# Patient Record
Sex: MALE | Race: WHITE | Employment: UNEMPLOYED | URBAN - METROPOLITAN AREA
[De-identification: names, ages, dates, MRNs, and addresses within clinical notes are randomized per-mention and may not be internally consistent; named-entity substitution may affect disease eponyms.]

---

## 2017-02-10 NOTE — PERIOP NOTES
ZACK PREOP PHONE INTERVIEW COMPLETED WITH:  WIFE, [address] [facility]. PATIENT ADVISED NOT TO EAT/DRINK ANYTHING PAST MIDNIGHT EVENING PRIOR TO SURGERY,  NOTHING TO EAT/DRINK MORNING OF SURGERY UNLESS SIP OF WATER TO SWALLOW MEDICATION;  LEAVE ALL VALUABLES AT HOME; DO BRING PICTURE ID, INSURANCE CARD AND ANY COPAY; WEAR COMFORTABLE CLOTHING;  NO PERFUMES, POWDERS, LOTIONS; NO ALCOHOL 24 HOURS BEFORE OR AFTER SURGERY;  WILL NEED TO BE DRIVEN HOME BY FAMILY OR FRIEND;  AVOID TAKING NSAIDS, ASPIRIN, FISH OIL, VITAMIN E OR GLUCOSAMINE/CHONDROITIN DURING THIS TIME PRIOR TO SURGERY;  MAY TAKE TYLENOL. INSTRUCTED TO REPORT  [address] BY SURGEON'S OFFICE.

## 2017-02-14 ENCOUNTER — ANESTHESIA EVENT (OUTPATIENT)
Dept: SURGERY | Age: 52
End: 2017-02-14
Payer: MEDICAID

## 2017-02-14 ENCOUNTER — HOSPITAL ENCOUNTER (OUTPATIENT)
Age: 52
Setting detail: OUTPATIENT SURGERY
Discharge: HOME | End: 2017-02-14
Payer: MEDICAID

## 2017-02-14 ENCOUNTER — ANESTHESIA (OUTPATIENT)
Dept: SURGERY | Age: 52
End: 2017-02-14
Payer: MEDICAID

## 2017-02-14 VITALS
WEIGHT: 159 LBS | BODY MASS INDEX: 24.96 KG/M2 | HEART RATE: 70 BPM | DIASTOLIC BLOOD PRESSURE: 66 MMHG | RESPIRATION RATE: 16 BRPM | TEMPERATURE: 98 F | SYSTOLIC BLOOD PRESSURE: 132 MMHG | OXYGEN SATURATION: 95 % | HEIGHT: 67 IN

## 2017-02-14 LAB
ANION GAP BLD CALC-SCNC: 19 MMOL/L (ref 5–15)
BUN BLD-MCNC: 16 MG/DL (ref 9–20)
CA-I BLD-MCNC: 1.06 MMOL/L (ref 1.12–1.32)
CHLORIDE BLD-SCNC: 98 MMOL/L (ref 98–107)
CO2 BLD-SCNC: 24 MMOL/L (ref 21–32)
CREAT BLD-MCNC: 2.7 MG/DL (ref 0.6–1.3)
GLUCOSE BLD-MCNC: 95 MG/DL (ref 75–110)
HCT VFR BLD CALC: 39 % (ref 36.6–50.3)
HGB BLD-MCNC: 13.3 GM/DL (ref 12.1–17)
POTASSIUM BLD-SCNC: 4.2 MMOL/L (ref 3.5–5.1)
SERVICE CMNT-IMP: ABNORMAL
SODIUM BLD-SCNC: 135 MMOL/L (ref 136–145)

## 2017-02-14 PROCEDURE — 77030031139 HC SUT VCRL2 J&J -A

## 2017-02-14 PROCEDURE — 76210000016 HC OR PH I REC 1 TO 1.5 HR

## 2017-02-14 PROCEDURE — 74011250636 HC RX REV CODE- 250/636

## 2017-02-14 PROCEDURE — 77030003601 HC NDL NRV BLK BBMI -A

## 2017-02-14 PROCEDURE — 77030032490 HC SLV COMPR SCD KNE COVD -B

## 2017-02-14 PROCEDURE — 74011000258 HC RX REV CODE- 258: Performed by: ANESTHESIOLOGY

## 2017-02-14 PROCEDURE — 74011000250 HC RX REV CODE- 250

## 2017-02-14 PROCEDURE — C1768 GRAFT, VASCULAR: HCPCS

## 2017-02-14 PROCEDURE — 80047 BASIC METABLC PNL IONIZED CA: CPT

## 2017-02-14 PROCEDURE — 76010000149 HC OR TIME 1 TO 1.5 HR

## 2017-02-14 PROCEDURE — 74011250636 HC RX REV CODE- 250/636: Performed by: ANESTHESIOLOGY

## 2017-02-14 PROCEDURE — C1757 CATH, THROMBECTOMY/EMBOLECT: HCPCS

## 2017-02-14 PROCEDURE — 77030002924 HC SUT GORTX WLGO -B

## 2017-02-14 PROCEDURE — 76060000033 HC ANESTHESIA 1 TO 1.5 HR

## 2017-02-14 PROCEDURE — 77030020256 HC SOL INJ NACL 0.9%  500ML

## 2017-02-14 PROCEDURE — 77030002916 HC SUT ETHLN J&J -A

## 2017-02-14 PROCEDURE — 77030011640 HC PAD GRND REM COVD -A

## 2017-02-14 PROCEDURE — 76210000020 HC REC RM PH II FIRST 0.5 HR

## 2017-02-14 PROCEDURE — 74011250637 HC RX REV CODE- 250/637: Performed by: ANESTHESIOLOGY

## 2017-02-14 DEVICE — GRAFT VASC L80CM ID7MM EPTFE THN WALLED STRTCH TECHNOLOGY N: Type: IMPLANTABLE DEVICE | Site: ARM | Status: FUNCTIONAL

## 2017-02-14 RX ORDER — MORPHINE SULFATE 10 MG/ML
2 INJECTION, SOLUTION INTRAMUSCULAR; INTRAVENOUS
Status: DISCONTINUED | OUTPATIENT
Start: 2017-02-14 | End: 2017-02-14 | Stop reason: HOSPADM

## 2017-02-14 RX ORDER — MIDAZOLAM HYDROCHLORIDE 1 MG/ML
1 INJECTION, SOLUTION INTRAMUSCULAR; INTRAVENOUS AS NEEDED
Status: DISCONTINUED | OUTPATIENT
Start: 2017-02-14 | End: 2017-02-14 | Stop reason: HOSPADM

## 2017-02-14 RX ORDER — FENTANYL CITRATE 50 UG/ML
25 INJECTION, SOLUTION INTRAMUSCULAR; INTRAVENOUS
Status: COMPLETED | OUTPATIENT
Start: 2017-02-14 | End: 2017-02-14

## 2017-02-14 RX ORDER — SODIUM CHLORIDE 9 MG/ML
1000 INJECTION, SOLUTION INTRAVENOUS CONTINUOUS
Status: DISCONTINUED | OUTPATIENT
Start: 2017-02-14 | End: 2017-02-14 | Stop reason: HOSPADM

## 2017-02-14 RX ORDER — LIDOCAINE HYDROCHLORIDE 10 MG/ML
0.1 INJECTION, SOLUTION EPIDURAL; INFILTRATION; INTRACAUDAL; PERINEURAL AS NEEDED
Status: DISCONTINUED | OUTPATIENT
Start: 2017-02-14 | End: 2017-02-14 | Stop reason: HOSPADM

## 2017-02-14 RX ORDER — SODIUM CHLORIDE 0.9 % (FLUSH) 0.9 %
5-10 SYRINGE (ML) INJECTION AS NEEDED
Status: DISCONTINUED | OUTPATIENT
Start: 2017-02-14 | End: 2017-02-14 | Stop reason: HOSPADM

## 2017-02-14 RX ORDER — SODIUM CHLORIDE 9 MG/ML
50 INJECTION, SOLUTION INTRAVENOUS CONTINUOUS
Status: DISCONTINUED | OUTPATIENT
Start: 2017-02-14 | End: 2017-02-14 | Stop reason: HOSPADM

## 2017-02-14 RX ORDER — HYDROMORPHONE HYDROCHLORIDE 1 MG/ML
0.2 INJECTION, SOLUTION INTRAMUSCULAR; INTRAVENOUS; SUBCUTANEOUS
Status: DISCONTINUED | OUTPATIENT
Start: 2017-02-14 | End: 2017-02-14 | Stop reason: HOSPADM

## 2017-02-14 RX ORDER — MIDAZOLAM HYDROCHLORIDE 1 MG/ML
0.5 INJECTION, SOLUTION INTRAMUSCULAR; INTRAVENOUS
Status: DISCONTINUED | OUTPATIENT
Start: 2017-02-14 | End: 2017-02-14 | Stop reason: HOSPADM

## 2017-02-14 RX ORDER — OXYCODONE AND ACETAMINOPHEN 5; 325 MG/1; MG/1
1 TABLET ORAL AS NEEDED
Status: DISCONTINUED | OUTPATIENT
Start: 2017-02-14 | End: 2017-02-14 | Stop reason: HOSPADM

## 2017-02-14 RX ORDER — OXYCODONE AND ACETAMINOPHEN 5; 325 MG/1; MG/1
1 TABLET ORAL
Qty: 35 TAB | Refills: 0 | Status: SHIPPED | OUTPATIENT
Start: 2017-02-14 | End: 2017-10-19

## 2017-02-14 RX ORDER — CEFAZOLIN SODIUM IN 0.9 % NACL 2 G/50 ML
2 INTRAVENOUS SOLUTION, PIGGYBACK (ML) INTRAVENOUS
Status: COMPLETED | OUTPATIENT
Start: 2017-02-14 | End: 2017-02-14

## 2017-02-14 RX ORDER — PROPOFOL 10 MG/ML
INJECTION, EMULSION INTRAVENOUS AS NEEDED
Status: DISCONTINUED | OUTPATIENT
Start: 2017-02-14 | End: 2017-02-14 | Stop reason: HOSPADM

## 2017-02-14 RX ORDER — SODIUM CHLORIDE, SODIUM LACTATE, POTASSIUM CHLORIDE, CALCIUM CHLORIDE 600; 310; 30; 20 MG/100ML; MG/100ML; MG/100ML; MG/100ML
125 INJECTION, SOLUTION INTRAVENOUS CONTINUOUS
Status: DISCONTINUED | OUTPATIENT
Start: 2017-02-14 | End: 2017-02-14 | Stop reason: HOSPADM

## 2017-02-14 RX ORDER — FENTANYL CITRATE 50 UG/ML
50 INJECTION, SOLUTION INTRAMUSCULAR; INTRAVENOUS AS NEEDED
Status: DISCONTINUED | OUTPATIENT
Start: 2017-02-14 | End: 2017-02-14 | Stop reason: HOSPADM

## 2017-02-14 RX ORDER — ONDANSETRON 2 MG/ML
4 INJECTION INTRAMUSCULAR; INTRAVENOUS AS NEEDED
Status: DISCONTINUED | OUTPATIENT
Start: 2017-02-14 | End: 2017-02-14 | Stop reason: HOSPADM

## 2017-02-14 RX ORDER — DIPHENHYDRAMINE HYDROCHLORIDE 50 MG/ML
12.5 INJECTION, SOLUTION INTRAMUSCULAR; INTRAVENOUS AS NEEDED
Status: DISCONTINUED | OUTPATIENT
Start: 2017-02-14 | End: 2017-02-14 | Stop reason: HOSPADM

## 2017-02-14 RX ORDER — FENTANYL CITRATE 50 UG/ML
INJECTION, SOLUTION INTRAMUSCULAR; INTRAVENOUS
Status: COMPLETED
Start: 2017-02-14 | End: 2017-02-14

## 2017-02-14 RX ORDER — LIDOCAINE HYDROCHLORIDE 10 MG/ML
INJECTION INFILTRATION; PERINEURAL AS NEEDED
Status: DISCONTINUED | OUTPATIENT
Start: 2017-02-14 | End: 2017-02-14 | Stop reason: HOSPADM

## 2017-02-14 RX ORDER — SODIUM CHLORIDE 0.9 % (FLUSH) 0.9 %
5-10 SYRINGE (ML) INJECTION EVERY 8 HOURS
Status: DISCONTINUED | OUTPATIENT
Start: 2017-02-14 | End: 2017-02-14 | Stop reason: HOSPADM

## 2017-02-14 RX ORDER — PROPOFOL 10 MG/ML
INJECTION, EMULSION INTRAVENOUS
Status: DISCONTINUED | OUTPATIENT
Start: 2017-02-14 | End: 2017-02-14 | Stop reason: HOSPADM

## 2017-02-14 RX ADMIN — FENTANYL CITRATE 25 MCG: 50 INJECTION, SOLUTION INTRAMUSCULAR; INTRAVENOUS at 11:39

## 2017-02-14 RX ADMIN — MIDAZOLAM HYDROCHLORIDE 2 MG: 1 INJECTION, SOLUTION INTRAMUSCULAR; INTRAVENOUS at 09:19

## 2017-02-14 RX ADMIN — CEFAZOLIN 2 G: 1 INJECTION, POWDER, FOR SOLUTION INTRAMUSCULAR; INTRAVENOUS; PARENTERAL at 10:09

## 2017-02-14 RX ADMIN — FENTANYL CITRATE 100 MCG: 50 INJECTION INTRAMUSCULAR; INTRAVENOUS at 09:19

## 2017-02-14 RX ADMIN — SODIUM CHLORIDE 50 ML/HR: 900 INJECTION, SOLUTION INTRAVENOUS at 09:02

## 2017-02-14 RX ADMIN — PROPOFOL 30 MG: 10 INJECTION, EMULSION INTRAVENOUS at 10:10

## 2017-02-14 RX ADMIN — FENTANYL CITRATE 25 MCG: 50 INJECTION, SOLUTION INTRAMUSCULAR; INTRAVENOUS at 12:01

## 2017-02-14 RX ADMIN — FENTANYL CITRATE 25 MCG: 50 INJECTION, SOLUTION INTRAMUSCULAR; INTRAVENOUS at 11:52

## 2017-02-14 RX ADMIN — SODIUM CHLORIDE: 900 INJECTION, SOLUTION INTRAVENOUS at 09:45

## 2017-02-14 RX ADMIN — OXYCODONE HYDROCHLORIDE AND ACETAMINOPHEN 1 TABLET: 5; 325 TABLET ORAL at 12:14

## 2017-02-14 RX ADMIN — PROPOFOL 20 MG: 10 INJECTION, EMULSION INTRAVENOUS at 10:30

## 2017-02-14 RX ADMIN — FENTANYL CITRATE 25 MCG: 50 INJECTION, SOLUTION INTRAMUSCULAR; INTRAVENOUS at 11:46

## 2017-02-14 RX ADMIN — PROPOFOL 25 MCG/KG/MIN: 10 INJECTION, EMULSION INTRAVENOUS at 10:05

## 2017-02-14 NOTE — BRIEF OP NOTE
BRIEF OPERATIVE NOTE    Date of Procedure: 2/14/2017   Preoperative Diagnosis: ESRD  Postoperative Diagnosis: ESRD    Procedure(s): Insertion left upper arm dialysis graft  Surgeon(s) and Role:     * Pio Stockton MD - Primary            Surgical Staff:  Circ-1: Venkat Bonilla RN  Circ-Relief: Venice Black  Circ-Intern: Ronel Diaz RN  Scrub RN-1: Bradley Gaspar RN  Surg Asst-1: Beba Parker  Surg Asst-Relief: Mary Iniguez  Event Time In   Incision Start 1021   Incision Close 1108     Anesthesia: Regional   Estimated Blood Loss: minimal  Specimens: * No specimens in log *   Findings: old graft could not be declotted   Complications: none  Implants:   Implant Name Type Inv.  Item Serial No.  Lot No. LRB No. Used Action   GRAFT TW STRTCH [id number] -- PROPATEN - [id number]   GRAFT TW STRTCH [id number] -- PROPATEN [id number] [employer] N/A Left 1 Implanted

## 2017-02-14 NOTE — DISCHARGE INSTRUCTIONS
Patient Discharge Instructions    Vi Ortega / [id number] : 1965    Admitted 2017 Discharged: 2017     Take Home Medications            · It is important that you take the medication exactly as they are prescribed. · Keep your medication in the bottles provided by the pharmacist and keep a list of the medication names, dosages, and times to be taken in your wallet. · Do not take other medications without consulting your doctor. What to do at Home    Recommended diet: Renal Diet,     Recommended activity: Activity as tolerated,     Additional Instructions: remove dressings on Th and leave open    Follow-up with Dr Harris Foreman in 2 weeks, call  [phone] for appt  -     ______________________________________________________________________    Anesthesia Discharge Instructions    After general anesthesia or intervenous sedation, for 24 hours or while taking prescription Narcotics:  · Limit your activities  · Do not drive or operate hazardous machinery  · If you have not urinated within 8 hours after discharge, please contact your surgeon on call. · Do not make important personal or business decisions  · Do not drink alcoholic beverages    Report the following to your surgeon:  · Excessive pain, swelling, redness or odor of or around the surgical area  · Temperature over 100.5 degrees  · Nausea and vomiting lasting longer than 4 hours or if unable to take medication  · Any signs of decreased circulation or nerve impairment to extremity:  Change in color, persistent numbness, tingling, coldness or increased pain.   · Any questions      5-325 mg of PERCOCET given in Recovery Room @ 12:15 today 2017

## 2017-02-14 NOTE — OP NOTES
[address] [place] [address], [address] [place] [address] Ave   OP NOTE       Name:  Mary Veloz   MR#:  [id number]   :  1965   Account #:  [de-identified]    Surgery Date:  2017   Date of Adm:  2017       PREOPERATIVE DIAGNOSIS: Renal failure with failed access. POSTOPERATIVE DIAGNOSIS: Renal failure with failed access. PROCEDURES PERFORMED: Insertion left upper arm [facility]   dialysis graft. SURGEON: Jasper Forbes MD    ANESTHESIA: Regional block. ESTIMATED BLOOD LOSS: Minimal.    SPECIMENS REMOVED: None. COMPLICATIONS: None. INDICATIONS: The patient is a 58-year-old male with end-stage renal   disease on hemodialysis. He had a previous left upper arm graft   placed. A tapered graft was used. The graft clotted repeatedly and   ultimately a catheter was placed. Initial plans were to attempt to declot   and revised the graft. DESCRIPTION OF PROCEDURE: The patient's left arm was prepped   and draped. A transverse incision was made over the lateral or arterial   end of the graft. The graft was identified and dissected free. A #4   Glendy catheter was passed through the body of the graft, but would   not pass through the venous side of the graft. It was felt that the graft   should be abandoned. A longitudinal incision was then made in the   proximal medial upper arm extending into the axilla. The brachial vein   and brachial artery were dissected free. Both were good sized vessels. A 7-mm thin-walled [facility] graft was tunneled in a loop   configuration around the previously placed graft using 3   counterincisions. The new graft was on the outer circumference of the   old graft. The arterial side was lateral. The graft was sewn end-to-side   to the brachial artery and end-to-side to the brachial vein using running   CV5 [facility]ki suture. Following this, there was good flow in the graft.    The incisions were closed with Vicryl subcutaneous suture and skin staples. Dressings were applied, and the patient was returned to the   recovery room in stable condition.         MD FAIZAN Cummings / SOFIE   D:  02/14/2017   11:15   T:  02/14/2017   11:34   Job #:  [id number]

## 2017-02-14 NOTE — ANESTHESIA PREPROCEDURE EVALUATION
Anesthetic History   No history of anesthetic complications            Review of Systems / Medical History  Patient summary reviewed, nursing notes reviewed and pertinent labs reviewed    Pulmonary    COPD    Sleep apnea           Neuro/Psych   Within defined limits           Cardiovascular    Hypertension                   GI/Hepatic/Renal         Renal disease  Liver disease     Endo/Other    Diabetes    Arthritis     Other Findings            Physical Exam    Airway  Mallampati: II  TM Distance: 4 - 6 cm  Neck ROM: normal range of motion   Mouth opening: Normal     Cardiovascular  Regular rate and rhythm,  S1 and S2 normal,  no murmur, click, rub, or gallop             Dental  No notable dental hx  Dentition: Poor dentition     Pulmonary  Breath sounds clear to auscultation               Abdominal  GI exam deferred       Other Findings            Anesthetic Plan    ASA: 3  Anesthesia type: regional - supraclavicular block          Induction: Intravenous  Anesthetic plan and risks discussed with: Patient

## 2017-02-14 NOTE — IP AVS SNAPSHOT
[address] 
[phone] Patient: Mercy Ritter. MRN: [id number] QPW:[id number] You are allergic to the following Allergen Reactions Ativan (Lorazepam) Other (comments) Hyper activity Recent Documentation Height Weight BMI Smoking Status 1.702 m 72.1 kg 24.9 kg/m2 Current Every Day Smoker Emergency Contacts Name Discharge Info Relation Home Work Mobile Sarah Beth Alba DISCHARGE CAREGIVER [3] Spouse [3] [phone] About your hospitalization You were admitted on:  February 14, 2017 You last received care in the:  [facility] PACU You were discharged on:  February 14, 2017 Unit phone number:  [phone] Why you were hospitalized Your primary diagnosis was:  Not on File Providers Seen During Your Hospitalizations Provider Role Specialty Primary office phone Liudmila Rhodes MD Attending Provider Vascular Surgery [phone] Your Primary Care Physician (PCP) Primary Care Physician Office Phone Office [facility]ax [facility] [phone] Follow-up Information Follow up With Details Comments Contact Info Miri Lennon NP   [address] [address] 
[phone] Liudmila Rhodes MD Schedule an appointment as soon as possible for a visit in 2 week(s)  [address] 
[phone] Current Discharge Medication List  
  
CONTINUE these medications which have CHANGED Dose & Instructions Dispensing Information Comments Morning Noon Evening Bedtime * oxyCODONE-acetaminophen  mg per tablet Commonly known as:  PERCOCET 10 What changed:  Another medication with the same name was added. Make sure you understand how and when to take each. Your next dose is: Today, Tomorrow Other:  _________ Dose:  1 Tab Take 1 Tab by mouth three (3) times daily. Refills:  0  
     
   
   
   
  
 * oxyCODONE-acetaminophen 5-325 mg per tablet Commonly known as:  PERCOCET What changed: You were already taking a medication with the same name, and this prescription was added. Make sure you understand how and when to take each. Your next dose is: Today, Tomorrow Other:  _________ Dose:  1 Tab Take 1 Tab by mouth every four (4) hours as needed for Pain. Max Daily Amount: 6 Tabs. Quantity:  35 Tab Refills:  0  
     
   
   
   
  
 * Notice: This list has 2 medication(s) that are the same as other medications prescribed for you. Read the directions carefully, and ask your doctor or other care provider to review them with you. CONTINUE these medications which have NOT CHANGED Dose & Instructions Dispensing Information Comments Morning Noon Evening Bedtime  
 albuterol-ipratropium 2.5 mg-0.5 mg/3 ml Nebu Commonly known as:  Maury Snowball Your next dose is: Today, Tomorrow Other:  _________ Dose:  3 mL  
3 mL by Nebulization route daily. AT LUNCHTIME DAILY. Refills:  0  
     
   
   
   
  
 amLODIPine 10 mg tablet Commonly known as:  Alyssa Partida Your next dose is: Today, Tomorrow Other:  _________ Take  by mouth daily. Indications: uknown dose Refills:  0  
     
   
   
   
  
 folic acid 1 mg tablet Commonly known as:  Google Your next dose is: Today, Tomorrow Other:  _________ Take  by mouth daily. Refills:  0  
     
   
   
   
  
 gabapentin 300 mg capsule Commonly known as:  NEURONTIN Your next dose is: Today, Tomorrow Other:  _________ Dose:  300 mg Take 300 mg by mouth nightly. Refills:  0 NovoLOG Mix 70-30 FlexPen 100 unit/mL (70-30) Inpn Generic drug:  insulin aspart protamine/insulin aspart Your next dose is: Today, Tomorrow Other:  _________ Dose:  30 Units 30 Units by SubCUTAneous route two (2) times a day. Refills:  0 Oxygen Your next dose is: Today, Tomorrow Other:  _________  
   
   
 nightly. 2 LITER/NASAL Refills:  0 PROVENTIL 90 mcg/actuation inhaler Generic drug:  albuterol Your next dose is: Today, Tomorrow Other:  _________ Dose:  2 Puff Take 2 Puffs by inhalation every six (6) hours as needed. Refills:  0  
     
   
   
   
  
 umeclidinium-vilanterol 62.5-25 mcg/actuation inhaler Commonly known as:  Debarah Breeding Your next dose is: Today, Tomorrow Other:  _________ Dose:  1 Puff Take 1 Puff by inhalation daily. Refills:  0  
     
   
   
   
  
 VITAMIN B-1 100 mg tablet Generic drug:  thiamine Your next dose is: Today, Tomorrow Other:  _________ Take  by mouth daily. Refills:  0 Where to Get Your Medications Information on where to get these meds will be given to you by the nurse or doctor. ! Ask your nurse or doctor about these medications  
  oxyCODONE-acetaminophen 5-325 mg per tablet Discharge Instructions Patient Discharge Instructions Hamlet Abarca. / [id number] : 1965 Admitted 2017 Discharged: 2017 Take Home Medications · It is important that you take the medication exactly as they are prescribed. · Keep your medication in the bottles provided by the pharmacist and keep a list of the medication names, dosages, and times to be taken in your wallet. · Do not take other medications without consulting your doctor. What to do at [facility] Recommended diet: Renal Diet, Recommended activity: Activity as tolerated, Additional Instructions: remove dressings on Thurs and leave open Follow-up with Dr Melissa Hills in 2 weeks, call  [phone] for appt - 
 
 ______________________________________________________________________ Anesthesia Discharge Instructions After general anesthesia or intervenous sedation, for 24 hours or while taking prescription Narcotics: · Limit your activities · Do not drive or operate hazardous machinery · If you have not urinated within 8 hours after discharge, please contact your surgeon on call. · Do not make important personal or business decisions · Do not drink alcoholic beverages Report the following to your surgeon: 
· Excessive pain, swelling, redness or odor of or around the surgical area · Temperature over 100.5 degrees · Nausea and vomiting lasting longer than 4 hours or if unable to take medication · Any signs of decreased circulation or nerve impairment to extremity:  Change in color, persistent numbness, tingling, coldness or increased pain. · Any questions 5-325 mg of PERCOCET given in Recovery Room @ 12:15 today 2/14/2017 Discharge Orders None Introducing [facility]! Beau Evan introduces [employer] patient portal. Now you can access parts of your medical record, email your doctor's office, and request medication refills online. 1. In your internet browser, go to [email or web address] 2. Click on the First Time User? Click Here link in the Sign In box. You will see the New Member Sign Up page. 3. Enter your [employer] Access Code exactly as it appears below. You will not need to use this code after youve completed the sign-up process. If you do not sign up before the expiration date, you must request a new code. · [employer] Access Code: [id number] Expires: 5/10/2017  4:41 PM 
 
4. Enter the last four digits of your Social Security Number (xxxx) and Date of Birth (mm/dd/yyyy) as indicated and click Submit.  You will be taken to the next sign-up page. 5. Create a [employer] ID. This will be your [employer] login ID and cannot be changed, so think of one that is secure and easy to remember. 6. Create a [employer] password. You can change your password at any time. 7. Enter your Password Reset Question and Answer. This can be used at a later time if you forget your password. 8. Enter your e-mail address. You will receive e-mail notification when new information is available in [address] 9. Click Sign Up. You can now view and download portions of your medical record. 10. Click the Download Summary menu link to download a portable copy of your medical information. If you have questions, please visit the Frequently Asked Questions section of the [employer] website. Remember, [employer] is NOT to be used for urgent needs. For medical emergencies, dial 911. Now available from your iPhone and Android! General Information Please provide this summary of care documentation to your next provider. Patient Signature:  ____________________________________________________________ Date:  ____________________________________________________________  
  
Peg Holman Provider Signature:  ____________________________________________________________ Date:  ____________________________________________________________

## 2017-02-14 NOTE — ANESTHESIA POSTPROCEDURE EVALUATION
Post-Anesthesia Evaluation and Assessment    Patient: Hamlet Abarca. MRN: [id number]  SSN: [id number]    YOB: 1965  Age: 46 y.o. Sex: male       Cardiovascular Function/Vital Signs  Visit Vitals    /59    Pulse 64    Temp 36.6 °C (97.8 °F)    Resp 16    Ht 5' 7\" (1.702 m)    Wt 72.1 kg (159 lb)    SpO2 98%    BMI 24.9 kg/m2       Patient is status post regional anesthesia for Procedure(s): Insertion left upper arm dialysis graft. Nausea/Vomiting: None    Postoperative hydration reviewed and adequate. Pain:  Pain Scale 1: Numeric (0 - 10) (02/14/17 1119)  Pain Intensity 1: 0 (02/14/17 1119)   Managed    Neurological Status:   Neuro (WDL):  (s/p ISB) (02/14/17 1119)   At baseline    Mental Status and Level of Consciousness: Arousable    Pulmonary Status:   O2 Device: Nasal cannula (02/14/17 1119)   Adequate oxygenation and airway patent    Complications related to anesthesia: None    Post-anesthesia assessment completed.  No concerns    Signed By: Carmine Butler MD     February 14, 2017

## 2017-02-14 NOTE — ROUTINE PROCESS
Patient: Aaliyah Blanc. MRN: [id number]  SSN: [id number]   YOB: 1965  Age: 46 y.o. Sex: male     Patient is status post Procedure(s): Insertion left upper arm dialysis graft. Surgeon(s) and Role:     * Karma Avery MD - Primary    Local/Dose/Irrigation:  1% lidocaine plain, 10mL used                  Peripheral IV 02/14/17 Right Hand (Active)   Site Assessment Clean, dry, & intact 2/14/2017  9:01 AM   Phlebitis Assessment 0 2/14/2017  9:01 AM   Infiltration Assessment 0 2/14/2017  9:01 AM   Dressing Status Clean, dry, & intact; New 2/14/2017  9:01 AM   Dressing Type Tape;Transparent 2/14/2017  9:01 AM   Hub Color/Line Status Blue 2/14/2017  9:01 AM                           Dressing/Packing:  Wound Arm Left-DRESSING TYPE: 4 x 4;Special tape (comment) (02/14/17 1110)    Wound on midline abdomen (present prior to admission) with 4x4 and tape

## 2017-02-14 NOTE — ANESTHESIA PROCEDURE NOTES
Peripheral Block    Start time: 2/14/2017 9:20 AM  End time: 2/14/2017 9:27 AM  Performed by: ARMIN Newsome  Authorized by: ARMIN Newsome       Pre-procedure:    Indications: at surgeon's request and post-op pain management    Preanesthetic Checklist: patient identified, risks and benefits discussed, site marked, timeout performed, anesthesia consent given and patient being monitored    Timeout Time: 09:20          Block Type:   Block Type:  Supraclavicular  Laterality:  Left  Monitoring:  Standard ASA monitoring, continuous pulse ox, frequent vital sign checks, heart rate, responsive to questions and oxygen  Injection Technique:  Single shot  Procedures: ultrasound guided and nerve stimulator    Patient Position: supine  Prep: betadine and povidone-iodine 7.5% surgical scrub    Location:  Supraclavicular  Needle Type:  Stimuplex  Needle Gauge:  22 G  Needle Localization:  Nerve stimulator and ultrasound guidance  Motor Response: minimal motor response >0.4 mA    Medication Injected:  1.5%  mepivacaine  Volume (mL):  30    Assessment:  Number of attempts:  1  Injection Assessment:  Incremental injection every 5 mL, local visualized surrounding nerve on ultrasound, negative aspiration for blood, no intravascular symptoms, negative aspiration for CSF and no paresthesia  Patient tolerance:  Patient tolerated the procedure well with no immediate complications

## 2017-03-30 ENCOUNTER — HOSPITAL ENCOUNTER (OUTPATIENT)
Dept: CT IMAGING | Age: 52
Discharge: HOME | End: 2017-03-30
Payer: MEDICAID

## 2017-03-30 ENCOUNTER — HOSPITAL ENCOUNTER (OUTPATIENT)
Dept: GENERAL RADIOLOGY | Age: 52
Discharge: HOME | End: 2017-03-30
Payer: MEDICAID

## 2017-03-30 DIAGNOSIS — N50.89 MALE URINARY-GENITAL TRACT FISTULA: ICD-10-CM

## 2017-03-30 DIAGNOSIS — R93.89 ABNORMAL CHEST X-RAY: ICD-10-CM

## 2017-03-30 PROCEDURE — 74011250636 HC RX REV CODE- 250/636

## 2017-03-30 PROCEDURE — 74011000255 HC RX REV CODE- 255

## 2017-03-30 PROCEDURE — 74011636320 HC RX REV CODE- 636/320

## 2017-03-30 PROCEDURE — 71020 XR CHEST PA LAT: CPT

## 2017-03-30 PROCEDURE — 74177 CT ABD & PELVIS W/CONTRAST: CPT

## 2017-03-30 RX ORDER — SODIUM CHLORIDE 9 MG/ML
50 INJECTION, SOLUTION INTRAVENOUS
Status: COMPLETED | OUTPATIENT
Start: 2017-03-30 | End: 2017-03-30

## 2017-03-30 RX ORDER — BARIUM SULFATE 20 MG/ML
900 SUSPENSION ORAL
Status: COMPLETED | OUTPATIENT
Start: 2017-03-30 | End: 2017-03-30

## 2017-03-30 RX ORDER — SODIUM CHLORIDE 0.9 % (FLUSH) 0.9 %
10 SYRINGE (ML) INJECTION
Status: COMPLETED | OUTPATIENT
Start: 2017-03-30 | End: 2017-03-30

## 2017-03-30 RX ADMIN — SODIUM CHLORIDE 50 ML/HR: 900 INJECTION, SOLUTION INTRAVENOUS at 13:02

## 2017-03-30 RX ADMIN — BARIUM SULFATE 900 ML: 21 SUSPENSION ORAL at 13:02

## 2017-03-30 RX ADMIN — Medication 10 ML: at 13:02

## 2017-03-30 RX ADMIN — IOPAMIDOL 100 ML: 755 INJECTION, SOLUTION INTRAVENOUS at 13:02

## 2017-04-13 ENCOUNTER — CLINICAL SUPPORT (OUTPATIENT)
Dept: CARDIOLOGY CLINIC | Age: 52
End: 2017-04-13

## 2017-04-13 ENCOUNTER — OFFICE VISIT (OUTPATIENT)
Dept: CARDIOLOGY CLINIC | Age: 52
End: 2017-04-13

## 2017-04-13 VITALS
HEART RATE: 68 BPM | WEIGHT: 152.8 LBS | OXYGEN SATURATION: 96 % | RESPIRATION RATE: 16 BRPM | BODY MASS INDEX: 23.98 KG/M2 | SYSTOLIC BLOOD PRESSURE: 150 MMHG | DIASTOLIC BLOOD PRESSURE: 70 MMHG | HEIGHT: 67 IN

## 2017-04-13 DIAGNOSIS — B18.2 CHRONIC HEPATITIS C WITHOUT HEPATIC COMA (HCC): Chronic | ICD-10-CM

## 2017-04-13 DIAGNOSIS — I15.0 RENOVASCULAR HYPERTENSION: ICD-10-CM

## 2017-04-13 DIAGNOSIS — R01.1 HEART MURMUR: Primary | ICD-10-CM

## 2017-04-13 DIAGNOSIS — Z72.0 TOBACCO ABUSE: Chronic | ICD-10-CM

## 2017-04-13 DIAGNOSIS — R01.1 HEART MURMUR: ICD-10-CM

## 2017-04-13 RX ORDER — ERGOCALCIFEROL 1.25 MG/1
50000 CAPSULE ORAL
COMMUNITY
Start: 2017-03-17 | End: 2017-10-19

## 2017-04-13 RX ORDER — FUROSEMIDE 80 MG/1
TABLET ORAL
Refills: 3 | COMMUNITY
Start: 2017-03-29 | End: 2017-10-19 | Stop reason: DRUGHIGH

## 2017-04-13 RX ORDER — ALBUTEROL SULFATE 90 UG/1
AEROSOL, METERED RESPIRATORY (INHALATION)
Refills: 2 | COMMUNITY
Start: 2017-03-15 | End: 2017-10-19 | Stop reason: SDUPTHER

## 2017-04-13 RX ORDER — CARVEDILOL 6.25 MG/1
TABLET ORAL
Refills: 3 | COMMUNITY
Start: 2017-03-08 | End: 2018-02-22

## 2017-04-13 RX ORDER — CEPHALEXIN 500 MG/1
CAPSULE ORAL
COMMUNITY
Start: 2017-04-10 | End: 2017-10-19

## 2017-04-13 NOTE — MR AVS SNAPSHOT
Visit Information Date & Time Provider Department Dept. Phone Encounter #  
 4/13/2017  1:30 PM [facility], [address] [place] [address] [facility] [phone] [id number] Upcoming Health Maintenance Date Due  
 LIPID PANEL Q1 1965 FOOT EXAM Q1 2/20/1973 MICROALBUMIN Q1 2/20/1973 EYE EXAM RETINAL OR DILATED Q1 2/20/1973 Pneumococcal 19-64 Highest Risk (1 of 3 - PCV13) 2/20/1982 DTaP/Tdap/Td series (1 - Tdap) 12/13/2012 FOBT Q 1 YEAR AGE 50-75 2/20/2013 HEMOGLOBIN A1C Q6M 6/16/2013 INFLUENZA AGE 9 TO ADULT 8/1/2016 Allergies as of 4/13/2017  Review Complete On: 4/13/2017 By: Ruth Cohn LPN Severity Noted Reaction Type Reactions Ativan [Lorazepam]  07/26/2012   Side Effect Other (comments) Hyper activity Current Immunizations  Reviewed on 6/13/2016 Name Date Influenza Vaccine Split  Deferred (Patient Refused) TD Vaccine 12/12/2012  8:12 PM  
  
 Not reviewed this visit You Were Diagnosed With   
  
 Codes Comments Heart murmur    -  Primary ICD-10-CM: R01.1 ICD-9-CM: 785.2 Tobacco abuse     ICD-10-CM: Z72.0 ICD-9-CM: 305.1 Chronic hepatitis C without hepatic coma (HCC)     ICD-10-CM: B18.2 ICD-9-CM: 070.54 Renovascular hypertension     ICD-10-CM: I15.0 ICD-9-CM: 405.91 Vitals BP Pulse Resp Height(growth percentile) Weight(growth percentile) SpO2  
 150/70 (BP 1 Location: Right arm, BP Patient Position: Sitting) 68 16 5' 7\" (1.702 m) 152 lb 12.8 oz (69.3 kg) 96% BMI Smoking Status 23.93 kg/m2 Current Every Day Smoker Vitals History BMI and BSA Data Body Mass Index Body Surface Area  
 23.93 kg/m 2 1.81 m 2 Preferred Pharmacy Pharmacy Name Phone [facility], [address] [facility] Jose Angel [phone] Your Updated Medication List  
  
   
This list is accurate as of: 4/13/17  3:42 PM.  Always use your most recent med list.  
 albuterol-ipratropium 2.5 mg-0.5 mg/3 ml Nebu Commonly known as:  DUO-NEB  
3 mL by Nebulization route daily. AT LUNCHTIME DAILY. amLODIPine 10 mg tablet Commonly known as:  Herlene Pupa Take  by mouth daily. Indications: uknown dose BREO ELLIPTA IN Take  by inhalation. carvedilol 6.25 mg tablet Commonly known as:  COREG  
TAKE 1 TABLET BY MOUTH TWO TIMES A DAY  
  
 cephALEXin 500 mg capsule Commonly known as:  KEFLEX  
  
 folic acid 1 mg tablet Commonly known as:  Google Take  by mouth daily. furosemide 80 mg tablet Commonly known as:  LASIX TAKE 1 TABLET BY MOUTH ONCE A DAY  
  
 gabapentin 300 mg capsule Commonly known as:  NEURONTIN Take 300 mg by mouth nightly. NovoLOG Mix 70-30 FlexPen 100 unit/mL (70-30) Inpn Generic drug:  insulin aspart protamine/insulin aspart 30 Units by SubCUTAneous route two (2) times a day. * oxyCODONE-acetaminophen  mg per tablet Commonly known as:  PERCOCET 10 Take 1 Tab by mouth three (3) times daily. * oxyCODONE-acetaminophen 5-325 mg per tablet Commonly known as:  PERCOCET Take 1 Tab by mouth every four (4) hours as needed for Pain. Max Daily Amount: 6 Tabs. Oxygen  
nightly. 2 LITER/NASAL  
  
 PROVENTIL 90 mcg/actuation inhaler Generic drug:  albuterol Take 2 Puffs by inhalation every six (6) hours as needed. umeclidinium-vilanterol 62.5-25 mcg/actuation inhaler Commonly known as:  Arvid Shanel Take 1 Puff by inhalation daily. VENTOLIN HFA 90 mcg/actuation inhaler Generic drug:  albuterol INHALE 2 PUFFS FOUR TIMES A DAY AS NEEDED FOR WHEEZING  
  
 VITAMIN B-1 100 mg tablet Generic drug:  thiamine Take  by mouth daily. VITAMIN D2 50,000 unit capsule Generic drug:  ergocalciferol Take 50,000 Units by mouth every seven (7) days. * Notice:   This list has 2 medication(s) that are the same as other medications prescribed for you. Read the directions carefully, and ask your doctor or other care provider to review them with you. We Performed the Following AMB POC EKG ROUTINE W/ 12 LEADS, INTER & REP [24308 CPT(R)] To-Do List   
 04/13/2017 ECHO:  2D ECHO COMPLETE ADULT (TTE) W OR WO CONTR   
  
 04/20/2017 9:30 AM  
  Appointment with [facility] CT 2 at [facility] RAD CT ([phone]) CONTRAST STUDY: 1. The patient should not eat solid food four hours before the appointment but should be encouraged to drink clear liquids. 2. The patient will require IV access for contrast administration. 3. The patient should not take  Ibuprofen (Advil, Motrin, etc.) and Naproxen Sodium (Aleve, etc.)  on the day of the exam. Stopping non-steroidal anti-inflammatory agents (NSAIDs) like Ibuprofen decreases the risk of kidney damage from the x-ray contrast (dye). 4. Bring any non [facility] facility films/images pertaining to the area of interest with you on the day of appointment. 5. Bring current lab work if available(within last 90 days [facility]) ***If scheduled at [facility], iSTAT is not available, labs will need to be done before appointment*** 6. Check in at registration at least 30 minutes before appt time unless you were instructed to do otherwise. 7. If you have to drink oral contrast please pick it up any weekday prior to your appointment, if you cannot please check in 2 hrs  before appt time. Introducing \[facility]\"" & HEALTH SERVICES! Shelby Escobar introduces [employer] patient portal. Now you can access parts of your medical record, email your doctor's office, and request medication refills online. 1. In your internet browser, go to [email or web address] 2. Click on the First Time User? Click Here link in the Sign In box. You will see the New Member Sign Up page. 3. Enter your [employer] Access Code exactly as it appears below.  You will not need to use this code after youve completed the sign-up process. If you do not sign up before the expiration date, you must request a new code. · [employer] Access Code: [id number] Expires: 5/10/2017  5:41 PM 
 
4. Enter the last four digits of your Social Security Number (xxxx) and Date of Birth (mm/dd/yyyy) as indicated and click Submit. You will be taken to the next sign-up page. 5. Create a [employer] ID. This will be your [employer] login ID and cannot be changed, so think of one that is secure and easy to remember. 6. Create a [employer] password. You can change your password at any time. 7. Enter your Password Reset Question and Answer. This can be used at a later time if you forget your password. 8. Enter your e-mail address. You will receive e-mail notification when new information is available in [address] 9. Click Sign Up. You can now view and download portions of your medical record. 10. Click the Download Summary menu link to download a portable copy of your medical information. If you have questions, please visit the Frequently Asked Questions section of the [employer] website. Remember, [employer] is NOT to be used for urgent needs. For medical emergencies, dial 911. Now available from your iPhone and Android! Please provide this summary of care documentation to your next provider. Your primary care clinician is listed as Bran 42. If you have any questions after today's visit, please call [phone].
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
## 2017-04-20 ENCOUNTER — HOSPITAL ENCOUNTER (OUTPATIENT)
Dept: CT IMAGING | Age: 52
Discharge: HOME | End: 2017-04-20
Attending: INTERNAL MEDICINE
Payer: MEDICAID

## 2017-04-20 DIAGNOSIS — R93.89 ABNORMAL X-RAY EXAMINATION: ICD-10-CM

## 2017-04-20 PROCEDURE — 74011636320 HC RX REV CODE- 636/320: Performed by: INTERNAL MEDICINE

## 2017-04-20 PROCEDURE — 71260 CT THORAX DX C+: CPT

## 2017-04-20 PROCEDURE — 74011250636 HC RX REV CODE- 250/636: Performed by: INTERNAL MEDICINE

## 2017-04-20 RX ORDER — SODIUM CHLORIDE 0.9 % (FLUSH) 0.9 %
10 SYRINGE (ML) INJECTION
Status: COMPLETED | OUTPATIENT
Start: 2017-04-20 | End: 2017-04-20

## 2017-04-20 RX ORDER — SODIUM CHLORIDE 9 MG/ML
50 INJECTION, SOLUTION INTRAVENOUS
Status: COMPLETED | OUTPATIENT
Start: 2017-04-20 | End: 2017-04-20

## 2017-04-20 RX ADMIN — Medication 10 ML: at 09:25

## 2017-04-20 RX ADMIN — IOPAMIDOL 80 ML: 612 INJECTION, SOLUTION INTRAVENOUS at 09:25

## 2017-04-20 RX ADMIN — SODIUM CHLORIDE 50 ML/HR: 900 INJECTION, SOLUTION INTRAVENOUS at 09:25

## 2017-09-25 ENCOUNTER — HOSPITAL ENCOUNTER (OUTPATIENT)
Dept: GENERAL RADIOLOGY | Age: 52
Discharge: HOME | End: 2017-09-25
Payer: MEDICAID

## 2017-09-25 DIAGNOSIS — J90 PLEURAL EFFUSION: ICD-10-CM

## 2017-09-25 PROCEDURE — 71020 XR CHEST PA LAT: CPT

## 2017-10-19 ENCOUNTER — HOSPITAL ENCOUNTER (OUTPATIENT)
Age: 52
Setting detail: OBSERVATION
Discharge: HOME | DRG: 254 | End: 2017-10-21
Attending: EMERGENCY MEDICINE | Admitting: INTERNAL MEDICINE
Payer: MEDICAID

## 2017-10-19 DIAGNOSIS — K92.1 MELENA: Primary | ICD-10-CM

## 2017-10-19 DIAGNOSIS — F10.10 ALCOHOL ABUSE: ICD-10-CM

## 2017-10-19 PROBLEM — K92.2 ACUTE GI BLEEDING: Status: ACTIVE | Noted: 2017-10-19

## 2017-10-19 PROBLEM — N18.6 ESRD ON DIALYSIS (HCC): Status: ACTIVE | Noted: 2017-10-19

## 2017-10-19 PROBLEM — I10 HTN (HYPERTENSION): Status: ACTIVE | Noted: 2017-10-19

## 2017-10-19 PROBLEM — Z99.2 ESRD ON DIALYSIS (HCC): Status: ACTIVE | Noted: 2017-10-19

## 2017-10-19 LAB
ABO + RH BLD: NORMAL
ALBUMIN SERPL-MCNC: 3 G/DL (ref 3.5–5)
ALBUMIN/GLOB SERPL: 0.8 {RATIO} (ref 1.1–2.2)
ALP SERPL-CCNC: 70 U/L (ref 45–117)
ALT SERPL-CCNC: 22 U/L (ref 12–78)
ANION GAP SERPL CALC-SCNC: 10 MMOL/L (ref 5–15)
AST SERPL-CCNC: 10 U/L (ref 15–37)
BASOPHILS # BLD: 0 K/UL (ref 0–0.1)
BASOPHILS NFR BLD: 0 % (ref 0–1)
BILIRUB SERPL-MCNC: 0.3 MG/DL (ref 0.2–1)
BLOOD GROUP ANTIBODIES SERPL: NORMAL
BUN SERPL-MCNC: 31 MG/DL (ref 6–20)
BUN/CREAT SERPL: 9 (ref 12–20)
CALCIUM SERPL-MCNC: 8.2 MG/DL (ref 8.5–10.1)
CHLORIDE SERPL-SCNC: 99 MMOL/L (ref 97–108)
CO2 SERPL-SCNC: 23 MMOL/L (ref 21–32)
CREAT SERPL-MCNC: 3.41 MG/DL (ref 0.7–1.3)
EOSINOPHIL # BLD: 0 K/UL (ref 0–0.4)
EOSINOPHIL NFR BLD: 0 % (ref 0–7)
ERYTHROCYTE [DISTWIDTH] IN BLOOD BY AUTOMATED COUNT: 15 % (ref 11.5–14.5)
ETHANOL SERPL-MCNC: 55 MG/DL
GLOBULIN SER CALC-MCNC: 3.7 G/DL (ref 2–4)
GLUCOSE BLD STRIP.AUTO-MCNC: 141 MG/DL (ref 65–100)
GLUCOSE SERPL-MCNC: 140 MG/DL (ref 65–100)
HCT VFR BLD AUTO: 24.7 % (ref 36.6–50.3)
HGB BLD-MCNC: 8.2 G/DL (ref 12.1–17)
INR PPP: 1.1 (ref 0.9–1.1)
LYMPHOCYTES # BLD: 0.9 K/UL (ref 0.8–3.5)
LYMPHOCYTES NFR BLD: 9 % (ref 12–49)
MCH RBC QN AUTO: 34.6 PG (ref 26–34)
MCHC RBC AUTO-ENTMCNC: 33.2 G/DL (ref 30–36.5)
MCV RBC AUTO: 104.2 FL (ref 80–99)
MONOCYTES # BLD: 0.8 K/UL (ref 0–1)
MONOCYTES NFR BLD: 7 % (ref 5–13)
NEUTS SEG # BLD: 8.7 K/UL (ref 1.8–8)
NEUTS SEG NFR BLD: 84 % (ref 32–75)
PLATELET # BLD AUTO: 251 K/UL (ref 150–400)
POTASSIUM SERPL-SCNC: 4.1 MMOL/L (ref 3.5–5.1)
PROT SERPL-MCNC: 6.7 G/DL (ref 6.4–8.2)
PROTHROMBIN TIME: 10.6 SEC (ref 9–11.1)
RBC # BLD AUTO: 2.37 M/UL (ref 4.1–5.7)
SERVICE CMNT-IMP: ABNORMAL
SODIUM SERPL-SCNC: 132 MMOL/L (ref 136–145)
SPECIMEN EXP DATE BLD: NORMAL
WBC # BLD AUTO: 10.4 K/UL (ref 4.1–11.1)

## 2017-10-19 PROCEDURE — 74011250637 HC RX REV CODE- 250/637: Performed by: INTERNAL MEDICINE

## 2017-10-19 PROCEDURE — 74011000250 HC RX REV CODE- 250: Performed by: INTERNAL MEDICINE

## 2017-10-19 PROCEDURE — 80053 COMPREHEN METABOLIC PANEL: CPT | Performed by: EMERGENCY MEDICINE

## 2017-10-19 PROCEDURE — 65660000000 HC RM CCU STEPDOWN

## 2017-10-19 PROCEDURE — 96374 THER/PROPH/DIAG INJ IV PUSH: CPT

## 2017-10-19 PROCEDURE — 74011250636 HC RX REV CODE- 250/636: Performed by: EMERGENCY MEDICINE

## 2017-10-19 PROCEDURE — 85610 PROTHROMBIN TIME: CPT | Performed by: EMERGENCY MEDICINE

## 2017-10-19 PROCEDURE — C9113 INJ PANTOPRAZOLE SODIUM, VIA: HCPCS | Performed by: EMERGENCY MEDICINE

## 2017-10-19 PROCEDURE — 82962 GLUCOSE BLOOD TEST: CPT

## 2017-10-19 PROCEDURE — 74011250637 HC RX REV CODE- 250/637: Performed by: EMERGENCY MEDICINE

## 2017-10-19 PROCEDURE — 74011000250 HC RX REV CODE- 250: Performed by: EMERGENCY MEDICINE

## 2017-10-19 PROCEDURE — 80307 DRUG TEST PRSMV CHEM ANLYZR: CPT | Performed by: EMERGENCY MEDICINE

## 2017-10-19 PROCEDURE — 85025 COMPLETE CBC W/AUTO DIFF WBC: CPT | Performed by: EMERGENCY MEDICINE

## 2017-10-19 PROCEDURE — 86900 BLOOD TYPING SEROLOGIC ABO: CPT | Performed by: EMERGENCY MEDICINE

## 2017-10-19 PROCEDURE — 74011250636 HC RX REV CODE- 250/636: Performed by: INTERNAL MEDICINE

## 2017-10-19 PROCEDURE — C9113 INJ PANTOPRAZOLE SODIUM, VIA: HCPCS | Performed by: INTERNAL MEDICINE

## 2017-10-19 PROCEDURE — 96376 TX/PRO/DX INJ SAME DRUG ADON: CPT

## 2017-10-19 PROCEDURE — 36415 COLL VENOUS BLD VENIPUNCTURE: CPT | Performed by: EMERGENCY MEDICINE

## 2017-10-19 PROCEDURE — 87205 SMEAR GRAM STAIN: CPT | Performed by: EMERGENCY MEDICINE

## 2017-10-19 PROCEDURE — 99218 HC RM OBSERVATION: CPT

## 2017-10-19 PROCEDURE — 99285 EMERGENCY DEPT VISIT HI MDM: CPT

## 2017-10-19 RX ORDER — CARVEDILOL 6.25 MG/1
6.25 TABLET ORAL 2 TIMES DAILY WITH MEALS
Status: DISCONTINUED | OUTPATIENT
Start: 2017-10-19 | End: 2017-10-21 | Stop reason: HOSPADM

## 2017-10-19 RX ORDER — GABAPENTIN 300 MG/1
300 CAPSULE ORAL 2 TIMES DAILY
Status: DISCONTINUED | OUTPATIENT
Start: 2017-10-19 | End: 2017-10-21 | Stop reason: HOSPADM

## 2017-10-19 RX ORDER — POLYETHYLENE GLYCOL 3350 17 G/17G
17 POWDER, FOR SOLUTION ORAL
COMMUNITY
End: 2018-02-22

## 2017-10-19 RX ORDER — IBUPROFEN 200 MG
1 TABLET ORAL DAILY
Status: DISCONTINUED | OUTPATIENT
Start: 2017-10-19 | End: 2017-10-21 | Stop reason: HOSPADM

## 2017-10-19 RX ORDER — FUROSEMIDE 80 MG/1
80 TABLET ORAL 2 TIMES DAILY
COMMUNITY
End: 2020-01-01

## 2017-10-19 RX ORDER — OXYCODONE AND ACETAMINOPHEN 5; 325 MG/1; MG/1
1 TABLET ORAL
Status: COMPLETED | OUTPATIENT
Start: 2017-10-19 | End: 2017-10-19

## 2017-10-19 RX ORDER — GABAPENTIN 300 MG/1
300 CAPSULE ORAL
COMMUNITY

## 2017-10-19 RX ORDER — IBUPROFEN 200 MG
1 TABLET ORAL DAILY
Status: DISCONTINUED | OUTPATIENT
Start: 2017-10-20 | End: 2017-10-19

## 2017-10-19 RX ORDER — OXYCODONE AND ACETAMINOPHEN 10; 325 MG/1; MG/1
1 TABLET ORAL
Status: DISCONTINUED | OUTPATIENT
Start: 2017-10-19 | End: 2017-10-21 | Stop reason: HOSPADM

## 2017-10-19 RX ORDER — LIDOCAINE AND PRILOCAINE 25; 25 MG/G; MG/G
CREAM TOPICAL AS NEEDED
COMMUNITY

## 2017-10-19 RX ADMIN — OXYCODONE HYDROCHLORIDE AND ACETAMINOPHEN 1 TABLET: 5; 325 TABLET ORAL at 22:09

## 2017-10-19 RX ADMIN — OXYCODONE HYDROCHLORIDE AND ACETAMINOPHEN 1 TABLET: 5; 325 TABLET ORAL at 22:18

## 2017-10-19 RX ADMIN — SODIUM CHLORIDE 40 MG: 9 INJECTION INTRAMUSCULAR; INTRAVENOUS; SUBCUTANEOUS at 23:02

## 2017-10-19 RX ADMIN — SODIUM CHLORIDE 40 MG: 9 INJECTION INTRAMUSCULAR; INTRAVENOUS; SUBCUTANEOUS at 17:17

## 2017-10-19 RX ADMIN — CARVEDILOL 6.25 MG: 6.25 TABLET, FILM COATED ORAL at 23:02

## 2017-10-19 RX ADMIN — GABAPENTIN 300 MG: 300 CAPSULE ORAL at 23:02

## 2017-10-19 NOTE — ED NOTES
Bedside shift change report given to MARYSOL Hunter (oncoming nurse) by Mira Omalley (offgoing nurse). Report included the following information SBAR, Kardex, ED Summary and MAR.

## 2017-10-19 NOTE — IP AVS SNAPSHOT
[facility] 
[phone] Patient: Tonia Horan. MRN: [id number] YDS:5[birth year]5 You are allergic to the following Allergen Reactions Ativan (Lorazepam) Other (comments) Hyper activity Recent Documentation Height Weight BMI Smoking Status 1.651 m 71.7 kg 26.3 kg/m2 Current Every Day Smoker Emergency Contacts Name Discharge Info Relation Home Work Mobile Sarah Beth Alba DISCHARGE CAREGIVER [3] Spouse [3] [phone] About your hospitalization You were admitted on:  October 19, 2017 You last received care in the:  [facility] 2 GENERAL SURGERY You were discharged on:  October 21, 2017 Unit phone number:  [phone] Why you were hospitalized Your primary diagnosis was:  Not on File Your diagnoses also included:  Acute Gi Bleeding, Dm (Diabetes Mellitus) (Hcc), Tobacco Abuse, Alcohol Abuse, Daily Use, Copd (Chronic Obstructive Pulmonary Disease) (Hcc), Hcv (Hepatitis C Virus), Htn (Hypertension), Esrd On Dialysis (Hcc) Providers Seen During Your Hospitalizations Provider Role Specialty Primary office phone Danny Funes MD Attending Provider Emergency Medicine [phone] Kirsty Luis MD Attending Provider Internal Medicine [phone] Your Primary Care Physician (PCP) Primary Care Physician Office Phone Office Fax Melvi Han [phone] Follow-up Information Follow up With Details Comments Contact Info Emily Redding NP   [address] [address] 
[phone] Current Discharge Medication List  
  
START taking these medications Dose & Instructions Dispensing Information Comments Morning Noon Evening Bedtime  
 amoxicillin 250 mg capsule Commonly known as:  AMOXIL Your last dose was: Your next dose is: Dose:  250 mg Take 1 Cap by mouth daily. Take after dialysis on your dialysis days Quantity:  5 Cap Refills:  0 Omeprazole delayed release 20 mg tablet Commonly known as:  PRILOSEC D/R Your last dose was: Your next dose is:    
   
   
 Dose:  20 mg Take 1 Tab by mouth two (2) times a day. Quantity:  60 Tab Refills:  0 CONTINUE these medications which have CHANGED Dose & Instructions Dispensing Information Comments Morning Noon Evening Bedtime  
 furosemide 80 mg tablet Commonly known as:  LASIX What changed:  Another medication with the same name was removed. Continue taking this medication, and follow the directions you see here. Your last dose was: Your next dose is:    
   
   
 Dose:  80 mg Take 80 mg by mouth two (2) times a day. Refills:  0  
     
   
   
   
  
 gabapentin 300 mg capsule Commonly known as:  NEURONTIN What changed:  Another medication with the same name was removed. Continue taking this medication, and follow the directions you see here. Your last dose was: Your next dose is:    
   
   
 Dose:  300 mg Take 300 mg by mouth two (2) times a day. Refills:  0 CONTINUE these medications which have NOT CHANGED Dose & Instructions Dispensing Information Comments Morning Noon Evening Bedtime  
 albuterol-ipratropium 2.5 mg-0.5 mg/3 ml Nebu Commonly known as:  Wyn Layer Your last dose was: Your next dose is:    
   
   
 Dose:  3 mL  
3 mL by Nebulization route four (4) times daily. AT LUNCHTIME DAILY. Refills:  0  
     
   
   
   
  
 b complex-vitamin c-folic acid 1 mg capsule Commonly known as:  Christian Jung Your last dose was: Your next dose is:    
   
   
 Dose:  1 Cap Take 1 Cap by mouth daily. Refills:  0  
     
   
   
   
  
 carvedilol 6.25 mg tablet Commonly known as:  Kevin Palma Your last dose was: Your next dose is: TAKE 1 TABLET BY MOUTH TWO TIMES A DAY Refills:  3 EMLA topical cream  
Generic drug:  lidocaine-prilocaine Your last dose was: Your next dose is:    
   
   
 Apply  to affected area as needed for Pain. Patient applies to arm before dialysis on Monday, Wednesday, and Friday. Refills:  0 MIRALAX 17 gram packet Generic drug:  polyethylene glycol Your last dose was: Your next dose is:    
   
   
 Dose:  17 g Take 17 g by mouth daily as needed (constipation). Refills:  0 NovoLOG Mix 70-30 FlexPen 100 unit/mL (70-30) Inpn Generic drug:  insulin aspart protamine/insulin aspart Your last dose was: Your next dose is:    
   
   
 Dose:  30 Units 30 Units by SubCUTAneous route two (2) times a day. Refills:  0  
     
   
   
   
  
 oxyCODONE-acetaminophen  mg per tablet Commonly known as:  PERCOCET 10 Your last dose was: Your next dose is:    
   
   
 Dose:  1 Tab Take 1 Tab by mouth three (3) times daily. Refills:  0 PROVENTIL 90 mcg/actuation inhaler Generic drug:  albuterol Your last dose was: Your next dose is:    
   
   
 Dose:  2 Puff Take 2 Puffs by inhalation every six (6) hours as needed for Shortness of Breath. Refills:  0 STOP taking these medications VENTOLIN HFA 90 mcg/actuation inhaler Generic drug:  albuterol Where to Get Your Medications Information on where to get these meds will be given to you by the nurse or doctor. ! Ask your nurse or doctor about these medications  
  amoxicillin 250 mg capsule Omeprazole delayed release 20 mg tablet Discharge Instructions Gastrointestinal Bleeding: Care Instructions Your Care Instructions The digestive or gastrointestinal tract goes from the mouth to the anus. It is often called the GI tract. Bleeding can happen anywhere in the GI tract. It may be caused by an ulcer, an infection, or cancer. It may also be caused by medicines such as aspirin or ibuprofen. Light bleeding may not cause any symptoms at first. But if you continue to bleed for a while, you may feel very weak or tired. Sudden, heavy bleeding means you need to see a doctor right away. This kind of bleeding can be very dangerous. But it can usually be cured or controlled. The doctor may do some tests to find the cause of your bleeding. Follow-up care is a key part of your treatment and safety. Be sure to make and go to all appointments, and call your doctor if you are having problems. It's also a good idea to know your test results and keep a list of the medicines you take. How can you care for yourself at home? · Be safe with medicines. Take your medicines exactly as prescribed. Call your doctor if you think you are having a problem with your medicine. You will get more details on the specific medicines your doctor prescribes. · Do not take aspirin or other anti-inflammatory medicines, such as naproxen (Aleve) or ibuprofen (Advil, Motrin), without talking to your doctor first. Ask your doctor if it is okay to use acetaminophen (Tylenol). · Do not drink alcohol. · The bleeding may make you lose iron. So it's important to eat foods that have a lot of iron. These include red meat, shellfish, poultry, and eggs. They also include beans, raisins, whole-grain breads, and leafy green vegetables. If you want help planning meals, you can make an appointment with a dietitian. When should you call for help? Call 911 anytime you think you may need emergency care. For example, call if: 
· You have sudden, severe belly pain. · You vomit blood or what looks like coffee grounds. · You passed out (lost consciousness). · Your stools are maroon or very bloody. Call your doctor now or seek immediate medical care if: 
· You are dizzy or lightheaded, or you feel like you may faint. · Your stools are black and look like tar, or they have streaks of blood. · You have belly pain. · You vomit or have nausea. · You have trouble swallowing, or it hurts when you swallow. Watch closely for changes in your health, and be sure to contact your doctor if: 
· You do not get better as expected. Where can you learn more? Go to http://andrew-waldo.info/. Enter G356 in the search box to learn more about \"Gastrointestinal Bleeding: Care Instructions. \" Current as of: March 20, 2017 Content Version: 11.3 © 0287-9842 [employer] Care instructions adapted under license by [employer] (which disclaims liability or warranty for this information). If you have questions about a medical condition or this instruction, always ask your healthcare professional. [place] [address] any warranty or liability for your use of this information. Discharge Orders None Introducing [facility]! Otilia Robins introduces [employer] patient portal. Now you can access parts of your medical record, email your doctor's office, and request medication refills online. 1. In your internet browser, go to [email or web address] 2. Click on the First Time User? Click Here link in the Sign In box. You will see the New Member Sign Up page. 3. Enter your [employer] Access Code exactly as it appears below. You will not need to use this code after youve completed the sign-up process. If you do not sign up before the expiration date, you must request a new code. · [employer] Access Code: [email or web address] Expires: 12/24/2017  4:44 PM 
 
4. Enter the last four digits of your Social Security Number (xxxx) and Date of Birth (mm/dd/yyyy) as indicated and click Submit.  You will be taken to the next sign-up page. 5. Create a [employer] ID. This will be your [employer] login ID and cannot be changed, so think of one that is secure and easy to remember. 6. Create a [employer] password. You can change your password at any time. 7. Enter your Password Reset Question and Answer. This can be used at a later time if you forget your password. 8. Enter your e-mail address. You will receive e-mail notification when new information is available in [address] 9. Click Sign Up. You can now view and download portions of your medical record. 10. Click the Download Summary menu link to download a portable copy of your medical information. If you have questions, please visit the Frequently Asked Questions section of the [employer] website. Remember, [employer] is NOT to be used for urgent needs. For medical emergencies, dial 911. Now available from your iPhone and Android! General Information Please provide this summary of care documentation to your next provider. Patient Signature:  ____________________________________________________________ Date:  ____________________________________________________________  
  
[facility] Provider Signature:  ____________________________________________________________ Date:  ____________________________________________________________
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
## 2017-10-19 NOTE — ED NOTES
Assumed care of pt from [address] [place] [address]., RN. Pt resting quietly and in no acute distress at this time. Call bell within reach. VSS.

## 2017-10-20 LAB
ALBUMIN SERPL-MCNC: 2.7 G/DL (ref 3.5–5)
ALBUMIN/GLOB SERPL: 0.8 {RATIO} (ref 1.1–2.2)
ALP SERPL-CCNC: 61 U/L (ref 45–117)
ALT SERPL-CCNC: 19 U/L (ref 12–78)
ANION GAP SERPL CALC-SCNC: 8 MMOL/L (ref 5–15)
AST SERPL-CCNC: 9 U/L (ref 15–37)
BASOPHILS # BLD: 0 K/UL (ref 0–0.1)
BASOPHILS NFR BLD: 0 % (ref 0–1)
BILIRUB SERPL-MCNC: 0.4 MG/DL (ref 0.2–1)
BUN SERPL-MCNC: 34 MG/DL (ref 6–20)
BUN/CREAT SERPL: 8 (ref 12–20)
CALCIUM SERPL-MCNC: 8.1 MG/DL (ref 8.5–10.1)
CHLORIDE SERPL-SCNC: 100 MMOL/L (ref 97–108)
CO2 SERPL-SCNC: 24 MMOL/L (ref 21–32)
CREAT SERPL-MCNC: 4.01 MG/DL (ref 0.7–1.3)
EOSINOPHIL # BLD: 0.2 K/UL (ref 0–0.4)
EOSINOPHIL NFR BLD: 2 % (ref 0–7)
ERYTHROCYTE [DISTWIDTH] IN BLOOD BY AUTOMATED COUNT: 15.4 % (ref 11.5–14.5)
GLOBULIN SER CALC-MCNC: 3.6 G/DL (ref 2–4)
GLUCOSE BLD STRIP.AUTO-MCNC: 119 MG/DL (ref 65–100)
GLUCOSE BLD STRIP.AUTO-MCNC: 121 MG/DL (ref 65–100)
GLUCOSE BLD STRIP.AUTO-MCNC: 161 MG/DL (ref 65–100)
GLUCOSE BLD STRIP.AUTO-MCNC: 171 MG/DL (ref 65–100)
GLUCOSE BLD STRIP.AUTO-MCNC: 209 MG/DL (ref 65–100)
GLUCOSE SERPL-MCNC: 141 MG/DL (ref 65–100)
HCT VFR BLD AUTO: 24.7 % (ref 36.6–50.3)
HCT VFR BLD AUTO: 25.5 % (ref 36.6–50.3)
HCT VFR BLD AUTO: 25.9 % (ref 36.6–50.3)
HGB BLD-MCNC: 8.1 G/DL (ref 12.1–17)
HGB BLD-MCNC: 8.6 G/DL (ref 12.1–17)
HGB BLD-MCNC: 8.8 G/DL (ref 12.1–17)
IRON SATN MFR SERPL: 19 % (ref 20–50)
IRON SERPL-MCNC: 53 UG/DL (ref 35–150)
LYMPHOCYTES # BLD: 1.6 K/UL (ref 0.8–3.5)
LYMPHOCYTES NFR BLD: 17 % (ref 12–49)
MCH RBC QN AUTO: 34.5 PG (ref 26–34)
MCHC RBC AUTO-ENTMCNC: 32.8 G/DL (ref 30–36.5)
MCV RBC AUTO: 105.1 FL (ref 80–99)
MONOCYTES # BLD: 1.2 K/UL (ref 0–1)
MONOCYTES NFR BLD: 13 % (ref 5–13)
NEUTS SEG # BLD: 6.6 K/UL (ref 1.8–8)
NEUTS SEG NFR BLD: 68 % (ref 32–75)
PLATELET # BLD AUTO: 249 K/UL (ref 150–400)
POTASSIUM SERPL-SCNC: 4.6 MMOL/L (ref 3.5–5.1)
PROT SERPL-MCNC: 6.3 G/DL (ref 6.4–8.2)
RBC # BLD AUTO: 2.35 M/UL (ref 4.1–5.7)
SERVICE CMNT-IMP: ABNORMAL
SODIUM SERPL-SCNC: 132 MMOL/L (ref 136–145)
TIBC SERPL-MCNC: 284 UG/DL (ref 250–450)
WBC # BLD AUTO: 9.5 K/UL (ref 4.1–11.1)

## 2017-10-20 PROCEDURE — 85018 HEMOGLOBIN: CPT | Performed by: INTERNAL MEDICINE

## 2017-10-20 PROCEDURE — 96376 TX/PRO/DX INJ SAME DRUG ADON: CPT

## 2017-10-20 PROCEDURE — 82962 GLUCOSE BLOOD TEST: CPT

## 2017-10-20 PROCEDURE — 99218 HC RM OBSERVATION: CPT

## 2017-10-20 PROCEDURE — 96372 THER/PROPH/DIAG INJ SC/IM: CPT

## 2017-10-20 PROCEDURE — 65660000000 HC RM CCU STEPDOWN

## 2017-10-20 PROCEDURE — 96375 TX/PRO/DX INJ NEW DRUG ADDON: CPT

## 2017-10-20 PROCEDURE — C9113 INJ PANTOPRAZOLE SODIUM, VIA: HCPCS | Performed by: INTERNAL MEDICINE

## 2017-10-20 PROCEDURE — 83540 ASSAY OF IRON: CPT | Performed by: INTERNAL MEDICINE

## 2017-10-20 PROCEDURE — 36415 COLL VENOUS BLD VENIPUNCTURE: CPT | Performed by: INTERNAL MEDICINE

## 2017-10-20 PROCEDURE — 74011000250 HC RX REV CODE- 250: Performed by: INTERNAL MEDICINE

## 2017-10-20 PROCEDURE — 74011250637 HC RX REV CODE- 250/637: Performed by: INTERNAL MEDICINE

## 2017-10-20 PROCEDURE — 74011250636 HC RX REV CODE- 250/636: Performed by: INTERNAL MEDICINE

## 2017-10-20 PROCEDURE — 90935 HEMODIALYSIS ONE EVALUATION: CPT

## 2017-10-20 PROCEDURE — 74011000250 HC RX REV CODE- 250: Performed by: HOSPITALIST

## 2017-10-20 PROCEDURE — 94640 AIRWAY INHALATION TREATMENT: CPT

## 2017-10-20 PROCEDURE — 74011636637 HC RX REV CODE- 636/637: Performed by: INTERNAL MEDICINE

## 2017-10-20 PROCEDURE — 80053 COMPREHEN METABOLIC PANEL: CPT | Performed by: INTERNAL MEDICINE

## 2017-10-20 PROCEDURE — 85025 COMPLETE CBC W/AUTO DIFF WBC: CPT | Performed by: INTERNAL MEDICINE

## 2017-10-20 PROCEDURE — 74011000250 HC RX REV CODE- 250

## 2017-10-20 RX ORDER — LANOLIN ALCOHOL/MO/W.PET/CERES
100 CREAM (GRAM) TOPICAL DAILY
Status: DISCONTINUED | OUTPATIENT
Start: 2017-10-20 | End: 2017-10-21 | Stop reason: HOSPADM

## 2017-10-20 RX ORDER — IPRATROPIUM BROMIDE AND ALBUTEROL SULFATE 2.5; .5 MG/3ML; MG/3ML
3 SOLUTION RESPIRATORY (INHALATION)
Status: DISCONTINUED | OUTPATIENT
Start: 2017-10-20 | End: 2017-10-21 | Stop reason: HOSPADM

## 2017-10-20 RX ORDER — SODIUM CHLORIDE 0.9 % (FLUSH) 0.9 %
5-10 SYRINGE (ML) INJECTION AS NEEDED
Status: DISCONTINUED | OUTPATIENT
Start: 2017-10-20 | End: 2017-10-21 | Stop reason: HOSPADM

## 2017-10-20 RX ORDER — IPRATROPIUM BROMIDE AND ALBUTEROL SULFATE 2.5; .5 MG/3ML; MG/3ML
SOLUTION RESPIRATORY (INHALATION)
Status: COMPLETED
Start: 2017-10-20 | End: 2017-10-20

## 2017-10-20 RX ORDER — SODIUM CHLORIDE 0.9 % (FLUSH) 0.9 %
5-10 SYRINGE (ML) INJECTION EVERY 8 HOURS
Status: DISCONTINUED | OUTPATIENT
Start: 2017-10-20 | End: 2017-10-21 | Stop reason: HOSPADM

## 2017-10-20 RX ORDER — MAGNESIUM SULFATE 100 %
4 CRYSTALS MISCELLANEOUS AS NEEDED
Status: DISCONTINUED | OUTPATIENT
Start: 2017-10-20 | End: 2017-10-21 | Stop reason: HOSPADM

## 2017-10-20 RX ORDER — DEXTROSE 50 % IN WATER (D50W) INTRAVENOUS SYRINGE
12.5-25 AS NEEDED
Status: DISCONTINUED | OUTPATIENT
Start: 2017-10-20 | End: 2017-10-21 | Stop reason: HOSPADM

## 2017-10-20 RX ORDER — DEXTROSE, SODIUM CHLORIDE, AND POTASSIUM CHLORIDE 5; .45; .3 G/100ML; G/100ML; G/100ML
INJECTION INTRAVENOUS CONTINUOUS
Status: DISCONTINUED | OUTPATIENT
Start: 2017-10-20 | End: 2017-10-20

## 2017-10-20 RX ORDER — INSULIN LISPRO 100 [IU]/ML
INJECTION, SOLUTION INTRAVENOUS; SUBCUTANEOUS EVERY 6 HOURS
Status: DISCONTINUED | OUTPATIENT
Start: 2017-10-20 | End: 2017-10-21 | Stop reason: HOSPADM

## 2017-10-20 RX ORDER — IPRATROPIUM BROMIDE AND ALBUTEROL SULFATE 2.5; .5 MG/3ML; MG/3ML
3 SOLUTION RESPIRATORY (INHALATION) 4 TIMES DAILY
Status: DISCONTINUED | OUTPATIENT
Start: 2017-10-20 | End: 2017-10-20

## 2017-10-20 RX ORDER — IBUPROFEN 200 MG
1 TABLET ORAL DAILY
Status: DISCONTINUED | OUTPATIENT
Start: 2017-10-20 | End: 2017-10-21 | Stop reason: HOSPADM

## 2017-10-20 RX ORDER — IPRATROPIUM BROMIDE AND ALBUTEROL SULFATE 2.5; .5 MG/3ML; MG/3ML
3 SOLUTION RESPIRATORY (INHALATION) 4 TIMES DAILY
Status: DISCONTINUED | OUTPATIENT
Start: 2017-10-20 | End: 2017-10-21 | Stop reason: HOSPADM

## 2017-10-20 RX ADMIN — OXYCODONE HYDROCHLORIDE AND ACETAMINOPHEN 1 TABLET: 10; 325 TABLET ORAL at 07:02

## 2017-10-20 RX ADMIN — ERYTHROPOIETIN 20000 UNITS: 10000 INJECTION, SOLUTION INTRAVENOUS; SUBCUTANEOUS at 18:37

## 2017-10-20 RX ADMIN — GABAPENTIN 300 MG: 300 CAPSULE ORAL at 18:02

## 2017-10-20 RX ADMIN — IPRATROPIUM BROMIDE AND ALBUTEROL SULFATE 3 ML: .5; 3 SOLUTION RESPIRATORY (INHALATION) at 20:04

## 2017-10-20 RX ADMIN — Medication 10 ML: at 21:22

## 2017-10-20 RX ADMIN — OXYCODONE HYDROCHLORIDE AND ACETAMINOPHEN 1 TABLET: 10; 325 TABLET ORAL at 18:01

## 2017-10-20 RX ADMIN — IPRATROPIUM BROMIDE AND ALBUTEROL SULFATE 3 ML: .5; 3 SOLUTION RESPIRATORY (INHALATION) at 02:09

## 2017-10-20 RX ADMIN — IRON SUCROSE 100 MG: 20 INJECTION, SOLUTION INTRAVENOUS at 18:02

## 2017-10-20 RX ADMIN — IPRATROPIUM BROMIDE AND ALBUTEROL SULFATE 3 ML: .5; 3 SOLUTION RESPIRATORY (INHALATION) at 07:38

## 2017-10-20 RX ADMIN — SODIUM CHLORIDE 40 MG: 9 INJECTION INTRAMUSCULAR; INTRAVENOUS; SUBCUTANEOUS at 18:02

## 2017-10-20 RX ADMIN — IPRATROPIUM BROMIDE AND ALBUTEROL SULFATE 3 ML: .5; 3 SOLUTION RESPIRATORY (INHALATION) at 11:22

## 2017-10-20 RX ADMIN — INSULIN LISPRO 3 UNITS: 100 INJECTION, SOLUTION INTRAVENOUS; SUBCUTANEOUS at 23:44

## 2017-10-20 RX ADMIN — SODIUM CHLORIDE 40 MG: 9 INJECTION INTRAMUSCULAR; INTRAVENOUS; SUBCUTANEOUS at 08:23

## 2017-10-20 RX ADMIN — INSULIN LISPRO 2 UNITS: 100 INJECTION, SOLUTION INTRAVENOUS; SUBCUTANEOUS at 01:45

## 2017-10-20 RX ADMIN — Medication 10 ML: at 07:03

## 2017-10-20 RX ADMIN — CARVEDILOL 6.25 MG: 6.25 TABLET, FILM COATED ORAL at 18:01

## 2017-10-20 RX ADMIN — Medication 10 ML: at 01:46
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
## 2017-10-20 NOTE — PROGRESS NOTES
General Surgery End of Shift Nursing Note    Bedside shift change report given to Radha (oncoming nurse) by Felicitas Zhang (offgoing nurse). Report included the following information SBAR, MAR, Accordion, Recent Results and Cardiac Rhythm NSR. Shift worked:   7p-7a   Significant changes during shift:    Patient concerns: risks with anesthesia, need for hemodialysis   Non-emergent issues for physician to address:   No BM this shift, Hbg/Hct stable     Number times ambulated in hallway past shift: 0, ambulated in room    Number of times OOB to chair past shift: 0    Pain Management:  Current medication: percocet  Patient states pain is manageable on current pain medication: YES    GI:    Current diet:  DIET NPO    Tolerating current diet: YES  Passing flatus: YES  Last Bowel Movement: yesterday   Appearance:     Respiratory:    Incentive Spirometer at bedside: YES  Patient instructed on use: YES    Patient Safety:    Falls Score: 1  Bed Alarm On? No  Sitter?  No    Hortencia A Medardo

## 2017-10-20 NOTE — ED NOTES
Pt and pt's wife very upset and complaining about wait time for hospitalist. Reassured pt that he would be seen by hospitalist and asked what this RN could do to Kalia Slack him more comfortable\". Pt very upset and wanting to see MD. ER MD notified. Pt states \"take my IV out. I want to leave\". Reassured pt MD would be in to speak with him.
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
## 2017-10-20 NOTE — PROGRESS NOTES
Patient had colonoscopy Ashleigh Raman about 2 years ago    Dr. Alarcon Beat patient.   I left him a voice mail    Dhara Doe MD  12:54 PM  10/20/2017

## 2017-10-20 NOTE — PROGRESS NOTES
Nurse spoke with Dr Nyla Almanza, if patient wanted to eat and is aware his procedures will be delayed then he can have a diabetic diet, Nurse spoke with Dr Shila Valdes and he reports that patient will be seen by Dr Cj Acosta, nurse then called Dr. Marcella Dominguez office and he is not on today but will either be seen by Dr Mayco Friedman, or Dr Ibrahima Camarena.  Pt awaiting dialysis still upset

## 2017-10-20 NOTE — PROGRESS NOTES
Primary Nurse Marvin Muller and Abel Cardona RN performed a dual skin assessment on this patient Impairment noted- see wound doc flow sheet  Walter score is 22

## 2017-10-20 NOTE — PROCEDURES
[facility]  [phone]    Vitals   Pre   Post   Assessment   Pre   Post     Temp  Temp: 97.7 °F (36.5 °C) (10/20/17 1111)  97.5 LOC  A&OX4 A&OX4   HR   Pulse (Heart Rate): 75 (10/20/17 1350) 77 Lungs   diminished  diminished   B/P   BP: 177/70 (10/20/17 1350) 155/66 Cardiac   NSR  NSR   Resp   Resp Rate: 18 (10/20/17 1350) 16 Skin   intact  intact   Pain level  Pain Intensity 1: 9 (10/20/17 0702) 0 Edema    none   none   Orders:    Duration:   Start:    9611 End:    5896 Total:   3.25 hours   Dialyzer:   Dialyzer/Set Up Inspection: Revaclear (10/20/17 1350)   K Bath:   Dialysate K (mEq/L): 2 (10/20/17 1350)   Ca Bath:   Dialysate CA (mEq/L): 2.5 (10/20/17 1350)   Na/Bicarb:   Dialysate NA (mEq/L): 140 (10/20/17 1350)   Target Fluid Removal:   Goal/Amount of Fluid to Remove (mL): 5000 mL (10/20/17 1350)   Access     Type & Location:   FILEMON AVG, 15 gauge needles used. Post tx: sites held 10 minutes, bleeding stopped. Labs     Obtained/Reviewed   Critical Results Called   Date when labs were drawn-  Hgb-    HGB   Date Value Ref Range Status   10/20/2017 8.8 (L) 12.1 - 17.0 g/dL Final     K-    Potassium   Date Value Ref Range Status   10/20/2017 4.6 3.5 - 5.1 mmol/L Final     Ca-   Calcium   Date Value Ref Range Status   10/20/2017 8.1 (L) 8.5 - 10.1 MG/DL Final     Bun-   BUN   Date Value Ref Range Status   10/20/2017 34 (H) 6 - 20 MG/DL Final     Creat-   Creatinine   Date Value Ref Range Status   10/20/2017 4.01 (H) 0.70 - 1.30 MG/DL Final        Medications/ Blood Products Given     Name   Dose   Route and Time     none                Blood Volume Processed (BVP):    79 liters Net Fluid   Removed:  5000 ml   Comments Goal to 5 kg at patients request. Patient is 5-6 kg over his dry weight. Dr Ta Burnett in to see patient and agrees with this. No problems during tx. Iron Panel sent but venofer order in too late for me to give.  Report called to Gen surg nurse, Wyatt Romo, using SBAR  Time Out Done: yes,1350  Primary Nurse Rpt Pre: Leah George RN  Primary Nurse Rpt Post: Leah George  Pt Education: fluid restriction  Care Plan: Dialysis as ordered  Tx Summary: Ran 3.25 hours on 2 K 2.5 Ca removed  Admiting Diagnosis:  Pt's previous clinic-[facility]  Consent signed -yes    Teddyita Consent - yes  Hepatitis Status- antigen  Machine #- Machine Number: [id number] (10/20/17 1350)  Telemetry status-on tele  Pre-dialysis wt. - Pre-Dialysis Weight: 71.7 kg (158 lb 1.1 oz) (10/20/17 1350)

## 2017-10-20 NOTE — PROGRESS NOTES
Problem: Falls - Risk of  Goal: *Absence of Falls  Document Rhianna Fall Risk and appropriate interventions in the flowsheet.    Outcome: Progressing Towards Goal  Fall Risk Interventions:            Medication Interventions: Teach patient to arise slowly

## 2017-10-20 NOTE — PROGRESS NOTES
Pharmacy Clarification of Prior to Admission Medication Regimen     The patient was interviewed regarding clarification of the prior to admission medication regimen. Wife was present in room and obtained permission from patient to discuss drug regimen with visitor(s) present. Patient was questioned regarding use of any other inhalers, topical products, over the counter medications, herbal medications, vitamin products or ophthalmic/nasal/otic medication use. Information Obtained From: Patient, Patient's Wife, Medication List, RX Query    Pertinent Pharmacy Findings:   Patient stated that he receives dialysis at [facility] on Monday, Wednesday, and Friday. Patient stated that he just started receiving IV iron during his dialysis treatments on 10/16/2017. Patient last received dialysis on 10/18/2017. PTA medication list was corrected to the following:     Prior to Admission Medications   Prescriptions Last Dose Informant Patient Reported? Taking? albuterol (PROVENTIL) 90 mcg/Actuation inhaler 10/19/2017 at Unknown time Other Yes Yes   Sig: Take 2 Puffs by inhalation every six (6) hours as needed for Shortness of Breath. albuterol-ipratropium (DUO-NEB) 2.5 mg-0.5 mg/3 ml nebu 10/19/2017 at Unknown time Other Yes Yes   Sig: 3 mL by Nebulization route four (4) times daily. AT LUNCHTIME DAILY. b complex-vitamin c-folic acid (NEPHROCAPS) 1 mg capsule 10/19/2017 at Unknown time Other Yes Yes   Sig: Take 1 Cap by mouth daily. carvedilol (COREG) 6.25 mg tablet 10/19/2017 at Unknown time Other Yes Yes   Sig: TAKE 1 TABLET BY MOUTH TWO TIMES A DAY   furosemide (LASIX) 80 mg tablet 10/19/2017 at Unknown time Other Yes Yes   Sig: Take 80 mg by mouth two (2) times a day.   gabapentin (NEURONTIN) 300 mg capsule 10/19/2017 at Unknown time Other Yes Yes   Sig: Take 300 mg by mouth two (2) times a day.    insulin (NOVOLOG MIX 70-30 FLEXPEN) 100 unit/mL (70-30) flex pen 10/19/2017 at Unknown time Other Yes Yes Si Units by SubCUTAneous route two (2) times a day. lidocaine-prilocaine (EMLA) topical cream 10/18/2017 at Unknown time Other Yes Yes   Sig: Apply  to affected area as needed for Pain. Patient applies to arm before dialysis on Monday, Wednesday, and Friday. oxyCODONE-acetaminophen (PERCOCET 10)  mg per tablet 10/19/2017 at Unknown time Other Yes Yes   Sig: Take 1 Tab by mouth three (3) times daily. polyethylene glycol (MIRALAX) 17 gram packet 10/17/2017 at Unknown time Other Yes Yes   Sig: Take 17 g by mouth daily as needed (constipation).       Facility-Administered Medications: None          Thank you,  Edna Merritt CPhT  Medication History Pharmacy Technician

## 2017-10-20 NOTE — ED NOTES
Hospitalist assessed pt. Per MD, pt okay to eat until 0000. Provided pt with frozen meal and turkey sandwich. Pt resting quietly and in no acute distress at this time. VSS. Pt less agitated at this time, but states he needs dialysis tomorrow. Will notify MD. Wife went home for the night.

## 2017-10-20 NOTE — ED NOTES
TRANSFER - OUT REPORT:    Verbal report given to gen surg RN (name) on [facility].  being transferred to gen surg(unit) for routine progression of care       Report consisted of patients Situation, Background, Assessment and   Recommendations(SBAR). Information from the following report(s) SBAR, Kardex, ED Summary, Intake/Output, MAR and Recent Results was reviewed with the receiving nurse. Lines:   Peripheral IV 10/19/17 Right Antecubital (Active)   Site Assessment Clean, dry, & intact 10/19/2017  5:34 PM   Phlebitis Assessment 0 10/19/2017  5:34 PM   Infiltration Assessment 0 10/19/2017  5:34 PM   Dressing Status Clean, dry, & intact 10/19/2017  5:34 PM   Dressing Type 4 X 4 10/19/2017  5:34 PM        Opportunity for questions and clarification was provided.       Patient transported with:   [employer]

## 2017-10-20 NOTE — ED NOTES
Dr. Armond Maldonado spoke with pt. Pt and pt's family upset. This RN asked pt what would make him more comfortable. Pt states \"I need my medicine and I'm hungry\". Pt on clear liquid diet at this time and requesting broth. Called gen surg to tube broth to ER. This RN asked pt what medicine he is referring to. Pt states \"I need my pain medicine\". Per pt, he takes Percocet TID at home for chronic back pain. Percocet ordered per MD. After administering Percocet, pt requesting nicotine patch. Notified MD. MD ordered. This RN gave pt broth and nicotine patch.

## 2017-10-20 NOTE — PROGRESS NOTES
Patient frustrated that no doctors have been in to see him. Patient needs dialysis today and is also requesting to eat. Paged nephro at this time. 1000: Patient updated that Dr. Cecilia Meredith is aware of his situation and will see him today (unsure of time). Awaiting HD orders from nephro.

## 2017-10-20 NOTE — PROGRESS NOTES
Visited by Robert Herrera Partner in Gen Surg on October 20, 2017.     MIHAELA Yoder, [facility], [address]    [address] Paging Service  [phone]

## 2017-10-20 NOTE — PROGRESS NOTES
Hospitalist Progress Note    NAME: Gaye Allison. :  1965   MRN:  [id number]   LOS:   1      Assessment / Plan:  Acute GI bleed likely upper given melena  Chronic anemia  -no significant decline in hemoglobin up to 8.8, no dark stools here  -GI consulted, patient has seen Dr. Sandee Snyder in the past  -continue PPI  -macrocytic could be related to alcohol use, will check B12, iron studies    DM  -continue lower dose NPH (takes 30 units of 70/30 BID at home)  -SSI    Chronic abdominal wound  -recent abdominal wall cellulitis with open wound after surgery for hernia, concern for chronically infected mesh  -had seen Dr. Colby Styles on last admission for this with recommendations for surgery, patient wanted to follow up with his surgeon at [facility] at the time  -f/u wound culture, no signs of systemic infection    HCV (hepatitis C virus)   -no history of cirrhosis per patient     Tobacco abuse  -nicotine patch     Alcohol use with 2 beers a day  -thiamine  -denies heavy alcohol use     COPD (chronic obstructive pulmonary disease)   -continue inhalers and nebs     HTN (hypertension)   -coreg     ESRD on dialysis   -nephrology consulted for continuing HD     Chronic Pain  -continue pain meds    Code Status: Full  Surrogate Decision Maker: wife     DVT Prophylaxis: scd     Subjective:     Chief Complaint: dark stools  patietn hungry and wondering when he can eat no abd pain no more dark stools      Objective:     VITALS:   Last 24hrs VS reviewed since prior progress note.  Most recent are:  Patient Vitals for the past 24 hrs:   Temp Pulse Resp BP SpO2   10/20/17 1350 - 75 18 177/70 -   10/20/17 1122 - - - - 93 %   10/20/17 1111 97.7 °F (36.5 °C) 70 18 178/77 97 %   10/20/17 0820 97.3 °F (36.3 °C) 72 18 181/82 100 %   10/20/17 0739 - - - - 96 %   10/20/17 0327 98.5 °F (36.9 °C) 72 18 143/75 98 %   10/20/17 0209 - - - - 97 %   10/20/17 0101 - 70 - 136/76 97 %   10/20/17 0015 98.8 °F (37.1 °C) 70 18 118/76 95 %   10/19/17 2330 - - - 156/74 98 %   10/19/17 2302 - 72 - 162/80 -   10/19/17 2300 - - - 162/80 99 %   10/19/17 2230 - - - 148/54 98 %   10/19/17 2200 - - - 162/72 98 %   10/19/17 2130 - - - 155/70 98 %   10/19/17 2100 - - - 139/68 97 %   10/19/17 2030 - - - 160/77 98 %   10/19/17 2000 - - - 149/68 97 %   10/19/17 1930 - - - 153/70 99 %   10/19/17 1900 - - - 153/68 99 %   10/19/17 1855 - - - - 99 %   10/19/17 1830 - - - 140/66 97 %   10/19/17 1829 - - - 142/67 97 %   10/19/17 1827 - 71 - 144/66 -   10/19/17 1800 - - - 146/67 98 %   10/19/17 1730 - - - 143/65 97 %   10/19/17 1722 - - - 138/66 -   10/19/17 1646 98.7 °F (37.1 °C) 73 17 143/65 95 %       Intake/Output Summary (Last 24 hours) at 10/20/17 1405  Last data filed at 10/20/17 1350   Gross per 24 hour   Intake                0 ml   Output              400 ml   Net             -400 ml        PHYSICAL EXAM:  General: Alert, cooperative, no acute distress    EENT:  EOMI. Anicteric sclerae. Mucous membranes moist  Resp:  CTA bilaterally, no wheezing or rales. No accessory muscle use  CV:  Regular rhythm, no edema  GI:  Soft, non distended, non tender. +Bowel sounds  Neurologic:  Alert and oriented X 3, normal speech  Psych:   Good insight, not anxious nor agitated  Skin:  No rashes, no jaundice  ________________________________________________________________________  Care Plan discussed with:    Comments   Patient x    Family      RN x    Care Manager     Consultant                        Multidiciplinary team rounds were held today with , nursing, pharmacist and clinical coordinator. Patient's plan of care was discussed; medications were reviewed and discharge planning was addressed.      ________________________________________________________________________  Total NON critical care TIME:  25   Minutes    >50% of visit spent in counseling and coordination of care       ________________________________________________________________________    Procedures: see electronic medical records for all procedures/Xrays and details which were not copied into this note but were reviewed prior to creation of Plan. LABS:  I reviewed today's most current labs and imaging studies.   Pertinent labs include:  Recent Labs      10/20/17   1053  10/20/17   0222  10/19/17   1714   WBC   --   9.5  10.4   HGB  8.8*  8.1*  8.2*   HCT  25.5*  24.7*  24.7*   PLT   --   249  251     Recent Labs      10/20/17   0222  10/19/17   1714   NA  132*  132*   K  4.6  4.1   CL  100  99   CO2  24  23   GLU  141*  140*   BUN  34*  31*   CREA  4.01*  3.41*   CA  8.1*  8.2*   ALB  2.7*  3.0*   TBILI  0.4  0.3   SGOT  9*  10*   ALT  19  22   INR   --   1.1       Signed: Robert West MD

## 2017-10-21 VITALS
DIASTOLIC BLOOD PRESSURE: 69 MMHG | RESPIRATION RATE: 16 BRPM | TEMPERATURE: 98.5 F | OXYGEN SATURATION: 95 % | BODY MASS INDEX: 26.34 KG/M2 | HEART RATE: 70 BPM | HEIGHT: 65 IN | SYSTOLIC BLOOD PRESSURE: 158 MMHG | WEIGHT: 158.07 LBS

## 2017-10-21 LAB
ANION GAP SERPL CALC-SCNC: 8 MMOL/L (ref 5–15)
BACTERIA SPEC CULT: NORMAL
BUN SERPL-MCNC: 24 MG/DL (ref 6–20)
BUN/CREAT SERPL: 8 (ref 12–20)
CALCIUM SERPL-MCNC: 7.8 MG/DL (ref 8.5–10.1)
CHLORIDE SERPL-SCNC: 100 MMOL/L (ref 97–108)
CO2 SERPL-SCNC: 28 MMOL/L (ref 21–32)
CREAT SERPL-MCNC: 2.92 MG/DL (ref 0.7–1.3)
FERRITIN SERPL-MCNC: 534 NG/ML (ref 26–388)
GLUCOSE BLD STRIP.AUTO-MCNC: 159 MG/DL (ref 65–100)
GLUCOSE BLD STRIP.AUTO-MCNC: 97 MG/DL (ref 65–100)
GLUCOSE SERPL-MCNC: 80 MG/DL (ref 65–100)
GRAM STN SPEC: NORMAL
GRAM STN SPEC: NORMAL
HCT VFR BLD AUTO: 25.7 % (ref 36.6–50.3)
HGB BLD-MCNC: 8.8 G/DL (ref 12.1–17)
POTASSIUM SERPL-SCNC: 4 MMOL/L (ref 3.5–5.1)
RETICS/RBC NFR AUTO: 5.9 % (ref 0.7–2.1)
SERVICE CMNT-IMP: ABNORMAL
SERVICE CMNT-IMP: NORMAL
SERVICE CMNT-IMP: NORMAL
SODIUM SERPL-SCNC: 136 MMOL/L (ref 136–145)
VIT B12 SERPL-MCNC: 429 PG/ML (ref 211–911)

## 2017-10-21 PROCEDURE — 82962 GLUCOSE BLOOD TEST: CPT

## 2017-10-21 PROCEDURE — 74011250637 HC RX REV CODE- 250/637: Performed by: INTERNAL MEDICINE

## 2017-10-21 PROCEDURE — 85045 AUTOMATED RETICULOCYTE COUNT: CPT | Performed by: INTERNAL MEDICINE

## 2017-10-21 PROCEDURE — 96376 TX/PRO/DX INJ SAME DRUG ADON: CPT

## 2017-10-21 PROCEDURE — 85018 HEMOGLOBIN: CPT | Performed by: INTERNAL MEDICINE

## 2017-10-21 PROCEDURE — 74011250637 HC RX REV CODE- 250/637: Performed by: EMERGENCY MEDICINE

## 2017-10-21 PROCEDURE — 74011000250 HC RX REV CODE- 250: Performed by: INTERNAL MEDICINE

## 2017-10-21 PROCEDURE — C9113 INJ PANTOPRAZOLE SODIUM, VIA: HCPCS | Performed by: INTERNAL MEDICINE

## 2017-10-21 PROCEDURE — 99218 HC RM OBSERVATION: CPT

## 2017-10-21 PROCEDURE — 74011000250 HC RX REV CODE- 250: Performed by: HOSPITALIST

## 2017-10-21 PROCEDURE — 74011250636 HC RX REV CODE- 250/636: Performed by: INTERNAL MEDICINE

## 2017-10-21 PROCEDURE — 94640 AIRWAY INHALATION TREATMENT: CPT

## 2017-10-21 PROCEDURE — 82728 ASSAY OF FERRITIN: CPT | Performed by: INTERNAL MEDICINE

## 2017-10-21 PROCEDURE — 36415 COLL VENOUS BLD VENIPUNCTURE: CPT | Performed by: INTERNAL MEDICINE

## 2017-10-21 PROCEDURE — 80048 BASIC METABOLIC PNL TOTAL CA: CPT | Performed by: INTERNAL MEDICINE

## 2017-10-21 PROCEDURE — 82607 VITAMIN B-12: CPT | Performed by: INTERNAL MEDICINE

## 2017-10-21 RX ORDER — AMOXICILLIN 250 MG/1
250 CAPSULE ORAL DAILY
Qty: 5 CAP | Refills: 0 | Status: SHIPPED | OUTPATIENT
Start: 2017-10-21 | End: 2017-10-31

## 2017-10-21 RX ORDER — PHENOL/SODIUM PHENOLATE
20 AEROSOL, SPRAY (ML) MUCOUS MEMBRANE 2 TIMES DAILY
Qty: 60 TAB | Refills: 0 | Status: SHIPPED | OUTPATIENT
Start: 2017-10-21 | End: 2017-12-13

## 2017-10-21 RX ADMIN — SODIUM CHLORIDE 40 MG: 9 INJECTION INTRAMUSCULAR; INTRAVENOUS; SUBCUTANEOUS at 09:00

## 2017-10-21 RX ADMIN — IPRATROPIUM BROMIDE AND ALBUTEROL SULFATE 3 ML: .5; 3 SOLUTION RESPIRATORY (INHALATION) at 07:43

## 2017-10-21 RX ADMIN — Medication 10 ML: at 05:51

## 2017-10-21 RX ADMIN — OXYCODONE HYDROCHLORIDE AND ACETAMINOPHEN 1 TABLET: 10; 325 TABLET ORAL at 05:57

## 2017-10-21 RX ADMIN — IPRATROPIUM BROMIDE AND ALBUTEROL SULFATE 3 ML: .5; 3 SOLUTION RESPIRATORY (INHALATION) at 12:16

## 2017-10-21 RX ADMIN — CARVEDILOL 6.25 MG: 6.25 TABLET, FILM COATED ORAL at 08:57

## 2017-10-21 NOTE — PROGRESS NOTES
Per report, dialysis nurse doreen and sent serial hbg/hct. No results found, lab redrawn and sent late.

## 2017-10-21 NOTE — DISCHARGE INSTRUCTIONS
You should talk to your primary care doctor or follow up with Dr. Molly Benavides your GI physician to discuss an outpatient EGD or endoscopy procedure to look for bleeding. Gastrointestinal Bleeding: Care Instructions  Your Care Instructions    The digestive or gastrointestinal tract goes from the mouth to the anus. It is often called the GI tract. Bleeding can happen anywhere in the GI tract. It may be caused by an ulcer, an infection, or cancer. It may also be caused by medicines such as aspirin or ibuprofen. Light bleeding may not cause any symptoms at first. But if you continue to bleed for a while, you may feel very weak or tired. Sudden, heavy bleeding means you need to see a doctor right away. This kind of bleeding can be very dangerous. But it can usually be cured or controlled. The doctor may do some tests to find the cause of your bleeding. Follow-up care is a key part of your treatment and safety. Be sure to make and go to all appointments, and call your doctor if you are having problems. It's also a good idea to know your test results and keep a list of the medicines you take. How can you care for yourself at home? · Be safe with medicines. Take your medicines exactly as prescribed. Call your doctor if you think you are having a problem with your medicine. You will get more details on the specific medicines your doctor prescribes. · Do not take aspirin or other anti-inflammatory medicines, such as naproxen (Aleve) or ibuprofen (Advil, Motrin), without talking to your doctor first. Ask your doctor if it is okay to use acetaminophen (Tylenol). · Do not drink alcohol. · The bleeding may make you lose iron. So it's important to eat foods that have a lot of iron. These include red meat, shellfish, poultry, and eggs. They also include beans, raisins, whole-grain breads, and leafy green vegetables. If you want help planning meals, you can make an appointment with a dietitian.   When should you call for help?  Call 911 anytime you think you may need emergency care. For example, call if:  · You have sudden, severe belly pain. · You vomit blood or what looks like coffee grounds. · You passed out (lost consciousness). · Your stools are maroon or very bloody. Call your doctor now or seek immediate medical care if:  · You are dizzy or lightheaded, or you feel like you may faint. · Your stools are black and look like tar, or they have streaks of blood. · You have belly pain. · You vomit or have nausea. · You have trouble swallowing, or it hurts when you swallow. Watch closely for changes in your health, and be sure to contact your doctor if:  · You do not get better as expected. Where can you learn more? Go to http://andrew-waldo.info/. Enter Z542 in the search box to learn more about \"Gastrointestinal Bleeding: Care Instructions. \"  Current as of: March 20, 2017  Content Version: 11.3  © 6179-2994 [employer]. Care instructions adapted under license by [employer] (which disclaims liability or warranty for this information). If you have questions about a medical condition or this instruction, always ask your healthcare professional. [address] any warranty or liability for your use of this information.

## 2017-10-21 NOTE — PROGRESS NOTES
Labs reviewed  hgb stable  HD yesterday  Next HD again on Monday    Will f/u again on Monday    Available over the weekend if needed. Call with any questions. Clive Treviño MD  [address]  Phone - [phone]   Fax - [phone]  www. [facility]

## 2017-10-21 NOTE — PROGRESS NOTES
General Surgery End of Shift Nursing Note    Bedside shift change report given to Radha (oncoming nurse) by Gaby Fong (offgoing nurse). Report included the following information SBAR, MAR, Accordion and Recent Results. Shift worked:   7p-7a   Significant changes during shift:    Hgb stable, NPO after midnight for EGD this AM. No BM or s/s bleeding this shift. Non-emergent issues for physician to address:        Number times ambulated in hallway past shift: 0, ambulated in BR    Number of times OOB to chair past shift: 0    Pain Management:  Current medication: percocet  Patient states pain is manageable on current pain medication: YES    GI:    Current diet:  DIET RENAL Regular; No Conc. Sweets    Tolerating current diet: YES  Passing flatus: YES  Last Bowel Movement: 10/19/17   Appearance:     Respiratory:    Incentive Spirometer at bedside: YES  Patient instructed on use: YES    Patient Safety:    Falls Score: 1  Bed Alarm On? No  Sitter?  No    Hortencia A Medardo

## 2017-10-21 NOTE — PROGRESS NOTES
GI Progress Note ([facility])  NAME:Amadou Lopez. :1965 OXZ:[id number]   ATTG: Dr. Diane Griffiths  PCP: Heather Kemp NP  Date/Time:  10/21/2017 10:51 AM   Assessment:   · Melena, likely PUD but self-limited. · Stable anemia  · ESRD, COPD, ETOH, HCV, abd draining wound (followed at [facility])     Plan:   · Declines EGD today, wants to go home and get as an outpatient  · PPI BID x 8 weeks (treat as if PUD)  · Follow up in the office in 2-4 weeks, to schedule EGD     Subjective:   Discussed with RN events overnight. No bleeding  Has a BM every few days at baseline, hard and dark. Had a charcoal/black    Review of Systems:  Symptom Y/N Comments  Symptom Y/N Comments   Fever/Chills n   Chest Pain n    Cough n   Headaches n    Sputum n   Joint Pain n    SOB/PRITCHETT n   Pruritis/Rash n    Tolerating Diet y   Other       Could NOT obtain due to:      Objective:   VITALS:   Last 24hrs VS reviewed since prior progress note. Most recent are:  Visit Vitals    /70    Pulse 70    Temp 98.7 °F (37.1 °C)    Resp 16    Ht 5' 5\" (1.651 m)    Wt 71.7 kg (158 lb 1.1 oz)    SpO2 100%    BMI 26.3 kg/m2       Intake/Output Summary (Last 24 hours) at 10/21/17 1051  Last data filed at 10/21/17 0928   Gross per 24 hour   Intake              480 ml   Output             5550 ml   Net            -5070 ml     PHYSICAL EXAM:  General: WD, WN. Alert, cooperative, no acute distress    HEENT: NC, Atraumatic. Anicteric sclerae. Lungs:  CTA Bilaterally. Faint exp wheezing  Heart:  Regular  rhythm,  No murmur (), No Rubs, No Gallops  Abdomen: Soft, Non distended, Non tender.  +Bowel sounds, no HSM  Extremities: No c/c/e  Neurologic:  Alert and oriented X 3. No acute neurological distress   Psych:   Good insight. Not anxious nor agitated.     Lab and Radiology Data Reviewed: (see below)    Medications Reviewed: (see below)  PMH/SH reviewed - no change compared to H&P  ________________________________________________________________________  Total time spent with patient: 15 minutes ________________________________________________________________________  Care Plan discussed with:  Patient y   Family     RN y              Consultant:       Royal Nguyen MD     Procedures: see electronic medical records for all procedures/Xrays and details which were not copied into this note but were reviewed prior to creation of Plan. LABS:  Recent Labs      10/21/17   0400  10/20/17   2120   10/20/17   0222  10/19/17   1714   WBC   --    --    --   9.5  10.4   HGB  8.8*  8.6*   < >  8.1*  8.2*   HCT  25.7*  25.9*   < >  24.7*  24.7*   PLT   --    --    --   249  251    < > = values in this interval not displayed. Recent Labs      10/21/17   0400  10/20/17   0222  10/19/17   1714   NA  136  132*  132*   K  4.0  4.6  4.1   CL  100  100  99   CO2  28  24  23   BUN  24*  34*  31*   CREA  2.92*  4.01*  3.41*   GLU  80  141*  140*   CA  7.8*  8.1*  8.2*     Recent Labs      10/20/17   0222  10/19/17   1714   SGOT  9*  10*   AP  61  70   TP  6.3*  6.7   ALB  2.7*  3.0*   GLOB  3.6  3.7     Recent Labs      10/19/17   1714   INR  1.1   PTP  10.6      Recent Labs      10/21/17   0400  10/20/17   1652   TIBC   --   284   PSAT   --   19*   FERR  534*   --       No results found for: FOL, RBCF  No results for input(s): PH, PCO2, PO2 in the last 72 hours. No results for input(s): CPK, CKMB in the last 72 hours.     No lab exists for component: TROPONINI  Lab Results   Component Value Date/Time    Color YELLOW/STRAW 06/12/2016 05:24 PM    Appearance CLEAR 06/12/2016 05:24 PM    Specific gravity 1.020 06/12/2016 05:24 PM    Specific gravity 1.013 12/14/2012 11:20 AM    pH (UA) 6.0 06/12/2016 05:24 PM    Protein 100 06/12/2016 05:24 PM    Glucose NEGATIVE  06/12/2016 05:24 PM    Ketone NEGATIVE  06/12/2016 05:24 PM    Bilirubin NEGATIVE  06/12/2016 05:24 PM    Urobilinogen 0.2 06/12/2016 05:24 PM Nitrites NEGATIVE  06/12/2016 05:24 PM    Leukocyte Esterase NEGATIVE  06/12/2016 05:24 PM    Epithelial cells FEW 06/12/2016 05:24 PM    Bacteria NEGATIVE  06/12/2016 05:24 PM    WBC 0-4 06/12/2016 05:24 PM    RBC 0-5 06/12/2016 05:24 PM       MEDICATIONS:  Current Facility-Administered Medications   Medication Dose Route Frequency    nicotine (NICODERM CQ) 21 mg/24 hr patch 1 Patch  1 Patch TransDERmal DAILY    thiamine (B-1) tablet 100 mg  100 mg Oral DAILY    sodium chloride (NS) flush 5-10 mL  5-10 mL IntraVENous Q8H    sodium chloride (NS) flush 5-10 mL  5-10 mL IntraVENous PRN    pantoprazole (PROTONIX) 40 mg in sodium chloride 0.9 % 10 mL injection  40 mg IntraVENous BID    insulin lispro (HUMALOG) injection   SubCUTAneous Q6H    glucose chewable tablet 16 g  4 Tab Oral PRN    dextrose (D50W) injection syrg 12.5-25 g  12.5-25 g IntraVENous PRN    glucagon (GLUCAGEN) injection 1 mg  1 mg IntraMUSCular PRN    insulin NPH (NOVOLIN N, HUMULIN N) injection 5 Units  5 Units SubCUTAneous ACB&D    albuterol-ipratropium (DUO-NEB) 2.5 MG-0.5 MG/3 ML  3 mL Nebulization QID    albuterol-ipratropium (DUO-NEB) 2.5 MG-0.5 MG/3 ML  3 mL Nebulization Q4H PRN    iron sucrose (VENOFER) 100 mg iron/5 mL injection 100 mg  100 mg IntraVENous Q MON, WED & FRI    epoetin ginger (EPOGEN;PROCRIT) injection 20,000 Units  20,000 Units SubCUTAneous Q MON, WED & FRI    nicotine (NICODERM CQ) 21 mg/24 hr patch 1 Patch  1 Patch TransDERmal DAILY    gabapentin (NEURONTIN) capsule 300 mg  300 mg Oral BID    carvedilol (COREG) tablet 6.25 mg  6.25 mg Oral BID WITH MEALS    oxyCODONE-acetaminophen (PERCOCET 10)  mg per tablet 1 Tab  1 Tab Oral TID PRN

## 2017-10-21 NOTE — PROGRESS NOTES
ADULT PROTOCOL: JET AEROSOL ASSESSMENT    Patient  Tonia Horan.     45 y.o.   male     10/21/2017  12:18 PM    Breath Sounds Pre Procedure: Right Breath Sounds: Diminished                               Left Breath Sounds: Diminished    Breath Sounds Post Procedure: Right Breath Sounds: Diminished                                 Left Breath Sounds: Diminished    Breathing pattern: Pre procedure Breathing Pattern: Regular          Post procedure Breathing Pattern: Regular    Heart Rate: Pre procedure Pulse: 77           Post procedure Pulse: 82    Resp Rate: Pre procedure Respirations: 20           Post procedure Respirations: 20    Oxygen: O2 Device: Room air   room air     Changed: NO    SpO2: Pre procedure SpO2: 95 %   without oxygen              Post procedure SpO2: 95 %  without oxygen    Nebulizer Therapy: Current medications Aerosolized Medications: DuoNeb      Changed: NO    Smoking History: current smoker  Problem List:   Patient Active Problem List   Diagnosis Code    Cellulitis of thumb, left L03.012    Tenosynovitis of finger M65.9    DM (diabetes mellitus) (HCC) E11.9    HCV (hepatitis C virus) B19.20    Tobacco abuse Z72.0    Alcohol abuse, daily use F10.10    COPD (chronic obstructive pulmonary disease) (HCC) J44.9    History of splenectomy Z90.81    Cellulitis and abscess of finger L03.019, L02.519    Abdominal wall cellulitis L03.311    Acute GI bleeding K92.2    HTN (hypertension) I10    ESRD on dialysis ([facility] [address] N18.6, Z99.2       Respiratory Therapist: Glen Mon

## 2017-10-21 NOTE — DISCHARGE SUMMARY
Hospitalist Discharge Summary     Patient ID:  María Munson  [id number]  45 y.o.  1965    PCP on record: Viki Reyes NP    Admit date: 10/19/2017  Discharge date: 10/21/2017       DISCHARGE DIAGNOSES  DISCHARGE SUMMARY/HOSPITAL COURSE: for full details see H&P, daily progress notes, labs, consult notes. Acute GI bleed likely upper given melena  Chronic anemia  -patient offered EGD, Dr. Elzbieta Galarza explained necessity to perform procedure and benefits, was ready to perform EGD however patient declined due to risks  -recommend outpatient EGD, patient says he will consider having it done  -will prescribe PPI BID, follow up with PCP and GI as outpatient   -advised to not take any aspirin, NSAIDs, avoid alcohol    DM  -continue home NPH     Chronic abdominal wound  -recent abdominal wall cellulitis with open wound after surgery for hernia, concern for chronically infected mesh  -had seen Dr. Ana Spring on last admission for this with recommendations for surgery, patient wanted to follow up with his surgeon at [facility] at the time  -patient said exploratory surgery was deferred due to patient's pulmonary risks  -wound culture with few GNR but culture results with mixed skin tana, will given short course of abx and patient will follow with his surgeon     HCV (hepatitis C virus)   -no history of cirrhosis per patient      Tobacco abuse  -nicotine patch      Alcohol use with 2 beers a day  -thiamine  -denies heavy alcohol use      COPD (chronic obstructive pulmonary disease)   -continue inhalers and nebs      HTN (hypertension)   -coreg      ESRD on dialysis   -HD      Chronic Pain  -continue pain meds     Code Status: Full  Surrogate Decision Maker: wife      DVT Prophylaxis: scd      CONSULTATIONS:  IP CONSULT TO GASTROENTEROLOGY  IP CONSULT TO NEPHROLOGY    Excerpted HPI from H&P of Alfonso Wilkerson MD:  Sarah Madrid is a 45 y.o.  male who presents with noticing black stool.  He claims he is been notiving black stool for past 1 week and has had 3 bowel movements with black stool. Pt denies any abdominal pain, nausea, vomiting, diarrhea, chest pain, lightheadedness and dizziness. Pt noted to be Hb of 8.2 which is close his his baseline. Pt reports use of ibuprofen long term and use of 2 beers a day.    _______________________________________________________________________  Patient seen and examined by me on discharge day. Pertinent Findings:  Gen:    Not in distress  Chest: Clear lungs  CVS:   Regular rhythm. No edema  Abd:  Small area of erythema, some brown milky discharge expressed from small wound, non tender  Neuro:  Alert, calm  _______________________________________________________________________  DISCHARGE MEDICATIONS:   Current Discharge Medication List      START taking these medications    Details   Omeprazole delayed release (PRILOSEC D/R) 20 mg tablet Take 1 Tab by mouth two (2) times a day. Qty: 60 Tab, Refills: 0      amoxicillin (AMOXIL) 250 mg capsule Take 1 Cap by mouth daily. Take after dialysis on your dialysis days  Qty: 5 Cap, Refills: 0         CONTINUE these medications which have NOT CHANGED    Details   furosemide (LASIX) 80 mg tablet Take 80 mg by mouth two (2) times a day.      gabapentin (NEURONTIN) 300 mg capsule Take 300 mg by mouth two (2) times a day. b complex-vitamin c-folic acid (NEPHROCAPS) 1 mg capsule Take 1 Cap by mouth daily. lidocaine-prilocaine (EMLA) topical cream Apply  to affected area as needed for Pain. Patient applies to arm before dialysis on Monday, Wednesday, and Friday. polyethylene glycol (MIRALAX) 17 gram packet Take 17 g by mouth daily as needed (constipation). carvedilol (COREG) 6.25 mg tablet TAKE 1 TABLET BY MOUTH TWO TIMES A DAY  Refills: 3      albuterol-ipratropium (DUO-NEB) 2.5 mg-0.5 mg/3 ml nebu 3 mL by Nebulization route four (4) times daily. AT LUNCHTIME DAILY.        oxyCODONE-acetaminophen (PERCOCET 10)  mg per tablet Take 1 Tab by mouth three (3) times daily. insulin (NOVOLOG MIX 70-30 FLEXPEN) 100 unit/mL (70-30) flex pen 30 Units by SubCUTAneous route two (2) times a day. albuterol (PROVENTIL) 90 mcg/Actuation inhaler Take 2 Puffs by inhalation every six (6) hours as needed for Shortness of Breath. STOP taking these medications       VENTOLIN HFA 90 mcg/actuation inhaler Comments:   Reason for Stopping:               My Recommended Diet, Activity, Wound Care, and follow-up labs are listed in the patient's Discharge Insturctions which I have personally completed and reviewed.     _______________________________________________________________________  DISPOSITION:    Home with Family: x   Home with HH/PT/OT/RN:    SNF/LTC:    MARY:    OTHER:        Condition at Discharge:  Stable  _______________________________________________________________________  Follow up with:   PCP : Roe Abdi NP  Follow-up Information     Follow up With Details Comments [facility] Gurwinder Hillman NP   [address] [facility]  [phone]                Total time in minutes spent coordinating this discharge (includes going over instructions, follow-up, prescriptions, and preparing report for sign off to her PCP) :  40 minutes    Signed:  Karyn Sr MD

## 2017-10-31 RX ORDER — SULFAMETHOXAZOLE AND TRIMETHOPRIM 800; 160 MG/1; MG/1
1 TABLET ORAL 2 TIMES DAILY
COMMUNITY
End: 2017-12-13

## 2017-10-31 NOTE — PERIOP NOTES
ENCOURAGED WIFE TO [address] [facility] [address] BERRIOS AWARE OF CURRENT STOMACH INFECTION PATIENT IS BEING TREATED FOR. PATIENT'S WIFE STATES \" WE ARE SUPPOSE TO SEE DR. BARAKAT ( PULMONARY DOCTOR) AS PATIENT HAS ADVANCED EMPHYSEMA ON THE DAY OF SURGERY IS WHEN THEIR APPOINTMENT IS AND THEN COMIE OVER TO [facility] [address]. DR. Allan Bazzi AWARE. WIFE VERY CONCERNED WITH ANESTHESIA AND PATIENT'S ADVANCED EMPHYSEMA.

## 2017-11-02 ENCOUNTER — ANESTHESIA EVENT (OUTPATIENT)
Dept: SURGERY | Age: 52
End: 2017-11-02
Payer: MEDICAID

## 2017-11-02 ENCOUNTER — HOSPITAL ENCOUNTER (OUTPATIENT)
Age: 52
Setting detail: OUTPATIENT SURGERY
Discharge: HOME | End: 2017-11-02
Payer: MEDICAID

## 2017-11-02 ENCOUNTER — ANESTHESIA (OUTPATIENT)
Dept: SURGERY | Age: 52
End: 2017-11-02
Payer: MEDICAID

## 2017-11-02 ENCOUNTER — APPOINTMENT (OUTPATIENT)
Dept: GENERAL RADIOLOGY | Age: 52
End: 2017-11-02
Payer: MEDICAID

## 2017-11-02 VITALS
WEIGHT: 154 LBS | DIASTOLIC BLOOD PRESSURE: 53 MMHG | TEMPERATURE: 97.6 F | HEART RATE: 61 BPM | RESPIRATION RATE: 10 BRPM | BODY MASS INDEX: 24.75 KG/M2 | OXYGEN SATURATION: 90 % | HEIGHT: 66 IN | SYSTOLIC BLOOD PRESSURE: 111 MMHG

## 2017-11-02 LAB
GLUCOSE BLD STRIP.AUTO-MCNC: 101 MG/DL (ref 65–100)
GLUCOSE BLD STRIP.AUTO-MCNC: 82 MG/DL (ref 65–100)
SERVICE CMNT-IMP: ABNORMAL
SERVICE CMNT-IMP: NORMAL

## 2017-11-02 PROCEDURE — 74011250636 HC RX REV CODE- 250/636

## 2017-11-02 PROCEDURE — 74011250636 HC RX REV CODE- 250/636: Performed by: ANESTHESIOLOGY

## 2017-11-02 PROCEDURE — 76000 FLUOROSCOPY <1 HR PHYS/QHP: CPT

## 2017-11-02 PROCEDURE — 77030031139 HC SUT VCRL2 J&J -A

## 2017-11-02 PROCEDURE — 76210000016 HC OR PH I REC 1 TO 1.5 HR

## 2017-11-02 PROCEDURE — 77030002924 HC SUT GORTX WLGO -B

## 2017-11-02 PROCEDURE — 77030032490 HC SLV COMPR SCD KNE COVD -B

## 2017-11-02 PROCEDURE — 77030020782 HC GWN BAIR PAWS FLX 3M -B

## 2017-11-02 PROCEDURE — 77030002987 HC SUT PROL J&J -B

## 2017-11-02 PROCEDURE — 74011636320 HC RX REV CODE- 636/320

## 2017-11-02 PROCEDURE — 74011000250 HC RX REV CODE- 250

## 2017-11-02 PROCEDURE — C1725 CATH, TRANSLUMIN NON-LASER: HCPCS

## 2017-11-02 PROCEDURE — 82962 GLUCOSE BLOOD TEST: CPT

## 2017-11-02 PROCEDURE — 77030002916 HC SUT ETHLN J&J -A

## 2017-11-02 PROCEDURE — C1768 GRAFT, VASCULAR: HCPCS

## 2017-11-02 PROCEDURE — 74011250637 HC RX REV CODE- 250/637: Performed by: ANESTHESIOLOGY

## 2017-11-02 PROCEDURE — C1894 INTRO/SHEATH, NON-LASER: HCPCS

## 2017-11-02 PROCEDURE — 76010000149 HC OR TIME 1 TO 1.5 HR

## 2017-11-02 PROCEDURE — 77030018846 HC SOL IRR STRL H20 ICUM -A

## 2017-11-02 PROCEDURE — C1769 GUIDE WIRE: HCPCS

## 2017-11-02 PROCEDURE — 77030020256 HC SOL INJ NACL 0.9%  500ML

## 2017-11-02 PROCEDURE — 77030011640 HC PAD GRND REM COVD -A

## 2017-11-02 PROCEDURE — 77030008467 HC STPLR SKN COVD -B

## 2017-11-02 PROCEDURE — 77030003704 HC NDL VASC ACC ARMD -A

## 2017-11-02 PROCEDURE — 76210000020 HC REC RM PH II FIRST 0.5 HR

## 2017-11-02 PROCEDURE — 77030013060 HC DEV INFL PRSS MRTM -B

## 2017-11-02 PROCEDURE — 76060000033 HC ANESTHESIA 1 TO 1.5 HR

## 2017-11-02 DEVICE — GRAFT VASC L10CM DIA7MM THN WALLED LN GOR TX: Type: IMPLANTABLE DEVICE | Site: ARM | Status: FUNCTIONAL

## 2017-11-02 RX ORDER — MIDAZOLAM HYDROCHLORIDE 1 MG/ML
INJECTION, SOLUTION INTRAMUSCULAR; INTRAVENOUS AS NEEDED
Status: DISCONTINUED | OUTPATIENT
Start: 2017-11-02 | End: 2017-11-02 | Stop reason: HOSPADM

## 2017-11-02 RX ORDER — FENTANYL CITRATE 50 UG/ML
50 INJECTION, SOLUTION INTRAMUSCULAR; INTRAVENOUS AS NEEDED
Status: DISCONTINUED | OUTPATIENT
Start: 2017-11-02 | End: 2017-11-02 | Stop reason: HOSPADM

## 2017-11-02 RX ORDER — PROPOFOL 10 MG/ML
INJECTION, EMULSION INTRAVENOUS AS NEEDED
Status: DISCONTINUED | OUTPATIENT
Start: 2017-11-02 | End: 2017-11-02 | Stop reason: HOSPADM

## 2017-11-02 RX ORDER — LIDOCAINE HYDROCHLORIDE 10 MG/ML
0.1 INJECTION, SOLUTION EPIDURAL; INFILTRATION; INTRACAUDAL; PERINEURAL AS NEEDED
Status: DISCONTINUED | OUTPATIENT
Start: 2017-11-02 | End: 2017-11-02 | Stop reason: HOSPADM

## 2017-11-02 RX ORDER — SODIUM CHLORIDE 0.9 % (FLUSH) 0.9 %
5-10 SYRINGE (ML) INJECTION EVERY 8 HOURS
Status: DISCONTINUED | OUTPATIENT
Start: 2017-11-02 | End: 2017-11-02 | Stop reason: HOSPADM

## 2017-11-02 RX ORDER — FENTANYL CITRATE 50 UG/ML
INJECTION, SOLUTION INTRAMUSCULAR; INTRAVENOUS AS NEEDED
Status: DISCONTINUED | OUTPATIENT
Start: 2017-11-02 | End: 2017-11-02 | Stop reason: HOSPADM

## 2017-11-02 RX ORDER — SODIUM CHLORIDE, SODIUM LACTATE, POTASSIUM CHLORIDE, CALCIUM CHLORIDE 600; 310; 30; 20 MG/100ML; MG/100ML; MG/100ML; MG/100ML
100 INJECTION, SOLUTION INTRAVENOUS CONTINUOUS
Status: DISCONTINUED | OUTPATIENT
Start: 2017-11-02 | End: 2017-11-02 | Stop reason: HOSPADM

## 2017-11-02 RX ORDER — FENTANYL CITRATE 50 UG/ML
25 INJECTION, SOLUTION INTRAMUSCULAR; INTRAVENOUS
Status: COMPLETED | OUTPATIENT
Start: 2017-11-02 | End: 2017-11-02

## 2017-11-02 RX ORDER — SODIUM CHLORIDE 0.9 % (FLUSH) 0.9 %
5-10 SYRINGE (ML) INJECTION AS NEEDED
Status: DISCONTINUED | OUTPATIENT
Start: 2017-11-02 | End: 2017-11-02 | Stop reason: HOSPADM

## 2017-11-02 RX ORDER — ROPIVACAINE HYDROCHLORIDE 5 MG/ML
150 INJECTION, SOLUTION EPIDURAL; INFILTRATION; PERINEURAL AS NEEDED
Status: DISCONTINUED | OUTPATIENT
Start: 2017-11-02 | End: 2017-11-02 | Stop reason: HOSPADM

## 2017-11-02 RX ORDER — ONDANSETRON 2 MG/ML
INJECTION INTRAMUSCULAR; INTRAVENOUS AS NEEDED
Status: DISCONTINUED | OUTPATIENT
Start: 2017-11-02 | End: 2017-11-02 | Stop reason: HOSPADM

## 2017-11-02 RX ORDER — MORPHINE SULFATE 10 MG/ML
2 INJECTION, SOLUTION INTRAMUSCULAR; INTRAVENOUS
Status: DISCONTINUED | OUTPATIENT
Start: 2017-11-02 | End: 2017-11-02 | Stop reason: HOSPADM

## 2017-11-02 RX ORDER — PROPOFOL 10 MG/ML
INJECTION, EMULSION INTRAVENOUS
Status: DISCONTINUED | OUTPATIENT
Start: 2017-11-02 | End: 2017-11-02 | Stop reason: HOSPADM

## 2017-11-02 RX ORDER — OXYCODONE HYDROCHLORIDE 5 MG/1
5 TABLET ORAL AS NEEDED
Status: DISCONTINUED | OUTPATIENT
Start: 2017-11-02 | End: 2017-11-02 | Stop reason: HOSPADM

## 2017-11-02 RX ORDER — ONDANSETRON 2 MG/ML
4 INJECTION INTRAMUSCULAR; INTRAVENOUS AS NEEDED
Status: DISCONTINUED | OUTPATIENT
Start: 2017-11-02 | End: 2017-11-02 | Stop reason: HOSPADM

## 2017-11-02 RX ORDER — SODIUM CHLORIDE, SODIUM LACTATE, POTASSIUM CHLORIDE, CALCIUM CHLORIDE 600; 310; 30; 20 MG/100ML; MG/100ML; MG/100ML; MG/100ML
INJECTION, SOLUTION INTRAVENOUS
Status: DISCONTINUED | OUTPATIENT
Start: 2017-11-02 | End: 2017-11-02 | Stop reason: HOSPADM

## 2017-11-02 RX ORDER — CEFAZOLIN SODIUM IN 0.9 % NACL 2 G/50 ML
2 INTRAVENOUS SOLUTION, PIGGYBACK (ML) INTRAVENOUS
Status: COMPLETED | OUTPATIENT
Start: 2017-11-02 | End: 2017-11-02

## 2017-11-02 RX ORDER — SODIUM CHLORIDE, SODIUM LACTATE, POTASSIUM CHLORIDE, CALCIUM CHLORIDE 600; 310; 30; 20 MG/100ML; MG/100ML; MG/100ML; MG/100ML
1000 INJECTION, SOLUTION INTRAVENOUS CONTINUOUS
Status: DISCONTINUED | OUTPATIENT
Start: 2017-11-02 | End: 2017-11-02 | Stop reason: HOSPADM

## 2017-11-02 RX ORDER — MIDAZOLAM HYDROCHLORIDE 1 MG/ML
0.5 INJECTION, SOLUTION INTRAMUSCULAR; INTRAVENOUS
Status: DISCONTINUED | OUTPATIENT
Start: 2017-11-02 | End: 2017-11-02 | Stop reason: HOSPADM

## 2017-11-02 RX ORDER — DEXMEDETOMIDINE HYDROCHLORIDE 4 UG/ML
INJECTION, SOLUTION INTRAVENOUS AS NEEDED
Status: DISCONTINUED | OUTPATIENT
Start: 2017-11-02 | End: 2017-11-02 | Stop reason: HOSPADM

## 2017-11-02 RX ORDER — MIDAZOLAM HYDROCHLORIDE 1 MG/ML
1 INJECTION, SOLUTION INTRAMUSCULAR; INTRAVENOUS AS NEEDED
Status: DISCONTINUED | OUTPATIENT
Start: 2017-11-02 | End: 2017-11-02 | Stop reason: HOSPADM

## 2017-11-02 RX ORDER — OXYCODONE AND ACETAMINOPHEN 5; 325 MG/1; MG/1
1 TABLET ORAL
Qty: 25 TAB | Refills: 0 | Status: SHIPPED | OUTPATIENT
Start: 2017-11-02 | End: 2017-12-20

## 2017-11-02 RX ORDER — DIPHENHYDRAMINE HYDROCHLORIDE 50 MG/ML
12.5 INJECTION, SOLUTION INTRAMUSCULAR; INTRAVENOUS AS NEEDED
Status: DISCONTINUED | OUTPATIENT
Start: 2017-11-02 | End: 2017-11-02 | Stop reason: HOSPADM

## 2017-11-02 RX ORDER — SODIUM CHLORIDE 9 MG/ML
25 INJECTION, SOLUTION INTRAVENOUS CONTINUOUS
Status: DISCONTINUED | OUTPATIENT
Start: 2017-11-02 | End: 2017-11-02 | Stop reason: HOSPADM

## 2017-11-02 RX ORDER — HYDROMORPHONE HYDROCHLORIDE 1 MG/ML
0.2 INJECTION, SOLUTION INTRAMUSCULAR; INTRAVENOUS; SUBCUTANEOUS
Status: ACTIVE | OUTPATIENT
Start: 2017-11-02 | End: 2017-11-02

## 2017-11-02 RX ADMIN — FENTANYL CITRATE 25 MCG: 50 INJECTION, SOLUTION INTRAMUSCULAR; INTRAVENOUS at 11:58

## 2017-11-02 RX ADMIN — FENTANYL CITRATE 25 MCG: 50 INJECTION, SOLUTION INTRAMUSCULAR; INTRAVENOUS at 13:41

## 2017-11-02 RX ADMIN — PROPOFOL 25 MCG/KG/MIN: 10 INJECTION, EMULSION INTRAVENOUS at 11:58

## 2017-11-02 RX ADMIN — FENTANYL CITRATE 25 MCG: 50 INJECTION, SOLUTION INTRAMUSCULAR; INTRAVENOUS at 13:36

## 2017-11-02 RX ADMIN — PROPOFOL 20 MG: 10 INJECTION, EMULSION INTRAVENOUS at 11:58

## 2017-11-02 RX ADMIN — SODIUM CHLORIDE 25 ML/HR: 900 INJECTION, SOLUTION INTRAVENOUS at 10:35

## 2017-11-02 RX ADMIN — DEXMEDETOMIDINE HYDROCHLORIDE 20 MCG: 4 INJECTION, SOLUTION INTRAVENOUS at 11:58

## 2017-11-02 RX ADMIN — SODIUM CHLORIDE, SODIUM LACTATE, POTASSIUM CHLORIDE, CALCIUM CHLORIDE: 600; 310; 30; 20 INJECTION, SOLUTION INTRAVENOUS at 11:48

## 2017-11-02 RX ADMIN — FENTANYL CITRATE 25 MCG: 50 INJECTION, SOLUTION INTRAMUSCULAR; INTRAVENOUS at 13:47

## 2017-11-02 RX ADMIN — FENTANYL CITRATE 25 MCG: 50 INJECTION, SOLUTION INTRAMUSCULAR; INTRAVENOUS at 13:56

## 2017-11-02 RX ADMIN — MIDAZOLAM HYDROCHLORIDE 1 MG: 1 INJECTION, SOLUTION INTRAMUSCULAR; INTRAVENOUS at 11:58

## 2017-11-02 RX ADMIN — CEFAZOLIN 2 G: 1 INJECTION, POWDER, FOR SOLUTION INTRAMUSCULAR; INTRAVENOUS; PARENTERAL at 12:00

## 2017-11-02 RX ADMIN — ONDANSETRON 4 MG: 2 INJECTION INTRAMUSCULAR; INTRAVENOUS at 12:59

## 2017-11-02 RX ADMIN — OXYCODONE HYDROCHLORIDE 5 MG: 5 TABLET ORAL at 13:50

## 2017-11-02 NOTE — BRIEF OP NOTE
BRIEF OPERATIVE NOTE    Date of Procedure: 11/2/2017   Preoperative Diagnosis: ESRD  Postoperative Diagnosis: ESRD    Procedure(s):  THROMBECTOMY, REVISION LEFT UPPER ARM ARTERIO VENOUS GRAFT, BALLOON ANGIOPLASTY   Surgeon(s) and Role:     * Liudmila Rhodes MD - Primary         Assistant Staff:       Surgical Staff:  Circ-1: Marcos Russo RN  Circ-Relief: Tad Metcalf RN  Radiology Technician: Cary Wong, RT  Scrub RN-1: Alfonso Hernandez RN  Scrub RN-2: Concha Soria  Surg Asst-1: Kaylee Nunez  Event Time In   Incision Start 1210   Incision Close 1257     Anesthesia: MAC   Estimated Blood Loss: 25 ml  Specimens: * No specimens in log *   Findings: venous anastomotic stenosis dilated   Complications: none  Implants:   Implant Name Type Inv.  Item Serial No.  Lot No. LRB No. Used Action   GRAFT VASC TW [id number] -- [employer] - [id number]   GRAFT VASC TW [id number] -- [employer] [id number]  [employer] NA Left 1 Implanted

## 2017-11-02 NOTE — DISCHARGE INSTRUCTIONS
Patient Discharge Instructions    Ly Beltrán / [id number] : 1965    Admitted 2017 Discharged: 2017     Take Home Medications            · It is important that you take the medication exactly as they are prescribed. · Keep your medication in the bottles provided by the pharmacist and keep a list of the medication names, dosages, and times to be taken in your wallet. · Do not take other medications without consulting your doctor. What to do at Home    Recommended diet: Renal Diet,     Recommended activity: Activity as tolerated,     Additional Instructions: remove dressings on Sat or Sun and leave open    Follow-up with Dr Douglas Mccollum in 2 weeks, call [phone] for appt        Information obtained by :  I understand that if any problems occur once I am at home I am to contact my physician. I understand and acknowledge receipt of the instructions indicated above. Physician's or R.N.'s Signature                                                                  Date/Time                                                                                                                                              Patient or Representative Signature                                                          Da    ROXICODONE 5MG GIVEN AT 1:50 PM      DISCHARGE SUMMARY from Nurse    PATIENT INSTRUCTIONS:    After general anesthesia or intravenous sedation, for 24 hours or while taking prescription Narcotics:  · Limit your activities  · Do not drive and operate hazardous machinery  · Do not make important personal or business decisions  · Do  not drink alcoholic beverages  · If you have not urinated within 8 hours after discharge, please contact your surgeon on call.     Report the following to your surgeon:  · Excessive pain, swelling, redness or odor of or around the surgical area  · Temperature over 100.5  · Nausea and vomiting lasting longer than 4 hours or if unable to take medications  · Any signs of decreased circulation or nerve impairment to extremity: change in color, persistent  numbness, tingling, coldness or increase pain  · Any questions       Hemodialysis Access: What to Expect at [address] [place] [address]  Hemodialysis is a way to remove wastes from the blood when your kidneys can no longer do the job. It is not a cure, but it can help you live longer and feel better. It is a lifesaving treatment when you have kidney failure. Hemodialysis is often called dialysis. Your doctor created a place (called an access) in your arm for your blood to flow in and out of your body during your dialysis sessions. Your arm will probably be bruised and swollen. It may hurt. The cut (incision) may bleed. The pain and bleeding will get better over several days. You will probably need only over-the-counter pain medicine. You can reduce swelling by propping your arm on 1 or 2 pillows and keeping your elbow straight. You will have stitches. These may dissolve on their own, or your doctor will tell you when to come in to have them removed. You should also be able to return to work in a few days. You may feel some coolness or numbness in your hand. These feelings usually go away in a few weeks. Your doctor may suggest squeezing a soft object. This will strengthen your access and may make hemodialysis faster and easier. You should always be able to feel blood rushing through the fistula or graft. It feels like a slight vibration when you put your fingers on the skin over the fistula or graft. This feeling is called a thrill or pulse. This care sheet gives you a general idea about how long it will take for you to recover. But each person recovers at a different pace. Follow the steps below to get better as quickly as possible. How can you care for yourself at home? Activity  ?  · Rest when you feel tired. Getting enough sleep will help you recover. Do not lie on or sleep on the arm with the access. ? · Avoid activities such as washing windows or gardening that put stress on the arm with the access. ? · You may use your arm, but do not lift anything that weighs more than about 15 pounds. This may include a child, heavy grocery bags, a heavy briefcase or backpack, cat litter or dog food bags, or a vacuum . ? · You can shower, but keep the access dry for the first 2 days. Cover the area with a plastic bag to keep it dry. ? · Do not soak or scrub the incision until it has healed. ? · Wear an arm guard to protect the area if you play sports or work with your arms. ? · You may drive when your doctor says it is okay. This is usually in 1 to 2 days. ? · Most people are able to return to work about 1 or 2 days after surgery. Diet  ? · Follow an eating plan that is good for your kidneys. A registered dietitian can help you make a meal plan that is right for you. You may need to limit protein, salt, fluids, and certain foods. Medicines  ? · Your doctor will tell you if and when you can restart your medicines. He or she will also give you instructions about taking any new medicines. ? · If you take blood thinners, such as warfarin (Coumadin), clopidogrel (Plavix), or aspirin, be sure to talk to your doctor. He or she will tell you if and when to start taking those medicines again. Make sure that you understand exactly what your doctor wants you to do. ? · Take pain medicines exactly as directed. ¨ If the doctor gave you a prescription medicine for pain, take it as prescribed. ¨ If you are not taking a prescription pain medicine, ask your doctor if you can take acetaminophen (Tylenol). Do not take ibuprofen (Advil, Motrin) or naproxen (Aleve), or similar medicines, unless your doctor tells you to. They may make chronic kidney disease worse.   ¨ Do not take two or more pain medicines at the same time unless the doctor told you to. Many pain medicines have acetaminophen, which is Tylenol. Too much acetaminophen (Tylenol) can be harmful. ? · If you think your pain medicine is making you sick to your stomach:  ¨ Take your medicine after meals (unless your doctor has told you not to). ¨ Ask your doctor for a different pain medicine. ? · If your doctor prescribed antibiotics, take them as directed. Do not stop taking them just because you feel better. You need to take the full course of antibiotics. Incision care  ? · Keep the area dry for 2 days. After 2 days, wash the area with soap and water every day, and always before dialysis. ? · Do not soak or scrub the incision until it has healed. ? · If you have a bandage, change it every day or as your doctor recommends. Your doctor will tell you when you can remove it. Exercise  ? · Squeeze a soft ball or other object as your doctor tells you. This will help blood flow through the access and help prevent blood clots. ? Elevation  ? · Prop up the sore arm on a pillow anytime you sit or lie down during the next 3 days. Try to keep it above the level of your heart. This will help reduce swelling. Other instructions  ? · Every day, check your access for a pulse or thrill in the fistula or graft area. A thrill is a vibration. To feel a pulse or thrill, place the first two fingers of your hand over the access. ? · Do not bump your arm. ? · Do not wear tight clothing, jewelry, or anything else that may squeeze the access. ? · Use your other arm to have blood drawn or blood pressure taken. ? · Do not put cream or lotion on or near the access. ? · Make sure all doctors you deal with know you have a vascular access. Follow-up care is a key part of your treatment and safety. Be sure to make and go to all appointments, and call your doctor if you are having problems.  It's also a good idea to know your test results and keep a list of the medicines you take. When should you call for help? Call 911 anytime you think you may need emergency care. For example, call if:  ? · You passed out (lost consciousness). ? · You have chest pain, are short of breath, or cough up blood. ?Call your doctor now or seek immediate medical care if:  ? · Your hand or arm is cold or dark-colored. ? · You have no pulse in your access. ? · You have nausea or you vomit. ? · You have pain that does not get better after you take pain medicine. ? · You have loose stitches, or your incision comes open. ? · You are bleeding from the incision. ? · You have signs of infection, such as:  ¨ Increased pain, swelling, warmth, or redness. ¨ Red streaks leading from the area. ¨ Pus draining from the area. ¨ A fever. ? · You have signs of a blood clot in your leg (called a deep vein thrombosis), such as:  ¨ Pain in your calf, back of the knee, thigh, or groin. ¨ Redness or swelling in your leg. ? Watch closely for changes in your health, and be sure to contact your doctor if you have any problems. Where can you learn more? Go to http://andrew-waldo.info/. Enter P616 in the search box to learn more about \"Hemodialysis Access: What to Expect at Home. \"  Current as of: May 12, 2017  Content Version: 11.4  © 3411-3093 [employer]. Care instructions adapted under license by [employer] (which disclaims liability or warranty for this information). If you have questions about a medical condition or this instruction, always ask your healthcare professional. [address] any warranty or liability for your use of this information. These are general instructions for a healthy lifestyle:    No smoking/ No tobacco products/ Avoid exposure to second hand smoke  Surgeon General's Warning:  Quitting smoking now greatly reduces serious risk to your health.     Obesity, smoking, and sedentary lifestyle greatly increases your risk for illness    A healthy diet, regular physical exercise & weight monitoring are important for maintaining a healthy lifestyle    You may be retaining fluid if you have a history of heart failure or if you experience any of the following symptoms:  Weight gain of 3 pounds or more overnight or 5 pounds in a week, increased swelling in our hands or feet or shortness of breath while lying flat in bed. Please call your doctor as soon as you notice any of these symptoms; do not wait until your next office visit. Recognize signs and symptoms of STROKE:    F-face looks uneven    A-arms unable to move or move unevenly    S-speech slurred or non-existent    T-time-call 911 as soon as signs and symptoms begin-DO NOT go       Back to bed or wait to see if you get better-TIME IS BRAIN. Warning Signs of HEART ATTACK     Call 911 if you have these symptoms:   Chest discomfort. Most heart attacks involve discomfort in the center of the chest that lasts more than a few minutes, or that goes away and comes back. It can feel like uncomfortable pressure, squeezing, fullness, or pain.  Discomfort in other areas of the upper body. Symptoms can include pain or discomfort in one or both arms, the back, neck, jaw, or stomach.  Shortness of breath with or without chest discomfort.  Other signs may include breaking out in a cold sweat, nausea, or lightheadedness. Don't wait more than five minutes to call 911 - MINUTES MATTER! Fast action can save your life. Calling 911 is almost always the fastest way to get lifesaving treatment. Emergency Medical Services staff can begin treatment when they arrive -- up to an hour sooner than if someone gets to the hospital by car. The discharge information has been reviewed with the {PATIENT PARENT GUARDIAN:09706}. The {PATIENT PARENT GUARDIAN:05195} verbalized understanding.   Discharge medications reviewed with the {Dishcarge meds reviewed KVVX:77303} and appropriate educational materials and side effects teaching were provided.   ___________________________________________________________________________________________________________________________________

## 2017-11-02 NOTE — ANESTHESIA POSTPROCEDURE EVALUATION
Post-Anesthesia Evaluation and Assessment    Patient: Nara Mo MRN: [id number]  SSN: [id number]    YOB: 1965  Age: 45 y.o. Sex: male       Cardiovascular Function/Vital Signs  Visit Vitals    /58    Pulse 65    Temp 36.4 °C (97.6 °F)    Resp 11    Ht 5' 6\" (1.676 m)    Wt 69.9 kg (154 lb)    SpO2 99%    BMI 24.86 kg/m2       Patient is status post MAC anesthesia for Procedure(s):  THROMBECTOMY, REVISION LEFT UPPER ARM ARTERIO VENOUS GRAFT, BALLOON ANGIOPLASTY . Nausea/Vomiting: None    Postoperative hydration reviewed and adequate. Pain:  Pain Scale 1: Numeric (0 - 10) (11/02/17 1305)  Pain Intensity 1: 0 (11/02/17 1305)   Managed    Neurological Status:   Neuro  LUE Motor Response:  (surgery weaker than rt) (11/02/17 1305)   At baseline    Mental Status and Level of Consciousness: Arousable    Pulmonary Status:   O2 Device: CO2 nasal cannula (11/02/17 1307)   Adequate oxygenation and airway patent    Complications related to anesthesia: None    Post-anesthesia assessment completed.  No concerns    Signed By: Nidia Beck MD     November 2, 2017

## 2017-11-02 NOTE — IP AVS SNAPSHOT
[facility] [address] [place] [address] 
[phone] Patient: Sherine Duckworth. MRN: [id number] About your hospitalization You were admitted on:  November 2, 2017 You last received care in the:  [facility] PACU You were discharged on:  November 2, 2017 Why you were hospitalized Your primary diagnosis was:  Not on File Things You Need To Do (next 8 weeks) Follow up with Mazin Guillermo NP Phone:  [phone] Where:  [address], [address] [facility] [address] Schedule an appointment with Leonidas Garcia MD as soon as possible for a visit in 2 week(s) Phone:  [phone] Where:  [address], [address] [address] [id number] Tuesday Dec 12, 2017 Any with Ron Dick MD at 11:30 AM  
Where:  [address] ([facility]) Discharge Orders None A check kaykay indicates which time of day the medication should be taken. My Medications TAKE these medications as instructed Instructions Each Dose to Equal  
 Morning Noon Evening Bedtime  
 albuterol-ipratropium 2.5 mg-0.5 mg/3 ml Nebu Commonly known as:  Shayne Cleveland Your last dose was: Your next dose is:    
   
   
 3 mL by Nebulization route three (3) times daily. AT LUNCHTIME DAILY. 3 mL  
    
   
   
   
  
 ANORO ELLIPTA 62.5-25 mcg/actuation inhaler Generic drug:  umeclidinium-vilanterol Your last dose was: Your next dose is: Take 1 Puff by inhalation daily. 1 Puff  
    
   
   
   
  
 b complex-vitamin c-folic acid 1 mg capsule Commonly known as:  Asa Flattery Your last dose was: Your next dose is: Take 1 Cap by mouth daily. 1 Cap BACTRIM -800 mg per tablet Generic drug:  trimethoprim-sulfamethoxazole Your last dose was: Your next dose is: Take 1 Tab by mouth two (2) times a day. FOR INFECTION IN STOMACH STARTING 10/27/17  
 1 Tab  
    
   
   
   
  
 carvedilol 6.25 mg tablet Commonly known as:  Venice Guillory Your last dose was: Your next dose is: TAKE 1 TABLET BY MOUTH TWO TIMES A DAY EMLA topical cream  
Generic drug:  lidocaine-prilocaine Your last dose was: Your next dose is:    
   
   
 Apply  to affected area as needed for Pain. Patient applies to arm before dialysis on Monday, Wednesday, and Friday. furosemide 80 mg tablet Commonly known as:  LASIX Your last dose was: Your next dose is: Take 80 mg by mouth two (2) times a day. 80 mg  
    
   
   
   
  
 gabapentin 300 mg capsule Commonly known as:  NEURONTIN Your last dose was: Your next dose is: Take 300 mg by mouth nightly. 300 mg MIRALAX 17 gram packet Generic drug:  polyethylene glycol Your last dose was: Your next dose is: Take 17 g by mouth daily as needed (constipation). 17 g NovoLOG Mix 70-30 FlexPen 100 unit/mL (70-30) Inpn Generic drug:  insulin aspart protamine/insulin aspart Your last dose was: Your next dose is:    
   
   
 30 Units by SubCUTAneous route two (2) times a day. 30 Units Omeprazole delayed release 20 mg tablet Commonly known as:  PRILOSEC D/R Your last dose was: Your next dose is: Take 1 Tab by mouth two (2) times a day. 20 mg  
    
   
   
   
  
 * oxyCODONE-acetaminophen  mg per tablet Commonly known as:  PERCOCET 10 Your last dose was: Your next dose is: Take 1 Tab by mouth three (3) times daily. 1 Tab * oxyCODONE-acetaminophen 5-325 mg per tablet Commonly known as:  PERCOCET  
   
 Your last dose was: Your next dose is: Take 1 Tab by mouth every four (4) hours as needed for Pain. Max Daily Amount: 6 Tabs. 1 Tab PROVENTIL 90 mcg/actuation inhaler Generic drug:  albuterol Your last dose was: Your next dose is: Take 2 Puffs by inhalation four (4) times daily. QID PRN  
 2 Puff * Notice: This list has 2 medication(s) that are the same as other medications prescribed for you. Read the directions carefully, and ask your doctor or other care provider to review them with you. Where to Get Your Medications Information on where to get these meds will be given to you by the nurse or doctor. ! Ask your nurse or doctor about these medications  
  oxyCODONE-acetaminophen 5-325 mg per tablet Discharge Instructions Patient Discharge Instructions Omar Orellana. / [id number] : 1965 Admitted 2017 Discharged: 2017 Take Home Medications · It is important that you take the medication exactly as they are prescribed. · Keep your medication in the bottles provided by the pharmacist and keep a list of the medication names, dosages, and times to be taken in your wallet. · Do not take other medications without consulting your doctor. What to do at [facility] Recommended diet: Renal Diet, Recommended activity: Activity as tolerated, Additional Instructions: remove dressings on Sat or Sun and leave open Follow-up with Dr Renny Florian in 2 weeks, call [phone] for appt Information obtained by : 
I understand that if any problems occur once I am at home I am to contact my physician. I understand and acknowledge receipt of the instructions indicated above. Physician's or R.N.'s Signature                                                                  Date/Time Patient or Representative Signature                                                          Da ROXICODONE 5MG GIVEN AT 1:50 PM 
 
 
DISCHARGE SUMMARY from Nurse PATIENT INSTRUCTIONS: 
 
After general anesthesia or intravenous sedation, for 24 hours or while taking prescription Narcotics: · Limit your activities · Do not drive and operate hazardous machinery · Do not make important personal or business decisions · Do  not drink alcoholic beverages · If you have not urinated within 8 hours after discharge, please contact your surgeon on call. Report the following to your surgeon: 
· Excessive pain, swelling, redness or odor of or around the surgical area · Temperature over 100.5 · Nausea and vomiting lasting longer than 4 hours or if unable to take medications · Any signs of decreased circulation or nerve impairment to extremity: change in color, persistent  numbness, tingling, coldness or increase pain · Any questions Hemodialysis Access: What to Expect at [facility] Your Recovery Hemodialysis is a way to remove wastes from the blood when your kidneys can no longer do the job. It is not a cure, but it can help you live longer and feel better. It is a lifesaving treatment when you have kidney failure. Hemodialysis is often called dialysis. Your doctor created a place (called an access) in your arm for your blood to flow in and out of your body during your dialysis sessions. Your arm will probably be bruised and swollen. It may hurt. The cut (incision) may bleed. The pain and bleeding will get better over several days. You will probably need only over-the-counter pain medicine.  You can reduce swelling by propping your arm on 1 or 2 pillows and keeping your elbow straight. You will have stitches. These may dissolve on their own, or your doctor will tell you when to come in to have them removed. You should also be able to return to work in a few days. You may feel some coolness or numbness in your hand. These feelings usually go away in a few weeks. Your doctor may suggest squeezing a soft object. This will strengthen your access and may make hemodialysis faster and easier. You should always be able to feel blood rushing through the fistula or graft. It feels like a slight vibration when you put your fingers on the skin over the fistula or graft. This feeling is called a thrill or pulse. This care sheet gives you a general idea about how long it will take for you to recover. But each person recovers at a different pace. Follow the steps below to get better as quickly as possible. How can you care for yourself at home? Activity ? · Rest when you feel tired. Getting enough sleep will help you recover. Do not lie on or sleep on the arm with the access. ? · Avoid activities such as washing windows or gardening that put stress on the arm with the access. ? · You may use your arm, but do not lift anything that weighs more than about 15 pounds. This may include a child, heavy grocery bags, a heavy briefcase or backpack, cat litter or dog food bags, or a vacuum . ? · You can shower, but keep the access dry for the first 2 days. Cover the area with a plastic bag to keep it dry. ? · Do not soak or scrub the incision until it has healed. ? · Wear an arm guard to protect the area if you play sports or work with your arms. ? · You may drive when your doctor says it is okay. This is usually in 1 to 2 days. ? · Most people are able to return to work about 1 or 2 days after surgery. Diet ? · Follow an eating plan that is good for your kidneys.  A registered dietitian can help you make a meal plan that is right for you. You may need to limit protein, salt, fluids, and certain foods. Medicines ? · Your doctor will tell you if and when you can restart your medicines. He or she will also give you instructions about taking any new medicines. ? · If you take blood thinners, such as warfarin (Coumadin), clopidogrel (Plavix), or aspirin, be sure to talk to your doctor. He or she will tell you if and when to start taking those medicines again. Make sure that you understand exactly what your doctor wants you to do. ? · Take pain medicines exactly as directed. ¨ If the doctor gave you a prescription medicine for pain, take it as prescribed. ¨ If you are not taking a prescription pain medicine, ask your doctor if you can take acetaminophen (Tylenol). Do not take ibuprofen (Advil, Motrin) or naproxen (Aleve), or similar medicines, unless your doctor tells you to. They may make chronic kidney disease worse. ¨ Do not take two or more pain medicines at the same time unless the doctor told you to. Many pain medicines have acetaminophen, which is Tylenol. Too much acetaminophen (Tylenol) can be harmful. ? · If you think your pain medicine is making you sick to your stomach: 
¨ Take your medicine after meals (unless your doctor has told you not to). ¨ Ask your doctor for a different pain medicine. ? · If your doctor prescribed antibiotics, take them as directed. Do not stop taking them just because you feel better. You need to take the full course of antibiotics. Incision care ? · Keep the area dry for 2 days. After 2 days, wash the area with soap and water every day, and always before dialysis. ? · Do not soak or scrub the incision until it has healed. ? · If you have a bandage, change it every day or as your doctor recommends. Your doctor will tell you when you can remove it. Exercise ? · Squeeze a soft ball or other object as your doctor tells you.  This will help blood flow through the access and help prevent blood clots. ? Elevation ? · Prop up the sore arm on a pillow anytime you sit or lie down during the next 3 days. Try to keep it above the level of your heart. This will help reduce swelling. Other instructions ? · Every day, check your access for a pulse or thrill in the fistula or graft area. A thrill is a vibration. To feel a pulse or thrill, place the first two fingers of your hand over the access. ? · Do not bump your arm. ? · Do not wear tight clothing, jewelry, or anything else that may squeeze the access. ? · Use your other arm to have blood drawn or blood pressure taken. ? · Do not put cream or lotion on or near the access. ? · Make sure all doctors you deal with know you have a vascular access. Follow-up care is a key part of your treatment and safety. Be sure to make and go to all appointments, and call your doctor if you are having problems. It's also a good idea to know your test results and keep a list of the medicines you take. When should you call for help? Call 911 anytime you think you may need emergency care. For example, call if: 
? · You passed out (lost consciousness). ? · You have chest pain, are short of breath, or cough up blood. ?Call your doctor now or seek immediate medical care if: 
? · Your hand or arm is cold or dark-colored. ? · You have no pulse in your access. ? · You have nausea or you vomit. ? · You have pain that does not get better after you take pain medicine. ? · You have loose stitches, or your incision comes open. ? · You are bleeding from the incision. ? · You have signs of infection, such as: 
¨ Increased pain, swelling, warmth, or redness. ¨ Red streaks leading from the area. ¨ Pus draining from the area. ¨ A fever. ? · You have signs of a blood clot in your leg (called a deep vein thrombosis), such as: 
¨ Pain in your calf, back of the knee, thigh, or groin. ¨ Redness or swelling in your leg. ? Watch closely for changes in your health, and be sure to contact your doctor if you have any problems. Where can you learn more? Go to http://andrew-waldo.info/. Enter P616 in the search box to learn more about \"Hemodialysis Access: What to Expect at Home. \" Current as of: May 12, 2017 Content Version: 11.4 © 1975-4959 [employer]. Care instructions adapted under license by [employer] (which disclaims liability or warranty for this information). If you have questions about a medical condition or this instruction, always ask your healthcare professional. [address] any warranty or liability for your use of this information. These are general instructions for a healthy lifestyle: No smoking/ No tobacco products/ Avoid exposure to second hand smoke Surgeon General's Warning:  Quitting smoking now greatly reduces serious risk to your health. Obesity, smoking, and sedentary lifestyle greatly increases your risk for illness A healthy diet, regular physical exercise & weight monitoring are important for maintaining a healthy lifestyle You may be retaining fluid if you have a history of heart failure or if you experience any of the following symptoms:  Weight gain of 3 pounds or more overnight or 5 pounds in a week, increased swelling in our hands or feet or shortness of breath while lying flat in bed. Please call your doctor as soon as you notice any of these symptoms; do not wait until your next office visit. Recognize signs and symptoms of STROKE: 
 
F-face looks uneven A-arms unable to move or move unevenly S-speech slurred or non-existent T-time-call 911 as soon as signs and symptoms begin-DO NOT go Back to bed or wait to see if you get better-TIME IS BRAIN. Warning Signs of HEART ATTACK Call 911 if you have these symptoms: ? Chest discomfort. Most heart attacks involve discomfort in the center of the chest that lasts more than a few minutes, or that goes away and comes back. It can feel like uncomfortable pressure, squeezing, fullness, or pain. ? Discomfort in other areas of the upper body. Symptoms can include pain or discomfort in one or both arms, the back, neck, jaw, or stomach. ? Shortness of breath with or without chest discomfort. ? Other signs may include breaking out in a cold sweat, nausea, or lightheadedness. Don't wait more than five minutes to call [address]! Fast action can save your life. Calling 911 is almost always the fastest way to get lifesaving treatment. Emergency Medical Services staff can begin treatment when they arrive  up to an hour sooner than if someone gets to the hospital by car. The discharge information has been reviewed with the {PATIENT PARENT GUARDIAN:34463}. The {PATIENT PARENT GUARDIAN:28635} verbalized understanding. Discharge medications reviewed with the {Dishcarge meds reviewed QUOE:74257} and appropriate educational materials and side effects teaching were provided. ___________________________________________________________________________________________________________________________________ Introducing [facility] & HEALTH SERVICES! [employer] introduces [employer] patient portal. Now you can access parts of your medical record, email your doctor's office, and request medication refills online. 1. In your internet browser, go to [email or web address] 2. Click on the First Time User? Click Here link in the Sign In box. You will see the New Member Sign Up page. 3. Enter your [employer] Access Code exactly as it appears below. You will not need to use this code after youve completed the sign-up process. If you do not sign up before the expiration date, you must request a new code.  
 
· [employer] Access Code: [email or web address] 
 Expires: 12/24/2017  4:44 PM 
 
4. Enter the last four digits of your Social Security Number (xxxx) and Date of Birth (mm/dd/yyyy) as indicated and click Submit. You will be taken to the next sign-up page. 5. Create a Safendt ID. This will be your [employer] login ID and cannot be changed, so think of one that is secure and easy to remember. 6. Create a [employer] password. You can change your password at any time. 7. Enter your Password Reset Question and Answer. This can be used at a later time if you forget your password. 8. Enter your e-mail address. You will receive e-mail notification when new information is available in [address] 9. Click Sign Up. You can now view and download portions of your medical record. 10. Click the Download Summary menu link to download a portable copy of your medical information. If you have questions, please visit the Frequently Asked Questions section of the [employer] website. Remember, [employer] is NOT to be used for urgent needs. For medical emergencies, dial 911. Now available from your iPhone and Android! Unresulted Labs-Please follow up with your PCP about these lab tests Order Current Status XR FLUOROSCOPY UNDER 60 MINUTES In process Providers Seen During Your Hospitalization Provider Specialty Primary office phone Leonidas Garcia MD Vascular Surgery [phone] Your Primary Care Physician (PCP) Primary Care Physician Office Phone Office Fax Padmini Gamez [phone] You are allergic to the following Allergen Reactions Ativan (Lorazepam) Other (comments) Hyper activity Recent Documentation Height Weight BMI Smoking Status 1.676 m 69.9 kg 24.86 kg/m2 Current Every Day Smoker Emergency Contacts Name Discharge Info Relation Home Work Mobile Sarah Beth Alba DISCHARGE CAREGIVER [3] Spouse [[phone] Patient Belongings The following personal items are in your possession at time of discharge: 
  Dental Appliances: Other (comment)  Visual Aid: None   Hearing Aids/Status:  (NO)  Home Medications: None   Jewelry: None  Clothing: Other (comment) (street clothes to pacu in personal belongings bag)    Other Valuables: None Discharge Instructions Attachments/References MEFS - OXYCODONE/ACETAMINOPHEN (PERCOCET, ROXICET) - (BY MOUTH) (ENGLISH) Patient Handouts Oxycodone/Acetaminophen (Percocet, Roxicet) - (By mouth) Why this medicine is used:  
Treats pain. This medicine contains a narcotic pain reliever. Contact a nurse or doctor right away if you have: 
· Extreme weakness, shallow breathing, slow heartbeat · Sweating or cold, clammy skin · Skin blisters, rash, or peeling Common side effects: 
· Constipation · Nausea, vomiting · Tiredness © 2017 [address] Information is for End User's use only and may not be sold, redistributed or otherwise used for commercial purposes. Please provide this summary of care documentation to your next provider. Signatures-by signing, you are acknowledging that this After Visit Summary has been reviewed with you and you have received a copy. Patient Signature:  ____________________________________________________________ Date:  ____________________________________________________________  
  
Leah Mckeon Provider Signature:  ____________________________________________________________ Date:  ____________________________________________________________

## 2017-11-02 NOTE — ANESTHESIA PREPROCEDURE EVALUATION
Anesthetic History   No history of anesthetic complications            Review of Systems / Medical History  Patient summary reviewed, nursing notes reviewed and pertinent labs reviewed    Pulmonary    COPD: severe               Neuro/Psych   Within defined limits           Cardiovascular    Hypertension                   GI/Hepatic/Renal       Hepatitis: type C  Renal disease: CRI       Endo/Other    Diabetes    Arthritis     Other Findings            Physical Exam    Airway  Mallampati: II  TM Distance: > 6 cm  Neck ROM: normal range of motion   Mouth opening: Normal     Cardiovascular  Regular rate and rhythm,  S1 and S2 normal,  no murmur, click, rub, or gallop             Dental  No notable dental hx       Pulmonary      Decreased breath sounds           Abdominal  GI exam deferred       Other Findings            Anesthetic Plan    ASA: 4  Anesthesia type: MAC            Anesthetic plan and risks discussed with: Patient

## 2017-11-02 NOTE — OP NOTES
[address] [facility] [address], [address]   OP NOTE       Name:  Matti Barnes   MR#:  [id number]   :  1965   Account #:  [de-identified]    Surgery Date:  2017   Date of Adm:  2017       PREOPERATIVE DIAGNOSES    1. Clotted left upper arm dialysis graft. 2. Open wound overlying left arm graft, with exposed graft. POSTOPERATIVE DIAGNOSES    1. Clotted left upper arm dialysis graft. 2. Open wound overlying left arm graft, with exposed graft. PROCEDURES PERFORMED   1. Thrombectomy and revision, left upper arm graft, with placement of   interposition graft to exclude exposed segment. 2. Balloon angioplasty of venous anastomotic stenosis. SURGEON: Jasper Garrsion MD    ANESTHESIA: Local with sedation. ESTIMATED BLOOD LOSS: 25 mL. SPECIMENS REMOVED: None. COMPLICATIONS: None. INDICATIONS: The patient is a 58-year-old male with end-stage renal   disease on hemodialysis. His left upper arm dialysis graft was found to   be occluded last week. A temporary catheter was inserted. He has a   small pinhole overlying the graft, with exposed graft at the base of the   wound. He will undergo open thrombectomy of the graft, and revision   with bypass of the exposed segment of graft. DESCRIPTION OF PROCEDURE: The patient's left arm was prepped   and draped. Transverse incisions were made proximal and distal to the   area of exposed graft in the upper outer portion of the upper arm loop   graft. The graft was identified through these 2 incisions. It was opened,   and Glendy catheters were used to declot the graft, getting good   inflow and fairly good backbleeding. A new segment of 7 mm thin-  walled [place]-Rikki graft was obtained, and was sewn end-to-end to the   arterial side of the graft. The graft was then tunneled lateral to the old   graft. A 6 cm segment of graft was replaced.  The graft was then sewn   end-to-end to the venous end using running CV5 Lee-Rikki suture. Flow was then restored. The incisions were closed with Vicryl   subcutaneous suture. A 7-Kenyan sheath was inserted into the graft. A   graft angiogram was performed that showed a 70% stenosis at the   venous anastomosis. This was dilated with an 8 mm balloon with a   good angiographic result. No other graft abnormalities were identified   on angiogram. The sheath was removed, and the puncture site closed   with a nylon suture. The incisions were then closed with skin staples. The small opening over the graft was then further opened, and the   exposed portion of graft was excised over a short length, so that it was   no longer exposed at the base of the small wound. This wound was   then closed with a staple. Dressings were applied, and the patient was   returned to the recovery room in stable condition.         MD Dianne Herrera   D:  11/02/2017   13:04   T:  11/02/2017   15:05   Job #:  [id number]

## 2017-12-13 ENCOUNTER — APPOINTMENT (OUTPATIENT)
Dept: GENERAL RADIOLOGY | Age: 52
DRG: 173 | End: 2017-12-13
Attending: EMERGENCY MEDICINE
Payer: MEDICAID

## 2017-12-13 ENCOUNTER — HOSPITAL ENCOUNTER (INPATIENT)
Age: 52
LOS: 6 days | Discharge: HOME | DRG: 173 | End: 2017-12-20
Attending: EMERGENCY MEDICINE | Admitting: SURGERY
Payer: MEDICAID

## 2017-12-13 ENCOUNTER — ANESTHESIA EVENT (OUTPATIENT)
Dept: SURGERY | Age: 52
DRG: 173 | End: 2017-12-13
Payer: MEDICAID

## 2017-12-13 ENCOUNTER — ANESTHESIA (OUTPATIENT)
Dept: SURGERY | Age: 52
DRG: 173 | End: 2017-12-13
Payer: MEDICAID

## 2017-12-13 DIAGNOSIS — I74.2 ARTERIAL EMBOLISM AND THROMBOSIS OF UPPER EXTREMITY (HCC): Primary | ICD-10-CM

## 2017-12-13 LAB
ANION GAP SERPL CALC-SCNC: 8 MMOL/L (ref 5–15)
BASOPHILS # BLD: 0 K/UL (ref 0–0.1)
BASOPHILS NFR BLD: 0 % (ref 0–1)
BUN SERPL-MCNC: 53 MG/DL (ref 6–20)
BUN/CREAT SERPL: 13 (ref 12–20)
CALCIUM SERPL-MCNC: 8.1 MG/DL (ref 8.5–10.1)
CHLORIDE SERPL-SCNC: 100 MMOL/L (ref 97–108)
CO2 SERPL-SCNC: 25 MMOL/L (ref 21–32)
CREAT SERPL-MCNC: 4 MG/DL (ref 0.7–1.3)
DIFFERENTIAL METHOD BLD: ABNORMAL
EOSINOPHIL # BLD: 1.1 K/UL (ref 0–0.4)
EOSINOPHIL NFR BLD: 13 % (ref 0–7)
ERYTHROCYTE [DISTWIDTH] IN BLOOD BY AUTOMATED COUNT: 13.8 % (ref 11.5–14.5)
GLUCOSE BLD STRIP.AUTO-MCNC: 90 MG/DL (ref 65–100)
GLUCOSE SERPL-MCNC: 135 MG/DL (ref 65–100)
HCT VFR BLD AUTO: 30.3 % (ref 36.6–50.3)
HGB BLD-MCNC: 10.4 G/DL (ref 12.1–17)
INR PPP: 1.1 (ref 0.9–1.1)
LYMPHOCYTES # BLD: 1.2 K/UL (ref 0.8–3.5)
LYMPHOCYTES NFR BLD: 14 % (ref 12–49)
MCH RBC QN AUTO: 34.7 PG (ref 26–34)
MCHC RBC AUTO-ENTMCNC: 34.3 G/DL (ref 30–36.5)
MCV RBC AUTO: 101 FL (ref 80–99)
MONOCYTES # BLD: 0.9 K/UL (ref 0–1)
MONOCYTES NFR BLD: 11 % (ref 5–13)
NEUTS SEG # BLD: 5.1 K/UL (ref 1.8–8)
NEUTS SEG NFR BLD: 62 % (ref 32–75)
PLATELET # BLD AUTO: 257 K/UL (ref 150–400)
POTASSIUM SERPL-SCNC: 4.2 MMOL/L (ref 3.5–5.1)
PROTHROMBIN TIME: 11.1 SEC (ref 9–11.1)
RBC # BLD AUTO: 3 M/UL (ref 4.1–5.7)
RBC MORPH BLD: ABNORMAL
SERVICE CMNT-IMP: NORMAL
SODIUM SERPL-SCNC: 133 MMOL/L (ref 136–145)
WBC # BLD AUTO: 8.3 K/UL (ref 4.1–11.1)

## 2017-12-13 PROCEDURE — 3E0T3BZ INTRODUCTION OF ANESTHETIC AGENT INTO PERIPHERAL NERVES AND PLEXI, PERCUTANEOUS APPROACH: ICD-10-PCS | Performed by: ANESTHESIOLOGY

## 2017-12-13 PROCEDURE — 74011250636 HC RX REV CODE- 250/636: Performed by: EMERGENCY MEDICINE

## 2017-12-13 PROCEDURE — 77030029684 HC NEB SM VOL KT MONA -A

## 2017-12-13 PROCEDURE — 36415 COLL VENOUS BLD VENIPUNCTURE: CPT | Performed by: EMERGENCY MEDICINE

## 2017-12-13 PROCEDURE — 99284 EMERGENCY DEPT VISIT MOD MDM: CPT

## 2017-12-13 PROCEDURE — 64450 NJX AA&/STRD OTHER PN/BRANCH: CPT

## 2017-12-13 PROCEDURE — 77030011640 HC PAD GRND REM COVD -A: Performed by: SURGERY

## 2017-12-13 PROCEDURE — 96374 THER/PROPH/DIAG INJ IV PUSH: CPT

## 2017-12-13 PROCEDURE — 76010000149 HC OR TIME 1 TO 1.5 HR: Performed by: SURGERY

## 2017-12-13 PROCEDURE — 77030031139 HC SUT VCRL2 J&J -A: Performed by: SURGERY

## 2017-12-13 PROCEDURE — 85025 COMPLETE CBC W/AUTO DIFF WBC: CPT | Performed by: EMERGENCY MEDICINE

## 2017-12-13 PROCEDURE — 85610 PROTHROMBIN TIME: CPT | Performed by: EMERGENCY MEDICINE

## 2017-12-13 PROCEDURE — 71010 XR CHEST PORT: CPT

## 2017-12-13 PROCEDURE — 74011000250 HC RX REV CODE- 250: Performed by: SURGERY

## 2017-12-13 PROCEDURE — 77030002986 HC SUT PROL J&J -A: Performed by: SURGERY

## 2017-12-13 PROCEDURE — 77030020256 HC SOL INJ NACL 0.9%  500ML: Performed by: SURGERY

## 2017-12-13 PROCEDURE — C1757 CATH, THROMBECTOMY/EMBOLECT: HCPCS | Performed by: SURGERY

## 2017-12-13 PROCEDURE — 77030018836 HC SOL IRR NACL ICUM -A: Performed by: SURGERY

## 2017-12-13 PROCEDURE — 76210000017 HC OR PH I REC 1.5 TO 2 HR: Performed by: SURGERY

## 2017-12-13 PROCEDURE — 03C80ZZ EXTIRPATION OF MATTER FROM LEFT BRACHIAL ARTERY, OPEN APPROACH: ICD-10-PCS | Performed by: SURGERY

## 2017-12-13 PROCEDURE — 80048 BASIC METABOLIC PNL TOTAL CA: CPT | Performed by: EMERGENCY MEDICINE

## 2017-12-13 PROCEDURE — 74011250636 HC RX REV CODE- 250/636

## 2017-12-13 PROCEDURE — 77030013079 HC BLNKT BAIR HGGR 3M -A: Performed by: ANESTHESIOLOGY

## 2017-12-13 PROCEDURE — 93931 UPPER EXTREMITY STUDY: CPT

## 2017-12-13 PROCEDURE — 74011000250 HC RX REV CODE- 250

## 2017-12-13 PROCEDURE — 77030032490 HC SLV COMPR SCD KNE COVD -B: Performed by: SURGERY

## 2017-12-13 PROCEDURE — 82962 GLUCOSE BLOOD TEST: CPT

## 2017-12-13 PROCEDURE — 93005 ELECTROCARDIOGRAM TRACING: CPT

## 2017-12-13 PROCEDURE — 74011250637 HC RX REV CODE- 250/637

## 2017-12-13 PROCEDURE — 76060000033 HC ANESTHESIA 1 TO 1.5 HR: Performed by: SURGERY

## 2017-12-13 PROCEDURE — 74011250636 HC RX REV CODE- 250/636: Performed by: ANESTHESIOLOGY

## 2017-12-13 PROCEDURE — 77030003601 HC NDL NRV BLK BBMI -A

## 2017-12-13 PROCEDURE — 77030003704 HC NDL VASC ACC ARMD -A: Performed by: SURGERY

## 2017-12-13 PROCEDURE — 77030008467 HC STPLR SKN COVD -B: Performed by: SURGERY

## 2017-12-13 PROCEDURE — 74011250636 HC RX REV CODE- 250/636: Performed by: SURGERY

## 2017-12-13 RX ORDER — MIDAZOLAM HYDROCHLORIDE 1 MG/ML
1 INJECTION, SOLUTION INTRAMUSCULAR; INTRAVENOUS AS NEEDED
Status: DISCONTINUED | OUTPATIENT
Start: 2017-12-13 | End: 2017-12-13 | Stop reason: HOSPADM

## 2017-12-13 RX ORDER — MORPHINE SULFATE 2 MG/ML
1-2 INJECTION, SOLUTION INTRAMUSCULAR; INTRAVENOUS
Status: DISCONTINUED | OUTPATIENT
Start: 2017-12-13 | End: 2017-12-14

## 2017-12-13 RX ORDER — MORPHINE SULFATE 10 MG/ML
2 INJECTION, SOLUTION INTRAMUSCULAR; INTRAVENOUS
Status: DISCONTINUED | OUTPATIENT
Start: 2017-12-13 | End: 2017-12-14 | Stop reason: HOSPADM

## 2017-12-13 RX ORDER — SODIUM CHLORIDE 0.9 % (FLUSH) 0.9 %
5-10 SYRINGE (ML) INJECTION AS NEEDED
Status: ACTIVE | OUTPATIENT
Start: 2017-12-13 | End: 2017-12-14

## 2017-12-13 RX ORDER — IPRATROPIUM BROMIDE AND ALBUTEROL SULFATE 2.5; .5 MG/3ML; MG/3ML
3 SOLUTION RESPIRATORY (INHALATION) 3 TIMES DAILY
Status: DISCONTINUED | OUTPATIENT
Start: 2017-12-14 | End: 2017-12-14

## 2017-12-13 RX ORDER — MORPHINE SULFATE 4 MG/ML
4 INJECTION, SOLUTION INTRAMUSCULAR; INTRAVENOUS ONCE
Status: COMPLETED | OUTPATIENT
Start: 2017-12-13 | End: 2017-12-13

## 2017-12-13 RX ORDER — PROPOFOL 10 MG/ML
INJECTION, EMULSION INTRAVENOUS AS NEEDED
Status: DISCONTINUED | OUTPATIENT
Start: 2017-12-13 | End: 2017-12-13 | Stop reason: HOSPADM

## 2017-12-13 RX ORDER — MIDAZOLAM HYDROCHLORIDE 1 MG/ML
INJECTION, SOLUTION INTRAMUSCULAR; INTRAVENOUS AS NEEDED
Status: DISCONTINUED | OUTPATIENT
Start: 2017-12-13 | End: 2017-12-13 | Stop reason: HOSPADM

## 2017-12-13 RX ORDER — SODIUM CHLORIDE, SODIUM LACTATE, POTASSIUM CHLORIDE, CALCIUM CHLORIDE 600; 310; 30; 20 MG/100ML; MG/100ML; MG/100ML; MG/100ML
100 INJECTION, SOLUTION INTRAVENOUS CONTINUOUS
Status: DISCONTINUED | OUTPATIENT
Start: 2017-12-13 | End: 2017-12-14 | Stop reason: HOSPADM

## 2017-12-13 RX ORDER — LIDOCAINE HYDROCHLORIDE 10 MG/ML
0.1 INJECTION, SOLUTION EPIDURAL; INFILTRATION; INTRACAUDAL; PERINEURAL AS NEEDED
Status: DISCONTINUED | OUTPATIENT
Start: 2017-12-13 | End: 2017-12-13 | Stop reason: HOSPADM

## 2017-12-13 RX ORDER — SODIUM CHLORIDE 0.9 % (FLUSH) 0.9 %
5-10 SYRINGE (ML) INJECTION AS NEEDED
Status: DISCONTINUED | OUTPATIENT
Start: 2017-12-13 | End: 2017-12-14 | Stop reason: HOSPADM

## 2017-12-13 RX ORDER — GABAPENTIN 300 MG/1
300 CAPSULE ORAL
Status: DISCONTINUED | OUTPATIENT
Start: 2017-12-14 | End: 2017-12-20 | Stop reason: HOSPADM

## 2017-12-13 RX ORDER — ALBUTEROL SULFATE 0.83 MG/ML
2.5 SOLUTION RESPIRATORY (INHALATION)
Status: DISCONTINUED | OUTPATIENT
Start: 2017-12-13 | End: 2017-12-20 | Stop reason: HOSPADM

## 2017-12-13 RX ORDER — LIDOCAINE HYDROCHLORIDE 10 MG/ML
INJECTION INFILTRATION; PERINEURAL AS NEEDED
Status: DISCONTINUED | OUTPATIENT
Start: 2017-12-13 | End: 2017-12-13 | Stop reason: HOSPADM

## 2017-12-13 RX ORDER — DIPHENHYDRAMINE HYDROCHLORIDE 50 MG/ML
12.5 INJECTION, SOLUTION INTRAMUSCULAR; INTRAVENOUS AS NEEDED
Status: ACTIVE | OUTPATIENT
Start: 2017-12-13 | End: 2017-12-13

## 2017-12-13 RX ORDER — CEFAZOLIN SODIUM 1 G/3ML
INJECTION, POWDER, FOR SOLUTION INTRAMUSCULAR; INTRAVENOUS AS NEEDED
Status: DISCONTINUED | OUTPATIENT
Start: 2017-12-13 | End: 2017-12-13 | Stop reason: HOSPADM

## 2017-12-13 RX ORDER — SODIUM CHLORIDE 0.9 % (FLUSH) 0.9 %
5-10 SYRINGE (ML) INJECTION EVERY 8 HOURS
Status: DISPENSED | OUTPATIENT
Start: 2017-12-14 | End: 2017-12-14

## 2017-12-13 RX ORDER — SODIUM CHLORIDE 9 MG/ML
100 INJECTION, SOLUTION INTRAVENOUS CONTINUOUS
Status: DISPENSED | OUTPATIENT
Start: 2017-12-14 | End: 2017-12-14

## 2017-12-13 RX ORDER — HEPARIN SODIUM 1000 [USP'U]/ML
INJECTION, SOLUTION INTRAVENOUS; SUBCUTANEOUS AS NEEDED
Status: DISCONTINUED | OUTPATIENT
Start: 2017-12-13 | End: 2017-12-13 | Stop reason: HOSPADM

## 2017-12-13 RX ORDER — INSULIN ASPART 100 [IU]/ML
35 INJECTION, SUSPENSION SUBCUTANEOUS EVERY EVENING
COMMUNITY
End: 2020-01-01

## 2017-12-13 RX ORDER — PROPOFOL 10 MG/ML
INJECTION, EMULSION INTRAVENOUS
Status: DISCONTINUED | OUTPATIENT
Start: 2017-12-13 | End: 2017-12-13 | Stop reason: HOSPADM

## 2017-12-13 RX ORDER — SODIUM CHLORIDE 9 MG/ML
50 INJECTION, SOLUTION INTRAVENOUS CONTINUOUS
Status: DISCONTINUED | OUTPATIENT
Start: 2017-12-13 | End: 2017-12-13 | Stop reason: HOSPADM

## 2017-12-13 RX ORDER — FENTANYL CITRATE 50 UG/ML
50 INJECTION, SOLUTION INTRAMUSCULAR; INTRAVENOUS AS NEEDED
Status: DISCONTINUED | OUTPATIENT
Start: 2017-12-13 | End: 2017-12-13 | Stop reason: HOSPADM

## 2017-12-13 RX ORDER — OXYCODONE AND ACETAMINOPHEN 10; 325 MG/1; MG/1
TABLET ORAL
Status: COMPLETED
Start: 2017-12-13 | End: 2017-12-13

## 2017-12-13 RX ORDER — SODIUM CHLORIDE 0.9 % (FLUSH) 0.9 %
5-10 SYRINGE (ML) INJECTION EVERY 8 HOURS
Status: DISCONTINUED | OUTPATIENT
Start: 2017-12-13 | End: 2017-12-13 | Stop reason: HOSPADM

## 2017-12-13 RX ORDER — DEXMEDETOMIDINE HYDROCHLORIDE 4 UG/ML
INJECTION, SOLUTION INTRAVENOUS
Status: DISCONTINUED | OUTPATIENT
Start: 2017-12-13 | End: 2017-12-13 | Stop reason: HOSPADM

## 2017-12-13 RX ORDER — ROPIVACAINE HYDROCHLORIDE 5 MG/ML
150 INJECTION, SOLUTION EPIDURAL; INFILTRATION; PERINEURAL AS NEEDED
Status: DISCONTINUED | OUTPATIENT
Start: 2017-12-13 | End: 2017-12-13 | Stop reason: HOSPADM

## 2017-12-13 RX ORDER — OMEPRAZOLE 20 MG/1
20 CAPSULE, DELAYED RELEASE ORAL
COMMUNITY
End: 2018-02-22

## 2017-12-13 RX ORDER — FENTANYL CITRATE 50 UG/ML
25 INJECTION, SOLUTION INTRAMUSCULAR; INTRAVENOUS
Status: DISCONTINUED | OUTPATIENT
Start: 2017-12-13 | End: 2017-12-14 | Stop reason: HOSPADM

## 2017-12-13 RX ORDER — CARVEDILOL 6.25 MG/1
6.25 TABLET ORAL 2 TIMES DAILY WITH MEALS
Status: DISCONTINUED | OUTPATIENT
Start: 2017-12-14 | End: 2017-12-20 | Stop reason: HOSPADM

## 2017-12-13 RX ORDER — OXYCODONE AND ACETAMINOPHEN 10; 325 MG/1; MG/1
1 TABLET ORAL 3 TIMES DAILY
Status: DISCONTINUED | OUTPATIENT
Start: 2017-12-13 | End: 2017-12-14

## 2017-12-13 RX ORDER — INSULIN ASPART 100 [IU]/ML
25 INJECTION, SUSPENSION SUBCUTANEOUS DAILY
COMMUNITY
End: 2020-01-01

## 2017-12-13 RX ORDER — PANTOPRAZOLE SODIUM 40 MG/1
40 TABLET, DELAYED RELEASE ORAL DAILY PRN
Status: DISCONTINUED | OUTPATIENT
Start: 2017-12-13 | End: 2017-12-14

## 2017-12-13 RX ORDER — SODIUM CHLORIDE, SODIUM LACTATE, POTASSIUM CHLORIDE, CALCIUM CHLORIDE 600; 310; 30; 20 MG/100ML; MG/100ML; MG/100ML; MG/100ML
100 INJECTION, SOLUTION INTRAVENOUS CONTINUOUS
Status: DISCONTINUED | OUTPATIENT
Start: 2017-12-13 | End: 2017-12-13 | Stop reason: HOSPADM

## 2017-12-13 RX ORDER — MIDAZOLAM HYDROCHLORIDE 1 MG/ML
0.5 INJECTION, SOLUTION INTRAMUSCULAR; INTRAVENOUS
Status: DISCONTINUED | OUTPATIENT
Start: 2017-12-13 | End: 2017-12-14 | Stop reason: HOSPADM

## 2017-12-13 RX ORDER — SODIUM CHLORIDE 0.9 % (FLUSH) 0.9 %
5-10 SYRINGE (ML) INJECTION AS NEEDED
Status: DISCONTINUED | OUTPATIENT
Start: 2017-12-13 | End: 2017-12-13 | Stop reason: HOSPADM

## 2017-12-13 RX ORDER — FUROSEMIDE 40 MG/1
80 TABLET ORAL 2 TIMES DAILY
Status: DISCONTINUED | OUTPATIENT
Start: 2017-12-14 | End: 2017-12-20 | Stop reason: HOSPADM

## 2017-12-13 RX ADMIN — HEPARIN SODIUM 3000 UNITS: 1000 INJECTION, SOLUTION INTRAVENOUS; SUBCUTANEOUS at 18:58

## 2017-12-13 RX ADMIN — PROPOFOL 30 MG: 10 INJECTION, EMULSION INTRAVENOUS at 19:00

## 2017-12-13 RX ADMIN — OXYCODONE HYDROCHLORIDE AND ACETAMINOPHEN 1 TABLET: 10; 325 TABLET ORAL at 22:28

## 2017-12-13 RX ADMIN — CEFAZOLIN SODIUM 2 G: 1 INJECTION, POWDER, FOR SOLUTION INTRAMUSCULAR; INTRAVENOUS at 18:51

## 2017-12-13 RX ADMIN — MORPHINE SULFATE 4 MG: 4 INJECTION, SOLUTION INTRAMUSCULAR; INTRAVENOUS at 16:19

## 2017-12-13 RX ADMIN — DEXMEDETOMIDINE HYDROCHLORIDE 0.4 MCG/KG/HR: 4 INJECTION, SOLUTION INTRAVENOUS at 18:39

## 2017-12-13 RX ADMIN — PROPOFOL 20 MG: 10 INJECTION, EMULSION INTRAVENOUS at 18:38

## 2017-12-13 RX ADMIN — PROPOFOL 20 MG: 10 INJECTION, EMULSION INTRAVENOUS at 18:41

## 2017-12-13 RX ADMIN — PROPOFOL 20 MG: 10 INJECTION, EMULSION INTRAVENOUS at 18:44

## 2017-12-13 RX ADMIN — MIDAZOLAM HYDROCHLORIDE 2 MG: 1 INJECTION, SOLUTION INTRAMUSCULAR; INTRAVENOUS at 19:00

## 2017-12-13 RX ADMIN — HEPARIN SODIUM 1000 UNITS: 1000 INJECTION, SOLUTION INTRAVENOUS; SUBCUTANEOUS at 19:22

## 2017-12-13 RX ADMIN — PROPOFOL 20 MCG/KG/MIN: 10 INJECTION, EMULSION INTRAVENOUS at 18:39

## 2017-12-13 RX ADMIN — MIDAZOLAM HYDROCHLORIDE 2 MG: 1 INJECTION, SOLUTION INTRAMUSCULAR; INTRAVENOUS at 18:12

## 2017-12-13 RX ADMIN — SODIUM CHLORIDE 50 ML/HR: 900 INJECTION, SOLUTION INTRAVENOUS at 18:21
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
## 2017-12-13 NOTE — ANESTHESIA PREPROCEDURE EVALUATION
Anesthetic History   No history of anesthetic complications            Review of Systems / Medical History  Patient summary reviewed, nursing notes reviewed and pertinent labs reviewed    Pulmonary  Within defined limits  COPD               Neuro/Psych   Within defined limits           Cardiovascular  Within defined limits  Hypertension                   GI/Hepatic/Renal  Within defined limits         Liver disease     Endo/Other  Within defined limits  Diabetes    Arthritis     Other Findings              Physical Exam    Airway  Mallampati: II  TM Distance: > 6 cm  Neck ROM: normal range of motion   Mouth opening: Normal     Cardiovascular  Regular rate and rhythm,  S1 and S2 normal,  no murmur, click, rub, or gallop             Dental  No notable dental hx       Pulmonary  Breath sounds clear to auscultation               Abdominal  GI exam deferred       Other Findings            Anesthetic Plan    ASA: 4, emergent  Anesthesia type: regional - infraclavicular block          Induction: Intravenous  Anesthetic plan and risks discussed with: Patient

## 2017-12-13 NOTE — ANESTHESIA PROCEDURE NOTES
Peripheral Block    Start time: 12/13/2017 6:11 PM  End time: 12/13/2017 6:21 PM  Performed by: Neto Hansen  Authorized by: Neto Hansen       Pre-procedure:    Indications: at surgeon's request, post-op pain management and primary anesthetic    Preanesthetic Checklist: patient identified, risks and benefits discussed, site marked, timeout performed, anesthesia consent given and patient being monitored      Block Type:   Block Type:  Supraclavicular  Laterality:  Left  Monitoring:  Standard ASA monitoring, continuous pulse ox, frequent vital sign checks, heart rate, responsive to questions and oxygen  Injection Technique:  Single shot  Procedures: ultrasound guided    Patient Position: supine  Prep: chlorhexidine    Location:  Supraclavicular  Needle Type:  Stimuplex  Needle Gauge:  22 G  Needle Localization:  Ultrasound guidance  Medication Injected:  0.5%  ropivacaine  Volume (mL):  30    Assessment:  Number of attempts:  1  Injection Assessment:  Incremental injection every 5 mL, local visualized surrounding nerve on ultrasound, negative aspiration for blood, no intravascular symptoms, negative aspiration for CSF, no paresthesia and ultrasound image on chart  Patient tolerance:  Patient tolerated the procedure well with no immediate complications

## 2017-12-13 NOTE — ED TRIAGE NOTES
Pt stated he went to dialysis this am and his shunt was clotted, pt went to vascular to have it declotted and since then he started having severe pain to left arm, left arm cyanotic and cold to touch from elbow done, no radial pulse noted in triage
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
## 2017-12-13 NOTE — ROUTINE PROCESS
TRANSFER - OUT REPORT:    Verbal report given to OR(name) on Zia Peters.  being transferred to OR(unit) for routine progression of care       Report consisted of patients Situation, Background, Assessment and   Recommendations(SBAR). Information from the following report(s) SBAR, ED Summary, Intake/Output, MAR and Recent Results was reviewed with the receiving nurse. Lines:   Peripheral IV 12/13/17 Right Antecubital (Active)   Site Assessment Clean, dry, & intact 12/13/2017  4:28 PM   Phlebitis Assessment 0 12/13/2017  4:28 PM   Infiltration Assessment 0 12/13/2017  4:28 PM   Dressing Status Clean, dry, & intact 12/13/2017  4:28 PM   Hub Color/Line Status Pink 12/13/2017  4:28 PM        Opportunity for questions and clarification was provided. Patient transported with:   [employer]       Dr Joel Lemos at bedside, consent obtained. Pt belongings secured in security. Wife on the way.  Pt taken off of unit in no signs of distress

## 2017-12-13 NOTE — H&P
Vascular Surgery H+P    CC: ischemic left hand    HPI: Patient is a severely debilitated gentleman with ESRD who presents with an ischemic left hand; studies suggest brachial artery occlusion. He had a HD graft thrombectomy earlier today. 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 Current Outpatient Prescriptions on File Prior to Encounter   Medication Sig Dispense Refill    oxyCODONE-acetaminophen (PERCOCET) 5-325 mg per tablet Take 1 Tab by mouth every four (4) hours as needed for Pain. Max Daily Amount: 6 Tabs. 25 Tab 0    umeclidinium-vilanterol (ANORO ELLIPTA) 62.5-25 mcg/actuation inhaler Take 1 Puff by inhalation daily.  furosemide (LASIX) 80 mg tablet Take 80 mg by mouth two (2) times a day.  gabapentin (NEURONTIN) 300 mg capsule Take 300 mg by mouth nightly.  b complex-vitamin c-folic acid (NEPHROCAPS) 1 mg capsule Take 1 Cap by mouth daily.  lidocaine-prilocaine (EMLA) topical cream Apply  to affected area as needed for Pain. Patient applies to arm before dialysis on Monday, Wednesday, and Friday.  polyethylene glycol (MIRALAX) 17 gram packet Take 17 g by mouth daily as needed (constipation).  carvedilol (COREG) 6.25 mg tablet TAKE 1 TABLET BY MOUTH TWO TIMES A DAY  3    albuterol-ipratropium (DUO-NEB) 2.5 mg-0.5 mg/3 ml nebu 3 mL by Nebulization route three (3) times daily. AT LUNCHTIME DAILY.        oxyCODONE-acetaminophen (PERCOCET 10)  mg per tablet Take 1 Tab by mouth three (3) times daily.  albuterol (PROVENTIL) 90 mcg/Actuation inhaler Take 2 Puffs by inhalation four (4) times daily. QID PRN         PE:  Visit Vitals    /65    Pulse (!) 101    Temp 97.5 °F (36.4 °C)    Resp 16    Ht 5' 5\" (1.651 m)    Wt 70.8 kg (156 lb 1 oz)    SpO2 98%    BMI 25.97 kg/m2     NAD  Mostly clear  RRR  Left arm with palpable HD graft; lukewarm hand; non-palpable brachial pulse    Recent Results (from the past 12 hour(s))   CBC WITH AUTOMATED DIFF    Collection Time: 12/13/17  4:16 PM   Result Value Ref Range    WBC 8.3 4.1 - 11.1 K/uL    RBC 3.00 (L) 4.10 - 5.70 M/uL    HGB 10.4 (L) 12.1 - 17.0 g/dL    HCT 30.3 (L) 36.6 - 50.3 %    .0 (H) 80.0 - 99.0 FL    MCH 34.7 (H) 26.0 - 34.0 PG    MCHC 34.3 30.0 - 36.5 g/dL    RDW 13.8 11.5 - 14.5 %    PLATELET 474 195 - 022 K/uL    NEUTROPHILS 62 32 - 75 %    LYMPHOCYTES 14 12 - 49 %    MONOCYTES 11 5 - 13 %    EOSINOPHILS 13 (H) 0 - 7 %    BASOPHILS 0 0 - 1 %    ABS. NEUTROPHILS 5.1 1.8 - 8.0 K/UL    ABS. LYMPHOCYTES 1.2 0.8 - 3.5 K/UL    ABS. MONOCYTES 0.9 0.0 - 1.0 K/UL    ABS. EOSINOPHILS 1.1 (H) 0.0 - 0.4 K/UL    ABS.  BASOPHILS 0.0 0.0 - 0.1 K/UL    DF SMEAR SCANNED      RBC COMMENTS MACROCYTOSIS  1+        RBC COMMENTS ANISOCYTOSIS  1+        RBC COMMENTS COLE CELLS  2+        RBC COMMENTS OVALOCYTES  1+       METABOLIC PANEL, BASIC    Collection Time: 12/13/17  4:16 PM   Result Value Ref Range    Sodium 133 (L) 136 - 145 mmol/L    Potassium 4.2 3.5 - 5.1 mmol/L    Chloride 100 97 - 108 mmol/L    CO2 25 21 - 32 mmol/L    Anion gap 8 5 - 15 mmol/L    Glucose 135 (H) 65 - 100 mg/dL    BUN 53 (H) 6 - 20 MG/DL    Creatinine 4.00 (H) 0.70 - 1.30 MG/DL    BUN/Creatinine ratio 13 12 - 20      GFR est AA 19 (L) >60 ml/min/1.73m2    GFR est non-AA 16 (L) >60 ml/min/1.73m2    Calcium 8.1 (L) 8.5 - 10.1 MG/DL   PROTHROMBIN TIME + INR    Collection Time: 12/13/17  4:16 PM   Result Value Ref Range    INR 1.1 0.9 - 1.1      Prothrombin time 11.1 9.0 - 11.1 sec   EKG, 12 LEAD, INITIAL    Collection Time: 12/13/17  4:17 PM   Result Value Ref Range    Ventricular Rate 65 BPM    Atrial Rate 65 BPM    P-R Interval 180 ms    QRS Duration 82 ms    Q-T Interval 434 ms    QTC Calculation (Bezet) 451 ms    Calculated P Axis 74 degrees    Calculated R Axis 95 degrees    Calculated T Axis 86 degrees    Diagnosis       Normal sinus rhythm  Septal infarct , age undetermined  When compared with ECG of 12-JUN-2016 18:21,  Nonspecific T wave abnormality no longer evident in Lateral leads       A/P  Left brachial artery occlusion  --urgent operative left arm thrombectomy

## 2017-12-13 NOTE — IP AVS SNAPSHOT
[address] [facility] [address] [place] [address] 
[phone] Patient: Nick Brown. 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  
for catheter removal  
  
Phone:  [phone] Where:  [address] [place] [address], Tabatha Brian 7 [address] Thursday Dec 28, 2017 Follow up with Morales Carranza NP For discharge follow up at 11:15AM   
  
Phone:  [phone] Where:  [address], [address] [facility] [address] Discharge Orders None A check kaykay indicates which time of day the medication should be taken. My Medications TAKE these medications as instructed Instructions Each Dose to Equal  
 Morning Noon Evening Bedtime  
 albuterol-ipratropium 2.5 mg-0.5 mg/3 ml Nebu Commonly known as:  Dany Reyes Your last dose was: Your next dose is:    
   
   
 3 mL by Nebulization route three (3) times daily. AT LUNCHTIME DAILY. 3 mL  
    
   
   
   
  
 amoxicillin-clavulanate 500-125 mg per tablet Commonly known as:  AUGMENTIN Your last dose was: Your next dose is: Take 1 Tab by mouth every twelve (12) hours for 7 days. 1 Tab ANORO ELLIPTA 62.5-25 mcg/actuation inhaler Generic drug:  umeclidinium-vilanterol Your last dose was: Your next dose is: Take 1 Puff by inhalation daily. 1 Puff  
    
   
   
   
  
 b complex-vitamin c-folic acid 1 mg capsule Commonly known as:  Jada Urias Your last dose was: Your next dose is: Take 1 Cap by mouth daily. 1 Cap  
    
   
   
   
  
 carvedilol 6.25 mg tablet Commonly known as:  Narka Members Your last dose was: Your next dose is: TAKE 1 TABLET BY MOUTH TWO TIMES A DAY EMLA topical cream  
Generic drug:  lidocaine-prilocaine Your last dose was: Your next dose is:    
   
   
 Apply  to affected area as needed for Pain. Patient applies to arm before dialysis on Monday, Wednesday, and Friday. furosemide 80 mg tablet Commonly known as:  LASIX Your last dose was: Your next dose is: Take 80 mg by mouth two (2) times a day. 80 mg  
    
   
   
   
  
 gabapentin 300 mg capsule Commonly known as:  NEURONTIN Your last dose was: Your next dose is: Take 300 mg by mouth nightly. 300 mg MIRALAX 17 gram packet Generic drug:  polyethylene glycol Your last dose was: Your next dose is: Take 17 g by mouth daily as needed (constipation). 17 g * NovoLOG Mix 70-30 FlexPen 100 unit/mL (70-30) Inpn Generic drug:  insulin aspart protamine/insulin aspart Your last dose was: Your next dose is:    
   
   
 25 Units by SubCUTAneous route daily. 25 Units * NovoLOG Mix 70-30 FlexPen 100 unit/mL (70-30) Inpn Generic drug:  insulin aspart protamine/insulin aspart Your last dose was: Your next dose is:    
   
   
 20 Units by SubCUTAneous route every evening. 20 Units  
    
   
   
   
  
 oxyCODONE-acetaminophen  mg per tablet Commonly known as:  PERCOCET 10 Your last dose was: Your next dose is: Take 1 Tab by mouth every six (6) hours. Max Daily Amount: 4 Tabs. 1 Tab PriLOSEC 20 mg capsule Generic drug:  omeprazole Your last dose was: Your next dose is: Take 20 mg by mouth two (2) times daily as needed (Stomach upset/GERD). 20 mg PROVENTIL 90 mcg/actuation inhaler Generic drug:  albuterol Your last dose was: Your next dose is: Take 2 Puffs by inhalation four (4) times daily. QID PRN  
 2 Puff * Notice: This list has 2 medication(s) that are the same as other medications prescribed for you. Read the directions carefully, and ask your doctor or other care provider to review them with you. Where to Get Your Medications Information on where to get these meds will be given to you by the nurse or doctor. ! Ask your nurse or doctor about these medications  
  amoxicillin-clavulanate 500-125 mg per tablet  
 oxyCODONE-acetaminophen  mg per tablet Discharge Instructions Can bathe and use left arm as normal; otherwise leave dialysis and biliary drains clean and dry. Learning About [address] What is a biliary drain? A biliary drain allows bile to flow out from a blocked bile duct into a collection bag outside the body. Bile is a liquid made by the liver. It helps digest fats. Blocked or narrowed bile ducts can stop the flow of bile and cause yellowing of the skin (jaundice) or an infection of the liver. The drain is a thin plastic tube (catheter) that the doctor places in the bile duct. From there the tube passes out through a drain site on your skin and into a collection bag. The bag may be attached to a belt or strapped to the leg. About 2 to 4 cups of bile will collect in the bag each day. The amount should be fairly constant from day to day. The bile that comes out of the drain may look bloody at first, but it will soon change to its normal yellow-green color.  
How long the drain stays in place depends on what caused the problem with your bile duct. Your doctor will discuss this with you. How can you care for your drain at home? Your doctor or ostomy nurse can answer any questions you have about caring for your drain or bag. Caring for the drain site You may have a bandage on your skin where the tube comes out of your body. Your doctor will tell you how often to change it. To change the bandage: 
1. Wash your hands with soap and water. 2. Take off the bandage from around the drain site. 3. Clean the drain site and the skin around it with soap and water. Use gauze or a cotton swab. 4. When the site is dry, you can put on a new bandage. First, cut a slit in the bandage, and then fit it around the drain site. Caring for the tube To keep the tube clear, flush it with sterile saline. Your doctor will tell you how and when to do this. Caring for the bag 
Empty the bile from your bag when it's about 2/3 full, or at least once a day. 1. Wash your hands with soap and water. 2. If your doctor requested it, make a note of the amount of bile in the bag. 
3. Open the drainage port at the bottom of the bag. 
4. Empty the contents of the bag into the toilet. 5. Clean the drainage port with soap and water, and close it. 6. Wash your hands again with soap and water. If the bag breaks or tears, replace it as soon as possible. When should you call for help? Call your doctor now or seek immediate medical care if: 
· You have symptoms of infection, such as: 
¨ Increased pain, swelling, warmth, or redness. ¨ Red streaks leading from the drain site. ¨ Pus draining from the drain site. ¨ A fever. · There is a new or increasing yellow tint to your skin or the whites of your eyes. · You see a sudden change in the color or smell of the drainage. · The tube is coming loose at the drain site. · You have new or worse belly pain. · You have a fever. · You are vomiting. · You cannot pass stools or gas. Watch closely for changes in your health, and be sure to contact your doctor if: 
· Drainage stops coming out of the tube. · You do not get better as expected. Follow-up care is a key part of your treatment and safety. Be sure to make and go to all appointments, and call your doctor if you are having problems. It's also a good idea to know your test results and keep a list of the medicines you take. Where can you learn more? Go to http://andrew-waldo.info/. Enter X508 in the search box to learn more about \"Learning About Biliary Drain Care. \" Current as of: May 12, 2017 Content Version: 11.4 © 6366-0381 [employer]. Care instructions adapted under license by [employer] (which disclaims liability or warranty for this information). If you have questions about a medical condition or this instruction, always ask your healthcare professional. [place] [address] any warranty or liability for your use of this information. Serviceful Activation Thank you for requesting access to Serviceful. Please follow the instructions below to securely access and download your online medical record. Serviceful allows you to send messages to your doctor, view your test results, renew your prescriptions, schedule appointments, and more. How Do I Sign Up? 1. In your internet browser, go to [email or web address] 
2. Click on the First Time User? Click Here link in the Sign In box. You will be redirect to the New Member Sign Up page. 3. Enter your Serviceful Access Code exactly as it appears below. You will not need to use this code after youve completed the sign-up process. If you do not sign up before the expiration date, you must request a new code. Serviceful Access Code: [email or web address] Expires: 2017  3:44 PM (This is the date your Serviceful access code will ) 4.  Enter the last four digits of your Social Security Number (xxxx) and Date of Birth (mm/dd/yyyy) as indicated and click Submit. You will be taken to the next sign-up page. 5. Create a Open Mile ID. This will be your Open Mile login ID and cannot be changed, so think of one that is secure and easy to remember. 6. Create a Open Mile password. You can change your password at any time. 7. Enter your Password Reset Question and Answer. This can be used at a later time if you forget your password. 8. Enter your e-mail address. You will receive e-mail notification when new information is available in [address] 9. Click Sign Up. You can now view and download portions of your medical record. 10. Click the Download Summary menu link to download a portable copy of your medical information. Additional Information If you have questions, please visit the Frequently Asked Questions section of the Open Mile website at [email or web address] Remember, Open Mile is NOT to be used for urgent needs. For medical emergencies, dial 911. DISCHARGE SUMMARY from Nurse PATIENT INSTRUCTIONS: 
 
 
F-face looks uneven A-arms unable to move or move unevenly S-speech slurred or non-existent T-time-call 911 as soon as signs and symptoms begin-DO NOT go Back to bed or wait to see if you get better-TIME IS BRAIN. Warning Signs of HEART ATTACK Call 911 if you have these symptoms: 
? Chest discomfort. Most heart attacks involve discomfort in the center of the chest that lasts more than a few minutes, or that goes away and comes back. It can feel like uncomfortable pressure, squeezing, fullness, or pain. ? Discomfort in other areas of the upper body. Symptoms can include pain or discomfort in one or both arms, the back, neck, jaw, or stomach. ? Shortness of breath with or without chest discomfort. ? Other signs may include breaking out in a cold sweat, nausea, or lightheadedness. Don't wait more than five minutes to call [address]! Fast action can save your life. Calling 911 is almost always the fastest way to get lifesaving treatment. Emergency Medical Services staff can begin treatment when they arrive  up to an hour sooner than if someone gets to the hospital by car. The discharge information has been reviewed with the patient. The patient verbalized understanding. Discharge medications reviewed with the patient and appropriate educational materials and side effects teaching were provided. ___________________________________________________________________________________________________________________________________ Introducing [facility]! [facility] introduces [employer] patient portal. Now you can access parts of your medical record, email your doctor's office, and request medication refills online. 1. In your internet browser, go to [email or web address] 2. Click on the First Time User? Click Here link in the Sign In box. You will see the New Member Sign Up page. 3. Enter your [employer] Access Code exactly as it appears below. You will not need to use this code after youve completed the sign-up process. If you do not sign up before the expiration date, you must request a new code. · [employer] Access Code: [email or web address] Expires: 12/24/2017  3:44 PM 
 
4. Enter the last four digits of your Social Security Number (xxxx) and Date of Birth (mm/dd/yyyy) as indicated and click Submit. You will be taken to the next sign-up page. 5. Create a InExchanget ID. This will be your [employer] login ID and cannot be changed, so think of one that is secure and easy to remember. 6. Create a MyChart password. You can change your password at any time. 7. Enter your Password Reset Question and Answer. This can be used at a later time if you forget your password. 8. Enter your e-mail address.  You will receive e-mail notification when new information is available in [employer]. 9. Click Sign Up. You can now view and download portions of your medical record. 10. Click the Download Summary menu link to download a portable copy of your medical information. If you have questions, please visit the Frequently Asked Questions section of the [employer] website. Remember, [employer] is NOT to be used for urgent needs. For medical emergencies, dial 911. Now available from your iPhone and Android! Providers Seen During Your Hospitalization Provider Specialty Primary office phone Nikos Cabrera DO Emergency Medicine [phone] Mele Ahuja MD Vascular Surgery [phone] Your Primary Care Physician (PCP) Primary Care Physician Office Phone Office Fax Kylee eWlls [phone] You are allergic to the following Allergen Reactions Ativan (Lorazepam) Other (comments) Hyper activity Recent Documentation Height Weight BMI Smoking Status 1.651 m 66.8 kg 24.51 kg/m2 Current Every Day Smoker Emergency Contacts Name Discharge Info Relation Home Work Mobile Sarah Beth Alba DISCHARGE CAREGIVER [3] Spouse [[phone] Aline Alba  Spouse [3] [phone] Patient Belongings The following personal items are in your possession at time of discharge: 
  Dental Appliances: None  Visual Aid: None      Home Medications: None   Jewelry: Ring, Sent home  Clothing: With patient    Other Valuables: Cell Phone, Sent home  Personal Items Sent to Safe: wedding band, cell phone Please provide this summary of care documentation to your next provider. Signatures-by signing, you are acknowledging that this After Visit Summary has been reviewed with you and you have received a copy. Patient Signature:  ____________________________________________________________ Date:  ____________________________________________________________  
  
Rhoda Must Provider Signature:  ____________________________________________________________ Date:  ____________________________________________________________

## 2017-12-13 NOTE — PROCEDURES
[facility]  *** FINAL REPORT ***    Name: Cici Unger  MRN: [id number]  : 1965  HIS Order #: [id number]  [address] [facility] Visit #: [id number]  Date: 13 Dec 2017    TYPE OF TEST: Extremity Arterial Duplex    REASON FOR TEST  Painful, cold, pulseless arm                            Right                     Left  Artery               PSV   Finding             PSV   Finding  ------------------  -----  ---------------    -----  ---------------  Subclavian:                                   235.0  Axillary:                                     276.0  Brachial:                                            Occluded  Radial:                                              Occluded  Ulnar:                                               Occluded    Digit pressures:      R:        L:  Wrist/brachial index: R:        L:      INTERPRETATION/FINDINGS  PROCEDURE:  Color duplex ultrasound imaging of upper extremity bypass  graft. Unless otherwise indicated, the exam also includes measurement   of systolic blood pressure at the forearm and brachial level with  calculation of the forearm/brachial pressure index. Pulse volume  recording (PVR) plethysmography at the forearm may also be included. FINDINGS:  A left Axillary artery to cephalic vein bypass is  identified. Color Doppler imaging demonstrates no significant  narrowing of the flow channel. Velocity is not significantly  elevated. Peak systolic velocity measurements in cm/sec are as  follows: Inflow artery = 275cm/sec  proximal anastomosis = 151cm/sec,    mid graft = 253cm/sec, distal graft = 287cm/sec,  outflow artery,  distal brachial, ulnar and radial arteries are occluded. IMPRESSION:  Patent  left upper extremity arterial bypass graft  without evidence of hemodynamically significant stenosis. Distal  outflow arteries, brachial, ulnar, and radial arteries are found to be   occluded.     ADDITIONAL COMMENTS    I have personally reviewed the data relevant to the interpretation of  this  study.     TECHNOLOGIST: Kelsi Rosario RVT  Signed: 12/13/2017 05:35 PM    PHYSICIAN: Rosy Cooks, MD  Signed: 12/14/2017 08:58 AM

## 2017-12-13 NOTE — IP AVS SNAPSHOT
[address] [place] [address] 
[phone] Patient: Mely Kaminski. MRN: [id number] NKM:2[birth year]50/[birth year]38 My Medications TAKE these medications as instructed Instructions Each Dose to Equal  
 Morning Noon Evening Bedtime  
 albuterol-ipratropium 2.5 mg-0.5 mg/3 ml Nebu Commonly known as:  Pama Lobstein Your last dose was: Your next dose is:    
   
   
 3 mL by Nebulization route three (3) times daily. AT LUNCHTIME DAILY. 3 mL  
    
   
   
   
  
 amoxicillin-clavulanate 500-125 mg per tablet Commonly known as:  AUGMENTIN Your last dose was: Your next dose is: Take 1 Tab by mouth every twelve (12) hours for 7 days. 1 Tab ANORO ELLIPTA 62.5-25 mcg/actuation inhaler Generic drug:  umeclidinium-vilanterol Your last dose was: Your next dose is: Take 1 Puff by inhalation daily. 1 Puff  
    
   
   
   
  
 b complex-vitamin c-folic acid 1 mg capsule Commonly known as:  Penne Sofie Your last dose was: Your next dose is: Take 1 Cap by mouth daily. 1 Cap  
    
   
   
   
  
 carvedilol 6.25 mg tablet Commonly known as:  René Denise Your last dose was: Your next dose is: TAKE 1 TABLET BY MOUTH TWO TIMES A DAY EMLA topical cream  
Generic drug:  lidocaine-prilocaine Your last dose was: Your next dose is:    
   
   
 Apply  to affected area as needed for Pain. Patient applies to arm before dialysis on Monday, Wednesday, and Friday. furosemide 80 mg tablet Commonly known as:  LASIX Your last dose was: Your next dose is: Take 80 mg by mouth two (2) times a day. 80 mg  
    
   
   
   
  
 gabapentin 300 mg capsule Commonly known as:  NEURONTIN Your last dose was: Your next dose is: Take 300 mg by mouth nightly. 300 mg MIRALAX 17 gram packet Generic drug:  polyethylene glycol Your last dose was: Your next dose is: Take 17 g by mouth daily as needed (constipation). 17 g * NovoLOG Mix 70-30 FlexPen 100 unit/mL (70-30) Inpn Generic drug:  insulin aspart protamine/insulin aspart Your last dose was: Your next dose is:    
   
   
 25 Units by SubCUTAneous route daily. 25 Units * NovoLOG Mix 70-30 FlexPen 100 unit/mL (70-30) Inpn Generic drug:  insulin aspart protamine/insulin aspart Your last dose was: Your next dose is:    
   
   
 20 Units by SubCUTAneous route every evening. 20 Units  
    
   
   
   
  
 oxyCODONE-acetaminophen  mg per tablet Commonly known as:  PERCOCET 10 Your last dose was: Your next dose is: Take 1 Tab by mouth every six (6) hours. Max Daily Amount: 4 Tabs. 1 Tab PriLOSEC 20 mg capsule Generic drug:  omeprazole Your last dose was: Your next dose is: Take 20 mg by mouth two (2) times daily as needed (Stomach upset/GERD). 20 mg PROVENTIL 90 mcg/actuation inhaler Generic drug:  albuterol Your last dose was: Your next dose is: Take 2 Puffs by inhalation four (4) times daily. QID PRN  
 2 Puff * Notice: This list has 2 medication(s) that are the same as other medications prescribed for you. Read the directions carefully, and ask your doctor or other care provider to review them with you. Where to Get Your Medications Information on where to get these meds will be given to you by the nurse or doctor. ! Ask your nurse or doctor about these medications  
  amoxicillin-clavulanate 500-125 mg per tablet  
 oxyCODONE-acetaminophen  mg per tablet

## 2017-12-14 ENCOUNTER — APPOINTMENT (OUTPATIENT)
Dept: GENERAL RADIOLOGY | Age: 52
DRG: 173 | End: 2017-12-14
Attending: HOSPITALIST
Payer: MEDICAID

## 2017-12-14 ENCOUNTER — APPOINTMENT (OUTPATIENT)
Dept: CT IMAGING | Age: 52
DRG: 173 | End: 2017-12-14
Attending: SURGERY
Payer: MEDICAID

## 2017-12-14 PROBLEM — M62.249: Status: ACTIVE | Noted: 2017-12-14

## 2017-12-14 LAB
ALBUMIN SERPL-MCNC: 2.7 G/DL (ref 3.5–5)
ALBUMIN/GLOB SERPL: 0.7 {RATIO} (ref 1.1–2.2)
ALP SERPL-CCNC: 151 U/L (ref 45–117)
ALT SERPL-CCNC: 26 U/L (ref 12–78)
ANION GAP SERPL CALC-SCNC: 8 MMOL/L (ref 5–15)
AST SERPL-CCNC: 22 U/L (ref 15–37)
ATRIAL RATE: 65 BPM
BASOPHILS # BLD: 0 K/UL (ref 0–0.1)
BASOPHILS NFR BLD: 0 % (ref 0–1)
BILIRUB DIRECT SERPL-MCNC: 0.1 MG/DL (ref 0–0.2)
BILIRUB SERPL-MCNC: 0.3 MG/DL (ref 0.2–1)
BUN SERPL-MCNC: 55 MG/DL (ref 6–20)
BUN/CREAT SERPL: 12 (ref 12–20)
CALCIUM SERPL-MCNC: 8 MG/DL (ref 8.5–10.1)
CALCULATED P AXIS, ECG09: 74 DEGREES
CALCULATED R AXIS, ECG10: 95 DEGREES
CALCULATED T AXIS, ECG11: 86 DEGREES
CHLORIDE SERPL-SCNC: 100 MMOL/L (ref 97–108)
CK MB CFR SERPL CALC: 2.8 % (ref 0–2.5)
CK MB SERPL-MCNC: 2 NG/ML (ref 5–25)
CK SERPL-CCNC: 72 U/L (ref 39–308)
CO2 SERPL-SCNC: 25 MMOL/L (ref 21–32)
CREAT SERPL-MCNC: 4.42 MG/DL (ref 0.7–1.3)
DIAGNOSIS, 93000: NORMAL
DIFFERENTIAL METHOD BLD: ABNORMAL
EOSINOPHIL # BLD: 1.2 K/UL (ref 0–0.4)
EOSINOPHIL NFR BLD: 17 % (ref 0–7)
ERYTHROCYTE [DISTWIDTH] IN BLOOD BY AUTOMATED COUNT: 13.9 % (ref 11.5–14.5)
GLOBULIN SER CALC-MCNC: 3.8 G/DL (ref 2–4)
GLUCOSE BLD STRIP.AUTO-MCNC: 128 MG/DL (ref 65–100)
GLUCOSE SERPL-MCNC: 154 MG/DL (ref 65–100)
HCT VFR BLD AUTO: 29.1 % (ref 36.6–50.3)
HGB BLD-MCNC: 9.8 G/DL (ref 12.1–17)
LACTATE SERPL-SCNC: 0.5 MMOL/L (ref 0.4–2)
LIPASE SERPL-CCNC: 141 U/L (ref 73–393)
LYMPHOCYTES # BLD: 1 K/UL (ref 0.8–3.5)
LYMPHOCYTES NFR BLD: 14 % (ref 12–49)
MCH RBC QN AUTO: 34 PG (ref 26–34)
MCHC RBC AUTO-ENTMCNC: 33.7 G/DL (ref 30–36.5)
MCV RBC AUTO: 101 FL (ref 80–99)
MONOCYTES # BLD: 1 K/UL (ref 0–1)
MONOCYTES NFR BLD: 14 % (ref 5–13)
NEUTS SEG # BLD: 3.9 K/UL (ref 1.8–8)
NEUTS SEG NFR BLD: 55 % (ref 32–75)
P-R INTERVAL, ECG05: 180 MS
PLATELET # BLD AUTO: 249 K/UL (ref 150–400)
POTASSIUM SERPL-SCNC: 4 MMOL/L (ref 3.5–5.1)
PROT SERPL-MCNC: 6.5 G/DL (ref 6.4–8.2)
Q-T INTERVAL, ECG07: 434 MS
QRS DURATION, ECG06: 82 MS
QTC CALCULATION (BEZET), ECG08: 451 MS
RBC # BLD AUTO: 2.88 M/UL (ref 4.1–5.7)
RBC MORPH BLD: ABNORMAL
SERVICE CMNT-IMP: ABNORMAL
SODIUM SERPL-SCNC: 133 MMOL/L (ref 136–145)
TROPONIN I SERPL-MCNC: <0.04 NG/ML
VENTRICULAR RATE, ECG03: 65 BPM
WBC # BLD AUTO: 7.1 K/UL (ref 4.1–11.1)

## 2017-12-14 PROCEDURE — 74011250637 HC RX REV CODE- 250/637: Performed by: HOSPITALIST

## 2017-12-14 PROCEDURE — 5A1D70Z PERFORMANCE OF URINARY FILTRATION, INTERMITTENT, LESS THAN 6 HOURS PER DAY: ICD-10-PCS | Performed by: INTERNAL MEDICINE

## 2017-12-14 PROCEDURE — 93005 ELECTROCARDIOGRAM TRACING: CPT

## 2017-12-14 PROCEDURE — 80048 BASIC METABOLIC PNL TOTAL CA: CPT | Performed by: SURGERY

## 2017-12-14 PROCEDURE — 94640 AIRWAY INHALATION TREATMENT: CPT

## 2017-12-14 PROCEDURE — 74011250636 HC RX REV CODE- 250/636: Performed by: INTERNAL MEDICINE

## 2017-12-14 PROCEDURE — 74011250636 HC RX REV CODE- 250/636: Performed by: SURGERY

## 2017-12-14 PROCEDURE — 74011000250 HC RX REV CODE- 250: Performed by: SURGERY

## 2017-12-14 PROCEDURE — 83690 ASSAY OF LIPASE: CPT | Performed by: HOSPITALIST

## 2017-12-14 PROCEDURE — 74177 CT ABD & PELVIS W/CONTRAST: CPT

## 2017-12-14 PROCEDURE — 36415 COLL VENOUS BLD VENIPUNCTURE: CPT | Performed by: HOSPITALIST

## 2017-12-14 PROCEDURE — 84484 ASSAY OF TROPONIN QUANT: CPT | Performed by: HOSPITALIST

## 2017-12-14 PROCEDURE — 99218 HC RM OBSERVATION: CPT

## 2017-12-14 PROCEDURE — 74011250636 HC RX REV CODE- 250/636: Performed by: HOSPITALIST

## 2017-12-14 PROCEDURE — 90935 HEMODIALYSIS ONE EVALUATION: CPT

## 2017-12-14 PROCEDURE — 74011000258 HC RX REV CODE- 258: Performed by: SURGERY

## 2017-12-14 PROCEDURE — 85025 COMPLETE CBC W/AUTO DIFF WBC: CPT | Performed by: SURGERY

## 2017-12-14 PROCEDURE — 65270000029 HC RM PRIVATE

## 2017-12-14 PROCEDURE — 77010033678 HC OXYGEN DAILY

## 2017-12-14 PROCEDURE — 74011636320 HC RX REV CODE- 636/320: Performed by: SURGERY

## 2017-12-14 PROCEDURE — 83605 ASSAY OF LACTIC ACID: CPT | Performed by: HOSPITALIST

## 2017-12-14 PROCEDURE — 82550 ASSAY OF CK (CPK): CPT | Performed by: HOSPITALIST

## 2017-12-14 PROCEDURE — 74020 XR ABD FLAT/ ERECT: CPT

## 2017-12-14 PROCEDURE — 80076 HEPATIC FUNCTION PANEL: CPT | Performed by: HOSPITALIST

## 2017-12-14 PROCEDURE — 82962 GLUCOSE BLOOD TEST: CPT

## 2017-12-14 PROCEDURE — 74011250637 HC RX REV CODE- 250/637: Performed by: SURGERY

## 2017-12-14 RX ORDER — HYDROMORPHONE HYDROCHLORIDE 2 MG/ML
1.5 INJECTION, SOLUTION INTRAMUSCULAR; INTRAVENOUS; SUBCUTANEOUS
Status: DISCONTINUED | OUTPATIENT
Start: 2017-12-14 | End: 2017-12-20 | Stop reason: HOSPADM

## 2017-12-14 RX ORDER — OXYCODONE AND ACETAMINOPHEN 10; 325 MG/1; MG/1
1 TABLET ORAL EVERY 6 HOURS
Status: DISCONTINUED | OUTPATIENT
Start: 2017-12-14 | End: 2017-12-20 | Stop reason: HOSPADM

## 2017-12-14 RX ORDER — SUCRALFATE 1 G/10ML
1 SUSPENSION ORAL
Status: DISCONTINUED | OUTPATIENT
Start: 2017-12-14 | End: 2017-12-20 | Stop reason: HOSPADM

## 2017-12-14 RX ORDER — BUDESONIDE 0.5 MG/2ML
500 INHALANT ORAL
Status: DISCONTINUED | OUTPATIENT
Start: 2017-12-14 | End: 2017-12-20 | Stop reason: HOSPADM

## 2017-12-14 RX ORDER — MORPHINE SULFATE 2 MG/ML
4 INJECTION, SOLUTION INTRAMUSCULAR; INTRAVENOUS
Status: DISCONTINUED | OUTPATIENT
Start: 2017-12-14 | End: 2017-12-14

## 2017-12-14 RX ORDER — IBUPROFEN 200 MG
1 TABLET ORAL DAILY
Status: DISCONTINUED | OUTPATIENT
Start: 2017-12-14 | End: 2017-12-20 | Stop reason: HOSPADM

## 2017-12-14 RX ORDER — IPRATROPIUM BROMIDE AND ALBUTEROL SULFATE 2.5; .5 MG/3ML; MG/3ML
3 SOLUTION RESPIRATORY (INHALATION)
Status: DISCONTINUED | OUTPATIENT
Start: 2017-12-14 | End: 2017-12-16

## 2017-12-14 RX ORDER — PANTOPRAZOLE SODIUM 40 MG/1
40 TABLET, DELAYED RELEASE ORAL
Status: DISCONTINUED | OUTPATIENT
Start: 2017-12-14 | End: 2017-12-20 | Stop reason: HOSPADM

## 2017-12-14 RX ORDER — SODIUM CHLORIDE 0.9 % (FLUSH) 0.9 %
10 SYRINGE (ML) INJECTION
Status: COMPLETED | OUTPATIENT
Start: 2017-12-14 | End: 2017-12-14

## 2017-12-14 RX ORDER — MORPHINE SULFATE 2 MG/ML
4 INJECTION, SOLUTION INTRAMUSCULAR; INTRAVENOUS ONCE
Status: COMPLETED | OUTPATIENT
Start: 2017-12-14 | End: 2017-12-14

## 2017-12-14 RX ORDER — HYDROMORPHONE HYDROCHLORIDE 2 MG/ML
1 INJECTION, SOLUTION INTRAMUSCULAR; INTRAVENOUS; SUBCUTANEOUS
Status: DISCONTINUED | OUTPATIENT
Start: 2017-12-14 | End: 2017-12-14

## 2017-12-14 RX ORDER — ARFORMOTEROL TARTRATE 15 UG/2ML
15 SOLUTION RESPIRATORY (INHALATION)
Status: DISCONTINUED | OUTPATIENT
Start: 2017-12-14 | End: 2017-12-20 | Stop reason: HOSPADM

## 2017-12-14 RX ADMIN — PANTOPRAZOLE SODIUM 40 MG: 40 TABLET, DELAYED RELEASE ORAL at 12:46

## 2017-12-14 RX ADMIN — PIPERACILLIN SODIUM AND TAZOBACTAM SODIUM 3.38 G: .375; 3 INJECTION, POWDER, LYOPHILIZED, FOR SOLUTION INTRAVENOUS at 20:50

## 2017-12-14 RX ADMIN — SUCRALFATE 1 G: 1 SUSPENSION ORAL at 12:46

## 2017-12-14 RX ADMIN — IPRATROPIUM BROMIDE AND ALBUTEROL SULFATE 3 ML: .5; 3 SOLUTION RESPIRATORY (INHALATION) at 08:14

## 2017-12-14 RX ADMIN — SUCRALFATE 1 G: 1 SUSPENSION ORAL at 21:01

## 2017-12-14 RX ADMIN — MORPHINE SULFATE 4 MG: 2 INJECTION, SOLUTION INTRAMUSCULAR; INTRAVENOUS at 14:14

## 2017-12-14 RX ADMIN — PANTOPRAZOLE SODIUM 40 MG: 40 TABLET, DELAYED RELEASE ORAL at 17:29

## 2017-12-14 RX ADMIN — MORPHINE SULFATE 2 MG: 2 INJECTION, SOLUTION INTRAMUSCULAR; INTRAVENOUS at 12:46

## 2017-12-14 RX ADMIN — MORPHINE SULFATE 2 MG: 2 INJECTION, SOLUTION INTRAMUSCULAR; INTRAVENOUS at 05:35

## 2017-12-14 RX ADMIN — IPRATROPIUM BROMIDE AND ALBUTEROL SULFATE 3 ML: .5; 3 SOLUTION RESPIRATORY (INHALATION) at 19:10

## 2017-12-14 RX ADMIN — BUDESONIDE 500 MCG: 0.5 INHALANT RESPIRATORY (INHALATION) at 08:14

## 2017-12-14 RX ADMIN — BUDESONIDE 500 MCG: 0.5 INHALANT RESPIRATORY (INHALATION) at 19:09

## 2017-12-14 RX ADMIN — BUDESONIDE 500 MCG: 0.5 INHALANT RESPIRATORY (INHALATION) at 02:06

## 2017-12-14 RX ADMIN — OXYCODONE HYDROCHLORIDE AND ACETAMINOPHEN 1 TABLET: 10; 325 TABLET ORAL at 08:25

## 2017-12-14 RX ADMIN — SODIUM CHLORIDE 100 ML: 900 INJECTION, SOLUTION INTRAVENOUS at 16:56

## 2017-12-14 RX ADMIN — FUROSEMIDE 80 MG: 40 TABLET ORAL at 07:42

## 2017-12-14 RX ADMIN — IOHEXOL 50 ML: 240 INJECTION, SOLUTION INTRATHECAL; INTRAVASCULAR; INTRAVENOUS; ORAL at 15:00

## 2017-12-14 RX ADMIN — GABAPENTIN 300 MG: 300 CAPSULE ORAL at 21:01

## 2017-12-14 RX ADMIN — HYDROMORPHONE HYDROCHLORIDE 1 MG: 2 INJECTION INTRAMUSCULAR; INTRAVENOUS; SUBCUTANEOUS at 16:17

## 2017-12-14 RX ADMIN — CARVEDILOL 6.25 MG: 6.25 TABLET, FILM COATED ORAL at 07:42

## 2017-12-14 RX ADMIN — Medication 10 ML: at 16:56

## 2017-12-14 RX ADMIN — SUCRALFATE 1 G: 1 SUSPENSION ORAL at 17:29

## 2017-12-14 RX ADMIN — LIDOCAINE HYDROCHLORIDE 40 ML: 20 SOLUTION ORAL; TOPICAL at 10:40

## 2017-12-14 RX ADMIN — MORPHINE SULFATE 2 MG: 2 INJECTION, SOLUTION INTRAMUSCULAR; INTRAVENOUS at 09:15

## 2017-12-14 RX ADMIN — CARVEDILOL 6.25 MG: 6.25 TABLET, FILM COATED ORAL at 17:29

## 2017-12-14 RX ADMIN — HYDROMORPHONE HYDROCHLORIDE 1.5 MG: 2 INJECTION INTRAMUSCULAR; INTRAVENOUS; SUBCUTANEOUS at 21:02

## 2017-12-14 RX ADMIN — ERYTHROPOIETIN 10000 UNITS: 10000 INJECTION, SOLUTION INTRAVENOUS; SUBCUTANEOUS at 19:56

## 2017-12-14 RX ADMIN — FUROSEMIDE 80 MG: 40 TABLET ORAL at 17:29

## 2017-12-14 RX ADMIN — PIPERACILLIN SODIUM AND TAZOBACTAM SODIUM 6.75 G: 36; 4.5 INJECTION, POWDER, LYOPHILIZED, FOR SOLUTION INTRAVENOUS at 20:50

## 2017-12-14 RX ADMIN — GABAPENTIN 300 MG: 300 CAPSULE ORAL at 01:28

## 2017-12-14 RX ADMIN — IOPAMIDOL 100 ML: 755 INJECTION, SOLUTION INTRAVENOUS at 16:56

## 2017-12-14 RX ADMIN — ARFORMOTEROL TARTRATE 15 MCG: 15 SOLUTION RESPIRATORY (INHALATION) at 02:06

## 2017-12-14 NOTE — PROGRESS NOTES
Bedside shift change report given to [address] (oncoming nurse) by Simone Lentz  (offgoing nurse). Report included the following information SBAR, Kardex, Intake/Output and MAR.      Patient informed this nurse he will need cab service possibly to get home will leave a note for nurse who is currently taking care of this patient

## 2017-12-14 NOTE — PROGRESS NOTES
Vascular Surgery  --patient is complaining of acute abdominal pain  --tender in epigastrium (fairly severe)  --appreciate everyone's assistance  --ordered abdominal CT; d/w Dr. Wiliam Davis; if CT is negative, patient will undergo endoscopy tomorrow

## 2017-12-14 NOTE — PROGRESS NOTES
TRANSFER - IN REPORT:    Verbal report received from Aamir Strong RN(name) on Sarah Beth Singh.  being received from [facility] for routine post - op      Report consisted of patients Situation, Background, Assessment and   Recommendations(SBAR). Information from the following report(s) SBAR, Kardex, Intake/Output and MAR was reviewed with the receiving nurse. Opportunity for questions and clarification was provided. Assessment completed upon patients arrival to unit and care assumed. Primary Nurse Celso Renee RN and Guerda Lopez RN performed a dual skin assessment on this patient Impairment noted- see wound doc flow sheet  Left arm surgical wound and upper mid abdominal abscess.   Walter score is 20
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
## 2017-12-14 NOTE — PERIOP NOTES
TRANSFER - OUT REPORT:    Verbal report given to Venancio Barnett RN(name) on Lamar Tay.  being transferred to [facility](unit) for routine post - op       Report consisted of patients Situation, Background, Assessment and   Recommendations(SBAR). Information from the following report(s) SBAR, OR Summary, Intake/Output, MAR, Recent Results and Cardiac Rhythm NSR. was reviewed with the receiving nurse. Opportunity for questions and clarification was provided. Is the patient on 02? YES       L/Min 2    Is the patient on a monitor? YES    Is the nurse transporting with the patient? YES    Surgical Waiting Area notified of patient's transfer from PACU? YES      The following personal items collected during your admission accompanied patient upon transfer:   Dental Appliance: Dental Appliances: None  Vision: Visual Aid: None  Hearing Aid:    Jewelry: Jewelry: Ring, Sent home  Clothing: Clothing: With patient  Other Valuables:  Other Valuables: Cell Phone, Sent home  Valuables sent to safe: Personal Items Sent to Safe: wedding band, cell phone

## 2017-12-14 NOTE — CONSULTS
[facility]   [address] [facility], [address] [place] [address] Rd [address], [facility]  Phone: [phone]   [phone]       Nephrology Progress Note  Kedar Holman     1965     [id number]  Date of Admission : 12/13/2017 12/14/17    CC: Follow up for ESRD      Assessment and Plan   ESRD- HD :  - dialyzes MWF at [address] renal [place]   - HD today and then again tomorrow   - 4 kg UF today ( he is 7 kg over his dry weight)    Clotted LUE AVG   - s/p declot yesterday   - good thrill   - wonder why he is clotting frequently     Ischemic LUE:  - thrombosed limb after AVG declot   - s/p thrombectomy w/ TIANNA    HTN :  - fluid overloaded   - dont change any BP meds    Anemia of CKD  - ordered Epogen     Sec HPT     Interval History:  Mr Eugene Emerson is known to me from prior admission . He used to be a pt of Dr Vannessa Vasques and now followed by Dr Pugh Goes   He has been on dialysis for 2 years now and gets HD MWF   Last HD Monday   Missed HD yesterday due to clotted access  It was declotted and subsequently had thrombosed forearm and needs emergent thrombectomy   He is 7 Kg over his dry weight   He drinks lot of fluids   He is little SOB now     Review of Systems: A comprehensive review of systems was negative.     Current Medications:   Current Facility-Administered Medications   Medication Dose Route Frequency    arformoterol (BROVANA) neb solution 15 mcg  15 mcg Nebulization BID RT    And    budesonide (PULMICORT) 500 mcg/2 ml nebulizer suspension  500 mcg Nebulization BID RT    nicotine (NICODERM CQ) 21 mg/24 hr patch 1 Patch  1 Patch TransDERmal DAILY    epoetin ginger (EPOGEN;PROCRIT) injection 10,000 Units  10,000 Units SubCUTAneous ONCE    albuterol (PROVENTIL VENTOLIN) nebulizer solution 2.5 mg  2.5 mg Nebulization QID PRN    albuterol-ipratropium (DUO-NEB) 2.5 MG-0.5 MG/3 ML  3 mL Nebulization TID    carvedilol (COREG) tablet 6.25 mg  6.25 mg Oral BID WITH MEALS    furosemide (LASIX) tablet 80 mg  80 mg Oral BID    gabapentin (NEURONTIN) capsule 300 mg  300 mg Oral QHS    pantoprazole (PROTONIX) tablet 40 mg  40 mg Oral DAILY PRN    oxyCODONE-acetaminophen (PERCOCET 10)  mg per tablet 1 Tab  1 Tab Oral TID    morphine injection 1-2 mg  1-2 mg IntraVENous Q2H PRN      Allergies   Allergen Reactions    Ativan [Lorazepam] Other (comments)     Hyper activity       Objective:  Vitals:    Vitals:    12/14/17 0020 12/14/17 0207 12/14/17 0300 12/14/17 0640   BP: 144/72  157/68    Pulse: 62  61    Resp: 16  18    Temp: 98.1 °F (36.7 °C)  98.1 °F (36.7 °C)    SpO2: 99% 100% 99%    Weight:    70.1 kg (154 lb 8.7 oz)   Height:         Intake and Output:     12/12 1901 - 12/14 0700  In: 600 [P.O.:225; I.V.:375]  Out: 665 [Urine:650]    Physical Examination:    General: No distress  Neck:  Supple, no mass  Resp:  Lungs CTA B/L, no wheezing , normal respiratory effort  CV:  RRR,  no murmur or rub,no LE edema  GI:  Soft, NT, + Bowel sounds, no hepatosplenomegaly  Neurologic:  Non focal  Psych:             AAO x 3 appropriate affect   Skin:  No Rash  Ext : Left arm - well perfused. AVG - good thrill     []    High complexity decision making was performed  []    Patient is at high-risk of decompensation with multiple organ involvement    Lab Data Personally Reviewed: I have reviewed all the pertinent labs, microbiology data and radiology studies during assessment.     Recent Labs      12/14/17   0347  12/13/17   1616   NA  133*  133*   K  4.0  4.2   CL  100  100   CO2  25  25   GLU  154*  135*   BUN  55*  53*   CREA  4.42*  4.00*   CA  8.0*  8.1*   INR   --   1.1     Recent Labs      12/14/17   0347  12/13/17   1616   WBC  7.1  8.3   HGB  9.8*  10.4*   HCT  29.1*  30.3*   PLT  249  257     Lab Results   Component Value Date/Time    Specimen Description: ABDOMEN 12/17/2012 02:45 PM    Specimen Description: HAND L THUMB 12/14/2012 03:30 PM    Specimen Description: HAND L THUMB 12/14/2012 03:30 PM    Specimen Description: QUINTIN 12/13/2012 06:04 PM     Lab Results   Component Value Date/Time    Culture result: MODERATE MIXED SKIN ALESSIA ISOLATED 10/19/2017 06:53 PM    Culture result: MODERATE  STAPHYLOCOCCUS AUREUS   06/13/2016 04:50 PM    Culture result: FEW  STAPHYLOCOCCUS SPECIES, COAGULASE NEGATIVE   06/13/2016 04:50 PM    Culture result: MODERATE  STAPHYLOCOCCUS AUREUS   06/12/2016 06:45 PM    Culture result: NO GROWTH 5 DAYS 06/12/2016 06:28 PM     Recent Results (from the past 24 hour(s))   CBC WITH AUTOMATED DIFF    Collection Time: 12/13/17  4:16 PM   Result Value Ref Range    WBC 8.3 4.1 - 11.1 K/uL    RBC 3.00 (L) 4.10 - 5.70 M/uL    HGB 10.4 (L) 12.1 - 17.0 g/dL    HCT 30.3 (L) 36.6 - 50.3 %    .0 (H) 80.0 - 99.0 FL    MCH 34.7 (H) 26.0 - 34.0 PG    MCHC 34.3 30.0 - 36.5 g/dL    RDW 13.8 11.5 - 14.5 %    PLATELET 054 377 - 816 K/uL    NEUTROPHILS 62 32 - 75 %    LYMPHOCYTES 14 12 - 49 %    MONOCYTES 11 5 - 13 %    EOSINOPHILS 13 (H) 0 - 7 %    BASOPHILS 0 0 - 1 %    ABS. NEUTROPHILS 5.1 1.8 - 8.0 K/UL    ABS. LYMPHOCYTES 1.2 0.8 - 3.5 K/UL    ABS. MONOCYTES 0.9 0.0 - 1.0 K/UL    ABS. EOSINOPHILS 1.1 (H) 0.0 - 0.4 K/UL    ABS.  BASOPHILS 0.0 0.0 - 0.1 K/UL    DF SMEAR SCANNED      RBC COMMENTS MACROCYTOSIS  1+        RBC COMMENTS ANISOCYTOSIS  1+        RBC COMMENTS COLE CELLS  2+        RBC COMMENTS OVALOCYTES  1+       METABOLIC PANEL, BASIC    Collection Time: 12/13/17  4:16 PM   Result Value Ref Range    Sodium 133 (L) 136 - 145 mmol/L    Potassium 4.2 3.5 - 5.1 mmol/L    Chloride 100 97 - 108 mmol/L    CO2 25 21 - 32 mmol/L    Anion gap 8 5 - 15 mmol/L    Glucose 135 (H) 65 - 100 mg/dL    BUN 53 (H) 6 - 20 MG/DL    Creatinine 4.00 (H) 0.70 - 1.30 MG/DL    BUN/Creatinine ratio 13 12 - 20      GFR est AA 19 (L) >60 ml/min/1.73m2    GFR est non-AA 16 (L) >60 ml/min/1.73m2    Calcium 8.1 (L) 8.5 - 10.1 MG/DL   PROTHROMBIN TIME + INR    Collection Time: 12/13/17  4:16 PM   Result Value Ref Range    INR 1.1 0.9 - 1.1      Prothrombin time 11.1 9.0 - 11.1 sec   EKG, 12 LEAD, INITIAL    Collection Time: 12/13/17  4:17 PM   Result Value Ref Range    Ventricular Rate 65 BPM    Atrial Rate 65 BPM    P-R Interval 180 ms    QRS Duration 82 ms    Q-T Interval 434 ms    QTC Calculation (Bezet) 451 ms    Calculated P Axis 74 degrees    Calculated R Axis 95 degrees    Calculated T Axis 86 degrees    Diagnosis       Normal sinus rhythm  Septal infarct , age undetermined  When compared with ECG of 12-JUN-2016 18:21,  Nonspecific T wave abnormality no longer evident in Lateral leads     GLUCOSE, POC    Collection Time: 12/13/17  7:47 PM   Result Value Ref Range    Glucose (POC) 90 65 - 100 mg/dL    Performed by Salazar Copper    CBC WITH AUTOMATED DIFF    Collection Time: 12/14/17  3:47 AM   Result Value Ref Range    WBC 7.1 4.1 - 11.1 K/uL    RBC 2.88 (L) 4.10 - 5.70 M/uL    HGB 9.8 (L) 12.1 - 17.0 g/dL    HCT 29.1 (L) 36.6 - 50.3 %    .0 (H) 80.0 - 99.0 FL    MCH 34.0 26.0 - 34.0 PG    MCHC 33.7 30.0 - 36.5 g/dL    RDW 13.9 11.5 - 14.5 %    PLATELET 671 180 - 470 K/uL    NEUTROPHILS 55 32 - 75 %    LYMPHOCYTES 14 12 - 49 %    MONOCYTES 14 (H) 5 - 13 %    EOSINOPHILS 17 (H) 0 - 7 %    BASOPHILS 0 0 - 1 %    ABS. NEUTROPHILS 3.9 1.8 - 8.0 K/UL    ABS. LYMPHOCYTES 1.0 0.8 - 3.5 K/UL    ABS. MONOCYTES 1.0 0.0 - 1.0 K/UL    ABS. EOSINOPHILS 1.2 (H) 0.0 - 0.4 K/UL    ABS.  BASOPHILS 0.0 0.0 - 0.1 K/UL    DF SMEAR SCANNED      RBC COMMENTS ANISOCYTOSIS  1+        RBC COMMENTS MACROCYTOSIS  1+        RBC COMMENTS COLE CELLS  PRESENT        RBC COMMENTS ACANTHOCYTES  PRESENT  SCHISTOCYTES  PRESENT        RBC COMMENTS OVALOCYTES  PRESENT       METABOLIC PANEL, BASIC    Collection Time: 12/14/17  3:47 AM   Result Value Ref Range    Sodium 133 (L) 136 - 145 mmol/L    Potassium 4.0 3.5 - 5.1 mmol/L    Chloride 100 97 - 108 mmol/L    CO2 25 21 - 32 mmol/L    Anion gap 8 5 - 15 mmol/L    Glucose 154 (H) 65 - 100 mg/dL    BUN 55 (H) 6 - 20 MG/DL    Creatinine 4.42 (H) 0.70 - 1.30 MG/DL    BUN/Creatinine ratio 12 12 - 20      GFR est AA 17 (L) >60 ml/min/1.73m2    GFR est non-AA 14 (L) >60 ml/min/1.73m2    Calcium 8.0 (L) 8.5 - 10.1 MG/DL           Total time spent with patient:  xxx   min. Care Plan discussed with:  Patient     Family      RN      Consulting Physician [facility],         I have reviewed the flowsheets. Chart and Pertinent Notes have been reviewed. No change in PMH ,family and social history from Consult note.       Bryant Bobo MD

## 2017-12-14 NOTE — DIALYSIS
[facility]  [phone]    Vitals   Pre   Post   Assessment   Pre   Post     Temp  Temp: 97.6 °F (36.4 °C) (12/14/17 1719)  98.6 LOC  AAO3 No change   HR   Pulse (Heart Rate): 74 (12/14/17 1819) 76 Lungs   CTA  CTA   B/P   BP: 197/88 (12/14/17 1819) 195/77 Cardiac   RRR  RRR   Resp   Resp Rate: 16 (12/14/17 1819) 14 Skin   Warm/dry  Warm/dry   Pain level  Pain Intensity 1: 10 (12/14/17 1719) primary nurse to manage 10 (primary nurse to manage) Edema  +3 general     No change   Orders:    Duration:   Start:   1819 End:   2120 Total:   3hr   Dialyzer:   Revaclear (lot [id number]) Nipro tubing (lot 17F19-10)   K Bath:   Dialysate K (mEq/L): 2 (12/14/17 1819)   Ca Bath:   Dialysate CA (mEq/L): 2.0 (12/14/17 1819)   Na/Bicarb:   Dialysate NA (mEq/L): 138 (SVS profile 150-138 Exp) (12/14/17 1819)   Target Fluid Removal:   Goal/Amount of Fluid to Remove (mL): 4000 mL (12/14/17 1819)   Access     Type & Location:   [facility]   Labs     Obtained/Reviewed   Critical Results Called   Date when labs were drawn-  Hgb-    HGB   Date Value Ref Range Status   12/14/2017 9.8 (L) 12.1 - 17.0 g/dL Final     K-    Potassium   Date Value Ref Range Status   12/14/2017 4.0 3.5 - 5.1 mmol/L Final     Ca-   Calcium   Date Value Ref Range Status   12/14/2017 8.0 (L) 8.5 - 10.1 MG/DL Final     Bun-   BUN   Date Value Ref Range Status   12/14/2017 55 (H) 6 - 20 MG/DL Final     Creat-   Creatinine   Date Value Ref Range Status   12/14/2017 4.42 (H) 0.70 - 1.30 MG/DL Final        Medications/ Blood Products Given     Name   Dose   Route and Time                     Blood Volume Processed (BVP):    75.9L Net Fluid   Removed:  4000ml   Comments   Time Out Done: Yes per policy  Primary Nurse Rpt Pre: Molly Venegas RN  Primary Nurse Rpt Post: Rosalio Lechuga RN  Pt Education: Access care, procedural, fluid management, profile   Care Plan: per primary team  Tx Summary: Orders, labs, notes reviewed.  Consent obtained at bedside. LUE AVG cannulated with 15G needles for good apparent flows. Pt tolerated 3 hr tx for net uf 4kg. Post tx all possible blood returned. Needles removed and hemostasis achieved <10min. Access sites bandage per policy. Pt left in room in bed in low position, side rails up x3, wheels locked, CB in reach. Admiting Diagnosis: abd pain  Pt's previous clinic- [facility]  Consent signed - Informed Consent Verified: Yes (12/14/17 1296)  [facility] Consent - yes  Hepatitis Status- Unknown  Machine #- Machine Number: [id number] (12/14/17 6529)  Telemetry status- bedside monitor  Pre-dialysis wt. - Pre-Dialysis Weight: 74.2 kg (163 lb 9.3 oz) (12/14/17 1819)   POST HD wt - 70.2kg    Signed: Katharine Flowers RN

## 2017-12-14 NOTE — PERIOP NOTES
VS stable. Awake and alert. Resting comfortably. Patient denies nausea. Pain improved. No signs of excessive bleeding. Tolerating ice chips without difficulty. Ready to transfer from PACU. Awaiting room assignment.

## 2017-12-14 NOTE — PROGRESS NOTES
Consult dictated #[id number]    Abdominal pain - suspect this is PUD given recent upper GI bleed. PPI, carafate, consult GI. Check abdominal xray, lipase, LFTs for other possible etiologies. Abdominal abscess - chronic, no leukocytosis or fever.  Patient says he will follow up with surgeon at [facility] [place]  EtOH absue - no current withdrawal. Will have to use barbiturates if he starts withdrawing as he seems to have had paradoxical disinhibition with ativan  DM - sugars are controlled    Reva Galeas MD

## 2017-12-14 NOTE — PROGRESS NOTES
Pt having severe upper abdominal pain that came on suddenly after eating 80% of breakfast. Paged Dr. Radha Clark and no call back. I called again and he was in the OR. I requested a call back. Received a call back from his assistant who ordered a GI cocktail and consult Hospitalist. Pt had a GI bleed not long ago. I spoke to Adrian Avina NP with hospitalist and explained the situation. Dr. Sarika Hollingsworth at bedside fairly quickly. Administered GI coctail prior to MD's arrival. Pt sent to x-ray and upon return pt reported GI coctail did not help at all. EKG obtained and labs obtained. Pt given ice chips. 1400 Pt sitting on side of the bed rocking back and forth asking for help. He complains of severe pain. I discussed with Dr. Emre Andrade about pt and he will increase pain meds. I called CT to confirm dosing of contrast. They will send up oral contrast. PT to take first bottle, then 30 minutes later start second bottle but only drink half. Send pt down to CT with second half of second bottle. Pt updated and his wife, and administered 4 mg of Morphine. Results of CT available. Called Dr. Emre Andrade. TORB for Zosyn 2.25 mg Q12. Start NS at 50 ml/hr. NPO after midnight. HIDA scan and consult general surgery. Change dilauded to 1.5 mg Q3 hrs PRN for severe pain.

## 2017-12-14 NOTE — ANESTHESIA POSTPROCEDURE EVALUATION
Post-Anesthesia Evaluation and Assessment    Patient: Jeanine So. MRN: [id number]  SSN: [id number]    YOB: 1965  Age: 45 y.o. Sex: male       Cardiovascular Function/Vital Signs  Visit Vitals    /58    Pulse 60    Temp 36.2 °C (97.2 °F)    Resp 13    Ht 5' 5\" (1.651 m)    Wt 70.8 kg (156 lb 1 oz)    SpO2 100%    BMI 25.97 kg/m2       Patient is status post regional anesthesia for Procedure(s):  THROMBECTOMY LEFT ARM. Nausea/Vomiting: None    Postoperative hydration reviewed and adequate. Pain:  Pain Scale 1: Adult Nonverbal Pain Scale (12/13/17 1950)  Pain Intensity 1: 0 (12/13/17 1950)   Managed    Neurological Status:   Neuro (WDL): Exceptions to WDL (12/13/17 1950)  Neuro  Neurologic State: Anesthetized (12/13/17 1950)  LUE Motor Response: Numbness; No movement to any stimulus (s/p block) (12/13/17 1950)   At baseline    Mental Status and Level of Consciousness: Arousable    Pulmonary Status:   O2 Device: Nasal cannula;Oral airway (12/13/17 1950)   Adequate oxygenation and airway patent    Complications related to anesthesia: None    Post-anesthesia assessment completed.  No concerns    Signed By: Scharlene Paget, MD     December 13, 2017

## 2017-12-14 NOTE — ROUTINE PROCESS
Patient: Barby Killian. MRN: [id number]  SSN: [id number]   YOB: 1965  Age: 45 y.o. Sex: male     Patient is status post Procedure(s):  THROMBECTOMY LEFT ARM.     Surgeon(s) and Role:     Job Sharma MD - Primary    Local/Dose/Irrigation:  Lidocaine 1%- 9 ml                  Peripheral IV 12/13/17 Right Antecubital (Active)   Site Assessment Clean, dry, & intact 12/13/2017  4:28 PM   Phlebitis Assessment 0 12/13/2017  4:28 PM   Infiltration Assessment 0 12/13/2017  4:28 PM   Dressing Status Clean, dry, & intact 12/13/2017  4:28 PM   Hub Color/Line Status Pink 12/13/2017  4:28 PM                           Dressing/Packing:  Wound Arm Left-DRESSING TYPE: 4 x 4;Staples;Special tape (comment) (hyperfix tape) (12/13/17 1930)  Splint/Cast:  ]    Other:

## 2017-12-14 NOTE — PROGRESS NOTES
Day #1 of Zosyn  Indication:  Intra-abdominal infection  Current regimen:  4.5 g Q12H  Abx regimen: vancomycin monotherapy  Recent Labs      17   0347  17   1616   WBC  7.1  8.3   CREA  4.42*  4.00*   BUN  55*  53*     Est CrCl: 16.6 ml/min; UO: 0.71 ml/kg/hr  Temp (24hrs), Av.8 °F (36.6 °C), Min:97.2 °F (36.2 °C), Max:98.7 °F (37.1 °C)    Cultures:  None    Plan: Change to Zosyn 3.375 g x 1, then 6.75 g 24-hour continuous infusion. **close i-vent after initial review. Remove this text prior to posting to note.

## 2017-12-14 NOTE — PROGRESS NOTES
CM reviewed chart. Patient is s/p THROMBECTOMY LEFT ARM on 12/13/17. CM met with patient and wife, explained role and discussed discharge planning. Patient alert, oriented but said he was in pain. Patient going to CT. Patient lives with his wife in their private residence. Patient has ESRD, goes to the [address] in [place] on M-W-F. Patient uses the [employer] for transportation to and from dialysis. He is independent without any assistive devices. He has home O2 that he uses PRN and a neb machine. [employer] is his DME company. Patient uses the [facility] and the dialysis pharmacy for prescriptions. His wife will provide transportation home and he did not voice any discharge barriers. Patient will be discharged home in care of his wife and will continue with outpatient dialysis. CM will follow as needed. Marshall Gavin MSA, RN, CRM. Care Management Interventions  PCP Verified by CM: Yes (Roshan Alvarez NP)  Mode of Transport at Discharge:  Other (see comment) (Private car)  Transition of Care Consult (CM Consult): Discharge Planning  MyChart Signup: No  Discharge Durable Medical Equipment: No  Health Maintenance Reviewed: Yes  Physical Therapy Consult: No  Occupational Therapy Consult: No  Speech Therapy Consult: No  Current Support Network: Lives with Spouse  Confirm Follow Up Transport: Family  Plan discussed with Pt/Family/Caregiver: Yes  Discharge Location  Discharge Placement: Home with family assistance

## 2017-12-14 NOTE — BRIEF OP NOTE
BRIEF OPERATIVE NOTE    Date of Procedure: 12/13/2017   Preoperative Diagnosis: ISCHEMIC LEFT HAND  Postoperative Diagnosis: ISCHEMIC LEFT HAND    Procedure(s):  THROMBECTOMY LEFT ARM  Surgeon(s) and Role:     * Kayla Espinosa MD - Primary         Assistant Staff:       Surgical Staff:  Circ-1: Yanet Emerson RN  Scrub RN-1: Anjel Wagner RN  Surg Asst-1: Park Cyr  Event Time In   Incision Start 1855   Incision Close      Anesthesia: General   Estimated Blood Loss: 50cc  Specimens: * No specimens in log *   Findings: large amount of thrombus  Complications: none  Implants: * No implants in log *

## 2017-12-15 ENCOUNTER — APPOINTMENT (OUTPATIENT)
Dept: NUCLEAR MEDICINE | Age: 52
DRG: 173 | End: 2017-12-15
Attending: HOSPITALIST
Payer: MEDICAID

## 2017-12-15 ENCOUNTER — APPOINTMENT (OUTPATIENT)
Dept: INTERVENTIONAL RADIOLOGY/VASCULAR | Age: 52
DRG: 173 | End: 2017-12-15
Attending: SURGERY
Payer: MEDICAID

## 2017-12-15 PROBLEM — K81.0 ACUTE CHOLECYSTITIS: Status: ACTIVE | Noted: 2017-12-15

## 2017-12-15 LAB
ALBUMIN SERPL-MCNC: 2.6 G/DL (ref 3.5–5)
ALBUMIN SERPL-MCNC: 2.6 G/DL (ref 3.5–5)
ALBUMIN/GLOB SERPL: 0.6 {RATIO} (ref 1.1–2.2)
ALBUMIN/GLOB SERPL: 0.6 {RATIO} (ref 1.1–2.2)
ALP SERPL-CCNC: 111 U/L (ref 45–117)
ALP SERPL-CCNC: 113 U/L (ref 45–117)
ALT SERPL-CCNC: 14 U/L (ref 12–78)
ALT SERPL-CCNC: 15 U/L (ref 12–78)
ANION GAP SERPL CALC-SCNC: 7 MMOL/L (ref 5–15)
AST SERPL-CCNC: 18 U/L (ref 15–37)
AST SERPL-CCNC: 18 U/L (ref 15–37)
BACTERIA SPEC CULT: NORMAL
BACTERIA SPEC CULT: NORMAL
BILIRUB DIRECT SERPL-MCNC: 0.2 MG/DL (ref 0–0.2)
BILIRUB SERPL-MCNC: 0.4 MG/DL (ref 0.2–1)
BILIRUB SERPL-MCNC: 0.5 MG/DL (ref 0.2–1)
BUN SERPL-MCNC: 29 MG/DL (ref 6–20)
BUN/CREAT SERPL: 9 (ref 12–20)
CALCIUM SERPL-MCNC: 7.8 MG/DL (ref 8.5–10.1)
CHLORIDE SERPL-SCNC: 98 MMOL/L (ref 97–108)
CO2 SERPL-SCNC: 30 MMOL/L (ref 21–32)
CREAT SERPL-MCNC: 3.39 MG/DL (ref 0.7–1.3)
ERYTHROCYTE [DISTWIDTH] IN BLOOD BY AUTOMATED COUNT: 14.2 % (ref 11.5–14.5)
GLOBULIN SER CALC-MCNC: 4.1 G/DL (ref 2–4)
GLOBULIN SER CALC-MCNC: 4.1 G/DL (ref 2–4)
GLUCOSE BLD STRIP.AUTO-MCNC: 114 MG/DL (ref 65–100)
GLUCOSE SERPL-MCNC: 121 MG/DL (ref 65–100)
HCT VFR BLD AUTO: 31.1 % (ref 36.6–50.3)
HGB BLD-MCNC: 10.3 G/DL (ref 12.1–17)
LIPASE SERPL-CCNC: 79 U/L (ref 73–393)
MCH RBC QN AUTO: 33.9 PG (ref 26–34)
MCHC RBC AUTO-ENTMCNC: 33.1 G/DL (ref 30–36.5)
MCV RBC AUTO: 102.3 FL (ref 80–99)
PLATELET # BLD AUTO: 265 K/UL (ref 150–400)
POTASSIUM SERPL-SCNC: 4.2 MMOL/L (ref 3.5–5.1)
PROT SERPL-MCNC: 6.7 G/DL (ref 6.4–8.2)
PROT SERPL-MCNC: 6.7 G/DL (ref 6.4–8.2)
RBC # BLD AUTO: 3.04 M/UL (ref 4.1–5.7)
SERVICE CMNT-IMP: ABNORMAL
SERVICE CMNT-IMP: NORMAL
SODIUM SERPL-SCNC: 135 MMOL/L (ref 136–145)
WBC # BLD AUTO: 11.7 K/UL (ref 4.1–11.1)

## 2017-12-15 PROCEDURE — C1894 INTRO/SHEATH, NON-LASER: HCPCS

## 2017-12-15 PROCEDURE — 74011250637 HC RX REV CODE- 250/637: Performed by: SURGERY

## 2017-12-15 PROCEDURE — 77030010548 HC BG WND DRN ARMD -B

## 2017-12-15 PROCEDURE — 87077 CULTURE AEROBIC IDENTIFY: CPT | Performed by: RADIOLOGY

## 2017-12-15 PROCEDURE — C1769 GUIDE WIRE: HCPCS

## 2017-12-15 PROCEDURE — 77030002996 HC SUT SLK J&J -A

## 2017-12-15 PROCEDURE — 77030011229 HC DIL VESL COON COOK -A

## 2017-12-15 PROCEDURE — 85027 COMPLETE CBC AUTOMATED: CPT | Performed by: SURGERY

## 2017-12-15 PROCEDURE — 82962 GLUCOSE BLOOD TEST: CPT

## 2017-12-15 PROCEDURE — 0F9430Z DRAINAGE OF GALLBLADDER WITH DRAINAGE DEVICE, PERCUTANEOUS APPROACH: ICD-10-PCS | Performed by: RADIOLOGY

## 2017-12-15 PROCEDURE — 74011250636 HC RX REV CODE- 250/636: Performed by: SURGERY

## 2017-12-15 PROCEDURE — 65270000029 HC RM PRIVATE

## 2017-12-15 PROCEDURE — C1729 CATH, DRAINAGE: HCPCS

## 2017-12-15 PROCEDURE — C1892 INTRO/SHEATH,FIXED,PEEL-AWAY: HCPCS

## 2017-12-15 PROCEDURE — 74011250636 HC RX REV CODE- 250/636: Performed by: RADIOLOGY

## 2017-12-15 PROCEDURE — 78227 HEPATOBIL SYST IMAGE W/DRUG: CPT

## 2017-12-15 PROCEDURE — 74011250636 HC RX REV CODE- 250/636: Performed by: HOSPITALIST

## 2017-12-15 PROCEDURE — 74011000250 HC RX REV CODE- 250: Performed by: SURGERY

## 2017-12-15 PROCEDURE — 36415 COLL VENOUS BLD VENIPUNCTURE: CPT | Performed by: SURGERY

## 2017-12-15 PROCEDURE — 74011250636 HC RX REV CODE- 250/636

## 2017-12-15 PROCEDURE — 94640 AIRWAY INHALATION TREATMENT: CPT

## 2017-12-15 PROCEDURE — 80076 HEPATIC FUNCTION PANEL: CPT | Performed by: SURGERY

## 2017-12-15 PROCEDURE — 74011250637 HC RX REV CODE- 250/637: Performed by: HOSPITALIST

## 2017-12-15 PROCEDURE — 74011000250 HC RX REV CODE- 250

## 2017-12-15 PROCEDURE — 87186 SC STD MICRODIL/AGAR DIL: CPT | Performed by: RADIOLOGY

## 2017-12-15 PROCEDURE — 47490 INCISION OF GALLBLADDER: CPT

## 2017-12-15 PROCEDURE — 80053 COMPREHEN METABOLIC PANEL: CPT | Performed by: SURGERY

## 2017-12-15 PROCEDURE — 65660000000 HC RM CCU STEPDOWN

## 2017-12-15 PROCEDURE — 87075 CULTR BACTERIA EXCEPT BLOOD: CPT | Performed by: RADIOLOGY

## 2017-12-15 PROCEDURE — 87205 SMEAR GRAM STAIN: CPT | Performed by: RADIOLOGY

## 2017-12-15 PROCEDURE — 83690 ASSAY OF LIPASE: CPT | Performed by: SURGERY

## 2017-12-15 RX ORDER — MIDAZOLAM HYDROCHLORIDE 1 MG/ML
5 INJECTION, SOLUTION INTRAMUSCULAR; INTRAVENOUS
Status: DISCONTINUED | OUTPATIENT
Start: 2017-12-15 | End: 2017-12-15 | Stop reason: ALTCHOICE

## 2017-12-15 RX ORDER — ACETAMINOPHEN 10 MG/ML
1000 INJECTION, SOLUTION INTRAVENOUS ONCE
Status: COMPLETED | OUTPATIENT
Start: 2017-12-15 | End: 2017-12-15

## 2017-12-15 RX ORDER — FENTANYL CITRATE 50 UG/ML
300 INJECTION, SOLUTION INTRAMUSCULAR; INTRAVENOUS
Status: DISCONTINUED | OUTPATIENT
Start: 2017-12-15 | End: 2017-12-15 | Stop reason: ALTCHOICE

## 2017-12-15 RX ORDER — MORPHINE SULFATE 2 MG/ML
2 INJECTION, SOLUTION INTRAMUSCULAR; INTRAVENOUS ONCE
Status: COMPLETED | OUTPATIENT
Start: 2017-12-15 | End: 2017-12-15

## 2017-12-15 RX ORDER — SODIUM CHLORIDE 9 MG/ML
50 INJECTION, SOLUTION INTRAVENOUS CONTINUOUS
Status: DISCONTINUED | OUTPATIENT
Start: 2017-12-15 | End: 2017-12-15 | Stop reason: ALTCHOICE

## 2017-12-15 RX ORDER — LIDOCAINE HYDROCHLORIDE 20 MG/ML
INJECTION, SOLUTION INFILTRATION; PERINEURAL
Status: COMPLETED
Start: 2017-12-15 | End: 2017-12-15

## 2017-12-15 RX ORDER — LIDOCAINE HYDROCHLORIDE 20 MG/ML
20 INJECTION, SOLUTION INFILTRATION; PERINEURAL
Status: COMPLETED | OUTPATIENT
Start: 2017-12-15 | End: 2017-12-15

## 2017-12-15 RX ORDER — DIPHENHYDRAMINE HYDROCHLORIDE 50 MG/ML
INJECTION, SOLUTION INTRAMUSCULAR; INTRAVENOUS
Status: DISPENSED
Start: 2017-12-15 | End: 2017-12-16

## 2017-12-15 RX ORDER — SODIUM CHLORIDE 0.9 % (FLUSH) 0.9 %
5-10 SYRINGE (ML) INJECTION EVERY 8 HOURS
Status: DISCONTINUED | OUTPATIENT
Start: 2017-12-15 | End: 2017-12-20 | Stop reason: HOSPADM

## 2017-12-15 RX ORDER — DIPHENHYDRAMINE HYDROCHLORIDE 50 MG/ML
50 INJECTION, SOLUTION INTRAMUSCULAR; INTRAVENOUS ONCE
Status: COMPLETED | OUTPATIENT
Start: 2017-12-15 | End: 2017-12-15

## 2017-12-15 RX ORDER — HYDROMORPHONE HYDROCHLORIDE 2 MG/ML
4 INJECTION, SOLUTION INTRAMUSCULAR; INTRAVENOUS; SUBCUTANEOUS
Status: DISCONTINUED | OUTPATIENT
Start: 2017-12-15 | End: 2017-12-15 | Stop reason: ALTCHOICE

## 2017-12-15 RX ADMIN — SUCRALFATE 1 G: 1 SUSPENSION ORAL at 21:31

## 2017-12-15 RX ADMIN — LIDOCAINE HYDROCHLORIDE: 20 INJECTION, SOLUTION INFILTRATION; PERINEURAL at 15:00

## 2017-12-15 RX ADMIN — OXYCODONE HYDROCHLORIDE AND ACETAMINOPHEN 1 TABLET: 10; 325 TABLET ORAL at 23:33

## 2017-12-15 RX ADMIN — DIPHENHYDRAMINE HYDROCHLORIDE 25 MG: 50 INJECTION, SOLUTION INTRAMUSCULAR; INTRAVENOUS at 15:14

## 2017-12-15 RX ADMIN — PIPERACILLIN SODIUM AND TAZOBACTAM SODIUM 6.75 G: 36; 4.5 INJECTION, POWDER, LYOPHILIZED, FOR SOLUTION INTRAVENOUS at 19:40

## 2017-12-15 RX ADMIN — IPRATROPIUM BROMIDE AND ALBUTEROL SULFATE 3 ML: .5; 3 SOLUTION RESPIRATORY (INHALATION) at 14:06

## 2017-12-15 RX ADMIN — OXYCODONE HYDROCHLORIDE AND ACETAMINOPHEN 1 TABLET: 10; 325 TABLET ORAL at 17:51

## 2017-12-15 RX ADMIN — PIPERACILLIN SODIUM AND TAZOBACTAM SODIUM 6.75 G: 36; 4.5 INJECTION, POWDER, LYOPHILIZED, FOR SOLUTION INTRAVENOUS at 18:21

## 2017-12-15 RX ADMIN — FENTANYL CITRATE 50 MCG: 50 INJECTION, SOLUTION INTRAMUSCULAR; INTRAVENOUS at 15:44

## 2017-12-15 RX ADMIN — ACETAMINOPHEN 1000 MG: 10 INJECTION, SOLUTION INTRAVENOUS at 07:57

## 2017-12-15 RX ADMIN — IPRATROPIUM BROMIDE AND ALBUTEROL SULFATE 3 ML: .5; 3 SOLUTION RESPIRATORY (INHALATION) at 19:07

## 2017-12-15 RX ADMIN — MIDAZOLAM HYDROCHLORIDE 1 MG: 1 INJECTION, SOLUTION INTRAMUSCULAR; INTRAVENOUS at 15:44

## 2017-12-15 RX ADMIN — FENTANYL CITRATE 50 MCG: 50 INJECTION, SOLUTION INTRAMUSCULAR; INTRAVENOUS at 15:47

## 2017-12-15 RX ADMIN — FUROSEMIDE 80 MG: 40 TABLET ORAL at 17:50

## 2017-12-15 RX ADMIN — OXYCODONE HYDROCHLORIDE AND ACETAMINOPHEN 1 TABLET: 10; 325 TABLET ORAL at 01:01

## 2017-12-15 RX ADMIN — CARVEDILOL 6.25 MG: 6.25 TABLET, FILM COATED ORAL at 17:51

## 2017-12-15 RX ADMIN — BUDESONIDE 500 MCG: 0.5 INHALANT RESPIRATORY (INHALATION) at 19:07

## 2017-12-15 RX ADMIN — OXYCODONE HYDROCHLORIDE AND ACETAMINOPHEN 1 TABLET: 10; 325 TABLET ORAL at 12:13

## 2017-12-15 RX ADMIN — Medication 10 ML: at 21:32

## 2017-12-15 RX ADMIN — MORPHINE SULFATE 2 MG: 2 INJECTION, SOLUTION INTRAMUSCULAR; INTRAVENOUS at 10:05

## 2017-12-15 RX ADMIN — GABAPENTIN 300 MG: 300 CAPSULE ORAL at 21:31

## 2017-12-15 NOTE — PROGRESS NOTES
Vascular Surgery   --events noted  --plans for CCY tube placement today  --no hand complaints  --HD tonight  --will be here through the weekend on abx  --appreciate everyone's help

## 2017-12-15 NOTE — PHYSICIAN ADVISORY
Letter of Status Determination:   Recommend hospitalization status upgraded from   OBSERVATION  to INPATIENT  Status     Pt Name:  Jaycee Connelly. MR#   DIOMEDES # [id number] /  [id number]   [facility]#  [id number]   Room and Hospital  2/  @ [facility]   Hospitalization date  12/13/2017  3:36 PM   Current Attending Physician  Hilton Alvarez MD   Principal diagnosis  Nontraumatic ischemic infarction of muscle of hand      Clinicals  45 y.o. y.o  male hospitalized with above diagnosis   The pt was taken to OR and LEFT Arm thrombectomy done due to left ischemic hand. He was then found to have RUQ pain and now HIDA scan suggests acute cholecystitis. [place] (MCG) criteria   Does   apply   Vascular Disease GRG  GRG: MG-VAS (ISC GRG)   STATUS DETERMINATION  Based on documented presenting clinical data, comorbid conditions, high risk of adverse events and deterioration, it is our recommendation that the patient's status should be upgraded from OBSERVATION to INPATIENT status. The final decision of the patient's hospitalization status depends on the attending physician's judgment. Additional comments     Payor: BLUE CROSS MEDICAID / Plan: [address] [place] [address] / Product Type: Managed Care Medicaid /         Disha Lee MD MPH FACP     Physician Advisor    [id number] [facility],[address] Documentation Management Program  [facility] [address]   President Medical Staff, [facility]    Cell  [phone]        [phone]    .

## 2017-12-15 NOTE — PROGRESS NOTES
Bedside shift change report given to Gertrude Hicks RN (oncoming nurse) by Connor Ortiz RN (offgoing nurse). Report included the following information SBAR, Kardex and Recent Results.

## 2017-12-15 NOTE — CONSULTS
[facility]   [address] [facility], [address] [place] [address], [facility]  Phone: [phone]   [phone]       Nephrology Progress Note  Christine Jacome     1965     [id number]  Date of Admission : 12/13/2017  12/15/17    CC: Follow up for ESRD      Assessment and Plan   ESRD- HD :  - dialyzes MWF at [facility]   - HD tonight or tomorrow and then back to MWF   - 4 kg UF today. He has B/L pleural effusions on CT. He has been drinking 3 cans of beer per day and has extreme non compliance to fluid intake     Clotted LUE AVG   - s/p declot 12/13  - to start  mg on d/c. Too risky to put him on low dose coumadin     Ischemic LUE:  - thrombosed limb after AVG declot   - s/p thrombectomy w/ TIANNA 12/13    Acute on Chronic Cholecystitis   - per Dr Estelle Urbina     Chronic Infected Incisional hernia Mesh w/ drainage     Chronic Alcoholism   - Lipase ok     HTN :  - fluid overloaded   - dont change any BP meds    Anemia of CKD  - on  Epogen     Sec HPTH     Interval History:  abdo pain still present   No N/V  Ct findings noted   Discussed frequent clotting of AVG w/ Dr Omega Escalona       Review of Systems: A comprehensive review of systems was negative.     Current Medications:   Current Facility-Administered Medications   Medication Dose Route Frequency    acetaminophen (OFIRMEV) infusion 1,000 mg  1,000 mg IntraVENous ONCE    arformoterol (BROVANA) neb solution 15 mcg  15 mcg Nebulization BID RT    And    budesonide (PULMICORT) 500 mcg/2 ml nebulizer suspension  500 mcg Nebulization BID RT    nicotine (NICODERM CQ) 21 mg/24 hr patch 1 Patch  1 Patch TransDERmal DAILY    pantoprazole (PROTONIX) tablet 40 mg  40 mg Oral ACB&D    sucralfate (CARAFATE) 100 mg/mL oral suspension 1 g  1 g Oral AC&HS    oxyCODONE-acetaminophen (PERCOCET 10)  mg per tablet 1 Tab  1 Tab Oral Q6H    HYDROmorphone (DILAUDID) injection 1.5 mg  1.5 mg IntraVENous Q3H PRN    albuterol-ipratropium (DUO-NEB) 2.5 MG-0.5 MG/3 ML  3 mL Nebulization TID RT    piperacillin-tazobactam (ZOSYN) 6.75 g in 0.9% sodium chloride 500 mL infusion  6.75 g IntraVENous Q24H    albuterol (PROVENTIL VENTOLIN) nebulizer solution 2.5 mg  2.5 mg Nebulization QID PRN    carvedilol (COREG) tablet 6.25 mg  6.25 mg Oral BID WITH MEALS    furosemide (LASIX) tablet 80 mg  80 mg Oral BID    gabapentin (NEURONTIN) capsule 300 mg  300 mg Oral QHS      Allergies   Allergen Reactions    Ativan [Lorazepam] Other (comments)     Hyper activity       Objective:  Vitals:    Vitals:    12/14/17 2123 12/14/17 2330 12/15/17 0309 12/15/17 0637   BP: 195/77 163/66 137/64    Pulse: 82 76 70    Resp: 14 12 18    Temp:  98.6 °F (37 °C) 99.1 °F (37.3 °C)    SpO2: 96% 97% 95%    Weight:    66.5 kg (146 lb 8.3 oz)   Height:         Intake and Output:     12/13 1901 - 12/15 0700  In: 1320 [P.O.:345; I.V.:975]  Out: 6265 [Urine:2250]    Physical Examination:    General: No distress  Neck:  Supple, no mass  Resp:  Lungs CTA B/L, no wheezing , normal respiratory effort  CV:  RRR,  no murmur or rub,no LE edema  GI:  RUQ and epigastric tenderness, voluntary guarding   Neurologic:  Non focal  Psych:             AAO x 3 appropriate affect   Skin:  No Rash  Ext : Left arm - well perfused. AVG - good thrill     []    High complexity decision making was performed  []    Patient is at high-risk of decompensation with multiple organ involvement    Lab Data Personally Reviewed: I have reviewed all the pertinent labs, microbiology data and radiology studies during assessment.     Recent Labs      12/14/17   1231  12/14/17   0347  12/13/17   1616   NA   --   133*  133*   K   --   4.0  4.2   CL   --   100  100   CO2   --   25  25   GLU   --   154*  135*   BUN   --   55*  53*   CREA   --   4.42*  4.00*   CA   --   8.0*  8.1*   ALB  2.7*   --    --    SGOT  22   --    --    ALT  26   --    --    INR   --    --   1.1     Recent Labs      12/14/17   0347  12/13/17   1616   WBC  7.1  8.3   HGB 9.8*  10.4*   HCT  29.1*  30.3*   PLT  249  257     Lab Results   Component Value Date/Time    Specimen Description: ABDOMEN 12/17/2012 02:45 PM    Specimen Description: HAND L THUMB 12/14/2012 03:30 PM    Specimen Description: HAND L THUMB 12/14/2012 03:30 PM    Specimen Description: [facility] 12/13/2012 06:04 PM     Lab Results   Component Value Date/Time    Culture result: MRSA NOT PRESENT 12/14/2017 05:39 AM    Culture result:  12/14/2017 05:39 AM         Screening of patient nares for MRSA is for surveillance purposes and, if positive, to facilitate isolation considerations in high risk settings. It is not intended for automatic decolonization interventions per se as regimens are not sufficiently effective to warrant routine use. Culture result: MODERATE MIXED SKIN ALESSIA ISOLATED 10/19/2017 06:53 PM    Culture result: MODERATE  STAPHYLOCOCCUS AUREUS   06/13/2016 04:50 PM    Culture result: FEW  STAPHYLOCOCCUS SPECIES, COAGULASE NEGATIVE   06/13/2016 04:50 PM     Recent Results (from the past 24 hour(s))   HEPATIC FUNCTION PANEL    Collection Time: 12/14/17 12:31 PM   Result Value Ref Range    Protein, total 6.5 6.4 - 8.2 g/dL    Albumin 2.7 (L) 3.5 - 5.0 g/dL    Globulin 3.8 2.0 - 4.0 g/dL    A-G Ratio 0.7 (L) 1.1 - 2.2      Bilirubin, total 0.3 0.2 - 1.0 MG/DL    Bilirubin, direct 0.1 0.0 - 0.2 MG/DL    Alk.  phosphatase 151 (H) 45 - 117 U/L    AST (SGOT) 22 15 - 37 U/L    ALT (SGPT) 26 12 - 78 U/L   LIPASE    Collection Time: 12/14/17 12:31 PM   Result Value Ref Range    Lipase 141 73 - 393 U/L   CK W/ CKMB & INDEX    Collection Time: 12/14/17 12:31 PM   Result Value Ref Range    CK 72 39 - 308 U/L    CK - MB 2.0 <3.6 NG/ML    CK-MB Index 2.8 (H) 0 - 2.5     TROPONIN I    Collection Time: 12/14/17 12:31 PM   Result Value Ref Range    Troponin-I, Qt. <0.04 <0.05 ng/mL   LACTIC ACID    Collection Time: 12/14/17  6:10 PM   Result Value Ref Range    Lactic acid 0.5 0.4 - 2.0 MMOL/L           Total time spent with patient:  xxx   min. Care Plan discussed with:  Patient     Family      RN      Consulting Physician [facility]        I have reviewed the flowsheets. Chart and Pertinent Notes have been reviewed. No change in PMH ,family and social history from Consult note.       Sharon Julian MD

## 2017-12-15 NOTE — PROGRESS NOTES
Problem: Falls - Risk of  Goal: *Absence of Falls  Document Rhianna Fall Risk and appropriate interventions in the flowsheet.    Outcome: Progressing Towards Goal  Fall Risk Interventions:  Mobility Interventions: Patient to call before getting OOB         Medication Interventions: Patient to call before getting OOB    Elimination Interventions: Patient to call for help with toileting needs

## 2017-12-15 NOTE — PROGRESS NOTES
TRANSFER - OUT REPORT:    Verbal report given to Mati Dee RN on Jaycee Connelly.  being transferred to  for routine progression of care       Report consisted of patients Situation, Background, Assessment and   Recommendations(SBAR). Information from the following report(s) SBAR and Procedure Summary was reviewed with the receiving nurse. Lines:   Peripheral IV 12/13/17 Right Antecubital (Active)   Site Assessment Clean, dry, & intact 12/15/2017  8:00 AM   Phlebitis Assessment 0 12/15/2017  8:00 AM   Infiltration Assessment 0 12/15/2017  8:00 AM   Dressing Status Clean, dry, & intact 12/15/2017  8:00 AM   Dressing Type Tape;Transparent 12/15/2017  8:00 AM   Hub Color/Line Status Pink; Infusing 12/15/2017  8:00 AM   Action Taken Open ports on tubing capped 12/15/2017  8:00 AM   Alcohol Cap Used Yes 12/15/2017  8:00 AM        Opportunity for questions and clarification was provided.       Patient transported with:   [employer]
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
## 2017-12-15 NOTE — CONSULTS
[address] [facility].Javed  MR#: [id number]  : 1965  ACCOUNT #: [de-identified]   DATE OF SERVICE: 2017    PRIMARY CARE PHYSICIAN: Cecilia Ellison    PHYSICIAN REQUESTING CONSULTATION: Dr. Khai José. REASON FOR CONSULTATION: Abdominal pain. HISTORY OF PRESENT ILLNESS: The patient is a 22-year-old gentleman, past medical history of end-stage renal disease, COPD, diabetes, hepatitis C virus, alcohol and tobacco use, and hypertension, who presented yesterday for an ischemic left hand. Apparently he had dialysis graft declotted approximately a week ago and then had increasing hand pain. When he presented he was found to have ischemia in his left arm and underwent a left arm thrombectomy on the . We were consulted for abdominal pain. Apparently the patient has chronic abdominal pain from an abdominal wound that has been persistent for years and potentially has infected mesh. He normally sees Dr. Marissa Omer at [facility] in the surgery department there. He tells me that he has been instructed not to undergo anesthesia because of his breathing difficulties and so has been dealing with flares of infection and abscess in his abdomen for years now. Of note, he also had a recent acute GI bleed for which he was admitted to the hospital from  to the . At that time he was offered endoscopy, as it was thought to be due to an upper source, but he deferred and was discharged on PPI. He says since then he has been taking ibuprofen and continuing to drink alcohol, though he notes he has been drinking a lot less than previous. This morning the patient had the acute onset of epigastric pain after eating breakfast. He says this is not like pain that he has from his chronic abdominal wound, nor is it similar to his presentation to the hospital in October for GI bleeding.  The pain is sharp, nonradiating, does not penetrate through to this back, and is present across his upper abdomen, but most likely in the epigastrium. Sitting up makes the pain better. He does not have any nausea. He feels like he has not been having any bleeding or black or tarry stools since before he came into the hospital. He does note that during the accident that he had that led to the abdominal surgery where the mesh was placed, he also had a partial pancreatectomy. He does not think he has ever had acute pancreatitis in the past.  He still has his gallbladder. PAST MEDICAL HISTORY  1. End-stage renal disease. 2. Diabetes. 3. Chronic abdominal wound. 4. Hepatitis C virus. 5. COPD. 6. History of splenectomy and partial pancreatectomy. 7. Hypertension. 8. Alcohol abuse. 9. Tobacco abuse. MEDICATIONS: Reviewed. ALLERGIES: ATIVAN MAKES HIM HYPERACTIVE. REVIEW OF SYSTEMS: The patient still makes a little urine, has no dysuria, hematuria or urinary frequency. He denies any chest pain, palpitations or dizziness. Otherwise, 10-point review of systems is negative except for as mentioned in the HPI and past medical history. FAMILY HISTORY: Was reviewed for peptic ulcer disease, is noncontributory. SOCIAL HISTORY: He lives at home with his wife. Continued tobacco abuse. Continues to drink alcohol, with last drink 2 days ago. Denies illicit drug use. PHYSICAL EXAMINATION  VITAL SIGNS: Temperature is 36.9, pulse 74, blood pressure 165/74, respirations 16, oxygen saturation 95% on 2 liters by nasal cannula. GENERAL APPEARANCE: The patient is an uncomfortable gentleman but in no acute distress. EYES: Pupils equal, round, and reactive to light. Extraocular movements intact. No scleral icterus. ENT: Mucous membranes are moist. Oropharynx is benign. NECK: Supple. No jugular venous distention. No thyromegaly. HEART: Regular rate and rhythm, with a 2/6 systolic murmur. RESPIRATORY: Lungs are clear to auscultation bilaterally.  Work of breathing is normal. GASTROINTESTINAL: Abdomen has a wound slightly between the epigastric and the umbilicus that is dressed and there is some serosanguineous drainage on the dressing. There is a great deal of scar tissue, no active fluctuance that I can palpate. Mildly tender to palpation. The epigastrium is more tender to palpation without rebound. He has positive bowel sounds and is nondistended. GENITOURINARY: No costovertebral angle tenderness. SKIN: No rash, pallor or jaundice. Wound as mentioned above. MUSCULOSKELETAL: Muscle tone is normal. Bulk is mildly decreased. There is no sign of cyanosis, clubbing, or edema. Surgical wound in the left arm dressed. NEUROLOGIC: Alert and oriented x3. Cranial nerves II-XII intact. He is moving all 4 extremities without focal deficits. SIGNIFICANT LABORATORY: White blood cell count is 7.1, hemoglobin 9.8, platelets are 150,995. Sodium 143, potassium 4.0, glucose 154, BUN 55, creatinine 4.42.     OTHER STUDIES  Telemetry shows sinus rhythm. X-ray of the chest yesterday showed a stable right pleural effusion. ASSESSMENT AND PLAN:  The patient is a 13-year-old gentleman who we were asked to consult on for abdominal pain. 1. Abdominal pain. Unfortunately, the patient has multiple different reasons to have abdominal pain, though I suspect this is mostly gastrointestinal-related. I think with his ongoing nonsteroidal anti-inflammatory use as well as alcohol use and previous gastrointestinal bleed, this is likely peptic ulcer disease. That being said, given his alcohol abuse, I think pancreatitis is in the running, and with his hepatitis C and alcohol abuse, could have alcoholic hepatitis. I think the best thing to do at this point would be to obtain labs with liver function tests and a lipase, obtain an x-ray of the abdomen, though I have very low suspicion for ileus or perforation.  He did have some benefit from a GI cocktail, so would like to place him back on standing b.i.d. proton pump inhibitor as well as Carafate. At this point, he is agreeable to go ahead with an esophagogastroduodenoscopy as long as he is not completely anesthetized for it. 2. Abdominal abscess with nonhealing wound. The last CT of the abdomen and pelvis we have is from March of this year which shows a small abscess in the midline subcutaneous fat that communicates with the skin. At this point, the patient is afebrile and has no leukocytosis. As with previous admissions, he is not interested in pursuing surgical correction and says that he will follow up with Dr. Amparo Encarnacion at [facility]. I see no need for antibiotics at the moment. 3. Alcohol abuse. The patient does have a history of alcohol abuse. He tells me he has cut way down because he cannot drink that much fluid with his end-stage renal disease. If no signs of withdrawal, at this point I think we can monitor and if he does show signs of withdrawal we could consider a barbiturate, as he has had difficulty with Ativan in the past.   4. End-stage renal disease. Nephrology has been consulted for dialysis. 5. Diabetes. At this point, his sugars are actually fairly good. We will hold long-acting insulin, continue sliding scale. 6. Hepatitis C virus. We will check liver function tests as above. 7. Tobacco abuse. Agree with the nicotine patch. 8. Ischemic left hand, status post left arm thrombectomy, management per vascular surgery. Thank you very much for the consult. MD Areli Fournier / Chip Mendez  D: 12/14/2017 11:12     T: 12/14/2017 16:13  JOB #: [id number]

## 2017-12-15 NOTE — PROGRESS NOTES
Sera Longo [facility] [address] M.D.  [phone]               GASTROENTEROLOGY PROGRESS NOTE        NAME: Jose Alfredo Dickey. :  1965   MRN:  [id number]       Subjective:   Pt seen during HIDA test. States he has significant upper abdominal pain. Objective:   VITALS:   Last 24hrs VS reviewed. Most recent are:  Visit Vitals    /64 (BP 1 Location: Right arm, BP Patient Position: At rest)    Pulse 70    Temp 99.1 °F (37.3 °C)    Resp 18    Ht 5' 5\" (1.651 m)    Wt 66.5 kg (146 lb 8.3 oz)    SpO2 95%    BMI 24.38 kg/m2       Intake/Output Summary (Last 24 hours) at 12/15/17 1047  Last data filed at 17 2120   Gross per 24 hour   Intake              600 ml   Output             4650 ml   Net            -4050 ml       PHYSICAL EXAM:  General: Alert, in mild distress    Lungs:            Mild Bilateral wheezing  Heart:  Normal S1, S2    Abdomen: Soft, Non distended, tender in the epigastric and RUQ area. Normoactive bowel sounds, no rebound/invol guarding  Skin:   Warm to touch  Psych:   Good insight. Not anxious nor agitated. Lab Data Reviewed:   Recent Labs      17   0347  17   1616   WBC  7.1  8.3   HGB  9.8*  10.4*   HCT  29.1*  30.3*   PLT  249  257     Recent Labs      17   0347  17   1616   NA  133*  133*   K  4.0  4.2   CL  100  100   CO2  25  25   BUN  55*  53*   CREA  4.42*  4.00*   GLU  154*  135*   CA  8.0*  8.1*     Recent Labs      17   1231   SGOT  22   AP  151*   TP  6.5   ALB  2.7*   GLOB  3.8   LPSE  141     Recent Labs      17   1616   INR  1.1   PTP  11.1      No results for input(s): FE, TIBC, PSAT, FERR in the last 72 hours. Recent Labs      17   1231   CPK  72   CKMB  2.0       See Electronic Medical Record for all procedure/radiology reports and details which were not copied into this note but were reviewed prior to the creation of the Plan. Assessment:   · Upper abdominal pain.  CT scan with concerns for possible cholecystitis. HIDA scan in progress. Patient Active Problem List   Diagnosis Code    Cellulitis of thumb, left L03.012    Tenosynovitis of finger M65.9    DM (diabetes mellitus) ([facility] [address] E11.9    HCV (hepatitis C virus) B19.20    Tobacco abuse Z72.0    Alcohol abuse, daily use F10.10    COPD (chronic obstructive pulmonary disease) (HCC) J44.9    History of splenectomy Z90.81    Cellulitis and abscess of finger L03.019, L02.519    Abdominal wall cellulitis L03.311    Acute GI bleeding K92.2    HTN (hypertension) I10    ESRD on dialysis ([facility] [address] N18.6, Z99.2    Nontraumatic ischemic infarction of muscle of hand M62.249       Plan:   · Surgery evaluating the pt  · Await HIDA scan results  · PPI/Carafate  · Hold on EGD at this time       Signed by:  Melony Vazquez MD         12/15/2017  10:47 AM

## 2017-12-15 NOTE — PROGRESS NOTES
Bedside shift change report given to Zaynab Vidal RN by Chana Moreno RN. Report included the following information SBAR, Kardex, ED Summary, Intake/Output, MAR, Recent Results and Cardiac Rhythm NSR.
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
## 2017-12-15 NOTE — PROGRESS NOTES
Hospitalist Progress Note  Paradise Beckford MD  Answering service: [phone] from in house phone        Date of Service:  12/15/2017  NAME:  Lucy Klein. :  1965  MRN:  [id number]      Admission Summary:   Patient is 45year old male admitted to vascular surgery for left upper extremity ischemia and underwent thrombectomy but later developed RUQ pain and found to have cholecystitis. Interval history / Subjective:   Patient seen and examined at bedside, he is resting in bed comfortably after a PCT placement. Reports RUQ but no nausea or vomiting. He denies fevers or chills. No chest pain or palpitations. No  complaints. Assessment & Plan:       Cholecystitis:  -CT abd showed gallbladder wall edema but HIDA shows no filling defect  -S/p PCT placement this afternoon and is comfortable   -Cont zosyn, GI prophylaxis and monitor clinically     HTN:  -Cont current regimen     ESRD:  -On HD and nephrology is on board    Anemia:  -Due to CKD and stable    Ischemic left upper extremity:  -S/p thrombectomy  -Mgmt as per vascular surgery    Code status: FULL  DVT prophylaxis:     Care Plan discussed with: Nurse and care coordinator  Disposition: TBD     Hospital Problems  Date Reviewed: 12/15/2017          Codes Class Noted POA    Acute cholecystitis ICD-10-CM: K81.0  ICD-9-CM: 575.0  12/15/2017 No        * (Principal)Nontraumatic ischemic infarction of muscle of hand ICD-10-CM: M62.249  ICD-9-CM: 728.89  2017 Yes                Review of Systems:   See subjective       Vital Signs:    Last 24hrs VS reviewed since prior progress note.  Most recent are:  Visit Vitals    /74 (BP 1 Location: Right arm, BP Patient Position: At rest)    Pulse 71    Temp 98 °F (36.7 °C)    Resp 16    Ht 5' 5\" (1.651 m)    Wt 66.5 kg (146 lb 8.3 oz)    SpO2 97%    BMI 24.38 kg/m2         Intake/Output Summary (Last 24 hours) at 12/15/17 Mike filed at 12/14/17 2120   Gross per 24 hour   Intake              600 ml   Output             4400 ml   Net            -3800 ml        Physical Examination:             General:  Awake, alert and oriented to person, place and situation. No acute distress, cooperative. ENT:  Oral mucous moist, no oral lesions. No ulcerations. Neck supple, normal range of motion. Resp:  CTA bilaterally. No wheezing or rales. CV:  Regular rhythm, normal rate, no murmurs. No edema    GI:  Soft, mild tenderness to deep palpation in the RUQ. S/p PCT.      Musculoskeletal:  No joint erythema edema, warm, 2+ pulses throughout    SKIN:  No rashes or ulcerations            Data Review:   Labs reviewed      Labs:     Recent Labs      12/15/17   1218  12/14/17   0347   WBC  11.7*  7.1   HGB  10.3*  9.8*   HCT  31.1*  29.1*   PLT  265  249     Recent Labs      12/15/17   1218  12/14/17   0347  12/13/17   1616   NA  135*  133*  133*   K  4.2  4.0  4.2   CL  98  100  100   CO2  30  25  25   BUN  29*  55*  53*   CREA  3.39*  4.42*  4.00*   GLU  121*  154*  135*   CA  7.8*  8.0*  8.1*     Recent Labs      12/15/17   1218  12/14/17   1231   SGOT  18  18  22   ALT  15  14  26   AP  113  111  151*   TBILI  0.4  0.5  0.3   TP  6.7  6.7  6.5   ALB  2.6*  2.6*  2.7*   GLOB  4.1*  4.1*  3.8   LPSE  79  141     Recent Labs      12/13/17   1616   INR  1.1   PTP  11.1        Recent Labs      12/14/17   1231   CPK  72   CKNDX  2.8*   TROIQ  <0.04     No results found for: CHOL, CHOLX, CHLST, CHOLV, HDL, LDL, LDLC, DLDLP, TGLX, TRIGL, TRIGP, CHHD, CHHDX  Lab Results   Component Value Date/Time    Glucose (POC) 128 12/14/2017 07:41 AM    Glucose (POC) 90 12/13/2017 07:47 PM    Glucose (POC) 101 11/02/2017 01:52 PM    Glucose (POC) 82 11/02/2017 10:30 AM    Glucose (POC) 159 10/21/2017 12:01 PM     Lab Results   Component Value Date/Time    Color YELLOW/STRAW 06/12/2016 05:24 PM    Appearance CLEAR 06/12/2016 05:24 PM    Specific gravity 1.020 06/12/2016 05:24 PM    Specific gravity 1.013 12/14/2012 11:20 AM    pH (UA) 6.0 06/12/2016 05:24 PM    Protein 100 06/12/2016 05:24 PM    Glucose NEGATIVE  06/12/2016 05:24 PM    Ketone NEGATIVE  06/12/2016 05:24 PM    Bilirubin NEGATIVE  06/12/2016 05:24 PM    Urobilinogen 0.2 06/12/2016 05:24 PM    Nitrites NEGATIVE  06/12/2016 05:24 PM    Leukocyte Esterase NEGATIVE  06/12/2016 05:24 PM    Epithelial cells FEW 06/12/2016 05:24 PM    Bacteria NEGATIVE  06/12/2016 05:24 PM    WBC 0-4 06/12/2016 05:24 PM    RBC 0-5 06/12/2016 05:24 PM         Medications Reviewed:     Current Facility-Administered Medications   Medication Dose Route Frequency    iopamidol (ISOVUE 300) 61 % contrast injection 50 mL  50 mL IntraVENous RAD ONCE    iopamidol (ISOVUE 300) 61 % contrast injection        diphenhydrAMINE (BENADRYL) 50 mg/mL injection        arformoterol (BROVANA) neb solution 15 mcg  15 mcg Nebulization BID RT    And    budesonide (PULMICORT) 500 mcg/2 ml nebulizer suspension  500 mcg Nebulization BID RT    nicotine (NICODERM CQ) 21 mg/24 hr patch 1 Patch  1 Patch TransDERmal DAILY    pantoprazole (PROTONIX) tablet 40 mg  40 mg Oral ACB&D    sucralfate (CARAFATE) 100 mg/mL oral suspension 1 g  1 g Oral AC&HS    oxyCODONE-acetaminophen (PERCOCET 10)  mg per tablet 1 Tab  1 Tab Oral Q6H    HYDROmorphone (DILAUDID) injection 1.5 mg  1.5 mg IntraVENous Q3H PRN    albuterol-ipratropium (DUO-NEB) 2.5 MG-0.5 MG/3 ML  3 mL Nebulization TID RT    piperacillin-tazobactam (ZOSYN) 6.75 g in 0.9% sodium chloride 500 mL infusion  6.75 g IntraVENous Q24H    albuterol (PROVENTIL VENTOLIN) nebulizer solution 2.5 mg  2.5 mg Nebulization QID PRN    carvedilol (COREG) tablet 6.25 mg  6.25 mg Oral BID WITH MEALS    furosemide (LASIX) tablet 80 mg  80 mg Oral BID    gabapentin (NEURONTIN) capsule 300 mg  300 mg Oral QHS     ______________________________________________________________________  EXPECTED LENGTH OF STAY: - - -  ACTUAL LENGTH OF STAY:          1                 Mirna López MD

## 2017-12-15 NOTE — PROGRESS NOTES
Problem: Falls - Risk of  Goal: *Absence of Falls  Document Rhianna Fall Risk and appropriate interventions in the flowsheet.    Outcome: Progressing Towards Goal  Fall Risk Interventions:  Mobility Interventions: Patient to call before getting OOB         Medication Interventions: Patient to call before getting OOB    Elimination Interventions: Call light in reach

## 2017-12-15 NOTE — PROCEDURES
PROCEDURE:Percutaneous cholecystostomy(8f pigtail). INDICATION:acute cholecystitis. ANESTHESIA:sedation and local.  COMPLICATION:NONE. SPECIMENS REMOVED:bile for gram stain and C&S.  BLOOD LOSS:NONE. /ASSISTANT:KEVEN Fraga RECOMMENDATIONS:straight drainage. CONSENT OBTAINED:YES.  NOTES:filling defects in CBD likely stones;cystic duct patent.

## 2017-12-16 LAB
BASOPHILS # BLD: 0 K/UL (ref 0–0.1)
BASOPHILS NFR BLD: 0 % (ref 0–1)
EOSINOPHIL # BLD: 0.7 K/UL (ref 0–0.4)
EOSINOPHIL NFR BLD: 7 % (ref 0–7)
ERYTHROCYTE [DISTWIDTH] IN BLOOD BY AUTOMATED COUNT: 14.3 % (ref 11.5–14.5)
HCT VFR BLD AUTO: 29.8 % (ref 36.6–50.3)
HGB BLD-MCNC: 9.8 G/DL (ref 12.1–17)
LYMPHOCYTES # BLD: 1 K/UL (ref 0.8–3.5)
LYMPHOCYTES NFR BLD: 10 % (ref 12–49)
MCH RBC QN AUTO: 33.9 PG (ref 26–34)
MCHC RBC AUTO-ENTMCNC: 32.9 G/DL (ref 30–36.5)
MCV RBC AUTO: 103.1 FL (ref 80–99)
MONOCYTES # BLD: 1.5 K/UL (ref 0–1)
MONOCYTES NFR BLD: 15 % (ref 5–13)
NEUTS SEG # BLD: 7 K/UL (ref 1.8–8)
NEUTS SEG NFR BLD: 68 % (ref 32–75)
PLATELET # BLD AUTO: 238 K/UL (ref 150–400)
RBC # BLD AUTO: 2.89 M/UL (ref 4.1–5.7)
WBC # BLD AUTO: 10.2 K/UL (ref 4.1–11.1)

## 2017-12-16 PROCEDURE — 36415 COLL VENOUS BLD VENIPUNCTURE: CPT | Performed by: INTERNAL MEDICINE

## 2017-12-16 PROCEDURE — 94640 AIRWAY INHALATION TREATMENT: CPT

## 2017-12-16 PROCEDURE — 74011250637 HC RX REV CODE- 250/637: Performed by: SURGERY

## 2017-12-16 PROCEDURE — 65270000029 HC RM PRIVATE

## 2017-12-16 PROCEDURE — 65660000000 HC RM CCU STEPDOWN

## 2017-12-16 PROCEDURE — 80053 COMPREHEN METABOLIC PANEL: CPT | Performed by: INTERNAL MEDICINE

## 2017-12-16 PROCEDURE — 74011250636 HC RX REV CODE- 250/636: Performed by: HOSPITALIST

## 2017-12-16 PROCEDURE — 85025 COMPLETE CBC W/AUTO DIFF WBC: CPT | Performed by: INTERNAL MEDICINE

## 2017-12-16 PROCEDURE — 90935 HEMODIALYSIS ONE EVALUATION: CPT

## 2017-12-16 PROCEDURE — 74011250637 HC RX REV CODE- 250/637: Performed by: HOSPITALIST

## 2017-12-16 PROCEDURE — 74011000250 HC RX REV CODE- 250: Performed by: SURGERY

## 2017-12-16 RX ORDER — IPRATROPIUM BROMIDE AND ALBUTEROL SULFATE 2.5; .5 MG/3ML; MG/3ML
3 SOLUTION RESPIRATORY (INHALATION)
Status: DISCONTINUED | OUTPATIENT
Start: 2017-12-16 | End: 2017-12-20 | Stop reason: HOSPADM

## 2017-12-16 RX ADMIN — OXYCODONE HYDROCHLORIDE AND ACETAMINOPHEN 1 TABLET: 10; 325 TABLET ORAL at 11:00

## 2017-12-16 RX ADMIN — FUROSEMIDE 80 MG: 40 TABLET ORAL at 17:32

## 2017-12-16 RX ADMIN — SUCRALFATE 1 G: 1 SUSPENSION ORAL at 17:32

## 2017-12-16 RX ADMIN — HYDROMORPHONE HYDROCHLORIDE 1.5 MG: 2 INJECTION INTRAMUSCULAR; INTRAVENOUS; SUBCUTANEOUS at 23:45

## 2017-12-16 RX ADMIN — HYDROMORPHONE HYDROCHLORIDE 1.5 MG: 2 INJECTION INTRAMUSCULAR; INTRAVENOUS; SUBCUTANEOUS at 10:53

## 2017-12-16 RX ADMIN — IPRATROPIUM BROMIDE AND ALBUTEROL SULFATE 3 ML: .5; 3 SOLUTION RESPIRATORY (INHALATION) at 21:16

## 2017-12-16 RX ADMIN — PANTOPRAZOLE SODIUM 40 MG: 40 TABLET, DELAYED RELEASE ORAL at 07:12

## 2017-12-16 RX ADMIN — OXYCODONE HYDROCHLORIDE AND ACETAMINOPHEN 1 TABLET: 10; 325 TABLET ORAL at 17:33

## 2017-12-16 RX ADMIN — OXYCODONE HYDROCHLORIDE AND ACETAMINOPHEN 1 TABLET: 10; 325 TABLET ORAL at 07:12

## 2017-12-16 RX ADMIN — SUCRALFATE 1 G: 1 SUSPENSION ORAL at 07:12

## 2017-12-16 RX ADMIN — Medication 10 ML: at 23:12

## 2017-12-16 RX ADMIN — OXYCODONE HYDROCHLORIDE AND ACETAMINOPHEN 1 TABLET: 10; 325 TABLET ORAL at 23:04

## 2017-12-16 RX ADMIN — PANTOPRAZOLE SODIUM 40 MG: 40 TABLET, DELAYED RELEASE ORAL at 17:33

## 2017-12-16 RX ADMIN — IPRATROPIUM BROMIDE AND ALBUTEROL SULFATE 3 ML: .5; 3 SOLUTION RESPIRATORY (INHALATION) at 07:22

## 2017-12-16 RX ADMIN — SUCRALFATE 1 G: 1 SUSPENSION ORAL at 23:12

## 2017-12-16 RX ADMIN — GABAPENTIN 300 MG: 300 CAPSULE ORAL at 23:04

## 2017-12-16 RX ADMIN — CARVEDILOL 6.25 MG: 6.25 TABLET, FILM COATED ORAL at 09:59

## 2017-12-16 RX ADMIN — SUCRALFATE 1 G: 1 SUSPENSION ORAL at 10:55

## 2017-12-16 RX ADMIN — BUDESONIDE 500 MCG: 0.5 INHALANT RESPIRATORY (INHALATION) at 07:23

## 2017-12-16 RX ADMIN — BUDESONIDE 500 MCG: 0.5 INHALANT RESPIRATORY (INHALATION) at 21:16

## 2017-12-16 RX ADMIN — FUROSEMIDE 80 MG: 40 TABLET ORAL at 09:59

## 2017-12-16 RX ADMIN — PIPERACILLIN SODIUM AND TAZOBACTAM SODIUM 6.75 G: 36; 4.5 INJECTION, POWDER, LYOPHILIZED, FOR SOLUTION INTRAVENOUS at 23:04

## 2017-12-16 NOTE — PROGRESS NOTES
Progress Note    Patient: Tanesha Hunt MRN: [id number]  SSN: [id number]    YOB: 1965  Age: 45 y.o. Sex: male      Admit Date: 2017    3 Days Post-Op    Procedure:  Procedure(s):  THROMBECTOMY LEFT ARM    Subjective:     No acute surgical issues. Pt reported pain is better. Tolerating GI lite diet. Objective:     Visit Vitals    BP 98/45 (BP 1 Location: Right arm, BP Patient Position: At rest)    Pulse 67    Temp 97.9 °F (36.6 °C)    Resp 20    Ht 5' 5\" (1.651 m)    Wt 156 lb (70.8 kg)    SpO2 98%    BMI 25.96 kg/m2       Temp (24hrs), Av.1 °F (36.7 °C), Min:97.7 °F (36.5 °C), Max:99.2 °F (37.3 °C)        Physical Exam:    Gen:  NAD  Pulm:  Unlabored  Abd:  S/mildly distended/moderate TTP in epigastric area  Pigtail with semibilious drainage    Recent Results (from the past 24 hour(s))   CULTURE, BODY FLUID W GRAM STAIN    Collection Time: 12/15/17  4:00 PM   Result Value Ref Range    Special Requests: NO SPECIAL REQUESTS      GRAM STAIN 2+ WBCS SEEN      GRAM STAIN NO ORGANISMS SEEN      GRAM STAIN Culture performed on Unspun Fluid      Culture result: HEAVY GRAM NEGATIVE RODS (A)     GLUCOSE, POC    Collection Time: 12/15/17  8:29 PM   Result Value Ref Range    Glucose (POC) 114 (H) 65 - 100 mg/dL    Performed by BASIA Head    CBC WITH AUTOMATED DIFF    Collection Time: 17 12:26 PM   Result Value Ref Range    WBC 10.2 4.1 - 11.1 K/uL    RBC 2.89 (L) 4.10 - 5.70 M/uL    HGB 9.8 (L) 12.1 - 17.0 g/dL    HCT 29.8 (L) 36.6 - 50.3 %    .1 (H) 80.0 - 99.0 FL    MCH 33.9 26.0 - 34.0 PG    MCHC 32.9 30.0 - 36.5 g/dL    RDW 14.3 11.5 - 14.5 %    PLATELET 154 903 - 290 K/uL    NEUTROPHILS 68 32 - 75 %    LYMPHOCYTES 10 (L) 12 - 49 %    MONOCYTES 15 (H) 5 - 13 %    EOSINOPHILS 7 0 - 7 %    BASOPHILS 0 0 - 1 %    ABS. NEUTROPHILS 7.0 1.8 - 8.0 K/UL    ABS. LYMPHOCYTES 1.0 0.8 - 3.5 K/UL    ABS. MONOCYTES 1.5 (H) 0.0 - 1.0 K/UL    ABS.  EOSINOPHILS 0.7 (H) 0.0 - 0.4 K/UL ABS. BASOPHILS 0.0 0.0 - 0.1 K/UL           Assessment:     Hospital Problems  Date Reviewed: 12/15/2017          Codes Class Noted POA    Acute cholecystitis ICD-10-CM: K81.0  ICD-9-CM: 575.0  12/15/2017 No        * (Principal)Nontraumatic ischemic infarction of muscle of hand ICD-10-CM: M62.249  ICD-9-CM: 728.89  12/14/2017 Yes              Plan/Recommendations/Medical Decision Making:     - Continue percutaneous cholecystostomy tube to drainage  - Continue antibiotic  - GI lite diet  - Cholangiogram study through cholecystostomy tube Monday  - Labs in am    Signed By: Caitlin Roca MD     December 16, 2017

## 2017-12-16 NOTE — PROGRESS NOTES
Bedside and Verbal shift change report given to Kimi Pace (oncoming nurse) by Christen Oconnor  (offgoing nurse). Report included the following information SBAR, Kardex, Intake/Output and MAR.

## 2017-12-16 NOTE — PROGRESS NOTES
Problem: Falls - Risk of  Goal: *Absence of Falls  Document Rhianna Fall Risk and appropriate interventions in the flowsheet.    Outcome: Progressing Towards Goal  Fall Risk Interventions:  Mobility Interventions: Communicate number of staff needed for ambulation/transfer, Patient to call before getting OOB         Medication Interventions: Assess postural VS orthostatic hypotension, Evaluate medications/consider consulting pharmacy, Patient to call before getting OOB, Teach patient to arise slowly    Elimination Interventions: Call light in reach, Patient to call for help with toileting needs, Urinal in reach

## 2017-12-16 NOTE — PROGRESS NOTES
Tawny Rodriguez M.D.  [phone]               GASTROENTEROLOGY PROGRESS NOTE        NAME: Samuel Garcia. :  1965   MRN:  [id number]       Subjective:   Reports same pain in the abdomen that he had prior to cholecystostomy tube being placed. During tube placement question raised re: filling defects in the common bile duct that may represent stones. LFTs normal and HIDA did not show this. Objective:   VITALS:   Last 24hrs VS reviewed. Most recent are:  Visit Vitals    /64 (BP 1 Location: Right arm, BP Patient Position: At rest)    Pulse 69    Temp 97.7 °F (36.5 °C)    Resp 20    Ht 5' 5\" (1.651 m)    Wt 70.8 kg (156 lb)    SpO2 98%    BMI 25.96 kg/m2       Intake/Output Summary (Last 24 hours) at 17 1100  Last data filed at 17 7616   Gross per 24 hour   Intake                0 ml   Output              290 ml   Net             -290 ml       PHYSICAL EXAM:  General: Alert, in mild distress    Lungs:            Mild Bilateral wheezing  Heart:  Normal S1, S2    Abdomen: Soft, Non distended, tender in the epigastric and RUQ area. Normoactive bowel sounds, no rebound/invol guarding  Skin:   Warm to touch  Psych:   Good insight. Not anxious nor agitated. Lab Data Reviewed:   Recent Labs      12/15/17   1218  17   0347   WBC  11.7*  7.1   HGB  10.3*  9.8*   HCT  31.1*  29.1*   PLT  265  249     Recent Labs      12/15/17   1218  17   0347   NA  135*  133*   K  4.2  4.0   CL  98  100   CO2  30  25   BUN  29*  55*   CREA  3.39*  4.42*   GLU  121*  154*   CA  7.8*  8.0*     Recent Labs      12/15/17   1218  17   1231   SGOT  18  18  22   AP  113  111  151*   TP  6.7  6.7  6.5   ALB  2.6*  2.6*  2.7*   GLOB  4.1*  4.1*  3.8   LPSE  79  141     Recent Labs      17   1616   INR  1.1   PTP  11.1      No results for input(s): FE, TIBC, PSAT, FERR in the last 72 hours.   Recent Labs      17   1231   CPK  72   CKMB 2.0       See Electronic Medical Record for all procedure/radiology reports and details which were not copied into this note but were reviewed prior to the creation of the Plan. Assessment:   · Upper abdominal pain. CT scan with concerns for possible cholecystitis. HIDA scan confirms this. · S/p cholecystostomy tube placement with question of filling defects seen on drain placement in CBD. LFTs x serial sets normal and HIDA did not show the same findings     Patient Active Problem List   Diagnosis Code    Cellulitis of thumb, left L03.012    Tenosynovitis of finger M65.9    DM (diabetes mellitus) ([facility] [address] E11.9    HCV (hepatitis C virus) B19.20    Tobacco abuse Z72.0    Alcohol abuse, daily use F10.10    COPD (chronic obstructive pulmonary disease) (HCC) J44.9    History of splenectomy Z90.81    Cellulitis and abscess of finger L03.019, L02.519    Abdominal wall cellulitis L03.311    Acute GI bleeding K92.2    HTN (hypertension) I10    [facility] N18.6, Z99.2    Nontraumatic ischemic infarction of muscle of hand M62.249    Acute cholecystitis K81.0       Plan:   · Labs ordered today including CBC and CMP  · Await general surgery input  · If LFTs become elevated I will discuss with the biliary team.  He is high risk for ERCP. · Following fluid cultures  · Continue broad spectrum antibiotics.        Signed by: Moon Sigala MD         12/16/2017  10:47 AM

## 2017-12-16 NOTE — PROGRESS NOTES
Problem: Falls - Risk of  Goal: *Absence of Falls  Document Rhianna Fall Risk and appropriate interventions in the flowsheet.    Outcome: Progressing Towards Goal  Fall Risk Interventions:  Mobility Interventions: Communicate number of staff needed for ambulation/transfer, Patient to call before getting OOB         Medication Interventions: Patient to call before getting OOB, Teach patient to arise slowly    Elimination Interventions: Call light in reach

## 2017-12-16 NOTE — PROGRESS NOTES
Signed off as there is general surgery and GI following. Discussed with Nurse. Please call us back if needed.

## 2017-12-16 NOTE — ROUTINE PROCESS
Bedside shift change report given to Abby Messina RN (oncoming nurse) by Josephine Rayo RN (offgoing nurse). Report included the following information SBAR, OR Summary, Procedure Summary, Intake/Output, MAR and Recent Results.

## 2017-12-16 NOTE — PROGRESS NOTES
Vascular    Abd pain feels better since cholecystostomy tube placed. No other complaints. Visit Vitals    /76 (BP 1 Location: Right arm, BP Patient Position: At rest)    Pulse 68    Temp 98.2 °F (36.8 °C)    Resp 16    Ht 5' 5\" (1.651 m)    Wt 70.8 kg (156 lb)    SpO2 97%    BMI 25.96 kg/m2   Awake and oriented  Left arm dry, incision clean  Pulsatile thrill in graft  abd soft  Bilious drainage in bag    No labs back today    44 y/o WM s/p L brachial embolectomy after AVG declot, complicated by cholecystitis  - Continue ABX, f/u cultures from drain, appreciate GS input  - Left arm warm and dry, HD this am, OK to use AVG.   - PT/OT

## 2017-12-17 LAB
ALBUMIN SERPL-MCNC: 2.7 G/DL (ref 3.5–5)
ALBUMIN/GLOB SERPL: 0.5 {RATIO} (ref 1.1–2.2)
ALP SERPL-CCNC: 196 U/L (ref 45–117)
ALT SERPL-CCNC: 24 U/L (ref 12–78)
ANION GAP SERPL CALC-SCNC: 7 MMOL/L (ref 5–15)
AST SERPL-CCNC: 37 U/L (ref 15–37)
BACTERIA SPEC CULT: ABNORMAL
BACTERIA SPEC CULT: NORMAL
BILIRUB SERPL-MCNC: 0.5 MG/DL (ref 0.2–1)
BUN SERPL-MCNC: 15 MG/DL (ref 6–20)
BUN/CREAT SERPL: 5 (ref 12–20)
CALCIUM SERPL-MCNC: 8.6 MG/DL (ref 8.5–10.1)
CHLORIDE SERPL-SCNC: 102 MMOL/L (ref 97–108)
CO2 SERPL-SCNC: 29 MMOL/L (ref 21–32)
CREAT SERPL-MCNC: 2.74 MG/DL (ref 0.7–1.3)
GLOBULIN SER CALC-MCNC: 5.1 G/DL (ref 2–4)
GLUCOSE SERPL-MCNC: 140 MG/DL (ref 65–100)
GRAM STN SPEC: ABNORMAL
POTASSIUM SERPL-SCNC: 3.8 MMOL/L (ref 3.5–5.1)
PROT SERPL-MCNC: 7.8 G/DL (ref 6.4–8.2)
SERVICE CMNT-IMP: ABNORMAL
SERVICE CMNT-IMP: NORMAL
SODIUM SERPL-SCNC: 138 MMOL/L (ref 136–145)

## 2017-12-17 PROCEDURE — 94640 AIRWAY INHALATION TREATMENT: CPT

## 2017-12-17 PROCEDURE — 74011000250 HC RX REV CODE- 250: Performed by: SURGERY

## 2017-12-17 PROCEDURE — 74011250637 HC RX REV CODE- 250/637: Performed by: HOSPITALIST

## 2017-12-17 PROCEDURE — 65270000032 HC RM SEMIPRIVATE

## 2017-12-17 PROCEDURE — 77030029684 HC NEB SM VOL KT MONA -A

## 2017-12-17 PROCEDURE — 74011250636 HC RX REV CODE- 250/636: Performed by: HOSPITALIST

## 2017-12-17 PROCEDURE — 74011250637 HC RX REV CODE- 250/637: Performed by: SURGERY

## 2017-12-17 RX ADMIN — SUCRALFATE 1 G: 1 SUSPENSION ORAL at 21:00

## 2017-12-17 RX ADMIN — HYDROMORPHONE HYDROCHLORIDE 1.5 MG: 2 INJECTION INTRAMUSCULAR; INTRAVENOUS; SUBCUTANEOUS at 07:36

## 2017-12-17 RX ADMIN — FUROSEMIDE 80 MG: 40 TABLET ORAL at 17:08

## 2017-12-17 RX ADMIN — SUCRALFATE 1 G: 1 SUSPENSION ORAL at 16:54

## 2017-12-17 RX ADMIN — HYDROMORPHONE HYDROCHLORIDE 1.5 MG: 2 INJECTION INTRAMUSCULAR; INTRAVENOUS; SUBCUTANEOUS at 20:49

## 2017-12-17 RX ADMIN — FUROSEMIDE 80 MG: 40 TABLET ORAL at 08:45

## 2017-12-17 RX ADMIN — HYDROMORPHONE HYDROCHLORIDE 1.5 MG: 2 INJECTION INTRAMUSCULAR; INTRAVENOUS; SUBCUTANEOUS at 02:41

## 2017-12-17 RX ADMIN — HYDROMORPHONE HYDROCHLORIDE 1.5 MG: 2 INJECTION INTRAMUSCULAR; INTRAVENOUS; SUBCUTANEOUS at 10:25

## 2017-12-17 RX ADMIN — IPRATROPIUM BROMIDE AND ALBUTEROL SULFATE 3 ML: .5; 3 SOLUTION RESPIRATORY (INHALATION) at 20:26

## 2017-12-17 RX ADMIN — CARVEDILOL 6.25 MG: 6.25 TABLET, FILM COATED ORAL at 16:51

## 2017-12-17 RX ADMIN — OXYCODONE HYDROCHLORIDE AND ACETAMINOPHEN 1 TABLET: 10; 325 TABLET ORAL at 07:21

## 2017-12-17 RX ADMIN — PIPERACILLIN SODIUM AND TAZOBACTAM SODIUM 6.75 G: 36; 4.5 INJECTION, POWDER, LYOPHILIZED, FOR SOLUTION INTRAVENOUS at 21:32

## 2017-12-17 RX ADMIN — BUDESONIDE 500 MCG: 0.5 INHALANT RESPIRATORY (INHALATION) at 20:26

## 2017-12-17 RX ADMIN — SUCRALFATE 1 G: 1 SUSPENSION ORAL at 07:21

## 2017-12-17 RX ADMIN — OXYCODONE HYDROCHLORIDE AND ACETAMINOPHEN 1 TABLET: 10; 325 TABLET ORAL at 17:08

## 2017-12-17 RX ADMIN — GABAPENTIN 300 MG: 300 CAPSULE ORAL at 21:00

## 2017-12-17 RX ADMIN — PANTOPRAZOLE SODIUM 40 MG: 40 TABLET, DELAYED RELEASE ORAL at 07:21

## 2017-12-17 RX ADMIN — SUCRALFATE 1 G: 1 SUSPENSION ORAL at 12:00

## 2017-12-17 RX ADMIN — IPRATROPIUM BROMIDE AND ALBUTEROL SULFATE 3 ML: .5; 3 SOLUTION RESPIRATORY (INHALATION) at 07:41

## 2017-12-17 RX ADMIN — PANTOPRAZOLE SODIUM 40 MG: 40 TABLET, DELAYED RELEASE ORAL at 16:51

## 2017-12-17 RX ADMIN — Medication 10 ML: at 14:02

## 2017-12-17 RX ADMIN — OXYCODONE HYDROCHLORIDE AND ACETAMINOPHEN 1 TABLET: 10; 325 TABLET ORAL at 12:51

## 2017-12-17 RX ADMIN — BUDESONIDE 500 MCG: 0.5 INHALANT RESPIRATORY (INHALATION) at 07:41

## 2017-12-17 RX ADMIN — CARVEDILOL 6.25 MG: 6.25 TABLET, FILM COATED ORAL at 08:46

## 2017-12-17 NOTE — ROUTINE PROCESS
Bedside and Verbal shift change report given to jud (oncoming nurse) by Juancarlos Merritt (offgoing nurse). Report included the following information SBAR, OR Summary, Procedure Summary, Med Rec Status and Cardiac Rhythm NSR.

## 2017-12-17 NOTE — PROGRESS NOTES
Bedside shift change report given to Juancarlos's RN (oncoming nurse) by Deysi Hernandez (offgoing nurse). Report included the following information SBAR, Kardex, Intake/Output and MAR.

## 2017-12-17 NOTE — PROGRESS NOTES
Bedside verbal shift change report given to oncoming nurse Elana Blanchard R.N. Opportunity for questions and clarifications provided.

## 2017-12-17 NOTE — DIALYSIS
[facility]  [phone]    Vitals   Pre   Post   Assessment   Pre   Post     Temp  97.7  98.5 LOC  AXOX3 AXOX3   HR   83 74 Lungs   Coarse moist prod. cough  Unchanged   B/P   150/56 147/70 Cardiac   NSR  NSR   Resp   17 20 Skin   RLQ drain  Unchanged   Pain level  0 0 Edema    None   None   Orders:    Duration:   Start:   1930 End:   2230 Total:   3  hours   Dialyzer:   Dialyzer/Set Up Inspection: Welford Mamadou (12/16/17 1936)   K Bath:   Dialysate K (mEq/L): 2 (12/16/17 1936)   Ca Bath:   Dialysate CA (mEq/L): 2.5 (12/16/17 1936)   Na/Bicarb:   Dialysate NA (mEq/L):  (Beginning 150, ending 138) (12/16/17 1936)   Target Fluid Removal:   Goal/Amount of Fluid to Remove (mL): 4000 mL (12/16/17 1936)   Access     Type & Location:   LUE AVG, no drainage, stitches/staples intact, +T/+B,sites prepped and cannulated aseptically per protocol, good blood return and NS flush. Labs     Obtained/Reviewed   Critical Results Called   Date when labs were drawn-  Hgb-    HGB   Date Value Ref Range Status   12/16/2017 9.8 (L) 12.1 - 17.0 g/dL Final     K-    Potassium   Date Value Ref Range Status   12/15/2017 4.2 3.5 - 5.1 mmol/L Final     Ca-   Calcium   Date Value Ref Range Status   12/15/2017 7.8 (L) 8.5 - 10.1 MG/DL Final     Bun-   BUN   Date Value Ref Range Status   12/15/2017 29 (H) 6 - 20 MG/DL Final     Comment:     INVESTIGATED PER DELTA CHECK PROTOCOL     Creat-   Creatinine   Date Value Ref Range Status   12/15/2017 3.39 (H) 0.70 - 1.30 MG/DL Final     Comment:     INVESTIGATED PER DELTA CHECK PROTOCOL        Medications/ Blood Products Given     Name   Dose   Route and Time                     Blood Volume Processed (BVP):    71.7 Net Fluid   Removed:  4000 ml   Comments   Time Out Done: Yes  Primary Nurse Rpt Pre:Rasheeda Schroeder RN  Primary Nurse Rpt Post:Liang Olivas RN  Pt Education:Access care, fluid management. Care Plan:HD as ordered by nephrologists.   Tx Summary:Uncomplicated tx, pt was stable throughout tx. AET, all possible blood returned, needles pulled x 2,+T/+B, hemostasis WNL. Sites taped securely. Pt remains in rm 442 in NAD, call bell within reach, bed in low position. Admiting Diagnosis:  Pt's previous [facility]  Consent signed - Informed Consent Verified: Yes (12/16/17 1936)  Teddyita Consent - Yes  Hepatitis Status- Neg on 12/06/17  Machine #- Machine Number: [id number] (12/16/17 1936)  Telemetry status-Bedside and unit monitoring  Pre-dialysis wt. - Pre-Dialysis Weight: 73 kg (160 lb 15 oz) (12/16/17 1936)

## 2017-12-17 NOTE — PROGRESS NOTES
Spiritual Care Partner Volunteer visited patient in Rm 510 on 12/17/17.   Documented by:  Chaplain Lobato MDiv, MS, [address]  [facility] ([id number])

## 2017-12-17 NOTE — ROUTINE PROCESS
TRANSFER - OUT REPORT:    Verbal report given to Chelsea(name) on Sheliah Coma.  being transferred to Room 506(unit) for routine progression of care       Report consisted of patients Situation, Background, Assessment and   Recommendations(SBAR). Information from the following report(s) SBAR, Kardex, MAR, Recent Results and Med Rec Status was reviewed with the receiving nurse. Lines:   Peripheral IV 12/16/17 Right Antecubital (Active)        Opportunity for questions and clarification was provided.       Patient transported with:   Monitor  Tech

## 2017-12-17 NOTE — PROGRESS NOTES
Vascular    No events. Eating, abd pain slowly improving. AVG worked well yesterday  Visit Vitals    /67 (BP 1 Location: Right arm, BP Patient Position: At rest)    Pulse 68    Temp 98.4 °F (36.9 °C)    Resp 19    Ht 5' 5\" (1.651 m)    Wt 68 kg (150 lb)    SpO2 94%    BMI 24.96 kg/m2     Awake and oriented  Left arm dry, incision clean  Pulsatile thrill in graft  abd soft  Bilious drainage in bag        46 y/o WM s/p L brachial embolectomy after AVG declot, complicated by cholecystitis  - Continue ABX, f/u cultures from drain, appreciate GS input. Plans for IR study tomorrow  - Left arm warm and dry, HD this am, OK to use AVG.   - PT/OT

## 2017-12-18 ENCOUNTER — APPOINTMENT (OUTPATIENT)
Dept: INTERVENTIONAL RADIOLOGY/VASCULAR | Age: 52
DRG: 173 | End: 2017-12-18
Attending: INTERNAL MEDICINE
Payer: MEDICAID

## 2017-12-18 ENCOUNTER — APPOINTMENT (OUTPATIENT)
Dept: GENERAL RADIOLOGY | Age: 52
DRG: 173 | End: 2017-12-18
Attending: SURGERY
Payer: MEDICAID

## 2017-12-18 LAB
ALBUMIN SERPL-MCNC: 2.5 G/DL (ref 3.5–5)
ALBUMIN/GLOB SERPL: 0.6 {RATIO} (ref 1.1–2.2)
ALP SERPL-CCNC: 148 U/L (ref 45–117)
ALT SERPL-CCNC: 22 U/L (ref 12–78)
ANION GAP SERPL CALC-SCNC: 11 MMOL/L (ref 5–15)
ANION GAP SERPL CALC-SCNC: 11 MMOL/L (ref 5–15)
AST SERPL-CCNC: 21 U/L (ref 15–37)
BASOPHILS # BLD: 0 K/UL (ref 0–0.1)
BASOPHILS NFR BLD: 0 % (ref 0–1)
BILIRUB SERPL-MCNC: 0.4 MG/DL (ref 0.2–1)
BUN SERPL-MCNC: 30 MG/DL (ref 6–20)
BUN SERPL-MCNC: 31 MG/DL (ref 6–20)
BUN/CREAT SERPL: 6 (ref 12–20)
BUN/CREAT SERPL: 6 (ref 12–20)
CALCIUM SERPL-MCNC: 8.6 MG/DL (ref 8.5–10.1)
CALCIUM SERPL-MCNC: 8.7 MG/DL (ref 8.5–10.1)
CHLORIDE SERPL-SCNC: 99 MMOL/L (ref 97–108)
CHLORIDE SERPL-SCNC: 99 MMOL/L (ref 97–108)
CO2 SERPL-SCNC: 24 MMOL/L (ref 21–32)
CO2 SERPL-SCNC: 25 MMOL/L (ref 21–32)
CREAT SERPL-MCNC: 4.62 MG/DL (ref 0.7–1.3)
CREAT SERPL-MCNC: 4.81 MG/DL (ref 0.7–1.3)
DIFFERENTIAL METHOD BLD: ABNORMAL
EOSINOPHIL # BLD: 1.3 K/UL (ref 0–0.4)
EOSINOPHIL NFR BLD: 13 % (ref 0–7)
ERYTHROCYTE [DISTWIDTH] IN BLOOD BY AUTOMATED COUNT: 14.1 % (ref 11.5–14.5)
GLOBULIN SER CALC-MCNC: 4.2 G/DL (ref 2–4)
GLUCOSE BLD STRIP.AUTO-MCNC: 177 MG/DL (ref 65–100)
GLUCOSE SERPL-MCNC: 101 MG/DL (ref 65–100)
GLUCOSE SERPL-MCNC: 103 MG/DL (ref 65–100)
HCT VFR BLD AUTO: 30.4 % (ref 36.6–50.3)
HGB BLD-MCNC: 10.2 G/DL (ref 12.1–17)
LYMPHOCYTES # BLD: 1 K/UL (ref 0.8–3.5)
LYMPHOCYTES NFR BLD: 10 % (ref 12–49)
MCH RBC QN AUTO: 34.1 PG (ref 26–34)
MCHC RBC AUTO-ENTMCNC: 33.6 G/DL (ref 30–36.5)
MCV RBC AUTO: 101.7 FL (ref 80–99)
MONOCYTES # BLD: 0.7 K/UL (ref 0–1)
MONOCYTES NFR BLD: 7 % (ref 5–13)
NEUTS SEG # BLD: 6.9 K/UL (ref 1.8–8)
NEUTS SEG NFR BLD: 70 % (ref 32–75)
PLATELET # BLD AUTO: 293 K/UL (ref 150–400)
POTASSIUM SERPL-SCNC: 3.7 MMOL/L (ref 3.5–5.1)
POTASSIUM SERPL-SCNC: 3.7 MMOL/L (ref 3.5–5.1)
PROT SERPL-MCNC: 6.7 G/DL (ref 6.4–8.2)
RBC # BLD AUTO: 2.99 M/UL (ref 4.1–5.7)
RBC MORPH BLD: ABNORMAL
SERVICE CMNT-IMP: ABNORMAL
SODIUM SERPL-SCNC: 134 MMOL/L (ref 136–145)
SODIUM SERPL-SCNC: 135 MMOL/L (ref 136–145)
WBC # BLD AUTO: 9.9 K/UL (ref 4.1–11.1)

## 2017-12-18 PROCEDURE — 74011250637 HC RX REV CODE- 250/637: Performed by: HOSPITALIST

## 2017-12-18 PROCEDURE — 80048 BASIC METABOLIC PNL TOTAL CA: CPT | Performed by: SURGERY

## 2017-12-18 PROCEDURE — C1892 INTRO/SHEATH,FIXED,PEEL-AWAY: HCPCS

## 2017-12-18 PROCEDURE — 94640 AIRWAY INHALATION TREATMENT: CPT

## 2017-12-18 PROCEDURE — 77030002996 HC SUT SLK J&J -A

## 2017-12-18 PROCEDURE — 74011636320 HC RX REV CODE- 636/320: Performed by: RADIOLOGY

## 2017-12-18 PROCEDURE — 74011250636 HC RX REV CODE- 250/636: Performed by: HOSPITALIST

## 2017-12-18 PROCEDURE — 02HV33Z INSERTION OF INFUSION DEVICE INTO SUPERIOR VENA CAVA, PERCUTANEOUS APPROACH: ICD-10-PCS | Performed by: RADIOLOGY

## 2017-12-18 PROCEDURE — 80053 COMPREHEN METABOLIC PANEL: CPT | Performed by: NURSE PRACTITIONER

## 2017-12-18 PROCEDURE — 74011250637 HC RX REV CODE- 250/637: Performed by: SURGERY

## 2017-12-18 PROCEDURE — 36415 COLL VENOUS BLD VENIPUNCTURE: CPT | Performed by: SURGERY

## 2017-12-18 PROCEDURE — 77030018719 HC DRSG PTCH ANTIMIC J&J -A

## 2017-12-18 PROCEDURE — 85025 COMPLETE CBC W/AUTO DIFF WBC: CPT | Performed by: SURGERY

## 2017-12-18 PROCEDURE — C1769 GUIDE WIRE: HCPCS

## 2017-12-18 PROCEDURE — 74011000250 HC RX REV CODE- 250: Performed by: RADIOLOGY

## 2017-12-18 PROCEDURE — 74011250636 HC RX REV CODE- 250/636

## 2017-12-18 PROCEDURE — 47531 INJECTION FOR CHOLANGIOGRAM: CPT

## 2017-12-18 PROCEDURE — 74011000250 HC RX REV CODE- 250: Performed by: SURGERY

## 2017-12-18 PROCEDURE — 82962 GLUCOSE BLOOD TEST: CPT

## 2017-12-18 PROCEDURE — 36556 INSERT NON-TUNNEL CV CATH: CPT

## 2017-12-18 PROCEDURE — 65270000032 HC RM SEMIPRIVATE

## 2017-12-18 PROCEDURE — 74011636320 HC RX REV CODE- 636/320

## 2017-12-18 PROCEDURE — C1752 CATH,HEMODIALYSIS,SHORT-TERM: HCPCS

## 2017-12-18 RX ORDER — SODIUM CHLORIDE 0.9 % (FLUSH) 0.9 %
SYRINGE (ML) INJECTION
Status: COMPLETED
Start: 2017-12-18 | End: 2017-12-18

## 2017-12-18 RX ORDER — LIDOCAINE HYDROCHLORIDE 20 MG/ML
20 INJECTION, SOLUTION INFILTRATION; PERINEURAL
Status: COMPLETED | OUTPATIENT
Start: 2017-12-18 | End: 2017-12-18

## 2017-12-18 RX ORDER — HEPARIN SODIUM 1000 [USP'U]/ML
INJECTION, SOLUTION INTRAVENOUS; SUBCUTANEOUS
Status: COMPLETED
Start: 2017-12-18 | End: 2017-12-18

## 2017-12-18 RX ORDER — HEPARIN SODIUM 1000 [USP'U]/ML
10000 INJECTION, SOLUTION INTRAVENOUS; SUBCUTANEOUS
Status: ACTIVE | OUTPATIENT
Start: 2017-12-18 | End: 2017-12-19

## 2017-12-18 RX ADMIN — Medication 5 ML: at 14:00

## 2017-12-18 RX ADMIN — CARVEDILOL 6.25 MG: 6.25 TABLET, FILM COATED ORAL at 16:44

## 2017-12-18 RX ADMIN — HYDROMORPHONE HYDROCHLORIDE 1.5 MG: 2 INJECTION INTRAMUSCULAR; INTRAVENOUS; SUBCUTANEOUS at 13:46

## 2017-12-18 RX ADMIN — PIPERACILLIN SODIUM AND TAZOBACTAM SODIUM 6.75 G: 36; 4.5 INJECTION, POWDER, LYOPHILIZED, FOR SOLUTION INTRAVENOUS at 19:33

## 2017-12-18 RX ADMIN — HYDROMORPHONE HYDROCHLORIDE 1.5 MG: 2 INJECTION INTRAMUSCULAR; INTRAVENOUS; SUBCUTANEOUS at 05:46

## 2017-12-18 RX ADMIN — FUROSEMIDE 80 MG: 40 TABLET ORAL at 09:11

## 2017-12-18 RX ADMIN — PANTOPRAZOLE SODIUM 40 MG: 40 TABLET, DELAYED RELEASE ORAL at 09:07

## 2017-12-18 RX ADMIN — BUDESONIDE 500 MCG: 0.5 INHALANT RESPIRATORY (INHALATION) at 20:40

## 2017-12-18 RX ADMIN — IOHEXOL 20 ML: 240 INJECTION, SOLUTION INTRATHECAL; INTRAVASCULAR; INTRAVENOUS; ORAL at 15:53

## 2017-12-18 RX ADMIN — IOHEXOL 35000 MG: 350 INJECTION, SOLUTION INTRAVENOUS at 10:00

## 2017-12-18 RX ADMIN — IPRATROPIUM BROMIDE AND ALBUTEROL SULFATE 3 ML: .5; 3 SOLUTION RESPIRATORY (INHALATION) at 20:40

## 2017-12-18 RX ADMIN — Medication 10 ML: at 01:13

## 2017-12-18 RX ADMIN — CARVEDILOL 6.25 MG: 6.25 TABLET, FILM COATED ORAL at 09:11

## 2017-12-18 RX ADMIN — HYDROMORPHONE HYDROCHLORIDE 1.5 MG: 2 INJECTION INTRAMUSCULAR; INTRAVENOUS; SUBCUTANEOUS at 11:00

## 2017-12-18 RX ADMIN — Medication 5 ML: at 11:00

## 2017-12-18 RX ADMIN — GABAPENTIN 300 MG: 300 CAPSULE ORAL at 21:59

## 2017-12-18 RX ADMIN — SUCRALFATE 1 G: 1 SUSPENSION ORAL at 09:07

## 2017-12-18 RX ADMIN — SUCRALFATE 1 G: 1 SUSPENSION ORAL at 21:59

## 2017-12-18 RX ADMIN — HYDROMORPHONE HYDROCHLORIDE 1.5 MG: 2 INJECTION INTRAMUSCULAR; INTRAVENOUS; SUBCUTANEOUS at 16:44

## 2017-12-18 RX ADMIN — OXYCODONE HYDROCHLORIDE AND ACETAMINOPHEN 1 TABLET: 10; 325 TABLET ORAL at 18:42

## 2017-12-18 RX ADMIN — HYDROMORPHONE HYDROCHLORIDE 1.5 MG: 2 INJECTION INTRAMUSCULAR; INTRAVENOUS; SUBCUTANEOUS at 21:59

## 2017-12-18 RX ADMIN — LIDOCAINE HYDROCHLORIDE 200 MG: 20 INJECTION, SOLUTION INFILTRATION; PERINEURAL at 15:51

## 2017-12-18 RX ADMIN — SUCRALFATE 1 G: 1 SUSPENSION ORAL at 11:30

## 2017-12-18 RX ADMIN — IPRATROPIUM BROMIDE AND ALBUTEROL SULFATE 3 ML: .5; 3 SOLUTION RESPIRATORY (INHALATION) at 08:48

## 2017-12-18 RX ADMIN — PANTOPRAZOLE SODIUM 40 MG: 40 TABLET, DELAYED RELEASE ORAL at 16:44

## 2017-12-18 RX ADMIN — Medication 10 ML: at 05:47

## 2017-12-18 RX ADMIN — OXYCODONE HYDROCHLORIDE AND ACETAMINOPHEN 1 TABLET: 10; 325 TABLET ORAL at 12:13

## 2017-12-18 RX ADMIN — HEPARIN SODIUM 4000 UNITS: 1000 INJECTION, SOLUTION INTRAVENOUS; SUBCUTANEOUS at 15:55

## 2017-12-18 RX ADMIN — SUCRALFATE 1 G: 1 SUSPENSION ORAL at 16:44

## 2017-12-18 RX ADMIN — Medication 10 ML: at 21:59

## 2017-12-18 RX ADMIN — OXYCODONE HYDROCHLORIDE AND ACETAMINOPHEN 1 TABLET: 10; 325 TABLET ORAL at 01:13

## 2017-12-18 RX ADMIN — FUROSEMIDE 80 MG: 40 TABLET ORAL at 18:42

## 2017-12-18 RX ADMIN — OXYCODONE HYDROCHLORIDE AND ACETAMINOPHEN 1 TABLET: 10; 325 TABLET ORAL at 23:37

## 2017-12-18 RX ADMIN — BUDESONIDE 500 MCG: 0.5 INHALANT RESPIRATORY (INHALATION) at 08:48

## 2017-12-18 NOTE — PROGRESS NOTES
Progress Note    Patient: Yusef Velarde. MRN: [id number]  SSN: [id number]    YOB: 1965  Age: 45 y.o. Sex: male      Admit Date: 2017    4 Days Post-Op    Procedure:  Procedure(s):  THROMBECTOMY LEFT ARM    Subjective:     No acute surgical issues. Pt reported pain around pigtail tube site. Tolerating diet without nausea or vomiting. Objective:     Visit Vitals    /74 (BP 1 Location: Right arm)    Pulse 73    Temp 97.8 °F (36.6 °C)    Resp 16    Ht 5' 5\" (1.651 m)    Wt 150 lb (68 kg)    SpO2 95%    BMI 24.96 kg/m2       Temp (24hrs), Av °F (36.7 °C), Min:97.7 °F (36.5 °C), Max:98.4 °F (36.9 °C)        Physical Exam:    Gen:  NAD  Pulm:  Unlabored  Abd:  S/mildly distended/moderate TTP in epigastric area  Pigtail with semibilious drainage    Recent Results (from the past 24 hour(s))   METABOLIC PANEL, COMPREHENSIVE    Collection Time: 17 11:55 PM   Result Value Ref Range    Sodium 138 136 - 145 mmol/L    Potassium 3.8 3.5 - 5.1 mmol/L    Chloride 102 97 - 108 mmol/L    CO2 29 21 - 32 mmol/L    Anion gap 7 5 - 15 mmol/L    Glucose 140 (H) 65 - 100 mg/dL    BUN 15 6 - 20 MG/DL    Creatinine 2.74 (H) 0.70 - 1.30 MG/DL    BUN/Creatinine ratio 5 (L) 12 - 20      GFR est AA 29 (L) >60 ml/min/1.73m2    GFR est non-AA 24 (L) >60 ml/min/1.73m2    Calcium 8.6 8.5 - 10.1 MG/DL    Bilirubin, total 0.5 0.2 - 1.0 MG/DL    ALT (SGPT) 24 12 - 78 U/L    AST (SGOT) 37 15 - 37 U/L    Alk.  phosphatase 196 (H) 45 - 117 U/L    Protein, total 7.8 6.4 - 8.2 g/dL    Albumin 2.7 (L) 3.5 - 5.0 g/dL    Globulin 5.1 (H) 2.0 - 4.0 g/dL    A-G Ratio 0.5 (L) 1.1 - 2.2             Assessment:     Hospital Problems  Date Reviewed: 12/15/2017          Codes Class Noted POA    Acute cholecystitis ICD-10-CM: K81.0  ICD-9-CM: 575.0  12/15/2017 No        * (Principal)Nontraumatic ischemic infarction of muscle of hand ICD-10-CM: M62.249  ICD-9-CM: 728.89  2017 Yes Plan/Recommendations/Medical Decision Making:     - Continue percutaneous cholecystostomy tube to drainage  - Continue antibiotic  - GI lite diet  - Cholangiogram study through cholecystostomy tube Monday  - Labs in am    Signed By: Margaret Gaviria MD     December 17, 2017

## 2017-12-18 NOTE — WOUND CARE
WOUND CARE CONSULT:  Consult by nurse request to evaluate abdominal wound. Patient is off the floor for dialysis. Wound care will see the patient tomorrow.     Cris Lan RN, BSN, Wound Care Nurse  Office x [phone]  Pager x [phone]

## 2017-12-18 NOTE — PROGRESS NOTES
[address]  [address] [facility], [address]       GI PROGRESS NOTE  [facility]  [phone] NP in-hospital cell phone M-F until 4:30  After 5pm or on weekends, please call  for physician on call      NAME: Maggy Ceja. :  1965   MRN:  [id number]       Subjective:   Patient denies complaint, just returned from cholangiogram.     Objective:     VITALS:   Last 24hrs VS reviewed since prior progress note. Most recent are:  Visit Vitals    /78 (BP 1 Location: Right arm, BP Patient Position: At rest)    Pulse 83    Temp 97.8 °F (36.6 °C)    Resp 17    Ht 5' 5\" (1.651 m)    Wt 67.9 kg (149 lb 11.1 oz)    SpO2 90%    BMI 24.91 kg/m2       PHYSICAL EXAM:  General: Cooperative, no acute distress    Neurologic:  Alert and oriented X 3. HEENT: EOMI, no scleral icterus   Lungs:  Coarse with scattered rhonchi bilaterally. No wheezing  Heart:  S1 S2, regular rhythm, no murmur   Abdomen: Soft, +distended, tenderness around pigtal. Drain with bilious drainage; + hyperactive bowel sounds  Extremities: No edema  Psych:   Good insight. Not anxious or agitated.     Lab Data Reviewed:     Recent Results (from the past 24 hour(s))   METABOLIC PANEL, BASIC    Collection Time: 17  5:45 AM   Result Value Ref Range    Sodium 135 (L) 136 - 145 mmol/L    Potassium 3.7 3.5 - 5.1 mmol/L    Chloride 99 97 - 108 mmol/L    CO2 25 21 - 32 mmol/L    Anion gap 11 5 - 15 mmol/L    Glucose 103 (H) 65 - 100 mg/dL    BUN 30 (H) 6 - 20 MG/DL    Creatinine 4.62 (H) 0.70 - 1.30 MG/DL    BUN/Creatinine ratio 6 (L) 12 - 20      GFR est AA 16 (L) >60 ml/min/1.73m2    GFR est non-AA 13 (L) >60 ml/min/1.73m2    Calcium 8.7 8.5 - 10.1 MG/DL   CBC WITH AUTOMATED DIFF    Collection Time: 17  5:45 AM   Result Value Ref Range    WBC 9.9 4.1 - 11.1 K/uL    RBC 2.99 (L) 4.10 - 5.70 M/uL    HGB 10.2 (L) 12.1 - 17.0 g/dL    HCT 30.4 (L) 36.6 - 50.3 %    .7 (H) 80.0 - 99.0 FL    MCH 34.1 (H) 26.0 - 34.0 PG    MCHC 33.6 30.0 - 36.5 g/dL    RDW 14.1 11.5 - 14.5 %    PLATELET 771 876 - 107 K/uL    NEUTROPHILS 70 32 - 75 %    LYMPHOCYTES 10 (L) 12 - 49 %    MONOCYTES 7 5 - 13 %    EOSINOPHILS 13 (H) 0 - 7 %    BASOPHILS 0 0 - 1 %    ABS. NEUTROPHILS 6.9 1.8 - 8.0 K/UL    ABS. LYMPHOCYTES 1.0 0.8 - 3.5 K/UL    ABS. MONOCYTES 0.7 0.0 - 1.0 K/UL    ABS. EOSINOPHILS 1.3 (H) 0.0 - 0.4 K/UL    ABS. BASOPHILS 0.0 0.0 - 0.1 K/UL    DF SMEAR SCANNED      RBC COMMENTS ANISOCYTOSIS  1+        RBC COMMENTS COLE CELLS  PRESENT        RBC COMMENTS SCHISTOCYTES  PRESENT        RBC COMMENTS ACANTHOCYTES          Assessment:   · Cholecystitis: pan sensitive Klebsiella; status post cholecystostomy tube placement, questionable CBD filling defect on placement, LFT's normal, not seen on HIDA     Patient Active Problem List   Diagnosis Code    Cellulitis of thumb, left L03.012    Tenosynovitis of finger M65.9    DM (diabetes mellitus) ([facility] E11.9    HCV (hepatitis C virus) B19.20    Tobacco abuse Z72.0    Alcohol abuse, daily use F10.10    COPD (chronic obstructive pulmonary disease) ([facility]) J44.9    History of splenectomy Z90.81    Cellulitis and abscess of finger L03.019, L02.519    Abdominal wall cellulitis L03.311    Acute GI bleeding K92.2    HTN (hypertension) I10    ESRD on dialysis ([facility]) N18.6, Z99.2    Nontraumatic ischemic infarction of muscle of hand M62.249    Acute cholecystitis K81.0     Plan:   · Awaiting cholangiogram results  · Monitor CMP, add on today  · Continue antibiotics   · Surgery following     Signed By: Kevin Chavis NP     12/18/2017  10:47 AM         Addendum:  Agree with the above plan. The cholangiogram is normal with no evidence of filling defect in the biliary tree to suggest retained stone. LFTs with normal AST/ALT/TB. No indication for ERCP in this setting. Would continue management of cholecystitis under the care of general surgery.  Please call us back if additional questions or concerns arise.

## 2017-12-18 NOTE — ROUTINE PROCESS
TRANSFER - OUT REPORT:    Verbal report given to Gordy Michael (name) on Elizabeth Johnson.  being transferred to [facility] (unit) for routine progression of care       Report consisted of patients Situation, Background, Assessment and   Recommendations(SBAR). Information from the following report(s) Procedure Summary was reviewed with the receiving nurse. Lines:   Peripheral IV 12/17/17 Right (Active)   Site Assessment Clean, dry, & intact 12/18/2017  9:00 AM   Phlebitis Assessment 0 12/18/2017  9:00 AM   Infiltration Assessment 0 12/18/2017  9:00 AM   Dressing Status Clean, dry, & intact 12/18/2017  9:00 AM   Dressing Type Transparent 12/18/2017  9:00 AM   Hub Color/Line Status Infusing;Pink 12/18/2017  9:00 AM   Action Taken Open ports on tubing capped 12/18/2017  9:00 AM   Alcohol Cap Used Yes 12/18/2017  9:00 AM        Opportunity for questions and clarification was provided, post left subclavian mel catheter placement in IR.     Patient transported with:   O2 @ RA liters

## 2017-12-18 NOTE — PROGRESS NOTES
Vascular Surgery  --no new issues  --normal cholangiogram earlier today   --has weak thrill in graft--if unusable will probable best be served with a new tunneled catheter placement

## 2017-12-18 NOTE — PROGRESS NOTES
[facility]   [address] [facility], [address] [place] [address] Rd Ne, [facility]  Phone: [phone]   [phone]       Nephrology Progress Note  Kedar Holman     1965     [id number]  Date of Admission : 12/13/2017 12/18/17    CC: Follow up for ESRD      Assessment and Plan   ESRD- HD :  - dialyzes MWF at [address] renal [place]   - HD today per schedule     Clotted LUE AVG   - s/p declot 12/13  - to start  mg on d/c. Ischemic LUE:  - thrombosed limb after AVG declot   - s/p thrombectomy w/ TIANNA 12/13    Acute on Chronic Cholecystitis   - s/p perc Sharon   - per surgery     Chronic Infected Incisional hernia Mesh w/ drainage   Chronic Alcoholism   HTN : stable   Anemia of CKD :  on  Epogen   Sec HPTH     Interval History:  Seen and examined en route for cholangiogram   He still has some RUQ pain   No N/V/SOB      Review of Systems: A comprehensive review of systems was negative.     Current Medications:   Current Facility-Administered Medications   Medication Dose Route Frequency    albuterol-ipratropium (DUO-NEB) 2.5 MG-0.5 MG/3 ML  3 mL Nebulization BID RT    sodium chloride (NS) flush 5-10 mL  5-10 mL IntraVENous Q8H    arformoterol (BROVANA) neb solution 15 mcg  15 mcg Nebulization BID RT    And    budesonide (PULMICORT) 500 mcg/2 ml nebulizer suspension  500 mcg Nebulization BID RT    nicotine (NICODERM CQ) 21 mg/24 hr patch 1 Patch  1 Patch TransDERmal DAILY    pantoprazole (PROTONIX) tablet 40 mg  40 mg Oral ACB&D    sucralfate (CARAFATE) 100 mg/mL oral suspension 1 g  1 g Oral AC&HS    oxyCODONE-acetaminophen (PERCOCET 10)  mg per tablet 1 Tab  1 Tab Oral Q6H    HYDROmorphone (DILAUDID) injection 1.5 mg  1.5 mg IntraVENous Q3H PRN    piperacillin-tazobactam (ZOSYN) 6.75 g in 0.9% sodium chloride 500 mL infusion  6.75 g IntraVENous Q24H    albuterol (PROVENTIL VENTOLIN) nebulizer solution 2.5 mg  2.5 mg Nebulization QID PRN    carvedilol (COREG) tablet 6.25 mg  6.25 mg Oral BID WITH MEALS    furosemide (LASIX) tablet 80 mg  80 mg Oral BID    gabapentin (NEURONTIN) capsule 300 mg  300 mg Oral QHS      Allergies   Allergen Reactions    Ativan [Lorazepam] Other (comments)     Hyper activity       Objective:  Vitals:    Vitals:    12/17/17 2338 12/18/17 0723 12/18/17 0849 12/18/17 0918   BP: 144/66   163/78   Pulse: 76   83   Resp: 16 17   Temp: 97.3 °F (36.3 °C)   97.8 °F (36.6 °C)   TempSrc:       SpO2: 94%  95% 90%   Weight:  67.9 kg (149 lb 11.1 oz)     Height:         Intake and Output:  12/18 0701 - 12/18 1900  In: 240 [P.O.:240]  Out: 650 [Urine:650]  12/16 1901 - 12/18 0700  In: 720 [P.O.:720]  Out: 4625 [Urine:250; Drains:375]    Physical Examination:    General: No distress  Neck:  Supple, no mass  Resp:  Lungs CTA B/L, no wheezing , normal respiratory effort  CV:  RRR,  no murmur or rub,no LE edema  GI:  RUQ and epigastric tenderness, voluntary guarding   Neurologic:  Non focal  Psych:             AAO x 3 appropriate affect   Skin:  No Rash  Ext : Left arm - well perfused. AVG - good thrill     []    High complexity decision making was performed  []    Patient is at high-risk of decompensation with multiple organ involvement    Lab Data Personally Reviewed: I have reviewed all the pertinent labs, microbiology data and radiology studies during assessment.     Recent Labs      12/18/17   0545  12/16/17   2355  12/15/17   1218   NA  135*  138  135*   K  3.7  3.8  4.2   CL  99  102  98   CO2  25  29  30   GLU  103*  140*  121*   BUN  30*  15  29*   CREA  4.62*  2.74*  3.39*   CA  8.7  8.6  7.8*   ALB   --   2.7*  2.6*  2.6*   SGOT   --   37  18  18   ALT   --   24  15  14     Recent Labs      12/18/17   0545  12/16/17   1226  12/15/17   1218   WBC  9.9  10.2  11.7*   HGB  10.2*  9.8*  10.3*   HCT  30.4*  29.8*  31.1*   PLT  293  238  265     Lab Results   Component Value Date/Time    Specimen Description: ABDOMEN 12/17/2012 02:45 PM    Specimen Description: HAND L THUMB 12/14/2012 03:30 PM    Specimen Description: HAND L THUMB 12/14/2012 03:30 PM    Specimen Description: [facility] 12/13/2012 06:04 PM     Lab Results   Component Value Date/Time    Culture result: NO ANAEROBES ISOLATED 12/15/2017 04:00 PM    Culture result: HEAVY KLEBSIELLA PNEUMONIAE 12/15/2017 04:00 PM    Culture result: MRSA NOT PRESENT 12/14/2017 05:39 AM    Culture result:  12/14/2017 05:39 AM         Screening of patient nares for MRSA is for surveillance purposes and, if positive, to facilitate isolation considerations in high risk settings. It is not intended for automatic decolonization interventions per se as regimens are not sufficiently effective to warrant routine use. Culture result: MODERATE MIXED SKIN ALESSIA ISOLATED 10/19/2017 06:53 PM     Recent Results (from the past 24 hour(s))   METABOLIC PANEL, BASIC    Collection Time: 12/18/17  5:45 AM   Result Value Ref Range    Sodium 135 (L) 136 - 145 mmol/L    Potassium 3.7 3.5 - 5.1 mmol/L    Chloride 99 97 - 108 mmol/L    CO2 25 21 - 32 mmol/L    Anion gap 11 5 - 15 mmol/L    Glucose 103 (H) 65 - 100 mg/dL    BUN 30 (H) 6 - 20 MG/DL    Creatinine 4.62 (H) 0.70 - 1.30 MG/DL    BUN/Creatinine ratio 6 (L) 12 - 20      GFR est AA 16 (L) >60 ml/min/1.73m2    GFR est non-AA 13 (L) >60 ml/min/1.73m2    Calcium 8.7 8.5 - 10.1 MG/DL   CBC WITH AUTOMATED DIFF    Collection Time: 12/18/17  5:45 AM   Result Value Ref Range    WBC 9.9 4.1 - 11.1 K/uL    RBC 2.99 (L) 4.10 - 5.70 M/uL    HGB 10.2 (L) 12.1 - 17.0 g/dL    HCT 30.4 (L) 36.6 - 50.3 %    .7 (H) 80.0 - 99.0 FL    MCH 34.1 (H) 26.0 - 34.0 PG    MCHC 33.6 30.0 - 36.5 g/dL    RDW 14.1 11.5 - 14.5 %    PLATELET 190 109 - 348 K/uL    NEUTROPHILS 70 32 - 75 %    LYMPHOCYTES 10 (L) 12 - 49 %    MONOCYTES 7 5 - 13 %    EOSINOPHILS 13 (H) 0 - 7 %    BASOPHILS 0 0 - 1 %    ABS. NEUTROPHILS 6.9 1.8 - 8.0 K/UL    ABS. LYMPHOCYTES 1.0 0.8 - 3.5 K/UL    ABS. MONOCYTES 0.7 0.0 - 1.0 K/UL    ABS.  EOSINOPHILS 1.3 (H) 0.0 - 0.4 K/UL    ABS. BASOPHILS 0.0 0.0 - 0.1 K/UL    DF SMEAR SCANNED      RBC COMMENTS ANISOCYTOSIS  1+        RBC COMMENTS COLE CELLS  PRESENT        RBC COMMENTS SCHISTOCYTES  PRESENT        RBC COMMENTS ACANTHOCYTES             Total time spent with patient:  xxx   min. Care Plan discussed with:  Patient     Family      RN      Consulting Physician [facility],         I have reviewed the flowsheets. Chart and Pertinent Notes have been reviewed. No change in PMH ,family and social history from Consult note.       Sowmya Reyes MD

## 2017-12-18 NOTE — DIALYSIS
Patient arrived at dialysis suite at 1426. Temp: 99.2 oral  Resp: 18  HR: 72  B/P: 180/78  Weight: 68.2 kg     Assessment completed. LUE AVG; no bruit auscultated, no thrill palpated. Dr. María Levi phoned to make aware and ordered to attempt cannulation. Cannulated patient x1 with 15g needle. No blood return noted, attempted to reposition needle. Dark blood noted, unable to flush or aspirate. Needle removed, hemostasis achieved. Site secured with guaze and tape. Dr. María Levi made aware and ordered catheter placement. Patient transported to IR for catheter insertion. [facility] schedulers have been updated. Primary nurse, Jelani Hernandes, THAD made aware of events.

## 2017-12-18 NOTE — PROGRESS NOTES
Bedside shift change report given to [address] (oncoming nurse) by Minda Adkins RN (offgoing nurse). Report included the following information SBAR, Kardex, MAR and Recent Results.

## 2017-12-18 NOTE — PROGRESS NOTES
Bedside and Verbal shift change report given to [facility] (oncoming nurse) by Dominic Esparza (offgoing nurse). Report included the following information SBAR, Kardex and ED Summary.

## 2017-12-19 LAB
ALBUMIN SERPL-MCNC: 2.3 G/DL (ref 3.5–5)
ALBUMIN/GLOB SERPL: 0.5 {RATIO} (ref 1.1–2.2)
ALP SERPL-CCNC: 132 U/L (ref 45–117)
ALT SERPL-CCNC: 16 U/L (ref 12–78)
ANION GAP SERPL CALC-SCNC: 10 MMOL/L (ref 5–15)
AST SERPL-CCNC: 17 U/L (ref 15–37)
BILIRUB SERPL-MCNC: 0.4 MG/DL (ref 0.2–1)
BUN SERPL-MCNC: 41 MG/DL (ref 6–20)
BUN/CREAT SERPL: 8 (ref 12–20)
CALCIUM SERPL-MCNC: 8.3 MG/DL (ref 8.5–10.1)
CHLORIDE SERPL-SCNC: 100 MMOL/L (ref 97–108)
CO2 SERPL-SCNC: 22 MMOL/L (ref 21–32)
CREAT SERPL-MCNC: 5.45 MG/DL (ref 0.7–1.3)
GLOBULIN SER CALC-MCNC: 4.3 G/DL (ref 2–4)
GLUCOSE BLD STRIP.AUTO-MCNC: 148 MG/DL (ref 65–100)
GLUCOSE BLD STRIP.AUTO-MCNC: 182 MG/DL (ref 65–100)
GLUCOSE BLD STRIP.AUTO-MCNC: 197 MG/DL (ref 65–100)
GLUCOSE BLD STRIP.AUTO-MCNC: 200 MG/DL (ref 65–100)
GLUCOSE SERPL-MCNC: 115 MG/DL (ref 65–100)
POTASSIUM SERPL-SCNC: 3.9 MMOL/L (ref 3.5–5.1)
PROT SERPL-MCNC: 6.6 G/DL (ref 6.4–8.2)
SERVICE CMNT-IMP: ABNORMAL
SODIUM SERPL-SCNC: 132 MMOL/L (ref 136–145)

## 2017-12-19 PROCEDURE — 77030011255 HC DSG AQUACEL AG BMS -A

## 2017-12-19 PROCEDURE — 74011000250 HC RX REV CODE- 250: Performed by: SURGERY

## 2017-12-19 PROCEDURE — 77030011256 HC DRSG MEPILEX <16IN NO BORD MOLN -A

## 2017-12-19 PROCEDURE — 74011250637 HC RX REV CODE- 250/637: Performed by: SURGERY

## 2017-12-19 PROCEDURE — 90935 HEMODIALYSIS ONE EVALUATION: CPT

## 2017-12-19 PROCEDURE — 82962 GLUCOSE BLOOD TEST: CPT

## 2017-12-19 PROCEDURE — 80053 COMPREHEN METABOLIC PANEL: CPT | Performed by: NURSE PRACTITIONER

## 2017-12-19 PROCEDURE — P9047 ALBUMIN (HUMAN), 25%, 50ML: HCPCS | Performed by: INTERNAL MEDICINE

## 2017-12-19 PROCEDURE — 74011250636 HC RX REV CODE- 250/636: Performed by: INTERNAL MEDICINE

## 2017-12-19 PROCEDURE — 94640 AIRWAY INHALATION TREATMENT: CPT

## 2017-12-19 PROCEDURE — 36415 COLL VENOUS BLD VENIPUNCTURE: CPT | Performed by: NURSE PRACTITIONER

## 2017-12-19 PROCEDURE — 65270000032 HC RM SEMIPRIVATE

## 2017-12-19 PROCEDURE — 74011250636 HC RX REV CODE- 250/636: Performed by: HOSPITALIST

## 2017-12-19 PROCEDURE — 74011250637 HC RX REV CODE- 250/637: Performed by: HOSPITALIST

## 2017-12-19 RX ORDER — AMOXICILLIN AND CLAVULANATE POTASSIUM 500; 125 MG/1; MG/1
1 TABLET, FILM COATED ORAL EVERY 12 HOURS
Status: DISCONTINUED | OUTPATIENT
Start: 2017-12-19 | End: 2017-12-20 | Stop reason: HOSPADM

## 2017-12-19 RX ORDER — ALBUMIN HUMAN 250 G/1000ML
50 SOLUTION INTRAVENOUS ONCE
Status: COMPLETED | OUTPATIENT
Start: 2017-12-19 | End: 2017-12-19

## 2017-12-19 RX ORDER — AMOXICILLIN AND CLAVULANATE POTASSIUM 875; 125 MG/1; MG/1
1 TABLET, FILM COATED ORAL EVERY 12 HOURS
Status: DISCONTINUED | OUTPATIENT
Start: 2017-12-19 | End: 2017-12-19 | Stop reason: DRUGHIGH

## 2017-12-19 RX ADMIN — GABAPENTIN 300 MG: 300 CAPSULE ORAL at 21:03

## 2017-12-19 RX ADMIN — IPRATROPIUM BROMIDE AND ALBUTEROL SULFATE 3 ML: .5; 3 SOLUTION RESPIRATORY (INHALATION) at 21:23

## 2017-12-19 RX ADMIN — OXYCODONE HYDROCHLORIDE AND ACETAMINOPHEN 1 TABLET: 10; 325 TABLET ORAL at 17:12

## 2017-12-19 RX ADMIN — BUDESONIDE 500 MCG: 0.5 INHALANT RESPIRATORY (INHALATION) at 09:33

## 2017-12-19 RX ADMIN — PANTOPRAZOLE SODIUM 40 MG: 40 TABLET, DELAYED RELEASE ORAL at 17:12

## 2017-12-19 RX ADMIN — HYDROMORPHONE HYDROCHLORIDE 1.5 MG: 2 INJECTION INTRAMUSCULAR; INTRAVENOUS; SUBCUTANEOUS at 21:10

## 2017-12-19 RX ADMIN — AMOXICILLIN AND CLAVULANATE POTASSIUM 1 TABLET: 500; 125 TABLET, FILM COATED ORAL at 09:31

## 2017-12-19 RX ADMIN — OXYCODONE HYDROCHLORIDE AND ACETAMINOPHEN 1 TABLET: 10; 325 TABLET ORAL at 07:29

## 2017-12-19 RX ADMIN — CARVEDILOL 6.25 MG: 6.25 TABLET, FILM COATED ORAL at 09:31

## 2017-12-19 RX ADMIN — AMOXICILLIN AND CLAVULANATE POTASSIUM 1 TABLET: 500; 125 TABLET, FILM COATED ORAL at 21:03

## 2017-12-19 RX ADMIN — FUROSEMIDE 80 MG: 40 TABLET ORAL at 17:16

## 2017-12-19 RX ADMIN — CARVEDILOL 6.25 MG: 6.25 TABLET, FILM COATED ORAL at 17:15

## 2017-12-19 RX ADMIN — SUCRALFATE 1 G: 1 SUSPENSION ORAL at 07:29

## 2017-12-19 RX ADMIN — OXYCODONE HYDROCHLORIDE AND ACETAMINOPHEN 1 TABLET: 10; 325 TABLET ORAL at 23:29

## 2017-12-19 RX ADMIN — HYDROMORPHONE HYDROCHLORIDE 1.5 MG: 2 INJECTION INTRAMUSCULAR; INTRAVENOUS; SUBCUTANEOUS at 17:35

## 2017-12-19 RX ADMIN — OXYCODONE HYDROCHLORIDE AND ACETAMINOPHEN 1 TABLET: 10; 325 TABLET ORAL at 12:18

## 2017-12-19 RX ADMIN — PANTOPRAZOLE SODIUM 40 MG: 40 TABLET, DELAYED RELEASE ORAL at 07:29

## 2017-12-19 RX ADMIN — SUCRALFATE 1 G: 1 SUSPENSION ORAL at 12:18

## 2017-12-19 RX ADMIN — SUCRALFATE 1 G: 1 SUSPENSION ORAL at 17:12

## 2017-12-19 RX ADMIN — FUROSEMIDE 80 MG: 40 TABLET ORAL at 09:25

## 2017-12-19 RX ADMIN — HYDROMORPHONE HYDROCHLORIDE 1.5 MG: 2 INJECTION INTRAMUSCULAR; INTRAVENOUS; SUBCUTANEOUS at 10:46

## 2017-12-19 RX ADMIN — ALBUMIN (HUMAN) 25 G: 0.25 INJECTION, SOLUTION INTRAVENOUS at 14:00

## 2017-12-19 RX ADMIN — SUCRALFATE 1 G: 1 SUSPENSION ORAL at 21:02

## 2017-12-19 RX ADMIN — Medication 10 ML: at 21:03

## 2017-12-19 RX ADMIN — IPRATROPIUM BROMIDE AND ALBUTEROL SULFATE 3 ML: .5; 3 SOLUTION RESPIRATORY (INHALATION) at 09:34

## 2017-12-19 RX ADMIN — BUDESONIDE 500 MCG: 0.5 INHALANT RESPIRATORY (INHALATION) at 21:23

## 2017-12-19 RX ADMIN — Medication 5 ML: at 06:00

## 2017-12-19 NOTE — PROGRESS NOTES
[facility]   [address] [facility], [facility] [address], [facility] [place]  Phone: [phone]   [phone]       Nephrology Progress Note  Donavan Shone     1965     [id number]  Date of Admission : 12/13/2017 12/19/17    CC: Follow up for ESRD      Assessment and Plan   ESRD- HD :  - dialyzes MWF at [address] renal [place]   - HD today and then tomorrow as out pt if he gets discharged     Clotted LUE AVG   - s/p declot 12/13 and clotted again 12/18  - s/p Permacath placement LIJ 12/18   - f/u w/ Vascular     Ischemic LUE:  - thrombosed limb after AVG declot   - s/p thrombectomy w/ TIANNA 12/13    Acute on Chronic Cholecystitis   - s/p perc Sharon 12/15  - Lap sharon planned for later gloria     Chronic Infected Incisional hernia Mesh w/ drainage   Chronic Alcoholism   HTN : stable   Anemia of CKD :  on  Epogen   Sec HPTH     Interval History:  Got LIJ permacath yesterday   About to start HD  No new complaints   No RUQ pain . Sharon drain - bilious   No N/V/SOB      Review of Systems: A comprehensive review of systems was negative.     Current Medications:   Current Facility-Administered Medications   Medication Dose Route Frequency    amoxicillin-clavulanate (AUGMENTIN) 500-125 mg per tablet 1 Tab  1 Tab Oral Q12H    albuterol-ipratropium (DUO-NEB) 2.5 MG-0.5 MG/3 ML  3 mL Nebulization BID RT    sodium chloride (NS) flush 5-10 mL  5-10 mL IntraVENous Q8H    arformoterol (BROVANA) neb solution 15 mcg  15 mcg Nebulization BID RT    And    budesonide (PULMICORT) 500 mcg/2 ml nebulizer suspension  500 mcg Nebulization BID RT    nicotine (NICODERM CQ) 21 mg/24 hr patch 1 Patch  1 Patch TransDERmal DAILY    pantoprazole (PROTONIX) tablet 40 mg  40 mg Oral ACB&D    sucralfate (CARAFATE) 100 mg/mL oral suspension 1 g  1 g Oral AC&HS    oxyCODONE-acetaminophen (PERCOCET 10)  mg per tablet 1 Tab  1 Tab Oral Q6H    HYDROmorphone (DILAUDID) injection 1.5 mg  1.5 mg IntraVENous Q3H PRN    albuterol (PROVENTIL VENTOLIN) nebulizer solution 2.5 mg  2.5 mg Nebulization QID PRN    carvedilol (COREG) tablet 6.25 mg  6.25 mg Oral BID WITH MEALS    furosemide (LASIX) tablet 80 mg  80 mg Oral BID    gabapentin (NEURONTIN) capsule 300 mg  300 mg Oral QHS      Allergies   Allergen Reactions    Ativan [Lorazepam] Other (comments)     Hyper activity       Objective:  Vitals:    Vitals:    12/19/17 0440 12/19/17 0558 12/19/17 0839 12/19/17 0934   BP: 148/63  127/78    Pulse: 79  72    Resp: 18  18    Temp: 98.1 °F (36.7 °C)  98 °F (36.7 °C)    TempSrc:       SpO2: 98%  94% 94%   Weight:  67.7 kg (149 lb 4 oz)     Height:         Intake and Output:  12/19 0701 - 12/19 1900  In: -   Out: 450 [Urine:450]  12/17 1901 - 12/19 0700  In: 2250 [P.O.:240; I.V.:2000]  Out: 1935 [Urine:900; Drains:975]    Physical Examination:    General: No distress  Neck:  LIJ permacath   Resp:  Lungs CTA B/L  CV:  RRR,  no murmur or rub,no LE edema  GI:  RUQ - perc elgin +, No tenderness  Neurologic:  Non focal  Psych:             AAO x 3 appropriate affect   Skin:  No Rash  Ext : Left arm - well perfused. AVG -no thrill     []    High complexity decision making was performed  []    Patient is at high-risk of decompensation with multiple organ involvement    Lab Data Personally Reviewed: I have reviewed all the pertinent labs, microbiology data and radiology studies during assessment.     Recent Labs      12/19/17   0448  12/18/17   0545  12/16/17   2355   NA  132*  134*  135*  138   K  3.9  3.7  3.7  3.8   CL  100  99  99  102   CO2  22  24  25  29   GLU  115*  101*  103*  140*   BUN  41*  31*  30*  15   CREA  5.45*  4.81*  4.62*  2.74*   CA  8.3*  8.6  8.7  8.6   ALB  2.3*  2.5*  2.7*   SGOT  17  21  37   ALT  16  22  24     Recent Labs      12/18/17   0545  12/16/17   1226   WBC  9.9  10.2   HGB  10.2*  9.8*   HCT  30.4*  29.8*   PLT  293  238     Lab Results   Component Value Date/Time    Specimen Description: ABDOMEN 12/17/2012 02:45 PM    Specimen Description: HAND L THUMB 12/14/2012 03:30 PM    Specimen Description: HAND L THUMB 12/14/2012 03:30 PM    Specimen Description: [facility] 12/13/2012 06:04 PM     Lab Results   Component Value Date/Time    Culture result: NO ANAEROBES ISOLATED 12/15/2017 04:00 PM    Culture result: HEAVY KLEBSIELLA PNEUMONIAE 12/15/2017 04:00 PM    Culture result: MRSA NOT PRESENT 12/14/2017 05:39 AM    Culture result:  12/14/2017 05:39 AM         Screening of patient nares for MRSA is for surveillance purposes and, if positive, to facilitate isolation considerations in high risk settings. It is not intended for automatic decolonization interventions per se as regimens are not sufficiently effective to warrant routine use. Culture result: MODERATE MIXED SKIN ALESSIA ISOLATED 10/19/2017 06:53 PM     Recent Results (from the past 24 hour(s))   GLUCOSE, POC    Collection Time: 12/18/17  9:49 PM   Result Value Ref Range    Glucose (POC) 177 (H) 65 - 100 mg/dL    Performed by [facility] DEBBY(CON)    METABOLIC PANEL, COMPREHENSIVE    Collection Time: 12/19/17  4:48 AM   Result Value Ref Range    Sodium 132 (L) 136 - 145 mmol/L    Potassium 3.9 3.5 - 5.1 mmol/L    Chloride 100 97 - 108 mmol/L    CO2 22 21 - 32 mmol/L    Anion gap 10 5 - 15 mmol/L    Glucose 115 (H) 65 - 100 mg/dL    BUN 41 (H) 6 - 20 MG/DL    Creatinine 5.45 (H) 0.70 - 1.30 MG/DL    BUN/Creatinine ratio 8 (L) 12 - 20      GFR est AA 13 (L) >60 ml/min/1.73m2    GFR est non-AA 11 (L) >60 ml/min/1.73m2    Calcium 8.3 (L) 8.5 - 10.1 MG/DL    Bilirubin, total 0.4 0.2 - 1.0 MG/DL    ALT (SGPT) 16 12 - 78 U/L    AST (SGOT) 17 15 - 37 U/L    Alk.  phosphatase 132 (H) 45 - 117 U/L    Protein, total 6.6 6.4 - 8.2 g/dL    Albumin 2.3 (L) 3.5 - 5.0 g/dL    Globulin 4.3 (H) 2.0 - 4.0 g/dL    A-G Ratio 0.5 (L) 1.1 - 2.2     GLUCOSE, POC    Collection Time: 12/19/17  7:20 AM   Result Value Ref Range    Glucose (POC) 148 (H) 65 - 100 mg/dL    Performed by Елена Heller (PCT) I have reviewed the flowsheets. Chart and Pertinent Notes have been reviewed. No change in PMH ,family and social history from Consult note.       Sandra Kessler MD

## 2017-12-19 NOTE — DIALYSIS
[facility] Dialysis Team [facility] Ac[facility]  [phone]    Vitals   Pre   Post   Assessment   Pre   Post     Temp  Temp: 98.5 °F (36.9 °C) (12/19/17 1314)  99.2 LOC   A & O A & O   HR   77 73 Lungs   No sob  no sob   B/P   149/70 163/75 Cardiac   regular  regular   Resp   18 18 Skin   warm  warm   Pain level  0/10 10/10; back pain Edema  None noted     None noted   Orders:    Duration:   Start:   Procedure Start Time: 1314 End:   1956 Total:   3 hours   Dialyzer:   Dialyzer/Set Up Inspection: Revaclear (12/19/17 1314)   K Bath:   Dialysate K (mEq/L): 3 (per Dr Julio Beard change bath to 3k) (12/19/17 1314)   Ca Bath:   Dialysate CA (mEq/L): 2.5 (12/19/17 1314)   Na/Bicarb:   Dialysate NA (mEq/L): 138 (12/19/17 1314)   Target Fluid Removal:   Goal/Amount of Fluid to Remove (mL): 3000 mL (12/19/17 1314)   Access     Type & Location:   Left cvc; verified ok to use; lines reversed for optimal ; dressing dry and intact; no s/s of infection noted; post tx both ports clamped and new caps applied securely   Labs     Obtained/Reviewed   Critical Results Called   Date when labs were drawn-  Hgb-    HGB   Date Value Ref Range Status   12/18/2017 10.2 (L) 12.1 - 17.0 g/dL Final     K-    Potassium   Date Value Ref Range Status   12/19/2017 3.9 3.5 - 5.1 mmol/L Final     Ca-   Calcium   Date Value Ref Range Status   12/19/2017 8.3 (L) 8.5 - 10.1 MG/DL Final     Bun-   BUN   Date Value Ref Range Status   12/19/2017 41 (H) 6 - 20 MG/DL Final     Creat-   Creatinine   Date Value Ref Range Status   12/19/2017 5.45 (H) 0.70 - 1.30 MG/DL Final        Medications/ Blood Products Given     Name   Dose   Route and Time     Albumin 25g  IV 1400             Blood Volume Processed (BVP):    65.9 Net Fluid   Removed:  1 liter   Comments   Time Out Done: yes  Primary Nurse Rpt Pre: Durward Light, RN  Primary Nurse Rpt Post: Milka Morrison  Pt Education:procedural; s/s of hypotension  Care Plan: continue dialysis per nephrologist  Tx Summary:  Hypotension during treatment pt remained asymptomatic; per Dr Sarah Beth Calhoun gave albumin x2 and decreased uf goal to 1.5 Liters; hypotension resolved; tx completed; at the end of tx all possible blood returned no apparent distress noted  Admiting Diagnosis: n/a  Pt's previous clinic-n/a  Consent signed - Informed Consent Verified: Yes (12/19/17 8560)  [facility] Consent - yes  Hepatitis Status- negative as of 12-6-17  Machine #- Machine Number: Nancy Ernst (12/19/17 3053)  Telemetry status- n/a  Pre-dialysis wt. - Pre-Dialysis Weight: 73 kg (160 lb 15 oz) (12/16/17 1936)

## 2017-12-19 NOTE — PROGRESS NOTES
Cholangiogram confirms patency of ductal system; Klebsiella in bile.  Continue antibiotics; can clamp cholecystostomy in AM.

## 2017-12-19 NOTE — WOUND CARE
WOCN Note:   New consult placed by RN for evaluation of abdominal wound; Chart review revealed:   Admitted for nontraumatic ischemic infarction of muscle of hand; history of cellulitis, DM, HCV, SMOKER, COPD, ABD WALL CELLULITIS, HTN, ESRD, ETOH ABUSE;  Surgery following [facility]; Admitted from home;     Assessment:   Patient is alert, verbal, denied pain;   Bed: total care bed  Patient continent;   Diet: GI lite  Bilateral heel, buttocks, and sacral skin intact and without erythema. Palpable DP pulses bilaterally. 1. Right abdomen drain with dressing intact;   2. POA midline abdominal scaring with minute pin hole draining pink purulent drainage when patient contracts abdominal muscles; patient states that this is a chronic wound and that he is followed by Dr. Edvin Cabrera at [facility]; patient states that he manages it at home with dry dressing changes;  Cleansed with normal saline, applied Aquacel Ag, secured with mepilex border dressing; Patient repositioned without assist from supine to right and back to supine on total care bed;     Skin/wound treatment Recommendations:    Daily cleanse midline abdominal wound with normal saline, apply Aquacel Ag, secure with mepilex border dressing;     Skin Care & Pressure Injury Prevention Recommendations:  1. Minimize layers of linen/pads under patient to optimize support surface. 2.  Encourage patient to reposition approximately every 2 hours;  3. Float heels with pillow under calves. 4.  promote continence;   5. Continue on total care bed for pressure redistribution. 6.  Assess/protect skin in contact with medical devices. Keep skin moisturized;   7. Ensure that patient is repositioning in chair shifting weight approximately every 15 minutes and standing up every hour. Discussed above assessment and recommendations with Juana BEVERLY) and Ania Fagan NP;      Transition of Care: Plan to follow weekly and as needed while admitted to hospital.    Romario Santana Chayo Bruce, MEGANN, RN, Card [facility] Ken  Certified Wound, Ostomy, Continence Nurse  office [phone]  pager Onel Mejia Pi, [facility]

## 2017-12-19 NOTE — PROGRESS NOTES
Vascular Surgery  --events noted  --appreciate everyone's help re: dialysis and cholecystitis  --dialysis today  --d/w patient  --will d/c to home tomorrow on augmentin

## 2017-12-19 NOTE — PROGRESS NOTES
Bedside shift change report given to [facility] MATY (oncoming nurse) by Maribel Bah  (offgoing nurse). Report included the following information SBAR.

## 2017-12-19 NOTE — PROGRESS NOTES
Day #1/10 of Augmentin  Indication:  Intra-abdominal infection  Current regimen:  875 mg Q 12hrs x 10 days  Abx regimen: replaces Zosyn  Recent Labs      17   0448  17   0545  17   2355  17   1226   WBC   --   9.9   --   10.2   CREA  5.45*  4.81*  4.62*  2.74*   --    BUN  41*  31*  30*  15   --    Est CrCl: 13.5 ml/min; UO: 0.8 ml/kg/hr (one occurrence)  Temp (24hrs), Av.1 °F (36.7 °C), Min:97.8 °F (36.6 °C), Max:98.8 °F (37.1 °C)    Cultures:   12/15 Body fluid - Kleb. pneumo. (pan-sensitive) - Final    Plan: Change to 500 mg PO Q12 x 10 days for CrCl 10-29 mL/min per P&T-approved Renal Dosing Protocol

## 2017-12-20 VITALS
WEIGHT: 147.27 LBS | OXYGEN SATURATION: 96 % | TEMPERATURE: 98 F | DIASTOLIC BLOOD PRESSURE: 75 MMHG | RESPIRATION RATE: 18 BRPM | SYSTOLIC BLOOD PRESSURE: 142 MMHG | HEIGHT: 65 IN | BODY MASS INDEX: 24.54 KG/M2 | HEART RATE: 63 BPM

## 2017-12-20 LAB
ALBUMIN SERPL-MCNC: 2.6 G/DL (ref 3.5–5)
ALBUMIN/GLOB SERPL: 0.6 {RATIO} (ref 1.1–2.2)
ALP SERPL-CCNC: 118 U/L (ref 45–117)
ALT SERPL-CCNC: 15 U/L (ref 12–78)
ANION GAP SERPL CALC-SCNC: 9 MMOL/L (ref 5–15)
AST SERPL-CCNC: 17 U/L (ref 15–37)
BILIRUB SERPL-MCNC: 0.4 MG/DL (ref 0.2–1)
BUN SERPL-MCNC: 19 MG/DL (ref 6–20)
BUN/CREAT SERPL: 5 (ref 12–20)
CALCIUM SERPL-MCNC: 8.6 MG/DL (ref 8.5–10.1)
CHLORIDE SERPL-SCNC: 99 MMOL/L (ref 97–108)
CO2 SERPL-SCNC: 27 MMOL/L (ref 21–32)
CREAT SERPL-MCNC: 3.61 MG/DL (ref 0.7–1.3)
GLOBULIN SER CALC-MCNC: 4.1 G/DL (ref 2–4)
GLUCOSE BLD STRIP.AUTO-MCNC: 115 MG/DL (ref 65–100)
GLUCOSE BLD STRIP.AUTO-MCNC: 119 MG/DL (ref 65–100)
GLUCOSE BLD STRIP.AUTO-MCNC: 175 MG/DL (ref 65–100)
GLUCOSE SERPL-MCNC: 107 MG/DL (ref 65–100)
POTASSIUM SERPL-SCNC: 3.7 MMOL/L (ref 3.5–5.1)
PROT SERPL-MCNC: 6.7 G/DL (ref 6.4–8.2)
SERVICE CMNT-IMP: ABNORMAL
SODIUM SERPL-SCNC: 135 MMOL/L (ref 136–145)

## 2017-12-20 PROCEDURE — 74011250637 HC RX REV CODE- 250/637: Performed by: SURGERY

## 2017-12-20 PROCEDURE — 74011636637 HC RX REV CODE- 636/637: Performed by: SURGERY

## 2017-12-20 PROCEDURE — 36415 COLL VENOUS BLD VENIPUNCTURE: CPT | Performed by: NURSE PRACTITIONER

## 2017-12-20 PROCEDURE — 90935 HEMODIALYSIS ONE EVALUATION: CPT

## 2017-12-20 PROCEDURE — 94640 AIRWAY INHALATION TREATMENT: CPT

## 2017-12-20 PROCEDURE — 74011250636 HC RX REV CODE- 250/636: Performed by: HOSPITALIST

## 2017-12-20 PROCEDURE — 74011250637 HC RX REV CODE- 250/637: Performed by: HOSPITALIST

## 2017-12-20 PROCEDURE — 74011000250 HC RX REV CODE- 250: Performed by: SURGERY

## 2017-12-20 PROCEDURE — 82962 GLUCOSE BLOOD TEST: CPT

## 2017-12-20 PROCEDURE — 80053 COMPREHEN METABOLIC PANEL: CPT | Performed by: NURSE PRACTITIONER

## 2017-12-20 RX ORDER — OXYCODONE AND ACETAMINOPHEN 10; 325 MG/1; MG/1
1 TABLET ORAL EVERY 6 HOURS
Qty: 30 TAB | Refills: 0 | Status: SHIPPED | OUTPATIENT
Start: 2017-12-20 | End: 2020-01-01

## 2017-12-20 RX ORDER — AMOXICILLIN AND CLAVULANATE POTASSIUM 500; 125 MG/1; MG/1
1 TABLET, FILM COATED ORAL EVERY 12 HOURS
Qty: 14 TAB | Refills: 0 | Status: SHIPPED | OUTPATIENT
Start: 2017-12-20 | End: 2017-12-27

## 2017-12-20 RX ADMIN — HYDROMORPHONE HYDROCHLORIDE 1.5 MG: 2 INJECTION INTRAMUSCULAR; INTRAVENOUS; SUBCUTANEOUS at 01:04

## 2017-12-20 RX ADMIN — Medication 5 ML: at 06:00

## 2017-12-20 RX ADMIN — FUROSEMIDE 80 MG: 40 TABLET ORAL at 09:18

## 2017-12-20 RX ADMIN — OXYCODONE HYDROCHLORIDE AND ACETAMINOPHEN 1 TABLET: 10; 325 TABLET ORAL at 06:49

## 2017-12-20 RX ADMIN — HUMAN INSULIN 2 UNITS: 100 INJECTION, SOLUTION SUBCUTANEOUS at 12:50

## 2017-12-20 RX ADMIN — HYDROMORPHONE HYDROCHLORIDE 1.5 MG: 2 INJECTION INTRAMUSCULAR; INTRAVENOUS; SUBCUTANEOUS at 16:18

## 2017-12-20 RX ADMIN — HYDROMORPHONE HYDROCHLORIDE 1.5 MG: 2 INJECTION INTRAMUSCULAR; INTRAVENOUS; SUBCUTANEOUS at 12:56

## 2017-12-20 RX ADMIN — OXYCODONE HYDROCHLORIDE AND ACETAMINOPHEN 1 TABLET: 10; 325 TABLET ORAL at 11:48

## 2017-12-20 RX ADMIN — BUDESONIDE 500 MCG: 0.5 INHALANT RESPIRATORY (INHALATION) at 09:03

## 2017-12-20 RX ADMIN — SUCRALFATE 1 G: 1 SUSPENSION ORAL at 11:48

## 2017-12-20 RX ADMIN — PANTOPRAZOLE SODIUM 40 MG: 40 TABLET, DELAYED RELEASE ORAL at 06:49

## 2017-12-20 RX ADMIN — HYDROMORPHONE HYDROCHLORIDE 1.5 MG: 2 INJECTION INTRAMUSCULAR; INTRAVENOUS; SUBCUTANEOUS at 09:22

## 2017-12-20 RX ADMIN — IPRATROPIUM BROMIDE AND ALBUTEROL SULFATE 3 ML: .5; 3 SOLUTION RESPIRATORY (INHALATION) at 09:03

## 2017-12-20 RX ADMIN — SUCRALFATE 1 G: 1 SUSPENSION ORAL at 06:49

## 2017-12-20 RX ADMIN — AMOXICILLIN AND CLAVULANATE POTASSIUM 1 TABLET: 500; 125 TABLET, FILM COATED ORAL at 09:18

## 2017-12-20 RX ADMIN — NITROGLYCERIN 2 INCH: 20 OINTMENT TOPICAL at 01:04

## 2017-12-20 RX ADMIN — CARVEDILOL 6.25 MG: 6.25 TABLET, FILM COATED ORAL at 09:19

## 2017-12-20 NOTE — PROGRESS NOTES
Discharge instructions given to patient with prescription for percocet and augmentin. Patient states he cannot read instructions because he does not have his glasses. Patient wife left room and went downstairs. Nurse had to go get wife to come back to room to read over discharge instructions. Jhonatan Payan (wife) read instructions and verbalized understanding. Wife and patient had opportunity to ask questions. Discharge home via wheelchair accompanied by Vashti OCONNOR.

## 2017-12-20 NOTE — PROGRESS NOTES
met with patient and he states that he does his wound care to his abdominal wound and uses normal saline and aquagel and covers with a dry dressing. His wife also helps do this. He goes to dialysis in [place] and will resume this on Friday Dec 22nd at 11 am.  No further needs noted at this time.   He will be discharged following his dialysis here this pm.

## 2017-12-20 NOTE — PROGRESS NOTES
Patient continues to have right side pain. Informed Dr. Bijal Ramirez that drain remains clamped.  Dr. Bijal Ramirez said to keep drain clamped as ordered and cmp ordered already for the am.

## 2017-12-20 NOTE — PROGRESS NOTES
Vascular Surgery  --doing well  --will d/c with augmentin following HD today  --appreciate everyone's help

## 2017-12-20 NOTE — PROGRESS NOTES
Bedside shift change report given to [facility] MATY (oncoming nurse) by Heriberto Hall (offgoing nurse). Report included the following information SBAR.     0104- Nitro paste placed for /87 from previous shift. 0156- BP rechecked 167/80. Will continue to monitor.

## 2017-12-20 NOTE — PROGRESS NOTES
[facility]   [address] [facility], [facility] [address], [facility]  Phone: [phone]   [phone]       Nephrology Progress Note  Alfredito Lawrence     1965     [id number]  Date of Admission : 12/13/2017 12/20/17    CC: Follow up for ESRD      Assessment and Plan   ESRD- HD :  - dialyzes MWF at [address] renal [place]   - HD today for 2 hrs before d/c     Clotted LUE AVG   - s/p declot 12/13 and clotted again 12/18  - s/p Permacath placement LIJ 12/18   - f/u w/ Vascular     Ischemic LUE:  - thrombosed limb after AVG declot   - s/p thrombectomy w/ TIANNA 12/13    Acute on Chronic Cholecystitis   - s/p perc Sharon 12/15  - Lap sharon planned for later gloria     Chronic Infected Incisional hernia Mesh w/ drainage   Chronic Alcoholism   HTN : continue current meds and hold before HD  Anemia of CKD :  on  Epogen   Sec HPTH     Interval History:  No pain in RUQ  Bilious drainge per perc sharon   No N/V/SOB      Review of Systems: A comprehensive review of systems was negative.     Current Medications:   Current Facility-Administered Medications   Medication Dose Route Frequency    nitroglycerin (NITROBID) 2 % ointment 2 Inch  2 Inch Topical Q6H PRN    amoxicillin-clavulanate (AUGMENTIN) 500-125 mg per tablet 1 Tab  1 Tab Oral Q12H    insulin regular (NOVOLIN R, HUMULIN R) injection   SubCUTAneous AC&HS    albuterol-ipratropium (DUO-NEB) 2.5 MG-0.5 MG/3 ML  3 mL Nebulization BID RT    sodium chloride (NS) flush 5-10 mL  5-10 mL IntraVENous Q8H    arformoterol (BROVANA) neb solution 15 mcg  15 mcg Nebulization BID RT    And    budesonide (PULMICORT) 500 mcg/2 ml nebulizer suspension  500 mcg Nebulization BID RT    nicotine (NICODERM CQ) 21 mg/24 hr patch 1 Patch  1 Patch TransDERmal DAILY    pantoprazole (PROTONIX) tablet 40 mg  40 mg Oral ACB&D    sucralfate (CARAFATE) 100 mg/mL oral suspension 1 g  1 g Oral AC&HS    oxyCODONE-acetaminophen (PERCOCET 10)  mg per tablet 1 Tab  1 Tab Oral Q6H  HYDROmorphone (DILAUDID) injection 1.5 mg  1.5 mg IntraVENous Q3H PRN    albuterol (PROVENTIL VENTOLIN) nebulizer solution 2.5 mg  2.5 mg Nebulization QID PRN    carvedilol (COREG) tablet 6.25 mg  6.25 mg Oral BID WITH MEALS    furosemide (LASIX) tablet 80 mg  80 mg Oral BID    gabapentin (NEURONTIN) capsule 300 mg  300 mg Oral QHS      Allergies   Allergen Reactions    Ativan [Lorazepam] Other (comments)     Hyper activity       Objective:  Vitals:    Vitals:    12/20/17 0005 12/20/17 0152 12/20/17 0712 12/20/17 0810   BP: 186/81 167/80  140/61   Pulse: 73 77  75   Resp: 18   17   Temp: 98.5 °F (36.9 °C)   98.6 °F (37 °C)   TempSrc:       SpO2: 95%   96%   Weight:   66.8 kg (147 lb 4.3 oz)    Height:         Intake and Output:  12/20 0701 - 12/20 1900  In: -   Out: 450 [Urine:450]  12/18 1901 - 12/20 0700  In: 10   Out: 2235 [Urine:800; Drains:375]    Physical Examination:    General: No distress  Neck:  LIJ permacath   Resp:  Lungs CTA B/L  CV:  RRR,  no murmur or rub,no LE edema  GI:  RUQ - perc elgin +, No tenderness  Neurologic:  Non focal  Psych:             AAO x 3 appropriate affect   Skin:  No Rash  Ext : Left arm - well perfused. AVG -no thrill     []    High complexity decision making was performed  []    Patient is at high-risk of decompensation with multiple organ involvement    Lab Data Personally Reviewed: I have reviewed all the pertinent labs, microbiology data and radiology studies during assessment.     Recent Labs      12/20/17   0304  12/19/17   0448  12/18/17   0545   NA  135*  132*  134*  135*   K  3.7  3.9  3.7  3.7   CL  99  100  99  99   CO2  27  22  24  25   GLU  107*  115*  101*  103*   BUN  19  41*  31*  30*   CREA  3.61*  5.45*  4.81*  4.62*   CA  8.6  8.3*  8.6  8.7   ALB  2.6*  2.3*  2.5*   SGOT  17  17  21   ALT  15  16  22     Recent Labs      12/18/17   0545   WBC  9.9   HGB  10.2*   HCT  30.4*   PLT  293     Lab Results   Component Value Date/Time    Specimen Description: ABDOMEN 12/17/2012 02:45 PM    Specimen Description: HAND L THUMB 12/14/2012 03:30 PM    Specimen Description: HAND L THUMB 12/14/2012 03:30 PM    Specimen Description: [facility] 12/13/2012 06:04 PM     Lab Results   Component Value Date/Time    Culture result: NO ANAEROBES ISOLATED 12/15/2017 04:00 PM    Culture result: HEAVY KLEBSIELLA PNEUMONIAE 12/15/2017 04:00 PM    Culture result: MRSA NOT PRESENT 12/14/2017 05:39 AM    Culture result:  12/14/2017 05:39 AM         Screening of patient nares for MRSA is for surveillance purposes and, if positive, to facilitate isolation considerations in high risk settings. It is not intended for automatic decolonization interventions per se as regimens are not sufficiently effective to warrant routine use. Culture result: MODERATE MIXED SKIN ALESSIA ISOLATED 10/19/2017 06:53 PM     Recent Results (from the past 24 hour(s))   GLUCOSE, POC    Collection Time: 12/19/17 11:09 AM   Result Value Ref Range    Glucose (POC) 197 (H) 65 - 100 mg/dL    Performed by Camila Guy    GLUCOSE, POC    Collection Time: 12/19/17  4:56 PM   Result Value Ref Range    Glucose (POC) 200 (H) 65 - 100 mg/dL    Performed by Marcus, POC    Collection Time: 12/19/17  9:21 PM   Result Value Ref Range    Glucose (POC) 182 (H) 65 - 100 mg/dL    Performed by [address] [place] [address], COMPREHENSIVE    Collection Time: 12/20/17  3:04 AM   Result Value Ref Range    Sodium 135 (L) 136 - 145 mmol/L    Potassium 3.7 3.5 - 5.1 mmol/L    Chloride 99 97 - 108 mmol/L    CO2 27 21 - 32 mmol/L    Anion gap 9 5 - 15 mmol/L    Glucose 107 (H) 65 - 100 mg/dL    BUN 19 6 - 20 MG/DL    Creatinine 3.61 (H) 0.70 - 1.30 MG/DL    BUN/Creatinine ratio 5 (L) 12 - 20      GFR est AA 21 (L) >60 ml/min/1.73m2    GFR est non-AA 18 (L) >60 ml/min/1.73m2    Calcium 8.6 8.5 - 10.1 MG/DL    Bilirubin, total 0.4 0.2 - 1.0 MG/DL    ALT (SGPT) 15 12 - 78 U/L    AST (SGOT) 17 15 - 37 U/L    Alk. phosphatase 118 (H) 45 - 117 U/L    Protein, total 6.7 6.4 - 8.2 g/dL    Albumin 2.6 (L) 3.5 - 5.0 g/dL    Globulin 4.1 (H) 2.0 - 4.0 g/dL    A-G Ratio 0.6 (L) 1.1 - 2.2     GLUCOSE, POC    Collection Time: 12/20/17  6:39 AM   Result Value Ref Range    Glucose (POC) 119 (H) 65 - 100 mg/dL    Performed by Clay Moreno I have reviewed the flowsheets. Chart and Pertinent Notes have been reviewed. No change in PMH ,family and social history from Consult note.       Carol Norman MD

## 2017-12-20 NOTE — DISCHARGE INSTRUCTIONS
Can bathe and use left arm as normal; otherwise leave dialysis and biliary drains clean and dry. Learning About Biliary Drain Care  What is a biliary drain? A biliary drain allows bile to flow out from a blocked bile duct into a collection bag outside the body. Bile is a liquid made by the liver. It helps digest fats. Blocked or narrowed bile ducts can stop the flow of bile and cause yellowing of the skin (jaundice) or an infection of the liver. The drain is a thin plastic tube (catheter) that the doctor places in the bile duct. From there the tube passes out through a drain site on your skin and into a collection bag. The bag may be attached to a belt or strapped to the leg. About 2 to 4 cups of bile will collect in the bag each day. The amount should be fairly constant from day to day. The bile that comes out of the drain may look bloody at first, but it will soon change to its normal yellow-green color. How long the drain stays in place depends on what caused the problem with your bile duct. Your doctor will discuss this with you. How can you care for your drain at home? Your doctor or ostomy nurse can answer any questions you have about caring for your drain or bag. Caring for the drain site  You may have a bandage on your skin where the tube comes out of your body. Your doctor will tell you how often to change it. To change the bandage:  1. Wash your hands with soap and water. 2. Take off the bandage from around the drain site. 3. Clean the drain site and the skin around it with soap and water. Use gauze or a cotton swab. 4. When the site is dry, you can put on a new bandage. First, cut a slit in the bandage, and then fit it around the drain site. Caring for the tube  To keep the tube clear, flush it with sterile saline. Your doctor will tell you how and when to do this. Caring for the bag  Empty the bile from your bag when it's about 2/3 full, or at least once a day.   1. Wash your hands with soap and water. 2. If your doctor requested it, make a note of the amount of bile in the bag.  3. Open the drainage port at the bottom of the bag.  4. Empty the contents of the bag into the toilet. 5. Clean the drainage port with soap and water, and close it. 6. Wash your hands again with soap and water. If the bag breaks or tears, replace it as soon as possible. When should you call for help? Call your doctor now or seek immediate medical care if:  · You have symptoms of infection, such as:  ¨ Increased pain, swelling, warmth, or redness. ¨ Red streaks leading from the drain site. ¨ Pus draining from the drain site. ¨ A fever. · There is a new or increasing yellow tint to your skin or the whites of your eyes. · You see a sudden change in the color or smell of the drainage. · The tube is coming loose at the drain site. · You have new or worse belly pain. · You have a fever. · You are vomiting. · You cannot pass stools or gas. Watch closely for changes in your health, and be sure to contact your doctor if:  · Drainage stops coming out of the tube. · You do not get better as expected. Follow-up care is a key part of your treatment and safety. Be sure to make and go to all appointments, and call your doctor if you are having problems. It's also a good idea to know your test results and keep a list of the medicines you take. Where can you learn more? Go to http://andrew-waldo.info/. Enter K610 in the search box to learn more about \"Learning About Biliary Drain Care. \"  Current as of: May 12, 2017  Content Version: 11.4  © 9028-1710 [employer] Care instructions adapted under license by [employer] (which disclaims liability or warranty for this information).  If you have questions about a medical condition or this instruction, always ask your healthcare professional. [address] any warranty or liability for your use of this information. MyChart Activation    Thank you for requesting access to SECU4. Please follow the instructions below to securely access and download your online medical record. SECU4 allows you to send messages to your doctor, view your test results, renew your prescriptions, schedule appointments, and more. How Do I Sign Up? 1. In your internet browser, go to [email or web address]  2. Click on the First Time User? Click Here link in the Sign In box. You will be redirect to the New Member Sign Up page. 3. Enter your SECU4 Access Code exactly as it appears below. You will not need to use this code after youve completed the sign-up process. If you do not sign up before the expiration date, you must request a new code. SECU4 Access Code: [email or web address]  Expires: 2017  3:44 PM (This is the date your SECU4 access code will )    4. Enter the last four digits of your Social Security Number (xxxx) and Date of Birth (mm/dd/yyyy) as indicated and click Submit. You will be taken to the next sign-up page. 5. Create a SECU4 ID. This will be your SECU4 login ID and cannot be changed, so think of one that is secure and easy to remember. 6. Create a SECU4 password. You can change your password at any time. 7. Enter your Password Reset Question and Answer. This can be used at a later time if you forget your password. 8. Enter your e-mail address. You will receive e-mail notification when new information is available in [address]. 9. Click Sign Up. You can now view and download portions of your medical record. 10. Click the Download Summary menu link to download a portable copy of your medical information. Additional Information    If you have questions, please visit the Frequently Asked Questions section of the SECU4 website at [email or web address] Remember, SECU4 is NOT to be used for urgent needs. For medical emergencies, dial 911.       DISCHARGE SUMMARY from Nurse    PATIENT INSTRUCTIONS:    After general anesthesia or intravenous sedation, for 24 hours or while taking prescription Narcotics:  · Limit your activities  · Do not drive and operate hazardous machinery  · Do not make important personal or business decisions  · Do  not drink alcoholic beverages  · If you have not urinated within 8 hours after discharge, please contact your surgeon on call. Report the following to your surgeon:  · Excessive pain, swelling, redness or odor of or around the surgical area  · Temperature over 100.5  · Nausea and vomiting lasting longer than 4 hours or if unable to take medications  · Any signs of decreased circulation or nerve impairment to extremity: change in color, persistent  numbness, tingling, coldness or increase pain  · Any questions    What to do at Home    *  Please give a list of your current medications to your Primary Care Provider. *  Please update this list whenever your medications are discontinued, doses are      changed, or new medications (including over-the-counter products) are added. *  Please carry medication information at all times in case of emergency situations. These are general instructions for a healthy lifestyle:    No smoking/ No tobacco products/ Avoid exposure to second hand smoke  Surgeon General's Warning:  Quitting smoking now greatly reduces serious risk to your health. Obesity, smoking, and sedentary lifestyle greatly increases your risk for illness    A healthy diet, regular physical exercise & weight monitoring are important for maintaining a healthy lifestyle    You may be retaining fluid if you have a history of heart failure or if you experience any of the following symptoms:  Weight gain of 3 pounds or more overnight or 5 pounds in a week, increased swelling in our hands or feet or shortness of breath while lying flat in bed.   Please call your doctor as soon as you notice any of these symptoms; do not wait until your next office visit. Recognize signs and symptoms of STROKE:    F-face looks uneven    A-arms unable to move or move unevenly    S-speech slurred or non-existent    T-time-call 911 as soon as signs and symptoms begin-DO NOT go       Back to bed or wait to see if you get better-TIME IS BRAIN. Warning Signs of HEART ATTACK     Call 911 if you have these symptoms:   Chest discomfort. Most heart attacks involve discomfort in the center of the chest that lasts more than a few minutes, or that goes away and comes back. It can feel like uncomfortable pressure, squeezing, fullness, or pain.  Discomfort in other areas of the upper body. Symptoms can include pain or discomfort in one or both arms, the back, neck, jaw, or stomach.  Shortness of breath with or without chest discomfort.  Other signs may include breaking out in a cold sweat, nausea, or lightheadedness. Don't wait more than five minutes to call 911 - MINUTES MATTER! Fast action can save your life. Calling 911 is almost always the fastest way to get lifesaving treatment. Emergency Medical Services staff can begin treatment when they arrive -- up to an hour sooner than if someone gets to the hospital by car. The discharge information has been reviewed with the patient. The patient verbalized understanding. Discharge medications reviewed with the patient and appropriate educational materials and side effects teaching were provided.   ___________________________________________________________________________________________________________________________________

## 2017-12-20 NOTE — PROGRESS NOTES
Daily Progress Note  [facility] at [address] Date: 2017  Day 5 post cholecystostomy placement    Subjective:     Last 24 hrs: Cholecystostomy tube clamped yesterday. Has some tenderness around the tube insertion site, unchanged since yesterday. Objective:     Blood pressure 140/61, pulse 75, temperature 98.6 °F (37 °C), resp. rate 17, height 5' 5\" (1.651 m), weight 66.8 kg (147 lb 4.3 oz), SpO2 96 %. Temp (24hrs), Av.6 °F (37 °C), Min:98.5 °F (36.9 °C), Max:98.8 °F (37.1 °C)      _____________________  Physical Exam:     Alert and Oriented, sitting up in bed, no acute distress. Cardiovascular: RRR, no peripheral edema  Lungs:CTAB   Abdomen: soft, mild tenderness around tube insertion site. No drainage or erythema. Has chronic abdominal wound with ss drainage. Assessment:   Principal Problem:    Nontraumatic ischemic infarction of muscle of hand (2017)    Active Problems:    Acute cholecystitis (12/15/2017)    s/p cholecystotomy tube placement for acute cholecystitis. Improved. Plan:     Stable for DC from General Surgery perspective  F/U appt in 4 weeks (scheduled)  He is followed by Dr. Sarah Chan at [facility] for his chronic abd wound. He is independent with wound care at home.          Merlyn Sellers, [facility] -  [facility] at [address], [address] [place] [address]  [phone]    Data Review:    Recent Labs      17   0545   WBC  9.9   HGB  10.2*   HCT  30.4*   PLT  293     Recent Labs      17   0304  17   0448  17   0545   NA  135*  132*  134*  135*   K  3.7  3.9  3.7  3.7   CL  99  100  99  99   CO2  27  22  24  25   GLU  107*  115*  101*  103*   BUN  19  41*  31*  30*   CREA  3.61*  5.45*  4.81*  4.62*   CA  8.6  8.3*  8.6  8.7   ALB  2.6*  2.3*  2.5*   TBILI  0.4  0.4  0.4   SGOT  17  17  21   ALT  15  16  22     No results for input(s): AML, LPSE in the last 72 hours.        ______________________  Medications:    Current Facility-Administered Medications   Medication Dose Route Frequency    nitroglycerin (NITROBID) 2 % ointment 2 Inch  2 Inch Topical Q6H PRN    amoxicillin-clavulanate (AUGMENTIN) 500-125 mg per tablet 1 Tab  1 Tab Oral Q12H    insulin regular (NOVOLIN R, HUMULIN R) injection   SubCUTAneous AC&HS    albuterol-ipratropium (DUO-NEB) 2.5 MG-0.5 MG/3 ML  3 mL Nebulization BID RT    sodium chloride (NS) flush 5-10 mL  5-10 mL IntraVENous Q8H    arformoterol (BROVANA) neb solution 15 mcg  15 mcg Nebulization BID RT    And    budesonide (PULMICORT) 500 mcg/2 ml nebulizer suspension  500 mcg Nebulization BID RT    nicotine (NICODERM CQ) 21 mg/24 hr patch 1 Patch  1 Patch TransDERmal DAILY    pantoprazole (PROTONIX) tablet 40 mg  40 mg Oral ACB&D    sucralfate (CARAFATE) 100 mg/mL oral suspension 1 g  1 g Oral AC&HS    oxyCODONE-acetaminophen (PERCOCET 10)  mg per tablet 1 Tab  1 Tab Oral Q6H    HYDROmorphone (DILAUDID) injection 1.5 mg  1.5 mg IntraVENous Q3H PRN    albuterol (PROVENTIL VENTOLIN) nebulizer solution 2.5 mg  2.5 mg Nebulization QID PRN    carvedilol (COREG) tablet 6.25 mg  6.25 mg Oral BID WITH MEALS    furosemide (LASIX) tablet 80 mg  80 mg Oral BID    gabapentin (NEURONTIN) capsule 300 mg  300 mg Oral QHS

## 2017-12-20 NOTE — DIALYSIS
[facility]  [phone]    Vitals   Pre   Post   Assessment   Pre   Post     Temp  97.8  98.2 LOC  A&Ox 3 and following commands A&Ox 3 and following commands   HR   68 62 Lungs   Clear Clear   B/P  160/86 124/68 Cardiac   Heart sounds auscultated, pulses palpable     Resp   16 18 Skin   Warm, dry and intact Warm, dry and intact   Pain level  0 0 Edema  None noted None noted   Orders:    Duration:   Start:   1330 End:   1530 Total:   2 hours   Dialyzer:   Revaclear   K Bath:   3   Ca Bath:   20.5   Na/Bicarb:   138/35   Target Fluid Removal:   2 kg   Access     Type & Location:   LSC CVC. +aspiration/NS flushes x2 ports. Labs     Obtained/Reviewed   Critical Results Called   Date when labs were drawn-  Hgb-    HGB   Date Value Ref Range Status   12/18/2017 10.2 (L) 12.1 - 17.0 g/dL Final     K-    Potassium   Date Value Ref Range Status   12/20/2017 3.7 3.5 - 5.1 mmol/L Final     Ca-   Calcium   Date Value Ref Range Status   12/20/2017 8.6 8.5 - 10.1 MG/DL Final     Bun-   BUN   Date Value Ref Range Status   12/20/2017 19 6 - 20 MG/DL Final     Creat-   Creatinine   Date Value Ref Range Status   12/20/2017 3.61 (H) 0.70 - 1.30 MG/DL Final     Comment:     INVESTIGATED PER DELTA CHECK PROTOCOL        Medications/ Blood Products Given     Name   Dose   Route and Time     None ordered                Blood Volume Processed (BVP):    30.4 Net Fluid   Removed:  2 kg   Comments   Time Out Done: Yes: Debbie Crane RN  Primary Nurse Rpt Pre: Ora Pedersen RN  Primary Nurse Rpt Kevin Wayne RN  Pt Education: Access care  Care Plan: Continue tx as ordered  Tx Summary: Pt tolerated tx well. At the end blood in circuit returned with 300ml of NS. Clamps secured, sterile caps applied. Dressing/biopatch changed. Returned to room via transportation.    Admiting Diagnosis:  Pt's previous clinic-  Consent signed - Informed Consent Verified: Yes (12/19/17 8640)  [facility] Consent - Yes  Hepatitis Status- Negative on 12/6/17  Machine #- [id number]  Telemetry status- None  Pre-dialysis wt. - 68.7 kg

## 2017-12-23 LAB
ATRIAL RATE: 76 BPM
CALCULATED P AXIS, ECG09: 73 DEGREES
CALCULATED R AXIS, ECG10: 96 DEGREES
CALCULATED T AXIS, ECG11: 63 DEGREES
DIAGNOSIS, 93000: NORMAL
P-R INTERVAL, ECG05: 168 MS
Q-T INTERVAL, ECG07: 388 MS
QRS DURATION, ECG06: 94 MS
QTC CALCULATION (BEZET), ECG08: 436 MS
VENTRICULAR RATE, ECG03: 76 BPM

## 2018-01-15 NOTE — OP NOTES
[address] [facility] Leisa Acuña.  MR#: [id number]  : 1965  ACCOUNT #: [de-identified]   DATE OF SERVICE: 2017    PREOPERATIVE DIAGNOSIS:  Ischemic left arm. POSTOPERATIVE DIAGNOSIS:  Ischemic left arm. PROCEDURE:   Thrombectomy of left arm. SURGEON:  Inocente Monson MD    ANESTHESIA:  Regional with sedation    ESTIMATED BLOOD LOSS:  50    SPECIMENS REMOVED:  None. COMPLICATIONS:  None. IMPLANTS:  none    INDICATIONS FOR PROCEDURE:  The patient is a 49-year-old dialysis patient who presented with an ischemic left arm following a dialysis procedure. He presents for thrombectomy of left brachial artery. DESCRIPTION OF PROCEDURE: The patient was placed supine on the operating table, and regional anesthesia was established by the anesthesia department with a supraclavicular block. There were a number of incisions in the arm from multiple prior surgeries, including fractures and dialysis grafts, etc.  An incision was made through one of the previous incisions near the area of the brachial artery above the elbow crease, and with some difficulty the brachial artery was dissected out and isolated with vessel loops. The patient was infused with heparin. A transverse arteriotomy was made in the brachial artery, and a 3 Fogerty was run distally and proximally, yielding a large amount of thrombus from both sides, with excellent backbleeding as well as brisk forward bleeding. After this was completed, the area was packed with heparinized saline and closed with a 5-0 Prolene in figure-of-eight fashion. Upon release of the clamps, there was a nice pulse returned to this area and a good Doppler signal at the wrist.  The wound was then thoroughly irrigated and closed in layers of 2-0 Vicryl and staples. The counts were correct at the end of the case x2.   The patient was then awoken and transported to the recovery room in stable condition.       Barby Alonzo MD AM / Dinh.Eveline  D: 01/15/2018 16:28     T: 01/15/2018 16:58  JOB #: [id number]  CC: KENDAL GALLOWAY NP

## 2018-01-19 NOTE — DISCHARGE SUMMARY
[address] [facility],[address] Stacie Chahal.  MR#: [id number]  : 1965  ACCOUNT #: [de-identified]   ADMIT DATE: 2017  DISCHARGE DATE: 2017    PROCEDURES PERFORMED DURING ADMISSION:  Include a thrombectomy of the left arm for an ischemic hand on 2017 and then a cholecystostomy tube performed by Dr. Doris العراقي of interventional radiology on 12/15/2017. HISTORY:  Please refer to dictated history and physical for details, but essentially, the patient is a severely debilitated 55-year-old gentleman with end-stage renal disease, multiple severe medical problems who presented with a brachial artery occlusion following a percutaneous graft thrombectomy done elsewhere. He presented to me for revascularization of the left arm. HOSPITAL COURSE:  The patient underwent an uncomplicated left arm thrombectomy that resulted in a normal flow to his left hand with removal of a large amount of thrombus. Following the procedure, he underwent nephrology consultation that assisted with starting his dialysis. It was planned for him to be able to go home; however, he developed severe abdominal pain. This prompted a hospitalist consult who assisted with evaluation. He has also underwent a gastroenterology consult. Due to his tenderness, a CT scan was obtained. This suggested acute cholecystitis along with his lab work for which a general surgery consult was obtained. I should add that a HIDA scan was used to assist with this diagnosis. Due to his severe debility and end-stage COPD as the patient described it, he underwent a cholecystostomy tube placement. Essentially he improved significantly after this. In terms of symptoms, he got quite a bit better. Tube instructions were given to the patient along with instructions from the general surgery team.  He was placed on antibiotics for this.   Unfortunately, towards the end of this hospital stay, his dialysis graft did thrombosed again and rather than put him through approximately his sixth graft thrombectomy in a short amount of time, it was decided to place a catheter, so a tunnel catheter was placed prior to discharge. He did undergo a cholangiogram which revealed the patency of his ductal system. He was discharged on his normal medication along with pain medicine, Percocet, omeprazole and antibiotics. DISPOSITION:  home.       Cassandra Patton MD AM/LUIZ  D: 01/18/2018 18:26     T: 01/18/2018 19:08  JOB #: [id number]  CC: KENDAL GALLOWAY NP

## 2018-01-23 ENCOUNTER — OFFICE VISIT (OUTPATIENT)
Dept: SURGERY | Age: 53
End: 2018-01-23

## 2018-01-23 VITALS
TEMPERATURE: 98.1 F | WEIGHT: 147 LBS | HEIGHT: 65 IN | BODY MASS INDEX: 24.49 KG/M2 | HEART RATE: 63 BPM | DIASTOLIC BLOOD PRESSURE: 70 MMHG | OXYGEN SATURATION: 98 % | RESPIRATION RATE: 20 BRPM | SYSTOLIC BLOOD PRESSURE: 150 MMHG

## 2018-01-23 DIAGNOSIS — K81.0 ACUTE CHOLECYSTITIS: Primary | ICD-10-CM

## 2018-01-23 PROBLEM — E11.21 TYPE 2 DIABETES MELLITUS WITH NEPHROPATHY (HCC): Status: ACTIVE | Noted: 2018-01-23

## 2018-01-23 NOTE — PROGRESS NOTES
1. Have you been to the ER, urgent care clinic since your last visit? Hospitalized since your last visit? No    2. Have you seen or consulted any other health care providers outside of the [address] [place] [address] since your last visit? Include any pap smears or colon screening.  No

## 2018-01-24 NOTE — PATIENT INSTRUCTIONS
Learning About Acute Cholecystitis  What is cholecystitis? Cholecystitis (say \"koh-lih-sis-TY-tus\") is inflammation of the gallbladder. The gallbladder stores bile. Bile helps the body digest food. Normally, the bile flows from the gallbladder to the small intestine. A gallstone stuck in the cystic duct is most often the cause of sudden (acute) cholecystitis. The cystic duct is the tube that carries the bile out of the gallbladder. The gallstone blocks the bile from leaving the gallbladder. This results in an irritated and swollen gallbladder. The disease can also be caused by infection or trauma, such as an injury from a car accident. Cholecystitis has to be treated right away. You will probably have to go to the hospital. Surgery is the usual treatment. What are the symptoms? Symptoms include:  · Steady and severe pain in the upper right part of belly. This is the most common symptom. The pain can sometimes move to your back or right shoulder blade. It may last for more than 6 hours. · Nausea or vomiting. · A fever. How is it treated? The main way to treat this disease is surgery to remove the gallbladder. This surgery can often be done through small cuts (incisions) in the belly. This is called a laparoscopic cholecystectomy. In some cases, you may need a more extensive surgery. You may need surgery as soon as possible. The doctor may try to reduce swelling and irritation in the gallbladder before removing it. You may be given fluids and antibiotics through an IV. You may also be given pain medicine. Follow-up care is a key part of your treatment and safety. Be sure to make and go to all appointments, and call your doctor if you are having problems. It's also a good idea to know your test results and keep a list of the medicines you take. Where can you learn more? Go to http://andrew-waldo.info/.   Enter T940 in the search box to learn more about \"Learning About Acute Cholecystitis. \"  Current as of: May 12, 2017  Content Version: 11.4  © 1717-5274 Healthwise, [employer]. Care instructions adapted under license by [employer] (which disclaims liability or warranty for this information). If you have questions about a medical condition or this instruction, always ask your healthcare professional. [place] [address] any warranty or liability for your use of this information.

## 2018-01-25 NOTE — PROGRESS NOTES
Subjective:      Cait Adame is a 45 y.o. male presents for follow-up following cholecystostomy for acute cholecystitis. Appetite is good. Eating a regular diet without difficulty. Bowel movements are regular. The patient is voiding without difficulty. The patient is not having any pain. No fever or chills. Objective:     Visit Vitals    /70    Pulse 63    Temp 98.1 °F (36.7 °C)    Resp 20    Ht 5' 5\" (1.651 m)    Wt 147 lb (66.7 kg)    SpO2 98%    BMI 24.46 kg/m2       General:  alert, cooperative, no distress, appears older than stated age   Abdomen: soft, non-tender, drain site clear; chronic open midline wound   Incision:   N/A     Assessment:     Doing well following cholecystostomy. Not a good surgical candidate given comorbidities. Plan:     1. Continue current medications. 2. Drain removed. 3. Pt is to increase activities as tolerated. 4. Low-fat diet.

## 2018-01-31 ENCOUNTER — HOSPITAL ENCOUNTER (EMERGENCY)
Age: 53
Discharge: HOME | End: 2018-01-31
Attending: EMERGENCY MEDICINE
Payer: MEDICAID

## 2018-01-31 VITALS
TEMPERATURE: 98.6 F | HEIGHT: 66 IN | SYSTOLIC BLOOD PRESSURE: 177 MMHG | OXYGEN SATURATION: 93 % | WEIGHT: 150 LBS | BODY MASS INDEX: 24.11 KG/M2 | DIASTOLIC BLOOD PRESSURE: 82 MMHG | RESPIRATION RATE: 16 BRPM | HEART RATE: 75 BPM

## 2018-01-31 DIAGNOSIS — T82.838A BLEEDING FROM PERIPHERALLY INSERTED CENTRAL VENOUS CATHETER (PICC), INITIAL ENCOUNTER: Primary | ICD-10-CM

## 2018-01-31 PROCEDURE — 99284 EMERGENCY DEPT VISIT MOD MDM: CPT
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
## 2018-02-01 NOTE — ED NOTES
Assumed care, verbal and beside report received from [facility]. Pt resting comfortably in bed, monitor x 2,  alert and oriented x 4 with no complaints at this time.

## 2018-02-01 NOTE — ED TRIAGE NOTES
Patient comes to the ER via EMS c/o bleeding from dialysis catheter. Patient had dialysis today.  Bleeding controlled upon arrival.

## 2018-02-01 NOTE — ED NOTES
Pt discharged from ED with follow up care instructions provided by MD; pt has no questions at this time. VSS. NAD. Pt ambulatory from ED with steady gait and family member present.

## 2018-02-22 RX ORDER — ERGOCALCIFEROL 1.25 MG/1
50000 CAPSULE ORAL
COMMUNITY

## 2018-02-22 RX ORDER — OMEPRAZOLE 20 MG/1
20 CAPSULE, DELAYED RELEASE ORAL AS NEEDED
COMMUNITY

## 2018-02-22 RX ORDER — CARVEDILOL 25 MG/1
12.5 TABLET ORAL 2 TIMES DAILY
COMMUNITY
End: 2020-01-01 | Stop reason: CLARIF

## 2018-02-22 RX ORDER — SEVELAMER CARBONATE 800 MG/1
800 TABLET, FILM COATED ORAL 3 TIMES DAILY
COMMUNITY
End: 2020-01-01

## 2018-02-22 NOTE — PERIOP NOTES
PHONE INTERVIEW DONE WITH PATIENT'S WIFE AFTER PATIENT GAVE PERMISSION TO INTERVIEW HER. PRE-OP INSTRUCTIONS REVIEWED WITH PATIENT'S WIFE.

## 2018-02-26 ENCOUNTER — ANESTHESIA EVENT (OUTPATIENT)
Dept: SURGERY | Age: 53
End: 2018-02-26
Payer: MEDICAID

## 2018-02-27 ENCOUNTER — ANESTHESIA (OUTPATIENT)
Dept: SURGERY | Age: 53
End: 2018-02-27
Payer: MEDICAID

## 2018-02-27 ENCOUNTER — HOSPITAL ENCOUNTER (OUTPATIENT)
Age: 53
Setting detail: OUTPATIENT SURGERY
Discharge: HOME | End: 2018-02-27
Payer: MEDICAID

## 2018-02-27 VITALS
HEART RATE: 77 BPM | BODY MASS INDEX: 23.39 KG/M2 | RESPIRATION RATE: 12 BRPM | HEIGHT: 67 IN | WEIGHT: 149 LBS | SYSTOLIC BLOOD PRESSURE: 148 MMHG | OXYGEN SATURATION: 99 % | DIASTOLIC BLOOD PRESSURE: 51 MMHG | TEMPERATURE: 98.1 F

## 2018-02-27 DIAGNOSIS — Z99.2 ESRD ON DIALYSIS (HCC): Primary | ICD-10-CM

## 2018-02-27 DIAGNOSIS — N18.6 ESRD ON DIALYSIS (HCC): Primary | ICD-10-CM

## 2018-02-27 LAB
ANION GAP BLD CALC-SCNC: 15 MMOL/L (ref 5–15)
BUN BLD-MCNC: 19 MG/DL (ref 9–20)
CA-I BLD-MCNC: 1.06 MMOL/L (ref 1.12–1.32)
CHLORIDE BLD-SCNC: 98 MMOL/L (ref 98–107)
CO2 BLD-SCNC: 24 MMOL/L (ref 21–32)
CREAT BLD-MCNC: 3.2 MG/DL (ref 0.6–1.3)
GLUCOSE BLD STRIP.AUTO-MCNC: 101 MG/DL (ref 65–100)
GLUCOSE BLD STRIP.AUTO-MCNC: 74 MG/DL (ref 65–100)
GLUCOSE BLD-MCNC: 85 MG/DL (ref 65–100)
HCT VFR BLD CALC: 34 % (ref 36.6–50.3)
HGB BLD-MCNC: 11.6 GM/DL (ref 12.1–17)
POTASSIUM BLD-SCNC: 4.4 MMOL/L (ref 3.5–5.1)
SERVICE CMNT-IMP: ABNORMAL
SERVICE CMNT-IMP: ABNORMAL
SERVICE CMNT-IMP: NORMAL
SODIUM BLD-SCNC: 131 MMOL/L (ref 136–145)

## 2018-02-27 PROCEDURE — 80047 BASIC METABLC PNL IONIZED CA: CPT

## 2018-02-27 PROCEDURE — 74011250636 HC RX REV CODE- 250/636

## 2018-02-27 PROCEDURE — 76060000033 HC ANESTHESIA 1 TO 1.5 HR

## 2018-02-27 PROCEDURE — 82962 GLUCOSE BLOOD TEST: CPT

## 2018-02-27 PROCEDURE — 77030020256 HC SOL INJ NACL 0.9%  500ML

## 2018-02-27 PROCEDURE — 77030032490 HC SLV COMPR SCD KNE COVD -B

## 2018-02-27 PROCEDURE — 77030011640 HC PAD GRND REM COVD -A

## 2018-02-27 PROCEDURE — 74011000258 HC RX REV CODE- 258

## 2018-02-27 PROCEDURE — 76210000021 HC REC RM PH II 0.5 TO 1 HR

## 2018-02-27 PROCEDURE — 77030031139 HC SUT VCRL2 J&J -A

## 2018-02-27 PROCEDURE — 77030003601 HC NDL NRV BLK BBMI -A

## 2018-02-27 PROCEDURE — 76210000016 HC OR PH I REC 1 TO 1.5 HR

## 2018-02-27 PROCEDURE — A4565 SLINGS: HCPCS

## 2018-02-27 PROCEDURE — 74011250636 HC RX REV CODE- 250/636: Performed by: ANESTHESIOLOGY

## 2018-02-27 PROCEDURE — 77030018846 HC SOL IRR STRL H20 ICUM -A

## 2018-02-27 PROCEDURE — 77030020782 HC GWN BAIR PAWS FLX 3M -B

## 2018-02-27 PROCEDURE — 77030008467 HC STPLR SKN COVD -B

## 2018-02-27 PROCEDURE — 76010000149 HC OR TIME 1 TO 1.5 HR

## 2018-02-27 RX ORDER — PROPOFOL 10 MG/ML
INJECTION, EMULSION INTRAVENOUS AS NEEDED
Status: DISCONTINUED | OUTPATIENT
Start: 2018-02-27 | End: 2018-02-27 | Stop reason: HOSPADM

## 2018-02-27 RX ORDER — SODIUM CHLORIDE 0.9 % (FLUSH) 0.9 %
5-10 SYRINGE (ML) INJECTION AS NEEDED
Status: DISCONTINUED | OUTPATIENT
Start: 2018-02-27 | End: 2018-02-27 | Stop reason: HOSPADM

## 2018-02-27 RX ORDER — OXYCODONE AND ACETAMINOPHEN 10; 325 MG/1; MG/1
1 TABLET ORAL
Qty: 35 TAB | Refills: 0 | Status: SHIPPED | OUTPATIENT
Start: 2018-02-27 | End: 2020-01-01 | Stop reason: DRUGHIGH

## 2018-02-27 RX ORDER — FENTANYL CITRATE 50 UG/ML
50 INJECTION, SOLUTION INTRAMUSCULAR; INTRAVENOUS AS NEEDED
Status: DISCONTINUED | OUTPATIENT
Start: 2018-02-27 | End: 2018-02-27 | Stop reason: HOSPADM

## 2018-02-27 RX ORDER — MIDAZOLAM HYDROCHLORIDE 1 MG/ML
1 INJECTION, SOLUTION INTRAMUSCULAR; INTRAVENOUS AS NEEDED
Status: DISCONTINUED | OUTPATIENT
Start: 2018-02-27 | End: 2018-02-27 | Stop reason: HOSPADM

## 2018-02-27 RX ORDER — HYDROMORPHONE HYDROCHLORIDE 1 MG/ML
0.2 INJECTION, SOLUTION INTRAMUSCULAR; INTRAVENOUS; SUBCUTANEOUS
Status: DISCONTINUED | OUTPATIENT
Start: 2018-02-27 | End: 2018-02-27 | Stop reason: HOSPADM

## 2018-02-27 RX ORDER — SODIUM CHLORIDE 9 MG/ML
INJECTION, SOLUTION INTRAVENOUS
Status: DISCONTINUED | OUTPATIENT
Start: 2018-02-27 | End: 2018-02-27 | Stop reason: HOSPADM

## 2018-02-27 RX ORDER — FENTANYL CITRATE 50 UG/ML
25 INJECTION, SOLUTION INTRAMUSCULAR; INTRAVENOUS
Status: DISCONTINUED | OUTPATIENT
Start: 2018-02-27 | End: 2018-02-27 | Stop reason: HOSPADM

## 2018-02-27 RX ORDER — CEFAZOLIN SODIUM IN 0.9 % NACL 2 G/100 ML
PLASTIC BAG, INJECTION (ML) INTRAVENOUS AS NEEDED
Status: DISCONTINUED | OUTPATIENT
Start: 2018-02-27 | End: 2018-02-27

## 2018-02-27 RX ORDER — MIDAZOLAM HYDROCHLORIDE 1 MG/ML
0.5 INJECTION, SOLUTION INTRAMUSCULAR; INTRAVENOUS
Status: DISCONTINUED | OUTPATIENT
Start: 2018-02-27 | End: 2018-02-27 | Stop reason: HOSPADM

## 2018-02-27 RX ORDER — SODIUM CHLORIDE 9 MG/ML
25 INJECTION, SOLUTION INTRAVENOUS CONTINUOUS
Status: DISCONTINUED | OUTPATIENT
Start: 2018-02-27 | End: 2018-02-27 | Stop reason: HOSPADM

## 2018-02-27 RX ORDER — SODIUM CHLORIDE 0.9 % (FLUSH) 0.9 %
5-10 SYRINGE (ML) INJECTION EVERY 8 HOURS
Status: DISCONTINUED | OUTPATIENT
Start: 2018-02-27 | End: 2018-02-27 | Stop reason: HOSPADM

## 2018-02-27 RX ORDER — SODIUM CHLORIDE, SODIUM LACTATE, POTASSIUM CHLORIDE, CALCIUM CHLORIDE 600; 310; 30; 20 MG/100ML; MG/100ML; MG/100ML; MG/100ML
125 INJECTION, SOLUTION INTRAVENOUS CONTINUOUS
Status: DISCONTINUED | OUTPATIENT
Start: 2018-02-27 | End: 2018-02-27 | Stop reason: HOSPADM

## 2018-02-27 RX ORDER — CEFAZOLIN SODIUM/WATER 2 G/20 ML
2 SYRINGE (ML) INTRAVENOUS ONCE
Status: COMPLETED | OUTPATIENT
Start: 2018-02-27 | End: 2018-02-27

## 2018-02-27 RX ORDER — LIDOCAINE HYDROCHLORIDE 10 MG/ML
0.1 INJECTION, SOLUTION EPIDURAL; INFILTRATION; INTRACAUDAL; PERINEURAL AS NEEDED
Status: DISCONTINUED | OUTPATIENT
Start: 2018-02-27 | End: 2018-02-27 | Stop reason: HOSPADM

## 2018-02-27 RX ORDER — SODIUM CHLORIDE, SODIUM LACTATE, POTASSIUM CHLORIDE, CALCIUM CHLORIDE 600; 310; 30; 20 MG/100ML; MG/100ML; MG/100ML; MG/100ML
75 INJECTION, SOLUTION INTRAVENOUS CONTINUOUS
Status: DISCONTINUED | OUTPATIENT
Start: 2018-02-27 | End: 2018-02-27 | Stop reason: HOSPADM

## 2018-02-27 RX ORDER — MORPHINE SULFATE 10 MG/ML
2 INJECTION, SOLUTION INTRAMUSCULAR; INTRAVENOUS
Status: DISCONTINUED | OUTPATIENT
Start: 2018-02-27 | End: 2018-02-27 | Stop reason: HOSPADM

## 2018-02-27 RX ORDER — ONDANSETRON 2 MG/ML
4 INJECTION INTRAMUSCULAR; INTRAVENOUS AS NEEDED
Status: DISCONTINUED | OUTPATIENT
Start: 2018-02-27 | End: 2018-02-27 | Stop reason: HOSPADM

## 2018-02-27 RX ORDER — DIPHENHYDRAMINE HYDROCHLORIDE 50 MG/ML
12.5 INJECTION, SOLUTION INTRAMUSCULAR; INTRAVENOUS AS NEEDED
Status: DISCONTINUED | OUTPATIENT
Start: 2018-02-27 | End: 2018-02-27 | Stop reason: HOSPADM

## 2018-02-27 RX ORDER — ROPIVACAINE HYDROCHLORIDE 5 MG/ML
30 INJECTION, SOLUTION EPIDURAL; INFILTRATION; PERINEURAL ONCE
Status: DISCONTINUED | OUTPATIENT
Start: 2018-02-27 | End: 2018-02-27 | Stop reason: HOSPADM

## 2018-02-27 RX ORDER — PROPOFOL 10 MG/ML
INJECTION, EMULSION INTRAVENOUS
Status: DISCONTINUED | OUTPATIENT
Start: 2018-02-27 | End: 2018-02-27 | Stop reason: HOSPADM

## 2018-02-27 RX ADMIN — PROPOFOL 20 MG: 10 INJECTION, EMULSION INTRAVENOUS at 07:38

## 2018-02-27 RX ADMIN — FENTANYL CITRATE 50 MCG: 50 INJECTION, SOLUTION INTRAMUSCULAR; INTRAVENOUS at 07:21

## 2018-02-27 RX ADMIN — SODIUM CHLORIDE 25 ML/HR: 9 INJECTION, SOLUTION INTRAVENOUS at 07:19

## 2018-02-27 RX ADMIN — Medication 2 G: at 07:35

## 2018-02-27 RX ADMIN — PROPOFOL 20 MG: 10 INJECTION, EMULSION INTRAVENOUS at 07:41

## 2018-02-27 RX ADMIN — PROPOFOL 20 MG: 10 INJECTION, EMULSION INTRAVENOUS at 07:46

## 2018-02-27 RX ADMIN — PROPOFOL 20 MG: 10 INJECTION, EMULSION INTRAVENOUS at 07:43

## 2018-02-27 RX ADMIN — PROPOFOL 30 MG: 10 INJECTION, EMULSION INTRAVENOUS at 07:35

## 2018-02-27 RX ADMIN — PROPOFOL 40 MCG/KG/MIN: 10 INJECTION, EMULSION INTRAVENOUS at 07:35

## 2018-02-27 RX ADMIN — SODIUM CHLORIDE: 9 INJECTION, SOLUTION INTRAVENOUS at 07:30

## 2018-02-27 RX ADMIN — MIDAZOLAM HYDROCHLORIDE 2 MG: 1 INJECTION, SOLUTION INTRAMUSCULAR; INTRAVENOUS at 07:21

## 2018-02-27 RX ADMIN — PROPOFOL 30 MG: 10 INJECTION, EMULSION INTRAVENOUS at 07:49

## 2018-02-27 NOTE — PERIOP NOTES
6569 - Patient interviewed in holding area, patient identity, allergies, and procedure verified. 0 - Patient's family member (wife - Yusra Subramanian) called and updated on patient progression and start of procedure. 1,000 mL of Sterile Water added to field for cleaning of instrumentation.    No specimen collected for this procedure, verified with MD.

## 2018-02-27 NOTE — IP AVS SNAPSHOT
[address] [facility] [address] [place] [address] 
[phone] Patient: Abraham Pena. MRN: [id number] CYK:4/14/[birth year]70 About your hospitalization You were admitted on:  February 27, 2018 You last received care in the:  [facility] PACU You were discharged on:  February 27, 2018 Why you were hospitalized Your primary diagnosis was:  Not on File Follow-up Information Follow up With Details Comments Contact Info Luma An MD Schedule an appointment as soon as possible for a visit in 2 week(s)  Natasha [address] [place] [address] 
[phone] Discharge Orders None A check kaykay indicates which time of day the medication should be taken. My Medications CHANGE how you take these medications Instructions Each Dose to Equal  
 Morning Noon Evening Bedtime  
 oxyCODONE-acetaminophen  mg per tablet Commonly known as:  PERCOCET 10 What changed:  when to take this Your last dose was: Your next dose is: Take 1 Tab by mouth every six (6) hours. Max Daily Amount: 4 Tabs. 1 Tab CONTINUE taking these medications Instructions Each Dose to Equal  
 Morning Noon Evening Bedtime  
 albuterol-ipratropium 2.5 mg-0.5 mg/3 ml Nebu Commonly known as:  Rosalind Jude Your last dose was: Your next dose is:    
   
   
 3 mL by Nebulization route three (3) times daily. AT LUNCHTIME DAILY. 3 mL  
    
   
   
   
  
 ANORO ELLIPTA 62.5-25 mcg/actuation inhaler Generic drug:  umeclidinium-vilanterol Your last dose was: Your next dose is: Take 1 Puff by inhalation daily. 1 Puff  
    
   
   
   
  
 b complex-vitamin c-folic acid 1 mg capsule Commonly known as:  Lauren Bustillo Your last dose was: Your next dose is: Take 1 Cap by mouth daily. 1 Cap  
    
   
   
   
  
 carvedilol 12.5 mg tablet Commonly known as:  Freda Parra Your last dose was: Your next dose is: Take  by mouth two (2) times a day. EMLA topical cream  
Generic drug:  lidocaine-prilocaine Your last dose was: Your next dose is:    
   
   
 Apply  to affected area as needed for Pain. Patient applies to arm before dialysis on Monday, Wednesday, and Friday. furosemide 80 mg tablet Commonly known as:  LASIX Your last dose was: Your next dose is: Take 80 mg by mouth two (2) times a day. 80 mg  
    
   
   
   
  
 gabapentin 300 mg capsule Commonly known as:  NEURONTIN Your last dose was: Your next dose is: Take 300 mg by mouth nightly. 300 mg * NovoLOG Mix 70-30FlexPen U-100 100 unit/mL (70-30) Inpn Generic drug:  insulin aspart protamine/insulin aspart Your last dose was: Your next dose is:    
   
   
 25 Units by SubCUTAneous route daily. EVERY MORNING  
 25 Units * NovoLOG Mix 70-30FlexPen U-100 100 unit/mL (70-30) Inpn Generic drug:  insulin aspart protamine/insulin aspart Your last dose was: Your next dose is:    
   
   
 35 Units by SubCUTAneous route every evening. 35 Units PriLOSEC 20 mg capsule Generic drug:  omeprazole Your last dose was: Your next dose is: Take 20 mg by mouth as needed. 20 mg PROVENTIL 90 mcg/actuation inhaler Generic drug:  albuterol Your last dose was: Your next dose is: Take 2 Puffs by inhalation four (4) times daily. QID PRN  
 2 Puff RENVELA PO Your last dose was: Your next dose is: Take 800 mg by mouth three (3) times daily. 800 mg  
    
   
   
   
  
 VITAMIN D2 50,000 unit capsule Generic drug:  ergocalciferol Your last dose was: Your next dose is: Take 50,000 Units by mouth every seven (7) days. MONDAY  
 [address] Units * Notice: This list has 2 medication(s) that are the same as other medications prescribed for you. Read the directions carefully, and ask your doctor or other care provider to review them with you. Discharge Instructions Patient Discharge Instructions Jessica HyattBenito / [id number] : 1965 Admitted 2018 Discharged: 2018 Take Home Medications · It is important that you take the medication exactly as they are prescribed. · Keep your medication in the bottles provided by the pharmacist and keep a list of the medication names, dosages, and times to be taken in your wallet. · Do not take other medications without consulting your doctor. What to do at [facility] Recommended diet: Diabetic Diet, Recommended activity: Activity as tolerated, Additional Instructions: remove dressing on Th and leave open Follow-up with Dr Alexander Jaeger in 2 weeks, call [phone] for appt 
 
______________________________________________________________________ Anesthesia Discharge Instructions After general anesthesia or intervenous sedation, for 24 hours or while taking prescription Narcotics: · Limit your activities · Do not drive or operate hazardous machinery · If you have not urinated within 8 hours after discharge, please contact your surgeon on call. · Do not make important personal or business decisions · Do not drink alcoholic beverages Report the following to your surgeon: 
· Excessive pain, swelling, redness or odor of or around the surgical area · Temperature over 100.5 degrees · Nausea and vomiting lasting longer than 4 hours or if unable to take medication · Any signs of decreased circulation or nerve impairment to extremity:  Change in color, persistent numbness, tingling, coldness or increased pain. 
· Any questions Introducing [facility]! Sonja Burgess introduces Advanced Telemetry patient portal. Now you can access parts of your medical record, email your doctor's office, and request medication refills online. 1. In your internet browser, go to [email or web address] 2. Click on the First Time User? Click Here link in the Sign In box. You will see the New Member Sign Up page. 3. Enter your Advanced Telemetry Access Code exactly as it appears below. You will not need to use this code after youve completed the sign-up process. If you do not sign up before the expiration date, you must request a new code. · Advanced [employer] Access Code: [email or web address] Expires: 5/1/2018  9:15 PM 
 
4. Enter the last four digits of your Social Security Number (xxxx) and Date of Birth (mm/dd/yyyy) as indicated and click Submit. You will be taken to the next sign-up page. 5. Create a Advanced Telemetry ID. This will be your Advanced Telemetry login ID and cannot be changed, so think of one that is secure and easy to remember. 6. Create a [employer] password. You can change your password at any time. 7. Enter your Password Reset Question and Answer. This can be used at a later time if you forget your password. 8. Enter your e-mail address. You will receive e-mail notification when new information is available in [address] 9. Click Sign Up. You can now view and download portions of your medical record. 10. Click the Download Summary menu link to download a portable copy of your medical information. If you have questions, please visit the Frequently Asked Questions section of the Advanced [employer] website. Remember, Advanced Telemetry is NOT to be used for urgent needs. For medical emergencies, dial 911. Now available from your iPhone and Android! Providers Seen During Your Hospitalization Provider Specialty Primary office phone Estefani Finn MD Vascular Surgery [phone] Your Primary Care Physician (PCP) Primary Care Physician Office Phone Office Fax Shakila Taveras [phone] You are allergic to the following Allergen Reactions Ativan (Lorazepam) Other (comments) Hyper activity Recent Documentation Height Weight BMI Smoking Status 1.702 m 67.6 kg 23.34 kg/m2 Current Every Day Smoker Emergency Contacts Name Discharge Info Relation Home Work Mobile Sarah Beth Alba DISCHARGE CAREGIVER [3] Spouse [3] [phone] Patient Belongings The following personal items are in your possession at time of discharge: 
  Dental Appliances: None (NO TEETH)  Visual Aid: None             Clothing: Other (comment) (clothing bag to pacu) Please provide this summary of care documentation to your next provider. Signatures-by signing, you are acknowledging that this After Visit Summary has been reviewed with you and you have received a copy. Patient Signature:  ____________________________________________________________ Date:  ____________________________________________________________  
  
Marlyse Leaks Provider Signature:  ____________________________________________________________ Date:  ____________________________________________________________

## 2018-02-27 NOTE — PERIOP NOTES
Right supraclavicular nerve block performed by Dr. Duane Fraire. Oxygen and monitors in place per protocol. Patient tolerated procedure well.

## 2018-02-27 NOTE — PERIOP NOTES
Patient: Samuel Maria MRN: [id number]  SSN: [id number]   YOB: 1965  Age: 52 y.o. Sex: male     Patient is status post Procedure(s):  RIGHT UPPER ARM BASILIC VEIN TRANSPOSITION  . Surgeon(s) and Role:     * Lasha Adkins MD - Primary    Local/Dose/Irrigation:  Hep/saline irrigation                  Peripheral IV 02/27/18 Left Arm (Active)        Hemodialysis Access (Active)                       Dressing/Packing:  Wound Arm Right;Upper;Mid-DRESSING TYPE: 4 x 4;Special tape (comment); Valeriy (02/27/18 2121)  Splint/Cast:  ]    Other:  SCDs

## 2018-02-27 NOTE — BRIEF OP NOTE
BRIEF OPERATIVE NOTE    Date of Procedure: 2/27/2018   Preoperative Diagnosis: ESRD  Postoperative Diagnosis: ESRD    Procedure(s):  RIGHT UPPER ARM BASILIC VEIN TRANSPOSITION    Surgeon(s) and Role:     * Bianca Enriquez MD - Primary         Assistant Staff: None      Surgical Staff:  Circ-1: Juhi Tee RN  Scrub RN-1: Camilla Whitman RN  Surg Asst-1: Jefry Costello RN  Event Time In   Incision Start 7703   Incision Close 8087     Anesthesia: Regional   Estimated Blood Loss: minimal  Specimens: * No specimens in log *   Findings: none   Complications: none  Implants: * No implants in log *

## 2018-02-27 NOTE — PERIOP NOTES
0910 blood sugar 75, patient given2 boxes of apple juice, 400cc, to drink. Pt is alert and oriented. 0929 blood sugar is 101.

## 2018-02-27 NOTE — ANESTHESIA PROCEDURE NOTES
Peripheral Block    Start time: 2/27/2018 7:12 AM  End time: 2/27/2018 7:16 AM  Performed by: Veronica Hernandez  Authorized by: Veronica Hernandez       Pre-procedure:    Indications: primary anesthetic    Preanesthetic Checklist: patient identified, risks and benefits discussed, site marked, timeout performed, anesthesia consent given and patient being monitored    Timeout Time: 07:12          Block Type:   Block Type:  Supraclavicular  Laterality:  Right  Monitoring:  Standard ASA monitoring, continuous pulse ox, frequent vital sign checks, heart rate, responsive to questions and oxygen  Injection Technique:  Single shot  Procedures: ultrasound guided and nerve stimulator    Patient Position: supine  Prep: betadine and povidone-iodine 7.5% surgical scrub    Location:  Supraclavicular  Needle Type:  Stimuplex  Needle Gauge:  22 G  Needle Localization:  Nerve stimulator and ultrasound guidance  Motor Response: minimal motor response >0.4 mA    Medication Injected:  0.5%  ropivacaine    Assessment:  Number of attempts:  1  Injection Assessment:  Incremental injection every 5 mL, local visualized surrounding nerve on ultrasound, negative aspiration for blood, no intravascular symptoms, negative aspiration for CSF, no paresthesia and ultrasound image on chart  Patient tolerance:  Patient tolerated the procedure well with no immediate complications

## 2018-02-27 NOTE — ANESTHESIA PREPROCEDURE EVALUATION
Anesthetic History   No history of anesthetic complications            Review of Systems / Medical History  Patient summary reviewed, nursing notes reviewed and pertinent labs reviewed    Pulmonary    COPD: mild               Neuro/Psych   Within defined limits           Cardiovascular    Hypertension: well controlled                   GI/Hepatic/Renal     GERD    Renal disease: dialysis and CRI  Liver disease     Endo/Other    Diabetes: well controlled, type 1    Arthritis     Other Findings              Physical Exam    Airway  Mallampati: II  TM Distance: > 6 cm  Neck ROM: normal range of motion   Mouth opening: Normal     Cardiovascular  Regular rate and rhythm,  S1 and S2 normal,  no murmur, click, rub, or gallop             Dental  No notable dental hx       Pulmonary  Breath sounds clear to auscultation               Abdominal  GI exam deferred       Other Findings            Anesthetic Plan    ASA: 3  Anesthesia type: regional          Induction: Intravenous  Anesthetic plan and risks discussed with: Patient

## 2018-02-27 NOTE — DISCHARGE INSTRUCTIONS
Patient Discharge Instructions    Kristi Cam / [id number] : 1965    Admitted 2018 Discharged: 2018     Take Home Medications       · It is important that you take the medication exactly as they are prescribed. · Keep your medication in the bottles provided by the pharmacist and keep a list of the medication names, dosages, and times to be taken in your wallet. · Do not take other medications without consulting your doctor. What to do at Home    Recommended diet: Diabetic Diet,     Recommended activity: Activity as tolerated,     Additional Instructions: remove dressing on Thurs and leave open    Follow-up with Dr Alejandro Ceja in 2 weeks, call [phone] for appt    ______________________________________________________________________    Anesthesia Discharge Instructions    After general anesthesia or intervenous sedation, for 24 hours or while taking prescription Narcotics:  · Limit your activities  · Do not drive or operate hazardous machinery  · If you have not urinated within 8 hours after discharge, please contact your surgeon on call. · Do not make important personal or business decisions  · Do not drink alcoholic beverages    Report the following to your surgeon:  · Excessive pain, swelling, redness or odor of or around the surgical area  · Temperature over 100.5 degrees  · Nausea and vomiting lasting longer than 4 hours or if unable to take medication  · Any signs of decreased circulation or nerve impairment to extremity:  Change in color, persistent numbness, tingling, coldness or increased pain.   · Any questions

## 2018-02-27 NOTE — OP NOTES
[address]  ACUTE CARE OP NOTE    Name:DEMI Packer  MR#: [id number]  : 1965  ACCOUNT #: [de-identified]   DATE OF SERVICE: 2018    PREOPERATIVE DIAGNOSIS:   Renal failure. POSTOPERATIVE DIAGNOSIS:  Renal failure. PROCEDURE PERFORMED:  Right upper arm basilic vein transposition dialysis fistula. SURGEON:  Jasper Alves MD       ANESTHESIA:  Regional block. ESTIMATED BLOOD LOSS:  Minimal.    SPECIMENS REMOVED:  None. COMPLICATIONS:  None. INDICATIONS:  The patient is a 57-year-old male with end-stage renal disease, on hemodialysis. He has had previous left upper arm grafts placed, which have failed. His right upper arm basilic vein appears to be an adequate vein for a native fistula. DESCRIPTION OF PROCEDURE:  The patient's right arm was prepped and draped. A longitudinal incision was made in the distal medial upper arm. The basilic vein was identified and dissected free. The vein was of good size. Side branches were ligated with silk ties. The vein was dissected in the proximal forearm where it was ligated and divided. Dissection was then continued proximally. A second interrupted longitudinal incision in the proximal medial upper arm was made and the vein was further dissected free to the proximal upper arm. A transverse incision was made at the antecubital level. The brachial artery was identified and dissected free. The basilic vein was tunneled in the anteromedial upper arm as closely to the skin as possible using one counter incision. The vein was sewn end-to-side to the brachial artery using running 6-0 Prolene. Following this, there was a good thrill in the fistula. The incisions were then closed with Vicryl subcutaneous suture and skin staples. Dressings were applied and the patient was returned to the recovery room in stable condition.       MD FAIZAN Verdin / [place]  D: 2018 08:46     T: 2018 11:57  JOB #: [id number]

## 2018-02-28 NOTE — ANESTHESIA POSTPROCEDURE EVALUATION
Post-Anesthesia Evaluation and Assessment    Patient: Jodie Arango. MRN: [id number]  SSN: [id number]    YOB: 1965  Age: 52 y.o. Sex: male       Cardiovascular Function/Vital Signs  Visit Vitals    /51    Pulse 77    Temp 36.7 °C (98.1 °F)    Resp 12    Ht 5' 7\" (1.702 m)    Wt 67.6 kg (149 lb)    SpO2 99%    BMI 23.34 kg/m2       Patient is status post regional anesthesia for Procedure(s):  RIGHT UPPER ARM BASILIC VEIN TRANSPOSITION  . Nausea/Vomiting: None    Postoperative hydration reviewed and adequate. Pain:  Pain Scale 1: Numeric (0 - 10) (02/27/18 0931)  Pain Intensity 1: 0 (02/27/18 0931)   Managed    Neurological Status:   Neuro (WDL):  (block rt arm) (02/27/18 0900)   At baseline    Mental Status and Level of Consciousness: Arousable    Pulmonary Status:   O2 Device: Room air (02/27/18 0931)   Adequate oxygenation and airway patent    Complications related to anesthesia: None    Post-anesthesia assessment completed.  No concerns    Signed By: Ron Mcqueen MD     February 28, 2018

## 2018-05-17 ENCOUNTER — HOSPITAL ENCOUNTER (OUTPATIENT)
Dept: ULTRASOUND IMAGING | Age: 53
Discharge: HOME | End: 2018-05-17
Attending: INTERNAL MEDICINE
Payer: MEDICAID

## 2018-05-17 ENCOUNTER — HOSPITAL ENCOUNTER (OUTPATIENT)
Dept: GENERAL RADIOLOGY | Age: 53
Discharge: HOME | End: 2018-05-17
Attending: RADIOLOGY
Payer: MEDICAID

## 2018-05-17 VITALS
HEART RATE: 66 BPM | DIASTOLIC BLOOD PRESSURE: 81 MMHG | TEMPERATURE: 98.6 F | HEIGHT: 66 IN | SYSTOLIC BLOOD PRESSURE: 168 MMHG | WEIGHT: 152 LBS | RESPIRATION RATE: 18 BRPM | OXYGEN SATURATION: 99 % | BODY MASS INDEX: 24.43 KG/M2

## 2018-05-17 DIAGNOSIS — J90 PLEURAL EFFUSION: ICD-10-CM

## 2018-05-17 LAB
APPEARANCE FLD: CLEAR
COLOR FLD: YELLOW
GLUCOSE FLD-MCNC: 110 MG/DL
LDH FLD L TO P-CCNC: 57 U/L
LYMPHOCYTES NFR FLD: 13 %
MESOTHL CELL NFR FLD: 5 %
MONOS+MACROS NFR FLD: 54 %
NEUTROPHILS NFR FLD: 28 %
NUC CELL # FLD: 264 /CU MM
PROT FLD-MCNC: 2 G/DL
RBC # FLD: 0 /CU MM
SPECIMEN SOURCE FLD: ABNORMAL
SPECIMEN SOURCE FLD: NORMAL

## 2018-05-17 PROCEDURE — 82945 GLUCOSE OTHER FLUID: CPT | Performed by: INTERNAL MEDICINE

## 2018-05-17 PROCEDURE — 87075 CULTR BACTERIA EXCEPT BLOOD: CPT | Performed by: INTERNAL MEDICINE

## 2018-05-17 PROCEDURE — 89050 BODY FLUID CELL COUNT: CPT | Performed by: INTERNAL MEDICINE

## 2018-05-17 PROCEDURE — 32555 ASPIRATE PLEURA W/ IMAGING: CPT

## 2018-05-17 PROCEDURE — 88112 CYTOPATH CELL ENHANCE TECH: CPT | Performed by: INTERNAL MEDICINE

## 2018-05-17 PROCEDURE — 88305 TISSUE EXAM BY PATHOLOGIST: CPT | Performed by: INTERNAL MEDICINE

## 2018-05-17 PROCEDURE — 83615 LACTATE (LD) (LDH) ENZYME: CPT | Performed by: INTERNAL MEDICINE

## 2018-05-17 PROCEDURE — 87205 SMEAR GRAM STAIN: CPT | Performed by: INTERNAL MEDICINE

## 2018-05-17 PROCEDURE — 77030028236 US THORACENTESIS LT NDL W IMAGE

## 2018-05-17 PROCEDURE — 84157 ASSAY OF PROTEIN OTHER: CPT | Performed by: INTERNAL MEDICINE

## 2018-05-17 PROCEDURE — 71045 X-RAY EXAM CHEST 1 VIEW: CPT

## 2018-05-17 NOTE — IP AVS SNAPSHOT
[address] [facility] 
[phone] Patient: Faheem aMrtinez MRN: [id number] GAR:5/33[birth year]6382 About your hospitalization You were admitted on:  May 17, 2018 You last received care in the:  [facility] Ultrasound You were discharged on:  May 17, 2018 Why you were hospitalized Your primary diagnosis was:  Not on File Follow-up Information None Discharge Orders None A check kaykay indicates which time of day the medication should be taken. My Medications ASK your doctor about these medications Instructions Each Dose to Equal  
 Morning Noon Evening Bedtime  
 albuterol-ipratropium 2.5 mg-0.5 mg/3 ml Nebu Commonly known as:  Caleb Ahuja Your last dose was: Your next dose is:    
   
   
 3 mL by Nebulization route three (3) times daily. AT LUNCHTIME DAILY. 3 mL  
    
   
   
   
  
 ANORO ELLIPTA 62.5-25 mcg/actuation inhaler Generic drug:  umeclidinium-vilanterol Your last dose was: Your next dose is: Take 1 Puff by inhalation daily. 1 Puff  
    
   
   
   
  
 b complex-vitamin c-folic acid 1 mg capsule Commonly known as:  Delena Meckel Your last dose was: Your next dose is: Take 1 Cap by mouth daily. 1 Cap  
    
   
   
   
  
 carvedilol 12.5 mg tablet Commonly known as:  Apolonia Ni Your last dose was: Your next dose is: Take  by mouth two (2) times a day. EMLA topical cream  
Generic drug:  lidocaine-prilocaine Your last dose was: Your next dose is:    
   
   
 Apply  to affected area as needed for Pain. Patient applies to arm before dialysis on Monday, Wednesday, and Friday. furosemide 80 mg tablet Commonly known as:  LASIX Your last dose was: Your next dose is: Take 80 mg by mouth two (2) times a day. 80 mg  
    
   
   
   
  
 gabapentin 300 mg capsule Commonly known as:  NEURONTIN Your last dose was: Your next dose is: Take 300 mg by mouth nightly. 300 mg * NovoLOG Mix 70-30FlexPen U-100 100 unit/mL (70-30) Inpn Generic drug:  insulin aspart protamine/insulin aspart Your last dose was: Your next dose is:    
   
   
 25 Units by SubCUTAneous route daily. EVERY MORNING  
 25 Units * NovoLOG Mix 70-30FlexPen U-100 100 unit/mL (70-30) Inpn Generic drug:  insulin aspart protamine/insulin aspart Your last dose was: Your next dose is:    
   
   
 35 Units by SubCUTAneous route every evening. 35 Units * oxyCODONE-acetaminophen  mg per tablet Commonly known as:  PERCOCET 10 Your last dose was: Your next dose is: Take 1 Tab by mouth every six (6) hours. Max Daily Amount: 4 Tabs. 1 Tab * oxyCODONE-acetaminophen  mg per tablet Commonly known as:  PERCOCET Your last dose was: Your next dose is: Take 1 Tab by mouth every four (4) hours as needed for Pain. Max Daily Amount: 6 Tabs. 1 Tab PriLOSEC 20 mg capsule Generic drug:  omeprazole Your last dose was: Your next dose is: Take 20 mg by mouth as needed. 20 mg PROVENTIL 90 mcg/actuation inhaler Generic drug:  albuterol Your last dose was: Your next dose is: Take 2 Puffs by inhalation four (4) times daily. QID PRN  
 2 Puff RENVELA PO Your last dose was: Your next dose is: Take 800 mg by mouth three (3) times daily. 800 mg  
    
   
   
   
  
 VITAMIN D2 50,000 unit capsule Generic drug:  ergocalciferol Your last dose was: Your next dose is: Take 50,000 Units by mouth every seven (7) days. MONDAY  
 48735 Units * Notice: This list has 4 medication(s) that are the same as other medications prescribed for you. Read the directions carefully, and ask your doctor or other care provider to review them with you. Opioid Education Prescription Opioids: What You Need to Know: 
 
Prescription opioids can be used to help relieve moderate-to-severe pain and are often prescribed following a surgery or injury, or for certain health conditions. These medications can be an important part of treatment but also come with serious risks. Opioids are strong pain medicines. Examples include hydrocodone, oxycodone, fentanyl, and morphine. Heroin is an example of an illegal opioid. It is important to work with your health care provider to make sure you are getting the safest, most effective care. WHAT ARE THE RISKS AND SIDE EFFECTS OF OPIOID USE? Prescription opioids carry serious risks of addiction and overdose, especially with prolonged use. An opioid overdose, often marked by slow breathing, can cause sudden death. The use of prescription opioids can have a number of side effects as well, even when taken as directed. · Tolerance-meaning you might need to take more of a medication for the same pain relief · Physical dependence-meaning you have symptoms of withdrawal when the medication is stopped. Withdrawal symptoms can include nausea, sweating, chills, diarrhea, stomach cramps, and muscle aches. Withdrawal can last up to several weeks, depending on which drug you took and how long you took it. · Increased sensitivity to pain · Constipation · Nausea, vomiting, and dry mouth · Sleepiness and dizziness · Confusion · Depression · Low levels of testosterone that can result in lower sex drive, energy, and strength · Itching and sweating RISKS ARE GREATER WITH:      
 · History of drug misuse, substance use disorder, or overdose · Mental health conditions (such as depression or anxiety) · Sleep apnea · Older age (72 years or older) · Pregnancy Avoid alcohol while taking prescription opioids. Also, unless specifically advised by your health care provider, medications to avoid include: · Benzodiazepines (such as Xanax or Valium) · Muscle relaxants (such as Soma or Flexeril) · Hypnotics (such as Ambien or Lunesta) · Other prescription opioids KNOW YOUR OPTIONS Talk to your health care provider about ways to manage your pain that don't involve prescription opioids. Some of these options may actually work better and have fewer risks and side effects. Options may include: 
· Pain relievers such as acetaminophen, ibuprofen, and naproxen · Some medications that are also used for depression or seizures · Physical therapy and exercise · Counseling to help patients learn how to cope better with triggers of pain and stress. · Application of heat or cold compress · Massage therapy · Relaxation techniques Be Informed Make sure you know the name of your medication, how much and how often to take it, and its potential risks & side effects. IF YOU ARE PRESCRIBED OPIOIDS FOR PAIN: 
· Never take opioids in greater amounts or more often than prescribed. Remember the goal is not to be pain-free but to manage your pain at a tolerable level. · Follow up with your primary care provider to: · Work together to create a plan on how to manage your pain. · Talk about ways to help manage your pain that don't involve prescription opioids. · Talk about any and all concerns and side effects. · Help prevent misuse and abuse. · Never sell or share prescription opioids · Help prevent misuse and abuse. · Store prescription opioids in a secure place and out of reach of others (this may include visitors, children, friends, and family). · Safely dispose of unused/unwanted prescription opioids: Find your community drug take-back program or your pharmacy mail-back program, or flush them down the toilet, following guidance from the Food and Drug Administration (www.fda.gov/Drugs/ResourcesForYou). · Visit www.cdc.gov/drugoverdose to learn about the risks of opioid abuse and overdose. · If you believe you may be struggling with addiction, tell your health care provider and ask for guidance or call [address] [place] [address] at 4-616-756-POXP. Discharge Instructions Awa Altamirano [facility] Special Procedures/Radiology Department Radiologist:Dr. Su Fernández Date: 05/17/2018 Thoracentesis Discharge Instructions You may have an aching pain in the puncture site tonight. Take Tylenol, as directed on the label, for pain or discomfort. Avoid ibuprofen (Advil, Motrin) and aspirin products for the next 48 hours as these drugs may cause you to bleed. Resume your previous diet and follow the medication reconciliation form. Rest for the next 24 hours. If you experience shortness of breath (other than your normal breathing pattern) difficulty breathing, or have severe chest pain, call 911 and go to the nearest Emergency Room. Be sure to follow up with your referring physician as soon as possible 
: 
 
 
 
 
  
  
  
Introducing [facility]! Awa Atlamirano introduces [employer] patient portal. Now you can access parts of your medical record, email your doctor's office, and request medication refills online. 1. In your internet browser, go to [email or web address] 2. Click on the First Time User? Click Here link in the Sign In box. You will see the New Member Sign Up page. 3. Enter your [employer] Access Code exactly as it appears below. You will not need to use this code after youve completed the sign-up process.  If you do not sign up before the expiration date, you must request a new code. · "[employer] Access Code: [email or web address] Expires: 8/9/2018  1:04 PM 
 
4. Enter the last four digits of your Social Security Number (xxxx) and Date of Birth (mm/dd/yyyy) as indicated and click Submit. You will be taken to the next sign-up page. 5. Create a "[employer] ID. This will be your "[employer] login ID and cannot be changed, so think of one that is secure and easy to remember. 6. Create a "[employer] password. You can change your password at any time. 7. Enter your Password Reset Question and Answer. This can be used at a later time if you forget your password. 8. Enter your e-mail address. You will receive e-mail notification when new information is available in [address] 9. Click Sign Up. You can now view and download portions of your medical record. 10. Click the Download Summary menu link to download a portable copy of your medical information. If you have questions, please visit the Frequently Asked Questions section of the "[employer] website. Remember, "[employer] is NOT to be used for urgent needs. For medical emergencies, dial 911. Now available from your iPhone and Android! Introducing Joel Green As a [facility] patient, I wanted to make you aware of our electronic visit tool called Joel Green. [facility] [facility] 24/7 allows you to connect within minutes with a medical provider 24 hours a day, seven days a week via a mobile device or tablet or logging into a secure website from your computer. You can access Joel Green from anywhere in the [place].  
 
A virtual visit might be right for you when you have a simple condition and feel like you just dont want to get out of bed, or cant get away from work for an appointment, when your regular [facility] provider is not available (evenings, weekends or holidays), or when youre out of town and need minor care. Electronic visits cost only $49 and if the [employer] 24/7 provider determines a prescription is needed to treat your condition, one can be electronically transmitted to a nearby pharmacy*. Please take a moment to enroll today if you have not already done so. The enrollment process is free and takes just a few minutes. To enroll, please download the [employer]Share clare to your tablet or phone, or visit [email or web address] to enroll on your computer. And, as an [address] [place] [address] patient with a [employer] account, the results of your visits will be scanned into your electronic medical record and your primary care provider will be able to view the scanned results. We urge you to continue to see your regular [facility] provider for your ongoing medical care. And while your primary care provider may not be the one available when you seek a [employer] virtual visit, the peace of mind you get from getting a real diagnosis real time can be priceless. For more information on [employer], view our Frequently Asked Questions (FAQs) at [email or web address]. Sincerely, 
 
Robert Soto MD 
Chief Medical Officer [address] *:  certain medications cannot be prescribed via [employer] Unresulted Labs-Please follow up with your PCP about these lab tests Order Current Status [address] In process Providers Seen During Your Hospitalization Provider Specialty Primary office phone Asad Webster MD Pulmonary Disease [phone] Your Primary Care Physician (PCP) Primary Care Physician Office Phone Office Fax Serafin Clark [phone] You are allergic to the following Allergen Reactions Ativan (Lorazepam) Other (comments) Hyper activity Recent Documentation Height Weight BMI Smoking Status 1.676 m 68.9 kg 24.53 kg/m2 Current Every Day Smoker Emergency Contacts Name Discharge Info Relation Home Work Mobile Sarah Beth Alba DISCHARGE CAREGIVER [3] Spouse [3] [phone] Patient Belongings The following personal items are in your possession at time of discharge: 
     Visual Aid: None Please provide this summary of care documentation to your next provider. Signatures-by signing, you are acknowledging that this After Visit Summary has been reviewed with you and you have received a copy. Patient Signature:  ____________________________________________________________ Date:  ____________________________________________________________  
  
[facility] Provider Signature:  ____________________________________________________________ Date:  ____________________________________________________________

## 2018-05-17 NOTE — DISCHARGE INSTRUCTIONS
[facility]  Special Procedures/Radiology Department    Radiologist:Dr. Ivan Mckenzie     Date: 05/17/2018      Thoracentesis Discharge Instructions    You may have an aching pain in the puncture site tonight. Take Tylenol, as directed on the label, for pain or discomfort. Avoid ibuprofen (Advil, Motrin) and aspirin products for the next 48 hours as these drugs may cause you to bleed. Resume your previous diet and follow the medication reconciliation form. Rest for the next 24 hours. If you experience shortness of breath (other than your normal breathing pattern) difficulty breathing, or have severe chest pain, call 911 and go to the nearest Emergency Room.     Be sure to follow up with your referring physician as soon as possible  :

## 2018-05-17 NOTE — ROUTINE PROCESS
Post right thoracentesis with a total of 1220 ml of clear yellow pleural fluid removed without complaints. Fluids taken to lab as ordered. Band aid to right upper back is clean, dry and intact. Chest xray was completed and read by Dr. Marilyn Heredia, pt is okay for discharge per him. Discharge instructions given and explained to pt and pt voices complete understanding of all instructions given. Pt discharged home ambulatory with wife.

## 2018-05-21 LAB
BACTERIA SPEC CULT: NORMAL
GRAM STN SPEC: NORMAL
SERVICE CMNT-IMP: NORMAL
SERVICE CMNT-IMP: NORMAL

## 2018-10-02 ENCOUNTER — HOSPITAL ENCOUNTER (OUTPATIENT)
Dept: GENERAL RADIOLOGY | Age: 53
Discharge: HOME | End: 2018-10-02
Payer: MEDICAID

## 2018-10-02 DIAGNOSIS — J90 PLEURAL EFFUSION: ICD-10-CM

## 2018-10-02 PROCEDURE — 71046 X-RAY EXAM CHEST 2 VIEWS: CPT

## 2019-05-07 ENCOUNTER — HOSPITAL ENCOUNTER (OUTPATIENT)
Dept: GENERAL RADIOLOGY | Age: 54
Discharge: HOME | End: 2019-05-07
Payer: MEDICAID

## 2019-05-07 DIAGNOSIS — R05.9 COUGH: ICD-10-CM

## 2019-05-07 PROCEDURE — 71046 X-RAY EXAM CHEST 2 VIEWS: CPT

## 2019-05-21 ENCOUNTER — HOSPITAL ENCOUNTER (OUTPATIENT)
Dept: GENERAL RADIOLOGY | Age: 54
Discharge: HOME | End: 2019-05-21
Attending: INTERNAL MEDICINE
Payer: MEDICAID

## 2019-05-21 ENCOUNTER — HOSPITAL ENCOUNTER (OUTPATIENT)
Dept: ULTRASOUND IMAGING | Age: 54
Discharge: HOME | End: 2019-05-21
Payer: MEDICAID

## 2019-05-21 VITALS
SYSTOLIC BLOOD PRESSURE: 153 MMHG | WEIGHT: 140 LBS | HEIGHT: 66 IN | OXYGEN SATURATION: 98 % | TEMPERATURE: 98.6 F | BODY MASS INDEX: 22.5 KG/M2 | DIASTOLIC BLOOD PRESSURE: 66 MMHG | HEART RATE: 69 BPM | RESPIRATION RATE: 16 BRPM

## 2019-05-21 DIAGNOSIS — J90 PLEURAL EFFUSION: ICD-10-CM

## 2019-05-21 LAB
APPEARANCE FLD: ABNORMAL
COLOR FLD: ABNORMAL
COMMENT, HOLDF: NORMAL
EOSINOPHIL NFR FLD MANUAL: 4 %
LYMPHOCYTES NFR FLD: 72 %
MESOTHL CELL NFR FLD: 1 %
MONOS+MACROS NFR FLD: 13 %
NEUTROPHILS NFR FLD: 10 %
NUC CELL # FLD: 506 /CU MM
PROT FLD-MCNC: 3.3 G/DL
RBC # FLD: >100 /CU MM
SAMPLES BEING HELD,HOLD: NORMAL
SPECIMEN SOURCE FLD: ABNORMAL
SPECIMEN SOURCE FLD: NORMAL

## 2019-05-21 PROCEDURE — 87205 SMEAR GRAM STAIN: CPT

## 2019-05-21 PROCEDURE — 84157 ASSAY OF PROTEIN OTHER: CPT

## 2019-05-21 PROCEDURE — 87116 MYCOBACTERIA CULTURE: CPT

## 2019-05-21 PROCEDURE — 32555 ASPIRATE PLEURA W/ IMAGING: CPT

## 2019-05-21 PROCEDURE — 88305 TISSUE EXAM BY PATHOLOGIST: CPT

## 2019-05-21 PROCEDURE — 87015 SPECIMEN INFECT AGNT CONCNTJ: CPT

## 2019-05-21 PROCEDURE — 71045 X-RAY EXAM CHEST 1 VIEW: CPT

## 2019-05-21 PROCEDURE — 88112 CYTOPATH CELL ENHANCE TECH: CPT

## 2019-05-21 PROCEDURE — 89050 BODY FLUID CELL COUNT: CPT

## 2019-05-21 NOTE — DISCHARGE INSTRUCTIONS
[address]  Special Procedures/Radiology Department    Radiologist: Ankit Harrison    Date: May 21, 2019      Thoracentesis Discharge Instructions    You may have an aching pain in the puncture site tonight. Take Tylenol, as directed on the label, for pain or discomfort. Avoid ibuprofen (Advil, Motrin) and aspirin products for the next 48 hours as these drugs may cause you to bleed. Resume your previous diet and follow the medication reconciliation form. Rest for the next 24 hours. If you experience shortness of breath (other than your normal breathing pattern) difficulty breathing, or have severe chest pain, call 911 and go to the nearest Emergency Room.     Be sure to follow up with your referring physician as soon as possible

## 2019-05-25 LAB
BACTERIA SPEC CULT: NORMAL
GRAM STN SPEC: NORMAL
GRAM STN SPEC: NORMAL
SERVICE CMNT-IMP: NORMAL

## 2019-07-04 LAB
ACID FAST STN SPEC: NEGATIVE
MYCOBACTERIUM SPEC QL CULT: NEGATIVE
SPECIMEN PREPARATION: NORMAL
SPECIMEN SOURCE: NORMAL

## 2019-08-13 ENCOUNTER — HOSPITAL ENCOUNTER (OUTPATIENT)
Dept: GENERAL RADIOLOGY | Age: 54
Discharge: HOME | End: 2019-08-13
Payer: MEDICAID

## 2019-08-13 DIAGNOSIS — J90 PLEURAL EFFUSION: ICD-10-CM

## 2019-08-13 PROCEDURE — 71046 X-RAY EXAM CHEST 2 VIEWS: CPT

## 2019-11-21 ENCOUNTER — HOSPITAL ENCOUNTER (OUTPATIENT)
Dept: ULTRASOUND IMAGING | Age: 54
Discharge: HOME | End: 2019-11-21
Attending: OTOLARYNGOLOGY
Payer: MEDICAID

## 2019-11-21 DIAGNOSIS — D34 THYROID ADENOMA: ICD-10-CM

## 2019-11-21 PROCEDURE — 88173 CYTOPATH EVAL FNA REPORT: CPT

## 2019-11-21 PROCEDURE — 88172 CYTP DX EVAL FNA 1ST EA SITE: CPT

## 2019-11-21 PROCEDURE — 10005 FNA BX W/US GDN 1ST LES: CPT

## 2019-11-21 PROCEDURE — 74011000250 HC RX REV CODE- 250: Performed by: INTERNAL MEDICINE

## 2019-11-21 RX ORDER — LIDOCAINE HYDROCHLORIDE 10 MG/ML
10 INJECTION, SOLUTION EPIDURAL; INFILTRATION; INTRACAUDAL; PERINEURAL
Status: COMPLETED | OUTPATIENT
Start: 2019-11-21 | End: 2019-11-21

## 2019-11-21 RX ADMIN — LIDOCAINE HYDROCHLORIDE 10 ML: 10 INJECTION, SOLUTION EPIDURAL; INFILTRATION; INTRACAUDAL; PERINEURAL at 14:00

## 2020-01-01 ENCOUNTER — APPOINTMENT (OUTPATIENT)
Dept: GENERAL RADIOLOGY | Age: 55
DRG: 720 | End: 2020-01-01
Attending: HOSPITALIST
Payer: MEDICAID

## 2020-01-01 ENCOUNTER — APPOINTMENT (OUTPATIENT)
Dept: CT IMAGING | Age: 55
DRG: 720 | End: 2020-01-01
Attending: HOSPITALIST
Payer: MEDICAID

## 2020-01-01 ENCOUNTER — APPOINTMENT (OUTPATIENT)
Dept: NON INVASIVE DIAGNOSTICS | Age: 55
DRG: 720 | End: 2020-01-01
Attending: HOSPITALIST
Payer: MEDICAID

## 2020-01-01 ENCOUNTER — APPOINTMENT (OUTPATIENT)
Dept: GENERAL RADIOLOGY | Age: 55
DRG: 720 | End: 2020-01-01
Attending: INTERNAL MEDICINE
Payer: MEDICAID

## 2020-01-01 ENCOUNTER — HOSPITAL ENCOUNTER (INPATIENT)
Age: 55
LOS: 17 days | Discharge: LEFT BEFORE DISCHARGE | DRG: 720 | End: 2020-11-22
Attending: EMERGENCY MEDICINE | Admitting: HOSPITALIST
Payer: MEDICAID

## 2020-01-01 ENCOUNTER — APPOINTMENT (OUTPATIENT)
Dept: CT IMAGING | Age: 55
DRG: 720 | End: 2020-01-01
Attending: EMERGENCY MEDICINE
Payer: MEDICAID

## 2020-01-01 ENCOUNTER — APPOINTMENT (OUTPATIENT)
Dept: ULTRASOUND IMAGING | Age: 55
DRG: 720 | End: 2020-01-01
Attending: INTERNAL MEDICINE
Payer: MEDICAID

## 2020-01-01 ENCOUNTER — APPOINTMENT (OUTPATIENT)
Dept: GENERAL RADIOLOGY | Age: 55
End: 2020-01-01
Attending: EMERGENCY MEDICINE
Payer: MEDICAID

## 2020-01-01 ENCOUNTER — APPOINTMENT (OUTPATIENT)
Dept: CT IMAGING | Age: 55
DRG: 197 | End: 2020-01-01
Attending: SURGERY
Payer: MEDICAID

## 2020-01-01 ENCOUNTER — APPOINTMENT (OUTPATIENT)
Dept: GENERAL RADIOLOGY | Age: 55
DRG: 720 | End: 2020-01-01
Attending: EMERGENCY MEDICINE
Payer: MEDICAID

## 2020-01-01 ENCOUNTER — HOSPITAL ENCOUNTER (OUTPATIENT)
Dept: ULTRASOUND IMAGING | Age: 55
Discharge: HOME | End: 2020-06-18
Attending: NURSE PRACTITIONER
Payer: MEDICAID

## 2020-01-01 ENCOUNTER — APPOINTMENT (OUTPATIENT)
Dept: GENERAL RADIOLOGY | Age: 55
DRG: 197 | End: 2020-01-01
Attending: EMERGENCY MEDICINE
Payer: MEDICAID

## 2020-01-01 ENCOUNTER — HOSPITAL ENCOUNTER (INPATIENT)
Age: 55
LOS: 10 days | Discharge: HOME HEALTH | DRG: 720 | End: 2020-12-03
Attending: EMERGENCY MEDICINE | Admitting: FAMILY MEDICINE
Payer: MEDICAID

## 2020-01-01 ENCOUNTER — APPOINTMENT (OUTPATIENT)
Dept: GENERAL RADIOLOGY | Age: 55
DRG: 720 | End: 2020-01-01
Attending: STUDENT IN AN ORGANIZED HEALTH CARE EDUCATION/TRAINING PROGRAM
Payer: MEDICAID

## 2020-01-01 ENCOUNTER — HOSPITAL ENCOUNTER (OUTPATIENT)
Dept: GENERAL RADIOLOGY | Age: 55
Discharge: HOME | End: 2020-06-18
Attending: NURSE PRACTITIONER
Payer: MEDICAID

## 2020-01-01 ENCOUNTER — APPOINTMENT (OUTPATIENT)
Dept: GENERAL RADIOLOGY | Age: 55
DRG: 720 | End: 2020-01-01
Attending: NURSE PRACTITIONER
Payer: MEDICAID

## 2020-01-01 ENCOUNTER — APPOINTMENT (OUTPATIENT)
Dept: GENERAL RADIOLOGY | Age: 55
DRG: 197 | End: 2020-01-01
Attending: INTERNAL MEDICINE
Payer: MEDICAID

## 2020-01-01 ENCOUNTER — HOSPITAL ENCOUNTER (OUTPATIENT)
Dept: CT IMAGING | Age: 55
Discharge: HOME | End: 2020-05-19
Attending: NURSE PRACTITIONER
Payer: MEDICAID

## 2020-01-01 ENCOUNTER — HOSPITAL ENCOUNTER (INPATIENT)
Age: 55
LOS: 2 days | DRG: 197 | End: 2020-12-09
Attending: EMERGENCY MEDICINE | Admitting: INTERNAL MEDICINE
Payer: MEDICAID

## 2020-01-01 ENCOUNTER — HOSPITAL ENCOUNTER (OUTPATIENT)
Age: 55
Setting detail: OBSERVATION
Discharge: HOME | End: 2020-01-15
Attending: EMERGENCY MEDICINE | Admitting: INTERNAL MEDICINE
Payer: MEDICAID

## 2020-01-01 VITALS
HEART RATE: 108 BPM | OXYGEN SATURATION: 97 % | TEMPERATURE: 98.3 F | BODY MASS INDEX: 24.75 KG/M2 | HEIGHT: 66 IN | SYSTOLIC BLOOD PRESSURE: 98 MMHG | DIASTOLIC BLOOD PRESSURE: 67 MMHG | WEIGHT: 154 LBS | RESPIRATION RATE: 18 BRPM

## 2020-01-01 VITALS
HEIGHT: 66 IN | DIASTOLIC BLOOD PRESSURE: 66 MMHG | HEART RATE: 90 BPM | WEIGHT: 144.1 LBS | TEMPERATURE: 98 F | RESPIRATION RATE: 20 BRPM | OXYGEN SATURATION: 99 % | SYSTOLIC BLOOD PRESSURE: 120 MMHG | BODY MASS INDEX: 23.16 KG/M2

## 2020-01-01 VITALS
RESPIRATION RATE: 20 BRPM | DIASTOLIC BLOOD PRESSURE: 68 MMHG | HEIGHT: 66 IN | BODY MASS INDEX: 22.18 KG/M2 | SYSTOLIC BLOOD PRESSURE: 169 MMHG | WEIGHT: 138 LBS | OXYGEN SATURATION: 99 % | HEART RATE: 78 BPM | TEMPERATURE: 98.5 F

## 2020-01-01 VITALS
OXYGEN SATURATION: 97 % | HEIGHT: 65 IN | DIASTOLIC BLOOD PRESSURE: 55 MMHG | TEMPERATURE: 99.2 F | SYSTOLIC BLOOD PRESSURE: 113 MMHG | RESPIRATION RATE: 16 BRPM | BODY MASS INDEX: 23.32 KG/M2 | HEART RATE: 86 BPM | WEIGHT: 139.99 LBS

## 2020-01-01 VITALS
RESPIRATION RATE: 14 BRPM | TEMPERATURE: 94.1 F | SYSTOLIC BLOOD PRESSURE: 41 MMHG | HEART RATE: 108 BPM | HEIGHT: 66 IN | BODY MASS INDEX: 24.66 KG/M2 | DIASTOLIC BLOOD PRESSURE: 21 MMHG | WEIGHT: 153.44 LBS | OXYGEN SATURATION: 94 %

## 2020-01-01 DIAGNOSIS — J90 LOCULATED PLEURAL EFFUSION: ICD-10-CM

## 2020-01-01 DIAGNOSIS — J18.9 HCAP (HEALTHCARE-ASSOCIATED PNEUMONIA): ICD-10-CM

## 2020-01-01 DIAGNOSIS — I10 ESSENTIAL HYPERTENSION: ICD-10-CM

## 2020-01-01 DIAGNOSIS — A49.8 PSEUDOMONAS INFECTION: ICD-10-CM

## 2020-01-01 DIAGNOSIS — Z90.81 H/O SPLENECTOMY: ICD-10-CM

## 2020-01-01 DIAGNOSIS — Z72.0 TOBACCO ABUSE: Chronic | ICD-10-CM

## 2020-01-01 DIAGNOSIS — J96.21 ACUTE ON CHRONIC RESPIRATORY FAILURE WITH HYPOXIA (HCC): ICD-10-CM

## 2020-01-01 DIAGNOSIS — R06.09 DOE (DYSPNEA ON EXERTION): ICD-10-CM

## 2020-01-01 DIAGNOSIS — J90 PLEURAL EFFUSION, BILATERAL: ICD-10-CM

## 2020-01-01 DIAGNOSIS — Z90.411 HISTORY OF PARTIAL PANCREATECTOMY: ICD-10-CM

## 2020-01-01 DIAGNOSIS — R53.1 WEAKNESS GENERALIZED: ICD-10-CM

## 2020-01-01 DIAGNOSIS — N18.6 ESRD (END STAGE RENAL DISEASE) ON DIALYSIS (HCC): ICD-10-CM

## 2020-01-01 DIAGNOSIS — J90 PLEURAL EFFUSION: ICD-10-CM

## 2020-01-01 DIAGNOSIS — N18.6 ESRD ON DIALYSIS (HCC): ICD-10-CM

## 2020-01-01 DIAGNOSIS — A41.9 SEPTIC SHOCK (HCC): Primary | ICD-10-CM

## 2020-01-01 DIAGNOSIS — E16.2 HYPOGLYCEMIA: Primary | ICD-10-CM

## 2020-01-01 DIAGNOSIS — F10.10 ALCOHOL ABUSE: ICD-10-CM

## 2020-01-01 DIAGNOSIS — J90 CHRONIC BILATERAL PLEURAL EFFUSIONS: ICD-10-CM

## 2020-01-01 DIAGNOSIS — K43.9 VENTRAL HERNIA WITHOUT OBSTRUCTION OR GANGRENE: ICD-10-CM

## 2020-01-01 DIAGNOSIS — J44.1 ACUTE EXACERBATION OF CHRONIC OBSTRUCTIVE PULMONARY DISEASE (COPD) (HCC): ICD-10-CM

## 2020-01-01 DIAGNOSIS — R41.0 DELIRIUM: ICD-10-CM

## 2020-01-01 DIAGNOSIS — R41.82 ALTERED MENTAL STATUS, UNSPECIFIED ALTERED MENTAL STATUS TYPE: ICD-10-CM

## 2020-01-01 DIAGNOSIS — E11.649 UNCONTROLLED TYPE 2 DIABETES MELLITUS WITH HYPOGLYCEMIA WITHOUT COMA (HCC): ICD-10-CM

## 2020-01-01 DIAGNOSIS — I74.2: ICD-10-CM

## 2020-01-01 DIAGNOSIS — N28.89 LEFT KIDNEY MASS: ICD-10-CM

## 2020-01-01 DIAGNOSIS — I48.0 PAROXYSMAL ATRIAL FIBRILLATION (HCC): ICD-10-CM

## 2020-01-01 DIAGNOSIS — I48.19 PERSISTENT ATRIAL FIBRILLATION (HCC): ICD-10-CM

## 2020-01-01 DIAGNOSIS — Z71.89 GOALS OF CARE, COUNSELING/DISCUSSION: ICD-10-CM

## 2020-01-01 DIAGNOSIS — A49.8 INFECTION DUE TO STENOTROPHOMONAS MALTOPHILIA: ICD-10-CM

## 2020-01-01 DIAGNOSIS — R65.21 SEPTIC SHOCK (HCC): Primary | ICD-10-CM

## 2020-01-01 DIAGNOSIS — Z99.2 ESRD (END STAGE RENAL DISEASE) ON DIALYSIS (HCC): ICD-10-CM

## 2020-01-01 DIAGNOSIS — J90 RECURRENT RIGHT PLEURAL EFFUSION: ICD-10-CM

## 2020-01-01 DIAGNOSIS — B18.2 CHRONIC HEPATITIS C WITHOUT HEPATIC COMA (HCC): Chronic | ICD-10-CM

## 2020-01-01 DIAGNOSIS — Z20.822 COVID-19 RULED OUT: ICD-10-CM

## 2020-01-01 DIAGNOSIS — Z71.89 ADVANCED CARE PLANNING/COUNSELING DISCUSSION: ICD-10-CM

## 2020-01-01 DIAGNOSIS — Z90.81 HISTORY OF SPLENECTOMY: Chronic | ICD-10-CM

## 2020-01-01 DIAGNOSIS — Z99.2 ESRD ON DIALYSIS (HCC): ICD-10-CM

## 2020-01-01 DIAGNOSIS — Z71.89 DNR (DO NOT RESUSCITATE) DISCUSSION: ICD-10-CM

## 2020-01-01 DIAGNOSIS — F17.200 SMOKER: ICD-10-CM

## 2020-01-01 DIAGNOSIS — N18.6 END STAGE RENAL DISEASE (HCC): ICD-10-CM

## 2020-01-01 DIAGNOSIS — J44.1 ACUTE EXACERBATION OF CHRONIC OBSTRUCTIVE PULMONARY DISEASE (COPD) (HCC): Primary | ICD-10-CM

## 2020-01-01 DIAGNOSIS — F10.10 ALCOHOL ABUSE, DAILY USE: Chronic | ICD-10-CM

## 2020-01-01 DIAGNOSIS — D72.829 LEUKOCYTOSIS, UNSPECIFIED TYPE: ICD-10-CM

## 2020-01-01 DIAGNOSIS — J96.02 ACUTE HYPERCAPNIC RESPIRATORY FAILURE (HCC): ICD-10-CM

## 2020-01-01 DIAGNOSIS — J15.6: ICD-10-CM

## 2020-01-01 DIAGNOSIS — R09.02 HYPOXIA: ICD-10-CM

## 2020-01-01 LAB
ABO + RH BLD: NORMAL
ALBUMIN SERPL-MCNC: 2.5 G/DL (ref 3.5–5)
ALBUMIN SERPL-MCNC: 2.6 G/DL (ref 3.5–5)
ALBUMIN SERPL-MCNC: 2.7 G/DL (ref 3.5–5)
ALBUMIN SERPL-MCNC: 2.8 G/DL (ref 3.5–5)
ALBUMIN/GLOB SERPL: 0.7 {RATIO} (ref 1.1–2.2)
ALBUMIN/GLOB SERPL: 0.7 {RATIO} (ref 1.1–2.2)
ALBUMIN/GLOB SERPL: 0.8 {RATIO} (ref 1.1–2.2)
ALBUMIN/GLOB SERPL: 1 {RATIO} (ref 1.1–2.2)
ALP SERPL-CCNC: 62 U/L (ref 45–117)
ALP SERPL-CCNC: 73 U/L (ref 45–117)
ALP SERPL-CCNC: 84 U/L (ref 45–117)
ALP SERPL-CCNC: 98 U/L (ref 45–117)
ALT SERPL-CCNC: 27 U/L (ref 12–78)
ALT SERPL-CCNC: 29 U/L (ref 12–78)
ALT SERPL-CCNC: 36 U/L (ref 12–78)
ALT SERPL-CCNC: 43 U/L (ref 12–78)
ANION GAP SERPL CALC-SCNC: 10 MMOL/L (ref 5–15)
ANION GAP SERPL CALC-SCNC: 10 MMOL/L (ref 5–15)
ANION GAP SERPL CALC-SCNC: 11 MMOL/L (ref 5–15)
ANION GAP SERPL CALC-SCNC: 12 MMOL/L (ref 5–15)
ANION GAP SERPL CALC-SCNC: 13 MMOL/L (ref 5–15)
ANION GAP SERPL CALC-SCNC: 14 MMOL/L (ref 5–15)
ANION GAP SERPL CALC-SCNC: 15 MMOL/L (ref 5–15)
ANION GAP SERPL CALC-SCNC: 5 MMOL/L (ref 5–15)
ANION GAP SERPL CALC-SCNC: 6 MMOL/L (ref 5–15)
ANION GAP SERPL CALC-SCNC: 7 MMOL/L (ref 5–15)
ANION GAP SERPL CALC-SCNC: 7 MMOL/L (ref 5–15)
ANION GAP SERPL CALC-SCNC: 8 MMOL/L (ref 5–15)
ANION GAP SERPL CALC-SCNC: 9 MMOL/L (ref 5–15)
APPEARANCE FLD: ABNORMAL
APTT PPP: 25.8 SEC (ref 22.1–32)
APTT PPP: 26.5 SEC (ref 22.1–31)
APTT PPP: 26.8 SEC (ref 22.1–31)
APTT PPP: 41.3 SEC (ref 22.1–31)
APTT PPP: >130 SEC (ref 22.1–31)
APTT PPP: >130 SEC (ref 22.1–31)
ARTERIAL PATENCY WRIST A: ABNORMAL
ARTERIAL PATENCY WRIST A: ABNORMAL
ARTERIAL PATENCY WRIST A: YES
AST SERPL-CCNC: 10 U/L (ref 15–37)
AST SERPL-CCNC: 19 U/L (ref 15–37)
AST SERPL-CCNC: 23 U/L (ref 15–37)
AST SERPL-CCNC: 28 U/L (ref 15–37)
ATRIAL RATE: 101 BPM
ATRIAL RATE: 120 BPM
ATRIAL RATE: 141 BPM
BACTERIA SPEC CULT: ABNORMAL
BACTERIA SPEC CULT: NORMAL
BASE EXCESS BLD CALC-SCNC: 0 MMOL/L
BASE EXCESS BLD CALC-SCNC: 6 MMOL/L
BASE EXCESS BLD CALC-SCNC: 9 MMOL/L
BASOPHILS # BLD: 0 K/UL (ref 0–0.1)
BASOPHILS NFR BLD: 0 % (ref 0–1)
BDY SITE: ABNORMAL
BILIRUB SERPL-MCNC: 0.6 MG/DL (ref 0.2–1)
BLOOD GROUP ANTIBODIES SERPL: NORMAL
BNP SERPL-MCNC: ABNORMAL PG/ML
BUN SERPL-MCNC: 103 MG/DL (ref 6–20)
BUN SERPL-MCNC: 105 MG/DL (ref 6–20)
BUN SERPL-MCNC: 109 MG/DL (ref 6–20)
BUN SERPL-MCNC: 117 MG/DL (ref 6–20)
BUN SERPL-MCNC: 24 MG/DL (ref 6–20)
BUN SERPL-MCNC: 32 MG/DL (ref 6–20)
BUN SERPL-MCNC: 39 MG/DL (ref 6–20)
BUN SERPL-MCNC: 40 MG/DL (ref 6–20)
BUN SERPL-MCNC: 41 MG/DL (ref 6–20)
BUN SERPL-MCNC: 42 MG/DL (ref 6–20)
BUN SERPL-MCNC: 45 MG/DL (ref 6–20)
BUN SERPL-MCNC: 46 MG/DL (ref 6–20)
BUN SERPL-MCNC: 48 MG/DL (ref 6–20)
BUN SERPL-MCNC: 50 MG/DL (ref 6–20)
BUN SERPL-MCNC: 50 MG/DL (ref 6–20)
BUN SERPL-MCNC: 55 MG/DL (ref 6–20)
BUN SERPL-MCNC: 56 MG/DL (ref 6–20)
BUN SERPL-MCNC: 58 MG/DL (ref 6–20)
BUN SERPL-MCNC: 60 MG/DL (ref 6–20)
BUN SERPL-MCNC: 61 MG/DL (ref 6–20)
BUN SERPL-MCNC: 62 MG/DL (ref 6–20)
BUN SERPL-MCNC: 63 MG/DL (ref 6–20)
BUN SERPL-MCNC: 65 MG/DL (ref 6–20)
BUN SERPL-MCNC: 66 MG/DL (ref 6–20)
BUN SERPL-MCNC: 71 MG/DL (ref 6–20)
BUN SERPL-MCNC: 76 MG/DL (ref 6–20)
BUN SERPL-MCNC: 77 MG/DL (ref 6–20)
BUN SERPL-MCNC: 83 MG/DL (ref 6–20)
BUN SERPL-MCNC: 85 MG/DL (ref 6–20)
BUN SERPL-MCNC: 90 MG/DL (ref 6–20)
BUN SERPL-MCNC: 91 MG/DL (ref 6–20)
BUN/CREAT SERPL: 10 (ref 12–20)
BUN/CREAT SERPL: 12 (ref 12–20)
BUN/CREAT SERPL: 13 (ref 12–20)
BUN/CREAT SERPL: 14 (ref 12–20)
BUN/CREAT SERPL: 14 (ref 12–20)
BUN/CREAT SERPL: 15 (ref 12–20)
BUN/CREAT SERPL: 16 (ref 12–20)
BUN/CREAT SERPL: 17 (ref 12–20)
BUN/CREAT SERPL: 18 (ref 12–20)
BUN/CREAT SERPL: 19 (ref 12–20)
BUN/CREAT SERPL: 19 (ref 12–20)
BUN/CREAT SERPL: 8 (ref 12–20)
CA-I BLD-SCNC: 1.06 MMOL/L (ref 1.12–1.32)
CA-I BLD-SCNC: 1.07 MMOL/L (ref 1.12–1.32)
CA-I BLD-SCNC: 1.08 MMOL/L (ref 1.12–1.32)
CALCIUM SERPL-MCNC: 7.5 MG/DL (ref 8.5–10.1)
CALCIUM SERPL-MCNC: 7.8 MG/DL (ref 8.5–10.1)
CALCIUM SERPL-MCNC: 7.9 MG/DL (ref 8.5–10.1)
CALCIUM SERPL-MCNC: 8 MG/DL (ref 8.5–10.1)
CALCIUM SERPL-MCNC: 8 MG/DL (ref 8.5–10.1)
CALCIUM SERPL-MCNC: 8.1 MG/DL (ref 8.5–10.1)
CALCIUM SERPL-MCNC: 8.2 MG/DL (ref 8.5–10.1)
CALCIUM SERPL-MCNC: 8.3 MG/DL (ref 8.5–10.1)
CALCIUM SERPL-MCNC: 8.3 MG/DL (ref 8.5–10.1)
CALCIUM SERPL-MCNC: 8.4 MG/DL (ref 8.5–10.1)
CALCIUM SERPL-MCNC: 8.5 MG/DL (ref 8.5–10.1)
CALCIUM SERPL-MCNC: 8.6 MG/DL (ref 8.5–10.1)
CALCIUM SERPL-MCNC: 8.7 MG/DL (ref 8.5–10.1)
CALCIUM SERPL-MCNC: 8.7 MG/DL (ref 8.5–10.1)
CALCIUM SERPL-MCNC: 8.8 MG/DL (ref 8.5–10.1)
CALCIUM SERPL-MCNC: 8.9 MG/DL (ref 8.5–10.1)
CALCULATED R AXIS, ECG10: 80 DEGREES
CALCULATED R AXIS, ECG10: 84 DEGREES
CALCULATED R AXIS, ECG10: 91 DEGREES
CALCULATED R AXIS, ECG10: 95 DEGREES
CALCULATED R AXIS, ECG10: 98 DEGREES
CALCULATED T AXIS, ECG11: -116 DEGREES
CALCULATED T AXIS, ECG11: -137 DEGREES
CALCULATED T AXIS, ECG11: 118 DEGREES
CALCULATED T AXIS, ECG11: 133 DEGREES
CALCULATED T AXIS, ECG11: 172 DEGREES
CHLORIDE SERPL-SCNC: 100 MMOL/L (ref 97–108)
CHLORIDE SERPL-SCNC: 85 MMOL/L (ref 97–108)
CHLORIDE SERPL-SCNC: 87 MMOL/L (ref 97–108)
CHLORIDE SERPL-SCNC: 89 MMOL/L (ref 97–108)
CHLORIDE SERPL-SCNC: 89 MMOL/L (ref 97–108)
CHLORIDE SERPL-SCNC: 91 MMOL/L (ref 97–108)
CHLORIDE SERPL-SCNC: 94 MMOL/L (ref 97–108)
CHLORIDE SERPL-SCNC: 94 MMOL/L (ref 97–108)
CHLORIDE SERPL-SCNC: 95 MMOL/L (ref 97–108)
CHLORIDE SERPL-SCNC: 96 MMOL/L (ref 97–108)
CHLORIDE SERPL-SCNC: 97 MMOL/L (ref 97–108)
CHLORIDE SERPL-SCNC: 98 MMOL/L (ref 97–108)
CHLORIDE SERPL-SCNC: 99 MMOL/L (ref 97–108)
CHLORIDE SERPL-SCNC: 99 MMOL/L (ref 97–108)
CHOLEST FLD-MCNC: <50 MG/DL
CHYLOMICRON SCREEN, CHYLMT: NORMAL
CO2 SERPL-SCNC: 20 MMOL/L (ref 21–32)
CO2 SERPL-SCNC: 22 MMOL/L (ref 21–32)
CO2 SERPL-SCNC: 23 MMOL/L (ref 21–32)
CO2 SERPL-SCNC: 23 MMOL/L (ref 21–32)
CO2 SERPL-SCNC: 24 MMOL/L (ref 21–32)
CO2 SERPL-SCNC: 24 MMOL/L (ref 21–32)
CO2 SERPL-SCNC: 25 MMOL/L (ref 21–32)
CO2 SERPL-SCNC: 26 MMOL/L (ref 21–32)
CO2 SERPL-SCNC: 27 MMOL/L (ref 21–32)
CO2 SERPL-SCNC: 28 MMOL/L (ref 21–32)
CO2 SERPL-SCNC: 29 MMOL/L (ref 21–32)
CO2 SERPL-SCNC: 30 MMOL/L (ref 21–32)
CO2 SERPL-SCNC: 31 MMOL/L (ref 21–32)
CO2 SERPL-SCNC: 32 MMOL/L (ref 21–32)
COLOR FLD: ABNORMAL
COMMENT, HOLDF: NORMAL
COVID-19 RAPID TEST, COVR: NOT DETECTED
CREAT SERPL-MCNC: 3.01 MG/DL (ref 0.7–1.3)
CREAT SERPL-MCNC: 3.02 MG/DL (ref 0.7–1.3)
CREAT SERPL-MCNC: 3.24 MG/DL (ref 0.7–1.3)
CREAT SERPL-MCNC: 3.26 MG/DL (ref 0.7–1.3)
CREAT SERPL-MCNC: 3.4 MG/DL (ref 0.7–1.3)
CREAT SERPL-MCNC: 3.64 MG/DL (ref 0.7–1.3)
CREAT SERPL-MCNC: 3.79 MG/DL (ref 0.7–1.3)
CREAT SERPL-MCNC: 3.92 MG/DL (ref 0.7–1.3)
CREAT SERPL-MCNC: 4 MG/DL (ref 0.7–1.3)
CREAT SERPL-MCNC: 4.04 MG/DL (ref 0.7–1.3)
CREAT SERPL-MCNC: 4.14 MG/DL (ref 0.7–1.3)
CREAT SERPL-MCNC: 4.27 MG/DL (ref 0.7–1.3)
CREAT SERPL-MCNC: 4.29 MG/DL (ref 0.7–1.3)
CREAT SERPL-MCNC: 4.33 MG/DL (ref 0.7–1.3)
CREAT SERPL-MCNC: 4.33 MG/DL (ref 0.7–1.3)
CREAT SERPL-MCNC: 4.38 MG/DL (ref 0.7–1.3)
CREAT SERPL-MCNC: 4.81 MG/DL (ref 0.7–1.3)
CREAT SERPL-MCNC: 4.9 MG/DL (ref 0.7–1.3)
CREAT SERPL-MCNC: 5.01 MG/DL (ref 0.7–1.3)
CREAT SERPL-MCNC: 5.03 MG/DL (ref 0.7–1.3)
CREAT SERPL-MCNC: 5.06 MG/DL (ref 0.7–1.3)
CREAT SERPL-MCNC: 5.1 MG/DL (ref 0.7–1.3)
CREAT SERPL-MCNC: 5.13 MG/DL (ref 0.7–1.3)
CREAT SERPL-MCNC: 5.23 MG/DL (ref 0.7–1.3)
CREAT SERPL-MCNC: 5.43 MG/DL (ref 0.7–1.3)
CREAT SERPL-MCNC: 5.54 MG/DL (ref 0.7–1.3)
CREAT SERPL-MCNC: 6.15 MG/DL (ref 0.7–1.3)
CREAT SERPL-MCNC: 6.17 MG/DL (ref 0.7–1.3)
CREAT SERPL-MCNC: 6.32 MG/DL (ref 0.7–1.3)
CREAT SERPL-MCNC: 6.49 MG/DL (ref 0.7–1.3)
CREAT SERPL-MCNC: 6.62 MG/DL (ref 0.7–1.3)
CRP SERPL HS-MCNC: >9.5 MG/L
DIAGNOSIS, 93000: NORMAL
DIFFERENTIAL METHOD BLD: ABNORMAL
ECHO AO ROOT DIAM: 3.38 CM
ECHO AV AREA PEAK VELOCITY: 1.66 CM2
ECHO AV AREA VTI: 2.04 CM2
ECHO AV AREA/BSA PEAK VELOCITY: 0.9 CM2/M2
ECHO AV AREA/BSA VTI: 1.2 CM2/M2
ECHO AV MEAN GRADIENT: 5.52 MMHG
ECHO AV PEAK GRADIENT: 9.94 MMHG
ECHO AV PEAK VELOCITY: 157.61 CM/S
ECHO AV VTI: 29.76 CM
ECHO EST RA PRESSURE: 10 MMHG
ECHO LA MAJOR AXIS: 4.59 CM
ECHO LA MINOR AXIS: 2.61 CM
ECHO LV INTERNAL DIMENSION DIASTOLIC: 4.03 CM (ref 4.2–5.9)
ECHO LV INTERNAL DIMENSION SYSTOLIC: 3.59 CM
ECHO LV IVSD: 1.23 CM (ref 0.6–1)
ECHO LV MASS 2D: 159.2 G (ref 88–224)
ECHO LV MASS INDEX 2D: 90.4 G/M2 (ref 49–115)
ECHO LV POSTERIOR WALL DIASTOLIC: 1.09 CM (ref 0.6–1)
ECHO LVOT CARDIAC OUTPUT: 3.4 LITER/MINUTE
ECHO LVOT DIAM: 1.85 CM
ECHO LVOT PEAK GRADIENT: 3.78 MMHG
ECHO LVOT PEAK VELOCITY: 97.24 CM/S
ECHO LVOT SV: 60.8 ML
ECHO LVOT VTI: 22.55 CM
ECHO MV A VELOCITY: 3.16 CM/S
ECHO MV AREA PHT: 4.24 CM2
ECHO MV AREA VTI: 2.89 CM2
ECHO MV E DECELERATION TIME (DT): 207.05 MS
ECHO MV E VELOCITY: 124.35 CM/S
ECHO MV E/A RATIO: 39.35
ECHO MV MAX VELOCITY: 130.39 CM/S
ECHO MV MEAN GRADIENT: 3.11 MMHG
ECHO MV PEAK GRADIENT: 6.8 MMHG
ECHO MV PRESSURE HALF TIME (PHT): 51.94 MS
ECHO MV VTI: 21.04 CM
ECHO PV MAX VELOCITY: 119.71 CM/S
ECHO PV MEAN GRADIENT: 2.82 MMHG
ECHO PV PEAK INSTANTANEOUS GRADIENT SYSTOLIC: 5.73 MMHG
ECHO PV VTI: 23.76 CM
ECHO RIGHT VENTRICULAR SYSTOLIC PRESSURE (RVSP): 59.66 MMHG
ECHO TV REGURGITANT MAX VELOCITY: 352.08 CM/S
ECHO TV REGURGITANT PEAK GRADIENT: 49.66 MMHG
EOSINOPHIL # BLD: 0 K/UL (ref 0–0.4)
EOSINOPHIL # BLD: 0.1 K/UL (ref 0–0.4)
EOSINOPHIL # BLD: 0.1 K/UL (ref 0–0.4)
EOSINOPHIL # BLD: 0.2 K/UL (ref 0–0.4)
EOSINOPHIL # BLD: 0.3 K/UL (ref 0–0.4)
EOSINOPHIL # BLD: 0.4 K/UL (ref 0–0.4)
EOSINOPHIL # BLD: 0.5 K/UL (ref 0–0.4)
EOSINOPHIL NFR BLD: 0 % (ref 0–7)
EOSINOPHIL NFR BLD: 1 % (ref 0–7)
EOSINOPHIL NFR BLD: 2 % (ref 0–7)
EOSINOPHIL NFR BLD: 2 % (ref 0–7)
EOSINOPHIL NFR BLD: 3 % (ref 0–7)
EOSINOPHIL NFR BLD: 4 % (ref 0–7)
EOSINOPHIL NFR BLD: 4 % (ref 0–7)
ERYTHROCYTE [DISTWIDTH] IN BLOOD BY AUTOMATED COUNT: 15.7 % (ref 11.5–14.5)
ERYTHROCYTE [DISTWIDTH] IN BLOOD BY AUTOMATED COUNT: 16.1 % (ref 11.5–14.5)
ERYTHROCYTE [DISTWIDTH] IN BLOOD BY AUTOMATED COUNT: 16.2 % (ref 11.5–14.5)
ERYTHROCYTE [DISTWIDTH] IN BLOOD BY AUTOMATED COUNT: 17.9 % (ref 11.5–14.5)
ERYTHROCYTE [DISTWIDTH] IN BLOOD BY AUTOMATED COUNT: 18.3 % (ref 11.5–14.5)
ERYTHROCYTE [DISTWIDTH] IN BLOOD BY AUTOMATED COUNT: 18.5 % (ref 11.5–14.5)
ERYTHROCYTE [DISTWIDTH] IN BLOOD BY AUTOMATED COUNT: 18.7 % (ref 11.5–14.5)
ERYTHROCYTE [DISTWIDTH] IN BLOOD BY AUTOMATED COUNT: 18.9 % (ref 11.5–14.5)
ERYTHROCYTE [DISTWIDTH] IN BLOOD BY AUTOMATED COUNT: 19 % (ref 11.5–14.5)
ERYTHROCYTE [DISTWIDTH] IN BLOOD BY AUTOMATED COUNT: 19.1 % (ref 11.5–14.5)
ERYTHROCYTE [DISTWIDTH] IN BLOOD BY AUTOMATED COUNT: 19.2 % (ref 11.5–14.5)
ERYTHROCYTE [DISTWIDTH] IN BLOOD BY AUTOMATED COUNT: 19.4 % (ref 11.5–14.5)
ERYTHROCYTE [DISTWIDTH] IN BLOOD BY AUTOMATED COUNT: 19.5 % (ref 11.5–14.5)
ERYTHROCYTE [DISTWIDTH] IN BLOOD BY AUTOMATED COUNT: 19.6 % (ref 11.5–14.5)
ERYTHROCYTE [DISTWIDTH] IN BLOOD BY AUTOMATED COUNT: 19.6 % (ref 11.5–14.5)
ERYTHROCYTE [DISTWIDTH] IN BLOOD BY AUTOMATED COUNT: 19.8 % (ref 11.5–14.5)
ERYTHROCYTE [DISTWIDTH] IN BLOOD BY AUTOMATED COUNT: 19.8 % (ref 11.5–14.5)
ERYTHROCYTE [DISTWIDTH] IN BLOOD BY AUTOMATED COUNT: 19.9 % (ref 11.5–14.5)
ERYTHROCYTE [DISTWIDTH] IN BLOOD BY AUTOMATED COUNT: 19.9 % (ref 11.5–14.5)
ERYTHROCYTE [DISTWIDTH] IN BLOOD BY AUTOMATED COUNT: 20.3 % (ref 11.5–14.5)
ERYTHROCYTE [DISTWIDTH] IN BLOOD BY AUTOMATED COUNT: 20.3 % (ref 11.5–14.5)
EST. AVERAGE GLUCOSE BLD GHB EST-MCNC: 100 MG/DL
EST. AVERAGE GLUCOSE BLD GHB EST-MCNC: 120 MG/DL
EST. AVERAGE GLUCOSE BLD GHB EST-MCNC: 131 MG/DL
FERRITIN SERPL-MCNC: 1168 NG/ML (ref 26–388)
GAS FLOW.O2 O2 DELIVERY SYS: ABNORMAL L/MIN
GAS FLOW.O2 SETTING OXYMISER: 2 L/M
GLOBULIN SER CALC-MCNC: 2.7 G/DL (ref 2–4)
GLOBULIN SER CALC-MCNC: 3.3 G/DL (ref 2–4)
GLOBULIN SER CALC-MCNC: 3.8 G/DL (ref 2–4)
GLOBULIN SER CALC-MCNC: 3.9 G/DL (ref 2–4)
GLUCOSE BLD STRIP.AUTO-MCNC: 103 MG/DL (ref 65–100)
GLUCOSE BLD STRIP.AUTO-MCNC: 105 MG/DL (ref 65–100)
GLUCOSE BLD STRIP.AUTO-MCNC: 109 MG/DL (ref 65–100)
GLUCOSE BLD STRIP.AUTO-MCNC: 109 MG/DL (ref 65–100)
GLUCOSE BLD STRIP.AUTO-MCNC: 111 MG/DL (ref 65–100)
GLUCOSE BLD STRIP.AUTO-MCNC: 112 MG/DL (ref 65–100)
GLUCOSE BLD STRIP.AUTO-MCNC: 113 MG/DL (ref 65–100)
GLUCOSE BLD STRIP.AUTO-MCNC: 114 MG/DL (ref 65–100)
GLUCOSE BLD STRIP.AUTO-MCNC: 115 MG/DL (ref 65–100)
GLUCOSE BLD STRIP.AUTO-MCNC: 116 MG/DL (ref 65–100)
GLUCOSE BLD STRIP.AUTO-MCNC: 117 MG/DL (ref 65–100)
GLUCOSE BLD STRIP.AUTO-MCNC: 117 MG/DL (ref 65–100)
GLUCOSE BLD STRIP.AUTO-MCNC: 118 MG/DL (ref 65–100)
GLUCOSE BLD STRIP.AUTO-MCNC: 119 MG/DL (ref 65–100)
GLUCOSE BLD STRIP.AUTO-MCNC: 121 MG/DL (ref 65–100)
GLUCOSE BLD STRIP.AUTO-MCNC: 122 MG/DL (ref 65–100)
GLUCOSE BLD STRIP.AUTO-MCNC: 123 MG/DL (ref 65–100)
GLUCOSE BLD STRIP.AUTO-MCNC: 126 MG/DL (ref 65–100)
GLUCOSE BLD STRIP.AUTO-MCNC: 130 MG/DL (ref 65–100)
GLUCOSE BLD STRIP.AUTO-MCNC: 132 MG/DL (ref 65–100)
GLUCOSE BLD STRIP.AUTO-MCNC: 133 MG/DL (ref 65–100)
GLUCOSE BLD STRIP.AUTO-MCNC: 139 MG/DL (ref 65–100)
GLUCOSE BLD STRIP.AUTO-MCNC: 140 MG/DL (ref 65–100)
GLUCOSE BLD STRIP.AUTO-MCNC: 142 MG/DL (ref 65–100)
GLUCOSE BLD STRIP.AUTO-MCNC: 145 MG/DL (ref 65–100)
GLUCOSE BLD STRIP.AUTO-MCNC: 145 MG/DL (ref 65–100)
GLUCOSE BLD STRIP.AUTO-MCNC: 146 MG/DL (ref 65–100)
GLUCOSE BLD STRIP.AUTO-MCNC: 148 MG/DL (ref 65–100)
GLUCOSE BLD STRIP.AUTO-MCNC: 149 MG/DL (ref 65–100)
GLUCOSE BLD STRIP.AUTO-MCNC: 150 MG/DL (ref 65–100)
GLUCOSE BLD STRIP.AUTO-MCNC: 153 MG/DL (ref 65–100)
GLUCOSE BLD STRIP.AUTO-MCNC: 154 MG/DL (ref 65–100)
GLUCOSE BLD STRIP.AUTO-MCNC: 155 MG/DL (ref 65–100)
GLUCOSE BLD STRIP.AUTO-MCNC: 155 MG/DL (ref 65–100)
GLUCOSE BLD STRIP.AUTO-MCNC: 156 MG/DL (ref 65–100)
GLUCOSE BLD STRIP.AUTO-MCNC: 156 MG/DL (ref 65–100)
GLUCOSE BLD STRIP.AUTO-MCNC: 157 MG/DL (ref 65–100)
GLUCOSE BLD STRIP.AUTO-MCNC: 160 MG/DL (ref 65–100)
GLUCOSE BLD STRIP.AUTO-MCNC: 161 MG/DL (ref 65–100)
GLUCOSE BLD STRIP.AUTO-MCNC: 162 MG/DL (ref 65–100)
GLUCOSE BLD STRIP.AUTO-MCNC: 165 MG/DL (ref 65–100)
GLUCOSE BLD STRIP.AUTO-MCNC: 167 MG/DL (ref 65–100)
GLUCOSE BLD STRIP.AUTO-MCNC: 171 MG/DL (ref 65–100)
GLUCOSE BLD STRIP.AUTO-MCNC: 172 MG/DL (ref 65–100)
GLUCOSE BLD STRIP.AUTO-MCNC: 173 MG/DL (ref 65–100)
GLUCOSE BLD STRIP.AUTO-MCNC: 174 MG/DL (ref 65–100)
GLUCOSE BLD STRIP.AUTO-MCNC: 175 MG/DL (ref 65–100)
GLUCOSE BLD STRIP.AUTO-MCNC: 175 MG/DL (ref 65–100)
GLUCOSE BLD STRIP.AUTO-MCNC: 177 MG/DL (ref 65–100)
GLUCOSE BLD STRIP.AUTO-MCNC: 181 MG/DL (ref 65–100)
GLUCOSE BLD STRIP.AUTO-MCNC: 184 MG/DL (ref 65–100)
GLUCOSE BLD STRIP.AUTO-MCNC: 186 MG/DL (ref 65–100)
GLUCOSE BLD STRIP.AUTO-MCNC: 186 MG/DL (ref 65–100)
GLUCOSE BLD STRIP.AUTO-MCNC: 189 MG/DL (ref 65–100)
GLUCOSE BLD STRIP.AUTO-MCNC: 191 MG/DL (ref 65–100)
GLUCOSE BLD STRIP.AUTO-MCNC: 194 MG/DL (ref 65–100)
GLUCOSE BLD STRIP.AUTO-MCNC: 204 MG/DL (ref 65–100)
GLUCOSE BLD STRIP.AUTO-MCNC: 205 MG/DL (ref 65–100)
GLUCOSE BLD STRIP.AUTO-MCNC: 208 MG/DL (ref 65–100)
GLUCOSE BLD STRIP.AUTO-MCNC: 212 MG/DL (ref 65–100)
GLUCOSE BLD STRIP.AUTO-MCNC: 215 MG/DL (ref 65–100)
GLUCOSE BLD STRIP.AUTO-MCNC: 216 MG/DL (ref 65–100)
GLUCOSE BLD STRIP.AUTO-MCNC: 218 MG/DL (ref 65–100)
GLUCOSE BLD STRIP.AUTO-MCNC: 223 MG/DL (ref 65–100)
GLUCOSE BLD STRIP.AUTO-MCNC: 225 MG/DL (ref 65–100)
GLUCOSE BLD STRIP.AUTO-MCNC: 226 MG/DL (ref 65–100)
GLUCOSE BLD STRIP.AUTO-MCNC: 227 MG/DL (ref 65–100)
GLUCOSE BLD STRIP.AUTO-MCNC: 229 MG/DL (ref 65–100)
GLUCOSE BLD STRIP.AUTO-MCNC: 232 MG/DL (ref 65–100)
GLUCOSE BLD STRIP.AUTO-MCNC: 233 MG/DL (ref 65–100)
GLUCOSE BLD STRIP.AUTO-MCNC: 233 MG/DL (ref 65–100)
GLUCOSE BLD STRIP.AUTO-MCNC: 236 MG/DL (ref 65–100)
GLUCOSE BLD STRIP.AUTO-MCNC: 237 MG/DL (ref 65–100)
GLUCOSE BLD STRIP.AUTO-MCNC: 237 MG/DL (ref 65–100)
GLUCOSE BLD STRIP.AUTO-MCNC: 239 MG/DL (ref 65–100)
GLUCOSE BLD STRIP.AUTO-MCNC: 243 MG/DL (ref 65–100)
GLUCOSE BLD STRIP.AUTO-MCNC: 245 MG/DL (ref 65–100)
GLUCOSE BLD STRIP.AUTO-MCNC: 248 MG/DL (ref 65–100)
GLUCOSE BLD STRIP.AUTO-MCNC: 253 MG/DL (ref 65–100)
GLUCOSE BLD STRIP.AUTO-MCNC: 259 MG/DL (ref 65–100)
GLUCOSE BLD STRIP.AUTO-MCNC: 267 MG/DL (ref 65–100)
GLUCOSE BLD STRIP.AUTO-MCNC: 268 MG/DL (ref 65–100)
GLUCOSE BLD STRIP.AUTO-MCNC: 272 MG/DL (ref 65–100)
GLUCOSE BLD STRIP.AUTO-MCNC: 277 MG/DL (ref 65–100)
GLUCOSE BLD STRIP.AUTO-MCNC: 280 MG/DL (ref 65–100)
GLUCOSE BLD STRIP.AUTO-MCNC: 280 MG/DL (ref 65–100)
GLUCOSE BLD STRIP.AUTO-MCNC: 282 MG/DL (ref 65–100)
GLUCOSE BLD STRIP.AUTO-MCNC: 283 MG/DL (ref 65–100)
GLUCOSE BLD STRIP.AUTO-MCNC: 291 MG/DL (ref 65–100)
GLUCOSE BLD STRIP.AUTO-MCNC: 293 MG/DL (ref 65–100)
GLUCOSE BLD STRIP.AUTO-MCNC: 303 MG/DL (ref 65–100)
GLUCOSE BLD STRIP.AUTO-MCNC: 304 MG/DL (ref 65–100)
GLUCOSE BLD STRIP.AUTO-MCNC: 305 MG/DL (ref 65–100)
GLUCOSE BLD STRIP.AUTO-MCNC: 316 MG/DL (ref 65–100)
GLUCOSE BLD STRIP.AUTO-MCNC: 317 MG/DL (ref 65–100)
GLUCOSE BLD STRIP.AUTO-MCNC: 320 MG/DL (ref 65–100)
GLUCOSE BLD STRIP.AUTO-MCNC: 321 MG/DL (ref 65–100)
GLUCOSE BLD STRIP.AUTO-MCNC: 325 MG/DL (ref 65–100)
GLUCOSE BLD STRIP.AUTO-MCNC: 328 MG/DL (ref 65–100)
GLUCOSE BLD STRIP.AUTO-MCNC: 339 MG/DL (ref 65–100)
GLUCOSE BLD STRIP.AUTO-MCNC: 339 MG/DL (ref 65–100)
GLUCOSE BLD STRIP.AUTO-MCNC: 341 MG/DL (ref 65–100)
GLUCOSE BLD STRIP.AUTO-MCNC: 347 MG/DL (ref 65–100)
GLUCOSE BLD STRIP.AUTO-MCNC: 354 MG/DL (ref 65–100)
GLUCOSE BLD STRIP.AUTO-MCNC: 354 MG/DL (ref 65–100)
GLUCOSE BLD STRIP.AUTO-MCNC: 360 MG/DL (ref 65–100)
GLUCOSE BLD STRIP.AUTO-MCNC: 362 MG/DL (ref 65–100)
GLUCOSE BLD STRIP.AUTO-MCNC: 383 MG/DL (ref 65–100)
GLUCOSE BLD STRIP.AUTO-MCNC: 391 MG/DL (ref 65–100)
GLUCOSE BLD STRIP.AUTO-MCNC: 42 MG/DL (ref 65–100)
GLUCOSE BLD STRIP.AUTO-MCNC: 44 MG/DL (ref 65–100)
GLUCOSE BLD STRIP.AUTO-MCNC: 52 MG/DL (ref 65–100)
GLUCOSE BLD STRIP.AUTO-MCNC: 52 MG/DL (ref 65–100)
GLUCOSE BLD STRIP.AUTO-MCNC: 54 MG/DL (ref 65–100)
GLUCOSE BLD STRIP.AUTO-MCNC: 55 MG/DL (ref 65–100)
GLUCOSE BLD STRIP.AUTO-MCNC: 55 MG/DL (ref 65–100)
GLUCOSE BLD STRIP.AUTO-MCNC: 57 MG/DL (ref 65–100)
GLUCOSE BLD STRIP.AUTO-MCNC: 63 MG/DL (ref 65–100)
GLUCOSE BLD STRIP.AUTO-MCNC: 63 MG/DL (ref 65–100)
GLUCOSE BLD STRIP.AUTO-MCNC: 66 MG/DL (ref 65–100)
GLUCOSE BLD STRIP.AUTO-MCNC: 70 MG/DL (ref 65–100)
GLUCOSE BLD STRIP.AUTO-MCNC: 74 MG/DL (ref 65–100)
GLUCOSE BLD STRIP.AUTO-MCNC: 77 MG/DL (ref 65–100)
GLUCOSE BLD STRIP.AUTO-MCNC: 92 MG/DL (ref 65–100)
GLUCOSE BLD STRIP.AUTO-MCNC: 94 MG/DL (ref 65–100)
GLUCOSE BLD STRIP.AUTO-MCNC: 94 MG/DL (ref 65–100)
GLUCOSE BLD STRIP.AUTO-MCNC: NORMAL MG/DL (ref 65–100)
GLUCOSE SERPL-MCNC: 108 MG/DL (ref 65–100)
GLUCOSE SERPL-MCNC: 110 MG/DL (ref 65–100)
GLUCOSE SERPL-MCNC: 111 MG/DL (ref 65–100)
GLUCOSE SERPL-MCNC: 126 MG/DL (ref 65–100)
GLUCOSE SERPL-MCNC: 130 MG/DL (ref 65–100)
GLUCOSE SERPL-MCNC: 133 MG/DL (ref 65–100)
GLUCOSE SERPL-MCNC: 135 MG/DL (ref 65–100)
GLUCOSE SERPL-MCNC: 139 MG/DL (ref 65–100)
GLUCOSE SERPL-MCNC: 139 MG/DL (ref 65–100)
GLUCOSE SERPL-MCNC: 144 MG/DL (ref 65–100)
GLUCOSE SERPL-MCNC: 156 MG/DL (ref 65–100)
GLUCOSE SERPL-MCNC: 170 MG/DL (ref 65–100)
GLUCOSE SERPL-MCNC: 189 MG/DL (ref 65–100)
GLUCOSE SERPL-MCNC: 194 MG/DL (ref 65–100)
GLUCOSE SERPL-MCNC: 204 MG/DL (ref 65–100)
GLUCOSE SERPL-MCNC: 205 MG/DL (ref 65–100)
GLUCOSE SERPL-MCNC: 206 MG/DL (ref 65–100)
GLUCOSE SERPL-MCNC: 213 MG/DL (ref 65–100)
GLUCOSE SERPL-MCNC: 232 MG/DL (ref 65–100)
GLUCOSE SERPL-MCNC: 261 MG/DL (ref 65–100)
GLUCOSE SERPL-MCNC: 272 MG/DL (ref 65–100)
GLUCOSE SERPL-MCNC: 288 MG/DL (ref 65–100)
GLUCOSE SERPL-MCNC: 35 MG/DL (ref 65–100)
GLUCOSE SERPL-MCNC: 366 MG/DL (ref 65–100)
GLUCOSE SERPL-MCNC: 394 MG/DL (ref 65–100)
GLUCOSE SERPL-MCNC: 76 MG/DL (ref 65–100)
GLUCOSE SERPL-MCNC: 84 MG/DL (ref 65–100)
GLUCOSE SERPL-MCNC: 89 MG/DL (ref 65–100)
GLUCOSE SERPL-MCNC: 95 MG/DL (ref 65–100)
GLUCOSE SERPL-MCNC: 96 MG/DL (ref 65–100)
GLUCOSE SERPL-MCNC: 97 MG/DL (ref 65–100)
GRAM STN SPEC: ABNORMAL
GRAM STN SPEC: NORMAL
HBA1C MFR BLD: 5.1 % (ref 4–5.6)
HBA1C MFR BLD: 5.8 % (ref 4–5.6)
HBA1C MFR BLD: 6.2 % (ref 4–5.6)
HBV SURFACE AB SER QL: REACTIVE
HBV SURFACE AB SER QL: REACTIVE
HBV SURFACE AB SER-ACNC: 22.91 MIU/ML
HBV SURFACE AB SER-ACNC: 74.95 MIU/ML
HBV SURFACE AG SER QL: <0.1 INDEX
HBV SURFACE AG SER QL: <0.1 INDEX
HBV SURFACE AG SER QL: NEGATIVE
HBV SURFACE AG SER QL: NEGATIVE
HCO3 BLD-SCNC: 27.4 MMOL/L (ref 22–26)
HCO3 BLD-SCNC: 31.8 MMOL/L (ref 22–26)
HCO3 BLD-SCNC: 33.4 MMOL/L (ref 22–26)
HCT VFR BLD AUTO: 22.6 % (ref 36.6–50.3)
HCT VFR BLD AUTO: 23.2 % (ref 36.6–50.3)
HCT VFR BLD AUTO: 23.6 % (ref 36.6–50.3)
HCT VFR BLD AUTO: 23.8 % (ref 36.6–50.3)
HCT VFR BLD AUTO: 23.8 % (ref 36.6–50.3)
HCT VFR BLD AUTO: 24 % (ref 36.6–50.3)
HCT VFR BLD AUTO: 24.6 % (ref 36.6–50.3)
HCT VFR BLD AUTO: 25.6 % (ref 36.6–50.3)
HCT VFR BLD AUTO: 25.8 % (ref 36.6–50.3)
HCT VFR BLD AUTO: 26.8 % (ref 36.6–50.3)
HCT VFR BLD AUTO: 27.5 % (ref 36.6–50.3)
HCT VFR BLD AUTO: 27.5 % (ref 36.6–50.3)
HCT VFR BLD AUTO: 27.6 % (ref 36.6–50.3)
HCT VFR BLD AUTO: 27.8 % (ref 36.6–50.3)
HCT VFR BLD AUTO: 28 % (ref 36.6–50.3)
HCT VFR BLD AUTO: 28 % (ref 36.6–50.3)
HCT VFR BLD AUTO: 28.3 % (ref 36.6–50.3)
HCT VFR BLD AUTO: 28.5 % (ref 36.6–50.3)
HCT VFR BLD AUTO: 28.6 % (ref 36.6–50.3)
HCT VFR BLD AUTO: 28.7 % (ref 36.6–50.3)
HCT VFR BLD AUTO: 28.8 % (ref 36.6–50.3)
HCT VFR BLD AUTO: 28.9 % (ref 36.6–50.3)
HCT VFR BLD AUTO: 29.1 % (ref 36.6–50.3)
HCT VFR BLD AUTO: 29.4 % (ref 36.6–50.3)
HCT VFR BLD AUTO: 29.6 % (ref 36.6–50.3)
HCT VFR BLD AUTO: 29.9 % (ref 36.6–50.3)
HCT VFR BLD AUTO: 30.3 % (ref 36.6–50.3)
HCT VFR BLD AUTO: 30.9 % (ref 36.6–50.3)
HCT VFR BLD AUTO: 31.8 % (ref 36.6–50.3)
HCT VFR BLD AUTO: 32.2 % (ref 36.6–50.3)
HCT VFR BLD AUTO: 33 % (ref 36.6–50.3)
HCT VFR BLD AUTO: 34.2 % (ref 36.6–50.3)
HCT VFR BLD AUTO: 34.3 % (ref 36.6–50.3)
HCV RNA SERPL NAA+PROBE-ACNC: NORMAL IU/ML
HCV RNA SERPL NAA+PROBE-LOG IU: 6.52 LOG10 IU/ML
HEALTH STATUS, XMCV2T: NORMAL
HGB BLD-MCNC: 10.2 G/DL (ref 12.1–17)
HGB BLD-MCNC: 10.2 G/DL (ref 12.1–17)
HGB BLD-MCNC: 10.3 G/DL (ref 12.1–17)
HGB BLD-MCNC: 10.4 G/DL (ref 12.1–17)
HGB BLD-MCNC: 10.5 G/DL (ref 12.1–17)
HGB BLD-MCNC: 10.7 G/DL (ref 12.1–17)
HGB BLD-MCNC: 10.9 G/DL (ref 12.1–17)
HGB BLD-MCNC: 11.1 G/DL (ref 12.1–17)
HGB BLD-MCNC: 11.3 G/DL (ref 12.1–17)
HGB BLD-MCNC: 11.8 G/DL (ref 12.1–17)
HGB BLD-MCNC: 7.1 G/DL (ref 12.1–17)
HGB BLD-MCNC: 7.2 G/DL (ref 12.1–17)
HGB BLD-MCNC: 7.4 G/DL (ref 12.1–17)
HGB BLD-MCNC: 7.5 G/DL (ref 12.1–17)
HGB BLD-MCNC: 7.7 G/DL (ref 12.1–17)
HGB BLD-MCNC: 7.7 G/DL (ref 12.1–17)
HGB BLD-MCNC: 8.1 G/DL (ref 12.1–17)
HGB BLD-MCNC: 8.2 G/DL (ref 12.1–17)
HGB BLD-MCNC: 8.4 G/DL (ref 12.1–17)
HGB BLD-MCNC: 8.8 G/DL (ref 12.1–17)
HGB BLD-MCNC: 8.8 G/DL (ref 12.1–17)
HGB BLD-MCNC: 9 G/DL (ref 12.1–17)
HGB BLD-MCNC: 9.1 G/DL (ref 12.1–17)
HGB BLD-MCNC: 9.2 G/DL (ref 12.1–17)
HGB BLD-MCNC: 9.2 G/DL (ref 12.1–17)
HGB BLD-MCNC: 9.4 G/DL (ref 12.1–17)
HGB BLD-MCNC: 9.5 G/DL (ref 12.1–17)
HGB BLD-MCNC: 9.5 G/DL (ref 12.1–17)
HGB BLD-MCNC: 9.7 G/DL (ref 12.1–17)
HGB BLD-MCNC: 9.7 G/DL (ref 12.1–17)
HGB BLD-MCNC: 9.8 G/DL (ref 12.1–17)
IMM GRANULOCYTES # BLD AUTO: 0 K/UL (ref 0–0.04)
IMM GRANULOCYTES # BLD AUTO: 0.1 K/UL (ref 0–0.04)
IMM GRANULOCYTES # BLD AUTO: 0.1 K/UL (ref 0–0.04)
IMM GRANULOCYTES # BLD AUTO: 0.2 K/UL (ref 0–0.04)
IMM GRANULOCYTES # BLD AUTO: 0.3 K/UL (ref 0–0.04)
IMM GRANULOCYTES # BLD AUTO: 0.3 K/UL (ref 0–0.04)
IMM GRANULOCYTES # BLD AUTO: 0.4 K/UL (ref 0–0.04)
IMM GRANULOCYTES # BLD AUTO: 0.7 K/UL (ref 0–0.04)
IMM GRANULOCYTES # BLD AUTO: 0.9 K/UL (ref 0–0.04)
IMM GRANULOCYTES NFR BLD AUTO: 0 % (ref 0–0.5)
IMM GRANULOCYTES NFR BLD AUTO: 1 % (ref 0–0.5)
IMM GRANULOCYTES NFR BLD AUTO: 1 % (ref 0–0.5)
IMM GRANULOCYTES NFR BLD AUTO: 2 % (ref 0–0.5)
IMM GRANULOCYTES NFR BLD AUTO: 4 % (ref 0–0.5)
IMM GRANULOCYTES NFR BLD AUTO: 5 % (ref 0–0.5)
IMM GRANULOCYTES NFR BLD AUTO: 5 % (ref 0–0.5)
INR PPP: 1.1 (ref 0.9–1.1)
INR PPP: 1.2 (ref 0.9–1.1)
IRON SATN MFR SERPL: 23 % (ref 20–50)
IRON SERPL-MCNC: 32 UG/DL (ref 35–150)
LACTATE BLD-SCNC: 0.4 MMOL/L (ref 0.4–2)
LACTATE BLD-SCNC: 0.59 MMOL/L (ref 0.4–2)
LACTATE SERPL-SCNC: 0.5 MMOL/L (ref 0.4–2)
LDH FLD L TO P-CCNC: 117 U/L
LDH FLD L TO P-CCNC: 86 U/L
LVOT MG: 2.25 MMHG
LYMPHOCYTES # BLD: 0.1 K/UL (ref 0.8–3.5)
LYMPHOCYTES # BLD: 0.1 K/UL (ref 0.8–3.5)
LYMPHOCYTES # BLD: 0.2 K/UL (ref 0.8–3.5)
LYMPHOCYTES # BLD: 0.3 K/UL (ref 0.8–3.5)
LYMPHOCYTES # BLD: 0.4 K/UL (ref 0.8–3.5)
LYMPHOCYTES # BLD: 0.5 K/UL (ref 0.8–3.5)
LYMPHOCYTES # BLD: 0.6 K/UL (ref 0.8–3.5)
LYMPHOCYTES # BLD: 0.6 K/UL (ref 0.8–3.5)
LYMPHOCYTES # BLD: 0.8 K/UL (ref 0.8–3.5)
LYMPHOCYTES # BLD: 1.6 K/UL (ref 0.8–3.5)
LYMPHOCYTES NFR BLD: 1 % (ref 12–49)
LYMPHOCYTES NFR BLD: 12 % (ref 12–49)
LYMPHOCYTES NFR BLD: 2 % (ref 12–49)
LYMPHOCYTES NFR BLD: 3 % (ref 12–49)
LYMPHOCYTES NFR BLD: 4 % (ref 12–49)
LYMPHOCYTES NFR BLD: 5 % (ref 12–49)
LYMPHOCYTES NFR BLD: 6 % (ref 12–49)
LYMPHOCYTES NFR BLD: 7 % (ref 12–49)
MAGNESIUM SERPL-MCNC: 2 MG/DL (ref 1.6–2.4)
MCH RBC QN AUTO: 33.4 PG (ref 26–34)
MCH RBC QN AUTO: 33.7 PG (ref 26–34)
MCH RBC QN AUTO: 34 PG (ref 26–34)
MCH RBC QN AUTO: 34.2 PG (ref 26–34)
MCH RBC QN AUTO: 34.3 PG (ref 26–34)
MCH RBC QN AUTO: 34.3 PG (ref 26–34)
MCH RBC QN AUTO: 34.4 PG (ref 26–34)
MCH RBC QN AUTO: 34.4 PG (ref 26–34)
MCH RBC QN AUTO: 34.5 PG (ref 26–34)
MCH RBC QN AUTO: 34.6 PG (ref 26–34)
MCH RBC QN AUTO: 34.7 PG (ref 26–34)
MCH RBC QN AUTO: 34.7 PG (ref 26–34)
MCH RBC QN AUTO: 34.8 PG (ref 26–34)
MCH RBC QN AUTO: 34.9 PG (ref 26–34)
MCH RBC QN AUTO: 35 PG (ref 26–34)
MCH RBC QN AUTO: 35.1 PG (ref 26–34)
MCH RBC QN AUTO: 35.3 PG (ref 26–34)
MCH RBC QN AUTO: 35.5 PG (ref 26–34)
MCH RBC QN AUTO: 35.5 PG (ref 26–34)
MCHC RBC AUTO-ENTMCNC: 30.5 G/DL (ref 30–36.5)
MCHC RBC AUTO-ENTMCNC: 31.4 G/DL (ref 30–36.5)
MCHC RBC AUTO-ENTMCNC: 31.5 G/DL (ref 30–36.5)
MCHC RBC AUTO-ENTMCNC: 31.9 G/DL (ref 30–36.5)
MCHC RBC AUTO-ENTMCNC: 32 G/DL (ref 30–36.5)
MCHC RBC AUTO-ENTMCNC: 32 G/DL (ref 30–36.5)
MCHC RBC AUTO-ENTMCNC: 32.1 G/DL (ref 30–36.5)
MCHC RBC AUTO-ENTMCNC: 32.3 G/DL (ref 30–36.5)
MCHC RBC AUTO-ENTMCNC: 32.4 G/DL (ref 30–36.5)
MCHC RBC AUTO-ENTMCNC: 32.5 G/DL (ref 30–36.5)
MCHC RBC AUTO-ENTMCNC: 32.6 G/DL (ref 30–36.5)
MCHC RBC AUTO-ENTMCNC: 32.6 G/DL (ref 30–36.5)
MCHC RBC AUTO-ENTMCNC: 32.8 G/DL (ref 30–36.5)
MCHC RBC AUTO-ENTMCNC: 32.9 G/DL (ref 30–36.5)
MCHC RBC AUTO-ENTMCNC: 32.9 G/DL (ref 30–36.5)
MCHC RBC AUTO-ENTMCNC: 33 G/DL (ref 30–36.5)
MCHC RBC AUTO-ENTMCNC: 33.1 G/DL (ref 30–36.5)
MCHC RBC AUTO-ENTMCNC: 33.2 G/DL (ref 30–36.5)
MCHC RBC AUTO-ENTMCNC: 33.5 G/DL (ref 30–36.5)
MCHC RBC AUTO-ENTMCNC: 33.6 G/DL (ref 30–36.5)
MCHC RBC AUTO-ENTMCNC: 33.6 G/DL (ref 30–36.5)
MCHC RBC AUTO-ENTMCNC: 33.7 G/DL (ref 30–36.5)
MCHC RBC AUTO-ENTMCNC: 33.9 G/DL (ref 30–36.5)
MCHC RBC AUTO-ENTMCNC: 34.1 G/DL (ref 30–36.5)
MCHC RBC AUTO-ENTMCNC: 34.1 G/DL (ref 30–36.5)
MCHC RBC AUTO-ENTMCNC: 34.4 G/DL (ref 30–36.5)
MCHC RBC AUTO-ENTMCNC: 34.8 G/DL (ref 30–36.5)
MCHC RBC AUTO-ENTMCNC: 35 G/DL (ref 30–36.5)
MCHC RBC AUTO-ENTMCNC: 35.7 G/DL (ref 30–36.5)
MCV RBC AUTO: 100.3 FL (ref 80–99)
MCV RBC AUTO: 100.7 FL (ref 80–99)
MCV RBC AUTO: 101.4 FL (ref 80–99)
MCV RBC AUTO: 101.8 FL (ref 80–99)
MCV RBC AUTO: 102 FL (ref 80–99)
MCV RBC AUTO: 102.1 FL (ref 80–99)
MCV RBC AUTO: 102.1 FL (ref 80–99)
MCV RBC AUTO: 102.7 FL (ref 80–99)
MCV RBC AUTO: 102.9 FL (ref 80–99)
MCV RBC AUTO: 102.9 FL (ref 80–99)
MCV RBC AUTO: 103 FL (ref 80–99)
MCV RBC AUTO: 103.8 FL (ref 80–99)
MCV RBC AUTO: 104.5 FL (ref 80–99)
MCV RBC AUTO: 105.2 FL (ref 80–99)
MCV RBC AUTO: 105.7 FL (ref 80–99)
MCV RBC AUTO: 106 FL (ref 80–99)
MCV RBC AUTO: 106.1 FL (ref 80–99)
MCV RBC AUTO: 106.3 FL (ref 80–99)
MCV RBC AUTO: 106.8 FL (ref 80–99)
MCV RBC AUTO: 107.1 FL (ref 80–99)
MCV RBC AUTO: 107.4 FL (ref 80–99)
MCV RBC AUTO: 107.4 FL (ref 80–99)
MCV RBC AUTO: 108.2 FL (ref 80–99)
MCV RBC AUTO: 108.2 FL (ref 80–99)
MCV RBC AUTO: 108.5 FL (ref 80–99)
MCV RBC AUTO: 108.7 FL (ref 80–99)
MCV RBC AUTO: 109.1 FL (ref 80–99)
MCV RBC AUTO: 109.7 FL (ref 80–99)
MCV RBC AUTO: 112.4 FL (ref 80–99)
MCV RBC AUTO: 99.3 FL (ref 80–99)
MCV RBC AUTO: 99.4 FL (ref 80–99)
METAMYELOCYTES NFR BLD MANUAL: 1 %
METAMYELOCYTES NFR BLD MANUAL: 1 %
METAMYELOCYTES NFR BLD MANUAL: 2 %
METAMYELOCYTES NFR BLD MANUAL: 2 %
METAMYELOCYTES NFR BLD MANUAL: 3 %
METAMYELOCYTES NFR BLD MANUAL: 3 %
MONOCYTES # BLD: 0.1 K/UL (ref 0–1)
MONOCYTES # BLD: 0.3 K/UL (ref 0–1)
MONOCYTES # BLD: 0.4 K/UL (ref 0–1)
MONOCYTES # BLD: 0.4 K/UL (ref 0–1)
MONOCYTES # BLD: 0.5 K/UL (ref 0–1)
MONOCYTES # BLD: 0.6 K/UL (ref 0–1)
MONOCYTES # BLD: 0.7 K/UL (ref 0–1)
MONOCYTES # BLD: 0.8 K/UL (ref 0–1)
MONOCYTES # BLD: 0.8 K/UL (ref 0–1)
MONOCYTES # BLD: 0.9 K/UL (ref 0–1)
MONOCYTES # BLD: 1 K/UL (ref 0–1)
MONOCYTES # BLD: 1.1 K/UL (ref 0–1)
MONOCYTES # BLD: 1.2 K/UL (ref 0–1)
MONOCYTES # BLD: 1.3 K/UL (ref 0–1)
MONOCYTES # BLD: 1.5 K/UL (ref 0–1)
MONOCYTES NFR BLD: 1 % (ref 5–13)
MONOCYTES NFR BLD: 11 % (ref 5–13)
MONOCYTES NFR BLD: 13 % (ref 5–13)
MONOCYTES NFR BLD: 17 % (ref 5–13)
MONOCYTES NFR BLD: 3 % (ref 5–13)
MONOCYTES NFR BLD: 4 % (ref 5–13)
MONOCYTES NFR BLD: 5 % (ref 5–13)
MONOCYTES NFR BLD: 6 % (ref 5–13)
MONOCYTES NFR BLD: 8 % (ref 5–13)
MONOCYTES NFR BLD: 9 % (ref 5–13)
MYELOCYTES NFR BLD MANUAL: 1 %
NEUTS BAND NFR BLD MANUAL: 1 %
NEUTS BAND NFR BLD MANUAL: 2 %
NEUTS BAND NFR BLD MANUAL: 3 %
NEUTS BAND NFR BLD MANUAL: 4 %
NEUTS SEG # BLD: 10.4 K/UL (ref 1.8–8)
NEUTS SEG # BLD: 11.1 K/UL (ref 1.8–8)
NEUTS SEG # BLD: 11.8 K/UL (ref 1.8–8)
NEUTS SEG # BLD: 12.3 K/UL (ref 1.8–8)
NEUTS SEG # BLD: 13.5 K/UL (ref 1.8–8)
NEUTS SEG # BLD: 15.8 K/UL (ref 1.8–8)
NEUTS SEG # BLD: 16 K/UL (ref 1.8–8)
NEUTS SEG # BLD: 16.3 K/UL (ref 1.8–8)
NEUTS SEG # BLD: 17.9 K/UL (ref 1.8–8)
NEUTS SEG # BLD: 19.3 K/UL (ref 1.8–8)
NEUTS SEG # BLD: 20.6 K/UL (ref 1.8–8)
NEUTS SEG # BLD: 20.6 K/UL (ref 1.8–8)
NEUTS SEG # BLD: 22.8 K/UL (ref 1.8–8)
NEUTS SEG # BLD: 5.6 K/UL (ref 1.8–8)
NEUTS SEG # BLD: 7.1 K/UL (ref 1.8–8)
NEUTS SEG # BLD: 7.6 K/UL (ref 1.8–8)
NEUTS SEG # BLD: 8 K/UL (ref 1.8–8)
NEUTS SEG # BLD: 8.4 K/UL (ref 1.8–8)
NEUTS SEG # BLD: 8.5 K/UL (ref 1.8–8)
NEUTS SEG # BLD: 8.7 K/UL (ref 1.8–8)
NEUTS SEG # BLD: 8.9 K/UL (ref 1.8–8)
NEUTS SEG # BLD: 9.1 K/UL (ref 1.8–8)
NEUTS SEG # BLD: 9.9 K/UL (ref 1.8–8)
NEUTS SEG NFR BLD: 72 % (ref 32–75)
NEUTS SEG NFR BLD: 72 % (ref 32–75)
NEUTS SEG NFR BLD: 75 % (ref 32–75)
NEUTS SEG NFR BLD: 81 % (ref 32–75)
NEUTS SEG NFR BLD: 83 % (ref 32–75)
NEUTS SEG NFR BLD: 85 % (ref 32–75)
NEUTS SEG NFR BLD: 87 % (ref 32–75)
NEUTS SEG NFR BLD: 88 % (ref 32–75)
NEUTS SEG NFR BLD: 89 % (ref 32–75)
NEUTS SEG NFR BLD: 90 % (ref 32–75)
NEUTS SEG NFR BLD: 91 % (ref 32–75)
NEUTS SEG NFR BLD: 92 % (ref 32–75)
NEUTS SEG NFR BLD: 92 % (ref 32–75)
NEUTS SEG NFR BLD: 94 % (ref 32–75)
NEUTS SEG NFR BLD: 95 % (ref 32–75)
NEUTS SEG NFR BLD: 95 % (ref 32–75)
NEUTS SEG NFR BLD: 96 % (ref 32–75)
NRBC # BLD: 0 K/UL (ref 0–0.01)
NRBC # BLD: 0.03 K/UL (ref 0–0.01)
NRBC # BLD: 0.04 K/UL (ref 0–0.01)
NRBC # BLD: 0.05 K/UL (ref 0–0.01)
NRBC # BLD: 0.05 K/UL (ref 0–0.01)
NRBC # BLD: 0.08 K/UL (ref 0–0.01)
NRBC # BLD: 0.09 K/UL (ref 0–0.01)
NRBC # BLD: 0.1 K/UL (ref 0–0.01)
NRBC # BLD: 0.12 K/UL (ref 0–0.01)
NRBC # BLD: 0.13 K/UL (ref 0–0.01)
NRBC # BLD: 0.14 K/UL (ref 0–0.01)
NRBC # BLD: 0.18 K/UL (ref 0–0.01)
NRBC # BLD: 0.19 K/UL (ref 0–0.01)
NRBC # BLD: 0.19 K/UL (ref 0–0.01)
NRBC # BLD: 0.5 K/UL (ref 0–0.01)
NRBC # BLD: 0.66 K/UL (ref 0–0.01)
NRBC # BLD: 0.68 K/UL (ref 0–0.01)
NRBC # BLD: 0.88 K/UL (ref 0–0.01)
NRBC # BLD: 0.98 K/UL (ref 0–0.01)
NRBC # BLD: 1.15 K/UL (ref 0–0.01)
NRBC # BLD: 1.51 K/UL (ref 0–0.01)
NRBC # BLD: 2.25 K/UL (ref 0–0.01)
NRBC # BLD: 2.32 K/UL (ref 0–0.01)
NRBC # BLD: 2.75 K/UL (ref 0–0.01)
NRBC # BLD: 2.79 K/UL (ref 0–0.01)
NRBC BLD-RTO: 0 PER 100 WBC
NRBC BLD-RTO: 0.3 PER 100 WBC
NRBC BLD-RTO: 0.4 PER 100 WBC
NRBC BLD-RTO: 0.5 PER 100 WBC
NRBC BLD-RTO: 0.6 PER 100 WBC
NRBC BLD-RTO: 0.7 PER 100 WBC
NRBC BLD-RTO: 0.7 PER 100 WBC
NRBC BLD-RTO: 0.8 PER 100 WBC
NRBC BLD-RTO: 1.3 PER 100 WBC
NRBC BLD-RTO: 1.4 PER 100 WBC
NRBC BLD-RTO: 1.6 PER 100 WBC
NRBC BLD-RTO: 11.3 PER 100 WBC
NRBC BLD-RTO: 2.1 PER 100 WBC
NRBC BLD-RTO: 2.9 PER 100 WBC
NRBC BLD-RTO: 3.7 PER 100 WBC
NRBC BLD-RTO: 3.7 PER 100 WBC
NRBC BLD-RTO: 5.1 PER 100 WBC
NRBC BLD-RTO: 5.7 PER 100 WBC
NRBC BLD-RTO: 6.1 PER 100 WBC
NRBC BLD-RTO: 8.5 PER 100 WBC
NRBC BLD-RTO: 8.7 PER 100 WBC
NRBC BLD-RTO: 9.6 PER 100 WBC
NRBC BLD-RTO: 9.9 PER 100 WBC
NUC CELL # FLD: 348 /CU MM
O2/TOTAL GAS SETTING VFR VENT: 28 %
P-R INTERVAL, ECG05: 194 MS
PATH REV BLD -IMP: NORMAL
PATH REV BLD -IMP: NORMAL
PCO2 BLD: 51 MMHG (ref 35–45)
PCO2 BLD: 59.5 MMHG (ref 35–45)
PCO2 BLD: 63 MMHG (ref 35–45)
PH BLD: 7.25 [PH] (ref 7.35–7.45)
PH BLD: 7.34 [PH] (ref 7.35–7.45)
PH BLD: 7.43 [PH] (ref 7.35–7.45)
PHOSPHATE SERPL-MCNC: 3.1 MG/DL (ref 2.6–4.7)
PHOSPHATE SERPL-MCNC: 3.5 MG/DL (ref 2.6–4.7)
PHOSPHATE SERPL-MCNC: 4.6 MG/DL (ref 2.6–4.7)
PHOSPHATE SERPL-MCNC: 4.9 MG/DL (ref 2.6–4.7)
PHOSPHATE SERPL-MCNC: 4.9 MG/DL (ref 2.6–4.7)
PHOSPHATE SERPL-MCNC: 5.6 MG/DL (ref 2.6–4.7)
PHOSPHATE SERPL-MCNC: 7 MG/DL (ref 2.6–4.7)
PHOSPHATE SERPL-MCNC: 7.7 MG/DL (ref 2.6–4.7)
PLATELET # BLD AUTO: 102 K/UL (ref 150–400)
PLATELET # BLD AUTO: 104 K/UL (ref 150–400)
PLATELET # BLD AUTO: 105 K/UL (ref 150–400)
PLATELET # BLD AUTO: 106 K/UL (ref 150–400)
PLATELET # BLD AUTO: 108 K/UL (ref 150–400)
PLATELET # BLD AUTO: 117 K/UL (ref 150–400)
PLATELET # BLD AUTO: 123 K/UL (ref 150–400)
PLATELET # BLD AUTO: 127 K/UL (ref 150–400)
PLATELET # BLD AUTO: 128 K/UL (ref 150–400)
PLATELET # BLD AUTO: 128 K/UL (ref 150–400)
PLATELET # BLD AUTO: 143 K/UL (ref 150–400)
PLATELET # BLD AUTO: 151 K/UL (ref 150–400)
PLATELET # BLD AUTO: 185 K/UL (ref 150–400)
PLATELET # BLD AUTO: 198 K/UL (ref 150–400)
PLATELET # BLD AUTO: 220 K/UL (ref 150–400)
PLATELET # BLD AUTO: 236 K/UL (ref 150–400)
PLATELET # BLD AUTO: 239 K/UL (ref 150–400)
PLATELET # BLD AUTO: 246 K/UL (ref 150–400)
PLATELET # BLD AUTO: 253 K/UL (ref 150–400)
PLATELET # BLD AUTO: 257 K/UL (ref 150–400)
PLATELET # BLD AUTO: 263 K/UL (ref 150–400)
PLATELET # BLD AUTO: 271 K/UL (ref 150–400)
PLATELET # BLD AUTO: 278 K/UL (ref 150–400)
PLATELET # BLD AUTO: 288 K/UL (ref 150–400)
PLATELET # BLD AUTO: 296 K/UL (ref 150–400)
PLATELET # BLD AUTO: 304 K/UL (ref 150–400)
PLATELET # BLD AUTO: 307 K/UL (ref 150–400)
PLATELET # BLD AUTO: 309 K/UL (ref 150–400)
PLATELET # BLD AUTO: 320 K/UL (ref 150–400)
PLATELET # BLD AUTO: 93 K/UL (ref 150–400)
PLATELET # BLD AUTO: 98 K/UL (ref 150–400)
PLATELET COMMENTS,PCOM: ABNORMAL
PMV BLD AUTO: 10.7 FL (ref 8.9–12.9)
PMV BLD AUTO: 10.7 FL (ref 8.9–12.9)
PMV BLD AUTO: 10.8 FL (ref 8.9–12.9)
PMV BLD AUTO: 10.9 FL (ref 8.9–12.9)
PMV BLD AUTO: 11.1 FL (ref 8.9–12.9)
PMV BLD AUTO: 11.2 FL (ref 8.9–12.9)
PMV BLD AUTO: 11.2 FL (ref 8.9–12.9)
PMV BLD AUTO: 11.4 FL (ref 8.9–12.9)
PMV BLD AUTO: 11.5 FL (ref 8.9–12.9)
PMV BLD AUTO: 11.7 FL (ref 8.9–12.9)
PMV BLD AUTO: 11.7 FL (ref 8.9–12.9)
PMV BLD AUTO: 11.9 FL (ref 8.9–12.9)
PMV BLD AUTO: 12 FL (ref 8.9–12.9)
PMV BLD AUTO: 12.1 FL (ref 8.9–12.9)
PMV BLD AUTO: 12.2 FL (ref 8.9–12.9)
PMV BLD AUTO: 12.3 FL (ref 8.9–12.9)
PMV BLD AUTO: 12.3 FL (ref 8.9–12.9)
PMV BLD AUTO: 12.4 FL (ref 8.9–12.9)
PMV BLD AUTO: 12.4 FL (ref 8.9–12.9)
PMV BLD AUTO: 12.5 FL (ref 8.9–12.9)
PMV BLD AUTO: 12.5 FL (ref 8.9–12.9)
PMV BLD AUTO: 12.7 FL (ref 8.9–12.9)
PO2 BLD: 116 MMHG (ref 80–100)
PO2 BLD: 34 MMHG (ref 80–100)
PO2 BLD: 44 MMHG (ref 80–100)
POTASSIUM SERPL-SCNC: 3.6 MMOL/L (ref 3.5–5.1)
POTASSIUM SERPL-SCNC: 3.8 MMOL/L (ref 3.5–5.1)
POTASSIUM SERPL-SCNC: 4 MMOL/L (ref 3.5–5.1)
POTASSIUM SERPL-SCNC: 4.1 MMOL/L (ref 3.5–5.1)
POTASSIUM SERPL-SCNC: 4.2 MMOL/L (ref 3.5–5.1)
POTASSIUM SERPL-SCNC: 4.3 MMOL/L (ref 3.5–5.1)
POTASSIUM SERPL-SCNC: 4.3 MMOL/L (ref 3.5–5.1)
POTASSIUM SERPL-SCNC: 4.4 MMOL/L (ref 3.5–5.1)
POTASSIUM SERPL-SCNC: 4.5 MMOL/L (ref 3.5–5.1)
POTASSIUM SERPL-SCNC: 4.7 MMOL/L (ref 3.5–5.1)
POTASSIUM SERPL-SCNC: 4.7 MMOL/L (ref 3.5–5.1)
POTASSIUM SERPL-SCNC: 4.8 MMOL/L (ref 3.5–5.1)
POTASSIUM SERPL-SCNC: 4.9 MMOL/L (ref 3.5–5.1)
POTASSIUM SERPL-SCNC: 5.1 MMOL/L (ref 3.5–5.1)
POTASSIUM SERPL-SCNC: 5.5 MMOL/L (ref 3.5–5.1)
POTASSIUM SERPL-SCNC: 5.6 MMOL/L (ref 3.5–5.1)
POTASSIUM SERPL-SCNC: 5.7 MMOL/L (ref 3.5–5.1)
POTASSIUM SERPL-SCNC: 5.7 MMOL/L (ref 3.5–5.1)
POTASSIUM SERPL-SCNC: 5.8 MMOL/L (ref 3.5–5.1)
POTASSIUM SERPL-SCNC: 5.9 MMOL/L (ref 3.5–5.1)
POTASSIUM SERPL-SCNC: 6.2 MMOL/L (ref 3.5–5.1)
POTASSIUM SERPL-SCNC: 7.4 MMOL/L (ref 3.5–5.1)
PROCALCITONIN SERPL-MCNC: 0.36 NG/ML
PROCALCITONIN SERPL-MCNC: 1.07 NG/ML
PROCALCITONIN SERPL-MCNC: 2.22 NG/ML
PROT FLD-MCNC: 1.2 G/DL
PROT FLD-MCNC: 2.3 G/DL
PROT SERPL-MCNC: 5.4 G/DL (ref 6.4–8.2)
PROT SERPL-MCNC: 5.9 G/DL (ref 6.4–8.2)
PROT SERPL-MCNC: 6.6 G/DL (ref 6.4–8.2)
PROT SERPL-MCNC: 6.7 G/DL (ref 6.4–8.2)
PROTHROMBIN TIME: 11.6 SEC (ref 9–11.1)
PROTHROMBIN TIME: 12 SEC (ref 9–11.1)
Q-T INTERVAL, ECG07: 256 MS
Q-T INTERVAL, ECG07: 266 MS
Q-T INTERVAL, ECG07: 330 MS
Q-T INTERVAL, ECG07: 370 MS
Q-T INTERVAL, ECG07: 406 MS
QRS DURATION, ECG06: 88 MS
QRS DURATION, ECG06: 88 MS
QRS DURATION, ECG06: 92 MS
QRS DURATION, ECG06: 94 MS
QRS DURATION, ECG06: 96 MS
QTC CALCULATION (BEZET), ECG08: 375 MS
QTC CALCULATION (BEZET), ECG08: 408 MS
QTC CALCULATION (BEZET), ECG08: 413 MS
QTC CALCULATION (BEZET), ECG08: 434 MS
QTC CALCULATION (BEZET), ECG08: 438 MS
RBC # BLD AUTO: 2.06 M/UL (ref 4.1–5.7)
RBC # BLD AUTO: 2.1 M/UL (ref 4.1–5.7)
RBC # BLD AUTO: 2.16 M/UL (ref 4.1–5.7)
RBC # BLD AUTO: 2.19 M/UL (ref 4.1–5.7)
RBC # BLD AUTO: 2.2 M/UL (ref 4.1–5.7)
RBC # BLD AUTO: 2.2 M/UL (ref 4.1–5.7)
RBC # BLD AUTO: 2.32 M/UL (ref 4.1–5.7)
RBC # BLD AUTO: 2.36 M/UL (ref 4.1–5.7)
RBC # BLD AUTO: 2.41 M/UL (ref 4.1–5.7)
RBC # BLD AUTO: 2.51 M/UL (ref 4.1–5.7)
RBC # BLD AUTO: 2.56 M/UL (ref 4.1–5.7)
RBC # BLD AUTO: 2.61 M/UL (ref 4.1–5.7)
RBC # BLD AUTO: 2.64 M/UL (ref 4.1–5.7)
RBC # BLD AUTO: 2.65 M/UL (ref 4.1–5.7)
RBC # BLD AUTO: 2.66 M/UL (ref 4.1–5.7)
RBC # BLD AUTO: 2.69 M/UL (ref 4.1–5.7)
RBC # BLD AUTO: 2.72 M/UL (ref 4.1–5.7)
RBC # BLD AUTO: 2.75 M/UL (ref 4.1–5.7)
RBC # BLD AUTO: 2.78 M/UL (ref 4.1–5.7)
RBC # BLD AUTO: 2.8 M/UL (ref 4.1–5.7)
RBC # BLD AUTO: 2.81 M/UL (ref 4.1–5.7)
RBC # BLD AUTO: 2.82 M/UL (ref 4.1–5.7)
RBC # BLD AUTO: 2.9 M/UL (ref 4.1–5.7)
RBC # BLD AUTO: 2.93 M/UL (ref 4.1–5.7)
RBC # BLD AUTO: 2.95 M/UL (ref 4.1–5.7)
RBC # BLD AUTO: 2.96 M/UL (ref 4.1–5.7)
RBC # BLD AUTO: 3.06 M/UL (ref 4.1–5.7)
RBC # BLD AUTO: 3.11 M/UL (ref 4.1–5.7)
RBC # BLD AUTO: 3.29 M/UL (ref 4.1–5.7)
RBC # BLD AUTO: 3.32 M/UL (ref 4.1–5.7)
RBC # BLD AUTO: 3.37 M/UL (ref 4.1–5.7)
RBC # FLD: >100 /CU MM
RBC MORPH BLD: ABNORMAL
SAMPLES BEING HELD,HOLD: NORMAL
SAO2 % BLD: 54 % (ref 92–97)
SAO2 % BLD: 80 % (ref 92–97)
SAO2 % BLD: 98 % (ref 92–97)
SERIAL MONITORING: NORMAL
SERVICE CMNT-IMP: ABNORMAL
SERVICE CMNT-IMP: NORMAL
SODIUM SERPL-SCNC: 120 MMOL/L (ref 136–145)
SODIUM SERPL-SCNC: 125 MMOL/L (ref 136–145)
SODIUM SERPL-SCNC: 126 MMOL/L (ref 136–145)
SODIUM SERPL-SCNC: 127 MMOL/L (ref 136–145)
SODIUM SERPL-SCNC: 128 MMOL/L (ref 136–145)
SODIUM SERPL-SCNC: 129 MMOL/L (ref 136–145)
SODIUM SERPL-SCNC: 130 MMOL/L (ref 136–145)
SODIUM SERPL-SCNC: 130 MMOL/L (ref 136–145)
SODIUM SERPL-SCNC: 131 MMOL/L (ref 136–145)
SODIUM SERPL-SCNC: 132 MMOL/L (ref 136–145)
SODIUM SERPL-SCNC: 133 MMOL/L (ref 136–145)
SODIUM SERPL-SCNC: 134 MMOL/L (ref 136–145)
SOURCE, COVRS: NORMAL
SPECIMEN EXP DATE BLD: NORMAL
SPECIMEN SOURCE FLD: ABNORMAL
SPECIMEN SOURCE FLD: NORMAL
SPECIMEN SOURCE, FCOV2M: NORMAL
SPECIMEN SOURCE: NORMAL
SPECIMEN TYPE, XMCV1T: NORMAL
SPECIMEN TYPE: ABNORMAL
T4 FREE SERPL-MCNC: 0.7 NG/DL (ref 0.8–1.5)
THERAPEUTIC RANGE,PTTT: ABNORMAL SECS (ref 58–77)
THERAPEUTIC RANGE,PTTT: NORMAL SECS (ref 58–77)
TIBC SERPL-MCNC: 138 UG/DL (ref 250–450)
TOTAL RESP. RATE, ITRR: 16
TOTAL RESP. RATE, ITRR: 18
TROPONIN I SERPL-MCNC: 0.05 NG/ML
TSH SERPL DL<=0.05 MIU/L-ACNC: 1.67 UIU/ML (ref 0.36–3.74)
VENTRICULAR RATE, ECG03: 106 BPM
VENTRICULAR RATE, ECG03: 120 BPM
VENTRICULAR RATE, ECG03: 157 BPM
VENTRICULAR RATE, ECG03: 61 BPM
VENTRICULAR RATE, ECG03: 83 BPM
WBC # BLD AUTO: 11.1 K/UL (ref 4.1–11.1)
WBC # BLD AUTO: 11.7 K/UL (ref 4.1–11.1)
WBC # BLD AUTO: 12.1 K/UL (ref 4.1–11.1)
WBC # BLD AUTO: 12.3 K/UL (ref 4.1–11.1)
WBC # BLD AUTO: 13.7 K/UL (ref 4.1–11.1)
WBC # BLD AUTO: 13.7 K/UL (ref 4.1–11.1)
WBC # BLD AUTO: 15.1 K/UL (ref 4.1–11.1)
WBC # BLD AUTO: 17.2 K/UL (ref 4.1–11.1)
WBC # BLD AUTO: 17.5 K/UL (ref 4.1–11.1)
WBC # BLD AUTO: 18.3 K/UL (ref 4.1–11.1)
WBC # BLD AUTO: 18.3 K/UL (ref 4.1–11.1)
WBC # BLD AUTO: 18.7 K/UL (ref 4.1–11.1)
WBC # BLD AUTO: 19.3 K/UL (ref 4.1–11.1)
WBC # BLD AUTO: 20.3 K/UL (ref 4.1–11.1)
WBC # BLD AUTO: 21.7 K/UL (ref 4.1–11.1)
WBC # BLD AUTO: 22.1 K/UL (ref 4.1–11.1)
WBC # BLD AUTO: 23.5 K/UL (ref 4.1–11.1)
WBC # BLD AUTO: 23.6 K/UL (ref 4.1–11.1)
WBC # BLD AUTO: 24 K/UL (ref 4.1–11.1)
WBC # BLD AUTO: 24.7 K/UL (ref 4.1–11.1)
WBC # BLD AUTO: 24.8 K/UL (ref 4.1–11.1)
WBC # BLD AUTO: 26.6 K/UL (ref 4.1–11.1)
WBC # BLD AUTO: 27.8 K/UL (ref 4.1–11.1)
WBC # BLD AUTO: 7.8 K/UL (ref 4.1–11.1)
WBC # BLD AUTO: 8.7 K/UL (ref 4.1–11.1)
WBC # BLD AUTO: 9.1 K/UL (ref 4.1–11.1)
WBC # BLD AUTO: 9.1 K/UL (ref 4.1–11.1)
WBC # BLD AUTO: 9.2 K/UL (ref 4.1–11.1)
WBC # BLD AUTO: 9.4 K/UL (ref 4.1–11.1)
WBC # BLD AUTO: 9.5 K/UL (ref 4.1–11.1)
WBC # BLD AUTO: 9.7 K/UL (ref 4.1–11.1)
WBC MORPH BLD: ABNORMAL
WBC MORPH BLD: ABNORMAL

## 2020-01-01 PROCEDURE — 94760 N-INVAS EAR/PLS OXIMETRY 1: CPT

## 2020-01-01 PROCEDURE — 83605 ASSAY OF LACTIC ACID: CPT

## 2020-01-01 PROCEDURE — 83036 HEMOGLOBIN GLYCOSYLATED A1C: CPT

## 2020-01-01 PROCEDURE — 99232 SBSQ HOSP IP/OBS MODERATE 35: CPT | Performed by: INTERNAL MEDICINE

## 2020-01-01 PROCEDURE — 74011000258 HC RX REV CODE- 258: Performed by: FAMILY MEDICINE

## 2020-01-01 PROCEDURE — 65660000000 HC RM CCU STEPDOWN

## 2020-01-01 PROCEDURE — 85610 PROTHROMBIN TIME: CPT

## 2020-01-01 PROCEDURE — 99285 EMERGENCY DEPT VISIT HI MDM: CPT

## 2020-01-01 PROCEDURE — 74011250636 HC RX REV CODE- 250/636: Performed by: EMERGENCY MEDICINE

## 2020-01-01 PROCEDURE — 65270000015 HC RM PRIVATE ONCOLOGY

## 2020-01-01 PROCEDURE — 74011000250 HC RX REV CODE- 250: Performed by: FAMILY MEDICINE

## 2020-01-01 PROCEDURE — 82962 GLUCOSE BLOOD TEST: CPT

## 2020-01-01 PROCEDURE — 97530 THERAPEUTIC ACTIVITIES: CPT | Performed by: PHYSICAL THERAPIST

## 2020-01-01 PROCEDURE — 74011250636 HC RX REV CODE- 250/636: Performed by: INTERNAL MEDICINE

## 2020-01-01 PROCEDURE — 74011000258 HC RX REV CODE- 258: Performed by: EMERGENCY MEDICINE

## 2020-01-01 PROCEDURE — 94640 AIRWAY INHALATION TREATMENT: CPT

## 2020-01-01 PROCEDURE — 5A1D70Z PERFORMANCE OF URINARY FILTRATION, INTERMITTENT, LESS THAN 6 HOURS PER DAY: ICD-10-PCS | Performed by: INTERNAL MEDICINE

## 2020-01-01 PROCEDURE — 80048 BASIC METABOLIC PNL TOTAL CA: CPT

## 2020-01-01 PROCEDURE — 74011250637 HC RX REV CODE- 250/637: Performed by: INTERNAL MEDICINE

## 2020-01-01 PROCEDURE — 74011250637 HC RX REV CODE- 250/637: Performed by: HOSPITALIST

## 2020-01-01 PROCEDURE — 77030029684 HC NEB SM VOL KT MONA -A

## 2020-01-01 PROCEDURE — 74011000250 HC RX REV CODE- 250: Performed by: HOSPITALIST

## 2020-01-01 PROCEDURE — 71045 X-RAY EXAM CHEST 1 VIEW: CPT

## 2020-01-01 PROCEDURE — 82803 BLOOD GASES ANY COMBINATION: CPT

## 2020-01-01 PROCEDURE — 74011250637 HC RX REV CODE- 250/637: Performed by: EMERGENCY MEDICINE

## 2020-01-01 PROCEDURE — 74011250636 HC RX REV CODE- 250/636: Performed by: HOSPITALIST

## 2020-01-01 PROCEDURE — 86706 HEP B SURFACE ANTIBODY: CPT

## 2020-01-01 PROCEDURE — 99218 HC RM OBSERVATION: CPT

## 2020-01-01 PROCEDURE — 99232 SBSQ HOSP IP/OBS MODERATE 35: CPT | Performed by: SURGERY

## 2020-01-01 PROCEDURE — 74011250636 HC RX REV CODE- 250/636

## 2020-01-01 PROCEDURE — 74011636637 HC RX REV CODE- 636/637: Performed by: INTERNAL MEDICINE

## 2020-01-01 PROCEDURE — 74011250636 HC RX REV CODE- 250/636: Performed by: FAMILY MEDICINE

## 2020-01-01 PROCEDURE — 74011636637 HC RX REV CODE- 636/637: Performed by: STUDENT IN AN ORGANIZED HEALTH CARE EDUCATION/TRAINING PROGRAM

## 2020-01-01 PROCEDURE — 36415 COLL VENOUS BLD VENIPUNCTURE: CPT

## 2020-01-01 PROCEDURE — 85025 COMPLETE CBC W/AUTO DIFF WBC: CPT

## 2020-01-01 PROCEDURE — 74011000258 HC RX REV CODE- 258: Performed by: HOSPITALIST

## 2020-01-01 PROCEDURE — 65270000029 HC RM PRIVATE

## 2020-01-01 PROCEDURE — 80069 RENAL FUNCTION PANEL: CPT

## 2020-01-01 PROCEDURE — 84439 ASSAY OF FREE THYROXINE: CPT

## 2020-01-01 PROCEDURE — 2709999900 HC NON-CHARGEABLE SUPPLY

## 2020-01-01 PROCEDURE — 74011000636 HC RX REV CODE- 636: Performed by: EMERGENCY MEDICINE

## 2020-01-01 PROCEDURE — 99231 SBSQ HOSP IP/OBS SF/LOW 25: CPT | Performed by: SURGERY

## 2020-01-01 PROCEDURE — 90935 HEMODIALYSIS ONE EVALUATION: CPT

## 2020-01-01 PROCEDURE — 85027 COMPLETE CBC AUTOMATED: CPT

## 2020-01-01 PROCEDURE — 77030040392 HC DRSG OPTIFOAM MDII -A

## 2020-01-01 PROCEDURE — 77010033678 HC OXYGEN DAILY

## 2020-01-01 PROCEDURE — 74011250637 HC RX REV CODE- 250/637: Performed by: NURSE PRACTITIONER

## 2020-01-01 PROCEDURE — 74011000250 HC RX REV CODE- 250: Performed by: EMERGENCY MEDICINE

## 2020-01-01 PROCEDURE — 99223 1ST HOSP IP/OBS HIGH 75: CPT | Performed by: PSYCHIATRY & NEUROLOGY

## 2020-01-01 PROCEDURE — 88341 IMHCHEM/IMCYTCHM EA ADD ANTB: CPT

## 2020-01-01 PROCEDURE — 88305 TISSUE EXAM BY PATHOLOGIST: CPT

## 2020-01-01 PROCEDURE — C1751 CATH, INF, PER/CENT/MIDLINE: HCPCS

## 2020-01-01 PROCEDURE — 74011000258 HC RX REV CODE- 258: Performed by: INTERNAL MEDICINE

## 2020-01-01 PROCEDURE — 84145 PROCALCITONIN (PCT): CPT

## 2020-01-01 PROCEDURE — 74011000250 HC RX REV CODE- 250: Performed by: INTERNAL MEDICINE

## 2020-01-01 PROCEDURE — 84100 ASSAY OF PHOSPHORUS: CPT

## 2020-01-01 PROCEDURE — 0W9B3ZZ DRAINAGE OF LEFT PLEURAL CAVITY, PERCUTANEOUS APPROACH: ICD-10-PCS | Performed by: STUDENT IN AN ORGANIZED HEALTH CARE EDUCATION/TRAINING PROGRAM

## 2020-01-01 PROCEDURE — 74011000250 HC RX REV CODE- 250: Performed by: NURSE PRACTITIONER

## 2020-01-01 PROCEDURE — 74011000258 HC RX REV CODE- 258: Performed by: NURSE PRACTITIONER

## 2020-01-01 PROCEDURE — 74011636637 HC RX REV CODE- 636/637: Performed by: HOSPITALIST

## 2020-01-01 PROCEDURE — 87340 HEPATITIS B SURFACE AG IA: CPT

## 2020-01-01 PROCEDURE — 70450 CT HEAD/BRAIN W/O DYE: CPT

## 2020-01-01 PROCEDURE — P9047 ALBUMIN (HUMAN), 25%, 50ML: HCPCS | Performed by: INTERNAL MEDICINE

## 2020-01-01 PROCEDURE — 99223 1ST HOSP IP/OBS HIGH 75: CPT | Performed by: INTERNAL MEDICINE

## 2020-01-01 PROCEDURE — 86900 BLOOD TYPING SEROLOGIC ABO: CPT

## 2020-01-01 PROCEDURE — 97116 GAIT TRAINING THERAPY: CPT

## 2020-01-01 PROCEDURE — 74011000636 HC RX REV CODE- 636: Performed by: INTERNAL MEDICINE

## 2020-01-01 PROCEDURE — 74011250637 HC RX REV CODE- 250/637: Performed by: FAMILY MEDICINE

## 2020-01-01 PROCEDURE — 99233 SBSQ HOSP IP/OBS HIGH 50: CPT | Performed by: PSYCHIATRY & NEUROLOGY

## 2020-01-01 PROCEDURE — 83615 LACTATE (LD) (LDH) ENZYME: CPT

## 2020-01-01 PROCEDURE — 74177 CT ABD & PELVIS W/CONTRAST: CPT

## 2020-01-01 PROCEDURE — 97530 THERAPEUTIC ACTIVITIES: CPT

## 2020-01-01 PROCEDURE — 99233 SBSQ HOSP IP/OBS HIGH 50: CPT | Performed by: INTERNAL MEDICINE

## 2020-01-01 PROCEDURE — 96367 TX/PROPH/DG ADDL SEQ IV INF: CPT

## 2020-01-01 PROCEDURE — 96365 THER/PROPH/DIAG IV INF INIT: CPT

## 2020-01-01 PROCEDURE — 80053 COMPREHEN METABOLIC PANEL: CPT

## 2020-01-01 PROCEDURE — 97535 SELF CARE MNGMENT TRAINING: CPT | Performed by: OCCUPATIONAL THERAPIST

## 2020-01-01 PROCEDURE — 84157 ASSAY OF PROTEIN OTHER: CPT

## 2020-01-01 PROCEDURE — 85730 THROMBOPLASTIN TIME PARTIAL: CPT

## 2020-01-01 PROCEDURE — 82728 ASSAY OF FERRITIN: CPT

## 2020-01-01 PROCEDURE — 97110 THERAPEUTIC EXERCISES: CPT

## 2020-01-01 PROCEDURE — 96372 THER/PROPH/DIAG INJ SC/IM: CPT

## 2020-01-01 PROCEDURE — 36600 WITHDRAWAL OF ARTERIAL BLOOD: CPT

## 2020-01-01 PROCEDURE — 86141 C-REACTIVE PROTEIN HS: CPT

## 2020-01-01 PROCEDURE — P9047 ALBUMIN (HUMAN), 25%, 50ML: HCPCS | Performed by: NURSE PRACTITIONER

## 2020-01-01 PROCEDURE — 97165 OT EVAL LOW COMPLEX 30 MIN: CPT

## 2020-01-01 PROCEDURE — 85018 HEMOGLOBIN: CPT

## 2020-01-01 PROCEDURE — 77030018836 HC SOL IRR NACL ICUM -A

## 2020-01-01 PROCEDURE — 87205 SMEAR GRAM STAIN: CPT

## 2020-01-01 PROCEDURE — 83735 ASSAY OF MAGNESIUM: CPT

## 2020-01-01 PROCEDURE — 96375 TX/PRO/DX INJ NEW DRUG ADDON: CPT

## 2020-01-01 PROCEDURE — 93005 ELECTROCARDIOGRAM TRACING: CPT

## 2020-01-01 PROCEDURE — 96374 THER/PROPH/DIAG INJ IV PUSH: CPT

## 2020-01-01 PROCEDURE — 73206 CT ANGIO UPR EXTRM W/O&W/DYE: CPT

## 2020-01-01 PROCEDURE — 93306 TTE W/DOPPLER COMPLETE: CPT

## 2020-01-01 PROCEDURE — 97116 GAIT TRAINING THERAPY: CPT | Performed by: PHYSICAL THERAPIST

## 2020-01-01 PROCEDURE — 74011250636 HC RX REV CODE- 250/636: Performed by: NURSE PRACTITIONER

## 2020-01-01 PROCEDURE — 02HV33Z INSERTION OF INFUSION DEVICE INTO SUPERIOR VENA CAVA, PERCUTANEOUS APPROACH: ICD-10-PCS | Performed by: RADIOLOGY

## 2020-01-01 PROCEDURE — 84484 ASSAY OF TROPONIN QUANT: CPT

## 2020-01-01 PROCEDURE — 99232 SBSQ HOSP IP/OBS MODERATE 35: CPT | Performed by: STUDENT IN AN ORGANIZED HEALTH CARE EDUCATION/TRAINING PROGRAM

## 2020-01-01 PROCEDURE — 87040 BLOOD CULTURE FOR BACTERIA: CPT

## 2020-01-01 PROCEDURE — 97161 PT EVAL LOW COMPLEX 20 MIN: CPT

## 2020-01-01 PROCEDURE — 83880 ASSAY OF NATRIURETIC PEPTIDE: CPT

## 2020-01-01 PROCEDURE — 32555 ASPIRATE PLEURA W/ IMAGING: CPT

## 2020-01-01 PROCEDURE — 87522 HEPATITIS C REVRS TRNSCRPJ: CPT

## 2020-01-01 PROCEDURE — 77030037877 HC DRSG MEPILEX >48IN BORD MOLN -A

## 2020-01-01 PROCEDURE — 71250 CT THORAX DX C-: CPT

## 2020-01-01 PROCEDURE — 82664 ELECTROPHORETIC TEST: CPT

## 2020-01-01 PROCEDURE — 77030041076 HC DRSG AG OPTICELL MDII -A

## 2020-01-01 PROCEDURE — 97530 THERAPEUTIC ACTIVITIES: CPT | Performed by: OCCUPATIONAL THERAPIST

## 2020-01-01 PROCEDURE — 97535 SELF CARE MNGMENT TRAINING: CPT

## 2020-01-01 PROCEDURE — 94761 N-INVAS EAR/PLS OXIMETRY MLT: CPT

## 2020-01-01 PROCEDURE — 65610000006 HC RM INTENSIVE CARE

## 2020-01-01 PROCEDURE — 87070 CULTURE OTHR SPECIMN AEROBIC: CPT

## 2020-01-01 PROCEDURE — 77030008027

## 2020-01-01 PROCEDURE — 74011636637 HC RX REV CODE- 636/637: Performed by: NURSE PRACTITIONER

## 2020-01-01 PROCEDURE — 87077 CULTURE AEROBIC IDENTIFY: CPT

## 2020-01-01 PROCEDURE — 87186 SC STD MICRODIL/AGAR DIL: CPT

## 2020-01-01 PROCEDURE — 95816 EEG AWAKE AND DROWSY: CPT | Performed by: PSYCHIATRY & NEUROLOGY

## 2020-01-01 PROCEDURE — 88342 IMHCHEM/IMCYTCHM 1ST ANTB: CPT

## 2020-01-01 PROCEDURE — 99223 1ST HOSP IP/OBS HIGH 75: CPT | Performed by: NURSE PRACTITIONER

## 2020-01-01 PROCEDURE — 84311 SPECTROPHOTOMETRY: CPT

## 2020-01-01 PROCEDURE — 88112 CYTOPATH CELL ENHANCE TECH: CPT

## 2020-01-01 PROCEDURE — 96376 TX/PRO/DX INJ SAME DRUG ADON: CPT

## 2020-01-01 PROCEDURE — 77030028236 US THORACENTESIS CATH W IMAGE

## 2020-01-01 PROCEDURE — 74011000250 HC RX REV CODE- 250

## 2020-01-01 PROCEDURE — 36556 INSERT NON-TUNNEL CV CATH: CPT

## 2020-01-01 PROCEDURE — 93306 TTE W/DOPPLER COMPLETE: CPT | Performed by: INTERNAL MEDICINE

## 2020-01-01 PROCEDURE — 74011250637 HC RX REV CODE- 250/637: Performed by: STUDENT IN AN ORGANIZED HEALTH CARE EDUCATION/TRAINING PROGRAM

## 2020-01-01 PROCEDURE — 75810000455 HC PLCMT CENT VENOUS CATH LVL 2 5182

## 2020-01-01 PROCEDURE — 87635 SARS-COV-2 COVID-19 AMP PRB: CPT

## 2020-01-01 PROCEDURE — 89050 BODY FLUID CELL COUNT: CPT

## 2020-01-01 PROCEDURE — 84443 ASSAY THYROID STIM HORMONE: CPT

## 2020-01-01 PROCEDURE — 83540 ASSAY OF IRON: CPT

## 2020-01-01 PROCEDURE — 96366 THER/PROPH/DIAG IV INF ADDON: CPT

## 2020-01-01 PROCEDURE — 96368 THER/DIAG CONCURRENT INF: CPT

## 2020-01-01 PROCEDURE — 74011000250 HC RX REV CODE- 250: Performed by: RADIOLOGY

## 2020-01-01 PROCEDURE — 77030027138 HC INCENT SPIROMETER -A

## 2020-01-01 RX ORDER — SEVELAMER CARBONATE 800 MG/1
1600 TABLET, FILM COATED ORAL 3 TIMES DAILY
Status: DISCONTINUED | OUTPATIENT
Start: 2020-01-01 | End: 2020-01-01 | Stop reason: HOSPADM

## 2020-01-01 RX ORDER — DEXTROSE 50 % IN WATER (D50W) INTRAVENOUS SYRINGE
Status: COMPLETED
Start: 2020-01-01 | End: 2020-01-01

## 2020-01-01 RX ORDER — OXYCODONE AND ACETAMINOPHEN 10; 325 MG/1; MG/1
1 TABLET ORAL
Status: COMPLETED | OUTPATIENT
Start: 2020-01-01 | End: 2020-01-01

## 2020-01-01 RX ORDER — DEXTROSE 50 % IN WATER (D50W) INTRAVENOUS SYRINGE
12.5-25 AS NEEDED
Status: DISCONTINUED | OUTPATIENT
Start: 2020-01-01 | End: 2020-01-01 | Stop reason: SDUPTHER

## 2020-01-01 RX ORDER — GUAIFENESIN 600 MG/1
600 TABLET, EXTENDED RELEASE ORAL 2 TIMES DAILY
Status: DISCONTINUED | OUTPATIENT
Start: 2020-01-01 | End: 2020-01-01

## 2020-01-01 RX ORDER — IPRATROPIUM BROMIDE AND ALBUTEROL SULFATE 2.5; .5 MG/3ML; MG/3ML
3 SOLUTION RESPIRATORY (INHALATION)
Status: COMPLETED | OUTPATIENT
Start: 2020-01-01 | End: 2020-01-01

## 2020-01-01 RX ORDER — IBUPROFEN 200 MG
1 TABLET ORAL EVERY 24 HOURS
Qty: 30 PATCH | Refills: 0 | Status: SHIPPED | OUTPATIENT
Start: 2020-01-01 | End: 2020-01-01 | Stop reason: SDUPTHER

## 2020-01-01 RX ORDER — ALBUTEROL SULFATE 2.5 MG/.5ML
2.5 SOLUTION RESPIRATORY (INHALATION)
Status: COMPLETED | OUTPATIENT
Start: 2020-01-01 | End: 2020-01-01

## 2020-01-01 RX ORDER — CEFEPIME HYDROCHLORIDE 1 G/1
1 INJECTION, POWDER, FOR SOLUTION INTRAMUSCULAR; INTRAVENOUS EVERY 12 HOURS
Status: DISCONTINUED | OUTPATIENT
Start: 2020-01-01 | End: 2020-01-01

## 2020-01-01 RX ORDER — DEXTROSE 50 % IN WATER (D50W) INTRAVENOUS SYRINGE
25 ONCE
Status: DISPENSED | OUTPATIENT
Start: 2020-01-01 | End: 2020-01-01

## 2020-01-01 RX ORDER — OXYCODONE HYDROCHLORIDE 5 MG/1
10 TABLET ORAL
Status: DISCONTINUED | OUTPATIENT
Start: 2020-01-01 | End: 2020-01-01 | Stop reason: HOSPADM

## 2020-01-01 RX ORDER — INSULIN LISPRO 100 [IU]/ML
INJECTION, SOLUTION INTRAVENOUS; SUBCUTANEOUS
Status: DISCONTINUED | OUTPATIENT
Start: 2020-01-01 | End: 2020-01-01 | Stop reason: HOSPADM

## 2020-01-01 RX ORDER — MIDAZOLAM HYDROCHLORIDE 1 MG/ML
2 INJECTION, SOLUTION INTRAMUSCULAR; INTRAVENOUS
Status: DISCONTINUED | OUTPATIENT
Start: 2020-01-01 | End: 2020-12-10 | Stop reason: HOSPADM

## 2020-01-01 RX ORDER — MAGNESIUM SULFATE 100 %
4 CRYSTALS MISCELLANEOUS AS NEEDED
Status: DISCONTINUED | OUTPATIENT
Start: 2020-01-01 | End: 2020-01-01 | Stop reason: HOSPADM

## 2020-01-01 RX ORDER — ALBUTEROL SULFATE 90 UG/1
2 AEROSOL, METERED RESPIRATORY (INHALATION)
Status: DISCONTINUED | OUTPATIENT
Start: 2020-01-01 | End: 2020-01-01 | Stop reason: CLARIF

## 2020-01-01 RX ORDER — FOLIC ACID 1 MG/1
1 TABLET ORAL DAILY
Status: DISCONTINUED | OUTPATIENT
Start: 2020-01-01 | End: 2020-01-01 | Stop reason: HOSPADM

## 2020-01-01 RX ORDER — LIDOCAINE 4 G/100G
2 PATCH TOPICAL EVERY 24 HOURS
Status: DISCONTINUED | OUTPATIENT
Start: 2020-01-01 | End: 2020-12-10 | Stop reason: HOSPADM

## 2020-01-01 RX ORDER — DILTIAZEM HYDROCHLORIDE 120 MG/1
240 CAPSULE, COATED, EXTENDED RELEASE ORAL DAILY
Status: DISCONTINUED | OUTPATIENT
Start: 2020-01-01 | End: 2020-01-01 | Stop reason: HOSPADM

## 2020-01-01 RX ORDER — DILTIAZEM HYDROCHLORIDE 5 MG/ML
10 INJECTION INTRAVENOUS ONCE
Status: COMPLETED | OUTPATIENT
Start: 2020-01-01 | End: 2020-01-01

## 2020-01-01 RX ORDER — ACETYLCYSTEINE 200 MG/ML
200 SOLUTION ORAL; RESPIRATORY (INHALATION) 3 TIMES DAILY
Qty: 30 ML | Refills: 0 | Status: SHIPPED | OUTPATIENT
Start: 2020-01-01 | End: 2020-01-01 | Stop reason: SDUPTHER

## 2020-01-01 RX ORDER — SEVELAMER CARBONATE 800 MG/1
800 TABLET, FILM COATED ORAL 3 TIMES DAILY
Status: DISCONTINUED | OUTPATIENT
Start: 2020-01-01 | End: 2020-01-01

## 2020-01-01 RX ORDER — ACETAMINOPHEN 650 MG/1
650 SUPPOSITORY RECTAL
Status: DISCONTINUED | OUTPATIENT
Start: 2020-01-01 | End: 2020-01-01 | Stop reason: HOSPADM

## 2020-01-01 RX ORDER — OXYCODONE AND ACETAMINOPHEN 10; 325 MG/1; MG/1
1 TABLET ORAL 3 TIMES DAILY
Status: DISCONTINUED | OUTPATIENT
Start: 2020-01-01 | End: 2020-01-01 | Stop reason: HOSPADM

## 2020-01-01 RX ORDER — DILTIAZEM HYDROCHLORIDE 180 MG/1
180 CAPSULE, COATED, EXTENDED RELEASE ORAL DAILY
Qty: 90 CAP | Refills: 0 | Status: SHIPPED | OUTPATIENT
Start: 2020-01-01 | End: 2020-01-01

## 2020-01-01 RX ORDER — LEVOFLOXACIN 500 MG/1
500 TABLET, FILM COATED ORAL
Qty: 7 TAB | Refills: 0 | Status: SHIPPED | OUTPATIENT
Start: 2020-01-01

## 2020-01-01 RX ORDER — PEPPERMINT OIL
OIL (ML) MISCELLANEOUS AS NEEDED
Status: DISCONTINUED | OUTPATIENT
Start: 2020-01-01 | End: 2020-01-01 | Stop reason: HOSPADM

## 2020-01-01 RX ORDER — BENZONATATE 100 MG/1
100 CAPSULE ORAL
Status: DISCONTINUED | OUTPATIENT
Start: 2020-01-01 | End: 2020-01-01 | Stop reason: HOSPADM

## 2020-01-01 RX ORDER — HEPARIN SODIUM 10000 [USP'U]/100ML
18-36 INJECTION, SOLUTION INTRAVENOUS
Status: DISCONTINUED | OUTPATIENT
Start: 2020-01-01 | End: 2020-01-01

## 2020-01-01 RX ORDER — FOLIC ACID 1 MG/1
1 TABLET ORAL DAILY
Qty: 30 TAB | Refills: 0 | Status: SHIPPED | OUTPATIENT
Start: 2020-01-01

## 2020-01-01 RX ORDER — DEXTROSE MONOHYDRATE AND SODIUM CHLORIDE 5; .45 G/100ML; G/100ML
50 INJECTION, SOLUTION INTRAVENOUS CONTINUOUS
Status: DISCONTINUED | OUTPATIENT
Start: 2020-01-01 | End: 2020-01-01

## 2020-01-01 RX ORDER — DILTIAZEM HYDROCHLORIDE 30 MG/1
30 TABLET, FILM COATED ORAL
Status: DISCONTINUED | OUTPATIENT
Start: 2020-01-01 | End: 2020-01-01

## 2020-01-01 RX ORDER — CARVEDILOL 6.25 MG/1
6.25 TABLET ORAL 2 TIMES DAILY WITH MEALS
Qty: 60 TAB | Refills: 1 | Status: SHIPPED | OUTPATIENT
Start: 2020-01-01

## 2020-01-01 RX ORDER — SODIUM CHLORIDE 0.9 % (FLUSH) 0.9 %
10 SYRINGE (ML) INJECTION
Status: COMPLETED | OUTPATIENT
Start: 2020-01-01 | End: 2020-01-01

## 2020-01-01 RX ORDER — ASPIRIN 325 MG/1
100 TABLET, FILM COATED ORAL DAILY
Qty: 30 TAB | Refills: 0 | Status: SHIPPED | OUTPATIENT
Start: 2020-01-01

## 2020-01-01 RX ORDER — FENTANYL CITRATE 50 UG/ML
25 INJECTION, SOLUTION INTRAMUSCULAR; INTRAVENOUS
Status: DISCONTINUED | OUTPATIENT
Start: 2020-01-01 | End: 2020-12-10 | Stop reason: HOSPADM

## 2020-01-01 RX ORDER — DEXTROSE 50 % IN WATER (D50W) INTRAVENOUS SYRINGE
12.5-25 AS NEEDED
Status: DISCONTINUED | OUTPATIENT
Start: 2020-01-01 | End: 2020-01-01 | Stop reason: HOSPADM

## 2020-01-01 RX ORDER — HYDRALAZINE HYDROCHLORIDE 20 MG/ML
10 INJECTION INTRAMUSCULAR; INTRAVENOUS
Status: DISCONTINUED | OUTPATIENT
Start: 2020-01-01 | End: 2020-01-01 | Stop reason: HOSPADM

## 2020-01-01 RX ORDER — CARVEDILOL 12.5 MG/1
12.5 TABLET ORAL 2 TIMES DAILY WITH MEALS
Status: DISCONTINUED | OUTPATIENT
Start: 2020-01-01 | End: 2020-01-01 | Stop reason: HOSPADM

## 2020-01-01 RX ORDER — CARVEDILOL 6.25 MG/1
6.25 TABLET ORAL 2 TIMES DAILY WITH MEALS
Status: DISCONTINUED | OUTPATIENT
Start: 2020-01-01 | End: 2020-01-01 | Stop reason: HOSPADM

## 2020-01-01 RX ORDER — GUAIFENESIN/DEXTROMETHORPHAN 100-10MG/5
10 SYRUP ORAL
Status: DISCONTINUED | OUTPATIENT
Start: 2020-01-01 | End: 2020-01-01 | Stop reason: HOSPADM

## 2020-01-01 RX ORDER — GUAIFENESIN/DEXTROMETHORPHAN 100-10MG/5
10 SYRUP ORAL
Qty: 1 BOTTLE | Refills: 0 | Status: SHIPPED | OUTPATIENT
Start: 2020-01-01 | End: 2020-01-01

## 2020-01-01 RX ORDER — SODIUM POLYSTYRENE SULFONATE 15 G/60ML
45 SUSPENSION ORAL; RECTAL
Status: COMPLETED | OUTPATIENT
Start: 2020-01-01 | End: 2020-01-01

## 2020-01-01 RX ORDER — OXYCODONE AND ACETAMINOPHEN 10; 325 MG/1; MG/1
1 TABLET ORAL 3 TIMES DAILY
COMMUNITY

## 2020-01-01 RX ORDER — ARFORMOTEROL TARTRATE 15 UG/2ML
15 SOLUTION RESPIRATORY (INHALATION)
Status: DISCONTINUED | OUTPATIENT
Start: 2020-01-01 | End: 2020-01-01

## 2020-01-01 RX ORDER — FOLIC ACID 1 MG/1
1 TABLET ORAL DAILY
Qty: 30 TAB | Refills: 0 | Status: SHIPPED | OUTPATIENT
Start: 2020-01-01 | End: 2020-01-01 | Stop reason: SDUPTHER

## 2020-01-01 RX ORDER — PREDNISONE 5 MG/1
10 TABLET ORAL
Status: DISCONTINUED | OUTPATIENT
Start: 2020-01-01 | End: 2020-01-01 | Stop reason: HOSPADM

## 2020-01-01 RX ORDER — ALBUTEROL SULFATE 0.83 MG/ML
2.5 SOLUTION RESPIRATORY (INHALATION)
Status: DISCONTINUED | OUTPATIENT
Start: 2020-01-01 | End: 2020-12-10 | Stop reason: HOSPADM

## 2020-01-01 RX ORDER — IPRATROPIUM BROMIDE AND ALBUTEROL SULFATE 2.5; .5 MG/3ML; MG/3ML
3 SOLUTION RESPIRATORY (INHALATION) 3 TIMES DAILY
Status: DISCONTINUED | OUTPATIENT
Start: 2020-01-01 | End: 2020-01-01

## 2020-01-01 RX ORDER — BUDESONIDE 0.25 MG/2ML
250 INHALANT ORAL
Status: DISCONTINUED | OUTPATIENT
Start: 2020-01-01 | End: 2020-01-01

## 2020-01-01 RX ORDER — BENZONATATE 100 MG/1
100 CAPSULE ORAL
Qty: 21 CAP | Refills: 0 | Status: SHIPPED | OUTPATIENT
Start: 2020-01-01 | End: 2020-01-01

## 2020-01-01 RX ORDER — LIDOCAINE HYDROCHLORIDE 10 MG/ML
10 INJECTION, SOLUTION EPIDURAL; INFILTRATION; INTRACAUDAL; PERINEURAL
Status: COMPLETED | OUTPATIENT
Start: 2020-01-01 | End: 2020-01-01

## 2020-01-01 RX ORDER — METOPROLOL TARTRATE 5 MG/5ML
2.5 INJECTION INTRAVENOUS ONCE
Status: COMPLETED | OUTPATIENT
Start: 2020-01-01 | End: 2020-01-01

## 2020-01-01 RX ORDER — DEXTROSE MONOHYDRATE 100 MG/ML
0-250 INJECTION, SOLUTION INTRAVENOUS AS NEEDED
Status: DISCONTINUED | OUTPATIENT
Start: 2020-01-01 | End: 2020-01-01 | Stop reason: HOSPADM

## 2020-01-01 RX ORDER — PREDNISONE 20 MG/1
20 TABLET ORAL
Status: DISCONTINUED | OUTPATIENT
Start: 2020-01-01 | End: 2020-01-01

## 2020-01-01 RX ORDER — NALOXONE HYDROCHLORIDE 0.4 MG/ML
0.4 INJECTION, SOLUTION INTRAMUSCULAR; INTRAVENOUS; SUBCUTANEOUS AS NEEDED
Status: DISCONTINUED | OUTPATIENT
Start: 2020-01-01 | End: 2020-01-01 | Stop reason: HOSPADM

## 2020-01-01 RX ORDER — CEFUROXIME AXETIL 250 MG/1
500 TABLET ORAL
Qty: 6 TAB | Refills: 0 | Status: SHIPPED | OUTPATIENT
Start: 2020-01-01 | End: 2020-01-01 | Stop reason: SDUPTHER

## 2020-01-01 RX ORDER — ALBUMIN HUMAN 250 G/1000ML
25 SOLUTION INTRAVENOUS ONCE
Status: COMPLETED | OUTPATIENT
Start: 2020-01-01 | End: 2020-01-01

## 2020-01-01 RX ORDER — GABAPENTIN 300 MG/1
300 CAPSULE ORAL
Status: DISCONTINUED | OUTPATIENT
Start: 2020-01-01 | End: 2020-01-01 | Stop reason: HOSPADM

## 2020-01-01 RX ORDER — HEPARIN SODIUM 5000 [USP'U]/ML
5000 INJECTION, SOLUTION INTRAVENOUS; SUBCUTANEOUS EVERY 8 HOURS
Status: DISCONTINUED | OUTPATIENT
Start: 2020-01-01 | End: 2020-01-01 | Stop reason: HOSPADM

## 2020-01-01 RX ORDER — NOREPINEPHRINE BITARTRATE/D5W 8 MG/250ML
2-100 PLASTIC BAG, INJECTION (ML) INTRAVENOUS
Status: DISCONTINUED | OUTPATIENT
Start: 2020-01-01 | End: 2020-01-01

## 2020-01-01 RX ORDER — SODIUM CHLORIDE 9 MG/ML
250 INJECTION, SOLUTION INTRAVENOUS AS NEEDED
Status: DISCONTINUED | OUTPATIENT
Start: 2020-01-01 | End: 2020-01-01 | Stop reason: HOSPADM

## 2020-01-01 RX ORDER — IPRATROPIUM BROMIDE AND ALBUTEROL SULFATE 2.5; .5 MG/3ML; MG/3ML
3 SOLUTION RESPIRATORY (INHALATION)
Status: DISCONTINUED | OUTPATIENT
Start: 2020-01-01 | End: 2020-01-01 | Stop reason: HOSPADM

## 2020-01-01 RX ORDER — PHENOBARBITAL SODIUM 65 MG/ML
65 INJECTION INTRAMUSCULAR 2 TIMES DAILY
Status: COMPLETED | OUTPATIENT
Start: 2020-01-01 | End: 2020-01-01

## 2020-01-01 RX ORDER — OXYCODONE AND ACETAMINOPHEN 5; 325 MG/1; MG/1
1 TABLET ORAL
Status: DISCONTINUED | OUTPATIENT
Start: 2020-01-01 | End: 2020-01-01

## 2020-01-01 RX ORDER — IBUPROFEN 200 MG
1 TABLET ORAL EVERY 24 HOURS
Status: DISCONTINUED | OUTPATIENT
Start: 2020-01-01 | End: 2020-01-01 | Stop reason: HOSPADM

## 2020-01-01 RX ORDER — CEFUROXIME AXETIL 250 MG/1
500 TABLET ORAL ONCE
Status: COMPLETED | OUTPATIENT
Start: 2020-01-01 | End: 2020-01-01

## 2020-01-01 RX ORDER — LIDOCAINE 40 MG/G
1 CREAM TOPICAL
Status: DISCONTINUED | OUTPATIENT
Start: 2020-01-01 | End: 2020-01-01 | Stop reason: HOSPADM

## 2020-01-01 RX ORDER — ACETYLCYSTEINE 200 MG/ML
200 SOLUTION ORAL; RESPIRATORY (INHALATION) 3 TIMES DAILY
Status: DISCONTINUED | OUTPATIENT
Start: 2020-01-01 | End: 2020-01-01

## 2020-01-01 RX ORDER — SODIUM CHLORIDE 9 MG/ML
75 INJECTION, SOLUTION INTRAVENOUS CONTINUOUS
Status: DISCONTINUED | OUTPATIENT
Start: 2020-01-01 | End: 2020-01-01

## 2020-01-01 RX ORDER — ARFORMOTEROL TARTRATE 15 UG/2ML
15 SOLUTION RESPIRATORY (INHALATION)
Status: DISCONTINUED | OUTPATIENT
Start: 2020-01-01 | End: 2020-01-01 | Stop reason: HOSPADM

## 2020-01-01 RX ORDER — POLYETHYLENE GLYCOL 3350 17 G/17G
17 POWDER, FOR SOLUTION ORAL DAILY PRN
Status: DISCONTINUED | OUTPATIENT
Start: 2020-01-01 | End: 2020-01-01 | Stop reason: HOSPADM

## 2020-01-01 RX ORDER — BALSAM PERU/CASTOR OIL
OINTMENT (GRAM) TOPICAL 2 TIMES DAILY
Status: DISCONTINUED | OUTPATIENT
Start: 2020-01-01 | End: 2020-01-01 | Stop reason: HOSPADM

## 2020-01-01 RX ORDER — DEXTROSE 50 % IN WATER (D50W) INTRAVENOUS SYRINGE
50 AS NEEDED
Status: DISCONTINUED | OUTPATIENT
Start: 2020-01-01 | End: 2020-01-01 | Stop reason: HOSPADM

## 2020-01-01 RX ORDER — PROMETHAZINE HYDROCHLORIDE 25 MG/1
12.5 TABLET ORAL
Status: DISCONTINUED | OUTPATIENT
Start: 2020-01-01 | End: 2020-01-01 | Stop reason: HOSPADM

## 2020-01-01 RX ORDER — OXYCODONE AND ACETAMINOPHEN 10; 325 MG/1; MG/1
1 TABLET ORAL ONCE
Status: COMPLETED | OUTPATIENT
Start: 2020-01-01 | End: 2020-01-01

## 2020-01-01 RX ORDER — DILTIAZEM HYDROCHLORIDE 180 MG/1
180 CAPSULE, COATED, EXTENDED RELEASE ORAL DAILY
Status: DISCONTINUED | OUTPATIENT
Start: 2020-01-01 | End: 2020-01-01

## 2020-01-01 RX ORDER — DILTIAZEM HYDROCHLORIDE 60 MG/1
60 TABLET, FILM COATED ORAL ONCE
Status: COMPLETED | OUTPATIENT
Start: 2020-01-01 | End: 2020-01-01

## 2020-01-01 RX ORDER — DILTIAZEM HYDROCHLORIDE 5 MG/ML
15 INJECTION INTRAVENOUS
Status: COMPLETED | OUTPATIENT
Start: 2020-01-01 | End: 2020-01-01

## 2020-01-01 RX ORDER — ONDANSETRON 2 MG/ML
4 INJECTION INTRAMUSCULAR; INTRAVENOUS
Status: DISCONTINUED | OUTPATIENT
Start: 2020-01-01 | End: 2020-01-01 | Stop reason: HOSPADM

## 2020-01-01 RX ORDER — SODIUM CHLORIDE 9 MG/ML
1000 INJECTION, SOLUTION INTRAVENOUS ONCE
Status: COMPLETED | OUTPATIENT
Start: 2020-01-01 | End: 2020-01-01

## 2020-01-01 RX ORDER — PREDNISONE 20 MG/1
40 TABLET ORAL ONCE
Status: COMPLETED | OUTPATIENT
Start: 2020-01-01 | End: 2020-01-01

## 2020-01-01 RX ORDER — ALBUTEROL SULFATE 0.83 MG/ML
SOLUTION RESPIRATORY (INHALATION)
Status: COMPLETED
Start: 2020-01-01 | End: 2020-01-01

## 2020-01-01 RX ORDER — ACETAMINOPHEN 325 MG/1
650 TABLET ORAL
Status: DISCONTINUED | OUTPATIENT
Start: 2020-01-01 | End: 2020-01-01 | Stop reason: HOSPADM

## 2020-01-01 RX ORDER — OXYCODONE HYDROCHLORIDE 5 MG/1
5 TABLET ORAL
Status: DISCONTINUED | OUTPATIENT
Start: 2020-01-01 | End: 2020-01-01 | Stop reason: HOSPADM

## 2020-01-01 RX ORDER — METRONIDAZOLE 500 MG/100ML
500 INJECTION, SOLUTION INTRAVENOUS EVERY 12 HOURS
Status: DISCONTINUED | OUTPATIENT
Start: 2020-01-01 | End: 2020-01-01

## 2020-01-01 RX ORDER — MORPHINE SULFATE 4 MG/ML
4 INJECTION INTRAVENOUS ONCE
Status: COMPLETED | OUTPATIENT
Start: 2020-01-01 | End: 2020-01-01

## 2020-01-01 RX ORDER — HEPARIN SODIUM 5000 [USP'U]/ML
80 INJECTION, SOLUTION INTRAVENOUS; SUBCUTANEOUS ONCE
Status: COMPLETED | OUTPATIENT
Start: 2020-01-01 | End: 2020-01-01

## 2020-01-01 RX ORDER — DILTIAZEM HYDROCHLORIDE 5 MG/ML
0.25 INJECTION INTRAVENOUS ONCE
Status: CANCELLED | OUTPATIENT
Start: 2020-01-01 | End: 2020-01-01

## 2020-01-01 RX ORDER — AMOXICILLIN 250 MG
1 CAPSULE ORAL
Qty: 30 TAB | Refills: 1 | Status: SHIPPED | OUTPATIENT
Start: 2020-01-01 | End: 2020-01-01 | Stop reason: SDUPTHER

## 2020-01-01 RX ORDER — ACETYLCYSTEINE 200 MG/ML
1 SOLUTION ORAL; RESPIRATORY (INHALATION)
Status: DISCONTINUED | OUTPATIENT
Start: 2020-01-01 | End: 2020-01-01

## 2020-01-01 RX ORDER — NYSTATIN 100000 [USP'U]/ML
500000 SUSPENSION ORAL 4 TIMES DAILY
Status: DISCONTINUED | OUTPATIENT
Start: 2020-01-01 | End: 2020-01-01 | Stop reason: HOSPADM

## 2020-01-01 RX ORDER — SODIUM CHLORIDE 0.9 % (FLUSH) 0.9 %
5-10 SYRINGE (ML) INJECTION AS NEEDED
Status: DISCONTINUED | OUTPATIENT
Start: 2020-01-01 | End: 2020-01-01 | Stop reason: HOSPADM

## 2020-01-01 RX ORDER — LEVALBUTEROL INHALATION SOLUTION 0.63 MG/3ML
0.63 SOLUTION RESPIRATORY (INHALATION)
Status: DISCONTINUED | OUTPATIENT
Start: 2020-01-01 | End: 2020-01-01 | Stop reason: HOSPADM

## 2020-01-01 RX ORDER — LABETALOL HYDROCHLORIDE 5 MG/ML
20 INJECTION, SOLUTION INTRAVENOUS
Status: COMPLETED | OUTPATIENT
Start: 2020-01-01 | End: 2020-01-01

## 2020-01-01 RX ORDER — METRONIDAZOLE 500 MG/100ML
500 INJECTION, SOLUTION INTRAVENOUS EVERY 12 HOURS
Status: DISPENSED | OUTPATIENT
Start: 2020-01-01 | End: 2020-01-01

## 2020-01-01 RX ORDER — BENZONATATE 100 MG/1
100 CAPSULE ORAL
Status: DISCONTINUED | OUTPATIENT
Start: 2020-01-01 | End: 2020-01-01 | Stop reason: SDUPTHER

## 2020-01-01 RX ORDER — OXYCODONE AND ACETAMINOPHEN 5; 325 MG/1; MG/1
1 TABLET ORAL
Status: COMPLETED | OUTPATIENT
Start: 2020-01-01 | End: 2020-01-01

## 2020-01-01 RX ORDER — NYSTATIN 100000 [USP'U]/ML
500000 SUSPENSION ORAL 4 TIMES DAILY
Qty: 60 ML | Refills: 0 | Status: SHIPPED | OUTPATIENT
Start: 2020-01-01

## 2020-01-01 RX ORDER — ALBUMIN HUMAN 250 G/1000ML
25 SOLUTION INTRAVENOUS ONCE
Status: ACTIVE | OUTPATIENT
Start: 2020-01-01 | End: 2020-01-01

## 2020-01-01 RX ORDER — BUDESONIDE 0.25 MG/2ML
250 INHALANT ORAL
Status: DISCONTINUED | OUTPATIENT
Start: 2020-01-01 | End: 2020-01-01 | Stop reason: HOSPADM

## 2020-01-01 RX ORDER — IPRATROPIUM BROMIDE 0.5 MG/2.5ML
0.5 SOLUTION RESPIRATORY (INHALATION)
Status: DISCONTINUED | OUTPATIENT
Start: 2020-01-01 | End: 2020-01-01

## 2020-01-01 RX ORDER — PANTOPRAZOLE SODIUM 40 MG/1
40 TABLET, DELAYED RELEASE ORAL
Status: DISCONTINUED | OUTPATIENT
Start: 2020-01-01 | End: 2020-01-01 | Stop reason: HOSPADM

## 2020-01-01 RX ORDER — GUAIFENESIN 600 MG/1
600 TABLET, EXTENDED RELEASE ORAL EVERY 12 HOURS
Status: DISCONTINUED | OUTPATIENT
Start: 2020-01-01 | End: 2020-01-01 | Stop reason: HOSPADM

## 2020-01-01 RX ORDER — SULFAMETHOXAZOLE AND TRIMETHOPRIM 400; 80 MG/1; MG/1
1 TABLET ORAL EVERY 12 HOURS
Status: DISCONTINUED | OUTPATIENT
Start: 2020-01-01 | End: 2020-01-01

## 2020-01-01 RX ORDER — DILTIAZEM HYDROCHLORIDE 180 MG/1
180 CAPSULE, COATED, EXTENDED RELEASE ORAL DAILY
Status: DISCONTINUED | OUTPATIENT
Start: 2020-01-01 | End: 2020-01-01 | Stop reason: HOSPADM

## 2020-01-01 RX ORDER — SODIUM CHLORIDE 0.9 % (FLUSH) 0.9 %
5-40 SYRINGE (ML) INJECTION AS NEEDED
Status: DISCONTINUED | OUTPATIENT
Start: 2020-01-01 | End: 2020-01-01 | Stop reason: HOSPADM

## 2020-01-01 RX ORDER — CEFIXIME 400 MG/1
400 CAPSULE ORAL DAILY
Qty: 7 CAP | Refills: 0 | Status: SHIPPED | OUTPATIENT
Start: 2020-01-01 | End: 2020-01-01

## 2020-01-01 RX ORDER — DEXTROSE 50 % IN WATER (D50W) INTRAVENOUS SYRINGE
25-50 AS NEEDED
Status: DISCONTINUED | OUTPATIENT
Start: 2020-01-01 | End: 2020-01-01

## 2020-01-01 RX ORDER — SULFAMETHOXAZOLE AND TRIMETHOPRIM 400; 80 MG/1; MG/1
1 TABLET ORAL DAILY
Qty: 11 TAB | Refills: 0 | Status: SHIPPED | OUTPATIENT
Start: 2020-01-01 | End: 2020-01-01

## 2020-01-01 RX ORDER — CARVEDILOL 6.25 MG/1
6.25 TABLET ORAL 2 TIMES DAILY WITH MEALS
Status: DISCONTINUED | OUTPATIENT
Start: 2020-01-01 | End: 2020-01-01

## 2020-01-01 RX ORDER — DEXTROSE 50 % IN WATER (D50W) INTRAVENOUS SYRINGE
50
Status: COMPLETED | OUTPATIENT
Start: 2020-01-01 | End: 2020-01-01

## 2020-01-01 RX ORDER — ASPIRIN 325 MG/1
100 TABLET, FILM COATED ORAL DAILY
Status: DISCONTINUED | OUTPATIENT
Start: 2020-01-01 | End: 2020-01-01 | Stop reason: HOSPADM

## 2020-01-01 RX ORDER — INSULIN LISPRO 100 [IU]/ML
INJECTION, SOLUTION INTRAVENOUS; SUBCUTANEOUS
Status: DISCONTINUED | OUTPATIENT
Start: 2020-01-01 | End: 2020-01-01

## 2020-01-01 RX ORDER — THERA TABS 400 MCG
1 TAB ORAL DAILY
Qty: 30 TAB | Refills: 0 | Status: SHIPPED | OUTPATIENT
Start: 2020-01-01 | End: 2020-01-01 | Stop reason: SDUPTHER

## 2020-01-01 RX ORDER — DEXTROSE 50 % IN WATER (D50W) INTRAVENOUS SYRINGE
25 ONCE
Status: COMPLETED | OUTPATIENT
Start: 2020-01-01 | End: 2020-01-01

## 2020-01-01 RX ORDER — HEPARIN SODIUM 5000 [USP'U]/ML
80 INJECTION, SOLUTION INTRAVENOUS; SUBCUTANEOUS AS NEEDED
Status: DISCONTINUED | OUTPATIENT
Start: 2020-01-01 | End: 2020-01-01

## 2020-01-01 RX ORDER — METOPROLOL TARTRATE 5 MG/5ML
2.5 INJECTION INTRAVENOUS
Status: DISCONTINUED | OUTPATIENT
Start: 2020-01-01 | End: 2020-01-01 | Stop reason: HOSPADM

## 2020-01-01 RX ORDER — GUAIFENESIN 600 MG/1
600 TABLET, EXTENDED RELEASE ORAL EVERY 12 HOURS
Status: DISCONTINUED | OUTPATIENT
Start: 2020-01-01 | End: 2020-01-01

## 2020-01-01 RX ORDER — AZITHROMYCIN 250 MG/1
250 TABLET, FILM COATED ORAL DAILY
Qty: 2 TAB | Refills: 0 | Status: SHIPPED | OUTPATIENT
Start: 2020-01-01 | End: 2020-01-01 | Stop reason: CLARIF

## 2020-01-01 RX ORDER — HEPARIN SODIUM 5000 [USP'U]/ML
5000 INJECTION, SOLUTION INTRAVENOUS; SUBCUTANEOUS EVERY 12 HOURS
Status: DISCONTINUED | OUTPATIENT
Start: 2020-01-01 | End: 2020-01-01 | Stop reason: HOSPADM

## 2020-01-01 RX ORDER — THERA TABS 400 MCG
1 TAB ORAL DAILY
Qty: 30 TAB | Refills: 0 | Status: SHIPPED | OUTPATIENT
Start: 2020-01-01

## 2020-01-01 RX ORDER — SODIUM CHLORIDE 0.9 % (FLUSH) 0.9 %
5-40 SYRINGE (ML) INJECTION EVERY 8 HOURS
Status: DISCONTINUED | OUTPATIENT
Start: 2020-01-01 | End: 2020-01-01 | Stop reason: HOSPADM

## 2020-01-01 RX ORDER — FUROSEMIDE 80 MG/1
80 TABLET ORAL 2 TIMES DAILY
Status: DISCONTINUED | OUTPATIENT
Start: 2020-01-01 | End: 2020-01-01 | Stop reason: HOSPADM

## 2020-01-01 RX ORDER — CARVEDILOL 12.5 MG/1
25 TABLET ORAL 2 TIMES DAILY WITH MEALS
Status: DISCONTINUED | OUTPATIENT
Start: 2020-01-01 | End: 2020-01-01

## 2020-01-01 RX ORDER — SODIUM POLYSTYRENE SULFONATE 15 G/60ML
30 SUSPENSION ORAL; RECTAL
Status: COMPLETED | OUTPATIENT
Start: 2020-01-01 | End: 2020-01-01

## 2020-01-01 RX ORDER — IBUPROFEN 200 MG
1 TABLET ORAL EVERY 24 HOURS
Qty: 30 PATCH | Refills: 0 | Status: SHIPPED | OUTPATIENT
Start: 2020-01-01 | End: 2020-01-01

## 2020-01-01 RX ORDER — HYDROCODONE BITARTRATE AND ACETAMINOPHEN 10; 325 MG/1; MG/1
1 TABLET ORAL
Status: DISCONTINUED | OUTPATIENT
Start: 2020-01-01 | End: 2020-01-01 | Stop reason: HOSPADM

## 2020-01-01 RX ORDER — GUAIFENESIN 600 MG/1
600 TABLET, EXTENDED RELEASE ORAL EVERY 12 HOURS
Status: DISCONTINUED | OUTPATIENT
Start: 2020-01-01 | End: 2020-12-10 | Stop reason: HOSPADM

## 2020-01-01 RX ORDER — BUDESONIDE 0.5 MG/2ML
500 INHALANT ORAL
Status: DISCONTINUED | OUTPATIENT
Start: 2020-01-01 | End: 2020-01-01 | Stop reason: HOSPADM

## 2020-01-01 RX ORDER — PREDNISONE 20 MG/1
40 TABLET ORAL
Qty: 6 TAB | Refills: 0 | Status: SHIPPED | OUTPATIENT
Start: 2020-01-01 | End: 2020-01-01

## 2020-01-01 RX ORDER — DIPHENHYDRAMINE HYDROCHLORIDE 50 MG/ML
25 INJECTION, SOLUTION INTRAMUSCULAR; INTRAVENOUS ONCE
Status: COMPLETED | OUTPATIENT
Start: 2020-01-01 | End: 2020-01-01

## 2020-01-01 RX ORDER — GABAPENTIN 300 MG/1
300 CAPSULE ORAL
Status: DISCONTINUED | OUTPATIENT
Start: 2020-01-01 | End: 2020-01-01

## 2020-01-01 RX ORDER — ERGOCALCIFEROL 1.25 MG/1
50000 CAPSULE ORAL
Status: DISCONTINUED | OUTPATIENT
Start: 2020-01-01 | End: 2020-01-01 | Stop reason: HOSPADM

## 2020-01-01 RX ORDER — SODIUM CHLORIDE 9 MG/ML
1000 INJECTION, SOLUTION INTRAVENOUS ONCE
Status: DISPENSED | OUTPATIENT
Start: 2020-01-01 | End: 2020-01-01

## 2020-01-01 RX ORDER — SIMETHICONE 80 MG
80 TABLET,CHEWABLE ORAL
Status: COMPLETED | OUTPATIENT
Start: 2020-01-01 | End: 2020-01-01

## 2020-01-01 RX ORDER — SEVELAMER CARBONATE 800 MG/1
800 TABLET, FILM COATED ORAL
Status: DISCONTINUED | OUTPATIENT
Start: 2020-01-01 | End: 2020-01-01 | Stop reason: HOSPADM

## 2020-01-01 RX ORDER — AMOXICILLIN 250 MG
1 CAPSULE ORAL DAILY
Status: DISCONTINUED | OUTPATIENT
Start: 2020-01-01 | End: 2020-01-01 | Stop reason: HOSPADM

## 2020-01-01 RX ORDER — ACETYLCYSTEINE 200 MG/ML
1 SOLUTION ORAL; RESPIRATORY (INHALATION)
Status: DISCONTINUED | OUTPATIENT
Start: 2020-01-01 | End: 2020-01-01 | Stop reason: HOSPADM

## 2020-01-01 RX ORDER — VANCOMYCIN 1.75 GRAM/500 ML IN 0.9 % SODIUM CHLORIDE INTRAVENOUS
1750 ONCE
Status: COMPLETED | OUTPATIENT
Start: 2020-01-01 | End: 2020-01-01

## 2020-01-01 RX ORDER — INSULIN LISPRO 100 [IU]/ML
5 INJECTION, SOLUTION INTRAVENOUS; SUBCUTANEOUS ONCE
Status: COMPLETED | OUTPATIENT
Start: 2020-01-01 | End: 2020-01-01

## 2020-01-01 RX ORDER — DILTIAZEM HYDROCHLORIDE 180 MG/1
180 CAPSULE, COATED, EXTENDED RELEASE ORAL DAILY
Qty: 90 CAP | Refills: 0 | Status: SHIPPED | OUTPATIENT
Start: 2020-01-01 | End: 2020-01-01 | Stop reason: SDUPTHER

## 2020-01-01 RX ORDER — CARVEDILOL 12.5 MG/1
12.5 TABLET ORAL 2 TIMES DAILY
Status: DISCONTINUED | OUTPATIENT
Start: 2020-01-01 | End: 2020-01-01

## 2020-01-01 RX ORDER — CARVEDILOL 12.5 MG/1
12.5 TABLET ORAL 2 TIMES DAILY WITH MEALS
Status: DISCONTINUED | OUTPATIENT
Start: 2020-01-01 | End: 2020-01-01

## 2020-01-01 RX ORDER — HYDROMORPHONE HYDROCHLORIDE 1 MG/ML
0.5 INJECTION, SOLUTION INTRAMUSCULAR; INTRAVENOUS; SUBCUTANEOUS
Status: DISCONTINUED | OUTPATIENT
Start: 2020-01-01 | End: 2020-01-01 | Stop reason: HOSPADM

## 2020-01-01 RX ORDER — ALBUTEROL SULFATE 0.83 MG/ML
2.5 SOLUTION RESPIRATORY (INHALATION)
Status: DISCONTINUED | OUTPATIENT
Start: 2020-01-01 | End: 2020-01-01 | Stop reason: HOSPADM

## 2020-01-01 RX ORDER — LEVOFLOXACIN 500 MG/1
TABLET, FILM COATED ORAL
Qty: 3 TAB | Refills: 0 | Status: SHIPPED | OUTPATIENT
Start: 2020-01-01 | End: 2020-01-01

## 2020-01-01 RX ORDER — ACETYLCYSTEINE 200 MG/ML
SOLUTION ORAL; RESPIRATORY (INHALATION)
Status: COMPLETED
Start: 2020-01-01 | End: 2020-01-01

## 2020-01-01 RX ORDER — AMOXICILLIN 250 MG
1 CAPSULE ORAL
Qty: 30 TAB | Refills: 1 | Status: SHIPPED | OUTPATIENT
Start: 2020-01-01

## 2020-01-01 RX ORDER — MORPHINE SULFATE 2 MG/ML
2 INJECTION, SOLUTION INTRAMUSCULAR; INTRAVENOUS ONCE
Status: COMPLETED | OUTPATIENT
Start: 2020-01-01 | End: 2020-01-01

## 2020-01-01 RX ORDER — DILTIAZEM HYDROCHLORIDE 240 MG/1
240 CAPSULE, COATED, EXTENDED RELEASE ORAL DAILY
Status: DISCONTINUED | OUTPATIENT
Start: 2020-01-01 | End: 2020-01-01

## 2020-01-01 RX ORDER — HEPARIN SODIUM 5000 [USP'U]/ML
40 INJECTION, SOLUTION INTRAVENOUS; SUBCUTANEOUS AS NEEDED
Status: DISCONTINUED | OUTPATIENT
Start: 2020-01-01 | End: 2020-01-01

## 2020-01-01 RX ORDER — SULFAMETHOXAZOLE AND TRIMETHOPRIM 400; 80 MG/1; MG/1
1 TABLET ORAL DAILY
Status: DISCONTINUED | OUTPATIENT
Start: 2020-01-01 | End: 2020-01-01 | Stop reason: HOSPADM

## 2020-01-01 RX ORDER — IPRATROPIUM BROMIDE AND ALBUTEROL SULFATE 2.5; .5 MG/3ML; MG/3ML
3 SOLUTION RESPIRATORY (INHALATION)
Status: DISCONTINUED | OUTPATIENT
Start: 2020-01-01 | End: 2020-01-01

## 2020-01-01 RX ORDER — ALBUMIN HUMAN 250 G/1000ML
25 SOLUTION INTRAVENOUS
Status: DISCONTINUED | OUTPATIENT
Start: 2020-01-01 | End: 2020-01-01 | Stop reason: HOSPADM

## 2020-01-01 RX ORDER — LEVOFLOXACIN 500 MG/1
TABLET, FILM COATED ORAL
Qty: 3 TAB | Refills: 0 | Status: SHIPPED | OUTPATIENT
Start: 2020-01-01 | End: 2020-01-01 | Stop reason: SDUPTHER

## 2020-01-01 RX ORDER — IBUPROFEN 200 MG
1 TABLET ORAL DAILY PRN
Status: DISCONTINUED | OUTPATIENT
Start: 2020-01-01 | End: 2020-01-01 | Stop reason: HOSPADM

## 2020-01-01 RX ORDER — ALBUTEROL SULFATE 0.83 MG/ML
2.5 SOLUTION RESPIRATORY (INHALATION)
Status: DISCONTINUED | OUTPATIENT
Start: 2020-01-01 | End: 2020-01-01

## 2020-01-01 RX ORDER — DEXTROSE MONOHYDRATE AND SODIUM CHLORIDE 5; .9 G/100ML; G/100ML
100 INJECTION, SOLUTION INTRAVENOUS CONTINUOUS
Status: DISCONTINUED | OUTPATIENT
Start: 2020-01-01 | End: 2020-01-01

## 2020-01-01 RX ORDER — PREDNISONE 20 MG/1
40 TABLET ORAL
Status: DISCONTINUED | OUTPATIENT
Start: 2020-01-01 | End: 2020-01-01

## 2020-01-01 RX ORDER — THERA TABS 400 MCG
1 TAB ORAL DAILY
Status: DISCONTINUED | OUTPATIENT
Start: 2020-01-01 | End: 2020-01-01 | Stop reason: HOSPADM

## 2020-01-01 RX ORDER — DESMOPRESSIN ACETATE 4 UG/ML
2 INJECTION, SOLUTION INTRAVENOUS; SUBCUTANEOUS ONCE
Status: COMPLETED | OUTPATIENT
Start: 2020-01-01 | End: 2020-01-01

## 2020-01-01 RX ORDER — ACETYLCYSTEINE 200 MG/ML
200 SOLUTION ORAL; RESPIRATORY (INHALATION)
Status: COMPLETED | OUTPATIENT
Start: 2020-01-01 | End: 2020-01-01

## 2020-01-01 RX ORDER — CARVEDILOL 6.25 MG/1
6.25 TABLET ORAL 2 TIMES DAILY WITH MEALS
Qty: 60 TAB | Refills: 1 | Status: SHIPPED | OUTPATIENT
Start: 2020-01-01 | End: 2020-01-01 | Stop reason: SDUPTHER

## 2020-01-01 RX ORDER — ACETAMINOPHEN 650 MG/1
650 SUPPOSITORY RECTAL
Status: DISCONTINUED | OUTPATIENT
Start: 2020-01-01 | End: 2020-12-10 | Stop reason: HOSPADM

## 2020-01-01 RX ORDER — DILTIAZEM HYDROCHLORIDE 240 MG/1
240 CAPSULE, COATED, EXTENDED RELEASE ORAL DAILY
Qty: 30 CAP | Refills: 1 | Status: SHIPPED | OUTPATIENT
Start: 2020-01-01

## 2020-01-01 RX ORDER — IBUPROFEN 200 MG
1 TABLET ORAL EVERY 24 HOURS
Qty: 30 PATCH | Refills: 0 | Status: SHIPPED | OUTPATIENT
Start: 2020-01-01 | End: 2020-12-22

## 2020-01-01 RX ORDER — OXYCODONE HYDROCHLORIDE 5 MG/1
5 TABLET ORAL
Status: DISCONTINUED | OUTPATIENT
Start: 2020-01-01 | End: 2020-01-01

## 2020-01-01 RX ORDER — ACETYLCYSTEINE 200 MG/ML
200 SOLUTION ORAL; RESPIRATORY (INHALATION)
Status: DISCONTINUED | OUTPATIENT
Start: 2020-01-01 | End: 2020-01-01 | Stop reason: HOSPADM

## 2020-01-01 RX ORDER — NYSTATIN 100000 [USP'U]/ML
500000 SUSPENSION ORAL 4 TIMES DAILY
Qty: 60 ML | Refills: 0 | Status: SHIPPED | OUTPATIENT
Start: 2020-01-01 | End: 2020-01-01 | Stop reason: SDUPTHER

## 2020-01-01 RX ORDER — PHENOBARBITAL SODIUM 65 MG/ML
65 INJECTION INTRAMUSCULAR EVERY 8 HOURS
Status: DISCONTINUED | OUTPATIENT
Start: 2020-01-01 | End: 2020-01-01

## 2020-01-01 RX ORDER — GUAIFENESIN/DEXTROMETHORPHAN 100-10MG/5
10 SYRUP ORAL
Qty: 1 BOTTLE | Refills: 0 | Status: SHIPPED | OUTPATIENT
Start: 2020-01-01 | End: 2020-01-01 | Stop reason: SDUPTHER

## 2020-01-01 RX ORDER — CEFUROXIME AXETIL 250 MG/1
500 TABLET ORAL
Qty: 4 TAB | Refills: 0 | Status: SHIPPED | OUTPATIENT
Start: 2020-01-01 | End: 2020-01-01

## 2020-01-01 RX ORDER — FENTANYL CITRATE 50 UG/ML
25 INJECTION, SOLUTION INTRAMUSCULAR; INTRAVENOUS
Status: DISCONTINUED | OUTPATIENT
Start: 2020-01-01 | End: 2020-01-01

## 2020-01-01 RX ORDER — OXYCODONE AND ACETAMINOPHEN 5; 325 MG/1; MG/1
2 TABLET ORAL
Status: DISCONTINUED | OUTPATIENT
Start: 2020-01-01 | End: 2020-01-01 | Stop reason: HOSPADM

## 2020-01-01 RX ORDER — PREDNISONE 10 MG/1
10 TABLET ORAL
Qty: 3 TAB | Refills: 0 | Status: SHIPPED | OUTPATIENT
Start: 2020-01-01

## 2020-01-01 RX ORDER — GUAIFENESIN 100 MG/5ML
200 SOLUTION ORAL
Status: DISCONTINUED | OUTPATIENT
Start: 2020-01-01 | End: 2020-01-01 | Stop reason: HOSPADM

## 2020-01-01 RX ORDER — CARVEDILOL 12.5 MG/1
12.5 TABLET ORAL
Status: COMPLETED | OUTPATIENT
Start: 2020-01-01 | End: 2020-01-01

## 2020-01-01 RX ORDER — DEXTROSE, SODIUM CHLORIDE, AND POTASSIUM CHLORIDE 5; .45; .075 G/100ML; G/100ML; G/100ML
50 INJECTION INTRAVENOUS CONTINUOUS
Status: DISCONTINUED | OUTPATIENT
Start: 2020-01-01 | End: 2020-01-01

## 2020-01-01 RX ORDER — ASPIRIN 325 MG/1
100 TABLET, FILM COATED ORAL DAILY
Qty: 30 TAB | Refills: 0 | Status: SHIPPED | OUTPATIENT
Start: 2020-01-01 | End: 2020-01-01 | Stop reason: SDUPTHER

## 2020-01-01 RX ORDER — SEVELAMER CARBONATE 800 MG/1
1600 TABLET, FILM COATED ORAL 3 TIMES DAILY
Qty: 90 TAB | Refills: 1 | Status: SHIPPED | OUTPATIENT
Start: 2020-01-01

## 2020-01-01 RX ORDER — ACETYLCYSTEINE 200 MG/ML
200 SOLUTION ORAL; RESPIRATORY (INHALATION) 3 TIMES DAILY
Qty: 30 ML | Refills: 0 | Status: SHIPPED | OUTPATIENT
Start: 2020-01-01

## 2020-01-01 RX ORDER — SULFAMETHOXAZOLE AND TRIMETHOPRIM 800; 160 MG/1; MG/1
1 TABLET ORAL DAILY
Qty: 11 TAB | Refills: 0 | Status: SHIPPED | OUTPATIENT
Start: 2020-01-01

## 2020-01-01 RX ORDER — IPRATROPIUM BROMIDE 0.5 MG/2.5ML
0.5 SOLUTION RESPIRATORY (INHALATION)
Status: DISCONTINUED | OUTPATIENT
Start: 2020-01-01 | End: 2020-01-01 | Stop reason: HOSPADM

## 2020-01-01 RX ORDER — HYDROXYZINE HYDROCHLORIDE 25 MG/ML
25 INJECTION, SOLUTION INTRAMUSCULAR ONCE
Status: COMPLETED | OUTPATIENT
Start: 2020-01-01 | End: 2020-01-01

## 2020-01-01 RX ORDER — TRAMADOL HYDROCHLORIDE 50 MG/1
50 TABLET ORAL
Status: DISCONTINUED | OUTPATIENT
Start: 2020-01-01 | End: 2020-01-01

## 2020-01-01 RX ORDER — ALBUTEROL SULFATE 2.5 MG/.5ML
20 SOLUTION RESPIRATORY (INHALATION)
Status: DISPENSED | OUTPATIENT
Start: 2020-01-01 | End: 2020-01-01

## 2020-01-01 RX ORDER — PREDNISONE 10 MG/1
10 TABLET ORAL
Status: DISCONTINUED | OUTPATIENT
Start: 2020-01-01 | End: 2020-01-01 | Stop reason: HOSPADM

## 2020-01-01 RX ORDER — MORPHINE SULFATE 100 MG/5ML
10 SOLUTION ORAL
Status: DISCONTINUED | OUTPATIENT
Start: 2020-01-01 | End: 2020-12-10 | Stop reason: HOSPADM

## 2020-01-01 RX ORDER — GUAIFENESIN 100 MG/5ML
100 SOLUTION ORAL
Status: DISCONTINUED | OUTPATIENT
Start: 2020-01-01 | End: 2020-01-01 | Stop reason: HOSPADM

## 2020-01-01 RX ORDER — ACETAMINOPHEN 325 MG/1
650 TABLET ORAL
Status: DISCONTINUED | OUTPATIENT
Start: 2020-01-01 | End: 2020-12-10 | Stop reason: HOSPADM

## 2020-01-01 RX ORDER — AMOXICILLIN 250 MG
1 CAPSULE ORAL
Status: DISCONTINUED | OUTPATIENT
Start: 2020-01-01 | End: 2020-01-01 | Stop reason: HOSPADM

## 2020-01-01 RX ADMIN — ARFORMOTEROL TARTRATE 15 MCG: 15 SOLUTION RESPIRATORY (INHALATION) at 08:46

## 2020-01-01 RX ADMIN — AMIODARONE HYDROCHLORIDE 1 MG/MIN: 50 INJECTION, SOLUTION INTRAVENOUS at 16:00

## 2020-01-01 RX ADMIN — LEVALBUTEROL HYDROCHLORIDE 0.63 MG: 0.63 SOLUTION RESPIRATORY (INHALATION) at 20:24

## 2020-01-01 RX ADMIN — NYSTATIN 500000 UNITS: 100000 SUSPENSION ORAL at 17:36

## 2020-01-01 RX ADMIN — CEFEPIME HYDROCHLORIDE 1 G: 1 INJECTION, POWDER, FOR SOLUTION INTRAMUSCULAR; INTRAVENOUS at 17:14

## 2020-01-01 RX ADMIN — INSULIN LISPRO 5 UNITS: 100 INJECTION, SOLUTION INTRAVENOUS; SUBCUTANEOUS at 22:42

## 2020-01-01 RX ADMIN — CEFEPIME HYDROCHLORIDE 1 G: 1 INJECTION, POWDER, FOR SOLUTION INTRAMUSCULAR; INTRAVENOUS at 18:40

## 2020-01-01 RX ADMIN — OXYCODONE HYDROCHLORIDE AND ACETAMINOPHEN 1 TABLET: 5; 325 TABLET ORAL at 18:17

## 2020-01-01 RX ADMIN — IPRATROPIUM BROMIDE AND ALBUTEROL SULFATE 3 ML: .5; 3 SOLUTION RESPIRATORY (INHALATION) at 08:20

## 2020-01-01 RX ADMIN — NYSTATIN 500000 UNITS: 100000 SUSPENSION ORAL at 17:13

## 2020-01-01 RX ADMIN — NEPHROCAP 1 CAPSULE: 1 CAP ORAL at 09:03

## 2020-01-01 RX ADMIN — Medication 100 MG: at 09:22

## 2020-01-01 RX ADMIN — DOCUSATE SODIUM AND SENNOSIDES 1 TABLET: 8.6; 5 TABLET, FILM COATED ORAL at 12:00

## 2020-01-01 RX ADMIN — CARVEDILOL 6.25 MG: 6.25 TABLET, FILM COATED ORAL at 08:52

## 2020-01-01 RX ADMIN — HUMAN INSULIN 25 UNITS: 100 INJECTION, SUSPENSION SUBCUTANEOUS at 09:21

## 2020-01-01 RX ADMIN — FOLIC ACID 1 MG: 1 TABLET ORAL at 09:07

## 2020-01-01 RX ADMIN — METHYLPREDNISOLONE SODIUM SUCCINATE 30 MG: 40 INJECTION, POWDER, FOR SOLUTION INTRAMUSCULAR; INTRAVENOUS at 13:11

## 2020-01-01 RX ADMIN — DEXTROSE MONOHYDRATE 7.5 MG/HR: 50 INJECTION, SOLUTION INTRAVENOUS at 18:46

## 2020-01-01 RX ADMIN — OXYCODONE HYDROCHLORIDE AND ACETAMINOPHEN 1 TABLET: 10; 325 TABLET ORAL at 08:33

## 2020-01-01 RX ADMIN — INSULIN LISPRO 3 UNITS: 100 INJECTION, SOLUTION INTRAVENOUS; SUBCUTANEOUS at 12:09

## 2020-01-01 RX ADMIN — ARFORMOTEROL TARTRATE 15 MCG: 15 SOLUTION RESPIRATORY (INHALATION) at 20:59

## 2020-01-01 RX ADMIN — METHYLPREDNISOLONE SODIUM SUCCINATE 40 MG: 40 INJECTION, POWDER, FOR SOLUTION INTRAMUSCULAR; INTRAVENOUS at 07:12

## 2020-01-01 RX ADMIN — ACETYLCYSTEINE 200 MG: 200 SOLUTION ORAL; RESPIRATORY (INHALATION) at 14:00

## 2020-01-01 RX ADMIN — BUDESONIDE 250 MCG: 0.25 INHALANT RESPIRATORY (INHALATION) at 22:09

## 2020-01-01 RX ADMIN — FOLIC ACID 1 MG: 1 TABLET ORAL at 12:57

## 2020-01-01 RX ADMIN — NOREPINEPHRINE BITARTRATE 30 MCG/MIN: 1 INJECTION, SOLUTION, CONCENTRATE INTRAVENOUS at 13:27

## 2020-01-01 RX ADMIN — HEPARIN SODIUM 5000 UNITS: 5000 INJECTION INTRAVENOUS; SUBCUTANEOUS at 13:09

## 2020-01-01 RX ADMIN — NYSTATIN 500000 UNITS: 100000 SUSPENSION ORAL at 18:24

## 2020-01-01 RX ADMIN — SEVELAMER CARBONATE 800 MG: 800 TABLET, FILM COATED ORAL at 21:42

## 2020-01-01 RX ADMIN — CEFEPIME HYDROCHLORIDE 1 G: 1 INJECTION, POWDER, FOR SOLUTION INTRAMUSCULAR; INTRAVENOUS at 18:17

## 2020-01-01 RX ADMIN — DIPHENHYDRAMINE HYDROCHLORIDE 25 MG: 50 INJECTION, SOLUTION INTRAMUSCULAR; INTRAVENOUS at 01:04

## 2020-01-01 RX ADMIN — METRONIDAZOLE 500 MG: 500 INJECTION, SOLUTION INTRAVENOUS at 09:20

## 2020-01-01 RX ADMIN — SEVELAMER CARBONATE 800 MG: 800 TABLET, FILM COATED ORAL at 21:34

## 2020-01-01 RX ADMIN — PREDNISONE 10 MG: 5 TABLET ORAL at 08:57

## 2020-01-01 RX ADMIN — FUROSEMIDE 80 MG: 40 TABLET ORAL at 08:37

## 2020-01-01 RX ADMIN — CARVEDILOL 12.5 MG: 12.5 TABLET, FILM COATED ORAL at 12:33

## 2020-01-01 RX ADMIN — MORPHINE SULFATE 4 MG: 4 INJECTION INTRAVENOUS at 16:10

## 2020-01-01 RX ADMIN — SEVELAMER CARBONATE 800 MG: 800 TABLET, FILM COATED ORAL at 17:39

## 2020-01-01 RX ADMIN — HEPARIN SODIUM 5000 UNITS: 5000 INJECTION INTRAVENOUS; SUBCUTANEOUS at 22:31

## 2020-01-01 RX ADMIN — ACETYLCYSTEINE 200 MG: 200 SOLUTION ORAL; RESPIRATORY (INHALATION) at 22:10

## 2020-01-01 RX ADMIN — IPRATROPIUM BROMIDE 0.5 MG: 0.5 SOLUTION RESPIRATORY (INHALATION) at 08:46

## 2020-01-01 RX ADMIN — GUAIFENESIN 600 MG: 600 TABLET, EXTENDED RELEASE ORAL at 21:28

## 2020-01-01 RX ADMIN — CARVEDILOL 6.25 MG: 6.25 TABLET, FILM COATED ORAL at 10:06

## 2020-01-01 RX ADMIN — Medication 100 MG: at 10:06

## 2020-01-01 RX ADMIN — SEVELAMER CARBONATE 800 MG: 800 TABLET, FILM COATED ORAL at 21:55

## 2020-01-01 RX ADMIN — Medication 10 ML: at 17:47

## 2020-01-01 RX ADMIN — ARFORMOTEROL TARTRATE 15 MCG: 15 SOLUTION RESPIRATORY (INHALATION) at 19:13

## 2020-01-01 RX ADMIN — GUAIFENESIN 600 MG: 600 TABLET, EXTENDED RELEASE ORAL at 09:21

## 2020-01-01 RX ADMIN — CASTOR OIL AND BALSAM, PERU: 788; 87 OINTMENT TOPICAL at 09:32

## 2020-01-01 RX ADMIN — NYSTATIN 500000 UNITS: 100000 SUSPENSION ORAL at 21:47

## 2020-01-01 RX ADMIN — NYSTATIN 500000 UNITS: 100000 SUSPENSION ORAL at 13:58

## 2020-01-01 RX ADMIN — DEXTROSE MONOHYDRATE 5 MG/HR: 50 INJECTION, SOLUTION INTRAVENOUS at 18:11

## 2020-01-01 RX ADMIN — CASTOR OIL AND BALSAM, PERU: 788; 87 OINTMENT TOPICAL at 17:57

## 2020-01-01 RX ADMIN — OXYCODONE HYDROCHLORIDE AND ACETAMINOPHEN 1 TABLET: 10; 325 TABLET ORAL at 08:57

## 2020-01-01 RX ADMIN — MULTIPLE VITAMINS W/ MINERALS TAB 1 TABLET: TAB at 08:58

## 2020-01-01 RX ADMIN — DILTIAZEM HYDROCHLORIDE 10 MG: 5 INJECTION INTRAVENOUS at 14:59

## 2020-01-01 RX ADMIN — EPOETIN ALFA-EPBX 4000 UNITS: 4000 INJECTION, SOLUTION INTRAVENOUS; SUBCUTANEOUS at 20:56

## 2020-01-01 RX ADMIN — SEVELAMER CARBONATE 800 MG: 800 TABLET, FILM COATED ORAL at 21:25

## 2020-01-01 RX ADMIN — ARFORMOTEROL TARTRATE 15 MCG: 15 SOLUTION RESPIRATORY (INHALATION) at 19:34

## 2020-01-01 RX ADMIN — NYSTATIN 500000 UNITS: 100000 SUSPENSION ORAL at 21:28

## 2020-01-01 RX ADMIN — CARVEDILOL 6.25 MG: 6.25 TABLET, FILM COATED ORAL at 18:31

## 2020-01-01 RX ADMIN — OXYCODONE HYDROCHLORIDE AND ACETAMINOPHEN 1 TABLET: 10; 325 TABLET ORAL at 15:45

## 2020-01-01 RX ADMIN — GUAIFENESIN 600 MG: 600 TABLET, EXTENDED RELEASE ORAL at 21:31

## 2020-01-01 RX ADMIN — ARFORMOTEROL TARTRATE 15 MCG: 15 SOLUTION RESPIRATORY (INHALATION) at 09:12

## 2020-01-01 RX ADMIN — BUDESONIDE 250 MCG: 0.25 INHALANT RESPIRATORY (INHALATION) at 07:19

## 2020-01-01 RX ADMIN — Medication 100 MG: at 09:00

## 2020-01-01 RX ADMIN — NYSTATIN 500000 UNITS: 100000 SUSPENSION ORAL at 09:59

## 2020-01-01 RX ADMIN — DILTIAZEM HYDROCHLORIDE 180 MG: 180 CAPSULE, COATED, EXTENDED RELEASE ORAL at 10:54

## 2020-01-01 RX ADMIN — ARFORMOTEROL TARTRATE 15 MCG: 15 SOLUTION RESPIRATORY (INHALATION) at 01:20

## 2020-01-01 RX ADMIN — HYDROCODONE BITARTRATE AND ACETAMINOPHEN 1 TABLET: 10; 325 TABLET ORAL at 16:21

## 2020-01-01 RX ADMIN — SEVELAMER CARBONATE 800 MG: 800 TABLET, FILM COATED ORAL at 21:13

## 2020-01-01 RX ADMIN — METHYLPREDNISOLONE SODIUM SUCCINATE 40 MG: 40 INJECTION, POWDER, FOR SOLUTION INTRAMUSCULAR; INTRAVENOUS at 21:39

## 2020-01-01 RX ADMIN — SEVELAMER CARBONATE 800 MG: 800 TABLET, FILM COATED ORAL at 17:42

## 2020-01-01 RX ADMIN — ACETYLCYSTEINE: 200 SOLUTION ORAL; RESPIRATORY (INHALATION) at 23:05

## 2020-01-01 RX ADMIN — OXYCODONE HYDROCHLORIDE AND ACETAMINOPHEN 1 TABLET: 10; 325 TABLET ORAL at 09:30

## 2020-01-01 RX ADMIN — SEVELAMER CARBONATE 1600 MG: 800 TABLET, FILM COATED ORAL at 22:21

## 2020-01-01 RX ADMIN — NYSTATIN 500000 UNITS: 100000 SUSPENSION ORAL at 10:30

## 2020-01-01 RX ADMIN — INSULIN LISPRO 2 UNITS: 100 INJECTION, SOLUTION INTRAVENOUS; SUBCUTANEOUS at 08:56

## 2020-01-01 RX ADMIN — ACETAMINOPHEN 650 MG: 325 TABLET ORAL at 05:30

## 2020-01-01 RX ADMIN — INSULIN LISPRO 7 UNITS: 100 INJECTION, SOLUTION INTRAVENOUS; SUBCUTANEOUS at 17:13

## 2020-01-01 RX ADMIN — INSULIN LISPRO 4 UNITS: 100 INJECTION, SOLUTION INTRAVENOUS; SUBCUTANEOUS at 21:51

## 2020-01-01 RX ADMIN — ARFORMOTEROL TARTRATE 15 MCG: 15 SOLUTION RESPIRATORY (INHALATION) at 20:37

## 2020-01-01 RX ADMIN — INSULIN LISPRO 2 UNITS: 100 INJECTION, SOLUTION INTRAVENOUS; SUBCUTANEOUS at 19:38

## 2020-01-01 RX ADMIN — OXYCODONE HYDROCHLORIDE AND ACETAMINOPHEN 1 TABLET: 10; 325 TABLET ORAL at 09:31

## 2020-01-01 RX ADMIN — DEXTROSE MONOHYDRATE AND SODIUM CHLORIDE 100 ML/HR: 5; .9 INJECTION, SOLUTION INTRAVENOUS at 09:10

## 2020-01-01 RX ADMIN — Medication 10 ML: at 06:08

## 2020-01-01 RX ADMIN — CEFEPIME HYDROCHLORIDE 1 G: 1 INJECTION, POWDER, FOR SOLUTION INTRAMUSCULAR; INTRAVENOUS at 17:48

## 2020-01-01 RX ADMIN — THERA TABS 1 TABLET: TAB at 09:36

## 2020-01-01 RX ADMIN — SEVELAMER CARBONATE 800 MG: 800 TABLET, FILM COATED ORAL at 10:30

## 2020-01-01 RX ADMIN — HEPARIN SODIUM 5000 UNITS: 5000 INJECTION INTRAVENOUS; SUBCUTANEOUS at 06:00

## 2020-01-01 RX ADMIN — GUAIFENESIN 600 MG: 600 TABLET, EXTENDED RELEASE ORAL at 21:38

## 2020-01-01 RX ADMIN — SEVELAMER CARBONATE 1600 MG: 800 TABLET, FILM COATED ORAL at 12:17

## 2020-01-01 RX ADMIN — IPRATROPIUM BROMIDE 0.5 MG: 0.5 SOLUTION RESPIRATORY (INHALATION) at 01:20

## 2020-01-01 RX ADMIN — SEVELAMER CARBONATE 1600 MG: 800 TABLET, FILM COATED ORAL at 18:50

## 2020-01-01 RX ADMIN — CARVEDILOL 6.25 MG: 6.25 TABLET, FILM COATED ORAL at 08:59

## 2020-01-01 RX ADMIN — METHYLPREDNISOLONE SODIUM SUCCINATE 40 MG: 40 INJECTION, POWDER, FOR SOLUTION INTRAMUSCULAR; INTRAVENOUS at 05:14

## 2020-01-01 RX ADMIN — CEFEPIME HYDROCHLORIDE 1 G: 1 INJECTION, POWDER, FOR SOLUTION INTRAMUSCULAR; INTRAVENOUS at 17:41

## 2020-01-01 RX ADMIN — Medication 100 MG: at 10:28

## 2020-01-01 RX ADMIN — ARFORMOTEROL TARTRATE 15 MCG: 15 SOLUTION RESPIRATORY (INHALATION) at 07:35

## 2020-01-01 RX ADMIN — PANTOPRAZOLE SODIUM 40 MG: 40 TABLET, DELAYED RELEASE ORAL at 09:45

## 2020-01-01 RX ADMIN — SEVELAMER CARBONATE 1600 MG: 800 TABLET, FILM COATED ORAL at 09:31

## 2020-01-01 RX ADMIN — IPRATROPIUM BROMIDE AND ALBUTEROL SULFATE 3 ML: .5; 3 SOLUTION RESPIRATORY (INHALATION) at 08:06

## 2020-01-01 RX ADMIN — NYSTATIN 500000 UNITS: 100000 SUSPENSION ORAL at 17:14

## 2020-01-01 RX ADMIN — METRONIDAZOLE 500 MG: 500 INJECTION, SOLUTION INTRAVENOUS at 21:38

## 2020-01-01 RX ADMIN — METRONIDAZOLE 500 MG: 500 INJECTION, SOLUTION INTRAVENOUS at 10:55

## 2020-01-01 RX ADMIN — INSULIN LISPRO 7 UNITS: 100 INJECTION, SOLUTION INTRAVENOUS; SUBCUTANEOUS at 16:30

## 2020-01-01 RX ADMIN — Medication 5 ML: at 14:00

## 2020-01-01 RX ADMIN — ACETYLCYSTEINE 200 MG: 200 SOLUTION ORAL; RESPIRATORY (INHALATION) at 08:35

## 2020-01-01 RX ADMIN — HYDROMORPHONE HYDROCHLORIDE 0.5 MG: 1 INJECTION, SOLUTION INTRAMUSCULAR; INTRAVENOUS; SUBCUTANEOUS at 16:59

## 2020-01-01 RX ADMIN — IPRATROPIUM BROMIDE 0.5 MG: 0.5 SOLUTION RESPIRATORY (INHALATION) at 07:18

## 2020-01-01 RX ADMIN — SEVELAMER CARBONATE 1600 MG: 800 TABLET, FILM COATED ORAL at 17:35

## 2020-01-01 RX ADMIN — AMIODARONE HYDROCHLORIDE 150 MG: 1.5 INJECTION, SOLUTION INTRAVENOUS at 15:43

## 2020-01-01 RX ADMIN — THERA TABS 1 TABLET: TAB at 13:20

## 2020-01-01 RX ADMIN — IPRATROPIUM BROMIDE AND ALBUTEROL SULFATE 3 ML: .5; 3 SOLUTION RESPIRATORY (INHALATION) at 20:21

## 2020-01-01 RX ADMIN — DILTIAZEM HYDROCHLORIDE 240 MG: 120 CAPSULE, COATED, EXTENDED RELEASE ORAL at 08:57

## 2020-01-01 RX ADMIN — NYSTATIN 500000 UNITS: 100000 SUSPENSION ORAL at 21:25

## 2020-01-01 RX ADMIN — ALBUTEROL SULFATE 2.5 MG: 2.5 SOLUTION RESPIRATORY (INHALATION) at 20:07

## 2020-01-01 RX ADMIN — HEPARIN SODIUM 5000 UNITS: 5000 INJECTION INTRAVENOUS; SUBCUTANEOUS at 14:39

## 2020-01-01 RX ADMIN — NYSTATIN 500000 UNITS: 100000 SUSPENSION ORAL at 21:14

## 2020-01-01 RX ADMIN — NYSTATIN 500000 UNITS: 100000 SUSPENSION ORAL at 15:29

## 2020-01-01 RX ADMIN — Medication 10 ML: at 05:12

## 2020-01-01 RX ADMIN — Medication 10 ML: at 00:08

## 2020-01-01 RX ADMIN — Medication: at 09:00

## 2020-01-01 RX ADMIN — CASTOR OIL AND BALSAM, PERU: 788; 87 OINTMENT TOPICAL at 07:28

## 2020-01-01 RX ADMIN — OXYCODONE 5 MG: 5 TABLET ORAL at 05:37

## 2020-01-01 RX ADMIN — DEXTROSE MONOHYDRATE 12.5 MG/HR: 50 INJECTION, SOLUTION INTRAVENOUS at 11:00

## 2020-01-01 RX ADMIN — CEFEPIME HYDROCHLORIDE 1 G: 1 INJECTION, POWDER, FOR SOLUTION INTRAMUSCULAR; INTRAVENOUS at 18:10

## 2020-01-01 RX ADMIN — HUMAN INSULIN 25 UNITS: 100 INJECTION, SUSPENSION SUBCUTANEOUS at 11:22

## 2020-01-01 RX ADMIN — DILTIAZEM HYDROCHLORIDE 10 MG: 5 INJECTION INTRAVENOUS at 19:13

## 2020-01-01 RX ADMIN — GUAIFENESIN AND DEXTROMETHORPHAN 10 ML: 100; 10 SYRUP ORAL at 22:19

## 2020-01-01 RX ADMIN — SEVELAMER CARBONATE 800 MG: 800 TABLET, FILM COATED ORAL at 09:32

## 2020-01-01 RX ADMIN — INSULIN LISPRO 2 UNITS: 100 INJECTION, SOLUTION INTRAVENOUS; SUBCUTANEOUS at 21:34

## 2020-01-01 RX ADMIN — GUAIFENESIN 600 MG: 600 TABLET, EXTENDED RELEASE ORAL at 22:03

## 2020-01-01 RX ADMIN — CARVEDILOL 6.25 MG: 6.25 TABLET, FILM COATED ORAL at 17:36

## 2020-01-01 RX ADMIN — THERA TABS 1 TABLET: TAB at 09:44

## 2020-01-01 RX ADMIN — GUAIFENESIN 600 MG: 600 TABLET, EXTENDED RELEASE ORAL at 12:33

## 2020-01-01 RX ADMIN — FOLIC ACID 1 MG: 1 TABLET ORAL at 09:21

## 2020-01-01 RX ADMIN — CARVEDILOL 6.25 MG: 6.25 TABLET, FILM COATED ORAL at 17:54

## 2020-01-01 RX ADMIN — DOCUSATE SODIUM AND SENNOSIDES 1 TABLET: 8.6; 5 TABLET, FILM COATED ORAL at 10:30

## 2020-01-01 RX ADMIN — SEVELAMER CARBONATE 800 MG: 800 TABLET, FILM COATED ORAL at 23:21

## 2020-01-01 RX ADMIN — METHYLPREDNISOLONE SODIUM SUCCINATE 20 MG: 40 INJECTION, POWDER, FOR SOLUTION INTRAMUSCULAR; INTRAVENOUS at 05:39

## 2020-01-01 RX ADMIN — DEXTROSE MONOHYDRATE AND SODIUM CHLORIDE 50 ML/HR: 5; .45 INJECTION, SOLUTION INTRAVENOUS at 20:25

## 2020-01-01 RX ADMIN — NYSTATIN 500000 UNITS: 100000 SUSPENSION ORAL at 14:00

## 2020-01-01 RX ADMIN — SODIUM CHLORIDE 75 ML/HR: 900 INJECTION, SOLUTION INTRAVENOUS at 20:54

## 2020-01-01 RX ADMIN — MULTIPLE VITAMINS W/ MINERALS TAB 1 TABLET: TAB at 09:31

## 2020-01-01 RX ADMIN — Medication 100 MG: at 09:56

## 2020-01-01 RX ADMIN — ARFORMOTEROL TARTRATE 15 MCG: 15 SOLUTION RESPIRATORY (INHALATION) at 19:30

## 2020-01-01 RX ADMIN — ARFORMOTEROL TARTRATE 15 MCG: 15 SOLUTION RESPIRATORY (INHALATION) at 08:41

## 2020-01-01 RX ADMIN — SEVELAMER CARBONATE 800 MG: 800 TABLET, FILM COATED ORAL at 21:38

## 2020-01-01 RX ADMIN — Medication 100 MG: at 09:03

## 2020-01-01 RX ADMIN — CEFEPIME HYDROCHLORIDE 1 G: 1 INJECTION, POWDER, FOR SOLUTION INTRAMUSCULAR; INTRAVENOUS at 17:50

## 2020-01-01 RX ADMIN — Medication 100 MG: at 08:57

## 2020-01-01 RX ADMIN — BENZONATATE 100 MG: 100 CAPSULE ORAL at 05:39

## 2020-01-01 RX ADMIN — METHYLPREDNISOLONE SODIUM SUCCINATE 40 MG: 40 INJECTION, POWDER, FOR SOLUTION INTRAMUSCULAR; INTRAVENOUS at 17:27

## 2020-01-01 RX ADMIN — NYSTATIN 500000 UNITS: 100000 SUSPENSION ORAL at 09:16

## 2020-01-01 RX ADMIN — HEPARIN SODIUM 5000 UNITS: 5000 INJECTION INTRAVENOUS; SUBCUTANEOUS at 05:36

## 2020-01-01 RX ADMIN — METHYLPREDNISOLONE SODIUM SUCCINATE 60 MG: 40 INJECTION, POWDER, FOR SOLUTION INTRAMUSCULAR; INTRAVENOUS at 05:12

## 2020-01-01 RX ADMIN — METRONIDAZOLE 500 MG: 500 INJECTION, SOLUTION INTRAVENOUS at 20:36

## 2020-01-01 RX ADMIN — Medication: at 17:36

## 2020-01-01 RX ADMIN — DILTIAZEM HYDROCHLORIDE 240 MG: 120 CAPSULE, COATED, EXTENDED RELEASE ORAL at 08:58

## 2020-01-01 RX ADMIN — ARFORMOTEROL TARTRATE 15 MCG: 15 SOLUTION RESPIRATORY (INHALATION) at 20:13

## 2020-01-01 RX ADMIN — NEPHROCAP 1 CAPSULE: 1 CAP ORAL at 13:02

## 2020-01-01 RX ADMIN — ACETYLCYSTEINE 200 MG: 200 SOLUTION ORAL; RESPIRATORY (INHALATION) at 08:20

## 2020-01-01 RX ADMIN — Medication: at 18:10

## 2020-01-01 RX ADMIN — ARFORMOTEROL TARTRATE 15 MCG: 15 SOLUTION RESPIRATORY (INHALATION) at 09:00

## 2020-01-01 RX ADMIN — METHYLPREDNISOLONE SODIUM SUCCINATE 125 MG: 125 INJECTION, POWDER, FOR SOLUTION INTRAMUSCULAR; INTRAVENOUS at 08:06

## 2020-01-01 RX ADMIN — HEPARIN SODIUM 5000 UNITS: 5000 INJECTION INTRAVENOUS; SUBCUTANEOUS at 13:14

## 2020-01-01 RX ADMIN — SEVELAMER CARBONATE 800 MG: 800 TABLET, FILM COATED ORAL at 18:00

## 2020-01-01 RX ADMIN — LEVALBUTEROL HYDROCHLORIDE 0.63 MG: 0.63 SOLUTION RESPIRATORY (INHALATION) at 20:23

## 2020-01-01 RX ADMIN — OXYCODONE HYDROCHLORIDE AND ACETAMINOPHEN 1 TABLET: 10; 325 TABLET ORAL at 09:02

## 2020-01-01 RX ADMIN — GUAIFENESIN 200 MG: 200 SOLUTION ORAL at 10:49

## 2020-01-01 RX ADMIN — METRONIDAZOLE 500 MG: 500 INJECTION, SOLUTION INTRAVENOUS at 04:01

## 2020-01-01 RX ADMIN — METRONIDAZOLE 500 MG: 500 INJECTION, SOLUTION INTRAVENOUS at 18:03

## 2020-01-01 RX ADMIN — METHYLPREDNISOLONE SODIUM SUCCINATE 40 MG: 40 INJECTION, POWDER, FOR SOLUTION INTRAMUSCULAR; INTRAVENOUS at 13:52

## 2020-01-01 RX ADMIN — INSULIN LISPRO 3 UNITS: 100 INJECTION, SOLUTION INTRAVENOUS; SUBCUTANEOUS at 21:13

## 2020-01-01 RX ADMIN — METRONIDAZOLE 500 MG: 500 INJECTION, SOLUTION INTRAVENOUS at 22:31

## 2020-01-01 RX ADMIN — METHYLPREDNISOLONE SODIUM SUCCINATE 60 MG: 40 INJECTION, POWDER, FOR SOLUTION INTRAMUSCULAR; INTRAVENOUS at 15:46

## 2020-01-01 RX ADMIN — BUDESONIDE 250 MCG: 0.25 INHALANT RESPIRATORY (INHALATION) at 21:19

## 2020-01-01 RX ADMIN — CEFEPIME HYDROCHLORIDE 1 G: 1 INJECTION, POWDER, FOR SOLUTION INTRAMUSCULAR; INTRAVENOUS at 19:35

## 2020-01-01 RX ADMIN — EPOETIN ALFA-EPBX 4000 UNITS: 4000 INJECTION, SOLUTION INTRAVENOUS; SUBCUTANEOUS at 21:26

## 2020-01-01 RX ADMIN — DOCUSATE SODIUM AND SENNOSIDES 1 TABLET: 8.6; 5 TABLET, FILM COATED ORAL at 10:28

## 2020-01-01 RX ADMIN — HEPARIN SODIUM 5000 UNITS: 5000 INJECTION INTRAVENOUS; SUBCUTANEOUS at 06:12

## 2020-01-01 RX ADMIN — BUDESONIDE 500 MCG: 0.5 SUSPENSION RESPIRATORY (INHALATION) at 07:41

## 2020-01-01 RX ADMIN — FOLIC ACID 1 MG: 1 TABLET ORAL at 08:57

## 2020-01-01 RX ADMIN — METRONIDAZOLE 500 MG: 500 INJECTION, SOLUTION INTRAVENOUS at 21:39

## 2020-01-01 RX ADMIN — IPRATROPIUM BROMIDE AND ALBUTEROL SULFATE 3 ML: .5; 3 SOLUTION RESPIRATORY (INHALATION) at 11:35

## 2020-01-01 RX ADMIN — NYSTATIN 500000 UNITS: 100000 SUSPENSION ORAL at 12:57

## 2020-01-01 RX ADMIN — Medication: at 09:09

## 2020-01-01 RX ADMIN — ARFORMOTEROL TARTRATE 15 MCG: 15 SOLUTION RESPIRATORY (INHALATION) at 07:31

## 2020-01-01 RX ADMIN — NYSTATIN 500000 UNITS: 100000 SUSPENSION ORAL at 17:39

## 2020-01-01 RX ADMIN — METHYLPREDNISOLONE SODIUM SUCCINATE 20 MG: 40 INJECTION, POWDER, FOR SOLUTION INTRAMUSCULAR; INTRAVENOUS at 21:55

## 2020-01-01 RX ADMIN — DILTIAZEM HYDROCHLORIDE 180 MG: 180 CAPSULE, COATED, EXTENDED RELEASE ORAL at 09:03

## 2020-01-01 RX ADMIN — PREDNISONE 40 MG: 20 TABLET ORAL at 12:24

## 2020-01-01 RX ADMIN — NYSTATIN 500000 UNITS: 100000 SUSPENSION ORAL at 08:57

## 2020-01-01 RX ADMIN — METHYLPREDNISOLONE SODIUM SUCCINATE 40 MG: 40 INJECTION, POWDER, FOR SOLUTION INTRAMUSCULAR; INTRAVENOUS at 23:44

## 2020-01-01 RX ADMIN — HEPARIN SODIUM 5000 UNITS: 5000 INJECTION INTRAVENOUS; SUBCUTANEOUS at 06:14

## 2020-01-01 RX ADMIN — PANTOPRAZOLE SODIUM 40 MG: 40 TABLET, DELAYED RELEASE ORAL at 13:58

## 2020-01-01 RX ADMIN — IPRATROPIUM BROMIDE AND ALBUTEROL SULFATE 3 ML: .5; 3 SOLUTION RESPIRATORY (INHALATION) at 14:39

## 2020-01-01 RX ADMIN — ALBUTEROL SULFATE 2.5 MG: 2.5 SOLUTION RESPIRATORY (INHALATION) at 20:28

## 2020-01-01 RX ADMIN — METHYLPREDNISOLONE SODIUM SUCCINATE 60 MG: 40 INJECTION, POWDER, FOR SOLUTION INTRAMUSCULAR; INTRAVENOUS at 05:58

## 2020-01-01 RX ADMIN — CEFEPIME HYDROCHLORIDE 1 G: 1 INJECTION, POWDER, FOR SOLUTION INTRAMUSCULAR; INTRAVENOUS at 17:39

## 2020-01-01 RX ADMIN — OXYCODONE HYDROCHLORIDE AND ACETAMINOPHEN 1 TABLET: 10; 325 TABLET ORAL at 22:29

## 2020-01-01 RX ADMIN — METOPROLOL TARTRATE 2.5 MG: 5 INJECTION INTRAVENOUS at 10:15

## 2020-01-01 RX ADMIN — ARFORMOTEROL TARTRATE 15 MCG: 15 SOLUTION RESPIRATORY (INHALATION) at 20:35

## 2020-01-01 RX ADMIN — EPOETIN ALFA-EPBX 12000 UNITS: 10000 INJECTION, SOLUTION INTRAVENOUS; SUBCUTANEOUS at 21:59

## 2020-01-01 RX ADMIN — INSULIN LISPRO 3 UNITS: 100 INJECTION, SOLUTION INTRAVENOUS; SUBCUTANEOUS at 16:30

## 2020-01-01 RX ADMIN — DILTIAZEM HYDROCHLORIDE 60 MG: 60 TABLET, FILM COATED ORAL at 13:11

## 2020-01-01 RX ADMIN — EPOETIN ALFA-EPBX 12000 UNITS: 10000 INJECTION, SOLUTION INTRAVENOUS; SUBCUTANEOUS at 22:40

## 2020-01-01 RX ADMIN — CASTOR OIL AND BALSAM, PERU: 788; 87 OINTMENT TOPICAL at 19:16

## 2020-01-01 RX ADMIN — INSULIN LISPRO 2 UNITS: 100 INJECTION, SOLUTION INTRAVENOUS; SUBCUTANEOUS at 12:25

## 2020-01-01 RX ADMIN — IPRATROPIUM BROMIDE 0.5 MG: 0.5 SOLUTION RESPIRATORY (INHALATION) at 13:05

## 2020-01-01 RX ADMIN — Medication 100 MG: at 17:59

## 2020-01-01 RX ADMIN — DILTIAZEM HYDROCHLORIDE 240 MG: 240 CAPSULE, COATED, EXTENDED RELEASE ORAL at 09:31

## 2020-01-01 RX ADMIN — EPOETIN ALFA-EPBX 4000 UNITS: 4000 INJECTION, SOLUTION INTRAVENOUS; SUBCUTANEOUS at 21:38

## 2020-01-01 RX ADMIN — BUDESONIDE 500 MCG: 0.5 SUSPENSION RESPIRATORY (INHALATION) at 09:41

## 2020-01-01 RX ADMIN — INSULIN LISPRO 2 UNITS: 100 INJECTION, SOLUTION INTRAVENOUS; SUBCUTANEOUS at 17:28

## 2020-01-01 RX ADMIN — CARVEDILOL 6.25 MG: 6.25 TABLET, FILM COATED ORAL at 18:11

## 2020-01-01 RX ADMIN — VANCOMYCIN HYDROCHLORIDE 500 MG: 500 INJECTION, POWDER, LYOPHILIZED, FOR SOLUTION INTRAVENOUS at 19:10

## 2020-01-01 RX ADMIN — INSULIN LISPRO 2 UNITS: 100 INJECTION, SOLUTION INTRAVENOUS; SUBCUTANEOUS at 16:30

## 2020-01-01 RX ADMIN — ARFORMOTEROL TARTRATE 15 MCG: 15 SOLUTION RESPIRATORY (INHALATION) at 21:19

## 2020-01-01 RX ADMIN — CASTOR OIL AND BALSAM, PERU: 788; 87 OINTMENT TOPICAL at 17:44

## 2020-01-01 RX ADMIN — ACETAMINOPHEN 650 MG: 325 TABLET ORAL at 03:22

## 2020-01-01 RX ADMIN — HEPARIN SODIUM 5000 UNITS: 5000 INJECTION INTRAVENOUS; SUBCUTANEOUS at 17:53

## 2020-01-01 RX ADMIN — DILTIAZEM HYDROCHLORIDE 30 MG: 30 TABLET, FILM COATED ORAL at 10:58

## 2020-01-01 RX ADMIN — IPRATROPIUM BROMIDE AND ALBUTEROL SULFATE 3 ML: .5; 3 SOLUTION RESPIRATORY (INHALATION) at 04:39

## 2020-01-01 RX ADMIN — CARVEDILOL 12.5 MG: 12.5 TABLET, FILM COATED ORAL at 10:55

## 2020-01-01 RX ADMIN — IPRATROPIUM BROMIDE 0.5 MG: 0.5 SOLUTION RESPIRATORY (INHALATION) at 19:45

## 2020-01-01 RX ADMIN — DILTIAZEM HYDROCHLORIDE 240 MG: 240 CAPSULE, COATED, EXTENDED RELEASE ORAL at 15:20

## 2020-01-01 RX ADMIN — HYDROCODONE BITARTRATE AND ACETAMINOPHEN 1 TABLET: 10; 325 TABLET ORAL at 23:08

## 2020-01-01 RX ADMIN — Medication 10 ML: at 21:02

## 2020-01-01 RX ADMIN — Medication 100 MG: at 10:30

## 2020-01-01 RX ADMIN — IPRATROPIUM BROMIDE 0.5 MG: 0.5 SOLUTION RESPIRATORY (INHALATION) at 20:11

## 2020-01-01 RX ADMIN — ALBUMIN (HUMAN) 25 G: 0.25 INJECTION, SOLUTION INTRAVENOUS at 13:35

## 2020-01-01 RX ADMIN — HEPARIN SODIUM 5000 UNITS: 5000 INJECTION INTRAVENOUS; SUBCUTANEOUS at 17:28

## 2020-01-01 RX ADMIN — ACETYLCYSTEINE 200 MG: 200 SOLUTION ORAL; RESPIRATORY (INHALATION) at 20:22

## 2020-01-01 RX ADMIN — SODIUM CHLORIDE 75 ML/HR: 900 INJECTION, SOLUTION INTRAVENOUS at 06:01

## 2020-01-01 RX ADMIN — ARFORMOTEROL TARTRATE 15 MCG: 15 SOLUTION RESPIRATORY (INHALATION) at 09:41

## 2020-01-01 RX ADMIN — Medication 150 MCG/HR: at 03:16

## 2020-01-01 RX ADMIN — CEFEPIME HYDROCHLORIDE 1 G: 1 INJECTION, POWDER, FOR SOLUTION INTRAMUSCULAR; INTRAVENOUS at 17:46

## 2020-01-01 RX ADMIN — INSULIN LISPRO 3 UNITS: 100 INJECTION, SOLUTION INTRAVENOUS; SUBCUTANEOUS at 13:11

## 2020-01-01 RX ADMIN — INSULIN LISPRO 3 UNITS: 100 INJECTION, SOLUTION INTRAVENOUS; SUBCUTANEOUS at 17:40

## 2020-01-01 RX ADMIN — INSULIN LISPRO 7 UNITS: 100 INJECTION, SOLUTION INTRAVENOUS; SUBCUTANEOUS at 17:14

## 2020-01-01 RX ADMIN — CARVEDILOL 6.25 MG: 6.25 TABLET, FILM COATED ORAL at 09:31

## 2020-01-01 RX ADMIN — CARVEDILOL 12.5 MG: 12.5 TABLET, FILM COATED ORAL at 18:24

## 2020-01-01 RX ADMIN — Medication 10 ML: at 05:26

## 2020-01-01 RX ADMIN — FOLIC ACID 1 MG: 1 TABLET ORAL at 10:54

## 2020-01-01 RX ADMIN — Medication 100 MG: at 09:07

## 2020-01-01 RX ADMIN — Medication 10 ML: at 06:14

## 2020-01-01 RX ADMIN — OXYCODONE HYDROCHLORIDE 10 MG: 5 TABLET ORAL at 02:32

## 2020-01-01 RX ADMIN — BUDESONIDE 500 MCG: 0.5 SUSPENSION RESPIRATORY (INHALATION) at 21:04

## 2020-01-01 RX ADMIN — NYSTATIN 500000 UNITS: 100000 SUSPENSION ORAL at 12:31

## 2020-01-01 RX ADMIN — LEVALBUTEROL HYDROCHLORIDE 0.63 MG: 0.63 SOLUTION RESPIRATORY (INHALATION) at 09:12

## 2020-01-01 RX ADMIN — PANTOPRAZOLE SODIUM 40 MG: 40 TABLET, DELAYED RELEASE ORAL at 09:36

## 2020-01-01 RX ADMIN — CASTOR OIL AND BALSAM, PERU: 788; 87 OINTMENT TOPICAL at 18:30

## 2020-01-01 RX ADMIN — THERA TABS 1 TABLET: TAB at 10:28

## 2020-01-01 RX ADMIN — HYDROCODONE BITARTRATE AND ACETAMINOPHEN 1 TABLET: 10; 325 TABLET ORAL at 10:10

## 2020-01-01 RX ADMIN — BUDESONIDE 250 MCG: 0.25 INHALANT RESPIRATORY (INHALATION) at 20:37

## 2020-01-01 RX ADMIN — HYDROCODONE BITARTRATE AND ACETAMINOPHEN 1 TABLET: 10; 325 TABLET ORAL at 10:04

## 2020-01-01 RX ADMIN — BUDESONIDE 500 MCG: 0.5 SUSPENSION RESPIRATORY (INHALATION) at 19:44

## 2020-01-01 RX ADMIN — Medication 10 ML: at 00:15

## 2020-01-01 RX ADMIN — HEPARIN SODIUM 5000 UNITS: 5000 INJECTION INTRAVENOUS; SUBCUTANEOUS at 22:03

## 2020-01-01 RX ADMIN — NYSTATIN 500000 UNITS: 100000 SUSPENSION ORAL at 08:56

## 2020-01-01 RX ADMIN — NYSTATIN 500000 UNITS: 100000 SUSPENSION ORAL at 22:03

## 2020-01-01 RX ADMIN — ALBUTEROL SULFATE 2.5 MG: 2.5 SOLUTION RESPIRATORY (INHALATION) at 15:50

## 2020-01-01 RX ADMIN — ALBUMIN (HUMAN) 25 G: 0.25 INJECTION, SOLUTION INTRAVENOUS at 07:19

## 2020-01-01 RX ADMIN — LEVALBUTEROL HYDROCHLORIDE 0.63 MG: 0.63 SOLUTION RESPIRATORY (INHALATION) at 14:01

## 2020-01-01 RX ADMIN — NYSTATIN 500000 UNITS: 100000 SUSPENSION ORAL at 09:22

## 2020-01-01 RX ADMIN — ACETYLCYSTEINE 200 MG: 200 SOLUTION ORAL; RESPIRATORY (INHALATION) at 08:45

## 2020-01-01 RX ADMIN — HEPARIN SODIUM 5000 UNITS: 5000 INJECTION INTRAVENOUS; SUBCUTANEOUS at 21:44

## 2020-01-01 RX ADMIN — NYSTATIN 500000 UNITS: 100000 SUSPENSION ORAL at 13:24

## 2020-01-01 RX ADMIN — GUAIFENESIN 600 MG: 600 TABLET, EXTENDED RELEASE ORAL at 22:31

## 2020-01-01 RX ADMIN — HYDROCODONE BITARTRATE AND ACETAMINOPHEN 1 TABLET: 10; 325 TABLET ORAL at 17:35

## 2020-01-01 RX ADMIN — DEXTROSE MONOHYDRATE 25 G: 25 INJECTION, SOLUTION INTRAVENOUS at 09:06

## 2020-01-01 RX ADMIN — OXYCODONE HYDROCHLORIDE AND ACETAMINOPHEN 1 TABLET: 10; 325 TABLET ORAL at 09:08

## 2020-01-01 RX ADMIN — IOPAMIDOL 100 ML: 755 INJECTION, SOLUTION INTRAVENOUS at 17:49

## 2020-01-01 RX ADMIN — FENTANYL CITRATE 25 MCG: 50 INJECTION, SOLUTION INTRAMUSCULAR; INTRAVENOUS at 02:37

## 2020-01-01 RX ADMIN — Medication 10 ML: at 15:50

## 2020-01-01 RX ADMIN — CEFEPIME HYDROCHLORIDE 1 G: 1 INJECTION, POWDER, FOR SOLUTION INTRAMUSCULAR; INTRAVENOUS at 18:51

## 2020-01-01 RX ADMIN — NYSTATIN 500000 UNITS: 100000 SUSPENSION ORAL at 17:50

## 2020-01-01 RX ADMIN — DILTIAZEM HYDROCHLORIDE 30 MG: 30 TABLET, FILM COATED ORAL at 16:18

## 2020-01-01 RX ADMIN — SEVELAMER CARBONATE 1600 MG: 800 TABLET, FILM COATED ORAL at 22:31

## 2020-01-01 RX ADMIN — DOCUSATE SODIUM AND SENNOSIDES 1 TABLET: 8.6; 5 TABLET, FILM COATED ORAL at 10:06

## 2020-01-01 RX ADMIN — BUDESONIDE 250 MCG: 0.25 INHALANT RESPIRATORY (INHALATION) at 08:25

## 2020-01-01 RX ADMIN — HYDROMORPHONE HYDROCHLORIDE 0.5 MG: 1 INJECTION, SOLUTION INTRAMUSCULAR; INTRAVENOUS; SUBCUTANEOUS at 00:26

## 2020-01-01 RX ADMIN — BUDESONIDE 250 MCG: 0.25 INHALANT RESPIRATORY (INHALATION) at 20:22

## 2020-01-01 RX ADMIN — INSULIN LISPRO 2 UNITS: 100 INJECTION, SOLUTION INTRAVENOUS; SUBCUTANEOUS at 13:56

## 2020-01-01 RX ADMIN — INSULIN LISPRO 2 UNITS: 100 INJECTION, SOLUTION INTRAVENOUS; SUBCUTANEOUS at 22:21

## 2020-01-01 RX ADMIN — DEXTROSE MONOHYDRATE 25 G: 25 INJECTION, SOLUTION INTRAVENOUS at 10:13

## 2020-01-01 RX ADMIN — DEXTROSE MONOHYDRATE 5 MG/HR: 50 INJECTION, SOLUTION INTRAVENOUS at 19:58

## 2020-01-01 RX ADMIN — BUDESONIDE 500 MCG: 0.5 SUSPENSION RESPIRATORY (INHALATION) at 07:48

## 2020-01-01 RX ADMIN — HEPARIN SODIUM 5000 UNITS: 5000 INJECTION INTRAVENOUS; SUBCUTANEOUS at 06:09

## 2020-01-01 RX ADMIN — SEVELAMER CARBONATE 1600 MG: 800 TABLET, FILM COATED ORAL at 08:57

## 2020-01-01 RX ADMIN — BUDESONIDE 250 MCG: 0.25 INHALANT RESPIRATORY (INHALATION) at 09:12

## 2020-01-01 RX ADMIN — BUDESONIDE 500 MCG: 0.5 SUSPENSION RESPIRATORY (INHALATION) at 23:22

## 2020-01-01 RX ADMIN — SEVELAMER CARBONATE 800 MG: 800 TABLET, FILM COATED ORAL at 10:28

## 2020-01-01 RX ADMIN — LIDOCAINE HYDROCHLORIDE 10 ML: 10 INJECTION, SOLUTION EPIDURAL; INFILTRATION; INTRACAUDAL; PERINEURAL at 18:00

## 2020-01-01 RX ADMIN — ACETAMINOPHEN 650 MG: 325 TABLET ORAL at 05:39

## 2020-01-01 RX ADMIN — INSULIN LISPRO 2 UNITS: 100 INJECTION, SOLUTION INTRAVENOUS; SUBCUTANEOUS at 21:47

## 2020-01-01 RX ADMIN — METHYLPREDNISOLONE SODIUM SUCCINATE 30 MG: 40 INJECTION, POWDER, FOR SOLUTION INTRAMUSCULAR; INTRAVENOUS at 05:17

## 2020-01-01 RX ADMIN — NYSTATIN 500000 UNITS: 100000 SUSPENSION ORAL at 12:41

## 2020-01-01 RX ADMIN — HEPARIN SODIUM 5000 UNITS: 5000 INJECTION INTRAVENOUS; SUBCUTANEOUS at 16:57

## 2020-01-01 RX ADMIN — METRONIDAZOLE 500 MG: 500 INJECTION, SOLUTION INTRAVENOUS at 13:18

## 2020-01-01 RX ADMIN — DEXTROSE MONOHYDRATE 15 MG/HR: 50 INJECTION, SOLUTION INTRAVENOUS at 11:30

## 2020-01-01 RX ADMIN — MULTIPLE VITAMINS W/ MINERALS TAB 1 TABLET: TAB at 08:57

## 2020-01-01 RX ADMIN — ACETYLCYSTEINE 200 MG: 200 SOLUTION ORAL; RESPIRATORY (INHALATION) at 15:22

## 2020-01-01 RX ADMIN — IPRATROPIUM BROMIDE AND ALBUTEROL SULFATE 3 ML: .5; 3 SOLUTION RESPIRATORY (INHALATION) at 16:44

## 2020-01-01 RX ADMIN — THERA TABS 1 TABLET: TAB at 09:32

## 2020-01-01 RX ADMIN — Medication 10 ML: at 05:17

## 2020-01-01 RX ADMIN — ARFORMOTEROL TARTRATE 15 MCG: 15 SOLUTION RESPIRATORY (INHALATION) at 19:43

## 2020-01-01 RX ADMIN — NYSTATIN 500000 UNITS: 100000 SUSPENSION ORAL at 22:31

## 2020-01-01 RX ADMIN — IPRATROPIUM BROMIDE 0.5 MG: 0.5 SOLUTION RESPIRATORY (INHALATION) at 13:18

## 2020-01-01 RX ADMIN — CARVEDILOL 6.25 MG: 6.25 TABLET, FILM COATED ORAL at 17:33

## 2020-01-01 RX ADMIN — IPRATROPIUM BROMIDE AND ALBUTEROL SULFATE 3 ML: .5; 3 SOLUTION RESPIRATORY (INHALATION) at 15:26

## 2020-01-01 RX ADMIN — GUAIFENESIN 600 MG: 600 TABLET, EXTENDED RELEASE ORAL at 21:25

## 2020-01-01 RX ADMIN — OXYCODONE HYDROCHLORIDE AND ACETAMINOPHEN 1 TABLET: 10; 325 TABLET ORAL at 21:44

## 2020-01-01 RX ADMIN — DILTIAZEM HYDROCHLORIDE 240 MG: 240 CAPSULE, COATED, EXTENDED RELEASE ORAL at 13:58

## 2020-01-01 RX ADMIN — METHYLPREDNISOLONE SODIUM SUCCINATE 40 MG: 40 INJECTION, POWDER, FOR SOLUTION INTRAMUSCULAR; INTRAVENOUS at 07:15

## 2020-01-01 RX ADMIN — BUDESONIDE 250 MCG: 0.25 INHALANT RESPIRATORY (INHALATION) at 11:35

## 2020-01-01 RX ADMIN — GUAIFENESIN 600 MG: 600 TABLET, EXTENDED RELEASE ORAL at 09:07

## 2020-01-01 RX ADMIN — OXYCODONE AND ACETAMINOPHEN 2 TABLET: 5; 325 TABLET ORAL at 22:57

## 2020-01-01 RX ADMIN — PREDNISONE 10 MG: 10 TABLET ORAL at 10:05

## 2020-01-01 RX ADMIN — INSULIN LISPRO 1 UNITS: 100 INJECTION, SOLUTION INTRAVENOUS; SUBCUTANEOUS at 21:56

## 2020-01-01 RX ADMIN — NEPHROCAP 1 CAPSULE: 1 CAP ORAL at 08:57

## 2020-01-01 RX ADMIN — FENTANYL CITRATE 25 MCG: 50 INJECTION, SOLUTION INTRAMUSCULAR; INTRAVENOUS at 21:01

## 2020-01-01 RX ADMIN — SEVELAMER CARBONATE 800 MG: 800 TABLET, FILM COATED ORAL at 18:07

## 2020-01-01 RX ADMIN — BUDESONIDE 250 MCG: 0.25 INHALANT RESPIRATORY (INHALATION) at 08:30

## 2020-01-01 RX ADMIN — Medication: at 17:43

## 2020-01-01 RX ADMIN — FENTANYL CITRATE 25 MCG: 50 INJECTION, SOLUTION INTRAMUSCULAR; INTRAVENOUS at 10:26

## 2020-01-01 RX ADMIN — THERA TABS 1 TABLET: TAB at 12:56

## 2020-01-01 RX ADMIN — CASTOR OIL AND BALSAM, PERU: 788; 87 OINTMENT TOPICAL at 12:34

## 2020-01-01 RX ADMIN — NOREPINEPHRINE BITARTRATE 4 MCG/MIN: 1 INJECTION, SOLUTION, CONCENTRATE INTRAVENOUS at 16:38

## 2020-01-01 RX ADMIN — Medication 10 ML: at 05:02

## 2020-01-01 RX ADMIN — FOLIC ACID 1 MG: 1 TABLET ORAL at 09:03

## 2020-01-01 RX ADMIN — ARFORMOTEROL TARTRATE 15 MCG: 15 SOLUTION RESPIRATORY (INHALATION) at 20:33

## 2020-01-01 RX ADMIN — Medication 10 ML: at 13:15

## 2020-01-01 RX ADMIN — DEXTROSE MONOHYDRATE 25 G: 25 INJECTION, SOLUTION INTRAVENOUS at 12:44

## 2020-01-01 RX ADMIN — BUDESONIDE 500 MCG: 0.5 SUSPENSION RESPIRATORY (INHALATION) at 08:41

## 2020-01-01 RX ADMIN — SEVELAMER CARBONATE 800 MG: 800 TABLET, FILM COATED ORAL at 22:31

## 2020-01-01 RX ADMIN — HUMAN INSULIN 25 UNITS: 100 INJECTION, SUSPENSION SUBCUTANEOUS at 12:56

## 2020-01-01 RX ADMIN — ARFORMOTEROL TARTRATE 15 MCG: 15 SOLUTION RESPIRATORY (INHALATION) at 20:50

## 2020-01-01 RX ADMIN — PREDNISONE 40 MG: 20 TABLET ORAL at 08:58

## 2020-01-01 RX ADMIN — IRON SUCROSE 100 MG: 20 INJECTION, SOLUTION INTRAVENOUS at 10:30

## 2020-01-01 RX ADMIN — HUMAN INSULIN 15 UNITS: 100 INJECTION, SUSPENSION SUBCUTANEOUS at 10:28

## 2020-01-01 RX ADMIN — Medication 10 ML: at 21:34

## 2020-01-01 RX ADMIN — ARFORMOTEROL TARTRATE 15 MCG: 15 SOLUTION RESPIRATORY (INHALATION) at 19:14

## 2020-01-01 RX ADMIN — ACETAMINOPHEN 650 MG: 325 TABLET ORAL at 21:31

## 2020-01-01 RX ADMIN — CASTOR OIL AND BALSAM, PERU: 788; 87 OINTMENT TOPICAL at 09:36

## 2020-01-01 RX ADMIN — HEPARIN SODIUM 5000 UNITS: 5000 INJECTION INTRAVENOUS; SUBCUTANEOUS at 04:54

## 2020-01-01 RX ADMIN — ACETYLCYSTEINE 200 MG: 200 SOLUTION ORAL; RESPIRATORY (INHALATION) at 15:50

## 2020-01-01 RX ADMIN — INSULIN LISPRO 3 UNITS: 100 INJECTION, SOLUTION INTRAVENOUS; SUBCUTANEOUS at 09:01

## 2020-01-01 RX ADMIN — CASTOR OIL AND BALSAM, PERU: 788; 87 OINTMENT TOPICAL at 17:33

## 2020-01-01 RX ADMIN — PANTOPRAZOLE SODIUM 40 MG: 40 TABLET, DELAYED RELEASE ORAL at 11:19

## 2020-01-01 RX ADMIN — METHYLPREDNISOLONE SODIUM SUCCINATE 20 MG: 40 INJECTION, POWDER, FOR SOLUTION INTRAMUSCULAR; INTRAVENOUS at 06:08

## 2020-01-01 RX ADMIN — OXYCODONE HYDROCHLORIDE AND ACETAMINOPHEN 1 TABLET: 10; 325 TABLET ORAL at 21:37

## 2020-01-01 RX ADMIN — ACETYLCYSTEINE 200 MG: 200 SOLUTION ORAL; RESPIRATORY (INHALATION) at 13:18

## 2020-01-01 RX ADMIN — DEXTROSE MONOHYDRATE AND SODIUM CHLORIDE 150 ML/HR: 5; .9 INJECTION, SOLUTION INTRAVENOUS at 10:36

## 2020-01-01 RX ADMIN — PREDNISONE 30 MG: 10 TABLET ORAL at 09:57

## 2020-01-01 RX ADMIN — GABAPENTIN 300 MG: 300 CAPSULE ORAL at 23:01

## 2020-01-01 RX ADMIN — ARFORMOTEROL TARTRATE 15 MCG: 15 SOLUTION RESPIRATORY (INHALATION) at 09:13

## 2020-01-01 RX ADMIN — INSULIN LISPRO 1 UNITS: 100 INJECTION, SOLUTION INTRAVENOUS; SUBCUTANEOUS at 22:20

## 2020-01-01 RX ADMIN — SULFAMETHOXAZOLE AND TRIMETHOPRIM 1 TABLET: 400; 80 TABLET ORAL at 08:58

## 2020-01-01 RX ADMIN — HEPARIN SODIUM 5000 UNITS: 5000 INJECTION INTRAVENOUS; SUBCUTANEOUS at 15:45

## 2020-01-01 RX ADMIN — GUAIFENESIN 600 MG: 600 TABLET, EXTENDED RELEASE ORAL at 13:14

## 2020-01-01 RX ADMIN — ARFORMOTEROL TARTRATE 15 MCG: 15 SOLUTION RESPIRATORY (INHALATION) at 07:48

## 2020-01-01 RX ADMIN — BUDESONIDE 500 MCG: 0.5 SUSPENSION RESPIRATORY (INHALATION) at 20:28

## 2020-01-01 RX ADMIN — PANTOPRAZOLE SODIUM 40 MG: 40 TABLET, DELAYED RELEASE ORAL at 10:30

## 2020-01-01 RX ADMIN — OXYCODONE 5 MG: 5 TABLET ORAL at 00:08

## 2020-01-01 RX ADMIN — Medication 10 ML: at 02:26

## 2020-01-01 RX ADMIN — ALBUTEROL SULFATE 2.5 MG: 2.5 SOLUTION RESPIRATORY (INHALATION) at 15:04

## 2020-01-01 RX ADMIN — PREDNISONE 30 MG: 10 TABLET ORAL at 09:21

## 2020-01-01 RX ADMIN — MULTIPLE VITAMINS W/ MINERALS TAB 1 TABLET: TAB at 09:03

## 2020-01-01 RX ADMIN — ALBUMIN (HUMAN) 25 G: 0.25 INJECTION, SOLUTION INTRAVENOUS at 17:56

## 2020-01-01 RX ADMIN — IPRATROPIUM BROMIDE AND ALBUTEROL SULFATE 3 ML: .5; 3 SOLUTION RESPIRATORY (INHALATION) at 19:23

## 2020-01-01 RX ADMIN — CEFEPIME HYDROCHLORIDE 1 G: 1 INJECTION, POWDER, FOR SOLUTION INTRAMUSCULAR; INTRAVENOUS at 17:42

## 2020-01-01 RX ADMIN — AZITHROMYCIN MONOHYDRATE 500 MG: 500 INJECTION, POWDER, LYOPHILIZED, FOR SOLUTION INTRAVENOUS at 13:26

## 2020-01-01 RX ADMIN — NYSTATIN 500000 UNITS: 100000 SUSPENSION ORAL at 18:49

## 2020-01-01 RX ADMIN — METHYLPREDNISOLONE SODIUM SUCCINATE 40 MG: 40 INJECTION, POWDER, FOR SOLUTION INTRAMUSCULAR; INTRAVENOUS at 22:58

## 2020-01-01 RX ADMIN — NYSTATIN 500000 UNITS: 100000 SUSPENSION ORAL at 21:38

## 2020-01-01 RX ADMIN — ARFORMOTEROL TARTRATE 15 MCG: 15 SOLUTION RESPIRATORY (INHALATION) at 08:30

## 2020-01-01 RX ADMIN — NEPHROCAP 1 CAPSULE: 1 CAP ORAL at 12:18

## 2020-01-01 RX ADMIN — GUAIFENESIN 600 MG: 600 TABLET, EXTENDED RELEASE ORAL at 22:23

## 2020-01-01 RX ADMIN — PHENOBARBITAL SODIUM 65 MG: 65 INJECTION INTRAMUSCULAR at 09:44

## 2020-01-01 RX ADMIN — SIMETHICONE 80 MG: 80 TABLET, CHEWABLE ORAL at 23:04

## 2020-01-01 RX ADMIN — ACETAMINOPHEN 650 MG: 325 TABLET ORAL at 19:09

## 2020-01-01 RX ADMIN — GUAIFENESIN 600 MG: 600 TABLET, EXTENDED RELEASE ORAL at 21:52

## 2020-01-01 RX ADMIN — CARVEDILOL 25 MG: 12.5 TABLET, FILM COATED ORAL at 17:14

## 2020-01-01 RX ADMIN — DOCUSATE SODIUM AND SENNOSIDES 1 TABLET: 8.6; 5 TABLET, FILM COATED ORAL at 10:41

## 2020-01-01 RX ADMIN — INSULIN LISPRO 2 UNITS: 100 INJECTION, SOLUTION INTRAVENOUS; SUBCUTANEOUS at 22:31

## 2020-01-01 RX ADMIN — THERA TABS 1 TABLET: TAB at 11:19

## 2020-01-01 RX ADMIN — NOREPINEPHRINE BITARTRATE 30 MCG/MIN: 1 INJECTION, SOLUTION, CONCENTRATE INTRAVENOUS at 08:25

## 2020-01-01 RX ADMIN — OXYCODONE HYDROCHLORIDE AND ACETAMINOPHEN 1 TABLET: 10; 325 TABLET ORAL at 21:28

## 2020-01-01 RX ADMIN — INSULIN LISPRO 8 UNITS: 100 INJECTION, SOLUTION INTRAVENOUS; SUBCUTANEOUS at 09:43

## 2020-01-01 RX ADMIN — ACETYLCYSTEINE 200 MG: 200 SOLUTION ORAL; RESPIRATORY (INHALATION) at 14:05

## 2020-01-01 RX ADMIN — INSULIN LISPRO 2 UNITS: 100 INJECTION, SOLUTION INTRAVENOUS; SUBCUTANEOUS at 17:53

## 2020-01-01 RX ADMIN — CEFEPIME HYDROCHLORIDE 1 G: 1 INJECTION, POWDER, FOR SOLUTION INTRAMUSCULAR; INTRAVENOUS at 17:00

## 2020-01-01 RX ADMIN — NYSTATIN 500000 UNITS: 100000 SUSPENSION ORAL at 13:12

## 2020-01-01 RX ADMIN — INSULIN LISPRO 9 UNITS: 100 INJECTION, SOLUTION INTRAVENOUS; SUBCUTANEOUS at 16:54

## 2020-01-01 RX ADMIN — SEVELAMER CARBONATE 1600 MG: 800 TABLET, FILM COATED ORAL at 08:58

## 2020-01-01 RX ADMIN — SEVELAMER CARBONATE 800 MG: 800 TABLET, FILM COATED ORAL at 09:02

## 2020-01-01 RX ADMIN — SEVELAMER CARBONATE 800 MG: 800 TABLET, FILM COATED ORAL at 16:54

## 2020-01-01 RX ADMIN — ARFORMOTEROL TARTRATE 15 MCG: 15 SOLUTION RESPIRATORY (INHALATION) at 08:55

## 2020-01-01 RX ADMIN — Medication 100 MG: at 10:54

## 2020-01-01 RX ADMIN — GUAIFENESIN 600 MG: 600 TABLET, EXTENDED RELEASE ORAL at 21:41

## 2020-01-01 RX ADMIN — NYSTATIN 500000 UNITS: 100000 SUSPENSION ORAL at 12:43

## 2020-01-01 RX ADMIN — CARVEDILOL 6.25 MG: 6.25 TABLET, FILM COATED ORAL at 08:58

## 2020-01-01 RX ADMIN — CASTOR OIL AND BALSAM, PERU: 788; 87 OINTMENT TOPICAL at 08:19

## 2020-01-01 RX ADMIN — HYDROCODONE BITARTRATE AND ACETAMINOPHEN 1 TABLET: 10; 325 TABLET ORAL at 13:58

## 2020-01-01 RX ADMIN — FOLIC ACID 1 MG: 1 TABLET ORAL at 10:29

## 2020-01-01 RX ADMIN — DEXTROSE MONOHYDRATE 335.52 MG: 50 INJECTION, SOLUTION INTRAVENOUS at 13:27

## 2020-01-01 RX ADMIN — ACETYLCYSTEINE 200 MG: 200 SOLUTION ORAL; RESPIRATORY (INHALATION) at 07:21

## 2020-01-01 RX ADMIN — EPOETIN ALFA-EPBX 4000 UNITS: 4000 INJECTION, SOLUTION INTRAVENOUS; SUBCUTANEOUS at 21:43

## 2020-01-01 RX ADMIN — METOPROLOL TARTRATE 2.5 MG: 1 INJECTION, SOLUTION INTRAVENOUS at 12:12

## 2020-01-01 RX ADMIN — INSULIN LISPRO 4 UNITS: 100 INJECTION, SOLUTION INTRAVENOUS; SUBCUTANEOUS at 12:55

## 2020-01-01 RX ADMIN — NYSTATIN 500000 UNITS: 100000 SUSPENSION ORAL at 09:31

## 2020-01-01 RX ADMIN — IPRATROPIUM BROMIDE AND ALBUTEROL SULFATE 3 ML: .5; 3 SOLUTION RESPIRATORY (INHALATION) at 08:39

## 2020-01-01 RX ADMIN — CEFEPIME HYDROCHLORIDE 1 G: 1 INJECTION, POWDER, FOR SOLUTION INTRAMUSCULAR; INTRAVENOUS at 18:14

## 2020-01-01 RX ADMIN — Medication 10 ML: at 14:00

## 2020-01-01 RX ADMIN — NYSTATIN 500000 UNITS: 100000 SUSPENSION ORAL at 21:31

## 2020-01-01 RX ADMIN — NYSTATIN 500000 UNITS: 100000 SUSPENSION ORAL at 17:26

## 2020-01-01 RX ADMIN — BUDESONIDE 500 MCG: 0.5 SUSPENSION RESPIRATORY (INHALATION) at 07:34

## 2020-01-01 RX ADMIN — THERA TABS 1 TABLET: TAB at 09:31

## 2020-01-01 RX ADMIN — CASTOR OIL AND BALSAM, PERU: 788; 87 OINTMENT TOPICAL at 09:45

## 2020-01-01 RX ADMIN — METHYLPREDNISOLONE SODIUM SUCCINATE 40 MG: 40 INJECTION, POWDER, FOR SOLUTION INTRAMUSCULAR; INTRAVENOUS at 17:59

## 2020-01-01 RX ADMIN — HEPARIN SODIUM 5000 UNITS: 5000 INJECTION INTRAVENOUS; SUBCUTANEOUS at 17:33

## 2020-01-01 RX ADMIN — BUDESONIDE 500 MCG: 0.5 SUSPENSION RESPIRATORY (INHALATION) at 07:32

## 2020-01-01 RX ADMIN — HEPARIN SODIUM 5000 UNITS: 5000 INJECTION INTRAVENOUS; SUBCUTANEOUS at 18:01

## 2020-01-01 RX ADMIN — SEVELAMER CARBONATE 1600 MG: 800 TABLET, FILM COATED ORAL at 18:12

## 2020-01-01 RX ADMIN — MULTIPLE VITAMINS W/ MINERALS TAB 1 TABLET: TAB at 09:21

## 2020-01-01 RX ADMIN — CEFUROXIME AXETIL 500 MG: 250 TABLET ORAL at 16:58

## 2020-01-01 RX ADMIN — CEFEPIME HYDROCHLORIDE 1 G: 1 INJECTION, POWDER, FOR SOLUTION INTRAMUSCULAR; INTRAVENOUS at 16:54

## 2020-01-01 RX ADMIN — METHYLPREDNISOLONE SODIUM SUCCINATE 40 MG: 40 INJECTION, POWDER, FOR SOLUTION INTRAMUSCULAR; INTRAVENOUS at 12:53

## 2020-01-01 RX ADMIN — HEPARIN SODIUM 5000 UNITS: 5000 INJECTION INTRAVENOUS; SUBCUTANEOUS at 23:01

## 2020-01-01 RX ADMIN — DOCUSATE SODIUM AND SENNOSIDES 1 TABLET: 8.6; 5 TABLET, FILM COATED ORAL at 09:44

## 2020-01-01 RX ADMIN — NYSTATIN 500000 UNITS: 100000 SUSPENSION ORAL at 12:24

## 2020-01-01 RX ADMIN — PANTOPRAZOLE SODIUM 40 MG: 40 TABLET, DELAYED RELEASE ORAL at 09:21

## 2020-01-01 RX ADMIN — BUDESONIDE 500 MCG: 0.5 SUSPENSION RESPIRATORY (INHALATION) at 19:51

## 2020-01-01 RX ADMIN — PREDNISONE 40 MG: 20 TABLET ORAL at 16:59

## 2020-01-01 RX ADMIN — NYSTATIN 500000 UNITS: 100000 SUSPENSION ORAL at 08:59

## 2020-01-01 RX ADMIN — HEPARIN SODIUM 5000 UNITS: 5000 INJECTION INTRAVENOUS; SUBCUTANEOUS at 05:49

## 2020-01-01 RX ADMIN — METHYLPREDNISOLONE SODIUM SUCCINATE 40 MG: 40 INJECTION, POWDER, FOR SOLUTION INTRAMUSCULAR; INTRAVENOUS at 00:08

## 2020-01-01 RX ADMIN — PHENOBARBITAL SODIUM 65 MG: 65 INJECTION INTRAMUSCULAR at 06:41

## 2020-01-01 RX ADMIN — CASTOR OIL AND BALSAM, PERU: 788; 87 OINTMENT TOPICAL at 10:07

## 2020-01-01 RX ADMIN — OXYCODONE HYDROCHLORIDE AND ACETAMINOPHEN 1 TABLET: 10; 325 TABLET ORAL at 17:49

## 2020-01-01 RX ADMIN — GUAIFENESIN 600 MG: 600 TABLET, EXTENDED RELEASE ORAL at 21:57

## 2020-01-01 RX ADMIN — Medication: at 17:52

## 2020-01-01 RX ADMIN — HYDROCODONE BITARTRATE AND ACETAMINOPHEN 1 TABLET: 10; 325 TABLET ORAL at 18:28

## 2020-01-01 RX ADMIN — INSULIN LISPRO 1 UNITS: 100 INJECTION, SOLUTION INTRAVENOUS; SUBCUTANEOUS at 21:32

## 2020-01-01 RX ADMIN — GUAIFENESIN 600 MG: 600 TABLET, EXTENDED RELEASE ORAL at 09:38

## 2020-01-01 RX ADMIN — LIDOCAINE HYDROCHLORIDE 10 ML: 10 INJECTION, SOLUTION EPIDURAL; INFILTRATION; INTRACAUDAL; PERINEURAL at 12:00

## 2020-01-01 RX ADMIN — CALCIUM GLUCONATE 2 G: 98 INJECTION, SOLUTION INTRAVENOUS at 08:58

## 2020-01-01 RX ADMIN — PIPERACILLIN AND TAZOBACTAM 3.38 G: 3; .375 INJECTION, POWDER, LYOPHILIZED, FOR SOLUTION INTRAVENOUS at 16:39

## 2020-01-01 RX ADMIN — OXYCODONE HYDROCHLORIDE AND ACETAMINOPHEN 1 TABLET: 10; 325 TABLET ORAL at 02:24

## 2020-01-01 RX ADMIN — HYDROXYZINE HYDROCHLORIDE 25 MG: 25 INJECTION, SOLUTION INTRAMUSCULAR at 04:26

## 2020-01-01 RX ADMIN — BUDESONIDE 250 MCG: 0.25 INHALANT RESPIRATORY (INHALATION) at 01:20

## 2020-01-01 RX ADMIN — NYSTATIN 500000 UNITS: 100000 SUSPENSION ORAL at 21:33

## 2020-01-01 RX ADMIN — INSULIN LISPRO 2 UNITS: 100 INJECTION, SOLUTION INTRAVENOUS; SUBCUTANEOUS at 07:13

## 2020-01-01 RX ADMIN — NYSTATIN 500000 UNITS: 100000 SUSPENSION ORAL at 11:19

## 2020-01-01 RX ADMIN — ARFORMOTEROL TARTRATE 15 MCG: 15 SOLUTION RESPIRATORY (INHALATION) at 07:41

## 2020-01-01 RX ADMIN — DILTIAZEM HYDROCHLORIDE 240 MG: 120 CAPSULE, COATED, EXTENDED RELEASE ORAL at 08:32

## 2020-01-01 RX ADMIN — CEFEPIME HYDROCHLORIDE 1 G: 1 INJECTION, POWDER, FOR SOLUTION INTRAMUSCULAR; INTRAVENOUS at 17:53

## 2020-01-01 RX ADMIN — BUDESONIDE 500 MCG: 0.5 SUSPENSION RESPIRATORY (INHALATION) at 19:14

## 2020-01-01 RX ADMIN — HEPARIN SODIUM 5000 UNITS: 5000 INJECTION INTRAVENOUS; SUBCUTANEOUS at 21:37

## 2020-01-01 RX ADMIN — SEVELAMER CARBONATE 1600 MG: 800 TABLET, FILM COATED ORAL at 17:39

## 2020-01-01 RX ADMIN — Medication 10 ML: at 05:37

## 2020-01-01 RX ADMIN — METHYLPREDNISOLONE SODIUM SUCCINATE 40 MG: 40 INJECTION, POWDER, FOR SOLUTION INTRAMUSCULAR; INTRAVENOUS at 13:27

## 2020-01-01 RX ADMIN — SEVELAMER CARBONATE 1600 MG: 800 TABLET, FILM COATED ORAL at 09:21

## 2020-01-01 RX ADMIN — DILTIAZEM HYDROCHLORIDE 180 MG: 180 CAPSULE, COATED, EXTENDED RELEASE ORAL at 12:55

## 2020-01-01 RX ADMIN — FOLIC ACID 1 MG: 1 TABLET ORAL at 09:57

## 2020-01-01 RX ADMIN — SEVELAMER CARBONATE 800 MG: 800 TABLET, FILM COATED ORAL at 16:18

## 2020-01-01 RX ADMIN — FUROSEMIDE 80 MG: 40 TABLET ORAL at 18:02

## 2020-01-01 RX ADMIN — CARVEDILOL 6.25 MG: 6.25 TABLET, FILM COATED ORAL at 18:16

## 2020-01-01 RX ADMIN — DILTIAZEM HYDROCHLORIDE 180 MG: 180 CAPSULE, COATED, EXTENDED RELEASE ORAL at 12:27

## 2020-01-01 RX ADMIN — FOLIC ACID 1 MG: 1 TABLET ORAL at 11:19

## 2020-01-01 RX ADMIN — FOLIC ACID 1 MG: 1 TABLET ORAL at 09:32

## 2020-01-01 RX ADMIN — FOLIC ACID 1 MG: 1 TABLET ORAL at 08:58

## 2020-01-01 RX ADMIN — INSULIN LISPRO 5 UNITS: 100 INJECTION, SOLUTION INTRAVENOUS; SUBCUTANEOUS at 09:32

## 2020-01-01 RX ADMIN — Medication 10 ML: at 21:42

## 2020-01-01 RX ADMIN — GUAIFENESIN 600 MG: 600 TABLET, EXTENDED RELEASE ORAL at 10:54

## 2020-01-01 RX ADMIN — ARFORMOTEROL TARTRATE 15 MCG: 15 SOLUTION RESPIRATORY (INHALATION) at 20:23

## 2020-01-01 RX ADMIN — FOLIC ACID 1 MG: 1 TABLET ORAL at 10:05

## 2020-01-01 RX ADMIN — IPRATROPIUM BROMIDE AND ALBUTEROL SULFATE 3 ML: .5; 3 SOLUTION RESPIRATORY (INHALATION) at 15:32

## 2020-01-01 RX ADMIN — SODIUM CHLORIDE 1000 ML: 900 INJECTION, SOLUTION INTRAVENOUS at 12:15

## 2020-01-01 RX ADMIN — OXYCODONE 5 MG: 5 TABLET ORAL at 10:25

## 2020-01-01 RX ADMIN — DEXTROSE MONOHYDRATE 5 MG/HR: 50 INJECTION, SOLUTION INTRAVENOUS at 08:42

## 2020-01-01 RX ADMIN — PANTOPRAZOLE SODIUM 40 MG: 40 TABLET, DELAYED RELEASE ORAL at 13:19

## 2020-01-01 RX ADMIN — GUAIFENESIN 600 MG: 600 TABLET, EXTENDED RELEASE ORAL at 09:29

## 2020-01-01 RX ADMIN — INSULIN LISPRO 3 UNITS: 100 INJECTION, SOLUTION INTRAVENOUS; SUBCUTANEOUS at 19:43

## 2020-01-01 RX ADMIN — METHYLPREDNISOLONE SODIUM SUCCINATE 40 MG: 40 INJECTION, POWDER, FOR SOLUTION INTRAMUSCULAR; INTRAVENOUS at 06:41

## 2020-01-01 RX ADMIN — BUDESONIDE 250 MCG: 0.25 INHALANT RESPIRATORY (INHALATION) at 08:39

## 2020-01-01 RX ADMIN — CASTOR OIL AND BALSAM, PERU: 788; 87 OINTMENT TOPICAL at 10:42

## 2020-01-01 RX ADMIN — SEVELAMER CARBONATE 800 MG: 800 TABLET, FILM COATED ORAL at 11:02

## 2020-01-01 RX ADMIN — SEVELAMER CARBONATE 1600 MG: 800 TABLET, FILM COATED ORAL at 09:29

## 2020-01-01 RX ADMIN — ACETYLCYSTEINE 200 MG: 200 SOLUTION ORAL; RESPIRATORY (INHALATION) at 08:55

## 2020-01-01 RX ADMIN — INSULIN LISPRO 2 UNITS: 100 INJECTION, SOLUTION INTRAVENOUS; SUBCUTANEOUS at 17:54

## 2020-01-01 RX ADMIN — THERA TABS 1 TABLET: TAB at 13:57

## 2020-01-01 RX ADMIN — THERA TABS 1 TABLET: TAB at 10:06

## 2020-01-01 RX ADMIN — PHENOBARBITAL SODIUM 65 MG: 65 INJECTION INTRAMUSCULAR at 17:26

## 2020-01-01 RX ADMIN — CASTOR OIL AND BALSAM, PERU: 788; 87 OINTMENT TOPICAL at 17:17

## 2020-01-01 RX ADMIN — ARFORMOTEROL TARTRATE 15 MCG: 15 SOLUTION RESPIRATORY (INHALATION) at 07:32

## 2020-01-01 RX ADMIN — ACETYLCYSTEINE 200 MG: 200 SOLUTION ORAL; RESPIRATORY (INHALATION) at 15:33

## 2020-01-01 RX ADMIN — DILTIAZEM HYDROCHLORIDE 15 MG: 5 INJECTION INTRAVENOUS at 09:28

## 2020-01-01 RX ADMIN — ACETYLCYSTEINE 200 MG: 200 SOLUTION ORAL; RESPIRATORY (INHALATION) at 14:01

## 2020-01-01 RX ADMIN — GUAIFENESIN 600 MG: 600 TABLET, EXTENDED RELEASE ORAL at 22:30

## 2020-01-01 RX ADMIN — NYSTATIN 500000 UNITS: 100000 SUSPENSION ORAL at 21:42

## 2020-01-01 RX ADMIN — FOLIC ACID 1 MG: 1 TABLET ORAL at 09:36

## 2020-01-01 RX ADMIN — NYSTATIN 500000 UNITS: 100000 SUSPENSION ORAL at 13:26

## 2020-01-01 RX ADMIN — SODIUM POLYSTYRENE SULFONATE 30 G: 15 SUSPENSION ORAL; RECTAL at 18:45

## 2020-01-01 RX ADMIN — METRONIDAZOLE 500 MG: 500 INJECTION, SOLUTION INTRAVENOUS at 21:54

## 2020-01-01 RX ADMIN — GUAIFENESIN 600 MG: 600 TABLET, EXTENDED RELEASE ORAL at 16:56

## 2020-01-01 RX ADMIN — PANTOPRAZOLE SODIUM 40 MG: 40 TABLET, DELAYED RELEASE ORAL at 08:17

## 2020-01-01 RX ADMIN — ACETYLCYSTEINE 200 MG: 200 SOLUTION ORAL; RESPIRATORY (INHALATION) at 20:32

## 2020-01-01 RX ADMIN — PREDNISONE 30 MG: 10 TABLET ORAL at 11:19

## 2020-01-01 RX ADMIN — Medication 100 MG: at 10:50

## 2020-01-01 RX ADMIN — HEPARIN SODIUM 5000 UNITS: 5000 INJECTION INTRAVENOUS; SUBCUTANEOUS at 18:51

## 2020-01-01 RX ADMIN — CASTOR OIL AND BALSAM, PERU: 788; 87 OINTMENT TOPICAL at 13:00

## 2020-01-01 RX ADMIN — SEVELAMER CARBONATE 800 MG: 800 TABLET, FILM COATED ORAL at 09:31

## 2020-01-01 RX ADMIN — METRONIDAZOLE 500 MG: 500 INJECTION, SOLUTION INTRAVENOUS at 21:30

## 2020-01-01 RX ADMIN — METRONIDAZOLE 500 MG: 500 INJECTION, SOLUTION INTRAVENOUS at 21:47

## 2020-01-01 RX ADMIN — INSULIN LISPRO 3 UNITS: 100 INJECTION, SOLUTION INTRAVENOUS; SUBCUTANEOUS at 12:45

## 2020-01-01 RX ADMIN — NYSTATIN 500000 UNITS: 100000 SUSPENSION ORAL at 18:13

## 2020-01-01 RX ADMIN — IPRATROPIUM BROMIDE AND ALBUTEROL SULFATE 3 ML: .5; 3 SOLUTION RESPIRATORY (INHALATION) at 03:12

## 2020-01-01 RX ADMIN — HEPARIN SODIUM 5000 UNITS: 5000 INJECTION INTRAVENOUS; SUBCUTANEOUS at 05:12

## 2020-01-01 RX ADMIN — INSULIN LISPRO 3 UNITS: 100 INJECTION, SOLUTION INTRAVENOUS; SUBCUTANEOUS at 17:42

## 2020-01-01 RX ADMIN — Medication 100 MG: at 09:36

## 2020-01-01 RX ADMIN — DILTIAZEM HYDROCHLORIDE 180 MG: 180 CAPSULE, COATED, EXTENDED RELEASE ORAL at 10:40

## 2020-01-01 RX ADMIN — BUDESONIDE 500 MCG: 0.5 SUSPENSION RESPIRATORY (INHALATION) at 20:33

## 2020-01-01 RX ADMIN — OXYCODONE 5 MG: 5 TABLET ORAL at 03:22

## 2020-01-01 RX ADMIN — HEPARIN SODIUM 5000 UNITS: 5000 INJECTION INTRAVENOUS; SUBCUTANEOUS at 15:56

## 2020-01-01 RX ADMIN — ALBUMIN (HUMAN) 25 G: 0.25 INJECTION, SOLUTION INTRAVENOUS at 10:00

## 2020-01-01 RX ADMIN — OXYCODONE 5 MG: 5 TABLET ORAL at 03:46

## 2020-01-01 RX ADMIN — Medication 10 ML: at 13:06

## 2020-01-01 RX ADMIN — DEXTROSE MONOHYDRATE AND SODIUM CHLORIDE 100 ML/HR: 5; .9 INJECTION, SOLUTION INTRAVENOUS at 00:11

## 2020-01-01 RX ADMIN — SEVELAMER CARBONATE 1600 MG: 800 TABLET, FILM COATED ORAL at 17:50

## 2020-01-01 RX ADMIN — IPRATROPIUM BROMIDE AND ALBUTEROL SULFATE 3 ML: .5; 3 SOLUTION RESPIRATORY (INHALATION) at 22:09

## 2020-01-01 RX ADMIN — INSULIN LISPRO 2 UNITS: 100 INJECTION, SOLUTION INTRAVENOUS; SUBCUTANEOUS at 21:02

## 2020-01-01 RX ADMIN — DOCUSATE SODIUM AND SENNOSIDES 1 TABLET: 8.6; 5 TABLET, FILM COATED ORAL at 10:54

## 2020-01-01 RX ADMIN — LEVALBUTEROL HYDROCHLORIDE 0.63 MG: 0.63 SOLUTION RESPIRATORY (INHALATION) at 09:14

## 2020-01-01 RX ADMIN — ACETYLCYSTEINE 200 MG: 200 SOLUTION ORAL; RESPIRATORY (INHALATION) at 09:12

## 2020-01-01 RX ADMIN — METRONIDAZOLE 500 MG: 500 INJECTION, SOLUTION INTRAVENOUS at 12:31

## 2020-01-01 RX ADMIN — SEVELAMER CARBONATE 800 MG: 800 TABLET, FILM COATED ORAL at 17:14

## 2020-01-01 RX ADMIN — OXYCODONE 5 MG: 5 TABLET ORAL at 21:31

## 2020-01-01 RX ADMIN — NEPHROCAP 1 CAPSULE: 1 CAP ORAL at 09:29

## 2020-01-01 RX ADMIN — THERA TABS 1 TABLET: TAB at 09:22

## 2020-01-01 RX ADMIN — SEVELAMER CARBONATE 800 MG: 800 TABLET, FILM COATED ORAL at 09:36

## 2020-01-01 RX ADMIN — DILTIAZEM HYDROCHLORIDE 180 MG: 180 CAPSULE, COATED, EXTENDED RELEASE ORAL at 12:42

## 2020-01-01 RX ADMIN — ACETAMINOPHEN 650 MG: 325 TABLET ORAL at 00:12

## 2020-01-01 RX ADMIN — Medication 10 ML: at 05:14

## 2020-01-01 RX ADMIN — PANTOPRAZOLE SODIUM 40 MG: 40 TABLET, DELAYED RELEASE ORAL at 08:59

## 2020-01-01 RX ADMIN — GUAIFENESIN AND DEXTROMETHORPHAN 10 ML: 100; 10 SYRUP ORAL at 00:41

## 2020-01-01 RX ADMIN — OXYCODONE HYDROCHLORIDE AND ACETAMINOPHEN 1 TABLET: 10; 325 TABLET ORAL at 09:22

## 2020-01-01 RX ADMIN — BUDESONIDE 250 MCG: 0.25 INHALANT RESPIRATORY (INHALATION) at 08:20

## 2020-01-01 RX ADMIN — OXYCODONE HYDROCHLORIDE AND ACETAMINOPHEN 1 TABLET: 10; 325 TABLET ORAL at 17:50

## 2020-01-01 RX ADMIN — VANCOMYCIN 1.75 GRAM/500 ML IN 0.9 % SODIUM CHLORIDE INTRAVENOUS 1750 MG: at 13:54

## 2020-01-01 RX ADMIN — ACETYLCYSTEINE 200 MG: 200 SOLUTION ORAL; RESPIRATORY (INHALATION) at 12:11

## 2020-01-01 RX ADMIN — METHYLPREDNISOLONE SODIUM SUCCINATE 40 MG: 40 INJECTION, POWDER, FOR SOLUTION INTRAMUSCULAR; INTRAVENOUS at 00:48

## 2020-01-01 RX ADMIN — Medication: at 09:37

## 2020-01-01 RX ADMIN — ACETYLCYSTEINE 200 MG: 200 SOLUTION ORAL; RESPIRATORY (INHALATION) at 15:04

## 2020-01-01 RX ADMIN — DEXTROSE MONOHYDRATE: 10 INJECTION, SOLUTION INTRAVENOUS at 18:53

## 2020-01-01 RX ADMIN — SEVELAMER CARBONATE 1600 MG: 800 TABLET, FILM COATED ORAL at 19:52

## 2020-01-01 RX ADMIN — DILTIAZEM HYDROCHLORIDE 240 MG: 120 CAPSULE, COATED, EXTENDED RELEASE ORAL at 09:53

## 2020-01-01 RX ADMIN — NYSTATIN 500000 UNITS: 100000 SUSPENSION ORAL at 09:30

## 2020-01-01 RX ADMIN — NYSTATIN 500000 UNITS: 100000 SUSPENSION ORAL at 21:37

## 2020-01-01 RX ADMIN — LEVALBUTEROL HYDROCHLORIDE 0.63 MG: 0.63 SOLUTION RESPIRATORY (INHALATION) at 08:46

## 2020-01-01 RX ADMIN — CARVEDILOL 6.25 MG: 6.25 TABLET, FILM COATED ORAL at 18:12

## 2020-01-01 RX ADMIN — BUDESONIDE 500 MCG: 0.5 SUSPENSION RESPIRATORY (INHALATION) at 07:28

## 2020-01-01 RX ADMIN — Medication 100 MG: at 09:29

## 2020-01-01 RX ADMIN — NYSTATIN 500000 UNITS: 100000 SUSPENSION ORAL at 17:42

## 2020-01-01 RX ADMIN — ARFORMOTEROL TARTRATE 15 MCG: 15 SOLUTION RESPIRATORY (INHALATION) at 19:33

## 2020-01-01 RX ADMIN — HYDROCODONE BITARTRATE AND ACETAMINOPHEN 1 TABLET: 10; 325 TABLET ORAL at 21:02

## 2020-01-01 RX ADMIN — HEPARIN SODIUM 5000 UNITS: 5000 INJECTION INTRAVENOUS; SUBCUTANEOUS at 14:36

## 2020-01-01 RX ADMIN — METRONIDAZOLE 500 MG: 500 INJECTION, SOLUTION INTRAVENOUS at 05:44

## 2020-01-01 RX ADMIN — NYSTATIN 500000 UNITS: 100000 SUSPENSION ORAL at 23:09

## 2020-01-01 RX ADMIN — GABAPENTIN 300 MG: 300 CAPSULE ORAL at 23:06

## 2020-01-01 RX ADMIN — FOLIC ACID 1 MG: 1 TABLET ORAL at 09:31

## 2020-01-01 RX ADMIN — SEVELAMER CARBONATE 800 MG: 800 TABLET, FILM COATED ORAL at 16:46

## 2020-01-01 RX ADMIN — OXYCODONE HYDROCHLORIDE AND ACETAMINOPHEN 1 TABLET: 10; 325 TABLET ORAL at 15:50

## 2020-01-01 RX ADMIN — THERA TABS 1 TABLET: TAB at 12:32

## 2020-01-01 RX ADMIN — IPRATROPIUM BROMIDE 0.5 MG: 0.5 SOLUTION RESPIRATORY (INHALATION) at 04:20

## 2020-01-01 RX ADMIN — IPRATROPIUM BROMIDE AND ALBUTEROL SULFATE 3 ML: .5; 3 SOLUTION RESPIRATORY (INHALATION) at 12:10

## 2020-01-01 RX ADMIN — Medication 10 ML: at 23:00

## 2020-01-01 RX ADMIN — BUDESONIDE 500 MCG: 0.5 SUSPENSION RESPIRATORY (INHALATION) at 20:07

## 2020-01-01 RX ADMIN — IPRATROPIUM BROMIDE AND ALBUTEROL SULFATE 3 ML: .5; 3 SOLUTION RESPIRATORY (INHALATION) at 20:18

## 2020-01-01 RX ADMIN — ALBUTEROL SULFATE 2.5 MG: 2.5 SOLUTION RESPIRATORY (INHALATION) at 07:21

## 2020-01-01 RX ADMIN — GUAIFENESIN 600 MG: 600 TABLET, EXTENDED RELEASE ORAL at 13:52

## 2020-01-01 RX ADMIN — Medication 10 ML: at 06:00

## 2020-01-01 RX ADMIN — INSULIN LISPRO 9 UNITS: 100 INJECTION, SOLUTION INTRAVENOUS; SUBCUTANEOUS at 08:17

## 2020-01-01 RX ADMIN — SEVELAMER CARBONATE 800 MG: 800 TABLET, FILM COATED ORAL at 21:48

## 2020-01-01 RX ADMIN — METHYLPREDNISOLONE SODIUM SUCCINATE 125 MG: 125 INJECTION, POWDER, FOR SOLUTION INTRAMUSCULAR; INTRAVENOUS at 12:15

## 2020-01-01 RX ADMIN — FENTANYL CITRATE 25 MCG: 50 INJECTION, SOLUTION INTRAMUSCULAR; INTRAVENOUS at 04:47

## 2020-01-01 RX ADMIN — CARVEDILOL 12.5 MG: 12.5 TABLET, FILM COATED ORAL at 17:39

## 2020-01-01 RX ADMIN — HEPARIN SODIUM 5000 UNITS: 5000 INJECTION INTRAVENOUS; SUBCUTANEOUS at 04:01

## 2020-01-01 RX ADMIN — BUDESONIDE 250 MCG: 0.25 INHALANT RESPIRATORY (INHALATION) at 20:11

## 2020-01-01 RX ADMIN — CARVEDILOL 25 MG: 12.5 TABLET, FILM COATED ORAL at 18:13

## 2020-01-01 RX ADMIN — ALBUMIN (HUMAN) 25 G: 0.25 INJECTION, SOLUTION INTRAVENOUS at 09:15

## 2020-01-01 RX ADMIN — NYSTATIN 500000 UNITS: 100000 SUSPENSION ORAL at 17:34

## 2020-01-01 RX ADMIN — CARVEDILOL 6.25 MG: 6.25 TABLET, FILM COATED ORAL at 10:10

## 2020-01-01 RX ADMIN — METHYLPREDNISOLONE SODIUM SUCCINATE 20 MG: 40 INJECTION, POWDER, FOR SOLUTION INTRAMUSCULAR; INTRAVENOUS at 14:06

## 2020-01-01 RX ADMIN — INSULIN LISPRO 4 UNITS: 100 INJECTION, SOLUTION INTRAVENOUS; SUBCUTANEOUS at 12:53

## 2020-01-01 RX ADMIN — BUDESONIDE 500 MCG: 0.5 SUSPENSION RESPIRATORY (INHALATION) at 19:34

## 2020-01-01 RX ADMIN — CEFEPIME HYDROCHLORIDE 1 G: 1 INJECTION, POWDER, FOR SOLUTION INTRAMUSCULAR; INTRAVENOUS at 18:13

## 2020-01-01 RX ADMIN — BUDESONIDE 500 MCG: 0.5 SUSPENSION RESPIRATORY (INHALATION) at 07:29

## 2020-01-01 RX ADMIN — IPRATROPIUM BROMIDE AND ALBUTEROL SULFATE 3 ML: .5; 3 SOLUTION RESPIRATORY (INHALATION) at 13:28

## 2020-01-01 RX ADMIN — OXYCODONE HYDROCHLORIDE AND ACETAMINOPHEN 1 TABLET: 10; 325 TABLET ORAL at 09:49

## 2020-01-01 RX ADMIN — SEVELAMER CARBONATE 1600 MG: 800 TABLET, FILM COATED ORAL at 15:56

## 2020-01-01 RX ADMIN — AMIODARONE HYDROCHLORIDE 0.5 MG/MIN: 50 INJECTION, SOLUTION INTRAVENOUS at 22:33

## 2020-01-01 RX ADMIN — NYSTATIN 500000 UNITS: 100000 SUSPENSION ORAL at 16:46

## 2020-01-01 RX ADMIN — METRONIDAZOLE 500 MG: 500 INJECTION, SOLUTION INTRAVENOUS at 09:32

## 2020-01-01 RX ADMIN — SEVELAMER CARBONATE 800 MG: 800 TABLET, FILM COATED ORAL at 21:51

## 2020-01-01 RX ADMIN — INSULIN LISPRO 3 UNITS: 100 INJECTION, SOLUTION INTRAVENOUS; SUBCUTANEOUS at 18:13

## 2020-01-01 RX ADMIN — HUMAN INSULIN 10 UNITS: 100 INJECTION, SUSPENSION SUBCUTANEOUS at 13:18

## 2020-01-01 RX ADMIN — ALBUTEROL SULFATE 2.5 MG: 2.5 SOLUTION RESPIRATORY (INHALATION) at 04:05

## 2020-01-01 RX ADMIN — ARFORMOTEROL TARTRATE 15 MCG: 15 SOLUTION RESPIRATORY (INHALATION) at 19:51

## 2020-01-01 RX ADMIN — DILTIAZEM HYDROCHLORIDE 180 MG: 180 CAPSULE, COATED, EXTENDED RELEASE ORAL at 10:06

## 2020-01-01 RX ADMIN — DILTIAZEM HYDROCHLORIDE 240 MG: 240 CAPSULE, COATED, EXTENDED RELEASE ORAL at 12:36

## 2020-01-01 RX ADMIN — Medication 10 ML: at 13:41

## 2020-01-01 RX ADMIN — OXYCODONE HYDROCHLORIDE AND ACETAMINOPHEN 1 TABLET: 5; 325 TABLET ORAL at 21:19

## 2020-01-01 RX ADMIN — INSULIN LISPRO 9 UNITS: 100 INJECTION, SOLUTION INTRAVENOUS; SUBCUTANEOUS at 13:21

## 2020-01-01 RX ADMIN — Medication: at 14:30

## 2020-01-01 RX ADMIN — SEVELAMER CARBONATE 800 MG: 800 TABLET, FILM COATED ORAL at 08:38

## 2020-01-01 RX ADMIN — HEPARIN SODIUM 5000 UNITS: 5000 INJECTION INTRAVENOUS; SUBCUTANEOUS at 22:00

## 2020-01-01 RX ADMIN — OXYCODONE 5 MG: 5 TABLET ORAL at 18:11

## 2020-01-01 RX ADMIN — CARVEDILOL 12.5 MG: 12.5 TABLET, FILM COATED ORAL at 18:07

## 2020-01-01 RX ADMIN — ARFORMOTEROL TARTRATE 15 MCG: 15 SOLUTION RESPIRATORY (INHALATION) at 07:18

## 2020-01-01 RX ADMIN — THERA TABS 1 TABLET: TAB at 10:41

## 2020-01-01 RX ADMIN — NYSTATIN 500000 UNITS: 100000 SUSPENSION ORAL at 16:25

## 2020-01-01 RX ADMIN — Medication 10 ML: at 22:00

## 2020-01-01 RX ADMIN — Medication 10 ML: at 21:49

## 2020-01-01 RX ADMIN — IPRATROPIUM BROMIDE AND ALBUTEROL SULFATE 3 ML: .5; 3 SOLUTION RESPIRATORY (INHALATION) at 20:36

## 2020-01-01 RX ADMIN — CARVEDILOL 6.25 MG: 6.25 TABLET, FILM COATED ORAL at 17:14

## 2020-01-01 RX ADMIN — PANTOPRAZOLE SODIUM 40 MG: 40 TABLET, DELAYED RELEASE ORAL at 10:53

## 2020-01-01 RX ADMIN — PANTOPRAZOLE SODIUM 40 MG: 40 TABLET, DELAYED RELEASE ORAL at 08:53

## 2020-01-01 RX ADMIN — SEVELAMER CARBONATE 800 MG: 800 TABLET, FILM COATED ORAL at 17:26

## 2020-01-01 RX ADMIN — Medication 16 G: at 08:43

## 2020-01-01 RX ADMIN — ACETAMINOPHEN 650 MG: 325 TABLET ORAL at 17:47

## 2020-01-01 RX ADMIN — FOLIC ACID 1 MG: 1 TABLET ORAL at 09:00

## 2020-01-01 RX ADMIN — SEVELAMER CARBONATE 800 MG: 800 TABLET, FILM COATED ORAL at 21:31

## 2020-01-01 RX ADMIN — BUDESONIDE 250 MCG: 0.25 INHALANT RESPIRATORY (INHALATION) at 20:24

## 2020-01-01 RX ADMIN — CASTOR OIL AND BALSAM, PERU: 788; 87 OINTMENT TOPICAL at 09:55

## 2020-01-01 RX ADMIN — IPRATROPIUM BROMIDE AND ALBUTEROL SULFATE 3 ML: .5; 3 SOLUTION RESPIRATORY (INHALATION) at 08:51

## 2020-01-01 RX ADMIN — Medication: at 18:18

## 2020-01-01 RX ADMIN — SEVELAMER CARBONATE 800 MG: 800 TABLET, FILM COATED ORAL at 15:47

## 2020-01-01 RX ADMIN — Medication 10 ML: at 22:32

## 2020-01-01 RX ADMIN — HYDROCODONE BITARTRATE AND ACETAMINOPHEN 1 TABLET: 10; 325 TABLET ORAL at 22:19

## 2020-01-01 RX ADMIN — ACETYLCYSTEINE 200 MG: 200 SOLUTION ORAL; RESPIRATORY (INHALATION) at 19:23

## 2020-01-01 RX ADMIN — ALBUTEROL SULFATE 2.5 MG: 2.5 SOLUTION RESPIRATORY (INHALATION) at 14:05

## 2020-01-01 RX ADMIN — CEFEPIME HYDROCHLORIDE 1 G: 1 INJECTION, POWDER, FOR SOLUTION INTRAMUSCULAR; INTRAVENOUS at 17:13

## 2020-01-01 RX ADMIN — NYSTATIN 500000 UNITS: 100000 SUSPENSION ORAL at 21:51

## 2020-01-01 RX ADMIN — HUMAN INSULIN 25 UNITS: 100 INJECTION, SUSPENSION SUBCUTANEOUS at 09:52

## 2020-01-01 RX ADMIN — FUROSEMIDE 80 MG: 40 TABLET ORAL at 18:07

## 2020-01-01 RX ADMIN — SEVELAMER CARBONATE 800 MG: 800 TABLET, FILM COATED ORAL at 18:02

## 2020-01-01 RX ADMIN — HUMAN INSULIN 15 UNITS: 100 INJECTION, SUSPENSION SUBCUTANEOUS at 17:37

## 2020-01-01 RX ADMIN — ACETYLCYSTEINE 200 MG: 200 SOLUTION ORAL; RESPIRATORY (INHALATION) at 19:45

## 2020-01-01 RX ADMIN — NYSTATIN 500000 UNITS: 100000 SUSPENSION ORAL at 10:06

## 2020-01-01 RX ADMIN — SEVELAMER CARBONATE 800 MG: 800 TABLET, FILM COATED ORAL at 12:33

## 2020-01-01 RX ADMIN — NYSTATIN 500000 UNITS: 100000 SUSPENSION ORAL at 18:38

## 2020-01-01 RX ADMIN — CARVEDILOL 6.25 MG: 6.25 TABLET, FILM COATED ORAL at 17:50

## 2020-01-01 RX ADMIN — SEVELAMER CARBONATE 800 MG: 800 TABLET, FILM COATED ORAL at 22:23

## 2020-01-01 RX ADMIN — CARVEDILOL 12.5 MG: 12.5 TABLET, FILM COATED ORAL at 17:59

## 2020-01-01 RX ADMIN — INSULIN LISPRO 7 UNITS: 100 INJECTION, SOLUTION INTRAVENOUS; SUBCUTANEOUS at 12:47

## 2020-01-01 RX ADMIN — Medication 100 MG: at 09:32

## 2020-01-01 RX ADMIN — BUDESONIDE 500 MCG: 0.5 SUSPENSION RESPIRATORY (INHALATION) at 08:55

## 2020-01-01 RX ADMIN — NYSTATIN 500000 UNITS: 100000 SUSPENSION ORAL at 09:08

## 2020-01-01 RX ADMIN — INSULIN LISPRO 2 UNITS: 100 INJECTION, SOLUTION INTRAVENOUS; SUBCUTANEOUS at 12:31

## 2020-01-01 RX ADMIN — Medication 10 ML: at 08:39

## 2020-01-01 RX ADMIN — FOLIC ACID 1 MG: 1 TABLET ORAL at 10:41

## 2020-01-01 RX ADMIN — CEFEPIME HYDROCHLORIDE 1 G: 1 INJECTION, POWDER, FOR SOLUTION INTRAMUSCULAR; INTRAVENOUS at 17:30

## 2020-01-01 RX ADMIN — FOLIC ACID 1 MG: 1 TABLET ORAL at 09:02

## 2020-01-01 RX ADMIN — FOLIC ACID 1 MG: 1 TABLET ORAL at 12:18

## 2020-01-01 RX ADMIN — HEPARIN SODIUM 5000 UNITS: 5000 INJECTION INTRAVENOUS; SUBCUTANEOUS at 03:22

## 2020-01-01 RX ADMIN — ACETAMINOPHEN 650 MG: 325 TABLET ORAL at 18:52

## 2020-01-01 RX ADMIN — NYSTATIN 500000 UNITS: 100000 SUSPENSION ORAL at 18:16

## 2020-01-01 RX ADMIN — METHYLPREDNISOLONE SODIUM SUCCINATE 40 MG: 40 INJECTION, POWDER, FOR SOLUTION INTRAMUSCULAR; INTRAVENOUS at 06:15

## 2020-01-01 RX ADMIN — DEXTROSE MONOHYDRATE 7.5 MG/HR: 50 INJECTION, SOLUTION INTRAVENOUS at 09:40

## 2020-01-01 RX ADMIN — SEVELAMER CARBONATE 800 MG: 800 TABLET, FILM COATED ORAL at 10:54

## 2020-01-01 RX ADMIN — HEPARIN SODIUM 5550 UNITS: 5000 INJECTION INTRAVENOUS; SUBCUTANEOUS at 16:49

## 2020-01-01 RX ADMIN — ALBUTEROL SULFATE 2.5 MG: 2.5 SOLUTION RESPIRATORY (INHALATION) at 08:55

## 2020-01-01 RX ADMIN — CASTOR OIL AND BALSAM, PERU: 788; 87 OINTMENT TOPICAL at 09:04

## 2020-01-01 RX ADMIN — Medication 10 ML: at 05:49

## 2020-01-01 RX ADMIN — FOLIC ACID 1 MG: 1 TABLET ORAL at 09:29

## 2020-01-01 RX ADMIN — METHYLPREDNISOLONE SODIUM SUCCINATE 40 MG: 40 INJECTION, POWDER, FOR SOLUTION INTRAMUSCULAR; INTRAVENOUS at 23:01

## 2020-01-01 RX ADMIN — CASTOR OIL AND BALSAM, PERU: 788; 87 OINTMENT TOPICAL at 17:47

## 2020-01-01 RX ADMIN — HYDROCODONE BITARTRATE AND ACETAMINOPHEN 1 TABLET: 10; 325 TABLET ORAL at 19:57

## 2020-01-01 RX ADMIN — GUAIFENESIN 600 MG: 600 TABLET, EXTENDED RELEASE ORAL at 09:02

## 2020-01-01 RX ADMIN — PANTOPRAZOLE SODIUM 40 MG: 40 TABLET, DELAYED RELEASE ORAL at 07:31

## 2020-01-01 RX ADMIN — METHYLPREDNISOLONE SODIUM SUCCINATE 40 MG: 40 INJECTION, POWDER, FOR SOLUTION INTRAMUSCULAR; INTRAVENOUS at 14:00

## 2020-01-01 RX ADMIN — OXYCODONE HYDROCHLORIDE AND ACETAMINOPHEN 1 TABLET: 10; 325 TABLET ORAL at 18:15

## 2020-01-01 RX ADMIN — ACETYLCYSTEINE 200 MG: 200 SOLUTION ORAL; RESPIRATORY (INHALATION) at 13:05

## 2020-01-01 RX ADMIN — ACETYLCYSTEINE 200 MG: 200 SOLUTION ORAL; RESPIRATORY (INHALATION) at 20:36

## 2020-01-01 RX ADMIN — ARFORMOTEROL TARTRATE 15 MCG: 15 SOLUTION RESPIRATORY (INHALATION) at 08:39

## 2020-01-01 RX ADMIN — METRONIDAZOLE 500 MG: 500 INJECTION, SOLUTION INTRAVENOUS at 21:13

## 2020-01-01 RX ADMIN — SEVELAMER CARBONATE 1600 MG: 800 TABLET, FILM COATED ORAL at 12:56

## 2020-01-01 RX ADMIN — NYSTATIN 500000 UNITS: 100000 SUSPENSION ORAL at 17:52

## 2020-01-01 RX ADMIN — SODIUM POLYSTYRENE SULFONATE 45 G: 15 SUSPENSION ORAL; RECTAL at 13:19

## 2020-01-01 RX ADMIN — OXYCODONE HYDROCHLORIDE AND ACETAMINOPHEN 1 TABLET: 10; 325 TABLET ORAL at 18:31

## 2020-01-01 RX ADMIN — CASTOR OIL AND BALSAM, PERU: 788; 87 OINTMENT TOPICAL at 15:21

## 2020-01-01 RX ADMIN — CASTOR OIL AND BALSAM, PERU: 788; 87 OINTMENT TOPICAL at 18:00

## 2020-01-01 RX ADMIN — IPRATROPIUM BROMIDE AND ALBUTEROL SULFATE 3 ML: .5; 3 SOLUTION RESPIRATORY (INHALATION) at 14:06

## 2020-01-01 RX ADMIN — BUDESONIDE 500 MCG: 0.5 SUSPENSION RESPIRATORY (INHALATION) at 07:31

## 2020-01-01 RX ADMIN — GUAIFENESIN AND DEXTROMETHORPHAN 10 ML: 100; 10 SYRUP ORAL at 20:00

## 2020-01-01 RX ADMIN — OXYCODONE AND ACETAMINOPHEN 2 TABLET: 5; 325 TABLET ORAL at 09:48

## 2020-01-01 RX ADMIN — EPOETIN ALFA-EPBX 4000 UNITS: 4000 INJECTION, SOLUTION INTRAVENOUS; SUBCUTANEOUS at 22:27

## 2020-01-01 RX ADMIN — SEVELAMER CARBONATE 800 MG: 800 TABLET, FILM COATED ORAL at 18:24

## 2020-01-01 RX ADMIN — SEVELAMER CARBONATE 1600 MG: 800 TABLET, FILM COATED ORAL at 12:42

## 2020-01-01 RX ADMIN — BUDESONIDE 250 MCG: 0.25 INHALANT RESPIRATORY (INHALATION) at 08:35

## 2020-01-01 RX ADMIN — PREDNISONE 40 MG: 20 TABLET ORAL at 11:39

## 2020-01-01 RX ADMIN — NYSTATIN 500000 UNITS: 100000 SUSPENSION ORAL at 17:48

## 2020-01-01 RX ADMIN — CASTOR OIL AND BALSAM, PERU: 788; 87 OINTMENT TOPICAL at 21:56

## 2020-01-01 RX ADMIN — LEVALBUTEROL HYDROCHLORIDE 0.63 MG: 0.63 SOLUTION RESPIRATORY (INHALATION) at 13:05

## 2020-01-01 RX ADMIN — FOLIC ACID 1 MG: 1 TABLET ORAL at 12:33

## 2020-01-01 RX ADMIN — INSULIN LISPRO 3 UNITS: 100 INJECTION, SOLUTION INTRAVENOUS; SUBCUTANEOUS at 22:24

## 2020-01-01 RX ADMIN — HUMAN INSULIN 25 UNITS: 100 INJECTION, SUSPENSION SUBCUTANEOUS at 10:06

## 2020-01-01 RX ADMIN — BUDESONIDE 250 MCG: 0.25 INHALANT RESPIRATORY (INHALATION) at 21:24

## 2020-01-01 RX ADMIN — NYSTATIN 500000 UNITS: 100000 SUSPENSION ORAL at 18:17

## 2020-01-01 RX ADMIN — Medication 10 ML: at 12:42

## 2020-01-01 RX ADMIN — CASTOR OIL AND BALSAM, PERU: 788; 87 OINTMENT TOPICAL at 19:04

## 2020-01-01 RX ADMIN — HEPARIN SODIUM 5000 UNITS: 5000 INJECTION INTRAVENOUS; SUBCUTANEOUS at 18:12

## 2020-01-01 RX ADMIN — Medication 10 ML: at 22:05

## 2020-01-01 RX ADMIN — CARVEDILOL 25 MG: 12.5 TABLET, FILM COATED ORAL at 16:54

## 2020-01-01 RX ADMIN — NYSTATIN 500000 UNITS: 100000 SUSPENSION ORAL at 15:45

## 2020-01-01 RX ADMIN — BUDESONIDE 250 MCG: 0.25 INHALANT RESPIRATORY (INHALATION) at 19:23

## 2020-01-01 RX ADMIN — NYSTATIN 500000 UNITS: 100000 SUSPENSION ORAL at 10:55

## 2020-01-01 RX ADMIN — BUDESONIDE 500 MCG: 0.5 SUSPENSION RESPIRATORY (INHALATION) at 19:33

## 2020-01-01 RX ADMIN — OXYCODONE AND ACETAMINOPHEN 2 TABLET: 5; 325 TABLET ORAL at 04:41

## 2020-01-01 RX ADMIN — INSULIN LISPRO 3 UNITS: 100 INJECTION, SOLUTION INTRAVENOUS; SUBCUTANEOUS at 18:24

## 2020-01-01 RX ADMIN — DEXTROSE MONOHYDRATE 5 MG/HR: 50 INJECTION, SOLUTION INTRAVENOUS at 09:31

## 2020-01-01 RX ADMIN — CASTOR OIL AND BALSAM, PERU: 788; 87 OINTMENT TOPICAL at 11:24

## 2020-01-01 RX ADMIN — ARFORMOTEROL TARTRATE 15 MCG: 15 SOLUTION RESPIRATORY (INHALATION) at 07:28

## 2020-01-01 RX ADMIN — NYSTATIN 500000 UNITS: 100000 SUSPENSION ORAL at 21:52

## 2020-01-01 RX ADMIN — THERA TABS 1 TABLET: TAB at 10:53

## 2020-01-01 RX ADMIN — IPRATROPIUM BROMIDE 0.5 MG: 0.5 SOLUTION RESPIRATORY (INHALATION) at 02:06

## 2020-01-01 RX ADMIN — METHYLPREDNISOLONE SODIUM SUCCINATE 20 MG: 40 INJECTION, POWDER, FOR SOLUTION INTRAMUSCULAR; INTRAVENOUS at 15:21

## 2020-01-01 RX ADMIN — METHYLPREDNISOLONE SODIUM SUCCINATE 20 MG: 40 INJECTION, POWDER, FOR SOLUTION INTRAMUSCULAR; INTRAVENOUS at 05:35

## 2020-01-01 RX ADMIN — ARFORMOTEROL TARTRATE 15 MCG: 15 SOLUTION RESPIRATORY (INHALATION) at 19:12

## 2020-01-01 RX ADMIN — NYSTATIN 500000 UNITS: 100000 SUSPENSION ORAL at 22:30

## 2020-01-01 RX ADMIN — DEXTROSE MONOHYDRATE 10 MG/HR: 50 INJECTION, SOLUTION INTRAVENOUS at 18:00

## 2020-01-01 RX ADMIN — IPRATROPIUM BROMIDE AND ALBUTEROL SULFATE 3 ML: .5; 3 SOLUTION RESPIRATORY (INHALATION) at 01:02

## 2020-01-01 RX ADMIN — EPOETIN ALFA-EPBX 12000 UNITS: 10000 INJECTION, SOLUTION INTRAVENOUS; SUBCUTANEOUS at 22:35

## 2020-01-01 RX ADMIN — ALBUMIN (HUMAN) 25 G: 0.25 INJECTION, SOLUTION INTRAVENOUS at 00:16

## 2020-01-01 RX ADMIN — ACETYLCYSTEINE 200 MG: 200 SOLUTION ORAL; RESPIRATORY (INHALATION) at 08:31

## 2020-01-01 RX ADMIN — LEVALBUTEROL HYDROCHLORIDE 0.63 MG: 0.63 SOLUTION RESPIRATORY (INHALATION) at 20:11

## 2020-01-01 RX ADMIN — CEFEPIME HYDROCHLORIDE 1 G: 1 INJECTION, POWDER, FOR SOLUTION INTRAMUSCULAR; INTRAVENOUS at 20:34

## 2020-01-01 RX ADMIN — DILTIAZEM HYDROCHLORIDE 240 MG: 120 CAPSULE, COATED, EXTENDED RELEASE ORAL at 09:31

## 2020-01-01 RX ADMIN — NYSTATIN 500000 UNITS: 100000 SUSPENSION ORAL at 09:44

## 2020-01-01 RX ADMIN — ARFORMOTEROL TARTRATE 15 MCG: 15 SOLUTION RESPIRATORY (INHALATION) at 20:10

## 2020-01-01 RX ADMIN — HEPARIN SODIUM 5000 UNITS: 5000 INJECTION INTRAVENOUS; SUBCUTANEOUS at 23:03

## 2020-01-01 RX ADMIN — IPRATROPIUM BROMIDE AND ALBUTEROL SULFATE 3 ML: .5; 3 SOLUTION RESPIRATORY (INHALATION) at 20:32

## 2020-01-01 RX ADMIN — DEXTROSE MONOHYDRATE 10 MG/HR: 50 INJECTION, SOLUTION INTRAVENOUS at 19:30

## 2020-01-01 RX ADMIN — BUDESONIDE 500 MCG: 0.5 SUSPENSION RESPIRATORY (INHALATION) at 19:12

## 2020-01-01 RX ADMIN — SEVELAMER CARBONATE 800 MG: 800 TABLET, FILM COATED ORAL at 13:02

## 2020-01-01 RX ADMIN — DOCUSATE SODIUM AND SENNOSIDES 1 TABLET: 8.6; 5 TABLET, FILM COATED ORAL at 11:19

## 2020-01-01 RX ADMIN — CASTOR OIL AND BALSAM, PERU: 788; 87 OINTMENT TOPICAL at 19:39

## 2020-01-01 RX ADMIN — METHYLPREDNISOLONE SODIUM SUCCINATE 30 MG: 40 INJECTION, POWDER, FOR SOLUTION INTRAMUSCULAR; INTRAVENOUS at 21:48

## 2020-01-01 RX ADMIN — ARFORMOTEROL TARTRATE 15 MCG: 15 SOLUTION RESPIRATORY (INHALATION) at 23:22

## 2020-01-01 RX ADMIN — Medication 50 MCG/HR: at 15:00

## 2020-01-01 RX ADMIN — FOLIC ACID 1 MG: 1 TABLET ORAL at 10:30

## 2020-01-01 RX ADMIN — Medication 10 ML: at 21:40

## 2020-01-01 RX ADMIN — OXYCODONE 5 MG: 5 TABLET ORAL at 17:42

## 2020-01-01 RX ADMIN — OXYCODONE HYDROCHLORIDE 10 MG: 5 TABLET ORAL at 04:24

## 2020-01-01 RX ADMIN — ALBUTEROL SULFATE 2.5 MG: 2.5 SOLUTION RESPIRATORY (INHALATION) at 12:10

## 2020-01-01 RX ADMIN — HEPARIN SODIUM 5000 UNITS: 5000 INJECTION INTRAVENOUS; SUBCUTANEOUS at 13:33

## 2020-01-01 RX ADMIN — DOCUSATE SODIUM AND SENNOSIDES 1 TABLET: 8.6; 5 TABLET, FILM COATED ORAL at 10:00

## 2020-01-01 RX ADMIN — DOCUSATE SODIUM AND SENNOSIDES 1 TABLET: 8.6; 5 TABLET, FILM COATED ORAL at 12:56

## 2020-01-01 RX ADMIN — HUMAN INSULIN 25 UNITS: 100 INJECTION, SUSPENSION SUBCUTANEOUS at 10:50

## 2020-01-01 RX ADMIN — OXYCODONE HYDROCHLORIDE AND ACETAMINOPHEN 1 TABLET: 10; 325 TABLET ORAL at 11:30

## 2020-01-01 RX ADMIN — HEPARIN SODIUM 5000 UNITS: 5000 INJECTION INTRAVENOUS; SUBCUTANEOUS at 21:52

## 2020-01-01 RX ADMIN — CEFEPIME HYDROCHLORIDE 1 G: 1 INJECTION, POWDER, FOR SOLUTION INTRAMUSCULAR; INTRAVENOUS at 18:24

## 2020-01-01 RX ADMIN — OXYCODONE HYDROCHLORIDE AND ACETAMINOPHEN 1 TABLET: 10; 325 TABLET ORAL at 21:26

## 2020-01-01 RX ADMIN — CARVEDILOL 6.25 MG: 6.25 TABLET, FILM COATED ORAL at 09:03

## 2020-01-01 RX ADMIN — OXYCODONE HYDROCHLORIDE AND ACETAMINOPHEN 1 TABLET: 10; 325 TABLET ORAL at 18:50

## 2020-01-01 RX ADMIN — CASTOR OIL AND BALSAM, PERU: 788; 87 OINTMENT TOPICAL at 13:58

## 2020-01-01 RX ADMIN — OXYCODONE 5 MG: 5 TABLET ORAL at 19:06

## 2020-01-01 RX ADMIN — HEPARIN SODIUM 5000 UNITS: 5000 INJECTION INTRAVENOUS; SUBCUTANEOUS at 05:02

## 2020-01-01 RX ADMIN — DOCUSATE SODIUM AND SENNOSIDES 1 TABLET: 8.6; 5 TABLET, FILM COATED ORAL at 09:02

## 2020-01-01 RX ADMIN — CEFEPIME HYDROCHLORIDE 1 G: 1 INJECTION, POWDER, FOR SOLUTION INTRAMUSCULAR; INTRAVENOUS at 17:28

## 2020-01-01 RX ADMIN — HYDROMORPHONE HYDROCHLORIDE 0.5 MG: 1 INJECTION, SOLUTION INTRAMUSCULAR; INTRAVENOUS; SUBCUTANEOUS at 18:01

## 2020-01-01 RX ADMIN — DILTIAZEM HYDROCHLORIDE 240 MG: 120 CAPSULE, COATED, EXTENDED RELEASE ORAL at 09:07

## 2020-01-01 RX ADMIN — METHYLPREDNISOLONE SODIUM SUCCINATE 40 MG: 40 INJECTION, POWDER, FOR SOLUTION INTRAMUSCULAR; INTRAVENOUS at 17:54

## 2020-01-01 RX ADMIN — ACETYLCYSTEINE 200 MG: 200 SOLUTION ORAL; RESPIRATORY (INHALATION) at 14:39

## 2020-01-01 RX ADMIN — MULTIPLE VITAMINS W/ MINERALS TAB 1 TABLET: TAB at 09:29

## 2020-01-01 RX ADMIN — ACETAMINOPHEN 650 MG: 325 TABLET ORAL at 10:25

## 2020-01-01 RX ADMIN — THERA TABS 1 TABLET: TAB at 10:30

## 2020-01-01 RX ADMIN — INSULIN LISPRO 7 UNITS: 100 INJECTION, SOLUTION INTRAVENOUS; SUBCUTANEOUS at 12:24

## 2020-01-01 RX ADMIN — NEPHROCAP 1 CAPSULE: 1 CAP ORAL at 09:31

## 2020-01-01 RX ADMIN — OXYCODONE AND ACETAMINOPHEN 2 TABLET: 5; 325 TABLET ORAL at 04:15

## 2020-01-01 RX ADMIN — DOCUSATE SODIUM AND SENNOSIDES 1 TABLET: 8.6; 5 TABLET, FILM COATED ORAL at 09:21

## 2020-01-01 RX ADMIN — Medication 10 ML: at 13:29

## 2020-01-01 RX ADMIN — CARVEDILOL 12.5 MG: 12.5 TABLET, FILM COATED ORAL at 18:00

## 2020-01-01 RX ADMIN — IPRATROPIUM BROMIDE AND ALBUTEROL SULFATE 3 ML: .5; 3 SOLUTION RESPIRATORY (INHALATION) at 16:00

## 2020-01-01 RX ADMIN — IPRATROPIUM BROMIDE 0.5 MG: 0.5 SOLUTION RESPIRATORY (INHALATION) at 02:40

## 2020-01-01 RX ADMIN — OXYCODONE HYDROCHLORIDE AND ACETAMINOPHEN 1 TABLET: 10; 325 TABLET ORAL at 21:52

## 2020-01-01 RX ADMIN — Medication 10 ML: at 21:28

## 2020-01-01 RX ADMIN — DILTIAZEM HYDROCHLORIDE 180 MG: 180 CAPSULE, COATED, EXTENDED RELEASE ORAL at 09:44

## 2020-01-01 RX ADMIN — LEVALBUTEROL HYDROCHLORIDE 0.63 MG: 0.63 SOLUTION RESPIRATORY (INHALATION) at 20:50

## 2020-01-01 RX ADMIN — NYSTATIN 500000 UNITS: 100000 SUSPENSION ORAL at 21:54

## 2020-01-01 RX ADMIN — BUDESONIDE 250 MCG: 0.25 INHALANT RESPIRATORY (INHALATION) at 08:46

## 2020-01-01 RX ADMIN — OXYCODONE AND ACETAMINOPHEN 2 TABLET: 5; 325 TABLET ORAL at 13:39

## 2020-01-01 RX ADMIN — GUAIFENESIN 600 MG: 600 TABLET, EXTENDED RELEASE ORAL at 21:44

## 2020-01-01 RX ADMIN — Medication 10 ML: at 07:12

## 2020-01-01 RX ADMIN — NYSTATIN 500000 UNITS: 100000 SUSPENSION ORAL at 17:35

## 2020-01-01 RX ADMIN — HEPARIN SODIUM 5000 UNITS: 5000 INJECTION INTRAVENOUS; SUBCUTANEOUS at 03:39

## 2020-01-01 RX ADMIN — SEVELAMER CARBONATE 1600 MG: 800 TABLET, FILM COATED ORAL at 13:26

## 2020-01-01 RX ADMIN — IPRATROPIUM BROMIDE AND ALBUTEROL SULFATE 3 ML: .5; 3 SOLUTION RESPIRATORY (INHALATION) at 08:35

## 2020-01-01 RX ADMIN — THERA TABS 1 TABLET: TAB at 09:02

## 2020-01-01 RX ADMIN — FUROSEMIDE 80 MG: 40 TABLET ORAL at 13:02

## 2020-01-01 RX ADMIN — HUMAN INSULIN 15 UNITS: 100 INJECTION, SUSPENSION SUBCUTANEOUS at 19:44

## 2020-01-01 RX ADMIN — AZITHROMYCIN MONOHYDRATE 500 MG: 500 INJECTION, POWDER, LYOPHILIZED, FOR SOLUTION INTRAVENOUS at 18:00

## 2020-01-01 RX ADMIN — BUDESONIDE 500 MCG: 0.5 SUSPENSION RESPIRATORY (INHALATION) at 20:13

## 2020-01-01 RX ADMIN — Medication 40 ML: at 22:00

## 2020-01-01 RX ADMIN — NYSTATIN 500000 UNITS: 100000 SUSPENSION ORAL at 13:14

## 2020-01-01 RX ADMIN — SEVELAMER CARBONATE 1600 MG: 800 TABLET, FILM COATED ORAL at 09:03

## 2020-01-01 RX ADMIN — METHYLPREDNISOLONE SODIUM SUCCINATE 20 MG: 40 INJECTION, POWDER, FOR SOLUTION INTRAMUSCULAR; INTRAVENOUS at 13:19

## 2020-01-01 RX ADMIN — IPRATROPIUM BROMIDE AND ALBUTEROL SULFATE 3 ML: .5; 3 SOLUTION RESPIRATORY (INHALATION) at 08:52

## 2020-01-01 RX ADMIN — HEPARIN SODIUM 5000 UNITS: 5000 INJECTION INTRAVENOUS; SUBCUTANEOUS at 07:15

## 2020-01-01 RX ADMIN — IPRATROPIUM BROMIDE AND ALBUTEROL SULFATE 3 ML: .5; 3 SOLUTION RESPIRATORY (INHALATION) at 08:18

## 2020-01-01 RX ADMIN — CASTOR OIL AND BALSAM, PERU: 788; 87 OINTMENT TOPICAL at 18:50

## 2020-01-01 RX ADMIN — HYDRALAZINE HYDROCHLORIDE 10 MG: 20 INJECTION INTRAMUSCULAR; INTRAVENOUS at 18:11

## 2020-01-01 RX ADMIN — ARFORMOTEROL TARTRATE 15 MCG: 15 SOLUTION RESPIRATORY (INHALATION) at 19:37

## 2020-01-01 RX ADMIN — ARFORMOTEROL TARTRATE 15 MCG: 15 SOLUTION RESPIRATORY (INHALATION) at 20:11

## 2020-01-01 RX ADMIN — DEXTROSE MONOHYDRATE 25 G: 500 INJECTION PARENTERAL at 12:47

## 2020-01-01 RX ADMIN — SEVELAMER CARBONATE 1600 MG: 800 TABLET, FILM COATED ORAL at 18:52

## 2020-01-01 RX ADMIN — ACETAMINOPHEN 650 MG: 325 TABLET ORAL at 05:37

## 2020-01-01 RX ADMIN — IOPAMIDOL 100 ML: 755 INJECTION, SOLUTION INTRAVENOUS at 12:42

## 2020-01-01 RX ADMIN — ARFORMOTEROL TARTRATE 15 MCG: 15 SOLUTION RESPIRATORY (INHALATION) at 08:20

## 2020-01-01 RX ADMIN — HYDROCODONE BITARTRATE AND ACETAMINOPHEN 1 TABLET: 10; 325 TABLET ORAL at 13:52

## 2020-01-01 RX ADMIN — HEPARIN SODIUM 5000 UNITS: 5000 INJECTION INTRAVENOUS; SUBCUTANEOUS at 07:11

## 2020-01-01 RX ADMIN — SODIUM CHLORIDE 1000 ML: 900 INJECTION, SOLUTION INTRAVENOUS at 16:32

## 2020-01-01 RX ADMIN — Medication 100 MG: at 08:58

## 2020-01-01 RX ADMIN — SEVELAMER CARBONATE 800 MG: 800 TABLET, FILM COATED ORAL at 21:57

## 2020-01-01 RX ADMIN — Medication 100 MG: at 09:31

## 2020-01-01 RX ADMIN — HYDROCODONE BITARTRATE AND ACETAMINOPHEN 1 TABLET: 10; 325 TABLET ORAL at 11:05

## 2020-01-01 RX ADMIN — NYSTATIN 500000 UNITS: 100000 SUSPENSION ORAL at 18:09

## 2020-01-01 RX ADMIN — Medication: at 10:18

## 2020-01-01 RX ADMIN — Medication: at 08:59

## 2020-01-01 RX ADMIN — OXYCODONE 5 MG: 5 TABLET ORAL at 05:30

## 2020-01-01 RX ADMIN — PHENOBARBITAL SODIUM 65 MG: 65 INJECTION INTRAMUSCULAR at 17:53

## 2020-01-01 RX ADMIN — ALBUTEROL SULFATE 2.5 MG: 2.5 SOLUTION RESPIRATORY (INHALATION) at 11:28

## 2020-01-01 RX ADMIN — DILTIAZEM HYDROCHLORIDE 240 MG: 240 CAPSULE, COATED, EXTENDED RELEASE ORAL at 10:28

## 2020-01-01 RX ADMIN — PANTOPRAZOLE SODIUM 40 MG: 40 TABLET, DELAYED RELEASE ORAL at 09:31

## 2020-01-01 RX ADMIN — DOCUSATE SODIUM AND SENNOSIDES 1 TABLET: 8.6; 5 TABLET, FILM COATED ORAL at 09:31

## 2020-01-01 RX ADMIN — HEPARIN SODIUM 5000 UNITS: 5000 INJECTION INTRAVENOUS; SUBCUTANEOUS at 21:41

## 2020-01-01 RX ADMIN — METRONIDAZOLE 500 MG: 500 INJECTION, SOLUTION INTRAVENOUS at 08:17

## 2020-01-01 RX ADMIN — HYDROMORPHONE HYDROCHLORIDE 0.5 MG: 1 INJECTION, SOLUTION INTRAMUSCULAR; INTRAVENOUS; SUBCUTANEOUS at 12:45

## 2020-01-01 RX ADMIN — Medication 100 MG: at 12:33

## 2020-01-01 RX ADMIN — DEXTROSE MONOHYDRATE 25 G: 25 INJECTION, SOLUTION INTRAVENOUS at 09:02

## 2020-01-01 RX ADMIN — METHYLPREDNISOLONE SODIUM SUCCINATE 60 MG: 40 INJECTION, POWDER, FOR SOLUTION INTRAMUSCULAR; INTRAVENOUS at 21:25

## 2020-01-01 RX ADMIN — OXYCODONE HYDROCHLORIDE AND ACETAMINOPHEN 1 TABLET: 10; 325 TABLET ORAL at 23:03

## 2020-01-01 RX ADMIN — LEVALBUTEROL HYDROCHLORIDE 0.63 MG: 0.63 SOLUTION RESPIRATORY (INHALATION) at 13:18

## 2020-01-01 RX ADMIN — CARVEDILOL 6.25 MG: 6.25 TABLET, FILM COATED ORAL at 18:14

## 2020-01-01 RX ADMIN — BUDESONIDE 250 MCG: 0.25 INHALANT RESPIRATORY (INHALATION) at 09:13

## 2020-01-01 RX ADMIN — DILTIAZEM HYDROCHLORIDE 240 MG: 120 CAPSULE, COATED, EXTENDED RELEASE ORAL at 09:29

## 2020-01-01 RX ADMIN — NEPHROCAP 1 CAPSULE: 1 CAP ORAL at 08:56

## 2020-01-01 RX ADMIN — BUDESONIDE 500 MCG: 0.5 SUSPENSION RESPIRATORY (INHALATION) at 19:37

## 2020-01-01 RX ADMIN — NEPHROCAP 1 CAPSULE: 1 CAP ORAL at 08:38

## 2020-01-01 RX ADMIN — DILTIAZEM HYDROCHLORIDE 180 MG: 180 CAPSULE, COATED, EXTENDED RELEASE ORAL at 13:20

## 2020-01-01 RX ADMIN — SEVELAMER CARBONATE 800 MG: 800 TABLET, FILM COATED ORAL at 11:19

## 2020-01-01 RX ADMIN — GUAIFENESIN 600 MG: 600 TABLET, EXTENDED RELEASE ORAL at 08:57

## 2020-01-01 RX ADMIN — FOLIC ACID 1 MG: 1 TABLET ORAL at 13:21

## 2020-01-01 RX ADMIN — HYDROCODONE BITARTRATE AND ACETAMINOPHEN 1 TABLET: 10; 325 TABLET ORAL at 11:19

## 2020-01-01 RX ADMIN — OXYCODONE HYDROCHLORIDE AND ACETAMINOPHEN 1 TABLET: 10; 325 TABLET ORAL at 08:58

## 2020-01-01 RX ADMIN — ACETYLCYSTEINE 200 MG: 200 SOLUTION ORAL; RESPIRATORY (INHALATION) at 20:10

## 2020-01-01 RX ADMIN — ARFORMOTEROL TARTRATE 15 MCG: 15 SOLUTION RESPIRATORY (INHALATION) at 20:28

## 2020-01-01 RX ADMIN — HEPARIN SODIUM 5000 UNITS: 5000 INJECTION INTRAVENOUS; SUBCUTANEOUS at 22:58

## 2020-01-01 RX ADMIN — BUDESONIDE 500 MCG: 0.5 SUSPENSION RESPIRATORY (INHALATION) at 21:37

## 2020-01-01 RX ADMIN — BUDESONIDE 250 MCG: 0.25 INHALANT RESPIRATORY (INHALATION) at 20:10

## 2020-01-01 RX ADMIN — INSULIN LISPRO 5 UNITS: 100 INJECTION, SOLUTION INTRAVENOUS; SUBCUTANEOUS at 07:25

## 2020-01-01 RX ADMIN — Medication 10 ML: at 20:52

## 2020-01-01 RX ADMIN — DEXTROSE MONOHYDRATE 335.52 MG: 50 INJECTION, SOLUTION INTRAVENOUS at 13:03

## 2020-01-01 RX ADMIN — CARVEDILOL 25 MG: 12.5 TABLET, FILM COATED ORAL at 09:31

## 2020-01-01 RX ADMIN — ARFORMOTEROL TARTRATE 15 MCG: 15 SOLUTION RESPIRATORY (INHALATION) at 21:37

## 2020-01-01 RX ADMIN — IPRATROPIUM BROMIDE AND ALBUTEROL SULFATE 3 ML: .5; 3 SOLUTION RESPIRATORY (INHALATION) at 21:24

## 2020-01-01 RX ADMIN — PHENOBARBITAL SODIUM 65 MG: 65 INJECTION INTRAMUSCULAR at 22:24

## 2020-01-01 RX ADMIN — ALBUMIN (HUMAN) 25 G: 0.25 INJECTION, SOLUTION INTRAVENOUS at 16:09

## 2020-01-01 RX ADMIN — Medication 10 ML: at 21:38

## 2020-01-01 RX ADMIN — FOLIC ACID 1 MG: 1 TABLET ORAL at 08:56

## 2020-01-01 RX ADMIN — ARFORMOTEROL TARTRATE 15 MCG: 15 SOLUTION RESPIRATORY (INHALATION) at 21:04

## 2020-01-01 RX ADMIN — METHYLPREDNISOLONE SODIUM SUCCINATE 20 MG: 40 INJECTION, POWDER, FOR SOLUTION INTRAMUSCULAR; INTRAVENOUS at 21:51

## 2020-01-01 RX ADMIN — DOCUSATE SODIUM AND SENNOSIDES 1 TABLET: 8.6; 5 TABLET, FILM COATED ORAL at 12:33

## 2020-01-01 RX ADMIN — HUMAN INSULIN 15 UNITS: 100 INJECTION, SUSPENSION SUBCUTANEOUS at 11:06

## 2020-01-01 RX ADMIN — POLYETHYLENE GLYCOL 3350 17 G: 17 POWDER, FOR SOLUTION ORAL at 17:39

## 2020-01-01 RX ADMIN — INSULIN LISPRO 2 UNITS: 100 INJECTION, SOLUTION INTRAVENOUS; SUBCUTANEOUS at 17:38

## 2020-01-01 RX ADMIN — LEVALBUTEROL HYDROCHLORIDE 0.63 MG: 0.63 SOLUTION RESPIRATORY (INHALATION) at 19:45

## 2020-01-01 RX ADMIN — PANTOPRAZOLE SODIUM 40 MG: 40 TABLET, DELAYED RELEASE ORAL at 09:02

## 2020-01-01 RX ADMIN — LEVALBUTEROL HYDROCHLORIDE 0.63 MG: 0.63 SOLUTION RESPIRATORY (INHALATION) at 15:41

## 2020-01-01 RX ADMIN — DESMOPRESSIN ACETATE 2 MCG: 4 SOLUTION INTRAVENOUS at 20:51

## 2020-01-01 RX ADMIN — METHYLPREDNISOLONE SODIUM SUCCINATE 60 MG: 40 INJECTION, POWDER, FOR SOLUTION INTRAMUSCULAR; INTRAVENOUS at 16:46

## 2020-01-01 RX ADMIN — NYSTATIN 500000 UNITS: 100000 SUSPENSION ORAL at 12:19

## 2020-01-01 RX ADMIN — Medication 10 ML: at 06:12

## 2020-01-01 RX ADMIN — OXYCODONE HYDROCHLORIDE AND ACETAMINOPHEN 1 TABLET: 10; 325 TABLET ORAL at 21:41

## 2020-01-01 RX ADMIN — NYSTATIN 500000 UNITS: 100000 SUSPENSION ORAL at 10:28

## 2020-01-01 RX ADMIN — SEVELAMER CARBONATE 1600 MG: 800 TABLET, FILM COATED ORAL at 21:37

## 2020-01-01 RX ADMIN — SEVELAMER CARBONATE 1600 MG: 800 TABLET, FILM COATED ORAL at 12:41

## 2020-01-01 RX ADMIN — ALBUTEROL SULFATE 2.5 MG: 2.5 SOLUTION RESPIRATORY (INHALATION) at 19:45

## 2020-01-01 RX ADMIN — INSULIN LISPRO 4 UNITS: 100 INJECTION, SOLUTION INTRAVENOUS; SUBCUTANEOUS at 09:37

## 2020-01-01 RX ADMIN — METHYLPREDNISOLONE SODIUM SUCCINATE 20 MG: 40 INJECTION, POWDER, FOR SOLUTION INTRAMUSCULAR; INTRAVENOUS at 22:31

## 2020-01-01 RX ADMIN — Medication 10 ML: at 21:14

## 2020-01-01 RX ADMIN — PHENOBARBITAL SODIUM 65 MG: 65 INJECTION INTRAMUSCULAR at 21:57

## 2020-01-01 RX ADMIN — PANTOPRAZOLE SODIUM 40 MG: 40 TABLET, DELAYED RELEASE ORAL at 06:26

## 2020-01-01 RX ADMIN — SEVELAMER CARBONATE 1600 MG: 800 TABLET, FILM COATED ORAL at 10:06

## 2020-01-01 RX ADMIN — SEVELAMER CARBONATE 1600 MG: 800 TABLET, FILM COATED ORAL at 18:31

## 2020-01-01 RX ADMIN — LEVALBUTEROL HYDROCHLORIDE 0.63 MG: 0.63 SOLUTION RESPIRATORY (INHALATION) at 07:19

## 2020-01-01 RX ADMIN — ARFORMOTEROL TARTRATE 15 MCG: 15 SOLUTION RESPIRATORY (INHALATION) at 08:25

## 2020-01-01 RX ADMIN — Medication: at 17:53

## 2020-01-01 RX ADMIN — HEPARIN SODIUM 5000 UNITS: 5000 INJECTION INTRAVENOUS; SUBCUTANEOUS at 05:44

## 2020-01-01 RX ADMIN — HYDROCODONE BITARTRATE AND ACETAMINOPHEN 1 TABLET: 10; 325 TABLET ORAL at 12:36

## 2020-01-01 RX ADMIN — GABAPENTIN 300 MG: 300 CAPSULE ORAL at 23:43

## 2020-01-01 RX ADMIN — SEVELAMER CARBONATE 800 MG: 800 TABLET, FILM COATED ORAL at 21:02

## 2020-01-01 RX ADMIN — BUDESONIDE 500 MCG: 0.5 SUSPENSION RESPIRATORY (INHALATION) at 09:00

## 2020-01-01 RX ADMIN — NYSTATIN 500000 UNITS: 100000 SUSPENSION ORAL at 14:36

## 2020-01-01 RX ADMIN — HEPARIN SODIUM 5000 UNITS: 5000 INJECTION INTRAVENOUS; SUBCUTANEOUS at 00:00

## 2020-01-01 RX ADMIN — SEVELAMER CARBONATE 800 MG: 800 TABLET, FILM COATED ORAL at 09:21

## 2020-01-01 RX ADMIN — HYDROCODONE BITARTRATE AND ACETAMINOPHEN 1 TABLET: 10; 325 TABLET ORAL at 16:26

## 2020-01-01 RX ADMIN — HEPARIN SODIUM 5000 UNITS: 5000 INJECTION INTRAVENOUS; SUBCUTANEOUS at 05:25

## 2020-01-01 RX ADMIN — GUAIFENESIN 200 MG: 200 SOLUTION ORAL at 17:35

## 2020-01-01 RX ADMIN — ARFORMOTEROL TARTRATE 15 MCG: 15 SOLUTION RESPIRATORY (INHALATION) at 20:07

## 2020-01-01 RX ADMIN — ARFORMOTEROL TARTRATE 15 MCG: 15 SOLUTION RESPIRATORY (INHALATION) at 08:35

## 2020-01-01 RX ADMIN — SEVELAMER CARBONATE 1600 MG: 800 TABLET, FILM COATED ORAL at 13:14

## 2020-01-01 RX ADMIN — SEVELAMER CARBONATE 1600 MG: 800 TABLET, FILM COATED ORAL at 18:17

## 2020-01-01 RX ADMIN — Medication 10 ML: at 05:45

## 2020-01-01 RX ADMIN — IPRATROPIUM BROMIDE AND ALBUTEROL SULFATE 3 ML: .5; 3 SOLUTION RESPIRATORY (INHALATION) at 08:30

## 2020-01-01 RX ADMIN — HUMAN INSULIN 10 UNITS: 100 INJECTION, SOLUTION SUBCUTANEOUS at 08:58

## 2020-01-01 RX ADMIN — ACETYLCYSTEINE 200 MG: 200 SOLUTION ORAL; RESPIRATORY (INHALATION) at 20:50

## 2020-01-01 RX ADMIN — HEPARIN SODIUM AND DEXTROSE 18 UNITS/KG/HR: 10000; 5 INJECTION INTRAVENOUS at 16:37

## 2020-01-01 RX ADMIN — CARVEDILOL 25 MG: 12.5 TABLET, FILM COATED ORAL at 08:17

## 2020-01-01 RX ADMIN — ACETYLCYSTEINE 200 MG: 200 SOLUTION ORAL; RESPIRATORY (INHALATION) at 08:18

## 2020-01-01 RX ADMIN — NEPHROCAP 1 CAPSULE: 1 CAP ORAL at 09:07

## 2020-01-01 RX ADMIN — NYSTATIN 500000 UNITS: 100000 SUSPENSION ORAL at 21:44

## 2020-01-01 RX ADMIN — OXYCODONE HYDROCHLORIDE AND ACETAMINOPHEN 1 TABLET: 10; 325 TABLET ORAL at 17:05

## 2020-01-01 RX ADMIN — BUDESONIDE 250 MCG: 0.25 INHALANT RESPIRATORY (INHALATION) at 20:50

## 2020-01-01 RX ADMIN — NEPHROCAP 1 CAPSULE: 1 CAP ORAL at 09:21

## 2020-01-01 RX ADMIN — HEPARIN SODIUM 5000 UNITS: 5000 INJECTION INTRAVENOUS; SUBCUTANEOUS at 06:16

## 2020-01-01 RX ADMIN — ACETYLCYSTEINE 200 MG: 200 SOLUTION ORAL; RESPIRATORY (INHALATION) at 20:25

## 2020-01-01 RX ADMIN — FOLIC ACID 1 MG: 1 TABLET ORAL at 13:58

## 2020-01-01 RX ADMIN — MORPHINE SULFATE 2 MG: 2 INJECTION, SOLUTION INTRAMUSCULAR; INTRAVENOUS at 07:26

## 2020-01-01 RX ADMIN — ACETYLCYSTEINE 200 MG: 200 SOLUTION ORAL; RESPIRATORY (INHALATION) at 13:29

## 2020-01-01 RX ADMIN — LEVALBUTEROL HYDROCHLORIDE 0.63 MG: 0.63 SOLUTION RESPIRATORY (INHALATION) at 15:22

## 2020-01-01 RX ADMIN — HEPARIN SODIUM 5000 UNITS: 5000 INJECTION INTRAVENOUS; SUBCUTANEOUS at 21:28

## 2020-01-01 RX ADMIN — INSULIN LISPRO 2 UNITS: 100 INJECTION, SOLUTION INTRAVENOUS; SUBCUTANEOUS at 23:01

## 2020-01-01 RX ADMIN — SEVELAMER CARBONATE 1600 MG: 800 TABLET, FILM COATED ORAL at 09:57

## 2020-01-01 RX ADMIN — HYDROCODONE BITARTRATE AND ACETAMINOPHEN 1 TABLET: 10; 325 TABLET ORAL at 08:53

## 2020-01-01 RX ADMIN — INSULIN LISPRO 2 UNITS: 100 INJECTION, SOLUTION INTRAVENOUS; SUBCUTANEOUS at 18:12

## 2020-01-01 RX ADMIN — OXYCODONE HYDROCHLORIDE AND ACETAMINOPHEN 1 TABLET: 10; 325 TABLET ORAL at 18:17

## 2020-01-01 RX ADMIN — MULTIPLE VITAMINS W/ MINERALS TAB 1 TABLET: TAB at 12:19

## 2020-01-01 RX ADMIN — DEXTROSE MONOHYDRATE 7.5 MG/HR: 50 INJECTION, SOLUTION INTRAVENOUS at 02:40

## 2020-01-01 RX ADMIN — PIPERACILLIN AND TAZOBACTAM 3.38 G: 3; .375 INJECTION, POWDER, LYOPHILIZED, FOR SOLUTION INTRAVENOUS at 12:45

## 2020-01-01 RX ADMIN — PREDNISONE 20 MG: 20 TABLET ORAL at 09:22

## 2020-01-01 RX ADMIN — AZITHROMYCIN MONOHYDRATE 500 MG: 500 INJECTION, POWDER, LYOPHILIZED, FOR SOLUTION INTRAVENOUS at 13:52

## 2020-01-01 RX ADMIN — METHYLPREDNISOLONE SODIUM SUCCINATE 60 MG: 40 INJECTION, POWDER, FOR SOLUTION INTRAMUSCULAR; INTRAVENOUS at 21:32

## 2020-01-01 RX ADMIN — NYSTATIN 500000 UNITS: 100000 SUSPENSION ORAL at 10:41

## 2020-01-01 RX ADMIN — Medication 10 ML: at 23:04

## 2020-01-01 RX ADMIN — FOLIC ACID 1 MG: 1 TABLET ORAL at 17:59

## 2020-01-01 RX ADMIN — SEVELAMER CARBONATE 1600 MG: 800 TABLET, FILM COATED ORAL at 10:41

## 2020-01-01 RX ADMIN — LABETALOL HYDROCHLORIDE 20 MG: 5 INJECTION INTRAVENOUS at 14:16

## 2020-01-01 RX ADMIN — CASTOR OIL AND BALSAM, PERU: 788; 87 OINTMENT TOPICAL at 10:41

## 2020-01-01 RX ADMIN — NYSTATIN 500000 UNITS: 100000 SUSPENSION ORAL at 21:26

## 2020-01-01 RX ADMIN — Medication: at 09:32

## 2020-01-01 RX ADMIN — SEVELAMER CARBONATE 1600 MG: 800 TABLET, FILM COATED ORAL at 11:39

## 2020-01-01 RX ADMIN — HYDROCODONE BITARTRATE AND ACETAMINOPHEN 1 TABLET: 10; 325 TABLET ORAL at 19:43

## 2020-01-01 RX ADMIN — NYSTATIN 500000 UNITS: 100000 SUSPENSION ORAL at 23:03

## 2020-01-01 RX ADMIN — NYSTATIN 500000 UNITS: 100000 SUSPENSION ORAL at 18:50

## 2020-01-01 RX ADMIN — SEVELAMER CARBONATE 800 MG: 800 TABLET, FILM COATED ORAL at 15:20

## 2020-01-01 RX ADMIN — ALBUTEROL SULFATE 2.5 MG: 2.5 SOLUTION RESPIRATORY (INHALATION) at 03:24

## 2020-01-01 RX ADMIN — MULTIPLE VITAMINS W/ MINERALS TAB 1 TABLET: TAB at 09:07

## 2020-01-01 RX ADMIN — DILTIAZEM HYDROCHLORIDE 180 MG: 180 CAPSULE, COATED, EXTENDED RELEASE ORAL at 07:31

## 2020-01-01 RX ADMIN — METRONIDAZOLE 500 MG: 500 INJECTION, SOLUTION INTRAVENOUS at 22:26

## 2020-01-01 RX ADMIN — THERA TABS 1 TABLET: TAB at 18:02

## 2020-01-01 RX ADMIN — OXYCODONE HYDROCHLORIDE AND ACETAMINOPHEN 1 TABLET: 10; 325 TABLET ORAL at 17:36

## 2020-01-01 RX ADMIN — NOREPINEPHRINE BITARTRATE 25 MCG/MIN: 1 INJECTION, SOLUTION, CONCENTRATE INTRAVENOUS at 06:32

## 2020-01-01 RX ADMIN — PHENOBARBITAL SODIUM 65 MG: 65 INJECTION INTRAMUSCULAR at 05:37

## 2020-01-01 RX ADMIN — NYSTATIN 500000 UNITS: 100000 SUSPENSION ORAL at 13:20

## 2020-01-01 RX ADMIN — BENZONATATE 100 MG: 100 CAPSULE ORAL at 06:18

## 2020-01-01 RX ADMIN — Medication 150 MCG/HR: at 12:40

## 2020-01-01 RX ADMIN — OXYCODONE HYDROCHLORIDE AND ACETAMINOPHEN 1 TABLET: 10; 325 TABLET ORAL at 22:03

## 2020-01-01 RX ADMIN — CARVEDILOL 12.5 MG: 12.5 TABLET, FILM COATED ORAL at 08:37

## 2020-01-01 RX ADMIN — Medication 100 MG: at 09:21

## 2020-01-01 RX ADMIN — HYDROCODONE BITARTRATE AND ACETAMINOPHEN 1 TABLET: 10; 325 TABLET ORAL at 17:26

## 2020-01-01 RX ADMIN — PREDNISONE 40 MG: 20 TABLET ORAL at 10:28

## 2020-01-01 RX ADMIN — SEVELAMER CARBONATE 800 MG: 800 TABLET, FILM COATED ORAL at 09:44

## 2020-01-01 RX ADMIN — METHYLPREDNISOLONE SODIUM SUCCINATE 40 MG: 40 INJECTION, POWDER, FOR SOLUTION INTRAMUSCULAR; INTRAVENOUS at 05:36

## 2020-01-01 RX ADMIN — PANTOPRAZOLE SODIUM 40 MG: 40 TABLET, DELAYED RELEASE ORAL at 12:32

## 2020-01-01 RX ADMIN — GUAIFENESIN 600 MG: 600 TABLET, EXTENDED RELEASE ORAL at 21:48

## 2020-01-01 RX ADMIN — SEVELAMER CARBONATE 800 MG: 800 TABLET, FILM COATED ORAL at 18:16

## 2020-01-01 RX ADMIN — DEXTROSE MONOHYDRATE 335.52 MG: 50 INJECTION, SOLUTION INTRAVENOUS at 17:40

## 2020-01-01 RX ADMIN — METHYLPREDNISOLONE SODIUM SUCCINATE 40 MG: 40 INJECTION, POWDER, FOR SOLUTION INTRAMUSCULAR; INTRAVENOUS at 13:22

## 2020-01-01 RX ADMIN — CASTOR OIL AND BALSAM, PERU: 788; 87 OINTMENT TOPICAL at 18:12

## 2020-01-01 RX ADMIN — ACETYLCYSTEINE 200 MG: 200 SOLUTION ORAL; RESPIRATORY (INHALATION) at 07:18

## 2020-01-01 RX ADMIN — HYDROCODONE BITARTRATE AND ACETAMINOPHEN 1 TABLET: 10; 325 TABLET ORAL at 10:28

## 2020-01-01 RX ADMIN — GUAIFENESIN 600 MG: 600 TABLET, EXTENDED RELEASE ORAL at 09:44

## 2020-01-01 RX ADMIN — SEVELAMER CARBONATE 1600 MG: 800 TABLET, FILM COATED ORAL at 21:26

## 2020-01-01 RX ADMIN — ARFORMOTEROL TARTRATE 15 MCG: 15 SOLUTION RESPIRATORY (INHALATION) at 07:29

## 2020-01-01 RX ADMIN — ALBUTEROL SULFATE 2.5 MG: 2.5 SOLUTION RESPIRATORY (INHALATION) at 23:05

## 2020-01-01 RX ADMIN — THERA TABS 1 TABLET: TAB at 09:56

## 2020-01-01 RX ADMIN — SEVELAMER CARBONATE 1600 MG: 800 TABLET, FILM COATED ORAL at 17:34

## 2020-01-01 RX ADMIN — IPRATROPIUM BROMIDE AND ALBUTEROL SULFATE 3 ML: .5; 3 SOLUTION RESPIRATORY (INHALATION) at 12:27

## 2020-01-01 RX ADMIN — PREDNISONE 10 MG: 10 TABLET ORAL at 08:53

## 2020-01-13 PROBLEM — J44.1 COPD EXACERBATION (HCC): Status: ACTIVE | Noted: 2020-01-01

## 2020-01-13 NOTE — PROGRESS NOTES
Reason for Admission:   COPD exacerbation                  RRAT Score:     19 moderate risk             Do you (patient/family) have any concerns for transition/discharge? No concerns at this time              Plan for utilizing home health:   Has used home health in the past    Current Advanced Directive/Advance Care Plan:  none            Transition of Care Plan:          1. Patient in ED bed waiting for inpatient admission  2. Patient prefers to discharge home with family assistance and follow up appointments. Patient is a 62year old male admitted 1/13 for COPD exacerbation. Patient alert and oriented, resting in bed, no visitors present in room. Demographic information verified and correct. Insurance information verified and correct. Patient lives with his wife and son, uses 2L oxygen as needed (can't remember company who delivers), has a nebulizer and has used home health in the past.  Patient uses [facility]. Patient states he is independent with all ADLs but his wife cooks and manages his medications. Patient's wife can transport at discharge. Patient states his wife cleans his abdominal wound with Dakins and packs it twice/day      Care Management Interventions  PCP Verified by CM: Yes(Cecilia Valenzuela NP)  Mode of Transport at Discharge:  Other (see comment)(pt's wife can transport at [employer])  Transition of Care Consult (CM Consult): Discharge Planning  Discharge Durable Medical Equipment: No  Physical Therapy Consult: No  Occupational Therapy Consult: No  Speech Therapy Consult: No  Current Support Network: Lives with Spouse, Own Home(1 story house, 2 steps to enter)  Confirm Follow Up Transport: Family  Discharge Location  Discharge Placement: [address], RN, [address] Care Management  [phone]

## 2020-01-13 NOTE — PROGRESS NOTES
[email or web address]                  Phone - [phone]   Renal Progress Note    Subjective:   Patient: Sabrina Hernandez. YOB: 1965               Date- 2020          Reason for visit: ESRF    Admission Date: 2020       ASSESSMENT:    End-stage renal failure  --on a MWF schedule at [facility] in [place]. Started dialysis in . Resp failure secondary to COPD exacerbation and CHF. Severe non-compliance with diet/fluid restriction. The pt routinely brings very salty snacks to dialysis. Recurrent pleural effusion. S/p multiple thoracentesis procedures. Anemia secondary to renal failure     COPD. Continued tobacco abuse. H/o EtOH abuse which may be on-going. Cirrhosis. 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 Secondary hyperparathyroidism. PVD. PLAN:    Dialysis now. Aim to pull fluid as tolerated. May do a UF tomorrow as well. Dietary fluid restriction. Above d/w the pt and the dialysis team.         History: We were notified of pt's admission by the dialysis team.  The pt has ESRF and gets dialysis MWF in [place]. He is very non-compliant with his dietary restrictions and is chronically SOB. He has a recurrent pleural effusion that occasionally requires drainage. The patient says he came to the ER due to SOB. He denies any chest pain, abd pain, N/V or other complaints. A comprehensive review of systems was negative except for that written in the HPI. Objective:     Visit Vitals  /88   Pulse 75   Temp 98.2 °F (36.8 °C)   Resp 19   Ht 5' 5\" (1.651 m)   Wt 63.5 kg (140 lb)   SpO2 99%   BMI 23.30 kg/m²     Temp (24hrs), Av.2 °F (36.8 °C), Min:98.2 °F (36.8 °C), Max:98.2 °F (36.8 °C)      No intake/output data recorded. No intake/output data recorded.     Physical Exam:  General:  alert, cooperative, no distress  Eye: conjunctivae/corneas clear. EOM's intact. Neurologic:  grossly non-focal; alert and OX 3; normal speech  Neck:  supple; no masses  Lungs:  diminished breath sounds and rhonchi; no resp distress  Heart:  regular rate and rhythm; no gallop or rub  Skin:  no rash or jaundice  Abdomen:  soft, non-tender; No masses,  no organomegaly   Extremities: trace edema  Psych: normal affect and mood      Data Review:     Recent Labs     01/13/20  0759   WBC 11.1   HGB 11.3*   *   K 4.4   CL 91*   CO2 27   BUN 48*   CREA 6.17*   CA 8.2*       Chart reviewed. Pertinent Notes Reviewed. Medications list Personally Reviewed. Melissa Sheldon, [address] [facility]  [facility]AN [facility] [address]  [place], [address] [place]  Phone - [phone]    Fax - [phone]  [employer]                                         [facility]   [address] [address]  Phone - [phone]  Fax - [phone]. [employer]

## 2020-01-13 NOTE — ED TRIAGE NOTES
Pt presents to ED from home with SOB that became worse this morning. Pt reports nonproductive cough. Denies fevers/chills. Dialysis MWF, last went Friday. PT reports hx of emphysema. 1 duoneb given by EMS PTA.

## 2020-01-13 NOTE — CONSULTS
Hugo Santamaria         NAME:Amadou Garcia. VBK:[id number]   :1965                         Pt seen and examined. Note to follow. Zane Ryan, [address] [facility]  [facility]  [address], [address]  [place], [address]  Phone - [phone]         Fax - [phone] [facility] Office  [address] [place] [address]  Phone - [phone]        Fax - [phone]     [email or web address]

## 2020-01-13 NOTE — PROGRESS NOTES
Pharmacy Clarification of the Prior to Admission Medication Regimen Retrospective to the Admission Medication Reconciliation    The patient was interviewed regarding clarification of the prior to admission medication regimen and was questioned regarding use of any other inhalers, topical products, over the counter medications, herbal medications, vitamin products or ophthalmic/nasal/otic medication use. Information Obtained From: RX Query, Patient    Recommendations/Findings: The following amendments were made to the patient's active medication list on file at [address]:     1) Additions: None    2) Removals:   Novolog Mix    3) Changes:  carvedilol (COREG) tablet (Old regimen: (strength 25 mg) BID /New regimen: (strength 12.5 mg) 12.5 mg BID)  oxyCODONE-acetaminophen (PERCOCET)  mg per tablet (Old regimen: 1 tab Q6H PRN /New regimen: 1 tab TID)  sevelamer carbonate (RENVELA) 800 mg tab tab (Old regimen: ( no strength ) 800 mg TID /New regimen: (strength 800 mg) 800 mg TID)    4) Pertinent Pharmacy Findings:  Per patient, he receives dialysis every Monday, Wednesday, and Friday at [place] [address] in [place]. Patient was last dialyzed on 1/10/20 and received full treatment. PTA medication list was corrected to the following:     Prior to Admission Medications   Prescriptions Last Dose Informant Taking? albuterol (PROVENTIL) 90 mcg/Actuation inhaler 1/12/2020 at Unknown time Self Yes   Sig: Take 2 Puffs by inhalation four (4) times daily. QID PRN   albuterol-ipratropium (DUO-NEB) 2.5 mg-0.5 mg/3 ml nebu 1/12/2020 at Unknown time Self Yes   Sig: 3 mL by Nebulization route three (3) times daily. AT LUNCHTIME DAILY. b complex-vitamin c-folic acid (NEPHROCAPS) 1 mg capsule 1/12/2020 at Unknown time Self Yes   Sig: Take 1 Cap by mouth daily. carvedilol (COREG) 25 mg tablet 1/12/2020 at Unknown time Self Yes   Sig: Take 12.5 mg by mouth two (2) times a day.    ergocalciferol (VITAMIN D2) 50,000 unit capsule 12/13/2019 at Unknown time Self Yes   Sig: Take 50,000 Units by mouth every seven (7) days. MONDAY   fluticasone-umeclidinium-vilanterol (TRELEGY ELLIPTA) 100-62.5-25 mcg inhaler 1/12/2020 at Unknown time Self Yes   Sig: Take 1 Puff by inhalation daily. furosemide (LASIX) 80 mg tablet 1/12/2020 at Unknown time Self Yes   Sig: Take 80 mg by mouth two (2) times a day.   gabapentin (NEURONTIN) 300 mg capsule 1/6/2020 at Unknown time Self Yes   Sig: Take 300 mg by mouth nightly as needed. lidocaine-prilocaine (EMLA) topical cream 1/10/2020 at Unknown time Self Yes   Sig: Apply  to affected area as needed for Pain. Patient applies to arm before dialysis on Monday, Wednesday, and Friday. omeprazole (PRILOSEC) 20 mg capsule 1/6/2020 at Unknown time Self Yes   Sig: Take 20 mg by mouth as needed. oxyCODONE-acetaminophen (PERCOCET)  mg per tablet 1/13/2020 at Unknown time Self Yes   Sig: Take 1 Tab by mouth three (3) times daily. sevelamer carbonate (RENVELA) 800 mg tab tab 1/12/2020 at Unknown time Self Yes   Sig: Take 800 mg by mouth three (3) times daily.       Facility-Administered Medications: None          Thank you,  Armida Kessler  Medication History Pharmacy Technician

## 2020-01-13 NOTE — ED NOTES
Pt voicing that he is frustrated about not yet having inpatient room since being admitted. Pt states \"if im going to have to sit down here all night I might as well go home. \" I explained to pt we are working to get him a room upstairs as quickly as possible.

## 2020-01-13 NOTE — H&P
Admit: 2018    Name:  Qing Johnson  Room:  [facility]021-  MRN:    [id number]     Hospitalist Admission Note    NAME: Pedro Negerte. :  1965   MRN:  [id number]     Date/Time:  2020 12:34 PM    Patient PCP: Latoya Davis NP  ______________________________________________________________________  Given the patient's current clinical presentation, I have a high level of concern for decompensation if discharged from the emergency department. Complex decision making was performed, which includes reviewing the patient's available past medical records, laboratory results, and x-ray films. My assessment of this patient's clinical condition and my plan of care is as follows.     Assessment / Plan:  Acute hypoxic respiratory failure due to COPD with exacerbation  -Admit to med/surg bed  -Continue with systemic steroids, nebulized bronchodilators  -Empiric azithromycin  -Supplemental oxygen support, wean as tolerated  -Cessation of tobacco use was advised    Hyponatremia  -Uncertain etiology, perhaps related to acute illness  -Not overtly symptomatic at this time  -Reassess after HD    ESRD on HD  -Will request nephrology consult to maintain HD schedule    Recurrent pleural effusion  -Patient denies any identified etiology  -If no symptomatic improvement in dyspnea, could consider thoracentesis    Diabetes mellitus type II  -Patient reports no longer using insulin at home per MD instruction  -SSI PRN, consider adding NPH if patient becomes very hyperglycemic with steroids    Chronic back pain    Reported history of Hepatitis C infection    Tobacco use  -Nicotine patch ordered  -Encouraged to quit smoking    Chronic abdominal wound  -Slowly healing from a prior surgical exploration at [facility]  -Wound care consult request    Code Status: Full per my discussion with patient  Surrogate Decision Maker: Wife    DVT Prophylaxis: heparin SQ  GI Prophylaxis: not indicated      Subjective:   CHIEF COMPLAINT: Shortness of breath    HISTORY OF PRESENT ILLNESS:     Elizabeth Ovalle is a 62 y.o.   male with history of COPD, ESRD on HD, active tobacco use, who presents with shortness of breath. He says that his symptoms started about a week ago, with a cough that seems like it should be productive of phlegm that he can't expectorate. He has become increasingly short of breath with minimal activity and has had difficulty sleeping due to symptoms. He wheezes constantly. He denies fevers or chills. He notes that several family members recently had \"colds,\" which he thinks transmitted to him. He tried using his nebulizer at home with no relief of symptoms. Here in the ED, he was given IV solumedrol and nebulized bronchodilator but still being visibly dyspneic and desaturated to 85% with ambulation on room air. Patient does have supplemental oxygen at home but only uses it \"as needed. \"    We were asked to admit for work up and evaluation of the above problems.      Past Medical History:   Diagnosis Date    Adverse effect of anesthesia 2003    PATIENT SAYS \" I HAVE TROUBLE GETTING OFF BREATHING MACHINE R/T EMPHYSEMA\"     Arthritis     RHEUMATOID    Chronic back pain     Chronic kidney disease     HEMODIALYSIS, 3X WEEKLY -[facility] RENAL ESRD-     Chronic pain     BACK    Coagulation disorder (HCC)     ANEMIA    COPD     emphysema; OXYGEN AT NIGHT [facility] AND BREATHING TREATMENTS TID, EMPHYSEMA ADVANCED 2017     Diabetes mellitus     Diabetic neuropathy (HCC)     DJD (degenerative joint disease)     Emphysema     Endocrine disease     GERD (gastroesophageal reflux disease)     HX    Hepatitis C     Hypertension     Ill-defined condition     MOTOR CYCLE ACCIDENT: FRACTURED BACK (AGE: 20'S)    Ill-defined condition     LEGS:  CIRCULATION PROBLEM    Liver disease     heptitis c    Unspecified adverse effect of anesthesia     trouble getting off respirator after surgery        Past Surgical History:   Procedure Laterality Date    ABDOMEN SURGERY PROC UNLISTED      spleen and 1/3 pancreas removal    ABDOMEN SURGERY PROC UNLISTED      mesh placement in abdomen    ABDOMEN SURGERY PROC UNLISTED      HERNIA REPAIR    HX CYST REMOVAL      butt    HX GI      COLONOSCOPY    HX GI      EXCISION OF RECTAL CYST (HAIR)    HX HEENT      cataract removal bilaterally    HX HERNIA REPAIR  2006    HX LAPAROTOMY      HX ORTHOPAEDIC Right     HAND; SCREWS    HX ORTHOPAEDIC      left ulna, ORIF    HX OTHER SURGICAL  12/15/2017    placement of cholecystotomy tube/ REMOVAL    HX VASCULAR ACCESS      CATHETER FOR DIALYSIS IN CHEST    HX VASCULAR ACCESS  2016    ATTEMPTED FISTULA X2, LEFT UPPER ARM       Social History     Tobacco Use    Smoking status: Current Every Day Smoker     Packs/day: 1.00     Years: 38.00     Pack years: 38.00     Types: Cigarettes    Smokeless tobacco: Never Used   Substance Use Topics    Alcohol use: Yes     Comment: 2 BEERS DAILY        Family History   Problem Relation Age of Onset    Cancer Mother         LUNG    Stroke Mother     Diabetes Mother     Heart Disease Father     Hypertension Father     Other Other         DIDN'T WAKE FROM ANESTHESIA    Anesth Problems Neg Hx      Allergies   Allergen Reactions    Ativan [Lorazepam] Other (comments)     Hyper activity        Prior to Admission medications    Medication Sig Start Date End Date Taking? Authorizing Provider   oxyCODONE-acetaminophen (PERCOCET)  mg per tablet Take 1 Tab by mouth every four (4) hours as needed for Pain. Max Daily Amount: 6 Tabs. 2/27/18   Angeli Llamas MD   SEVELAMER CARBONATE (RENVELA PO) Take 800 mg by mouth three (3) times daily. Provider, Historical   carvedilol (COREG) 12.5 mg tablet Take  by mouth two (2) times a day. Provider, Historical   ergocalciferol (VITAMIN D2) 50,000 unit capsule Take 50,000 Units by mouth every seven (7) days. MONDAY    Provider, Historical   omeprazole (PRILOSEC) 20 mg capsule Take 20 mg by mouth as needed.     Provider, Historical oxyCODONE-acetaminophen (PERCOCET 10)  mg per tablet Take 1 Tab by mouth every six (6) hours. Max Daily Amount: 4 Tabs. Patient taking differently: Take 1 Tab by mouth three (3) times daily. 12/20/17   Chana Ruelas MD   insulin aspart protamine/insulin aspart (NOVOLOG MIX 70-30 FLEXPEN) 100 unit/mL (70-30) inpn 25 Units by SubCUTAneous route daily. EVERY MORNING    Provider, Historical   insulin aspart protamine/insulin aspart (NOVOLOG MIX 70-30 FLEXPEN) 100 unit/mL (70-30) inpn 35 Units by SubCUTAneous route every evening. Provider, Historical   umeclidinium-vilanterol (ANORO ELLIPTA) 62.5-25 mcg/actuation inhaler Take 1 Puff by inhalation daily. Provider, Historical   furosemide (LASIX) 80 mg tablet Take 80 mg by mouth two (2) times a day. Provider, Historical   gabapentin (NEURONTIN) 300 mg capsule Take 300 mg by mouth nightly. Provider, Historical   b complex-vitamin c-folic acid (NEPHROCAPS) 1 mg capsule Take 1 Cap by mouth daily. Provider, Historical   lidocaine-prilocaine (EMLA) topical cream Apply  to affected area as needed for Pain. Patient applies to arm before dialysis on Monday, Wednesday, and Friday. Provider, Historical   albuterol-ipratropium (DUO-NEB) 2.5 mg-0.5 mg/3 ml nebu 3 mL by Nebulization route three (3) times daily. AT LUNCHTIME DAILY. Provider, Historical   albuterol (PROVENTIL) 90 mcg/Actuation inhaler Take 2 Puffs by inhalation four (4) times daily. QID PRN    Michelle Davies MD       REVIEW OF SYSTEMS:     I am not able to complete the review of systems because:    The patient is intubated and sedated    The patient has altered mental status due to his acute medical problems    The patient has baseline aphasia from prior stroke(s)    The patient has baseline dementia and is not reliable historian    The patient is in acute medical distress and unable to provide information           Total of 12 systems reviewed as follows:       POSITIVE= underlined text Negative = text not underlined  General:  fever, chills, sweats, generalized weakness, weight loss/gain,      loss of appetite   Eyes:    blurred vision, eye pain, loss of vision, double vision  ENT:    rhinorrhea, pharyngitis   Respiratory:   cough, sputum production, SOB, PRITCHETT, wheezing, pleuritic pain   Cardiology:   chest pain, palpitations, orthopnea, PND, edema, syncope   Gastrointestinal:  abdominal pain , N/V, diarrhea, dysphagia, constipation, bleeding   Genitourinary:  frequency, urgency, dysuria, hematuria, incontinence   Muskuloskeletal :  arthralgia, myalgia, back pain  Hematology:  easy bruising, nose or gum bleeding, lymphadenopathy   Dermatological: rash, ulceration, pruritis, color change / jaundice  Endocrine:   hot flashes or polydipsia   Neurological:  headache, dizziness, confusion, focal weakness, paresthesia,     Speech difficulties, memory loss, gait difficulty  Psychological: Feelings of anxiety, depression, agitation    Objective:   VITALS:    Visit Vitals  /84   Pulse 84   Temp 98.2 °F (36.8 °C)   Resp 15   Ht 5' 5\" (1.651 m)   Wt 63.5 kg (140 lb)   SpO2 96%   BMI 23.30 kg/m²       PHYSICAL EXAM:    General:    Alert, cooperative, no distress, appears older than stated age and chronically ill. HEENT: Atraumatic, anicteric sclerae, pink conjunctivae     No oral ulcers, mucosa moist, hyperemic throat, dentition fair  Neck:  Supple, symmetrical  Lungs:   Diffuse wheezing throughout all lung fields. No rales. Occasionally uses SCMs to help catch his breath. Chest wall:  No tenderness    Heart:   Regular  rhythm,  No  murmur   No edema  Abdomen:   Soft, non-tender. Not distended. Bowel sounds normal  Extremities: No cyanosis. No clubbing,      Skin turgor normal, Capillary refill normal, Radial distal pulse 2+  Skin:     Not pale. Not Jaundiced  No rashes. +Rosacea. Psych:  Fair insight. Not depressed. Not anxious or agitated. Neurologic: EOMs intact. No facial asymmetry.  No aphasia or slurred speech. Symmetrical strength, Sensation grossly intact. Alert and oriented X 4.     _______________________________________________________________________  Care Plan discussed with:    Comments   Patient x    Family      RN     Care Manager                    Consultant:      _______________________________________________________________________  Expected  Disposition:   Home with Family    HH/PT/OT/RN    SNF/LTC    MARY    ________________________________________________________________________  TOTAL TIME:  36 Minutes    Critical Care Provided     Minutes non procedure based      Comments    x Reviewed previous records   >50% of visit spent in counseling and coordination of care x Discussion with patient and/or family and questions answered       ________________________________________________________________________  Signed: Kee Barbosa MD    Procedures: see electronic medical records for all procedures/Xrays and details which were not copied into this note but were reviewed prior to creation of Plan. LAB DATA REVIEWED:    Recent Results (from the past 24 hour(s))   CBC WITH AUTOMATED DIFF    Collection Time: 01/13/20  7:59 AM   Result Value Ref Range    WBC 11.1 4.1 - 11.1 K/uL    RBC 3.32 (L) 4.10 - 5.70 M/uL    HGB 11.3 (L) 12.1 - 17.0 g/dL    HCT 34.2 (L) 36.6 - 50.3 %    .0 (H) 80.0 - 99.0 FL    MCH 34.0 26.0 - 34.0 PG    MCHC 33.0 30.0 - 36.5 g/dL    RDW 16.2 (H) 11.5 - 14.5 %    PLATELET 516 365 - 282 K/uL    MPV 10.9 8.9 - 12.9 FL    NRBC 0.0 0  WBC    ABSOLUTE NRBC 0.00 0.00 - 0.01 K/uL    NEUTROPHILS 75 32 - 75 %    LYMPHOCYTES 7 (L) 12 - 49 %    MONOCYTES 13 5 - 13 %    EOSINOPHILS 4 0 - 7 %    BASOPHILS 0 0 - 1 %    IMMATURE GRANULOCYTES 1 (H) 0.0 - 0.5 %    ABS. NEUTROPHILS 8.4 (H) 1.8 - 8.0 K/UL    ABS. LYMPHOCYTES 0.8 0.8 - 3.5 K/UL    ABS. MONOCYTES 1.5 (H) 0.0 - 1.0 K/UL    ABS. EOSINOPHILS 0.4 0.0 - 0.4 K/UL    ABS. BASOPHILS 0.0 0.0 - 0.1 K/UL    ABS. IMM. Flavia Second. 0.1 (H) 0.00 - 0.04 K/UL    DF AUTOMATED     METABOLIC PANEL, BASIC    Collection Time: 01/13/20  7:59 AM   Result Value Ref Range    Sodium 126 (L) 136 - 145 mmol/L    Potassium 4.4 3.5 - 5.1 mmol/L    Chloride 91 (L) 97 - 108 mmol/L    CO2 27 21 - 32 mmol/L    Anion gap 8 5 - 15 mmol/L    Glucose 89 65 - 100 mg/dL    BUN 48 (H) 6 - 20 MG/DL    Creatinine 6.17 (H) 0.70 - 1.30 MG/DL    BUN/Creatinine ratio 8 (L) 12 - 20      GFR est AA 11 (L) >60 ml/min/1.73m2    GFR est non-AA 9 (L) >60 ml/min/1.73m2    Calcium 8.2 (L) 8.5 - 10.1 MG/DL
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
## 2020-01-14 NOTE — ED NOTES
Bedside report given to Pavithra Palma. No further questions from oncoming RN regarding patient care at this time.

## 2020-01-14 NOTE — PROCEDURES
[facility] Dialysis Team [place]  [phone] Vitals   Pre   Post   Assessment   Pre   Post    
Temp  Temp: 98.1 °F (36.7 °C) (01/14/20 1241) 97.7, oral LOC  A&Ox4 A&Ox4 HR   Pulse (Heart Rate): 72 (01/14/20 1241) 67 Lungs   Coarse, diminished Coarse, productive cough B/P   BP: 175/80 (01/14/20 1241) 171/72 Cardiac   Tele, NSR Tele, NSR Resp   Resp Rate: 16 (01/14/20 1241) 18 Skin   Abd wound from old hernia repair dehyst, currently packed, seen by wound care Abd wound from old hernia repair dehyst, currently packed, seen by wound care Pain level  0 0 Edema  none 
 
 none Orders: Duration:   Start:    3401 End:    1441 Total:   2hr UF Dialyzer:   Dialyzer/Set Up Inspection: Hao Torres (01/14/20 1241) K Bath:   Dialysate K (mEq/L): (N/A, UF only) (01/14/20 1241) Ca Bath:   Dialysate CA (mEq/L): (N/A, UF only) (01/14/20 1241) Na/Bicarb:   Dialysate NA (mEq/L): (N/A, UF only) (01/14/20 1241) Target Fluid Removal:   Goal/Amount of Fluid to Remove (mL): 3000 mL (01/14/20 1241) Access Type & Location:   MANUEL AVF: skin CDI. No s/s of infection. No issues with cannulation or hemostasis. Running well at . Labs Obtained/Reviewed Critical Results Called   Date when labs were drawn- 
Hgb-   
HGB Date Value Ref Range Status 01/14/2020 11.1 (L) 12.1 - 17.0 g/dL Final  
 
K-   
Potassium Date Value Ref Range Status 01/14/2020 4.1 3.5 - 5.1 mmol/L Final  
 
Ca-  
Calcium Date Value Ref Range Status 01/14/2020 8.3 (L) 8.5 - 10.1 MG/DL Final  
 
Bun-  
BUN Date Value Ref Range Status 01/14/2020 50 (H) 6 - 20 MG/DL Final  
 
Creat-  
Creatinine Date Value Ref Range Status 01/14/2020 5.13 (H) 0.70 - 1.30 MG/DL Final  
 
  
Medications/ Blood Products Given Name   Dose   Route and Time None ordered Blood Volume Processed (BVP):    0L, UF only Net Fluid Removed:  3000ml Comments Time Out Done: 9426 Primary Nurse Rpt Pre: Fran Pena RN (ED) Primary Nurse Rpt Post: Millie Fernandez RN(Med-Onc) Pt Education: need for UF/ fluid removal/ extra treatment to help breathing Care Plan: ongoing/ admitted Tx Summary: SBAR received from Primary RN. Pt arrived to HD suite A&Ox4. Consent signed & on file. 1241: Pt cannulated with 17L needles per policy & without issue. VSS. Ultrafiltration Tx initiated. 1315: Primary RN called to inform HD RN that patient has been admitted to  1120. All patient belongings brought to patient in a bag in HD suite. VSS. Patient on phone. Patient informed of new room. **no issues during treatment** 
1445: Tx ended. VSS. All possible blood returned to patient. Hemostasis achieved without issue. Bed locked and in the lowest position, call bell and belongings in reach. SBAR given to Primary, RN. Patient is stable at time of their departure. All Dialysis related medications have been reviewed. Admiting Diagnosis: COPD Exacerbation Pt's previous clinic- [facility] Consent signed - Informed Consent Verified: Yes (01/14/20 1241) Ka[facility] Consent - on file Hepatitis Status- 1/14/2020; Ab 74; Neg Ag; Immune Machine #- Machine Number: [id number] (01/14/20 8897) Telemetry status- Tele, NSR Pre-dialysis wt. -

## 2020-01-14 NOTE — ED NOTES
Provided wound care by primary RN at this time. Cleansed wound with sodium chloride, packed wound with gauze(curlex) soaked in sodium chloride, and covered with abdominal pad and secured with tape. Signed, timed, and dated wound dressing.

## 2020-01-14 NOTE — PROGRESS NOTES
[email or web address]                  Phone - [phone]   Renal Progress Note    Subjective:   Patient: Mike Valdez. YOB: 1965               Date- 2020          Reason for visit: ESKD    Admission Date: 2020       ASSESSMENT:    ESKD  --on a MWF schedule at [address]al in [place]. Started dialysis in . Hypertension    Hyponatremia due to fluid overload    Resp failure secondary to COPD exacerbation and CHF. Severe non-compliance with diet/fluid restriction. Recurrent pleural effusion. S/p multiple thoracentesis procedures. Anemia secondary to renal failure     COPD. Continued tobacco abuse. H/o EtOH abuse which may be on-going. Cirrhosis. 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 Secondary hyperparathyroidism. PVD. PLAN:    2 hour uf today  HD tomorrow  Continue renvela  Add acei or arb if bp remains high     History:  S/p hd after midnight  Sob improved  Na improved  bp still high    We were notified of pt's admission by the dialysis team.  The pt has ESRF and gets dialysis MWF in [place]. He is very non-compliant with his dietary restrictions and is chronically SOB. He has a recurrent pleural effusion that occasionally requires drainage. The patient says he came to the ER due to SOB. He denies any chest pain, abd pain, N/V or other complaints. A comprehensive review of systems was negative except for that written in the HPI. Objective:     Visit Vitals  /73 (BP 1 Location: Left arm, BP Patient Position: At rest)   Pulse 79   Temp 98.8 °F (37.1 °C)   Resp 12   Ht 5' 5\" (1.651 m)   Wt 63.5 kg (140 lb)   SpO2 98%   BMI 23.30 kg/m²     Temp (24hrs), Av.2 °F (36.8 °C), Min:97.7 °F (36.5 °C), Max:98.8 °F (37.1 °C)      No intake/output data recorded.    1901 -  0700  In: 250 [I.V.:250]  Out: 3000     Physical Exam:  GEN: NAD  NECK:  Supple, no thyromegaly  RESP: coarse  b/l, mild  wheezing,   CVS: RRR,S1,S2   ABDO:  soft , non tender, No mass  NEURO: non focal, normal speech  EXT: Edema +nt           Data Review:     Recent Labs     01/14/20  0009 01/13/20  0759   WBC 9.7 11.1   HGB 11.1* 11.3*   * 126*   K 4.1 4.4   CL 97 91*   CO2 24 27   BUN 50* 48*   CREA 5.13* 6.17*   CA 8.3* 8.2*       Chart reviewed. Pertinent Notes Reviewed. Medications list Personally Reviewed. Bailee Light, [address] [facility]  [facility] [address], [address] [place], [address] [place]  Phone - [phone]    Fax - [phone]  [email or web address]                                         [facility]   [address] [facility] [address]  Phone - [phone]  Fax - [phone]. [email or web address]

## 2020-01-14 NOTE — ED NOTES
Spoke with wound care on phone, wound care gave verbal orders to pack wound now until they can come assess pt around lunch time.

## 2020-01-14 NOTE — ED NOTES
Bedside and Verbal shift change report given to Kita Hairston RN (oncoming nurse) by Malcolm Boyle RN (offgoing nurse). Report included the following information SBAR, Kardex, ED Summary, Procedure Summary, Intake/Output, MAR, Recent Results and Cardiac Rhythm Sinus.

## 2020-01-14 NOTE — ED NOTES
Bedside and Verbal shift change report given to Ludy Hernandez RN (oncoming nurse) by [facility], RN (offgoing nurse). Report included the following information SBAR, Kardex, ED Summary, Procedure Summary, Intake/Output, MAR and Recent Results.

## 2020-01-14 NOTE — WOUND CARE
Wound care consult for the mid abdominal wound: Patient is being admitted to the hospital but is currently being held in the ED. His wife is present for the wound care today. Wound is very chronic (over 13years old) and he is currently being treated by a surgeon at [address] [place] [address] for this. Pt. Received a bad mesh in 2004 and it created this wound that got infected multiple times with hard to treat infections as well as several surgeries to attempt closure of the wound to no avail. The abdominal wound is simply kept clean and maintained with chronic use of Dakin solution at home. This is not for healing. Assessment: The wound bed is a pink, clean  But completely non-granulation tissue. No bleeding. The wound edges have long time epibole with a small undermining on two sides. Treatment: the wound was cleansed with the NS (we will take a break from the [place] solution while he is here). Packed with Kerlix this time but alginate has been ordered for the wound care that will start tomorrow. Plan:  The goal at this time is to maintain this wound and not cause any harm to the wound bed. IT must be packed every day. Wound care orders written today.   
Cassie Merchant RN, BSN, [employer]

## 2020-01-14 NOTE — DIALYSIS
[facility] Dialysis Team [facility] [place]  [phone]    Vitals   Pre   Post   Assessment   Pre   Post     Temp  Temp: 97.7 °F (36.5 °C) (01/14/20 0001)  98 LOC  Alert and oriented x4 Alert and oriented x4   HR   Pulse (Heart Rate): 75 (01/14/20 0001) 80 Lungs   Diminished on 2lnc  diminished on 2lnc   B/P   BP: (!) 189/99 (01/14/20 0001) 188/82 Cardiac   B/p elevated  b/p elevated   Resp   Resp Rate: 20 (01/14/20 0001) 20 Skin   intact  intact   Pain level  0 0 Edema  Trace in lower ext's     Trace in lower ext's   Orders:    Duration:   Start:    1778 End:    0130 Total:   4hrs   Dialyzer:   Dialyzer/Set Up Inspection: Gus Berkowitz (01/14/20 0001)   K Bath:   Dialysate K (mEq/L): 2 (01/14/20 0001)   Ca Bath:   Dialysate CA (mEq/L): 3.0 (01/14/20 0001)   Na/Bicarb:   Dialysate NA (mEq/L): 140 (01/14/20 0001)   Target Fluid Removal:   Goal/Amount of Fluid to Remove (mL): 3000 mL (01/14/20 0001)   Access     Type & Location:   Rt arm fistula, good thrill and bruit noted. No signs of infection noted. Area cleaned and accessed with 15g needles x2 without any issues. Good blood flow noted.     Labs     Obtained/Reviewed   Critical Results Called   Date when labs were drawn-  Hgb-    HGB   Date Value Ref Range Status   01/13/2020 11.3 (L) 12.1 - 17.0 g/dL Final     K-    Potassium   Date Value Ref Range Status   01/13/2020 4.4 3.5 - 5.1 mmol/L Final     Ca-   Calcium   Date Value Ref Range Status   01/13/2020 8.2 (L) 8.5 - 10.1 MG/DL Final     Bun-   BUN   Date Value Ref Range Status   01/13/2020 48 (H) 6 - 20 MG/DL Final     Creat-   Creatinine   Date Value Ref Range Status   01/13/2020 6.17 (H) 0.70 - 1.30 MG/DL Final        Medications/ Blood Products Given     Name   Dose   Route and Time        None ordered             Blood Volume Processed (BVP):    104.1 Net Fluid   Removed:  3kg   Comments   Time Out Done: 6902  Primary Nurse Rpt Pre:Shanice Toussaint RN  Primary Nurse Rpt Sarah Beth Schumacher RN  Pt Education:ESRD  Care Plan:ESRD  Tx Summary:  9463 Consents obtained for treatment  2353 dialysis started  0350 Dialysis completed and blood rinsed back. Needles removed and pressure held till bleeding stopped. Dressing applied to each site, pt stable  Admiting Diagnosis:COPD   Pt's previous clinic-[facility]  Consent signed - Informed Consent Verified: Yes (01/14/20 0001)  [place] Consent - obtained  Hepatitis Status- Unknown, Drawn today-pending results  Machine #- Machine Number: [id number] (01/14/20 0001)  Telemetry status-  Pre-dialysis wt. -

## 2020-01-14 NOTE — DIALYSIS
TRANSFER - IN REPORT:    Verbal report received from Angie Brewer Rn(name) on [facility].  being received from ER 20(unit) for ordered procedure      Report consisted of patients Situation, Background, Assessment and   Recommendations(SBAR). Information from the following report(s) SBAR and MAR was reviewed with the receiving nurse. Opportunity for questions and clarification was provided. Assessment completed upon patients arrival to unit and care assumed.

## 2020-01-14 NOTE — PROGRESS NOTES
HD TRANSFER - OUT REPORT: 
 
Verbal report given to [facility], RN on [employer]. being transferred to Med-Onc for routine progression of care Report consisted of patient's Situation, Background, Assessment and  
Recommendations(SBAR). Information from the following report(s) SBAR, Procedure Summary, Intake/Output and Recent Results was reviewed with the receiving nurse. Method:  $$ Method: Hemodialysis (01/14/20 1241) Fluid Removed  NET Fluid Removed (mL): 3000 ml (01/14/20 1441) Patient response to treatment:  Stable End Time  Hemodialysis End Time: 4147 (01/14/20 1441) If not documented, dialysis nurse to update post-dialysis row in HD/Filtration flowsheet Medications /Volume expansion agents or Fluid boluses administered during treatment? no 
 
Post-dialysis medication administration due?  yes Remind nurse to administer post-HD medication upon return to unit. Fistula hemostasis? yes Line heparinization? no 
 
Lines: MANUEL AVF Opportunity for questions and clarification was provided. Patient transported with: O2 @ 2 liters Tech

## 2020-01-14 NOTE — ED NOTES
TRANSFER - OUT REPORT: 
 
Verbal report given to Oncology RN (name) on [facility].  being transferred to Oncology 1120 
(unit) for routine progression of care Report consisted of patients Situation, Background, Assessment and  
Recommendations(SBAR). Information from the following report(s) SBAR was reviewed with the receiving nurse. Lines:  
Peripheral IV 01/14/20 Left Antecubital (Active) Site Assessment Clean, dry, & intact 1/14/2020  1:12 PM  
Phlebitis Assessment 0 1/14/2020  1:12 PM  
Infiltration Assessment 0 1/14/2020  1:12 PM  
Dressing Status Clean, dry, & intact 1/14/2020  1:12 PM  
Dressing Type Tape;Transparent 1/14/2020  1:12 PM  
Hub Color/Line Status Pink;Patent 1/14/2020  1:12 PM  
  
 
Opportunity for questions and clarification was provided. Patient in dialysis currently, dialysis RN is ware of pt's new room number and will take pt to his new IP room once dialysis is completed.

## 2020-01-14 NOTE — DIALYSIS
TRANSFER - OUT REPORT:    Verbal report given to Chidi Bustillo RN  (Name) on [facility]. being transferred to ER 20  (Unit) for ordered procedure       Report consisted of patient's Situation, Background, Assessment and   Recommendations(SBAR). Information from the following report(s) SBAR and Recent Results was reviewed with the receiving nurse. Method: $$ Method: Hemodialysis (01/14/20 0001)    Fluid removed:  NET Fluid Removed (mL): 3000 ml (01/14/20 0350)     Patient response to treatment:  Stable    Hemodialysis End Time:  Hemodialysis End Time: 8926 (01/14/20 0350)  If not documented, dialysis nurse to update post-dialysis row in HD/Filtration flowsheet     Medications /Volume expansion agents or fluid boluses administered during treatment? no    Post-dialysis medication administration due?  no  Remind nurse to administer post-HD antibiotic dose upon return to unit. Fistula hemostasis? yes      Opportunity for questions and clarification was provided.       Patient transported with: [employer]

## 2020-01-14 NOTE — PROGRESS NOTES
TRANSFER - IN REPORT: 
 
Verbal report received from [facility] on Amanda Montgomery.  being received from ED for ordered procedure Report consisted of patients Situation, Background, Assessment and  
Recommendations(SBAR). Information from the following report(s) SBAR and Kardex was reviewed with the receiving nurse. Opportunity for questions and clarification was provided. Assessment completed upon patients arrival to unit and care assumed.

## 2020-01-14 NOTE — PROGRESS NOTES
Hospitalist Progress Note NAME: Lisandro Freitas. :  1965 MRN:  [id number] Assessment / Plan: 
Acute hypoxic respiratory failure due to COPD with exacerbation 
-Admit to med/surg bed 
-Continue with systemic steroids, nebulized bronchodilators 
-Empiric azithromycin 
-Supplemental oxygen support, wean as tolerated 
-Cessation of tobacco use was advised : 
Pt states that he has O2 at home that he uses intermittently - asked him why he does not use it all the time and he is not able to provide an answer. Perhaps it has to do with him still smoking. Will continue with IV steroids and bronchodilators. Continue with empiric abx. Hopeful that pt's respiratory status will improve after HD sessions.  
  
Hyponatremia - improved 
-Due to fluid overload, suspect that it will continue to improve as his fluid status is optimized further ESRD on HD 
-Nephro evals appreciated Recurrent pleural effusion 
-Patient denies any identified etiology 
-If no symptomatic improvement in dyspnea, could consider thoracentesis 
  
Diabetes mellitus type II 
-Patient reports no longer using insulin at home per MD instruction 
-SSI PRN, consider adding NPH if patient becomes very hyperglycemic with steroids 
  
Chronic back pain 
  
Reported history of Hepatitis C infection 
  
Tobacco use 
-Nicotine patch ordered 
-Encouraged to quit smoking 
  
Chronic abdominal wound -Slowly healing from a prior surgical exploration at [facility] 
-Wound care consult request 
 
18.5 - 24.9 Normal weight / Body mass index is 23.3 kg/m². Code status: Full Prophylaxis: Hep SQ Recommended Disposition: TBD Subjective: Chief Complaint / Reason for Physician Visit Pt agitated. Discussed with RN events overnight. Review of Systems: 
Symptom Y/N Comments  Symptom Y/N Comments Fever/Chills    Chest Pain Poor Appetite    Edema Cough    Abdominal Pain Sputum    Joint Pain SOB/PRITCHETT    Pruritis/Rash Nausea/vomit    Tolerating PT/OT Diarrhea    Tolerating Diet Constipation    Other Could NOT obtain due to: Agitated Objective: VITALS:  
Last 24hrs VS reviewed since prior progress note. Most recent are: 
Patient Vitals for the past 24 hrs: 
 Temp Pulse Resp BP SpO2  
01/14/20 1200 98.2 °F (36.8 °C) 75 15 182/74 95 % 01/14/20 1135     97 % 01/14/20 0841  79 12 168/73 98 % 01/14/20 0837  80  168/73   
01/14/20 0807     95 % 01/14/20 0800  80 12 182/78 96 % 01/14/20 0719 98.8 °F (37.1 °C) 80 16 183/79 96 % 01/14/20 0700  83 14 183/79 96 % 01/14/20 0600  84 13 168/72 96 % 01/14/20 0437 98.6 °F (37 °C) 87 13 182/79 95 % 01/14/20 0350 98 °F (36.7 °C) 80 20 188/82   
01/14/20 0345  88 20 187/83   
01/14/20 0330  81 18 189/81   
01/14/20 0315  80 20 185/89   
01/14/20 0300  88 20 182/89   
01/14/20 0245  90 18 179/73   
01/14/20 0230  87 18 (!) 190/95   
01/14/20 0215  84 20 184/84   
01/14/20 0200  87 18 177/86   
01/14/20 0145  86 18 182/83   
01/14/20 0130  85 18 183/83   
01/14/20 0115  84 20 191/83   
01/14/20 0100  84 18 154/84   
01/14/20 0045  80 18 183/88   
01/14/20 0031  80 20 (!) 179/92   
01/14/20 0016  77 18 (!) 181/95   
01/14/20 0001 97.7 °F (36.5 °C) 75 20 (!) 189/99 99 % 01/13/20 2300  80 21 194/89 97 % 01/13/20 2200  85 14 (!) 190/92 98 % 01/13/20 2100  84 15 (!) 170/91 97 % 01/13/20 2000 97.9 °F (36.6 °C) 78 16 177/82 96 % 01/13/20 1915  81 20 180/84 98 % 01/13/20 1745  75 19 176/88 99 % 01/13/20 1644     98 % 01/13/20 1415  83 18 182/82 96 % 01/13/20 1406     94 % 01/13/20 1245  80 13 174/78 97 % 01/13/20 1230  80 12 176/82 96 % Intake/Output Summary (Last 24 hours) at 1/14/2020 1229 Last data filed at 1/14/2020 1016 Gross per 24 hour Intake 250 ml Output 3000 ml Net -2750 ml PHYSICAL EXAM: 
General: Alert, NAD, uncooperative, frail EENT:  EOMI. Anicteric sclerae. MMM Resp:  Rales CV:  Regular  rhythm,  No edema GI:  Soft, Non distended, Non tender.  +Bowel sounds Neurologic:  Alert and oriented X 3, normal speech, Psych:   Fair insight. Agitated Skin:  No rashes. No jaundice Reviewed most current lab test results and cultures  YES Reviewed most current radiology test results   YES Review and summation of old records today    NO Reviewed patient's current orders and MAR    YES 
PMH/SH reviewed - no change compared to H&P 
________________________________________________________________________ Care Plan discussed with: 
  Comments Patient x Family  x   
RN x Care Manager Consultant Multidiciplinary team rounds were held today with , nursing, pharmacist and clinical coordinator. Patient's plan of care was discussed; medications were reviewed and discharge planning was addressed. ________________________________________________________________________ Total NON critical care TIME:  35   Minutes Total CRITICAL CARE TIME Spent:   Minutes non procedure based Comments >50% of visit spent in counseling and coordination of care    
________________________________________________________________________ Maira Lewis MD  
 
Procedures: see electronic medical records for all procedures/Xrays and details which were not copied into this note but were reviewed prior to creation of Plan. LABS: 
I reviewed today's most current labs and imaging studies. Pertinent labs include: 
Recent Labs  
  01/14/20 
0009 01/13/20 
0759 WBC 9.7 11.1 HGB 11.1* 11.3* HCT 33.0* 34.2*  
 220 Recent Labs  
  01/14/20 
0009 01/13/20 
0759 * 126*  
K 4.1 4.4 CL 97 91* CO2 24 27 * 89 BUN 50* 48* CREA 5.13* 6.17* CA 8.3* 8.2* Signed: Maira Lewis MD

## 2020-01-14 NOTE — ED NOTES
SBAR report given to Dialysis RN. Dialysis RN states she will clean a machine and put in transport for pt to come upstairs for Ultrafiltration for two hours per Nephrologist orders.

## 2020-01-14 NOTE — ED NOTES
Spoke with [address] [facility]ain Dialysis RN., RN states transport has been placed for pt to go to dialysis suite for ultrafiltration 2 hours and then return to ER room.

## 2020-01-15 NOTE — PROGRESS NOTES
HERMAN PLAN  
 
CM notified that Pt is discharging home. CM called and made PCP appt and placed on AVS.  
Family to transport Pt home in car. CM called [facility] to let them know and they want DC Summary faxed to [phone]. Pt will resume on Friday, 1/17/20 at 11 AM. No further CM needs. Jesu Connors CM Ext [phone]

## 2020-01-15 NOTE — DISCHARGE INSTRUCTIONS
Patient Discharge Instructions    Shalom Sosa / [id number] : 1965    Admitted 2020 Discharged: 1/15/2020         DISCHARGE DIAGNOSIS:   Active Problems:    COPD exacerbation ([address] (2020)           What to do at Home    1. Recommended diet: Cardiac Diet    2. Recommended activity: Activity as tolerated    3. If you experience any of the following symptoms then please call your primary care physician or return to the emergency room if you cannot get hold of your doctor:   Fevers > 100.5, chills   Nausea or vomiting, persistent diarrhea > 24 hours   Blood in stool or black stools   Chest pain or SOB      Follow-up Information     Follow up With Specialties Details Why Contact Info    Kemar Valenzuela NP Nurse Practitioner Schedule an appointment as soon as possible for a visit in 2 weeks  [address] [place] [address]      Latrell Barrientos MD Pulmonary Disease  keep your scheduled appointment next week [address] [facility]  [address] [facility] [address]  [phone]          Take azithromycin 250 mg on Thursday and Friday AM, then stop    Take cefuroxime 500 mg after HD on Friday and Monday, then stop    Take prednisone 40 mg every morning starting Thursday for 3 days    Use nebs every 4 hours while awake till back to normal breathing, then reduce tto your usual regimen      Information obtained by :  I understand that if any problems occur once I am at home I am to contact my physician. I understand and acknowledge receipt of the instructions indicated above.                                                                                                                                            Physician's or R.N.'s Signature                                                                  Date/Time Patient or Representative Signature                                                          Date/Time
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
## 2020-01-15 NOTE — PROGRESS NOTES
Bedside shift change report given to Mamta Fry (oncoming nurse) by Mandy Ricardo (offgoing nurse). Report included the following information SBAR, Kardex and MAR.

## 2020-01-15 NOTE — PROGRESS NOTES
TRANSFER - IN REPORT: 
 
Verbal report received from Jessica Jones RN on Cecilio Deem.  being received from Med-Onc for ordered procedure Report consisted of patients Situation, Background, Assessment and  
Recommendations(SBAR). Information from the following report(s) SBAR and Kardex was reviewed with the receiving nurse. Opportunity for questions and clarification was provided. Assessment completed upon patients arrival to unit and care assumed.

## 2020-01-15 NOTE — PROGRESS NOTES
[email or web address]                  Phone - [phone] Renal Progress Note Subjective:  
Patient: Flakito Hedrick. YOB: 1965 Date- 1/15/2020 Reason for visit:   ESKD Admission Date: 2020 ASSESSMENT: 
 
End stage kidney dis --on a MWF schedule at  Renal in [place]. Started dialysis in . Fluid overload Hypertension 
 
hyponatremia due to fluid overload Resp failure secondary to COPD exacerbation and CHF. Severe non-compliance with diet/fluid restriction. Recurrent pleural effusion. S/p multiple thoracentesis procedures. Anemia secondary to renal failure 
  
COPD. Continued tobacco abuse. H/o EtOH abuse which may be on-going. Cirrhosis. 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 Secondary hyperparathyroidism. PVD. PLAN: 
Seen on hd today Remove 3-4 kg as tolerated Continue renvela Add acei or arb if bp remains high after dialysis today 
 
 History: 
Seen on hd 
S/p UF yesterday Sob improved 
bp still high C/o cough No fever We were notified of pt's admission by the dialysis team.  The pt has ESRF and gets dialysis MWF in [place]. He is very non-compliant with his dietary restrictions and is chronically SOB. He has a recurrent pleural effusion that occasionally requires drainage. The patient says he came to the ER due to SOB. He denies any chest pain, abd pain, N/V or other complaints. A comprehensive review of systems was negative except for that written in the HPI. Objective:  
 
Visit Vitals /82 Pulse 80 Temp 97.8 °F (36.6 °C) (Oral) Resp 18 Ht 5' 5\" (1.651 m) Wt 63.5 kg (139 lb 15.9 oz) SpO2 95% BMI 23.30 kg/m² Temp (24hrs), Av °F (36.7 °C), Min:97.7 °F (36.5 °C), Max:98.3 °F (36.8 °C) No intake/output data recorded.  1901 - 01/15 0700 In: -  
 Out: 6000 Physical Exam: 
GEN:  NAD NECK:  Supple, no thyromegaly RESP: coarse  b/l, mild  wheezing, CVS: RRR,S1,S2 ABDO:  soft , non tender, No mass NEURO: non focal, normal speech EXT: Edema +nt Data Review:  
 
Recent Labs  
  01/15/20 
0818 01/14/20 
0009 01/13/20 
2397 WBC 18.7* 9.7 11.1 HGB 10.4* 11.1* 11.3* * 131* 126*  
K 3.8 4.1 4.4 CL 95* 97 91* CO2 24 24 27 BUN 61* 50* 48* CREA 4.81* 5.13* 6.17* CA 8.2* 8.3* 8.2* ALB 2.5*  --   --   
PHOS 3.5  --   --   
 
 
Chart reviewed. Pertinent Notes Reviewed. Medications list Personally Reviewed. Derk Leyden, [address] [facility] [address]or[facility] [address] [place], [address] Phone - [phone]    Fax - [phone] Www. [facility] [address], [address] [facility] Phone - [phone]  Fax - [phone] [employer]

## 2020-01-15 NOTE — PROCEDURES
[facility] Dialysis Team [place]  [phone] Vitals   Pre   Post   Assessment   Pre   Post    
Temp  Temp: 97.8 °F (36.6 °C) (01/15/20 0802) 98.2, oral LOC  A&Ox4 A&Ox4 HR   Pulse (Heart Rate): 79 (01/15/20 0802) 84 Lungs   Coarse, Diminished Coarse B/P   BP: 175/83 (01/15/20 0802) 153/73 Cardiac   Reg S1 S2 Reg S1 S2 Resp   Resp Rate: 18 (01/15/20 0802) 18 Skin   Scattered scabs; very thin/ dry Scattered scabs; very thin/ dry Pain level  0 0 Edema  Abd distention Abd Distention Orders: Duration:   Start:    0802 End:    1202 Total:   4hr  
Dialyzer:   Dialyzer/Set Up Inspection: Layla Lopez (01/15/20 0802) K Bath:   Dialysate K (mEq/L): 2 (01/15/20 0802) Ca Bath:   Dialysate CA (mEq/L): 3.0 (01/15/20 0802) Na/Bicarb:   Dialysate NA (mEq/L): 140 (01/15/20 0802) Target Fluid Removal:   Goal/Amount of Fluid to Remove (mL): 3000 mL (01/15/20 0802) Access Type & Location:   MANUEL AVF: skin CDI. No s/s of infection. No issues with cannulation or hemostasis. Running well at . Labs Obtained/Reviewed Critical Results Called   Date when labs were drawn- 
Hgb-   
HGB Date Value Ref Range Status 01/15/2020 10.4 (L) 12.1 - 17.0 g/dL Final  
 
K-   
Potassium Date Value Ref Range Status 01/14/2020 4.1 3.5 - 5.1 mmol/L Final  
 
Ca-  
Calcium Date Value Ref Range Status 01/14/2020 8.3 (L) 8.5 - 10.1 MG/DL Final  
 
Bun-  
BUN Date Value Ref Range Status 01/14/2020 50 (H) 6 - 20 MG/DL Final  
 
Creat-  
Creatinine Date Value Ref Range Status 01/14/2020 5.13 (H) 0.70 - 1.30 MG/DL Final  
 
  
Medications/ Blood Products Given Name   Dose   Route and Time None ordered Blood Volume Processed (BVP):    82.4L Net Fluid Removed:  3500nl Comments Time Out Done: 0800 Primary Nurse Rpt Pre: Dylan Mac RN 
Primary Nurse Rpt Post: Sunny Ruelas RN 
Pt Education: fluid restriction/ try Ice rather than water Care Plan: ongoing Tx Summary: 
Pt arrived to HD suite A&Ox4. Consent signed & on file. SBAR received from Primary RN. 
8672: Pt cannulated with 65T needles per policy & without issue. Labs drawn per request/ order. VSS. Dialysis Tx initiated. 0815: Dr Saravia Fails at bedside for assessment. VSS with HTN. Informed of patient's request to increase goal; ok with MD. 
1000: 100ml NS flush given for clot prevention. VSS. 
1100: 100ml NS flush given for clot prevention. VSS. 
1202: Tx ended. VSS. All possible blood returned to patient. Hemostasis achieved without issue. Bed locked and in the lowest position, call bell and belongings in reach. SBAR given to Primary, RN. Patient is stable at time of their departure. **Patient has had 9.5kg removed so far this admission (admitted 1/13/20)** All Dialysis related medications have been reviewed. Admiting Diagnosis: COPD exacerbation Pt's previous clinic- [facility] Consent signed - Informed Consent Verified: Yes (01/15/20 0802) Kaity Consent - on file Hepatitis Status- 1/14/2020; Ab 74; Neg Ag; Immune Machine #- Machine Number: [id number] (01/15/20 7311) Telemetry status- none Pre-dialysis wt. -

## 2020-01-15 NOTE — PROGRESS NOTES
HD TRANSFER - OUT REPORT: 
 
Verbal report given to THAD Saunders on [employer]. being transferred to [facility] for routine progression of care Report consisted of patient's Situation, Background, Assessment and  
Recommendations(SBAR). Information from the following report(s) SBAR, Procedure Summary, Intake/Output and Recent Results was reviewed with the receiving nurse. Method:  $$ Method: Hemodialysis (01/15/20 0802) Fluid Removed  NET Fluid Removed (mL): 3500 ml (01/15/20 1202) Patient response to treatment:  Stable End Time  Hemodialysis End Time: 9025 (01/15/20 1202) If not documented, dialysis nurse to update post-dialysis row in HD/Filtration flowsheet Medications /Volume expansion agents or Fluid boluses administered during treatment? yes Post-dialysis medication administration due?  yes Remind nurse to administer post-HD medication upon return to unit. Fistula hemostasis? yes Line heparinization? no 
 
Lines: MANUEL AVF Opportunity for questions and clarification was provided. Patient transported with: O2 @ 3 liters Tech

## 2020-06-18 NOTE — DISCHARGE INSTRUCTIONS
[address] [facility]  Radiology Department  [phone]    Radiologist:  Dr. Maria Eugenia Stein    Date:6/18/2020    Thoracentesis Discharge Instructions    You may have an aching pain in the puncture site tonight as the numbing medicine wears off. You may take Tylenol, as directed on the label, for pain or discomfort. Avoid ibuprofen (Advil, Motrin) and aspirin products for the next 48 hours as these drugs may increase your chance of bleeding. You have develop a mild cough as the lungs re expand with air, this should resolve with in the next 24 hours. Resume your previous diet and continue your prescribed medicaitons. Rest for the next 24 hours. If you experience shortness of breath (other than your normal breathing pattern) difficulty breathing, or have severe chest pain, call 911 and go to the nearest Emergency Room.     Be sure to follow up with your referring physician as soon as possible    Other:

## 2020-06-18 NOTE — PROGRESS NOTES
Spoke with Simon Hernandez NP to make aware of fluid removed during thoracentesis was removed from the left lung instead of the right lung as ordered because the fluid on the right side was loculated. Labs sent on fluid removed from the left side as ordered. Chest x-ray ordered post thoracentesis.

## 2020-06-18 NOTE — ROUTINE PROCESS
Procedure reviewed with patient by Dr. Jose Finney. Opportunity to verbalize questions and concerns. Consent obtained.

## 2020-09-09 NOTE — ED NOTES
Pt wanted meal tray. Kitchen is closed. Provided ginger ale. Patient labs show anemia with slow decline since early this year. Now 7.4. FOBT (-); ?2/2 malignancy, chemo    -s/p 1U pRBC, Hgb now 8.7  - trend daily CBC for now  - will need to monitor closely given heparin gtt; vascular following for possible IVC filter if indicated Patient labs show anemia with slow decline since early this year. Now 7.4. FOBT (-); ?2/2 malignancy, chemo    -s/p 1U pRBC, Hgb now 9.2  - trend daily CBC for now  - will need to monitor closely given heparin gtt; vascular following for possible IVC filter if indicated

## 2020-11-05 PROBLEM — J96.01 ACUTE HYPOXEMIC RESPIRATORY FAILURE DUE TO COVID-19 (HCC): Status: ACTIVE | Noted: 2020-01-01

## 2020-11-05 PROBLEM — U07.1 ACUTE HYPOXEMIC RESPIRATORY FAILURE DUE TO COVID-19 (HCC): Status: ACTIVE | Noted: 2020-01-01

## 2020-11-05 PROBLEM — I48.91 A-FIB (HCC): Status: ACTIVE | Noted: 2020-01-01

## 2020-11-05 PROBLEM — E16.2 HYPOGLYCEMIA: Status: ACTIVE | Noted: 2020-01-01

## 2020-11-05 NOTE — ED TRIAGE NOTES
Pt arrived Via EMS with CC of Low blood sugar. Pt is dialysis patient M/W/F. Per EMS pt was given glucagon IM for a blood sugar in the 30's. Subsequent reading 62, Pt received PO glucose at that time. Pt is alert and oriented, but drowsy. Pt blood sugar Just PTA was 45. Pt given one amp d50 per MD order on arrival. Pt new glucose reading 291. Pt has a \"graft site to abd\" which is covered in bulky dressing, has a fistula in the right arm and an old graft site to the left. Md okay to place IV to the left forearm. P was diaphoretic on arrival is no longer diaphoretic, denies any pain. EKG showing AFIB RVR.

## 2020-11-05 NOTE — ED NOTES
Went to straight cath patient. Pt declined at this time. States I don't make much urine and I have had that done, I don't want to go through that again\". Informed of importance, continues to decline. Pt states he will try to void into a urinal. Urinal provided for patient attempt.

## 2020-11-05 NOTE — PROGRESS NOTES
Pharmacy [facility] Dosing Protocol - Antimicrobials Indication for Antimicrobials: Bacteremia Current Regimen of Each Antimicrobial: 
Vancomycin 1750 mg x 1 then 750 mg after each HD (Start Date ; Day # 1) Cefepime 1 gm Q24H (Start Date , Day 1) Previous Antimicrobial Therapy: 
 
Vancomycin Goal Level: 20-25 mcg/ml Vancomycin Levels Date Dose & Interval Measured (mcg/mL) Steady State (mcg/mL) Date & time of next level:  
 
Significant Cultures:  
 
Radiology / Imaging results: (X-ray, CT scan or MRI):  
 
 
Labs: 
Recent Labs 20 
3157 CREA 3.24* BUN 39* WBC 13.7* Temp (24hrs), Av.5 °F (36.4 °C), Min:97.5 °F (36.4 °C), Max:97.5 °F (36.4 °C) Paralysis, amputations, malnutrition:  
Creatinine Clearance (mL/min) or Dialysis: HD Impression/Plan: Antibiotics as above Pharmacy will follow daily and adjust medications as appropriate for renal function and/or serum levels. Thank you, 
Devora Rivera, [facility] Recommended duration of therapy 
http://[facility]/[place]/[facility]/Pharmacy/Clinical%20Companion/Duration%20of%20ABX%20therapy. docx Renal Dosing 
http://[facility]/[place]/[facility]/Pharmacy/Clinical%20Companion/Renal%[email or web address]. pdf

## 2020-11-05 NOTE — REMOTE MONITORING
Message left for primary RN regarding sepsis bundle need- antibiotics. Fani Mercedes MSN, RN [address] [facility] [phone]

## 2020-11-05 NOTE — PROGRESS NOTES
Pharmacy Clarification of Prior to Admission Medication Regimen The patient was interviewed regarding clarification of the prior to admission medication regimen. Patient was questioned regarding use of any other inhalers, topical products, over the counter medications, herbal medications, vitamin products or ophthalmic/nasal/otic medication use. Information Obtained From: query, patient Pertinent Pharmacy Findings: 
Updated patients preferred outpatient pharmacy to: [address] PTA medication list was corrected to the following:  
 
Prior to Admission Medications Prescriptions Last Dose Informant Taking? albuterol (PROVENTIL) 90 mcg/Actuation inhaler 11/4/2020 at Unknown time Self Yes Sig: Take 2 Puffs by inhalation four (4) times daily. QID PRN  
albuterol-ipratropium (DUO-NEB) 2.5 mg-0.5 mg/3 ml nebu 11/4/2020 at Unknown time Self Yes Sig: 3 mL by Nebulization route three (3) times daily. AT LUNCHTIME DAILY. b complex-vitamin c-folic acid (NEPHROCAPS) 1 mg capsule 11/4/2020 at Unknown time Self Yes Sig: Take 1 Cap by mouth daily. ergocalciferol (VITAMIN D2) 50,000 unit capsule 11/2/2020 at Unknown time Self Yes Sig: Take 50,000 Units by mouth every seven (7) days. MONDAY  
fluticasone-umeclidinium-vilanterol (TRELEGY ELLIPTA) 100-62.5-25 mcg inhaler 11/4/2020 at Unknown time Self Yes Sig: Take 1 Puff by inhalation daily. furosemide (LASIX) 80 mg tablet 11/4/2020 at Unknown time Self Yes Sig: Take 80 mg by mouth two (2) times a day.  
gabapentin (NEURONTIN) 300 mg capsule 11/4/2020 at Unknown time Self Yes Sig: Take 300 mg by mouth nightly as needed. lidocaine-prilocaine (EMLA) topical cream 11/4/2020 at Unknown time Self Yes Sig: Apply  to affected area as needed for Pain. Patient applies to arm before dialysis on Monday, Wednesday, and Friday. omeprazole (PRILOSEC) 20 mg capsule 11/4/2020 at Unknown time Self Yes Sig: Take 20 mg by mouth as needed. oxyCODONE-acetaminophen (PERCOCET)  mg per tablet 11/4/2020 at Unknown time Self Yes Sig: Take 1 Tab by mouth three (3) times daily. sevelamer carbonate (RENVELA) 800 mg tab tab 11/4/2020 at Unknown time Self Yes Sig: Take 800 mg by mouth three (3) times daily. Facility-Administered Medications: None Thank you, 
Armani Flynn CPHT Medication History Pharmacy Technician
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
## 2020-11-05 NOTE — ED NOTES
Pt repeat bsg check of 74. Pt mentation remains unchanged. Pt alert and oriented. Pt continues to deny pain. Pt Diltiazem drip infusing at this time.

## 2020-11-05 NOTE — ED NOTES
Pt continues to refuse straight cath Md Patricia Dickens was aware and stated that it was okay to hold due to patient making very little urine. Pt returned from CT, Pt showing 90's drip titrated back down as appropriate. Pt eating dinner now. Recent bsg 156. Pt given pain medication per request of 7/10 back pain. \" my back hurts all the time. \"
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
## 2020-11-05 NOTE — ED NOTES
Pt BSG 54, then 52 on recheck. Md notified. Pt has D5 0.9 % NaCL infusing at 100mL/hr. Per MD Indira Lane  Increase rate on D5 infusion to 150mL/ hr. Pt recheck in 30 mins.

## 2020-11-06 PROBLEM — R06.00 ACUTE DYSPNEA: Status: ACTIVE | Noted: 2020-01-01

## 2020-11-06 NOTE — ED NOTES
Pt returned from dialysis requesting pain medication and food, Pt Lean meal made for patient as it is in between meal times and patient missed lunch due to being in dialysis.

## 2020-11-06 NOTE — CONSULTS
Hugo Santamaria  
 
 
 
NAME:Amadou Resendez Francisco. [facility]:[id number]   :1965 Pt [facility] for [facility] today Tracee Frazier, [address] [facility] [employer] [address] [place], [address] [facility] [place] Phone - [phone] Fax - [phone] [address], [address] [facility] Phone - [phone] Fax - [phone]    
[email or web address]

## 2020-11-06 NOTE — ED NOTES
Bedside shift change report given to THAD Jeff (oncoming nurse) by June Snyder RN (offgoing nurse). Report included the following information SBAR, Kardex, ED Summary, [facility] and Recent Results. Pt alert and oriented at this time, speaking in full sentences. Pt still declining straight cath at this time. 8:22 PM: Attempted to call Hospitalist/NP regarding pt bp.  
 
8:46 PM: Spoke with NP purveyor regarding pt bp. She states she will hold Cardizem drip and coreg in the [facility] and to monitor the pt. She states she does not want to pursue fluid administration at this time as the bp is receiving dialysis but to notify her if the pt bp continues to drop. 11:16: Sent NP message via [employer] regarding pt BP.  
 
11:51 PM: Redressed pt abdominal wound and obtained wound culture. Pt noted that he had a wound and abscess to his right buttocks. Examined pt and found nickel sized abscess appearing nodule to right buttock cheek and small circular wound <size of naman to sacrum. Skin around the area blanchable at this time. 12:03 AM: Spoke with pharmacist to confirm compatibility of D5, D10 and albumin. He states the 3 are compatible with each other. 0630: Rechecked pt bg with result of 225. Result does not seem to be transferring into chart at this time from glucometer. 7:19 AM: Bedside shift change report given to June Snyder RN (oncoming nurse) by Linda Arechiga RN (offgoing nurse). Report included the following information SBAR, Kardex, ED Summary, [facility] and Recent Results.

## 2020-11-06 NOTE — ED NOTES
TRANSFER - OUT REPORT: 
 
Verbal report given to Shahram Ned (name) on [facility] [place].  being transferred to [facility] Rm 2209(unit) for routine progression of care Report consisted of patients Situation, Background, Assessment and  
Recommendations(SBAR). Information from the following report(s) SBAR, ED Summary, Procedure Summary, Intake/Output and MAR was reviewed with the receiving nurse. Lines:  
Peripheral IV 11/05/20 Left Forearm (Active) Opportunity for questions and clarification was provided. Patient transported with: 
Pt labels, Belongings.   
With transport team.

## 2020-11-06 NOTE — PROGRESS NOTES
1710: Notified Dr. Abdelrahman Silveira of /150. Awaiting orders. 1800: PRN IV hydralazine ordered q4 SBP >160.  
 
1900: Per Dr. Aaron Perez- start diltiazem gtt at 10 mg/hr

## 2020-11-06 NOTE — CONSULTS
[facility] Consult Service Note Pulmonary, Critical Care, and Sleep Medicine Name: Faby Young. MRN: [id number] : 1965 Hospital: Critical access hospital Date: 2020  Admission date: 2020 Hospital Day: 2 Subjective/Interval History:  
Seen earlier today on rounds. Pt is unstable and acutely ill. Medical records and data reviewed. Hospital Problems  Date Reviewed: 2018 Codes Class Noted POA A-fib [facility]) ICD-10-CM: I48.91 
ICD-9-CM: 427.31  2020 Unknown Hypoglycemia ICD-10-CM: E16.2 ICD-9-CM: 251.2  2020 Unknown Acute hypoxemic respiratory failure due to COVID-19 [facility]) ICD-10-CM: U07.1, J96.01 
ICD-9-CM: 518.81, 079.89, 799.02  2020 Unknown IMPRESSION:  
1. Acute chronic respiratory failure with Hypoxia 2. Recurrent pleural effusions; chronic right loculated with compression atelectasis, RLL collapse, consolidation- was told fluid was too thick and he was not good surgical candidate for decortication; has had pigtail at [facility] and thoracenteses in the past; sees my partner Dr. Caitlyn Ravi 3. Chronic Obstructive Pulmonary Disease with emphysema, chronic bronchitis; poor airway clearance 4. ESRD on HD- [place] renal  
5. IDDM type II with renal manifestation 6. Hypertension, benign essential 
7. Tobacco Abuse 8. ETOH abuse 9. Constipation 10. Hep C, followed by hepatology at [facility] 11. Diabetes. 12. History of splenectomy and partial pancreatectomy. 15. H/o PVD; h/o LUE thrombectomy 14. Body mass index is 23.13 kg/m². 15. Multiorgan dysfunction as outlined above: Pt has one or more acute or chronic illnesses with severe exacerbation with progression or side effects of treatment that poses a threat to life or bodily function 16. Additional workup outlined below 17. Pt is requiring Drug therapy requiring intensive monitoring for toxicity 25. Pt is unstable, unpredictable needing inpatient monitoring; is acutely ill and at high risk of sudden decline and decompensation with severe consequenses and continued end organ dysfunction and failure 19. Prognosis guarded RECOMMENDATIONS/PLAN:  
1. Sputum for culture 2. Limited options for effusions 3. Agree with Empiric IV antibiotics pending culture results 4. Follow culture results 5. Supplemental O2 to keep sats > 93% 6. Aspiration precautions 7. Labs to follow electrolytes, renal function and and blood counts 8. Glucose monitoring and SSI 9. Bronchial hygiene with respiratory therapy techniques, bronchodilators 10. Eventually maintenane meds need to be switched to nebulized equivalents for better delivery. 11. DVT prophylaxis 12. Smoking cessation counseling done 13. Pt needs IV fluids with additives and Drug therapy requiring intensive monitoring for toxicity 14. Prescription drug management with home med reconciliation reviewed [x] High complexity decision making was performed [x] See my orders for details Subjective/Initial History:  
 
I was asked by Darling Drisclol MD to see Nicci Cabrera  a 60 y.o.  male in consultation for a chief complaint of recurrent pleural effusions Excerpts from admission 11/5/2020 or consult notes as follows:  
 
\"  Nicci Cabrera, 60 y.o. male presents to the ED with history of acute hypoxic respiratory failure, COPD, hyponatremia, end-stage renal disease on hemodialysis, last hemodialysis yesterday, recurrent pleural effusion, diabetes type 2, leukocytosis onset after steroids, chronic back pain and possible hep C infection here with hypoglycemia reported by EMS in the field. Patient's wife called 911 after he was \"totally out of it\".   Patient was reported to have a blood sugar in the 40s, received oral glucose and glucagon from BLS and ALS personnel in the field before being transported here. Repeat blood sugar was high but quickly came down into the 50s. \" Has recurrent pleural effusion followed by Dr. Surinder Byrd. Notes reviewed. Has had fluid drained in the past but was told too thick and that decortication would be too risky. Was given empiric abx recently but followup sputum culture was not submitted to [facility] by pt. Patient overall poor historian. Now in dialysis unit on O2. No SOB at rest. Congestion. Admits to smoking and that he needs to quit but wife also smokes. He denies any CP/N/V/D. He has some PRITCHETT/SOB from COPD and uses prn O2. He had thoracentesis for pl effusions in past. Even had a pigtail at [facility]. He admits to drinking and smoking. He altaf any rectal bleed, fevers, hematuria, headache, dizziness, palpitations. On Trelegy. Hard to nhale the meds Allergies Allergen Reactions  Ativan [Lorazepam] Other (comments) Hyper activity MAR reviewed and pertinent medications noted or modified as needed Current Facility-Administered Medications Medication  dilTIAZem IR (CARDIZEM) tablet 30 mg  
 VANCOMYCIN INFORMATION NOTE  cefepime (MAXIPIME) 1 g in 0.9% sodium chloride (MBP/ADV) 50 mL  [Held by provider] dilTIAZem (CARDIZEM) 100 mg in dextrose 5% (MBP/ADV) 100 mL infusion  sodium chloride (NS) flush 5-10 mL  acetaminophen (TYLENOL) tablet 650 mg  
 thiamine mononitrate (B-1) tablet 637 mg  
 folic acid (FOLVITE) tablet 1 mg  therapeutic multivitamin (THERAGRAN) tablet 1 Tab  insulin lispro (HUMALOG) injection  glucose chewable tablet 16 g  
 dextrose (D50W) injection syrg 12.5-25 g  
 glucagon (GLUCAGEN) injection 1 mg  metroNIDAZOLE (FLAGYL) IVPB premix 500 mg  [Held by provider] carvediloL (COREG) tablet 12.5 mg  
 pantoprazole (PROTONIX) tablet 40 mg  
 sevelamer carbonate (RENVELA) tab 800 mg  
 gabapentin (NEURONTIN) capsule 300 mg  
 methylPREDNISolone (PF) (SOLU-MEDROL) injection 40 mg  
  heparin (porcine) injection 5,000 Units  albuterol-ipratropium (DUO-NEB) 2.5 MG-0.5 MG/3 ML  
 budesonide (PULMICORT) 500 mcg/2 ml nebulizer suspension And  
 arformoteroL (BROVANA) neb solution 15 mcg And  ipratropium (ATROVENT) 0.02 % nebulizer solution 0.5 mg  
 oxyCODONE IR (ROXICODONE) tablet 5 mg  traMADoL (ULTRAM) tablet 50 mg  
 
Current Outpatient Medications Medication Sig  
 fluticasone-umeclidinium-vilanterol (TRELEGY ELLIPTA) 100-62.5-25 mcg inhaler Take 1 Puff by inhalation daily.  oxyCODONE-acetaminophen (PERCOCET)  mg per tablet Take 1 Tab by mouth three (3) times daily.  sevelamer carbonate (RENVELA) 800 mg tab tab Take 800 mg by mouth three (3) times daily.  ergocalciferol (VITAMIN D2) 50,000 unit capsule Take 50,000 Units by mouth every seven (7) days. Jacob Klinefelter  omeprazole (PRILOSEC) 20 mg capsule Take 20 mg by mouth as needed.  furosemide (LASIX) 80 mg tablet Take 80 mg by mouth two (2) times a day.  gabapentin (NEURONTIN) 300 mg capsule Take 300 mg by mouth nightly as needed.  b complex-vitamin c-folic acid (NEPHROCAPS) 1 mg capsule Take 1 Cap by mouth daily.  lidocaine-prilocaine (EMLA) topical cream Apply  to affected area as needed for Pain. Patient applies to arm before dialysis on Monday, Wednesday, and Friday.  albuterol-ipratropium (DUO-NEB) 2.5 mg-0.5 mg/3 ml nebu 3 mL by Nebulization route three (3) times daily. AT LUNCHTIME DAILY.  albuterol (PROVENTIL) 90 mcg/Actuation inhaler Take 2 Puffs by inhalation four (4) times daily. QID PRN Patient PCP: Gia Gonzales NP 
PMH:  has a past medical history of Adverse effect of anesthesia (2003), Arthritis, Chronic back pain, Chronic kidney disease, Chronic pain, Coagulation disorder ([address] COPD, Diabetes mellitus, Diabetic neuropathy ([facility] [address] DJD (degenerative joint disease), Emphysema, Endocrine disease, GERD (gastroesophageal reflux disease), Hepatitis C, Hypertension, Ill-defined condition, Ill-defined condition, Liver disease, and Unspecified adverse effect of anesthesia. PSH:   has a past surgical history that includes hx cyst removal; hx laparotomy; hx heent; pr abdomen surgery proc unlisted; pr abdomen surgery proc unlisted; pr abdomen surgery proc unlisted; hx other surgical (12/15/2017); hx vascular access; hx vascular access (); hx gi; hx gi; hx hernia repair (); hx orthopaedic (Right); and hx orthopaedic. FHX: family history includes Cancer in his mother; Diabetes in his mother; Heart Disease in his father; Hypertension in his father; Other in an other family member; Stroke in his mother. SHX:  reports that he has been smoking cigarettes. He has a 38.00 pack-year smoking history. He has never used smokeless tobacco. He reports current alcohol use. He reports that he does not use drugs. ROS:A comprehensive review of systems was negative except for that written in the HPI. Objective:  
 
Vital Signs: Telemetry:     Intake/Output:  
Visit Vitals /84 Pulse (!) 105 Temp 97.9 °F (36.6 °C) Resp 23 Ht 5' 6\" (1.676 m) Wt 65 kg (143 lb 4.8 oz) SpO2 96% BMI 23.13 kg/m² Temp (24hrs), Av.2 °F (36.8 °C), Min:97.8 °F (36.6 °C), Max:98.5 °F (36.9 °C) O2 Device: Room air Wt Readings from Last 4 Encounters:  
20 65 kg (143 lb 4.8 oz) 20 62.6 kg (138 lb)  
20 63.5 kg (139 lb 15.9 oz) 19 63.5 kg (140 lb) Intake/Output Summary (Last 24 hours) at 2020 1125 Last data filed at 2020 0500 Gross per 24 hour Intake 800 ml Output  Net 800 ml Last shift:      No intake/output data recorded. Last 3 shifts:  1901 -  0700 In: 800 [I.V.:800] Out: - Physical Exam:  
General:  male; in no respiratory distress and acyanotic, alert, oriented times 3, cooperative and moderately ill;  NC; 
HEENT: NCAT, no dentition, lips and mucosa dry Eyes: anicteric; conjunctiva clear Neck: no nodes, no cuff leak, no accessory MM use. Chest: no deformity,  
Cardiac: regular rate, rhythm; no murmur Lungs: distant breath sounds; few wheezes, no rales, no rhonchi Abd: soft, NT, hypoactive BS, Ext: no edema; no joint swelling; No clubbing : NO valdivia, Neuro: fluent, poor recal, sedated, follows commands Psych- no agitation, oriented to person;  
Skin: warm, dry, no cyanosis; scattered ecchymoses Pulses: 1-2+ Bilateral pedal, radial 
Capillary: brisk; pale Labs: 
 
Recent Labs 11/06/20 
5304 11/05/20 
7721 WBC 9.2 13.7* HGB 9.5* 11.8*  
 296 INR 1.2*  --   
APTT 25.8  --   
 
Recent Labs 11/06/20 
2054 11/05/20 
1083 * 132* K 4.8 3.6 CL 95* 96* CO2 23 25 * 35* BUN 46* 39* CREA 3.64* 3.24* CA 8.1* 8.9 MG 2.0  --   
ALB  --  2.8* ALT  --  27 No results for input(s): PH, PCO2, PO2, HCO3, FIO2 in the last 72 hours. Recent Labs 11/05/20 
5873 TROIQ 0.05* No results found for: BNPP, BNP Lab Results Component Value Date/Time Culture result: NO GROWTH AFTER 19 HOURS 11/05/2020 11:55 AM  
 Culture result: NO GROWTH AFTER 19 HOURS 11/05/2020 11:45 AM  
 Culture result: NO GROWTH 4 DAYS 05/21/2019 10:49 AM  
 
Lab Results Component Value Date/Time TSH 1.67 11/06/2020 03:39 AM  
 
 
Imaging: CXR Results  (Last 48 hours) 11/05/20 1206  XR CHEST PORT Final result Impression:  Impression: Small bilateral effusions with underlying atelectasis and mild  
pulmonary edema. Narrative: Indication: Acute hypoxic respiratory failure Comparison: 6/18/2020 Portable exam of the chest obtained at 1154 demonstrates stable cardiomegaly. There are small bilateral pleural effusions with underlying atelectasis and mild  
pulmonary edema. The osseous structures are unremarkable. Results from Hospital Encounter encounter on 11/05/20 CT CHEST WO CONT Narrative EXAM:  CT CHEST WO CONT INDICATION:  loculated effusion seen on chest xray COMPARISON: Earlier same day and 5/19/2020. Deja Garzon TECHNIQUE: Unenhanced multislice helical CT was performed from the thoracic 
inlet to the adrenal glands without intravenous contrast administration. Contiguous 5 mm axial images were reconstructed and lung and soft tissue windows 
were generated. Coronal and sagittal reformations were generated. CT dose 
reduction was achieved through use of a standardized protocol tailored for this 
examination and automatic exposure control for dose modulation. Deja Garzon FINDINGS: 
CHEST: 
Chest wall/thoracic inlet: Within normal limits. Thyroid: 5 mm right thyroid nodule unchanged. Mediastinum/zuri: 18 mm right lower paratracheal node unchanged. Heart/vessels: Aorta is normal in caliber but atherosclerotic. Extensive 
coronary artery calcifications. Heart is not enlarged. Deja Garzon Lungs/Pleura: There is debris within the right lower lobe bronchi with bronchial 
wall thickening. Partial collapse of the left lower lobe and near complete 
collapse of the right lower lobe. Patchy airspace disease within the right upper 
lobe posteriorly. Loculated right pleural effusion again noted minimally 
smaller. Left pleural effusion not significant changed. Deja Garzon ABDOMEN: 
Incidentally imaged portions of the abdomen appear unremarkable. . 
MSK: Within normal limits. .  
 Impression IMPRESSION:   
1. Loculated right pleural effusion minimally smaller 2. Left pleural effusion unchanged 3. Near complete collapse of the right lower lobe and partial collapse of the 
left lower lobe with debris within the right lower lobe bronchi 4. Bronchial wall thickening in the right middle lobe as well as airspace 
disease posteriorly right upper lobe likely infectious or inflammatory 5. 5 cm nodule right lobe of the thyroid unchanged. 6. Unchanged mediastinal adenopathy This care involved high complexity medical decision making: I personally: · Reviewed the flowsheet and previous days notes · Reviewed and summarized records or history from previous days note or discussions with staff, family · High Risk Drug therapy requiring intensive monitoring for toxicity: eg steroids, pressors, antibiotics · Reviewed and/or ordered Clinical lab tests · Reviewed images and/or ordered Radiology tests · Reviewed the patients ECG / Telemetry · discussed my assessment/management with : Nursing, for coordination of care Lonny Styles MD

## 2020-11-06 NOTE — PROGRESS NOTES
Physical Therapy Received PT eval order. Chart reviewed. Pt currently at dialysis.  Will follow up at later time, likely in am. 
 
Lewis Raphael, TAWANNA

## 2020-11-06 NOTE — PROGRESS NOTES
Nephrology Progress Note Hugo Santamaria  
 
[email or web address]  Phone - [phone] Patient: Cindy Ellis. YOB: 1965     Admit Date: 11/5/2020 Date- 11/6/2020 CC: Follow up for end-stage renal disease IMPRESSION & PLAN:  
? ESRD- renal-[place]-Monday Wednesday Friday ? A. fib with RVR ? Hypoglycemia ? Hyponatremia 
? History of excessive fluid gain ? Anemia of chronic kidney disease ? Secondary hyperparathyroidism ? Current smoker PLAN- 
Dialysis today-goal to remove 2 to 2.5 kg He will need ultrafiltration tomorrow Follow BMP Low blood pressure will limit the fluid removal on dialysis Continue Renvela Give Epogen On Cardizem per primary team 
Advised to quit smoking Subjective: Interval History:  
-Patient has history of end-stage renal disease on dialysis Monday Wednesday Friday at [facility] His last dialysis was on Wednesday He is admitted with hypoglycemia and A. fib with RVR Complaint of shortness of breath No fever or chills He complained of cough which is chronic due to smoking ROS:- As documented above Objective:  
Vitals:  
 11/06/20 0800 11/06/20 0830 11/06/20 0841 11/06/20 4160 BP: 118/85 106/80 Pulse: (!) 102 (!) 108 Resp: 18 17 Temp: 97.9 °F (36.6 °C) SpO2: 97% 98% 96% 100% Weight:      
Height:      
  
11/05 0701 - 11/06 0700 In: 800 [I.V.:800] Out: - Last 3 Recorded Weights in this Encounter 11/05/20 3221 Weight: 65 kg (143 lb 4.8 oz) Physical exam:  
GEN: NAD NECK- Supple, no mass RESP: + wheezing, coarse b/l CVS: S1,S2  RRR 
NEURO: Normal speech, Non focal 
Abdo- soft, NT 
EXT: + Edema PSYCH: Normal Mood Chart reviewed. Pertinent Notes reviewed. Data Review : 
Recent Labs 11/06/20 
0700 11/05/20 
2860 * 132* K 4.8 3.6 CL 95* 96* CO2 23 25 BUN 46* 39* CREA 3.64* 3.24* * 35* CA 8.1* 8.9 MG 2.0  --   
 
 Recent Labs 11/06/20 2095 11/05/20 
4492 WBC 9.2 13.7* HGB 9.5* 11.8* HCT 28.3* 34.3*  
 296 No results for input(s): FE, TIBC, PSAT, FERR in the last 72 hours. Medication list  reviewed Current Facility-Administered Medications Medication Dose Route Frequency  dilTIAZem IR (CARDIZEM) tablet 30 mg  30 mg Oral AC&HS  [Held by provider] dilTIAZem (CARDIZEM) 100 mg in dextrose 5% (MBP/ADV) 100 mL infusion  0-15 mg/hr IntraVENous TITRATE  sodium chloride (NS) flush 5-10 mL  5-10 mL IntraVENous PRN  
 acetaminophen (TYLENOL) tablet 650 mg  650 mg Oral Q6H PRN  
 cefepime (MAXIPIME) 1 g in 0.9% sodium chloride (MBP/ADV) 50 mL  1 g IntraVENous Q24H  
 thiamine mononitrate (B-1) tablet 100 mg  100 mg Oral DAILY  folic acid (FOLVITE) tablet 1 mg  1 mg Oral DAILY  therapeutic multivitamin (THERAGRAN) tablet 1 Tab  1 Tab Oral DAILY  insulin lispro (HUMALOG) injection   SubCUTAneous AC&HS  
 glucose chewable tablet 16 g  4 Tab Oral PRN  
 dextrose (D50W) injection syrg 12.5-25 g  12.5-25 g IntraVENous PRN  
 glucagon (GLUCAGEN) injection 1 mg  1 mg IntraMUSCular PRN  
 metroNIDAZOLE (FLAGYL) IVPB premix 500 mg  500 mg IntraVENous Q12H  
 [Held by provider] carvediloL (COREG) tablet 12.5 mg  12.5 mg Oral BID  pantoprazole (PROTONIX) tablet 40 mg  40 mg Oral ACB  sevelamer carbonate (RENVELA) tab 800 mg  800 mg Oral TID  gabapentin (NEURONTIN) capsule 300 mg  300 mg Oral TID PRN  
 methylPREDNISolone (PF) (SOLU-MEDROL) injection 40 mg  40 mg IntraVENous Q6H  
 heparin (porcine) injection 5,000 Units  5,000 Units SubCUTAneous Q12H  
 albuterol-ipratropium (DUO-NEB) 2.5 MG-0.5 MG/3 ML  3 mL Nebulization Q4H PRN  
 budesonide (PULMICORT) 500 mcg/2 ml nebulizer suspension  500 mcg Nebulization BID RT And  
 arformoteroL (BROVANA) neb solution 15 mcg  15 mcg Nebulization BID RT  And  
 ipratropium (ATROVENT) 0.02 % nebulizer solution 0.5 mg  0.5 mg Nebulization Q6H PRN  
 oxyCODONE IR (ROXICODONE) tablet 5 mg  5 mg Oral Q6H PRN  
 traMADoL (ULTRAM) tablet 50 mg  50 mg Oral Q6H PRN Current Outpatient Medications Medication Sig  
 fluticasone-umeclidinium-vilanterol (TRELEGY ELLIPTA) 100-62.5-25 mcg inhaler Take 1 Puff by inhalation daily.  oxyCODONE-acetaminophen (PERCOCET)  mg per tablet Take 1 Tab by mouth three (3) times daily.  sevelamer carbonate (RENVELA) 800 mg tab tab Take 800 mg by mouth three (3) times daily.  ergocalciferol (VITAMIN D2) 50,000 unit capsule Take 50,000 Units by mouth every seven (7) days. Debby Coins  omeprazole (PRILOSEC) 20 mg capsule Take 20 mg by mouth as needed.  furosemide (LASIX) 80 mg tablet Take 80 mg by mouth two (2) times a day.  gabapentin (NEURONTIN) 300 mg capsule Take 300 mg by mouth nightly as needed.  b complex-vitamin c-folic acid (NEPHROCAPS) 1 mg capsule Take 1 Cap by mouth daily.  lidocaine-prilocaine (EMLA) topical cream Apply  to affected area as needed for Pain. Patient applies to arm before dialysis on Monday, Wednesday, and Friday.  albuterol-ipratropium (DUO-NEB) 2.5 mg-0.5 mg/3 ml nebu 3 mL by Nebulization route three (3) times daily. AT LUNCHTIME DAILY.  albuterol (PROVENTIL) 90 mcg/Actuation inhaler Take 2 Puffs by inhalation four (4) times daily. QID PRN Tracee Frazier, [address] [employer]  [facility] [place], [address] Phone - [phone]               Fax - [phone]

## 2020-11-06 NOTE — PROGRESS NOTES
11/06/20 1733 Vital Signs Temp 98.5 °F (36.9 °C) Temp Source Oral  
Pulse (Heart Rate) (!) 121 Resp Rate 18  
O2 Sat (%) 97 % Level of Consciousness Alert  
BP (!) 171/150 MAP (Calculated) 157 MEWS Score 3 MD notified of BP, awaiting med orders.
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
## 2020-11-06 NOTE — PROGRESS NOTES
Received message from patient's nurse Violeta Maynard stating : 
 
Patient experiencing hypotension at this point with average of 73/31. Angelica Bustamante Patient reports no shortness of breath, headache, neuro changes or other symptoms at this point. Patient history of dialysis, last session yesterday. Discussion / orders: 
 
Reviewed patient record and medication list. 
Patient was admitted today secondary to hypoglycemia and decreased mental status. He was noted to have SVT/A. fib with RVR rate of 170 which did improve with Cardizem drip and cardiology was consulted in the emergency department. Per patient's nurse, blood pressure now 70/53 with pulse of 57. I note that patient is also on Coreg 12.5 mg 2 times daily and received last dose at 1700. I asked patient's nurse to go ahead and hold Cardizem. I also informed her that I will place Coreg on hold as well. His blood pressure and heart rate will likely improve with these medications held. I did ask her that if heart rate deteriorates to give me a call ASAP. She verbalized understanding. Please note that this note was dictated using Dragon computer voice recognition software. Quite often unanticipated grammatical, syntax, homophones, and other interpretive errors are inadvertently transcribed by the computer software. Please disregard these errors. Please excuse any errors that have escaped final proofreading.

## 2020-11-06 NOTE — PROGRESS NOTES
Physician Progress Note Ron RIVERA #:                  [id number] :                       1965 ADMIT DATE:       2020 8:53 AM 
DISCH DATE: 
RESPONDING 
PROVIDER #:        Chi GARCIA MD 
 
 
 
 
QUERY TEXT: 
 
Patient admitted with suspected sepsis. Noted documentation of acute respiratory failure in  pn. Please indicate one of the following and document in the medical record: The medical record reflects the following: 
Risk Factors: copd, esrd Clinical Indicators:  cardiology pn: Acute hypoxemic respiratory failure due to COVID-19 [facility]) (2020) Treatment: pulm eval, Nebs prn, IV steroids as had some wheezing,On iv abx,Monitor clinically Acute Respiratory Failure Clinical Indicators per [employer] Training Guide and Quick Reference Guide: 
pO2 < 60 mmHg or SpO2 (pulse oximetry) < 91% breathing room air 
pCO2 > 50 and pH < 7.35 
P/F ratio (pO2 / FIO2) < 300 
pO2 decrease or pCO2 increase by 10 mmHg from baseline (if known) Supplemental oxygen of 40% or more Presence of respiratory distress, tachypnea, dyspnea, shortness of breath, wheezing Unable to speak in complete sentences Use of accessory muscles to breathe Extreme anxiety and feeling of impending doom Tripod position Confusion/altered mental status/obtunded Thanks, Edan Mendosa/[facility]  Options provided: 
-- Acute Respiratory Failure currently as evidenced by, Please document evidence. -- Acute Respiratory Failure ruled out after study -- Other - I will add my own diagnosis -- Disagree - Not applicable / Not valid -- Disagree - Clinically unable to determine / Unknown 
-- Refer to Clinical Documentation Reviewer PROVIDER RESPONSE TEXT: 
 
Acute Respiratory Failure has been ruled out after study.  
 
Query created by: Nona Al on 2020 2:40 PM 
 
 
Electronically signed by:  Maria Teresa Reyes MD 2020 3:10 PM

## 2020-11-06 NOTE — ED NOTES
Bedside shift change report given to [facility]ita and [facility]  (oncoming nurse) by Denise De La Cruz  (offgoing nurse). Report included the following information SBAR, ED Summary, Procedure Summary, Intake/Output and MAR. All questions answered. Pt stable for handoff at this time. Sitting up in bed and resting comfortably.

## 2020-11-06 NOTE — PROGRESS NOTES
Hospitalist Progress Note NAME: Sakshi Ngo. :  1965 MRN:  [id number] Subjective:  
Daily Progress Note: 2020 11:30 AM 
 
 
Chief complaint: admitted with hypoglycemia noted AFib RVR in ER Still in ER waiting for bed, doing okay, events noted had hypotention and cardizem turned off. CT chest shows pleural effusion with lung collpase Current Facility-Administered Medications Medication Dose Route Frequency  dilTIAZem IR (CARDIZEM) tablet 30 mg  30 mg Oral AC&HS  VANCOMYCIN INFORMATION NOTE   Other PRN  
 cefepime (MAXIPIME) 1 g in 0.9% sodium chloride (MBP/ADV) 50 mL  1 g IntraVENous Q24H  
 [Held by provider] dilTIAZem (CARDIZEM) 100 mg in dextrose 5% (MBP/ADV) 100 mL infusion  0-15 mg/hr IntraVENous TITRATE  sodium chloride (NS) flush 5-10 mL  5-10 mL IntraVENous PRN  
 acetaminophen (TYLENOL) tablet 650 mg  650 mg Oral Q6H PRN  thiamine mononitrate (B-1) tablet 100 mg  100 mg Oral DAILY  folic acid (FOLVITE) tablet 1 mg  1 mg Oral DAILY  therapeutic multivitamin (THERAGRAN) tablet 1 Tab  1 Tab Oral DAILY  insulin lispro (HUMALOG) injection   SubCUTAneous AC&HS  
 glucose chewable tablet 16 g  4 Tab Oral PRN  
 dextrose (D50W) injection syrg 12.5-25 g  12.5-25 g IntraVENous PRN  
 glucagon (GLUCAGEN) injection 1 mg  1 mg IntraMUSCular PRN  
 metroNIDAZOLE (FLAGYL) IVPB premix 500 mg  500 mg IntraVENous Q12H  
 [Held by provider] carvediloL (COREG) tablet 12.5 mg  12.5 mg Oral BID  pantoprazole (PROTONIX) tablet 40 mg  40 mg Oral ACB  sevelamer carbonate (RENVELA) tab 800 mg  800 mg Oral TID  gabapentin (NEURONTIN) capsule 300 mg  300 mg Oral TID PRN  
 methylPREDNISolone (PF) (SOLU-MEDROL) injection 40 mg  40 mg IntraVENous Q6H  
 heparin (porcine) injection 5,000 Units  5,000 Units SubCUTAneous Q12H  
 albuterol-ipratropium (DUO-NEB) 2.5 MG-0.5 MG/3 ML  3 mL Nebulization Q4H PRN  
  budesonide (PULMICORT) 500 mcg/2 ml nebulizer suspension  500 mcg Nebulization BID RT And  
 arformoteroL (BROVANA) neb solution 15 mcg  15 mcg Nebulization BID RT And  
 ipratropium (ATROVENT) 0.02 % nebulizer solution 0.5 mg  0.5 mg Nebulization Q6H PRN  
 oxyCODONE IR (ROXICODONE) tablet 5 mg  5 mg Oral Q6H PRN  
 traMADoL (ULTRAM) tablet 50 mg  50 mg Oral Q6H PRN Current Outpatient Medications Medication Sig  
 fluticasone-umeclidinium-vilanterol (TRELEGY ELLIPTA) 100-62.5-25 mcg inhaler Take 1 Puff by inhalation daily.  oxyCODONE-acetaminophen (PERCOCET)  mg per tablet Take 1 Tab by mouth three (3) times daily.  sevelamer carbonate (RENVELA) 800 mg tab tab Take 800 mg by mouth three (3) times daily.  ergocalciferol (VITAMIN D2) 50,000 unit capsule Take 50,000 Units by mouth every seven (7) days. [place]  omeprazole (PRILOSEC) 20 mg capsule Take 20 mg by mouth as needed.  furosemide (LASIX) 80 mg tablet Take 80 mg by mouth two (2) times a day.  gabapentin (NEURONTIN) 300 mg capsule Take 300 mg by mouth nightly as needed.  b complex-vitamin c-folic acid (NEPHROCAPS) 1 mg capsule Take 1 Cap by mouth daily.  lidocaine-prilocaine (EMLA) topical cream Apply  to affected area as needed for Pain. Patient applies to arm before dialysis on Monday, Wednesday, and Friday.  albuterol-ipratropium (DUO-NEB) 2.5 mg-0.5 mg/3 ml nebu 3 mL by Nebulization route three (3) times daily. AT LUNCHTIME DAILY.  albuterol (PROVENTIL) 90 mcg/Actuation inhaler Take 2 Puffs by inhalation four (4) times daily. QID PRN Objective:  
 
Visit Vitals /84 Pulse (!) 105 Temp 97.9 °F (36.6 °C) Resp 23 Ht 5' 6\" (1.676 m) Wt 65 kg (143 lb 4.8 oz) SpO2 96% BMI 23.13 kg/m² O2 Device: Room air Temp (24hrs), Av.2 °F (36.8 °C), Min:97.8 °F (36.6 °C), Max:98.5 °F (36.9 °C) PHYSICAL EXAM: 
Gen:  Thin built and nourished,  in no acute distress HEENT:   hearing intact to voice Neck:  Supple Resp:  Dec BS bilaterally,  wheezes rales or rhonchi + Card:  without thrills, bruits or peripheral edema Abd:   Soft, non-tender, previous hernia repair scar present with dressing Lymph:  No cervical adenopathy Musc:  Moves all extremities Skin:  Scaly LE and dry Neuro:    follows commands appropriately Psych:   Flat affect Data Review Recent Results (from the past 24 hour(s)) CULTURE, BLOOD Collection Time: 11/05/20 11:45 AM  
 Specimen: Blood Result Value Ref Range Special Requests: NO SPECIAL REQUESTS Culture result: NO GROWTH AFTER 19 HOURS    
POC LACTIC ACID Collection Time: 11/05/20 11:49 AM  
Result Value Ref Range Lactic Acid (POC) 0.59 0.40 - 2.00 mmol/L  
GLUCOSE, POC Collection Time: 11/05/20 11:52 AM  
Result Value Ref Range Glucose (POC) 74 65 - 100 mg/dL Performed by Joel EPPERSON   
CULTURE, BLOOD Collection Time: 11/05/20 11:55 AM  
 Specimen: Blood Result Value Ref Range Special Requests: NO SPECIAL REQUESTS Culture result: NO GROWTH AFTER 19 HOURS    
GLUCOSE, POC Collection Time: 11/05/20  1:50 PM  
Result Value Ref Range Glucose (POC) 55 (L) 65 - 100 mg/dL Performed by Joel Green N   
GLUCOSE, POC Collection Time: 11/05/20  4:47 PM  
Result Value Ref Range Glucose (POC) 63 (L) 65 - 100 mg/dL Performed by Joel Green N   
GLUCOSE, POC Collection Time: 11/05/20  6:19 PM  
Result Value Ref Range Glucose (POC) 156 (H) 65 - 100 mg/dL Performed by Joel Green N   
GLUCOSE, POC Collection Time: 11/05/20  7:52 PM  
Result Value Ref Range Glucose (POC) 148 (H) 65 - 100 mg/dL Performed by Faustina Lange PROCALCITONIN Collection Time: 11/05/20  9:15 PM  
Result Value Ref Range Procalcitonin 2.22 ng/mL CRP, HIGH SENSITIVITY Collection Time: 11/05/20  9:15 PM  
Result Value Ref Range  CRP, High sensitivity >9.5 mg/L  
 GLUCOSE, POC Collection Time: 11/05/20 11:09 PM  
Result Value Ref Range Glucose (POC) 70 65 - 100 mg/dL Performed by Pelon Campbell (SUSANAT) GLUCOSE, POC Collection Time: 11/06/20 12:25 AM  
Result Value Ref Range Glucose (POC) 94 65 - 100 mg/dL Performed by Moreno Fishman (RN)   
CBC WITH AUTOMATED DIFF Collection Time: 11/06/20  3:39 AM  
Result Value Ref Range WBC 9.2 4.1 - 11.1 K/uL  
 RBC 2.75 (L) 4.10 - 5.70 M/uL HGB 9.5 (L) 12.1 - 17.0 g/dL HCT 28.3 (L) 36.6 - 50.3 % .9 (H) 80.0 - 99.0 FL  
 MCH 34.5 (H) 26.0 - 34.0 PG  
 MCHC 33.6 30.0 - 36.5 g/dL  
 RDW 18.9 (H) 11.5 - 14.5 % PLATELET 950 799 - 848 K/uL MPV 10.7 8.9 - 12.9 FL  
 NRBC 0.3 (H) 0  WBC ABSOLUTE NRBC 0.03 (H) 0.00 - 0.01 K/uL NEUTROPHILS 89 (H) 32 - 75 % BAND NEUTROPHILS 3 % LYMPHOCYTES 3 (L) 12 - 49 % MONOCYTES 3 (L) 5 - 13 % EOSINOPHILS 0 0 - 7 % BASOPHILS 0 0 - 1 % METAMYELOCYTES 2 % IMMATURE GRANULOCYTES 0 0.0 - 0.5 % ABS. NEUTROPHILS 8.5 (H) 1.8 - 8.0 K/UL  
 ABS. LYMPHOCYTES 0.3 (L) 0.8 - 3.5 K/UL  
 ABS. MONOCYTES 0.3 0.0 - 1.0 K/UL  
 ABS. EOSINOPHILS 0.0 0.0 - 0.4 K/UL  
 ABS. BASOPHILS 0.0 0.0 - 0.1 K/UL  
 ABS. IMM. GRANS. 0.0 0.00 - 0.04 K/UL  
 DF MANUAL    
 RBC COMMENTS ANISOCYTOSIS 1+ 
    
 RBC COMMENTS COLE CELLS 
PRESENT 
    
 RBC COMMENTS MACROCYTOSIS 
1+ METABOLIC PANEL, BASIC Collection Time: 11/06/20  3:39 AM  
Result Value Ref Range Sodium 127 (L) 136 - 145 mmol/L Potassium 4.8 3.5 - 5.1 mmol/L Chloride 95 (L) 97 - 108 mmol/L  
 CO2 23 21 - 32 mmol/L Anion gap 9 5 - 15 mmol/L Glucose 144 (H) 65 - 100 mg/dL BUN 46 (H) 6 - 20 MG/DL Creatinine 3.64 (H) 0.70 - 1.30 MG/DL  
 BUN/Creatinine ratio 13 12 - 20 GFR est AA 21 (L) >60 ml/min/1.73m2 GFR est non-AA 17 (L) >60 ml/min/1.73m2 Calcium 8.1 (L) 8.5 - 10.1 MG/DL MAGNESIUM Collection Time: 11/06/20  3:39 AM  
Result Value Ref Range Magnesium 2.0 1.6 - 2.4 mg/dL PROTHROMBIN TIME + INR Collection Time: 11/06/20  3:39 AM  
Result Value Ref Range INR 1.2 (H) 0.9 - 1.1 Prothrombin time 12.0 (H) 9.0 - 11.1 sec PTT Collection Time: 11/06/20  3:39 AM  
Result Value Ref Range aPTT 25.8 22.1 - 32.0 sec  
 aPTT, therapeutic range     58.0 - 77.0 SECS  
HEMOGLOBIN A1C WITH EAG Collection Time: 11/06/20  3:39 AM  
Result Value Ref Range Hemoglobin A1c 5.8 (H) 4.0 - 5.6 % Est. average glucose 120 mg/dL T4, FREE Collection Time: 11/06/20  3:39 AM  
Result Value Ref Range T4, Free 0.7 (L) 0.8 - 1.5 NG/DL  
TSH 3RD GENERATION Collection Time: 11/06/20  3:39 AM  
Result Value Ref Range TSH 1.67 0.36 - 3.74 uIU/mL GLUCOSE, POC Collection Time: 11/06/20  4:38 AM  
Result Value Ref Range Glucose (POC) 186 (H) 65 - 100 mg/dL Performed by Manuel Rios (SUSANAT) GLUCOSE, POC Collection Time: 11/06/20  6:19 AM  
Result Value Ref Range Glucose (POC) 225 (H) 65 - 100 mg/dL Performed by Lynn Dominguez GLUCOSE, POC Collection Time: 11/06/20 11:01 AM  
Result Value Ref Range Glucose (POC) 191 (H) 65 - 100 mg/dL Performed by Joel GUZMANN No results found for this visit on 11/05/20. All Micro Results Procedure Component Value Units Date/Time Nataliaeta Bridges STAIN [[id number]] Collected:  11/05/20 2326 Order Status:  Completed Specimen:  Abdomen Updated:  11/06/20 0945 CULTURE, BLOOD [[id number]] Collected:  11/05/20 1145 Order Status:  Completed Specimen:  Blood Updated:  11/06/20 4455 Special Requests: NO SPECIAL REQUESTS Culture result: NO GROWTH AFTER 19 HOURS     
 CULTURE, BLOOD [[id number]] Collected:  11/05/20 1155 Order Status:  Completed Specimen:  Blood Updated:  11/06/20 5161 Special Requests: NO SPECIAL REQUESTS Culture result: NO GROWTH AFTER 19 HOURS     
 URINE CULTURE HOLD SAMPLE [[id number]] Order Status:  Canceled Specimen:  Urine Radiology reports and films for the last 24 hours have been reviewed. Assessment/Plan:  
 
DM ty2 with severe symptomatic hypoglycemia on amdit After taking insulin last night PTA now resolved Stop D10 drip 
accu checks q4hrs Check A1C 5.8 Monitor BG 
  
Suspected sepsis not sever POA Iv abx vanc/cefepime/flagyl F/u blood cx 
F/u wound care Check procal 2.22 
CRP 9 
  
Ventral hernia Mesh No fluid collection  on CT Cont wound care F/u wound cx Iv Abx 
  
SVT/Afib  in ER on admit Cont cardizem PO per cards s/p cardizem drip Hold AC as hx of GI bleed in past 
Cards on case f/u recomnds Coreg bid if BP tolerates Echo pend Tsh/Ft4 ok Trend CE neg 
  
R loculated Pleural effusion with lung collapse COPD 
pulm eval   
Nebs prn 
IV steroids as had some wheezing On iv abx Monitor clinically 
  
L kidney mass Urology eval   
  
ESRD/chr hyponatremia Cont HD per renal 
Na 127 Not fluid overload Monitor lytes 
  
ETOH abuse Risk for WD 
allergic to benzos, use phenobarbital 65 mg  IV q8 taper as improves Thiamine/folate/mvt 
  
Debility/Deconditiones state with mod protien malnourished PT/OT eval 
Dietary eval 
  
Surrogate decision maker: wife AD Full code Care Plan discussed with: ER staff Prophylaxis:  Sq heparin Signed By: Namita Galindo MD   
 November 6, 2020
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
## 2020-11-06 NOTE — PROCEDURES
[facility] [place]  [phone] Vitals   Pre   Post   Assessment   Pre   Post    
Temp  Temp: 97.8 °F (36.6 °C) (11/06/20 1200)  98.2 LOC  AxOx4 AxOx4 HR   Pulse (Heart Rate): (!) 107 (11/06/20 1200) 89 Lungs   diminished diminished B/P   BP: 124/84 (11/06/20 1200) 110/76 Cardiac   RRR RRR Resp   Resp Rate: 18 (11/06/20 1200) 18 Skin   intact intact Pain level  Pain Intensity 1: 0 (11/05/20 0943) 0 Edema  trace 
 trace Orders: Duration:   Start:    1144 End:    1500 Total:   3 hrs Dialyzer:   Dialyzer/Set Up Inspection: Anthony Mater (11/06/20 1200) K Bath:   Dialysate K (mEq/L): 2 (11/06/20 1200) Ca Bath:   Dialysate CA (mEq/L): 2.5 (11/06/20 1200) Na/Bicarb:   Dialysate NA (mEq/L): 138 (11/06/20 1200) Target Fluid Removal:   Goal/Amount of Fluid to Remove (mL): 2000 mL (11/06/20 1200) Access Type & Location:   Right upper arm AV Fistula without evidence of warmth, redness, or drainage. +thrill/+bruit. Each access site disinfected for 60 seconds per site with alcohol swabs per P&P. Cannulated with 15G needles x2 and secured with paper tape. +aspiration/+flushed. Labs Obtained/Reviewed Critical Results Called   Date when labs were drawn- 
Hgb-   
HGB Date Value Ref Range Status 11/06/2020 9.5 (L) 12.1 - 17.0 g/dL Final  
 
K-   
Potassium Date Value Ref Range Status 11/06/2020 4.8 3.5 - 5.1 mmol/L Final  
  Comment:  
  INVESTIGATED PER DELTA CHECK PROTOCOL Ca-  
Calcium Date Value Ref Range Status 11/06/2020 8.1 (L) 8.5 - 10.1 MG/DL Final  
 
Bun-  
BUN Date Value Ref Range Status 11/06/2020 46 (H) 6 - 20 MG/DL Final  
 
Creat-  
Creatinine Date Value Ref Range Status 11/06/2020 3.64 (H) 0.70 - 1.30 MG/DL Final  
 
  
Medications/ Blood Products Given Name   Dose   Route and Time    
none Blood Volume Processed (BVP):    72.2 L Net Fluid Removed:  2000 mL Comments Time Out Done: 1150 Primary Nurse Rpt Pre: Rosemarie Patel RN 
Primary Nurse Rpt Post: Rosemarie Patel RN 
Pt Education: procedural / infection control Care Plan: continue current HD plan of care Tx Summary:  
1157 HD treatment initiated per physician order. 1500 HD treatment completed per physician order. All possible blood returned to patient. Hemostasis achieved in <10 minutes. Access site dressings clean, dry, and intact. +thrill. Admiting Diagnosis: Hypoglycemia / New onset Afib Pt's previous clinic- 
Consent signed - Informed Consent Verified: Yes (11/06/20 1200) Kaity Consent -  obtained Hepatitis Status- 11/4/20 Ag Neg (clinic) Machine #- Machine Number: [id number] (11/06/20 1200) Telemetry status- n/a 
Pre-dialysis wt. -

## 2020-11-06 NOTE — PROGRESS NOTES
Received message from patient's nurse Avril Wells stating : 
 
Patient still hypotensive at 74/53 at the last reading. Patient has not had a reading with MAP above 65 since last communication causing continued concern for this nurse. For my future reference I would like to have this parameters for this patient's BP outlining what you are eating you would like to be contacted for in the future. Discussion / orders: 
 
Ordered one-time albumin infusion Also ordered provider notification with parameters. Please note that this note was dictated using Dragon computer voice recognition software. Quite often unanticipated grammatical, syntax, homophones, and other interpretive errors are inadvertently transcribed by the computer software. Please disregard these errors. Please excuse any errors that have escaped final proofreading.

## 2020-11-06 NOTE — ED NOTES
Spoke with MONA Whipple Regarding the Dx,\" Acute hypoxemic Rep failure due to COVID -19\". Sai BREWSTER, The hospitalist who admitted the pt doesn't have any COVID blood markers nor Covid-19 swab testing nor droplet plus orders. Purveyor NP agrees with this finding, Sole reports that she will inform the incoming MD of this finding to expand on whether this is a mistake or if it's a correct Dx that needs further work up Charge RN, Nilson Hernandez, made aware of this

## 2020-11-07 NOTE — PROGRESS NOTES
[facility] [address] [place] [address]  [phone] Cardiology Progress Note 11/7/2020 11:21 AM 
 
Admit Date: 11/5/2020 Admit Diagnosis: Hypoglycemia [E16.2]; A-fib ([facility] [address] [I48.91]; Acute hypoxemic respiratory failure due to COVID-19 ([facility] [address] [U07.1, J96.01] Subjective:  
 
Lonni Boga. No complaints Visit Vitals /82 (BP 1 Location: Left arm, BP Patient Position: At rest) Pulse 90 Temp 97.4 °F (36.3 °C) Resp 18 Ht 5' 6\" (1.676 m) Wt 148 lb 9.6 oz (67.4 kg) SpO2 94% BMI 23.98 kg/m² Current Facility-Administered Medications Medication Dose Route Frequency  guaiFENesin-dextromethorphan (ROBITUSSIN DM) 100-10 mg/5 mL syrup 10 mL  10 mL Oral Q6H PRN  
 cefepime (MAXIPIME) 1 g in 0.9% sodium chloride (MBP/ADV) 50 mL  1 g IntraVENous Q24H  
 PHENobarbital (LUMINAL) injection 65 mg  65 mg IntraVENous Q8H  
 epoetin ginger-epbx (RETACRIT) injection 4,000 Units  4,000 Units SubCUTAneous Q MON, WED & FRI  guaiFENesin ER (MUCINEX) tablet 600 mg  600 mg Oral Q12H  hydrALAZINE (APRESOLINE) 20 mg/mL injection 10 mg  10 mg IntraVENous Q4H PRN  
 dilTIAZem (CARDIZEM) 100 mg in dextrose 5% (MBP/ADV) 100 mL infusion  0-15 mg/hr IntraVENous TITRATE  [Held by provider] dilTIAZem (CARDIZEM) 100 mg in dextrose 5% (MBP/ADV) 100 mL infusion  0-15 mg/hr IntraVENous TITRATE  sodium chloride (NS) flush 5-10 mL  5-10 mL IntraVENous PRN  
 acetaminophen (TYLENOL) tablet 650 mg  650 mg Oral Q6H PRN  thiamine mononitrate (B-1) tablet 100 mg  100 mg Oral DAILY  folic acid (FOLVITE) tablet 1 mg  1 mg Oral DAILY  therapeutic multivitamin (THERAGRAN) tablet 1 Tab  1 Tab Oral DAILY  insulin lispro (HUMALOG) injection   SubCUTAneous AC&HS  
 glucose chewable tablet 16 g  4 Tab Oral PRN  
 dextrose (D50W) injection syrg 12.5-25 g  12.5-25 g IntraVENous PRN  
 glucagon (GLUCAGEN) injection 1 mg  1 mg IntraMUSCular PRN  
  [Held by provider] carvediloL (COREG) tablet 12.5 mg  12.5 mg Oral BID  pantoprazole (PROTONIX) tablet 40 mg  40 mg Oral ACB  sevelamer carbonate (RENVELA) tab 800 mg  800 mg Oral TID  gabapentin (NEURONTIN) capsule 300 mg  300 mg Oral TID PRN  
 methylPREDNISolone (PF) (SOLU-MEDROL) injection 40 mg  40 mg IntraVENous Q6H  
 heparin (porcine) injection 5,000 Units  5,000 Units SubCUTAneous Q12H  
 albuterol-ipratropium (DUO-NEB) 2.5 MG-0.5 MG/3 ML  3 mL Nebulization Q4H PRN  
 budesonide (PULMICORT) 500 mcg/2 ml nebulizer suspension  500 mcg Nebulization BID RT And  
 arformoteroL (BROVANA) neb solution 15 mcg  15 mcg Nebulization BID RT And  
 ipratropium (ATROVENT) 0.02 % nebulizer solution 0.5 mg  0.5 mg Nebulization Q6H PRN  
 oxyCODONE IR (ROXICODONE) tablet 5 mg  5 mg Oral Q6H PRN Objective:  
  
Physical Exam: 
General Appearance:   
Chest:   Clear Cardiovascular: irreg Extremities: no edema Skin:  Warm and dry.  
 
Data Review:  
Recent Labs 11/07/20 
[phone] 11/06/20 
3852 11/05/20 
4249 WBC 9.5 9.2 13.7* HGB 9.7* 9.5* 11.8* HCT 28.6* 28.3* 34.3*  
 253 296 Recent Labs 11/07/20 
[phone] 11/06/20 
1755 11/05/20 
2652 * 127* 132* K 4.5 4.8 3.6 CL 94* 95* 96* CO2 28 23 25 * 144* 35* BUN 42* 46* 39* CREA 3.02* 3.64* 3.24* CA 8.1* 8.1* 8.9 MG  --  2.0  --   
PHOS 4.9*  --   --   
ALB  --   --  2.8* TBILI  --   --  0.6 ALT  --   --  27 INR  --  1.2*  --   
 
 
Recent Labs 11/05/20 
9906 TROIQ 0.05* Intake/Output Summary (Last 24 hours) at 11/7/2020 1121 Last data filed at 11/6/2020 1500 Gross per 24 hour Intake  Output 2000 ml Net -2000 ml Telemetry:  
EKG: 
Cxray: 
 
Assessment:  
 
Active Problems: 
  A-fib ([address] (11/5/2020) Hypoglycemia (11/5/2020) Acute dyspnea (11/5/2020) Plan:  
 
AF rate ~ 100 on IV dilt; no room to increase due to low  BP Luis Fernando Martinez M.D., [facility]

## 2020-11-07 NOTE — PROGRESS NOTES
Problem: Chronic Obstructive Pulmonary Disease (COPD) Goal: *Oxygen saturation during activity within specified parameters Outcome: Progressing Towards Goal 
  
Problem: Falls - Risk of 
Goal: *Absence of Falls Description: Document Alfred Stokes Fall Risk and appropriate interventions in the flowsheet. Outcome: Progressing Towards Goal 
Note: Fall Risk Interventions: 
Mobility Interventions: Bed/chair exit alarm, Communicate number of staff needed for ambulation/transfer, Patient to call before getting OOB Medication Interventions: Assess postural VS orthostatic hypotension, Evaluate medications/consider consulting pharmacy, Patient to call before getting OOB Elimination Interventions: Bed/chair exit alarm, Call light in reach, Patient to call for help with toileting needs History of Falls Interventions: Bed/chair exit alarm, Door open when patient unattended, Evaluate medications/consider consulting pharmacy Problem: Pressure Injury - Risk of 
Goal: *Prevention of pressure injury Description: Document Walter Scale and appropriate interventions in the flowsheet. Outcome: Progressing Towards Goal 
Note: Pressure Injury Interventions: 
Sensory Interventions: Assess changes in LOC, Assess need for specialty bed, Avoid rigorous massage over bony prominences, Keep linens dry and wrinkle-free, Float heels Moisture Interventions: Absorbent underpads, Maintain skin hydration (lotion/cream) Activity Interventions: Assess need for specialty bed, Pressure redistribution bed/mattress(bed type), PT/OT evaluation Mobility Interventions: Assess need for specialty bed, Float heels, Pressure redistribution bed/mattress (bed type) Nutrition Interventions: Document food/fluid/supplement intake, Offer support with meals,snacks and hydration Friction and Shear Interventions: Apply protective barrier, creams and emollients, HOB 30 degrees or less

## 2020-11-07 NOTE — PROGRESS NOTES
Occupational Therapy: Defer Attempted OT eval. Pt currently receiving dialysis in room. Will defer and follow. Julia Urias MS OTR/LUIS F

## 2020-11-07 NOTE — PROGRESS NOTES
11/06/20 2232 Vitals Temp 99.4 °F (37.4 °C) Temp Source Oral  
Pulse (Heart Rate) (!) 114 Heart Rate Source Monitor Resp Rate 18  
O2 Sat (%) 96 % Level of Consciousness Alert  
BP (!) 102/53 MAP (Monitor) 66  
Cardiac Rhythm A Fib MEWS Score 3 Pain 1 Pain Scale 1 Numeric (0 - 10) Pain Intensity 1 0 Patient Stated Pain Goal 0 Pain Reassessment 1 Yes POSS Scale Opioid Sedation Scale 1 Oxygen Therapy Pulse via Oximetry 120 beats per minute O2 Device Room air

## 2020-11-07 NOTE — PROGRESS NOTES
ADULT PROTOCOL: JET AEROSOL  REASSESSMENT Patient  Erik Maryana.     60 y.o.   male     11/7/2020  1:18 AM 
 
Breath Sounds Pre Procedure: Right Breath Sounds: Expiratory wheezing Left Breath Sounds: Expiratory wheezing Breath Sounds Post Procedure: Right Breath Sounds: Expiratory wheezing Left Breath Sounds: Expiratory wheezing Breathing pattern: Pre procedure Breathing Pattern: Regular Post procedure Breathing Pattern: Regular Heart Rate: Pre procedure Pulse: 112 Post procedure Pulse: 114 Resp Rate: Pre procedure Respirations: 20 
         Post procedure Respirations: 18 
 
 
Cough: Pre procedure Cough: Non-productive Post procedure Cough: Non-productive Suctioned: NO Oxygen: O2 Device: Room air Changed: NO SpO2: Pre procedure SpO2: 96 %   with oxygen Post procedure SpO2: 94 %  with oxygen Nebulizer Therapy: Current medications Aerosolized Medications: DuoNeb Changed: NO Smoking History: current everyday smoker Problem List:  
Patient Active Problem List  
Diagnosis Code  Cellulitis of thumb, left L03.012  Tenosynovitis of finger M65.9  
 DM [facility]diabetes mellitus) ([facility] E11.9  
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  
 Cellulitis and abscess of finger L03.019, L02.519  Abdominal wall cellulitis L03.311  Acute GI bleeding K92.2  
 HTN (hypertension) I10  
 ESRD on dialysis ([facility]) N18.6, Z99.2  Nontraumatic ischemic infarction of muscle of hand M62.249  Acute cholecystitis K81.0  Type 2 diabetes mellitus with nephropathy (HCC) E11.21  
 COPD exacerbation (HCC) J44.1  A-fib (HCC) I48.91  
 Hypoglycemia E16.2  Acute dyspnea R06.00 Respiratory Therapist: Meghna Perez, RT

## 2020-11-07 NOTE — PROGRESS NOTES
Received message from patient's nurse Sherrill Nick stating : 
 
Patient is having a coughing fit and requesting cough medication. I administered mucinex at 10 pm. 
  
 
Discussion / orders: 
 
Ordered robitussin 10 ml q6h prn cough Please note that this note was dictated using Dragon computer voice recognition software. Quite often unanticipated grammatical, syntax, homophones, and other interpretive errors are inadvertently transcribed by the computer software. Please disregard these errors. Please excuse any errors that have escaped final proofreading.

## 2020-11-07 NOTE — PROGRESS NOTES
Physical Therapy Approached for eval. Pt currently receiving dialysis in room. Will defer and follow.  
 
Karina Bueno, PT

## 2020-11-07 NOTE — PROGRESS NOTES
Bedside and Verbal shift change report given to 612 Karen So RN (oncoming nurse) by Ishmael Smith RN (offgoing nurse). Report included the following information SBAR and Kardex.

## 2020-11-07 NOTE — PROGRESS NOTES
. 
Progress Note Pt Name  Summer Veras. Date of Birth 1965 Medical Record Number  [id number] Age  60 y.o. PCP Monika Valenzuela NP Admit date:  11/5/2020 Room Number  2209/01  @ [facility] Date of Service  11/7/2020 Admission Diagnoses:  Severe symptomatic hypoglycemia Admission Summary: 
\" Mr. Wli Lucero is a 60 y.o.    male   Hx of ESRD on hemodialysis, last hemodialysis yesterday, recurrent pleural effusion, diabetes type 2 came here today with with hypoglycemia and dec mental status. Patient BG was elevated last night >400 he took 35 units of insulin(details unknown). He noted low BG EMS was called and brought here. BG were 35 got several D50 BG remained yudith and he was symptomatic. He was started on D10 and BG improved to 70-90. He also noted to have SVT/Afiv with RVR rate 170 got better with Cardizem drip cardiology was consulted in ER. Patient had CT abd showed resolution of ventral hernia fluid collection, L kidney mass and R loculated Pl effusion. Patient overall poor historian, family unable to reach at this time. He denies any CP/N/V/D/cough to me. He has some PRITCHETT/SOB from COPD and uses prn O2. He had thoracentesis for pl effusions in past. He admits to drinking and smoking and last drink was beer yesterday. He altaf any rectal bleed, fevers, hematuria, headache, dizziness, palpitations to me. He is being admited for further care. \"  
 
Assessment and plan:  
 
DM 2 uncontrolled Severe symptomatic hypoglycemia resolved Off of D10 drip HgA1C 5.8 Diabetic diet  
  
Suspected sepsis Started on empiric IV abx Vanc+Cefepime+Flagyl All cultures are negative so far. Wound Cx is showing heavy GNR I will stop Flagyl and Vanc Continue Cefepime as is for now.  
  
Ventral hernia Mesh No fluid collection  on CT Cont wound care F/u wound cx Iv Abx 
  
SVT/Afib  in ER on admit Cont cardizem PO per cards s/p cardizem drip Hold AC as hx of GI bleed in past 
Cards on case f/u recomnds Coreg bid if BP tolerates Echo pend Tsh/Ft4 ok Trend CE neg 
  
Acute on chronic respiratory failure with hypoxia Recurrent Pleural effusion RLL collapse COPD managed by Dr. Brandie Demarco Appreciate guidance from Dr. Carol Bautista Nebs prn 
IV steroids 
  
L Renal mass Urology eval requested  
  
ESRD/HD [facility] ETOH abuse Tobacco abuse Thiamine/folate/mvt Nicotine patch 14mg daily PRN  
  
Debility/Deconditiones state with mod protien malnourished PT/OT eval 
Dietary eval 
 
Hepatitis C infection hx Monitored at [facility] hepatology clinic Hx of PVD s/p thrombectomy Hx of Splenectomy and partial pancreatectomy Chronic constipation - pt takes mag citrate at home PRN Will start Senokot daily I have stopped Tramadol - due to high risk of seizure due to accumulation of active metabolite CODE STATUS   Full Functional Status  Pt is  and lives with his wife in [facility] Pt is able to walk independently Surrogate decision maker:  Pt's wife Angela Babin [phone] Prophylaxis   Hep SQ Discharge Plan:  [facility] PT, OT, RN, Misc Benefit:  Payor: [employer] MEDICAID / Plan: [facility] / Product Type: Managed Care Medicaid /   
Isolation :  There are currently no Active Isolations ADT status:  INPATIENT Query   None noted today Prognosis Social issues  Date  Comment 11/07/20  I spoke with his wife over telephone ;  I explained above issues in simple language and answered all questions. Subjective Data \"I am fine \" Review of Systems - History obtained from the patient Respiratory ROS: no cough, shortness of breath, or wheezing Cardiovascular ROS: no chest pain or dyspnea on exertion Objective Data Comments  Pleasant gentleman , appears chronically ill gentleman Patient Vitals for the past 24 hrs: 
 BP  
11/07/20 0800 107/69 11/07/20 0730 124/79  
11/07/20 0700 106/85  
11/07/20 0630 123/86  
11/07/20 0626 (!) 124/104  
11/07/20 0600 121/68  
11/07/20 0530 121/74  
11/07/20 0500 122/72  
11/07/20 0430 114/64  
11/07/20 0400 110/84  
11/07/20 0330 127/74  
11/07/20 0300 105/80  
11/07/20 0230 110/66  
11/07/20 0200 120/70  
11/07/20 0130 123/83  
11/07/20 0100 120/78  
11/07/20 0030 108/79  
11/07/20 0000 105/74  
11/06/20 2330 110/78  
11/06/20 2300 101/78  
11/06/20 2232 (!) 102/53  
11/06/20 2100 100/71  
11/06/20 2030 108/67  
11/06/20 2000 (!) 85/66  
11/06/20 1834 100/75  
11/06/20 1733 (!) 171/150  
11/06/20 1618 103/79  
11/06/20 1554 128/85  
11/06/20 1500 110/76  
11/06/20 1445 (!) 132/101  
11/06/20 1430 124/72  
11/06/20 1415 128/74  
11/06/20 1400 (!) 117/95  
11/06/20 1345 (!) 105/58  
11/06/20 1330 115/81  
11/06/20 1315 102/63  
11/06/20 1300 111/77  
11/06/20 1245 99/67  
11/06/20 1230 107/64  
11/06/20 1215 121/84  
11/06/20 1200 124/84  
11/06/20 1058 102/84 Patient Vitals for the past 24 hrs: 
 Pulse 11/07/20 0800 (!) 111  
11/07/20 0730 (!) 112  
11/07/20 0700 (!) 121  
11/07/20 0630 (!) 103  
11/07/20 0626 84  
11/07/20 0600 (!) 104  
11/07/20 0530 (!) 122  
11/07/20 0500 (!) 104  
11/07/20 0430 (!) 122  
11/07/20 0400 (!) 117  
11/07/20 0330 (!) 102  
11/07/20 0300 97  
11/07/20 0230 (!) 124  
11/07/20 0200 100  
11/07/20 0130 100  
11/07/20 0107 (!) 126  
11/07/20 0100 (!) 119  
11/07/20 0030 (!) 111  
11/07/20 0000 (!) 113  
11/06/20 2300 (!) 110  
11/06/20 2232 (!) 114  
11/06/20 2030 (!) 103  
11/06/20 2000 (!) 102  
11/06/20 1834 (!) 127  
11/06/20 1733 (!) 121  
11/06/20 1618 (!) 122  
11/06/20 1554 99  
11/06/20 1500 89  
11/06/20 1445 (!) 107  
11/06/20 1430 (!) 106  
11/06/20 1415 94  
11/06/20 1400 84  
11/06/20 1345 96  
11/06/20 1330 (!) 120  
11/06/20 1315 99  
11/06/20 1300 (!) 112  
11/06/20 1245 (!) 107  
11/06/20 1230 (!) 113  
11/06/20 1215 91  
11/06/20 1200 (!) 107 11/06/20 1058 (!) 105 Patient Vitals for the past 24 hrs: 
 Resp 11/07/20 0630 18  
11/07/20 0300 18  
11/06/20 2232 18  
11/06/20 1834 18  
11/06/20 1733 18  
11/06/20 1554 18  
11/06/20 1500 18  
11/06/20 1445 18  
11/06/20 1430 18  
11/06/20 1415 18  
11/06/20 1400 18  
11/06/20 1345 18  
11/06/20 1330 18  
11/06/20 1315 18  
11/06/20 1300 18  
11/06/20 1245 18  
11/06/20 1230 18  
11/06/20 1215 18  
11/06/20 1200 18 Patient Vitals for the past 24 hrs: 
 Temp 11/07/20 0630 98.8 °F (37.1 °C)  
11/07/20 0300 98.9 °F (37.2 °C) 11/06/20 2232 99.4 °F (37.4 °C) 11/06/20 1834 98.8 °F (37.1 °C) 11/06/20 1733 98.5 °F (36.9 °C) 11/06/20 1554 98.2 °F (36.8 °C) 11/06/20 1500 98.2 °F (36.8 °C) 11/06/20 1200 97.8 °F (36.6 °C) SpO2 Readings from Last 6 Encounters:  
11/07/20 93% 06/18/20 99% 01/15/20 97% 05/21/19 98% 05/17/18 99% 02/27/18 99% O2 Flow Rate (L/min): 2 l/min  O2 Device: Room air Body mass index is 23.98 kg/m². - 
Wt Readings from Last 10 Encounters:  
11/07/20 67.4 kg (148 lb 9.6 oz) 06/18/20 62.6 kg (138 lb)  
01/14/20 63.5 kg (139 lb 15.9 oz) 05/21/19 63.5 kg (140 lb) 05/17/18 68.9 kg (152 lb)  
02/27/18 67.6 kg (149 lb) 01/31/18 68 kg (150 lb)  
01/23/18 66.7 kg (147 lb) 12/20/17 66.8 kg (147 lb 4.3 oz) 11/02/17 69.9 kg (154 lb) Physical Exam:            
General:  Alert, cooperative,  
  
well developed,  
appears stated age Ears/Eyes:  Hearing intact Sclera anicteric. Pupils equal  
Mouth/Throat:  Mucous membranes normal pink and moist 
  
Neck:    
Lungs:  Trachea midline Chest excursion symmetrical  
Auscultation B/L Symmetrical with Vesicular breath sounds   
 few scattered creps Percussion note resonant on mid Clavicular line; no sign of pneumothorax CVS:  Regular rate and rhythm  
no  murmur, No click, rub or gallop S1 normal  
S2 normal  
Pedal pulses  b/l symmetrical but feeble Abdomen:   
Soft, non-tender Bowel sounds normal 
  
Extremities:  Fistula on the RUE arm noted. Old surgical scars noted on this LEFT forearm No cyanosis, jaundice No edema noted No sign of DVT/cord like lesion on palpation No sign of acute trauma . Skin:    
Lymph nodes: Musculoskeletal Muscle bulk B/L symmetrical  
Neuro Cranial nerves are intact,  
motor movement b/l symmetrical, Sensory evaluation b/l symmetrical   
Psych:  Alert and oriented,  
normal mood & affect Medications reviewed Current Facility-Administered Medications Medication Dose Route Frequency  guaiFENesin-dextromethorphan (ROBITUSSIN DM) 100-10 mg/5 mL syrup 10 mL  10 mL Oral Q6H PRN  
 VANCOMYCIN INFORMATION NOTE   Other PRN  
 cefepime (MAXIPIME) 1 g in 0.9% sodium chloride (MBP/ADV) 50 mL  1 g IntraVENous Q24H  
 PHENobarbital (LUMINAL) injection 65 mg  65 mg IntraVENous Q8H  
 epoetin ginger-epbx (RETACRIT) injection 4,000 Units  4,000 Units SubCUTAneous Q MON, WED & FRI  guaiFENesin ER (MUCINEX) tablet 600 mg  600 mg Oral Q12H  hydrALAZINE (APRESOLINE) 20 mg/mL injection 10 mg  10 mg IntraVENous Q4H PRN  
 dilTIAZem (CARDIZEM) 100 mg in dextrose 5% (MBP/ADV) 100 mL infusion  0-15 mg/hr IntraVENous TITRATE  [Held by provider] dilTIAZem (CARDIZEM) 100 mg in dextrose 5% (MBP/ADV) 100 mL infusion  0-15 mg/hr IntraVENous TITRATE  sodium chloride (NS) flush 5-10 mL  5-10 mL IntraVENous PRN  
 acetaminophen (TYLENOL) tablet 650 mg  650 mg Oral Q6H PRN  thiamine mononitrate (B-1) tablet 100 mg  100 mg Oral DAILY  folic acid (FOLVITE) tablet 1 mg  1 mg Oral DAILY  therapeutic multivitamin (THERAGRAN) tablet 1 Tab  1 Tab Oral DAILY  insulin lispro (HUMALOG) injection   SubCUTAneous AC&HS  
 glucose chewable tablet 16 g  4 Tab Oral PRN  
 dextrose (D50W) injection syrg 12.5-25 g  12.5-25 g IntraVENous PRN  
 glucagon (GLUCAGEN) injection 1 mg  1 mg IntraMUSCular PRN  
  metroNIDAZOLE (FLAGYL) IVPB premix 500 mg  500 mg IntraVENous Q12H  
 [Held by provider] carvediloL (COREG) tablet 12.5 mg  12.5 mg Oral BID  pantoprazole (PROTONIX) tablet 40 mg  40 mg Oral ACB  sevelamer carbonate (RENVELA) tab 800 mg  800 mg Oral TID  gabapentin (NEURONTIN) capsule 300 mg  300 mg Oral TID PRN  
 methylPREDNISolone (PF) (SOLU-MEDROL) injection 40 mg  40 mg IntraVENous Q6H  
 heparin (porcine) injection 5,000 Units  5,000 Units SubCUTAneous Q12H  
 albuterol-ipratropium (DUO-NEB) 2.5 MG-0.5 MG/3 ML  3 mL Nebulization Q4H PRN  
 budesonide (PULMICORT) 500 mcg/2 ml nebulizer suspension  500 mcg Nebulization BID RT And  
 arformoteroL (BROVANA) neb solution 15 mcg  15 mcg Nebulization BID RT And  
 ipratropium (ATROVENT) 0.02 % nebulizer solution 0.5 mg  0.5 mg Nebulization Q6H PRN  
 oxyCODONE IR (ROXICODONE) tablet 5 mg  5 mg Oral Q6H PRN  
 traMADoL (ULTRAM) tablet 50 mg  50 mg Oral Q6H PRN Relevant other informations: Other medical conditions listed in [facility] problem list section; all of these and other pertinent data were taken into consideration when treatment plan is developed and customized to this patient's unique overall circumstances and needs. We have reviewed available old medical records within the constraints of this admission process. Data Review:  
Recent Days: 
All Micro Results Procedure Component Value Units Date/Time CULTURE, RESPIRATORY/SPUTUM/BRONCH Juhi So [[id number]] Collected:  11/07/20 0355 Order Status:  Completed Specimen:  Sputum Updated:  11/07/20 1043 Londell Banco STAIN [[id number]] Collected:  11/05/20 1996 Order Status:  Completed Specimen:  Abdomen Updated:  11/07/20 0930 Special Requests: NO SPECIAL REQUESTS     
  GRAM STAIN NO WBC'S SEEN 4+ APPARENT GRAM POSITIVE RODS 3+ APPARENT GRAM NEGATIVE RODS Culture result: HEAVY MIXED SKIN ALESSIA ISOLATED  
     
 CULTURE, BLOOD [[id number]] Collected:  11/05/20 1145 Order Status:  Completed Specimen:  Blood Updated:  11/07/20 0847 Special Requests: NO SPECIAL REQUESTS Culture result: NO GROWTH 2 DAYS     
 CULTURE, BLOOD [[id number]] Collected:  11/05/20 1155 Order Status:  Completed Specimen:  Blood Updated:  11/07/20 0847 Special Requests: NO SPECIAL REQUESTS Culture result: NO GROWTH 2 DAYS URINE CULTURE HOLD SAMPLE [[id number]] Order Status:  Canceled Specimen:  Urine Recent Labs 11/07/20 
[phone] 11/06/20 
3587 11/05/20 
8270 WBC 9.5 9.2 13.7* HGB 9.7* 9.5* 11.8* HCT 28.6* 28.3* 34.3*  
 253 296 Recent Labs 11/07/20 
[phone] 11/06/20 
2244 11/05/20 
0825 * 127* 132* K 4.5 4.8 3.6 CL 94* 95* 96* CO2 28 23 25 * 144* 35* BUN 42* 46* 39* CREA 3.02* 3.64* 3.24* CA 8.1* 8.1* 8.9 MG  --  2.0  --   
PHOS 4.9*  --   --   
ALB  --   --  2.8* TBILI  --   --  0.6 ALT  --   --  27 INR  --  1.2*  --   
  
Lab Results Component Value Date/Time TSH 1.67 11/06/2020 03:39 AM  
 
 
 
  
Care Plan discussed with:Patient/Family and Nurse Other medical conditions are listed in the active hospital problem list section; these and other pertinent data were taken into consideration when the treatment plan was developed and customized to this patient's unique overall circumstances and needs. High complexity decision making was performed for this patient who is at high risk for decompensation with multiple organ involvement. Today total floor/unit time was 35 minutes while caring for this patient and greater than 50% of that time was spent with patient (and/or family) coordinating patients clinical issues; this includes time spent during multidisciplinary rounds. Chase Lopez MD MPH FACP   
11/7/2020

## 2020-11-07 NOTE — PROGRESS NOTES
Nephrology Progress Note Hugo Santamaria  
 
[facility]. [facility]  Phone - [phone] Patient: Layla Schultz. YOB: 1965     Admit Date: 11/5/2020 Date- 11/7/2020 CC: Follow up for ESRD IMPRESSION & PLAN:  
? ESRD renal-[place]-Monday Wednesday Friday ? A. fib with RVR ? Hypoglycemia ? Hyponatremia due to fluid overload 
? History of excessive fluid gain ? Anemia of chronic kidney disease ? Secondary hyperparathyroidism ? Current smoker PLAN- 
ULTRAFILTRATION TODAY- remove 3. 3.5 kg Follow BMP Low blood pressure will limit the fluid removal on dialysis Continue Renvela Give Epogen On Cardizem per primary team 
Advised to quit smoking Subjective: Interval History:  
-S/P HD LAST NIGHT 
SOB improving He is gaining 4-5 kg weight in between hd. Patient has history of end-stage renal disease on dialysis Monday Wednesday Friday at [facility] unit His last dialysis was on Wednesday He is admitted with hypoglycemia and A. fib with RVR Complaint of shortness of breath No fever or chills He complained of cough which is chronic due to smoking ROS:- As documented above Objective:  
Vitals:  
 11/07/20 0530 11/07/20 0548 11/07/20 0626 11/07/20 0630 BP: 121/74  (!) 124/104 123/86 Pulse: (!) 122  84 (!) 103 Resp:    18 Temp:    98.8 °F (37.1 °C) TempSrc:      
SpO2: 95%  97% 98% Weight:  67.4 kg (148 lb 9.6 oz) Height:      
  
11/06 0701 - 11/07 0700 In: 3602.9 [I.V.:3602.9] Out: 2000 Last 3 Recorded Weights in this Encounter 11/05/20 0948 11/07/20 8400 Weight: 65 kg (143 lb 4.8 oz) 67.4 kg (148 lb 9.6 oz) Physical exam:  
GEN: nad NECK- Supple, no mass RESP: no wheezing coarse b/l CVS: S1,S2  rrrr NEURO: Normal speech, non focal 
Abdo- soft, NT 
EXT: + Edema PSYCH: Normal Mood Right arm avf + Chart reviewed. Pertinent Notes reviewed. Data Review : 
Recent Labs 11/07/20 
0355 11/06/20 0543 11/05/20 
6193 * 127* 132* K 4.5 4.8 3.6 CL 94* 95* 96* CO2 28 23 25 BUN 42* 46* 39* CREA 3.02* 3.64* 3.24* * 144* 35* CA 8.1* 8.1* 8.9 MG  --  2.0  --   
PHOS 4.9*  --   --   
 
Recent Labs 11/07/20 
[phone] 11/06/20 
6321 11/05/20 
5597 WBC 9.5 9.2 13.7* HGB 9.7* 9.5* 11.8* HCT 28.6* 28.3* 34.3*  
 253 296 No results for input(s): FE, TIBC, PSAT, FERR in the last 72 hours. Medication list  reviewed Current Facility-Administered Medications Medication Dose Route Frequency  guaiFENesin-dextromethorphan (ROBITUSSIN DM) 100-10 mg/5 mL syrup 10 mL  10 mL Oral Q6H PRN  
 VANCOMYCIN INFORMATION NOTE   Other PRN  
 cefepime (MAXIPIME) 1 g in 0.9% sodium chloride (MBP/ADV) 50 mL  1 g IntraVENous Q24H  
 PHENobarbital (LUMINAL) injection 65 mg  65 mg IntraVENous Q8H  
 epoetin ginger-epbx (RETACRIT) injection 4,000 Units  4,000 Units SubCUTAneous Q MON, WED & FRI  guaiFENesin ER (MUCINEX) tablet 600 mg  600 mg Oral Q12H  hydrALAZINE (APRESOLINE) 20 mg/mL injection 10 mg  10 mg IntraVENous Q4H PRN  
 dilTIAZem (CARDIZEM) 100 mg in dextrose 5% (MBP/ADV) 100 mL infusion  0-15 mg/hr IntraVENous TITRATE  [Held by provider] dilTIAZem (CARDIZEM) 100 mg in dextrose 5% (MBP/ADV) 100 mL infusion  0-15 mg/hr IntraVENous TITRATE  sodium chloride (NS) flush 5-10 mL  5-10 mL IntraVENous PRN  
 acetaminophen (TYLENOL) tablet 650 mg  650 mg Oral Q6H PRN  thiamine mononitrate (B-1) tablet 100 mg  100 mg Oral DAILY  folic acid (FOLVITE) tablet 1 mg  1 mg Oral DAILY  therapeutic multivitamin (THERAGRAN) tablet 1 Tab  1 Tab Oral DAILY  insulin lispro (HUMALOG) injection   SubCUTAneous AC&HS  
 glucose chewable tablet 16 g  4 Tab Oral PRN  
 dextrose (D50W) injection syrg 12.5-25 g  12.5-25 g IntraVENous PRN  
 glucagon (GLUCAGEN) injection 1 mg  1 mg IntraMUSCular PRN  
 metroNIDAZOLE (FLAGYL) IVPB premix 500 mg  500 mg IntraVENous Q12H  [Held by provider] carvediloL (COREG) tablet 12.5 mg  12.5 mg Oral BID  pantoprazole (PROTONIX) tablet 40 mg  40 mg Oral ACB  sevelamer carbonate (RENVELA) tab 800 mg  800 mg Oral TID  gabapentin (NEURONTIN) capsule 300 mg  300 mg Oral TID PRN  
 methylPREDNISolone (PF) (SOLU-MEDROL) injection 40 mg  40 mg IntraVENous Q6H  
 heparin (porcine) injection 5,000 Units  5,000 Units SubCUTAneous Q12H  
 albuterol-ipratropium (DUO-NEB) 2.5 MG-0.5 MG/3 ML  3 mL Nebulization Q4H PRN  
 budesonide (PULMICORT) 500 mcg/2 ml nebulizer suspension  500 mcg Nebulization BID RT And  
 arformoteroL (BROVANA) neb solution 15 mcg  15 mcg Nebulization BID RT And  
 ipratropium (ATROVENT) 0.02 % nebulizer solution 0.5 mg  0.5 mg Nebulization Q6H PRN  
 oxyCODONE IR (ROXICODONE) tablet 5 mg  5 mg Oral Q6H PRN  
 traMADoL (ULTRAM) tablet 50 mg  50 mg Oral Q6H PRN Sonia More, [address] [facility] [employer] [address] [place], [address] Phone - [phone]               Fax - [phone]

## 2020-11-07 NOTE — DIALYSIS
[facility] Dialysis Team [place]  [phone] Vitals   Pre   Post   Assessment   Pre   Post    
Temp  Temp: 97.4 °F (36.3 °C) (11/07/20 1105)  97.1 LOC  A&OX4 A&OX4 HR   Pulse (Heart Rate): 90 (11/07/20 1330) 91 Lungs   diminished  diminished B/P   BP: 99/67 (11/07/20 1330) 121/69 Cardiac   afib  afib Resp   Resp Rate: 18 (11/07/20 1330) 18 Skin   Warm and dry  warm and dry Pain level  Pain Intensity 1: 0 (11/07/20 9354)  Edema 2+NED 2+NED Orders: Duration:   Start:    1330 End:    1600 Total:   2.5 hrs Dialyzer:   Dialyzer/Set Up Inspection: Rosey Babinski (11/07/20 1330) K Bath:   Dialysate K (mEq/L): (uf only) (11/07/20 1330) Ca Bath:   Dialysate CA (mEq/L): (uf only) (11/07/20 1330) Na/Bicarb:   Dialysate NA (mEq/L): (ultrafiltration only) (11/07/20 1330) Target Fluid Removal:   Goal/Amount of Fluid to Remove (mL): 4000 mL (11/07/20 1330) Access Type & Location:   RUE AVF, 15 gauge needles used. Post tx: Sites held 10 minutes, bleeding stopped. Labs Obtained/Reviewed Critical Results Called   Date when labs were drawn- 
Hgb-   
HGB Date Value Ref Range Status 11/07/2020 9.7 (L) 12.1 - 17.0 g/dL Final  
 
K-   
Potassium Date Value Ref Range Status 11/07/2020 4.5 3.5 - 5.1 mmol/L Final  
 
Ca-  
Calcium Date Value Ref Range Status 11/07/2020 8.1 (L) 8.5 - 10.1 MG/DL Final  
 
Bun-  
BUN Date Value Ref Range Status 11/07/2020 42 (H) 6 - 20 MG/DL Final  
 
Creat-  
Creatinine Date Value Ref Range Status 11/07/2020 3.02 (H) 0.70 - 1.30 MG/DL Final  
 
  
Medications/ Blood Products Given Name   Dose   Route and Time    
none Blood Volume Processed (BVP):    n/a Net Fluid Removed:  4000 ml Comments Patient tolerated tx well with no c/o during or after. His BP held steady despite the highh rate of fluid removal. Report given to Iris Lopez RN using SBAR Time Out Done: yes, 1330 Primary Nurse Rpt Pre: Iris Lopez RN 
 Primary Nurse Rpt Post: Martina King RN 
Pt Education: procedural, fluid management Care Plan: UF with 4000 ml removal 
Tx Summary: 2 hours UF with 4000 ml removed Admiting Diagnosis: 
Pt's previous clinic- [facility] Consent signed - Informed Consent Verified: Yes (11/07/20 1330) Kaity Consent -  
Hepatitis Status- antigen negative 1-14-20, immune 1-14-20 Machine #- Machine Number: [id number] (11/07/20 1330) Telemetry status-yes Pre-dialysis wt. -

## 2020-11-07 NOTE — PROGRESS NOTES
Problem: Falls - Risk of 
Goal: *Absence of Falls Description: Document Paulette Cannon Fall Risk and appropriate interventions in the flowsheet. Outcome: Progressing Towards Goal 
Note: Fall Risk Interventions: 
Mobility Interventions: Bed/chair exit alarm, Communicate number of staff needed for ambulation/transfer, Patient to call before getting OOB Medication Interventions: Assess postural VS orthostatic hypotension, Evaluate medications/consider consulting pharmacy, Patient to call before getting OOB Elimination Interventions: Bed/chair exit alarm, Call light in reach, Patient to call for help with toileting needs History of Falls Interventions: Bed/chair exit alarm, Door open when patient unattended, Evaluate medications/consider consulting pharmacy Problem: Pressure Injury - Risk of 
Goal: *Prevention of pressure injury Description: Document Walter Scale and appropriate interventions in the flowsheet. Outcome: Progressing Towards Goal 
Note: Pressure Injury Interventions: 
Sensory Interventions: Assess changes in LOC, Assess need for specialty bed, Avoid rigorous massage over bony prominences, Keep linens dry and wrinkle-free, Float heels Moisture Interventions: Absorbent underpads, Maintain skin hydration (lotion/cream) Activity Interventions: Assess need for specialty bed, Pressure redistribution bed/mattress(bed type), PT/OT evaluation Mobility Interventions: Assess need for specialty bed, Float heels, Pressure redistribution bed/mattress (bed type) Nutrition Interventions: Document food/fluid/supplement intake, Offer support with meals,snacks and hydration Friction and Shear Interventions: Apply protective barrier, creams and emollients, HOB 30 degrees or less

## 2020-11-07 NOTE — PROGRESS NOTES
2000 BP 85/66  . Cardizem drip held. Paged Dr Regi Araiza. Dr. Lucian Wall on call, he wants to hold cardizem drip and restart when patient BP can tolerate

## 2020-11-08 NOTE — PROGRESS NOTES
End of Shift Note Bedside shift change report given to Marissa Butler RN (oncoming nurse) by Gerry Tan (offgoing nurse). Report included the following information SBAR and ED Summary Shift worked:  7p-7a Shift summary and any significant changes:  
  Patient medicated for back pain. Concerns for physician to address:  N/A Zone phone for oncoming shift:   [phone] Activity: 
Activity Level: Up with Assistance Number times ambulated in hallways past shift: 0 Number of times OOB to chair past shift: 1 Cardiac:  
Cardiac Monitoring: Yes     
Cardiac Rhythm: Atrial fibrillation Access:  
Current line(s): PIV Genitourinary:  
Urinary status: oliguric Respiratory:  
O2 Device: Room air Chronic home O2 use?: NO Incentive spirometer at bedside: NO 
  
GI: 
Last Bowel Movement Date: 11/06/20 Current diet:  DIET DIABETIC CONSISTENT CARB Mechanical Soft; 2 GM NA (House Low NA) DIET NUTRITIONAL SUPPLEMENTS Dinner, Breakfast; Nepro Passing flatus: YES Tolerating current diet: YES Pain Management:  
Patient states pain is manageable on current regimen: YES Skin: 
Walter Score: 13 Interventions: speciality bed, float heels, increase time out of bed, foam dressing and PT/OT consult Patient Safety: 
Fall Score: Total Score: 4 Interventions: bed/chair alarm, assistive device (walker, cane, etc), gripper socks and pt to call before getting OOB High Fall Risk: Yes Length of Stay: 
Expected LOS: 4d 19h Actual LOS: 3 Gerry Tan

## 2020-11-08 NOTE — PROGRESS NOTES
ADULT PROTOCOL: JET AEROSOL  REASSESSMENT Patient  Faby Young.     60 y.o.   male     11/8/2020  7:58 AM 
 
Breath Sounds Pre Procedure: Right Breath Sounds: Expiratory wheezing Left Breath Sounds: Expiratory wheezing Breath Sounds Post Procedure: Right Breath Sounds: Expiratory wheezing Left Breath Sounds: Expiratory wheezing Breathing pattern: Pre procedure Breathing Pattern: Regular Post procedure Breathing Pattern: Regular Heart Rate: Pre procedure Pulse: 90 
         Post procedure Pulse: 84 Resp Rate: Pre procedure Respirations: 18 Post procedure Respirations: 16 
 
 
Cough: Pre procedure Cough: Congested Post procedure Cough: Non-productive Oxygen: 2L/NC Changed:NO SpO2: Pre procedure SpO2: 98 %               Post procedure SpO2: 98 % Nebulizer Therapy: Current medications Aerosolized Medications: Brovana, Pulmicort Changed:NO Problem List:  
Patient Active Problem List  
Diagnosis Code  Cellulitis of thumb, left L03.012  Tenosynovitis of finger M65.9  
 DM (diabetes mellitus) ([facility] E11.9  
 HCV (hepatitis C virus) B19.20  Tobacco abuse Z72.0  Alcohol abuse, daily use F10.10  COPD (chronic obstructive pulmonary disease) (HCC) J44.9  History of splenectomy Z90.81  
 Cellulitis and abscess of finger L03.019, L02.519  Abdominal wall cellulitis L03.311  Acute GI bleeding K92.2  
 HTN (hypertension) I10  
 ESRD on dialysis (HCC) N18.6, Z99.2  Nontraumatic ischemic infarction of muscle of hand M62.249  Acute cholecystitis K81.0  Type 2 diabetes mellitus with nephropathy (HCC) E11.21  
 COPD exacerbation (HCC) J44.1  A-fib (HCC) I48.91  
 Hypoglycemia E16.2  Acute dyspnea R06.00 Respiratory Therapist: Lynn Hitchcock, RT

## 2020-11-08 NOTE — PROGRESS NOTES
7:20 AM Received bedside verbal report from THAD Fregoso. 
 
11:43 AM Verbal order received for  Cardizem tablet 180mg CD daily and also received order to stop cardizem gtt after 2 hours of giving the first dose of PO cardizem. See MAR.

## 2020-11-08 NOTE — PROGRESS NOTES
. 
Progress Note Pt Name  Henny Pickens Date of Birth 1965 Medical Record Number  [id number] Age  60 y.o. PCP Barbie Valenzuela NP Admit date:  11/5/2020 Room Number  2209/01  @ [facility] Date of Service  11/8/2020 Admission Diagnoses:  Severe symptomatic hypoglycemia Admission Summary: 
\" Mr. Nickie Holter is a 60 y.o.    male   Hx of ESRD on hemodialysis, last hemodialysis yesterday, recurrent pleural effusion, diabetes type 2 came here today with with hypoglycemia and dec mental status. Patient BG was elevated last night >400 he took 35 units of insulin(details unknown). He noted low BG EMS was called and brought here. BG were 35 got several D50 BG remained yudith and he was symptomatic. He was started on D10 and BG improved to 70-90. He also noted to have SVT/Afiv with RVR rate 170 got better with Cardizem drip cardiology was consulted in ER. Patient had CT abd showed resolution of ventral hernia fluid collection, L kidney mass and R loculated Pl effusion. Patient overall poor historian, family unable to reach at this time. He denies any CP/N/V/D/cough to me. He has some PRITCHETT/SOB from COPD and uses prn O2. He had thoracentesis for pl effusions in past. He admits to drinking and smoking and last drink was beer yesterday. He altaf any rectal bleed, fevers, hematuria, headache, dizziness, palpitations to me. He is being admited for further care. \"  
 
Assessment and plan:  
 
DM 2 uncontrolled Severe symptomatic hypoglycemia resolved Off of D10 drip HgA1C 5.8 Diabetic diet  
  
Suspected sepsis Started on empiric IV abx Vanc+Cefepime+Flagyl All cultures are negative so far. Wound Cx is showing heavy GNR I will stop Flagyl and Vanc Continue Cefepime as is for now.  
  
Ventral hernia Mesh No fluid collection  on CT Cont wound care F/u wound cx Iv Abx 
  
SVT/Afib  in ER on admit Cont cardizem PO per cards s/p cardizem drip Hold AC as hx of GI bleed in past 
Cards on case f/u recomnds Coreg bid if BP tolerates Echo pend Tsh/Ft4 ok Trend CE neg 
  
Acute on chronic respiratory failure with hypoxia Recurrent Pleural effusion RLL collapse COPD managed by Dr. Sierra Delgado Appreciate guidance from Dr. Tommy Walsh Nebs prn 
IV steroids 
  
L Renal mass Urology eval requested  
  
ESRD/HD [place] [facility] ETOH abuse Tobacco abuse Thiamine/folate/mvt I have tapered and ordered stop for Depakote; I don't recommend pre-emptive DT Rx in this pt Nicotine patch 14mg daily PRN  
  
Debility/Deconditiones state with mod protien malnourished PT/OT eval 
Dietary eval 
 
Hepatitis C infection hx Monitored at [facility] hepatology clinic Hx of PVD s/p thrombectomy Hx of Splenectomy and partial pancreatectomy Chronic constipation - pt takes mag citrate at home PRN Will start Senokot daily I have stopped Tramadol - due to high risk of seizure due to accumulation of active metabolite CODE STATUS   Full Functional Status  Pt is  and lives with his wife in [place] Pt is able to walk independently Surrogate decision maker:  Pt's wife Ari Bennett [phone] Prophylaxis   Hep SQ Discharge Plan:  [facility] PT, OT, RN, Misc Benefit:  Payor: [facility] MEDICAID / Plan: SARA MOSS [facility] / Product Type: Managed Care Medicaid /   
Isolation :  There are currently no Active Isolations ADT status:  INPATIENT Query   None noted today Prognosis Social issues  Date  Comment 11/07/20  I spoke with his wife over telephone ;  I explained above issues in simple language and answered all questions. 11/08/20  8:39 AM No family or visitor here with the patient today Subjective Data \"my chest feels tight, wheezing  \" Review of Systems - History obtained from the patient Respiratory ROS: no cough, shortness of breath, or wheezing Cardiovascular ROS: no chest pain or dyspnea on exertion Objective Data Comments  Pleasant gentleman , appears chronically ill gentleman Patient Vitals for the past 24 hrs: 
 BP  
11/08/20 0630 122/76  
11/08/20 0334 101/68  
11/08/20 0300 122/75  
11/08/20 0230 110/73  
11/08/20 0200 113/82  
11/07/20 2300 (!) 132/90  
11/07/20 2230 (!) 119/96  
11/07/20 2200 (!) 139/99  
11/07/20 2130 111/80  
11/07/20 2118 126/78  
11/07/20 1830 (!) 107/94  
11/07/20 1600 121/69  
11/07/20 1546 119/82  
11/07/20 1530 123/71  
11/07/20 1515 114/85  
11/07/20 1500 114/79  
11/07/20 1446 106/64  
11/07/20 1430 100/88  
11/07/20 1417 108/70  
11/07/20 1400 111/70  
11/07/20 1346 108/72  
11/07/20 1330 99/67  
11/07/20 1122 112/82  
11/07/20 1105 112/82 Patient Vitals for the past 24 hrs: 
 Pulse 11/08/20 0630 75  
11/08/20 0300 67  
11/07/20 2300 95  
11/07/20 2200 96  
11/07/20 2130 91  
11/07/20 2118 96  
11/07/20 1830 99  
11/07/20 1600 91  
11/07/20 1546 84  
11/07/20 1530 92  
11/07/20 1515 83  
11/07/20 1500 93  
11/07/20 1446 91  
11/07/20 1430 82  
11/07/20 1417 86  
11/07/20 1400 82  
11/07/20 1346 81  
11/07/20 1330 90  
11/07/20 1105 90 Patient Vitals for the past 24 hrs: 
 Resp 11/08/20 0630 18  
11/08/20 0300 18  
11/07/20 2300 18  
11/07/20 1830 18  
11/07/20 1600 18  
11/07/20 1546 18  
11/07/20 1530 18  
11/07/20 1515 18  
11/07/20 1500 18  
11/07/20 1446 18  
11/07/20 1430 18  
11/07/20 1417 18  
11/07/20 1400 18  
11/07/20 1346 18  
11/07/20 1330 18  
11/07/20 1105 18 Patient Vitals for the past 24 hrs: 
 Temp 11/08/20 0630 97.8 °F (36.6 °C) 11/08/20 0300 97.6 °F (36.4 °C)  
11/07/20 2300 97.4 °F (36.3 °C) 11/07/20 1830 97.3 °F (36.3 °C) 11/07/20 1600 97.1 °F (36.2 °C)  
11/07/20 1530 97.6 °F (36.4 °C)  
11/07/20 1105 97.4 °F (36.3 °C) SpO2 Readings from Last 6 Encounters:  
11/08/20 98% 06/18/20 99% 01/15/20 97% 05/21/19 98% 05/17/18 99% 02/27/18 99% O2 Flow Rate (L/min): 2 l/min  O2 Device: Nasal cannula Body mass index is 23.56 kg/m². - 
Wt Readings from Last 10 Encounters:  
11/08/20 66.2 kg (145 lb 15.1 oz) 06/18/20 62.6 kg (138 lb)  
01/14/20 63.5 kg (139 lb 15.9 oz) 05/21/19 63.5 kg (140 lb) 05/17/18 68.9 kg (152 lb)  
02/27/18 67.6 kg (149 lb) 01/31/18 68 kg (150 lb)  
01/23/18 66.7 kg (147 lb) 12/20/17 66.8 kg (147 lb 4.3 oz) 11/02/17 69.9 kg (154 lb) Physical Exam:            
General:  Alert, cooperative,  
  
well developed,  
appears stated age Ears/Eyes:  Hearing intact Sclera anicteric. Pupils equal  
Mouth/Throat:  Mucous membranes normal pink and moist 
  
Neck:    
Lungs:  Trachea midline Chest excursion symmetrical  
Auscultation B/L Symmetrical with Diffuse wheezing Percussion note resonant on mid Clavicular line; no sign of pneumothorax CVS:  Regular rate and rhythm  
no  murmur, No click, rub or gallop S1 normal  
S2 normal  
Pedal pulses  b/l symmetrical but feeble Abdomen:   
Soft, non-tender Bowel sounds normal 
  
Extremities:  Fistula on the RUE arm noted. Old surgical scars noted on this LEFT forearm No cyanosis, jaundice No edema noted No sign of DVT/cord like lesion on palpation No sign of acute trauma . Skin:    
Lymph nodes: Musculoskeletal Muscle bulk B/L symmetrical  
Neuro Cranial nerves are intact,  
motor movement b/l symmetrical, Sensory evaluation b/l symmetrical   
Psych:  Alert and oriented,  
normal mood & affect Medications reviewed Current Facility-Administered Medications Medication Dose Route Frequency  PHENobarbital (LUMINAL) injection 65 mg  65 mg IntraVENous BID  
 guaiFENesin-dextromethorphan (ROBITUSSIN DM) 100-10 mg/5 mL syrup 10 mL  10 mL Oral Q6H PRN  
 nicotine (NICODERM CQ) 14 mg/24 hr patch 1 Patch  1 Patch TransDERmal DAILY PRN  
 senna-docusate (PERICOLACE) 8.6-50 mg per tablet 1 Tab  1 Tab Oral DAILY  balsam peru-castor oiL (VENELEX) ointment   Topical BID  cefepime (MAXIPIME) 1 g in 0.9% sodium chloride (MBP/ADV) 50 mL  1 g IntraVENous Q24H  
 epoetin ginger-epbx (RETACRIT) injection 4,000 Units  4,000 Units SubCUTAneous Q MON, WED & FRI  guaiFENesin ER (MUCINEX) tablet 600 mg  600 mg Oral Q12H  hydrALAZINE (APRESOLINE) 20 mg/mL injection 10 mg  10 mg IntraVENous Q4H PRN  
 dilTIAZem (CARDIZEM) 100 mg in dextrose 5% (MBP/ADV) 100 mL infusion  0-15 mg/hr IntraVENous TITRATE  [Held by provider] dilTIAZem (CARDIZEM) 100 mg in dextrose 5% (MBP/ADV) 100 mL infusion  0-15 mg/hr IntraVENous TITRATE  sodium chloride (NS) flush 5-10 mL  5-10 mL IntraVENous PRN  
 acetaminophen (TYLENOL) tablet 650 mg  650 mg Oral Q6H PRN  thiamine mononitrate (B-1) tablet 100 mg  100 mg Oral DAILY  folic acid (FOLVITE) tablet 1 mg  1 mg Oral DAILY  therapeutic multivitamin (THERAGRAN) tablet 1 Tab  1 Tab Oral DAILY  insulin lispro (HUMALOG) injection   SubCUTAneous AC&HS  
 glucose chewable tablet 16 g  4 Tab Oral PRN  
 dextrose (D50W) injection syrg 12.5-25 g  12.5-25 g IntraVENous PRN  
 glucagon (GLUCAGEN) injection 1 mg  1 mg IntraMUSCular PRN  
 [Held by provider] carvediloL (COREG) tablet 12.5 mg  12.5 mg Oral BID  pantoprazole (PROTONIX) tablet 40 mg  40 mg Oral ACB  sevelamer carbonate (RENVELA) tab 800 mg  800 mg Oral TID  gabapentin (NEURONTIN) capsule 300 mg  300 mg Oral TID PRN  
 methylPREDNISolone (PF) (SOLU-MEDROL) injection 40 mg  40 mg IntraVENous Q6H  
 heparin (porcine) injection 5,000 Units  5,000 Units SubCUTAneous Q12H  
 albuterol-ipratropium (DUO-NEB) 2.5 MG-0.5 MG/3 ML  3 mL Nebulization Q4H PRN  
 budesonide (PULMICORT) 500 mcg/2 ml nebulizer suspension  500 mcg Nebulization BID RT And  
 arformoteroL (BROVANA) neb solution 15 mcg  15 mcg Nebulization BID RT  And  
 ipratropium (ATROVENT) 0.02 % nebulizer solution 0.5 mg  0.5 mg Nebulization Q6H PRN  
  oxyCODONE IR (ROXICODONE) tablet 5 mg  5 mg Oral Q6H PRN Relevant other informations: Other medical conditions listed in [facility] problem list section; all of these and other pertinent data were taken into consideration when treatment plan is developed and customized to this patient's unique overall circumstances and needs. We have reviewed available old medical records within the constraints of this admission process. Data Review:  
Recent Days: 
All Micro Results Procedure Component Value Units Date/Time CULTURE, BLOOD [[id number]] Collected:  11/05/20 1145 Order Status:  Completed Specimen:  Blood Updated:  11/08/20 5559 Special Requests: NO SPECIAL REQUESTS Culture result: NO GROWTH 3 DAYS     
 CULTURE, BLOOD [[id number]] Collected:  11/05/20 1155 Order Status:  Completed Specimen:  Blood Updated:  11/08/20 4336 Special Requests: NO SPECIAL REQUESTS Culture result: NO GROWTH 3 DAYS     
 CULTURE, RESPIRATORY/SPUTUM/BRONCH Iris Lessen [[id number]] Collected:  11/07/20 0355 Order Status:  Completed Specimen:  Sputum Updated:  11/07/20 1244 Special Requests: NO SPECIAL REQUESTS     
  GRAM STAIN 1+ EPITHELIAL CELLS SEEN 2+ GRAM POSITIVE COCCI IN CLUSTERS Culture result: PENDING  
 CULTURE, Leticia Silvan STAIN [[id number]] Collected:  11/05/20 2326 Order Status:  Completed Specimen:  Abdomen Updated:  11/07/20 0930 Special Requests: NO SPECIAL REQUESTS     
  GRAM STAIN NO WBC'S SEEN 4+ APPARENT GRAM POSITIVE RODS 3+ APPARENT GRAM NEGATIVE RODS Culture result:    
  HEAVY MIXED SKIN ALESSIA ISOLATED URINE CULTURE HOLD SAMPLE [[id number]] Order Status:  Canceled Specimen:  Urine Recent Labs 11/07/20 
[phone] [phone] 11/06/20 
6536 11/05/20 
1623 WBC 9.5 9.2 13.7* HGB 9.7* 9.5* 11.8* HCT 28.6* 28.3* 34.3*  
 253 296 Recent Labs 11/07/20 7879 11/06/20 
8546 11/05/20 
5073 * 127* 132* K 4.5 4.8 3.6 CL 94* 95* 96* CO2 28 23 25 * 144* 35* BUN 42* 46* 39* CREA 3.02* 3.64* 3.24* CA 8.1* 8.1* 8.9 MG  --  2.0  --   
PHOS 4.9*  --   --   
ALB  --   --  2.8* TBILI  --   --  0.6 ALT  --   --  27 INR  --  1.2*  --   
  
Lab Results Component Value Date/Time TSH 1.67 11/06/2020 03:39 AM  
 
 
 
  
Care Plan discussed with:Patient/Family and Nurse Other medical conditions are listed in the active hospital problem list section; these and other pertinent data were taken into consideration when the treatment plan was developed and customized to this patient's unique overall circumstances and needs. High complexity decision making was performed for this patient who is at high risk for decompensation with multiple organ involvement. Today total floor/unit time was 35 minutes while caring for this patient and greater than 50% of that time was spent with patient (and/or family) coordinating patients clinical issues; this includes time spent during multidisciplinary rounds. Bev Manning MD MPH FACP   
11/8/2020

## 2020-11-08 NOTE — PROGRESS NOTES
[facility] [address] [place] [address]  [phone] Cardiology Progress Note 
 
 
11/8/2020 4:10 PM 
 
Admit Date: 11/5/2020 Admit Diagnosis: Hypoglycemia [E16.2]; A-fib ([facility] [I48.91]; Acute hypoxemic respiratory failure due to COVID-19 ([facility] [U07.1, J96.01] Subjective:  
 
Mastaci Gannon. No complaints Visit Vitals /71 (BP 1 Location: Left arm, BP Patient Position: Sitting) Pulse 64 Temp 98 °F (36.7 °C) Resp 18 Ht 5' 6\" (1.676 m) Wt 145 lb 15.1 oz (66.2 kg) SpO2 95% BMI 23.56 kg/m² Current Facility-Administered Medications Medication Dose Route Frequency  PHENobarbital (LUMINAL) injection 65 mg  65 mg IntraVENous BID  methylPREDNISolone (PF) (SOLU-MEDROL) injection 60 mg  60 mg IntraVENous Q8H  
 dilTIAZem ER (CARDIZEM CD) capsule 180 mg  180 mg Oral DAILY  guaiFENesin-dextromethorphan (ROBITUSSIN DM) 100-10 mg/5 mL syrup 10 mL  10 mL Oral Q6H PRN  
 nicotine (NICODERM CQ) 14 mg/24 hr patch 1 Patch  1 Patch TransDERmal DAILY PRN  
 senna-docusate (PERICOLACE) 8.6-50 mg per tablet 1 Tab  1 Tab Oral DAILY  balsam peru-castor oiL (VENELEX) ointment   Topical BID  cefepime (MAXIPIME) 1 g in 0.9% sodium chloride (MBP/ADV) 50 mL  1 g IntraVENous Q24H  
 epoetin ginger-epbx (RETACRIT) injection 4,000 Units  4,000 Units SubCUTAneous Q MON, WED & FRI  guaiFENesin ER (MUCINEX) tablet 600 mg  600 mg Oral Q12H  hydrALAZINE (APRESOLINE) 20 mg/mL injection 10 mg  10 mg IntraVENous Q4H PRN  
 [Held by provider] dilTIAZem (CARDIZEM) 100 mg in dextrose 5% (MBP/ADV) 100 mL infusion  0-15 mg/hr IntraVENous TITRATE  sodium chloride (NS) flush 5-10 mL  5-10 mL IntraVENous PRN  
 acetaminophen (TYLENOL) tablet 650 mg  650 mg Oral Q6H PRN  thiamine mononitrate (B-1) tablet 100 mg  100 mg Oral DAILY  folic acid (FOLVITE) tablet 1 mg  1 mg Oral DAILY  therapeutic multivitamin (THERAGRAN) tablet 1 Tab  1 Tab Oral DAILY  insulin lispro (HUMALOG) injection   SubCUTAneous AC&HS  
 glucose chewable tablet 16 g  4 Tab Oral PRN  
 dextrose (D50W) injection syrg 12.5-25 g  12.5-25 g IntraVENous PRN  
 glucagon (GLUCAGEN) injection 1 mg  1 mg IntraMUSCular PRN  
 [Held by provider] carvediloL (COREG) tablet 12.5 mg  12.5 mg Oral BID  pantoprazole (PROTONIX) tablet 40 mg  40 mg Oral ACB  sevelamer carbonate (RENVELA) tab 800 mg  800 mg Oral TID  gabapentin (NEURONTIN) capsule 300 mg  300 mg Oral TID PRN  
 heparin (porcine) injection 5,000 Units  5,000 Units SubCUTAneous Q12H  
 albuterol-ipratropium (DUO-NEB) 2.5 MG-0.5 MG/3 ML  3 mL Nebulization Q4H PRN  
 budesonide (PULMICORT) 500 mcg/2 ml nebulizer suspension  500 mcg Nebulization BID RT And  
 arformoteroL (BROVANA) neb solution 15 mcg  15 mcg Nebulization BID RT And  
 ipratropium (ATROVENT) 0.02 % nebulizer solution 0.5 mg  0.5 mg Nebulization Q6H PRN  
 oxyCODONE IR (ROXICODONE) tablet 5 mg  5 mg Oral Q6H PRN Objective:  
  
Physical Exam: 
General Appearance:   
Chest:   Clear Cardiovascular: irreg Extremities: no edema Skin:  Warm and dry.  
 
Data Review:  
Recent Labs 11/07/20 
[phone] 11/06/20 
8463 WBC 9.5 9.2 HGB 9.7* 9.5* HCT 28.6* 28.3*  
 253 Recent Labs 11/07/20 
[phone] 11/06/20 
7547 * 127* K 4.5 4.8  
CL 94* 95* CO2 28 23 * 144* BUN 42* 46* CREA 3.02* 3.64* CA 8.1* 8.1*  
MG  --  2.0 PHOS 4.9*  --   
INR  --  1.2* No results for input(s): TROIQ, CPK, CKMB in the last 72 hours. Intake/Output Summary (Last 24 hours) at 11/8/2020 1610 Last data filed at 11/8/2020 0480 Gross per 24 hour Intake 240 ml Output  Net 240 ml Telemetry:  
EKG: 
Cxray: 
 
Assessment:  
 
Active Problems: 
  A-fib ([facility] (11/5/2020) Hypoglycemia (11/5/2020) Acute dyspnea (11/5/2020) Plan: Rate controlled; switch to po dilt Yuridia Hanna M.D., [facility]

## 2020-11-08 NOTE — PROGRESS NOTES
Comprehensive Nutrition Assessment Type and Reason for Visit: Initial, Consult Nutrition Recommendations/Plan:  
· Continue CCD for BG management, 2gm Na+ per hx ESRD and dental soft diet per edentulous and dentures are ill-fitting. · Continue Nepro shake po once daily with dinner. Enc pt to take home any shakes not consumed and use to supplement at home as needed when appetite not optimal. 
· Please document % meals and supplements consumed in flowsheet I/O's under intake. Nutrition Assessment:     
11/8:  Chart reviewed; med noted for ESRD-HD, a-fib on admission, ventral hernia mesh. MD also noted debility with moderate protein calorie malnutrition. RD visited with pt at bedside. Reports appetite is good but he has difficulty swallowing and also worried that he will not be able to sustain current diet recommendations/meal planning at home. States \"I have to eat what everybody else eats\". Meaning that he cannot prepare separate meals for himself. RD obtained information on typical meals at home and made a few recommendations for adjustments. Pt also meets with RD at dialysis center to manage phos and K+ levels. Pt does not use added salt at home, other than what is already in the foods. Pt was on a mech soft diet but would like to transition to a dental soft which is slightly less restrictive as experiencing difficulty ordering meals in hospital based on the combination of CCD/2gm Na+ and texture modified diet; RD in agreement. Pt likes the Nepro shakes but does not drink them every night as reports sometimes feeling full and/or bloated after consumption. Encouraged pt to take any shakes not consumed home to supplement as needed. Pt reports that he is unable to afford to continue nutritional shakes at home. Pt pleasant to chat with during our visit and thanked me for my visit with him.  
 
RD completed a nutrition focused physical exam during visit which revealed mild/moderate fat loss and moderate/severe muscle wasting of the upper and lower extremities. Pt pulled sleeves up and encouraged me to look at how thin his arms and legs had gotten. Pt has a bony feel to shoulders and thighs. Meets ASPEN criteria for moderate protein calorie malnutrition which has already been documented by MD. 
 
Patient Vitals for the past 72 hrs: 
 % Diet Eaten 11/08/20 0842 100 % Last Weight Metric Weight Loss Metrics 11/8/2020 6/18/2020 1/14/2020 5/21/2019 5/17/2018 2/27/2018 1/31/2018 Today's Wt 145 lb 15.1 oz 138 lb 139 lb 15.9 oz 140 lb 152 lb 149 lb 150 lb BMI 23.56 kg/m2 22.27 kg/m2 23.3 kg/m2 22.6 kg/m2 24.53 kg/m2 23.34 kg/m2 24.21 kg/m2 Malnutrition Assessment: 
Malnutrition Status: Moderate malnutrition Context:  Chronic illness Findings of the 6 clinical characteristics of malnutrition:  
Energy Intake:    
Weight Loss: Body Fat Loss:  1 - Mild body fat loss, Muscle Mass Loss:  7 - Severe muscle mass loss, Clavicles (pectoralis &deltoids), Scapula (trapezius), Thigh (quadraceps) Fluid Accumulation:  1 - Mild,   
 Strength:  Not performed Estimated Daily Nutrient Needs: 
Energy (kcal): 1853 (BMR 1425 x 1. 3AF); Weight Used for Energy Requirements: Current Protein (g): 79 (1.2 g/kg bw); Weight Used for Protein Requirements: Current Fluid (ml/day): 1800 ml/day; Method Used for Fluid Requirements: 1 ml/kcal 
 
Nutrition Related Findings:  Meds: MVI, thiamine, folvite, pericolace, flagyl; BM: 11/6, trace/weaping edema Wounds:   
Stage II, Deep tissue injury, Surgical incision Current Nutrition Therapies: DIET NUTRITIONAL SUPPLEMENTS Dinner, Breakfast; Nepro DIET DIABETIC CONSISTENT CARB Soft Solids; 2 GM NA (House Low NA) Anthropometric Measures: 
· Height:  5' 6\" (167.6 cm) · Current Body Wt:  66.2 kg (145 lb 15.1 oz) · Ideal Body Wt:  142 lbs:  102.8 % · BMI Category:  Normal weight (BMI 22.0-24.9) age over 72    
 
 Nutrition Diagnosis: · Altered nutrition-related lab values related to (current medical conditions) as evidenced by (elevated BG, renal labs, Na+ 128) Nutrition Interventions:  
Food and/or Nutrient Delivery: Modify current diet, Continue oral nutrition supplement Nutrition Education and Counseling: No recommendations at this time Coordination of Nutrition Care: No recommendation at this time Goals: 
Maintain PO >50% of meals while trend BG <200 mg/dl, maintain K+/Phos WNL next 3-5 days Nutrition Monitoring and Evaluation:  
Behavioral-Environmental Outcomes: None identified Food/Nutrient Intake Outcomes: Food and nutrient intake, Supplement intake Physical Signs/Symptoms Outcomes: Biochemical data, Skin, Weight, GI status, Constipation, Fluid status or edema Discharge Planning:   
Continue current diet Electronically signed by Sanya Herrera RD on 11/8/2020 at 3:36 PM

## 2020-11-08 NOTE — PROGRESS NOTES
Problem: Falls - Risk of 
Goal: *Absence of Falls Description: Document Monica Lemos Fall Risk and appropriate interventions in the flowsheet. Outcome: Progressing Towards Goal 
Note: Fall Risk Interventions: 
Mobility Interventions: Assess mobility with egress test, Bed/chair exit alarm, Communicate number of staff needed for ambulation/transfer, OT consult for ADLs, Patient to call before getting OOB Medication Interventions: Bed/chair exit alarm, Evaluate medications/consider consulting pharmacy, Patient to call before getting OOB, Teach patient to arise slowly Elimination Interventions: Bed/chair exit alarm, Call light in reach, Patient to call for help with toileting needs History of Falls Interventions: Bed/chair exit alarm, Door open when patient unattended, Evaluate medications/consider consulting pharmacy Problem: Pressure Injury - Risk of 
Goal: *Prevention of pressure injury Description: Document Walter Scale and appropriate interventions in the flowsheet. Outcome: Progressing Towards Goal 
Note: Pressure Injury Interventions: 
Sensory Interventions: Assess changes in LOC, Avoid rigorous massage over bony prominences, Maintain/enhance activity level, Float heels Moisture Interventions: Absorbent underpads, Apply protective barrier, creams and emollients, Maintain skin hydration (lotion/cream) Activity Interventions: Increase time out of bed, Pressure redistribution bed/mattress(bed type), PT/OT evaluation Mobility Interventions: Assess need for specialty bed, Pressure redistribution bed/mattress (bed type), HOB 30 degrees or less Nutrition Interventions: Document food/fluid/supplement intake, Offer support with meals,snacks and hydration Friction and Shear Interventions: Apply protective barrier, creams and emollients, Minimize layers

## 2020-11-08 NOTE — PROGRESS NOTES
End of Shift Note Bedside shift change report given to Joseph Cardenas  (oncoming nurse) by Jennifer Chavez (offgoing nurse). Report included the following information SBAR, Kardex, Intake/Output and Cardiac Rhythm Afib Shift worked:  7a-7p Shift summary and any significant changes:  
  n/a Concerns for physician to address:  n/a  
Zone phone for oncoming shift:   n/a Activity: 
Activity Level: Up with Assistance Number times ambulated in hallways past shift: 1 Number of times OOB to chair past shift: 1 Cardiac:  
Cardiac Monitoring: Yes     
Cardiac Rhythm: Atrial fibrillation Access:  
Current line(s): PIV Genitourinary:  
Urinary status: oliguric Respiratory:  
O2 Device: Room air Chronic home O2 use?: NO Incentive spirometer at bedside: NO 
  
GI: 
Last Bowel Movement Date: 11/06/20 Current diet:  DIET NUTRITIONAL SUPPLEMENTS Dinner, Breakfast; [place] DIET DIABETIC CONSISTENT CARB Soft Solids; 2 GM NA (House Low NA) Passing flatus: YES Tolerating current diet: YES 
% Diet Eaten: 100 % Pain Management:  
Patient states pain is manageable on current regimen: YES Skin: 
Walter Score: 13 Interventions: turn team, speciality bed, float heels, increase time out of bed, foam dressing, PT/OT consult, limit briefs and nutritional support Patient Safety: 
Fall Score: Total Score: 4 Interventions: bed/chair alarm, assistive device (walker, cane, etc), gripper socks, pt to call before getting OOB and stay with me (per policy) High Fall Risk: Yes Length of Stay: 
Expected LOS: 4d 19h Actual LOS: 3 Barb Willoughby

## 2020-11-09 NOTE — PROGRESS NOTES
RAPID RESPONSE TEAM 
 
Responded to overhead rapid response to 2209 On arrival primary nurse applying pressure to abdominal wound that began bleeding. Moderate amounts of blood noted. Site of bleeding from previous hernia repair with mesh. Pt denies pain, dizziness, or weakness. Dr. Giana Contreras at bedside. Labs sent: PT/INR, type and screen and CBC. Maintenance fluids started at 75 ml/hr. Stat page placed to general surgery. Dr. Wendie Kirk responded and enroute. Spouse and son at bedside and updated on plan of care. VSS 
 
H&H q6h x3 ordered. Pt to remain in 2209. Yuval Schofield RN 
RRT, C.0012 Lab Results Component Value Date/Time WBC 19.3 (H) 11/09/2020 05:46 PM  
 Hemoglobin (POC) 11.6 (L) 02/27/2018 07:31 AM  
 HGB 10.5 (L) 11/09/2020 05:46 PM  
 Hematocrit (POC) 34 (L) 02/27/2018 07:31 AM  
 HCT 29.4 (L) 11/09/2020 05:46 PM  
 PLATELET 482 07/73/6094 05:46 PM  
 MCV 99.3 (H) 11/09/2020 05:46 PM  
 
COAGS:   
Lab Results Component Value Date/Time PTP 11.6 (H) 11/09/2020 05:46 PM  
 INR 1.1 11/09/2020 05:46 PM  
 
 
Patient Vitals for the past 4 hrs: 
 Pulse Resp BP SpO2  
11/09/20 1840 (!) 101  (!) 132/90 97 % 11/09/20 1805 (!) 107  132/88 97 % 11/09/20 1732 (!) 102 18 129/79 91 %
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
## 2020-11-09 NOTE — PROGRESS NOTES
Cardiology Progress Note 
 
 
11/9/2020 5:58 PM 
 
Admit Date: 11/5/2020 Admit Diagnosis: Hypoglycemia [E16.2]; A-fib ([facility] [I48.91]; Acute hypoxemic respiratory failure due to COVID-19 ([facility] [U07.1, J96.01] Subjective:  
 
Georgie Mccollum. denies any palpitations, chest pain. Visit Vitals /79 Pulse (!) 102 Temp 97.9 °F (36.6 °C) (Oral) Resp 18 Ht 5' 6\" (1.676 m) Wt 152 lb (68.9 kg) SpO2 91% BMI 24.53 kg/m² Current Facility-Administered Medications Medication Dose Route Frequency  EPO- HOLD dose tonight  1 Each Other ONCE  
 guaiFENesin ER (MUCINEX) tablet 600 mg  600 mg Oral BID  
 HYDROcodone-acetaminophen (NORCO)  mg tablet 1 Tab  1 Tab Oral Q6H PRN  peppermint oil   Other PRN  
 0.9% sodium chloride infusion  75 mL/hr IntraVENous CONTINUOUS  
 desmopressin (DDAVP) 4 mcg/mL injection 2 mcg  2 mcg IntraVENous ONCE  
 methylPREDNISolone (PF) (SOLU-MEDROL) injection 60 mg  60 mg IntraVENous Q8H  
 dilTIAZem ER (CARDIZEM CD) capsule 180 mg  180 mg Oral DAILY  nystatin (MYCOSTATIN) 100,000 unit/mL oral suspension 500,000 Units  500,000 Units Oral QID  guaiFENesin-dextromethorphan (ROBITUSSIN DM) 100-10 mg/5 mL syrup 10 mL  10 mL Oral Q6H PRN  
 nicotine (NICODERM CQ) 14 mg/24 hr patch 1 Patch  1 Patch TransDERmal DAILY PRN  
 senna-docusate (PERICOLACE) 8.6-50 mg per tablet 1 Tab  1 Tab Oral DAILY  balsam peru-castor oiL (VENELEX) ointment   Topical BID  cefepime (MAXIPIME) 1 g in 0.9% sodium chloride (MBP/ADV) 50 mL  1 g IntraVENous Q24H  
 epoetin ginger-epbx (RETACRIT) injection 4,000 Units  4,000 Units SubCUTAneous Q MON, WED & FRI  guaiFENesin ER (MUCINEX) tablet 600 mg  600 mg Oral Q12H  hydrALAZINE (APRESOLINE) 20 mg/mL injection 10 mg  10 mg IntraVENous Q4H PRN  
 [Held by provider] dilTIAZem (CARDIZEM) 100 mg in dextrose 5% (MBP/ADV) 100 mL infusion  0-15 mg/hr IntraVENous TITRATE  sodium chloride (NS) flush 5-10 mL  5-10 mL IntraVENous PRN  
 acetaminophen (TYLENOL) tablet 650 mg  650 mg Oral Q6H PRN  thiamine mononitrate (B-1) tablet 100 mg  100 mg Oral DAILY  folic acid (FOLVITE) tablet 1 mg  1 mg Oral DAILY  therapeutic multivitamin (THERAGRAN) tablet 1 Tab  1 Tab Oral DAILY  insulin lispro (HUMALOG) injection   SubCUTAneous AC&HS  
 glucose chewable tablet 16 g  4 Tab Oral PRN  
 dextrose (D50W) injection syrg 12.5-25 g  12.5-25 g IntraVENous PRN  
 glucagon (GLUCAGEN) injection 1 mg  1 mg IntraMUSCular PRN  
 [Held by provider] carvediloL (COREG) tablet 12.5 mg  12.5 mg Oral BID  pantoprazole (PROTONIX) tablet 40 mg  40 mg Oral ACB  sevelamer carbonate (RENVELA) tab 800 mg  800 mg Oral TID  gabapentin (NEURONTIN) capsule 300 mg  300 mg Oral TID PRN  
 [Held by provider] heparin (porcine) injection 5,000 Units  5,000 Units SubCUTAneous Q12H  
 albuterol-ipratropium (DUO-NEB) 2.5 MG-0.5 MG/3 ML  3 mL Nebulization Q4H PRN  
 budesonide (PULMICORT) 500 mcg/2 ml nebulizer suspension  500 mcg Nebulization BID RT And  
 arformoteroL (BROVANA) neb solution 15 mcg  15 mcg Nebulization BID RT And  
 ipratropium (ATROVENT) 0.02 % nebulizer solution 0.5 mg  0.5 mg Nebulization Q6H PRN Objective:  
  
Physical Exam: 
Visit Vitals /79 Pulse (!) 102 Temp 97.9 °F (36.6 °C) (Oral) Resp 18 Ht 5' 6\" (1.676 m) Wt 152 lb (68.9 kg) SpO2 91% BMI 24.53 kg/m² General Appearance:  Well developed, well nourished,alert and oriented x 3, and individual in no acute distress. Ears/Nose/Mouth/Throat:   Hearing grossly normal. 
  
    Neck: Supple. Chest:   Lungs clear to auscultation bilaterally. Cardiovascular:  Regular rate and rhythm, S1, S2 normal, no murmur. Abdomen:   Soft, non-tender, bowel sounds are active. Extremities: No edema bilaterally. Skin: Warm and dry. Data Review:  
Labs: Recent Results (from the past 24 hour(s)) GLUCOSE, POC Collection Time: 11/08/20  9:12 PM  
Result Value Ref Range Glucose (POC) 212 (H) 65 - 100 mg/dL Performed by Marielena Mariano (PCT) CBC W/O DIFF Collection Time: 11/09/20  5:11 AM  
Result Value Ref Range WBC 17.5 (H) 4.1 - 11.1 K/uL  
 RBC 2.93 (L) 4.10 - 5.70 M/uL  
 HGB 10.2 (L) 12.1 - 17.0 g/dL HCT 29.9 (L) 36.6 - 50.3 % .0 (H) 80.0 - 99.0 FL  
 MCH 34.8 (H) 26.0 - 34.0 PG  
 MCHC 34.1 30.0 - 36.5 g/dL  
 RDW 18.5 (H) 11.5 - 14.5 % PLATELET 621 240 - 926 K/uL MPV 11.4 8.9 - 12.9 FL  
 NRBC 2.9 (H) 0  WBC ABSOLUTE NRBC 0.50 (H) 0.00 - 0.01 K/uL GLUCOSE, POC Collection Time: 11/09/20  8:29 AM  
Result Value Ref Range Glucose (POC) 391 (H) 65 - 100 mg/dL Performed by Angelito Dao CBC WITH AUTOMATED DIFF Collection Time: 11/09/20 12:28 PM  
Result Value Ref Range WBC 18.3 (H) 4.1 - 11.1 K/uL  
 RBC 2.95 (L) 4.10 - 5.70 M/uL  
 HGB 10.2 (L) 12.1 - 17.0 g/dL HCT 30.3 (L) 36.6 - 50.3 % .7 (H) 80.0 - 99.0 FL  
 MCH 34.6 (H) 26.0 - 34.0 PG  
 MCHC 33.7 30.0 - 36.5 g/dL  
 RDW 18.3 (H) 11.5 - 14.5 % PLATELET 578 442 - 698 K/uL MPV 10.9 8.9 - 12.9 FL  
 NRBC 3.7 (H) 0  WBC ABSOLUTE NRBC 0.68 (H) 0.00 - 0.01 K/uL NEUTROPHILS 89 (H) 32 - 75 % LYMPHOCYTES 2 (L) 12 - 49 % MONOCYTES 4 (L) 5 - 13 % EOSINOPHILS 0 0 - 7 % BASOPHILS 0 0 - 1 % IMMATURE GRANULOCYTES 5 (H) 0.0 - 0.5 % ABS. NEUTROPHILS 16.3 (H) 1.8 - 8.0 K/UL  
 ABS. LYMPHOCYTES 0.4 (L) 0.8 - 3.5 K/UL  
 ABS. MONOCYTES 0.7 0.0 - 1.0 K/UL  
 ABS. EOSINOPHILS 0.0 0.0 - 0.4 K/UL  
 ABS. BASOPHILS 0.0 0.0 - 0.1 K/UL  
 ABS. IMM. GRANS. 0.9 (H) 0.00 - 0.04 K/UL  
 DF SMEAR SCANNED    
 RBC COMMENTS ANISOCYTOSIS 1+ 
    
 RBC COMMENTS COLE CELLS 
PRESENT 
    
RENAL FUNCTION PANEL Collection Time: 11/09/20 12:28 PM  
Result Value Ref Range  Sodium 120 (L) 136 - 145 mmol/L  
 Potassium 4.5 3.5 - 5.1 mmol/L Chloride 85 (L) 97 - 108 mmol/L  
 CO2 20 (L) 21 - 32 mmol/L Anion gap 15 5 - 15 mmol/L Glucose 366 (H) 65 - 100 mg/dL  (H) 6 - 20 MG/DL Creatinine 5.54 (H) 0.70 - 1.30 MG/DL  
 BUN/Creatinine ratio 19 12 - 20 GFR est AA 13 (L) >60 ml/min/1.73m2 GFR est non-AA 11 (L) >60 ml/min/1.73m2 Calcium 7.5 (L) 8.5 - 10.1 MG/DL Phosphorus 7.7 (H) 2.6 - 4.7 MG/DL Albumin 2.8 (L) 3.5 - 5.0 g/dL Telemetry: AFIB Assessment:  
 
Hospital Problems  Date Reviewed: 1/25/2018 Codes Class Noted POA A-fib [facility]) ICD-10-CM: I48.91 
ICD-9-CM: 427.31  11/5/2020 Unknown Hypoglycemia ICD-10-CM: E16.2 ICD-9-CM: 251.2  11/5/2020 Unknown Acute dyspnea ICD-10-CM: R06.00 
ICD-9-CM: 786.09  11/5/2020 Plan:  
 
Continue cardizem/coreg for rate control. Pau Damon HIRA/DCC if remains in afib,  when medically stable. Anticoagulation.  
 
Scott Velasquez MD

## 2020-11-09 NOTE — PROGRESS NOTES
[facility] Consult Service Note Pulmonary, Critical Care, and Sleep Medicine Name: Julian Shell. MRN: [id number] : 1965 Hospital: [facility] Date: 2020  Admission date: 2020 Hospital Day: 5 Subjective/Interval History:  
Seen earlier today on rounds. Pt is unstable and acutely ill. Medical records and data reviewed. No acute events overnight No acute complaints IMPRESSION:  
1. Acute chronic respiratory failure with Hypoxia 2. Recurrent pleural effusions; chronic right loculated with compression atelectasis, RLL collapse, consolidation- was told fluid was too thick and he was not good surgical candidate for decortication; has had pigtail at [facility] and thoracenteses in the past; sees my partner Dr. Miya Doshi 3. Chronic Obstructive Pulmonary Disease with emphysema, chronic bronchitis; poor airway clearance 4. ESRD on HD- [facility]  
5. IDDM type II with renal manifestation 6. Hypertension, benign essential 
7. Tobacco Abuse 8. ETOH abuse 9. Constipation 10. Hep C, followed by hepatology at [facility] 11. Diabetes. 12. History of splenectomy and partial pancreatectomy. 15. H/o PVD; h/o LUE thrombectomy 14. Prognosis guarded RECOMMENDATIONS/PLAN:  
1. Sputum for culture normal tana 2. Limited options for effusions 3. Agree with Empiric IV antibiotics 4. Taper steroids 5. Supplemental O2 to keep sats > 93% 6. Aspiration precautions 7. Labs to follow electrolytes, renal function and and blood counts 8. Glucose monitoring and SSI 9. Bronchial hygiene with respiratory therapy techniques, bronchodilators 10. Eventually maintenane meds need to be switched to nebulized equivalents for better delivery. 11. DVT prophylaxis 12. Smoking cessation counseling done 13. Need to follow with us as out pt 14. Will follow prn  
 
 
  
[x] High complexity decision making was performed [x] See my orders for details Current Facility-Administered Medications Medication  EPO- HOLD dose tonight  PHENobarbital (LUMINAL) injection 65 mg  
 methylPREDNISolone (PF) (SOLU-MEDROL) injection 60 mg  
 dilTIAZem ER (CARDIZEM CD) capsule 180 mg  
 nystatin (MYCOSTATIN) 100,000 unit/mL oral suspension 500,000 Units  guaiFENesin-dextromethorphan (ROBITUSSIN DM) 100-10 mg/5 mL syrup 10 mL  nicotine (NICODERM CQ) 14 mg/24 hr patch 1 Patch  
 senna-docusate (PERICOLACE) 8.6-50 mg per tablet 1 Tab  balsam peru-castor oiL (VENELEX) ointment  cefepime (MAXIPIME) 1 g in 0.9% sodium chloride (MBP/ADV) 50 mL  epoetin ginger-epbx (RETACRIT) injection 4,000 Units  guaiFENesin ER (MUCINEX) tablet 600 mg  hydrALAZINE (APRESOLINE) 20 mg/mL injection 10 mg  [Held by provider] dilTIAZem (CARDIZEM) 100 mg in dextrose 5% (MBP/ADV) 100 mL infusion  sodium chloride (NS) flush 5-10 mL  acetaminophen (TYLENOL) tablet 650 mg  
 thiamine mononitrate (B-1) tablet 542 mg  
 folic acid (FOLVITE) tablet 1 mg  therapeutic multivitamin (THERAGRAN) tablet 1 Tab  insulin lispro (HUMALOG) injection  glucose chewable tablet 16 g  
 dextrose (D50W) injection syrg 12.5-25 g  
 glucagon (GLUCAGEN) injection 1 mg  [Held by provider] carvediloL (COREG) tablet 12.5 mg  
 pantoprazole (PROTONIX) tablet 40 mg  
 sevelamer carbonate (RENVELA) tab 800 mg  
 gabapentin (NEURONTIN) capsule 300 mg  
 heparin (porcine) injection 5,000 Units  albuterol-ipratropium (DUO-NEB) 2.5 MG-0.5 MG/3 ML  
 budesonide (PULMICORT) 500 mcg/2 ml nebulizer suspension And  
 arformoteroL (BROVANA) neb solution 15 mcg And  ipratropium (ATROVENT) 0.02 % nebulizer solution 0.5 mg  
 oxyCODONE IR (ROXICODONE) tablet 5 mg ROS:A comprehensive review of systems was negative except for that written in the HPI. Objective:  
 
Vital Signs: Telemetry:     Intake/Output:  
Visit Vitals /66 Pulse 90 Temp 97.6 °F (36.4 °C) Resp 18 Ht 5' 6\" (1.676 m) Wt 68.9 kg (152 lb) SpO2 92% BMI 24.53 kg/m² Temp (24hrs), Av.7 °F (36.5 °C), Min:97.4 °F (36.3 °C), Max:98 °F (36.7 °C) O2 Device: Nasal cannula O2 Flow Rate (L/min): 2 l/min Wt Readings from Last 4 Encounters:  
20 68.9 kg (152 lb)  
20 62.6 kg (138 lb)  
20 63.5 kg (139 lb 15.9 oz) 19 63.5 kg (140 lb) Intake/Output Summary (Last 24 hours) at 2020 0920 Last data filed at 2020 1830 Gross per 24 hour Intake 480 ml Output  Net 480 ml Last shift:      No intake/output data recorded. Last 3 shifts:  1901 -  0700 In: 720 [P.O.:720] Out: - Physical Exam:  
General:  male; in no respiratory distress and acyanotic, alert, oriented times 3, cooperative and moderately ill;  NC; 
HEENT: NCAT, no dentition, lips and mucosa dry Eyes: anicteric; conjunctiva clear Neck: no nodes, no cuff leak, no accessory MM use. Chest: no deformity,  
Cardiac: regular rate, rhythm; no murmur Lungs: distant breath sounds; few wheezes, no rales, no rhonchi Abd: soft, NT, hypoactive BS, Ext: no edema; no joint swelling; No clubbing : NO valdivia, Neuro: fluent, poor recal, sedated, follows commands Psych- no agitation, oriented to person;  
Skin: warm, dry, no cyanosis; scattered ecchymoses Pulses: 1-2+ Bilateral pedal, radial 
Capillary: brisk; pale Labs: 
 
Recent Labs 20 
9317 20 
0355 WBC 17.5* 9.5 HGB 10.2* 9.7*  257 Recent Labs 20 
0355 * K 4.5  
CL 94* CO2 28  
* BUN 42* CREA 3.02* CA 8.1*  
PHOS 4.9* No results for input(s): PH, PCO2, PO2, HCO3, FIO2 in the last 72 hours. No results for input(s): CPK, CKNDX, TROIQ in the last 72 hours. No lab exists for component: CPKMB No results found for: BNPP, BNP Lab Results Component Value Date/Time Culture result: LIGHT GRAM NEGATIVE RODS (A) 11/07/2020 03:55 AM  
 Culture result: LIGHT NORMAL RESPIRATORY ALESSIA 11/07/2020 03:55 AM  
 Culture result: HEAVY MIXED SKIN ALESSIA ISOLATED 11/05/2020 11:26 PM  
 
Lab Results Component Value Date/Time TSH 1.67 11/06/2020 03:39 AM  
 
 
Imaging: CXR Results  (Last 48 hours) None Results from Hospital Encounter encounter on 11/05/20 CT CHEST WO CONT Narrative EXAM:  CT CHEST WO CONT INDICATION:  loculated effusion seen on chest xray COMPARISON: Earlier same day and 5/19/2020. Vinod Hussein TECHNIQUE: Unenhanced multislice helical CT was performed from the thoracic 
inlet to the adrenal glands without intravenous contrast administration. Contiguous 5 mm axial images were reconstructed and lung and soft tissue windows 
were generated. Coronal and sagittal reformations were generated. CT dose 
reduction was achieved through use of a standardized protocol tailored for this 
examination and automatic exposure control for dose modulation. Vinod Hussein FINDINGS: 
CHEST: 
Chest wall/thoracic inlet: Within normal limits. Thyroid: 5 mm right thyroid nodule unchanged. Mediastinum/zuri: 18 mm right lower paratracheal node unchanged. Heart/vessels: Aorta is normal in caliber but atherosclerotic. Extensive 
coronary artery calcifications. Heart is not enlarged. Vinod Hussein Lungs/Pleura: There is debris within the right lower lobe bronchi with bronchial 
wall thickening. Partial collapse of the left lower lobe and near complete 
collapse of the right lower lobe. Patchy airspace disease within the right upper 
lobe posteriorly. Loculated right pleural effusion again noted minimally 
smaller. Left pleural effusion not significant changed. Vinod Hussein ABDOMEN: 
Incidentally imaged portions of the abdomen appear unremarkable. . 
MSK: Within normal limits. .  
 Impression IMPRESSION:   
1. Loculated right pleural effusion minimally smaller 2. Left pleural effusion unchanged 3. Near complete collapse of the right lower lobe and partial collapse of the 
left lower lobe with debris within the right lower lobe bronchi 4. Bronchial wall thickening in the right middle lobe as well as airspace 
disease posteriorly right upper lobe likely infectious or inflammatory 5. 5 cm nodule right lobe of the thyroid unchanged. 6. Unchanged mediastinal adenopathy This care involved high complexity medical decision making: I personally: · Reviewed the flowsheet and previous days notes · Reviewed and summarized records or history from previous days note or discussions with staff, family · High Risk Drug therapy requiring intensive monitoring for toxicity: eg steroids, pressors, antibiotics · Reviewed and/or ordered Clinical lab tests · Reviewed images and/or ordered Radiology tests · Reviewed the patients ECG / Telemetry · discussed my assessment/management with : Nursing, for coordination of care Katarina Cisneros MD

## 2020-11-09 NOTE — PROGRESS NOTES
Hospitalist Progress Note NAME: Henny Pickens :  1965 MRN:  [id number] Assessment / Plan: 
DM 2 uncontrolled Severe symptomatic hypoglycemia resolved Off of D10 drip HgA1C 5.8 Diabetic diet  
  
Suspected sepsis ? CAP Was on broad-spectrum antibiotic, Has chronic wound on abdomen from hernia repair, wound culture with normal tana Sputum culture growing Pseudomonas aeruginosa,  
Continue cefepime, will cover for CAP for total 7 days WBC has increased today, secondary to steroids?,  Monitor for now 
 
  
Ventral hernia Mesh No fluid collection  on CT Cont wound care F/u wound cx Iv Abx 
  
A. fib with RVR, currently rate controlled  in ER on admit Cont cardizem PO per cards s/p cardizem drip Hold AC as hx of GI bleed in past 
Cards on case f/u recomnds Coreg bid if BP tolerates Echo -EF normal, dilated LA, PASP 56 Tsh/Ft4 ok Acute on chronic respiratory failure with hypoxia, currently requiring 2 L Recurrent Pleural effusion  
RLL collapse COPD managed by Dr. Kendall Rivera Appreciate guidance from Dr. Hendrick Riedel Nebs prn 
IV steroids, continue current dose as patient significantly wheezing today. ,  Decrease it tomorrow 
  
L Renal mass Urology eval requested, reviewed recommendation 
  
ESRD/HD [facility]  
  
ETOH abuse Tobacco abuse Thiamine/folate/mvt CIWA monitoring Nicotine patch 14mg daily PRN  
  
Debility/Deconditiones state with mod protien malnourished PT/OT eval 
Dietary eval 
  
Hepatitis C infection hx Monitored at [facility] hepatology clinic Hx of PVD s/p thrombectomy Hx of Splenectomy and partial pancreatectomy Chronic constipation - pt takes mag citrate at home PRN Subjective: Chief Complaint / Reason for Physician Visit Currently on 2 L nasal cannula. Wants her home pain medications. Nurse reported left swelling, likely due to IV line, not warm, not red, no significant edema Review of Systems: Symptom Y/N Comments  Symptom Y/N Comments Fever/Chills n   Chest Pain n   
Poor Appetite n   Edema n   
Cough y   Abdominal Pain n   
Sputum y   Joint Pain SOB/PRITCHETT y   Pruritis/Rash Nausea/vomit n   Tolerating PT/OT Diarrhea    Tolerating Diet Constipation    Other Could NOT obtain due to:   
 
Objective: VITALS:  
Last 24hrs VS reviewed since prior progress note. Most recent are: 
Patient Vitals for the past 24 hrs: 
 Temp Pulse Resp BP SpO2  
11/09/20 1035 97.7 °F (36.5 °C) 92 19 98/65 96 % 11/09/20 0944  98  118/74   
11/09/20 0637 97.6 °F (36.4 °C) 90 18 102/66 92 % 11/09/20 0323 97.4 °F (36.3 °C) 78 18 121/75 96 % 11/08/20 2241 97.6 °F (36.4 °C) 84 18 119/75 95 % 11/08/20 2008     94 % 11/08/20 1830 97.5 °F (36.4 °C) 94 18 139/88 94 % 11/08/20 1446 98 °F (36.7 °C) 64 18 126/71 95 % 11/08/20 1227     94 % 11/08/20 1224  66  107/67 92 % Intake/Output Summary (Last 24 hours) at 11/9/2020 1218 Last data filed at 11/9/2020 8030 Gross per 24 hour Intake 720 ml Output  Net 720 ml PHYSICAL EXAM: 
General: WD, WN. Alert, cooperative, no acute distress   
EENT:  EOMI. Anicteric sclerae. MMM Resp:  Coarse sounds bilaterally, expiratory wheezing present CV:  Irregular rhythm, no edema GI:  Soft, Non distended, Non tender.  +Bowel sounds Neurologic:  Alert and oriented X 3, normal speech, Psych:   Good insight. Not anxious nor agitated Skin:  No rashes. No jaundice Reviewed most current lab test results and cultures  YES Reviewed most current radiology test results   YES Review and summation of old records today    NO Reviewed patient's current orders and MAR    YES 
PMH/SH reviewed - no change compared to H&P 
________________________________________________________________________ Care Plan discussed with: 
  Comments Patient x Family RN x Care Manager Consultant Multidiciplinary team rounds were held today with , nursing, pharmacist and clinical coordinator. Patient's plan of care was discussed; medications were reviewed and discharge planning was addressed. ________________________________________________________________________ Total NON critical care TIME:  40  Minutes Total CRITICAL CARE TIME Spent:   Minutes non procedure based Comments >50% of visit spent in counseling and coordination of care    
________________________________________________________________________ Devon Mo MD  
 
Procedures: see electronic medical records for all procedures/Xrays and details which were not copied into this note but were reviewed prior to creation of Plan. LABS: 
I reviewed today's most current labs and imaging studies. Pertinent labs include: 
Recent Labs 11/09/20 
9135 11/07/20 
0355 WBC 17.5* 9.5 HGB 10.2* 9.7* HCT 29.9* 28.6*  
 257 Recent Labs 11/07/20 
0355 * K 4.5  
CL 94* CO2 28  
* BUN 42* CREA 3.02* CA 8.1*  
PHOS 4.9* Signed: Devon Mo MD

## 2020-11-09 NOTE — PROGRESS NOTES
Problem: Mobility Impaired (Adult and Pediatric) Goal: *Acute Goals and Plan of Care (Insert Text) Description: FUNCTIONAL STATUS PRIOR TO ADMISSION: pt does not drive/work, only household amb. Distances using rollator per pt report ; spouse assists with IADL's ; home O2 \"prn\" per pt report HOME SUPPORT PRIOR TO ADMISSION: The patient lived with spouse who assisted with IADL's. Physical Therapy Goals Initiated 11/8/2020 1. Patient will move from supine to sit and sit to supine , scoot up and down, and roll side to side in bed with independence within 7 day(s). 2.  Patient will transfer from bed to chair and chair to bed with modified independence using the least restrictive device within 7 day(s). 3.  Patient will perform sit to stand with modified independence within 7 day(s). 4.  Patient will ambulate with modified independence for 50 feet with the least restrictive device within 7 day(s). 5.  Patient will ascend/descend 4 stairs with supervision/set-up within 7 day(s). Outcome: Not Met PHYSICAL THERAPY TREATMENT Patient: Carmita Grace (56 y.o. male) Date: 11/9/2020 Diagnosis: Hypoglycemia [E16.2] A-fib ([facility] [address] [I48.91] Acute hypoxemic respiratory failure [facility] to [facility] ([facility] [U07.1, J96.01] Precautions: Fall Chart, physical therapy assessment, plan of care and goals were reviewed. ASSESSMENT: Patient continues with skilled PT services and is progressing towards goals, no LOB, min->mod SOB, O2 drops into the upper 80's with activity, did well with transfers and ther-ex, vc's for safety and proper RW use. Current Level of Function Impacting Discharge (mobility/balance): CGA x1 PLAN : Patient continues to benefit from skilled intervention to address the above impairments. Continue treatment per established plan of care 
to address goals.  
 
Recommendation for discharge: (in order for the patient to meet his/her long term goals) Therapy up to 5 days/week in SNF setting vs HHPT pending progress. This discharge recommendation: Has not yet been discussed the attending provider and/or case management IF patient discharges home will need the following DME: rolling walker OBJECTIVE DATA SUMMARY:  
 
Critical Behavior: 
  
Orientation Level: Appropriate for age, Oriented X4 Cognition: Follows commands Safety/Judgement: (P) Awareness of environment, Insight into deficits Functional Mobility Training: 
Bed Mobility: Pt sitting in chair on arrival. 
 
Transfers: 
Sit to Stand: Stand-by assistance Stand to Sit: Stand-by assistance Interventions: Verbal cues Level of Assistance: Stand-by assistance Balance: 
Sitting: Intact; Without support Standing: Intact; With support Standing - Static: Good;Constant support Standing - Dynamic : Good;Constant support Ambulation/Gait Training: 
Distance (ft): 50 Feet (ft) Assistive Device: Gait belt;Walker, rolling Ambulation - Level of Assistance: Contact guard assistance;Assist x1;Additional time Gait Abnormalities: Decreased step clearance Right Side Weight Bearing: Full Left Side Weight Bearing: Full Base of Support: Widened Speed/Felicitas: Pace decreased (<100 feet/min) Step Length: Left shortened;Right shortened Interventions: Tactile cues; Verbal cues Therapeutic Exercises:  
sitting EXERCISE Sets Reps Active Active Assist  
Passive Comments Ankle pumps 1 10 [x] [] [] bilat Heel raises 1 10 [x] [] [] \" Toe tap 1 10 [x] [] [] \" Knee ext 1 10 [x] [] [] \" Hip flex 1 10 [x] [] [] \"  
 
Pain Rating:3 Activity Tolerance: Poor After treatment patient left in no apparent distress: Sitting in chair, Call bell within reach, and Bed / chair alarm activated COMMUNICATION/COLLABORATION:  
The patients plan of care was discussed with: Registered nurse. Mark Akers PTA Time Calculation: 25 mins

## 2020-11-09 NOTE — PROGRESS NOTES
End of Shift Note Bedside shift change report given to Kevyn Jacobson RN (oncoming nurse) by Kaiden Núñez (offgoing nurse). Report included the following information SBAR and Kardex Shift worked:  7p-7a Shift summary and any significant changes:  
  n/a Concerns for physician to address:  n/a  
Zone phone for oncoming shift:     
 
Activity: 
Activity Level: Up with Assistance Number times ambulated in hallways past shift: 0 Number of times OOB to chair past shift: 1 Cardiac:  
Cardiac Monitoring: Yes     
Cardiac Rhythm: Atrial fibrillation Access:  
Current line(s): PIV Genitourinary:  
Urinary status: voiding and oliguric Respiratory:  
O2 Device: Room air Chronic home O2 use?: NO Incentive spirometer at bedside: NO 
  
GI: 
Last Bowel Movement Date: 11/06/20 Current diet:  DIET NUTRITIONAL SUPPLEMENTS Dinner, Breakfast; [place] DIET DIABETIC CONSISTENT CARB Soft Solids; 2 GM NA (House Low NA) Passing flatus: YES Tolerating current diet: YES 
% Diet Eaten: 100 % Pain Management:  
Patient states pain is manageable on current regimen: YES Skin: 
Walter Score: 13 Interventions: float heels, increase time out of bed and foam dressing Patient Safety: 
Fall Score: Total Score: 4 Interventions: bed/chair alarm, assistive device (walker, cane, etc), gripper socks and pt to call before getting OOB High Fall Risk: Yes Length of Stay: 
Expected LOS: 4d 19h Actual LOS: 4 Kaiden Núñez

## 2020-11-09 NOTE — PROCEDURES
[facility] Team [place]  [phone] Vitals   Pre   Post   Assessment   Pre   Post    
Temp  Temp: 97.3 °F (36.3 °C) (11/09/20 1223)  97.9 LOC  A&Ox4 A&Ox4 HR   Pulse (Heart Rate): (!) 39 (11/09/20 1223) 142 Lungs   coarse coarse B/P   BP: 114/69 (11/09/20 1223) 108/70 Cardiac   IRR/TACHY IRR / TACHY Resp   Resp Rate: 19 (11/09/20 1223) 18 Skin   CDI CDI Pain level  Pain Intensity 1: 0 (11/09/20 0700) 0 Edema  Genralized Generalized Orders: Duration:   Start:    1223 End:    1453 Total:   2.5 hours Dialyzer:   Dialyzer/Set Up Inspection: Valente Leija (11/09/20 1223) K Bath:   Dialysate K (mEq/L): 3 (11/09/20 1223) Ca Bath:   Dialysate CA (mEq/L): 3.0 (11/09/20 1223) Na/Bicarb:   Dialysate NA (mEq/L): 140 (11/09/20 1223) Target Fluid Removal:   Goal/Amount of Fluid to Remove (mL): 2500 mL (11/09/20 1223) Access Type & Location:   MANUEL AVF: skin CDI. No s/s of infection. + B/T. No issues with cannulation or hemostasis. Running well at . Pt arrived to HD suite A&Ox4. Consent signed & on file. SBAR received from Primary RN. Pt cannulated with 04K needles per policy & without issue. Labs drawn per request/ order. VSS. Dialysis Tx initiated. Labs Obtained/Reviewed Critical Results Called   Date when labs were drawn- 
Hgb-   
HGB Date Value Ref Range Status 11/09/2020 10.2 (L) 12.1 - 17.0 g/dL Final  
 
K-   
Potassium Date Value Ref Range Status 11/07/2020 4.5 3.5 - 5.1 mmol/L Final  
 
Ca-  
Calcium Date Value Ref Range Status 11/07/2020 8.1 (L) 8.5 - 10.1 MG/DL Final  
 
Bun-  
BUN Date Value Ref Range Status 11/07/2020 42 (H) 6 - 20 MG/DL Final  
 
Creat-  
Creatinine Date Value Ref Range Status 11/07/2020 3.02 (H) 0.70 - 1.30 MG/DL Final  
 
  
Medications/ Blood Products Given Name   Dose   Route and Time None ordered Blood Volume Processed (BVP):    56.2 Net Fluid Removed:  1500mL Comments All dialysis related medications have been reviewed. Assessment performed by RN. Procedure and documentation observed and reviewed by Arjun Gutierrez RN Time Out Done:  4386 Primary Nurse Rpt Pre:  Adair Saenz RN 
Primary Nurse Rpt Post:  Adair Saenz RN 
Pt Education:  procedural 
Care Plan:  On going Tx Summary: 
2951:  MANUEL AVF: skin CDI. No s/s of infection. + B/T. No issues with cannulation or hemostasis. Running well at . Pt arrived to HD suite A&Ox4. Consent signed & on file. SBAR received from Primary RN. Pt cannulated with 78A needles per policy & without issue. Labs drawn per request/ order. VSS. Dialysis Tx initiated. 1230:  Pt resting 1245:  Pt resting 1300:  Pt resting 1315:  Pt had BM, cleaned and bedding changed 1330:  Pt resting 1345:  Pt resting 1400:  Pt resting 1415:  Pt had BM, pt cleaned up 1430:  Pt resting 1453: Tx ended. VSS. All possible blood returned to patient. Hemostasis achieved without issue. Bed locked and in the lowest position, call bell and belongings in reach. SBAR given to Primary, RN. Patient is stable at time of their/ my departure. Admiting Diagnosis:  AFib Pt's previous clinic-   [facility] [place] Consent signed - Informed Consent Verified: Yes (11/09/20 1223) Kaity Consent - signed and on file Hepatitis Status- neg/susc 11/4/20 Machine #- Machine Number: [id number] (11/09/20 1223) Telemetry status- 
Pre-dialysis wt. -

## 2020-11-09 NOTE — PROGRESS NOTES
Reason for Admission:   Hypoglycemia, A-Fib, acute hypoxemic respiratory failure to COVID-19. RUR Score:  23 Moderate risk for readmission PCP: First and Last name:  Akosua Smith NP Name of Practice:  
 Are you a current patient: Yes/No: Yes Approximate date of last visit: October 2020 Can you participate in a virtual visit if needed: No 
 
Do you (patient/family) have any concerns for transition/discharge? None Plan for utilizing home health: {t has had home health in the past. Pt was not receptive to home health at d/c. Current Advanced Directive/Advance Care Plan:  Pt is FULL code status. Pt does not have an ACP on file. CM addressed with pt about AMD. Pt did not want to address AMD at this time. Transition of Care Plan:    
Home vs Home Health 2nd IM Letter Follow-up Appointment CM met with pt at bedside to discuss d/c plan. Pt was alert and oriented. CM verified pt's demographics, insurance and PCP. Pt is a 61 y/o [place] American male admitted to [facility] on 11/5/2020 for Hypoglycemia, A-Fib, acute hypoxemic respiratory failure to COVID-19. Pt's NP is Cecilia Valenzuela. Pt sees NP every month. Pt uses [facility] pharmacy in [place] for Rx. Pt resides with his wife in an one level home with 3 TINY. Pt needs assistance with ADL's and IADL's. Pt does not drive. Pt's wife transports when necessary. Pt has home oxygen, cane and rollator. Pt is on 2lnc of home oxygen with [facility]. Pt has [facility] in the past. No SNF or IPR in the past. Pt is FULL code status. Pt does not have an ACP on file. Pt's wife Yomaira Fuelling will transport at d/c.  
 
CM discussed with pt about SNF. Pt declined SNF. CM discussed with pt about HH. Pt declined HH. Care Management Interventions PCP Verified by CM: Yes(Pt's NP is Marcus Miller. ) Palliative Care Criteria Met (RRAT>21 & CHF Dx)?: No 
Mode of Transport at Discharge:  Other (see comment)(Pt's wife Yomaira Fuelling will transport at d/c. ) Transition of Care Consult (CM Consult): Discharge Planning(Home vs Home with Home Health) Discharge Durable Medical Equipment: No(Pt has home oxygen, cane and rollator.) Physical Therapy Consult: Yes Occupational Therapy Consult: Yes Speech Therapy Consult: No 
Current Support Network: Lives with Spouse, Own Home(Pt resides with his wife in an one level home with 3 TINY.) Confirm Follow Up Transport: Family(Pt does not drive. Pt's wife transports when necessary.) [employer] Information Provided?: No 
Discharge Location Discharge Placement: (Home vs Home with [address]) CM will continue to follow patient for discharge planning needs and arrange for services as deemed necessary. Jarod Cho [address] 
[phone]

## 2020-11-09 NOTE — PROGRESS NOTES
Nephrology Progress Note Hugo Santamaria  
 
www. [facility].[employer]  Phone - [phone] Patient: Carmita Nieves. YOB: 1965     Admit Date: 11/5/2020 Date- 11/9/2020 CC: Follow up for end-stage renal disease IMPRESSION & PLAN:  
? ESRD renal-[place]-Monday Wednesday Friday ? Anemia of CKD ? Noncompliance to fluid restriction ? Hyponatremia ? A. fib with RVR ? Hypoglycemia ? Secondary hyperparathyroidism ? Current smoker PLAN- Hemodialysis today Ultrafiltration tomorrow Fluid restriction 1200 mL/day Follow BMP Low blood pressure will limit the fluid removal on dialysis Continue Renvela Venofer today Continue Epogen Monday Wednesday Friday Subjective: Interval History:  
-Shortness of breath improving Continue to have tachycardia He received ultrafiltration on Saturday. Patient has history of end-stage renal disease on dialysis Monday Wednesday Friday at [facility] unit His last dialysis was on Wednesday He is admitted with hypoglycemia and A. fib with RVR He complained of cough which is chronic due to smoking No c/o chest pain, No c/o nausea or vomiting, No c/o  fever. Complaint of shortness of breath ROS:- As documented above Objective:  
Vitals:  
 11/09/20 0523 11/09/20 1993 11/09/20 0944 11/09/20 1035 BP:  102/66 118/74 98/65 Pulse:  90 98 92 Resp:  18  19 Temp:  97.6 °F (36.4 °C)  97.7 °F (36.5 °C) TempSrc:      
SpO2:  92%  96% Weight: 68.9 kg (152 lb) Height:      
  
11/08 0701 - 11/09 0700 In: 720 [P.O.:720] Out: - Last 3 Recorded Weights in this Encounter 11/07/20 1122 11/08/20 0317 11/09/20 9977 Weight: 67.1 kg (148 lb) 66.2 kg (145 lb 15.1 oz) 68.9 kg (152 lb) Physical exam:  
GEN: NAD NECK- Supple, no mass RESP: mild wheezing coarse b/l CVS: S1,S2  rrrr NEURO: Normal speech, non focal 
Abdo- soft, NT 
EXT: + Edema PSYCH: Normal Mood Right arm avf + 
 
 Chart reviewed. Pertinent Notes reviewed. Data Review : 
Recent Labs 11/07/20 
0355 * K 4.5  
CL 94* CO2 28 BUN 42* CREA 3.02* * CA 8.1*  
PHOS 4.9* Recent Labs 11/09/20 
2804 11/07/20 
0355 WBC 17.5* 9.5 HGB 10.2* 9.7* HCT 29.9* 28.6*  
 257 No results for input(s): FE, TIBC, PSAT, FERR in the last 72 hours. Medication list  reviewed Current Facility-Administered Medications Medication Dose Route Frequency  EPO- HOLD dose tonight  1 Each Other ONCE  
 methylPREDNISolone (PF) (SOLU-MEDROL) injection 60 mg  60 mg IntraVENous Q8H  
 dilTIAZem ER (CARDIZEM CD) capsule 180 mg  180 mg Oral DAILY  nystatin (MYCOSTATIN) 100,000 unit/mL oral suspension 500,000 Units  500,000 Units Oral QID  guaiFENesin-dextromethorphan (ROBITUSSIN DM) 100-10 mg/5 mL syrup 10 mL  10 mL Oral Q6H PRN  
 nicotine (NICODERM CQ) 14 mg/24 hr patch 1 Patch  1 Patch TransDERmal DAILY PRN  
 senna-docusate (PERICOLACE) 8.6-50 mg per tablet 1 Tab  1 Tab Oral DAILY  balsam peru-castor oiL (VENELEX) ointment   Topical BID  cefepime (MAXIPIME) 1 g in 0.9% sodium chloride (MBP/ADV) 50 mL  1 g IntraVENous Q24H  
 epoetin ginger-epbx (RETACRIT) injection 4,000 Units  4,000 Units SubCUTAneous Q MON, WED & FRI  guaiFENesin ER (MUCINEX) tablet 600 mg  600 mg Oral Q12H  hydrALAZINE (APRESOLINE) 20 mg/mL injection 10 mg  10 mg IntraVENous Q4H PRN  
 [Held by provider] dilTIAZem (CARDIZEM) 100 mg in dextrose 5% (MBP/ADV) 100 mL infusion  0-15 mg/hr IntraVENous TITRATE  sodium chloride (NS) flush 5-10 mL  5-10 mL IntraVENous PRN  
 acetaminophen (TYLENOL) tablet 650 mg  650 mg Oral Q6H PRN  thiamine mononitrate (B-1) tablet 100 mg  100 mg Oral DAILY  folic acid (FOLVITE) tablet 1 mg  1 mg Oral DAILY  therapeutic multivitamin (THERAGRAN) tablet 1 Tab  1 Tab Oral DAILY  insulin lispro (HUMALOG) injection   SubCUTAneous AC&HS  
  glucose chewable tablet 16 g  4 Tab Oral PRN  
 dextrose (D50W) injection syrg 12.5-25 g  12.5-25 g IntraVENous PRN  
 glucagon (GLUCAGEN) injection 1 mg  1 mg IntraMUSCular PRN  
 [Held by provider] carvediloL (COREG) tablet 12.5 mg  12.5 mg Oral BID  pantoprazole (PROTONIX) tablet 40 mg  40 mg Oral ACB  sevelamer carbonate (RENVELA) tab 800 mg  800 mg Oral TID  gabapentin (NEURONTIN) capsule 300 mg  300 mg Oral TID PRN  
 heparin (porcine) injection 5,000 Units  5,000 Units SubCUTAneous Q12H  
 albuterol-ipratropium (DUO-NEB) 2.5 MG-0.5 MG/3 ML  3 mL Nebulization Q4H PRN  
 budesonide (PULMICORT) 500 mcg/2 ml nebulizer suspension  500 mcg Nebulization BID RT And  
 arformoteroL (BROVANA) neb solution 15 mcg  15 mcg Nebulization BID RT And  
 ipratropium (ATROVENT) 0.02 % nebulizer solution 0.5 mg  0.5 mg Nebulization Q6H PRN  
 oxyCODONE IR (ROXICODONE) tablet 5 mg  5 mg Oral Q6H PRN [place], [address]  [facility] [employer] [address] [place], [address] [facility] Phone - [phone]               Fax - [phone]

## 2020-11-09 NOTE — PROGRESS NOTES
Ezio 214, RN form Dialysis called to get report on patient for Hemodialysis. 1200 Pt off floor for Dialysis. [phone], RN from Dialysis called and gave report on Mr. Kala Domingo. Pt about to be transported back to [facility].
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
## 2020-11-09 NOTE — PROGRESS NOTES
Pt had a RRT, sudden bleeding started from Abd wound, on his chronic abd wound. As per nurse wound opened up He has abd wound from his ventral hernia repair/Infected mesh. orginally repaired done in 2003, then dealing with infected mesh chronically, lastly explored 8 months ago, says he still has some mesh left. Vitals: pulse 102, /79, on room air Minimal distress, abdomen distended, pressure dressing applied. Lungs with expiratory wheezing General surgeon notified. Occlusive dressing applied CBC/INR/Type and cross Desmopressin IV once Tx to ICU , may need to go to OR 
 
 
 
 
- Picture taken 11/06/2020 Plan Chronic abdominal wound dehiscence/exposed mesh with history of ventral hernia repair 
-Quick clot applied by general surgery, bleeding controlled 
-Due to active bleed, 1 unit of PRBC was ordered earlier , But no need for Blood confusion his bleeding has controlled within appropriate time. 
-Dr. Elier Mckay, quick clot applied, no stitches, bleeding seems to be controlled. We will continue telemetry monitoring, hold off on transfer to the ICU 
-Dressing will be changed tomorrow by general surgery 
-Check H&H every 6 hours, transfuse if hemoglobin less than 7  
-Hold heparin I have provided   50     minutes of critical care time. During this entire length of time I was immediately available to the patient. The reason for providing this level of medical care was due to a critical illness that impaired one or more vital organ systems, such that there was a high probability of imminent or life threatening deterioration in the patient's condition.  This care involved high complexity decision making which includes reviewing the patient's past medical records, current laboratory results, and actual Xray films in order to assess, support vital system function, and to treat this degree of vital organ system failure, and to prevent further life threatening deterioration of the patients condition. I have also discussed this case with the involved general surgery

## 2020-11-10 NOTE — PROGRESS NOTES
TRANSFER - IN REPORT: 
 
Verbal report received from Magdalena Lundy RN on Bishop Gilford.  being received from [facility] Amy Isidro for ordered procedure Report consisted of patients Situation, Background, Assessment and  
Recommendations(SBAR). Information from the following report(s) SBAR and Kardex was reviewed with the receiving nurse. Opportunity for questions and clarification was provided. Assessment completed upon patients arrival to unit and care assumed.

## 2020-11-10 NOTE — PROGRESS NOTES
Cardiology Progress Note 11/10/2020 3:44 PM 
 
Admit Date: 11/5/2020 Admit Diagnosis: Hypoglycemia [E16.2]; A-fib ([facility] [I48.91]; Acute hypoxemic respiratory failure due to COVID-19 ([facility] [U07.1, J96.01] Subjective:  
 
Summer Rebollar has no specific complaints. Remains in afib. Visit Vitals /77 Pulse 100 Temp 97.5 °F (36.4 °C) (Oral) Resp 18 Ht 5' 6\" (1.676 m) Wt 161 lb 11.2 oz (73.3 kg) SpO2 (!) 84% BMI 26.10 kg/m² Current Facility-Administered Medications Medication Dose Route Frequency  methylPREDNISolone (PF) (SOLU-MEDROL) injection 40 mg  40 mg IntraVENous Q8H  
 insulin lispro (HUMALOG) injection   SubCUTAneous AC&HS  
 dextrose (D50W) injection syrg 12.5-25 g  12.5-25 g IntraVENous PRN  
 carvediloL (COREG) tablet 6.25 mg  6.25 mg Oral BID WITH MEALS  cefepime (MAXIPIME) 1 g in 0.9% sodium chloride (MBP/ADV) 50 mL MBP  1 g IntraVENous Q24H  
 HYDROcodone-acetaminophen (NORCO)  mg tablet 1 Tab  1 Tab Oral Q6H PRN  peppermint oil   Other PRN  
 0.9% sodium chloride infusion 250 mL  250 mL IntraVENous PRN  
 metroNIDAZOLE (FLAGYL) IVPB premix 500 mg  500 mg IntraVENous Q12H  
 dilTIAZem ER (CARDIZEM CD) capsule 180 mg  180 mg Oral DAILY  nystatin (MYCOSTATIN) 100,000 unit/mL oral suspension 500,000 Units  500,000 Units Oral QID  guaiFENesin-dextromethorphan (ROBITUSSIN DM) 100-10 mg/5 mL syrup 10 mL  10 mL Oral Q6H PRN  
 nicotine (NICODERM CQ) 14 mg/24 hr patch 1 Patch  1 Patch TransDERmal DAILY PRN  
 senna-docusate (PERICOLACE) 8.6-50 mg per tablet 1 Tab  1 Tab Oral DAILY  balsam peru-castor oiL (VENELEX) ointment   Topical BID  epoetin ginger-epbx (RETACRIT) injection 4,000 Units  4,000 Units SubCUTAneous Q MON, WED & FRI  guaiFENesin ER (MUCINEX) tablet 600 mg  600 mg Oral Q12H  hydrALAZINE (APRESOLINE) 20 mg/mL injection 10 mg  10 mg IntraVENous Q4H PRN  
  [Held by provider] dilTIAZem (CARDIZEM) 100 mg in dextrose 5% (MBP/ADV) 100 mL infusion  0-15 mg/hr IntraVENous TITRATE  sodium chloride (NS) flush 5-10 mL  5-10 mL IntraVENous PRN  
 acetaminophen (TYLENOL) tablet 650 mg  650 mg Oral Q6H PRN  thiamine mononitrate (B-1) tablet 100 mg  100 mg Oral DAILY  folic acid (FOLVITE) tablet 1 mg  1 mg Oral DAILY  therapeutic multivitamin (THERAGRAN) tablet 1 Tab  1 Tab Oral DAILY  glucose chewable tablet 16 g  4 Tab Oral PRN  
 dextrose (D50W) injection syrg 12.5-25 g  12.5-25 g IntraVENous PRN  
 glucagon (GLUCAGEN) injection 1 mg  1 mg IntraMUSCular PRN  pantoprazole (PROTONIX) tablet 40 mg  40 mg Oral ACB  sevelamer carbonate (RENVELA) tab 800 mg  800 mg Oral TID  gabapentin (NEURONTIN) capsule 300 mg  300 mg Oral TID PRN  
 [Held by provider] heparin (porcine) injection 5,000 Units  5,000 Units SubCUTAneous Q12H  
 albuterol-ipratropium (DUO-NEB) 2.5 MG-0.5 MG/3 ML  3 mL Nebulization Q4H PRN  
 budesonide (PULMICORT) 500 mcg/2 ml nebulizer suspension  500 mcg Nebulization BID RT And  
 arformoteroL (BROVANA) neb solution 15 mcg  15 mcg Nebulization BID RT And  
 ipratropium (ATROVENT) 0.02 % nebulizer solution 0.5 mg  0.5 mg Nebulization Q6H PRN Objective:  
  
Physical Exam: 
Visit Vitals /77 Pulse 100 Temp 97.5 °F (36.4 °C) (Oral) Resp 18 Ht 5' 6\" (1.676 m) Wt 161 lb 11.2 oz (73.3 kg) SpO2 (!) 84% BMI 26.10 kg/m² General Appearance:  Well developed, well nourished,alert and oriented x 3, and individual in no acute distress. Ears/Nose/Mouth/Throat:   Hearing grossly normal. 
  
    Neck: Supple. Chest:   Lungs clear to auscultation bilaterally. Cardiovascular:  Regular rate and rhythm, S1, S2 normal, no murmur. Abdomen:   Soft, non-tender, bowel sounds are active. Extremities: No edema bilaterally. Skin: Warm and dry. Data Review:  
Labs: Recent Results (from the past 24 hour(s)) SAMPLES BEING HELD Collection Time: 11/09/20  5:45 PM  
Result Value Ref Range SAMPLES BEING HELD  SST   
 COMMENT Add-on orders for these samples will be processed based on acceptable specimen integrity and analyte stability, which may vary by analyte. PROTHROMBIN TIME + INR Collection Time: 11/09/20  5:46 PM  
Result Value Ref Range INR 1.1 0.9 - 1.1 Prothrombin time 11.6 (H) 9.0 - 11.1 sec CBC WITH AUTOMATED DIFF Collection Time: 11/09/20  5:46 PM  
Result Value Ref Range WBC 19.3 (H) 4.1 - 11.1 K/uL  
 RBC 2.96 (L) 4.10 - 5.70 M/uL  
 HGB 10.5 (L) 12.1 - 17.0 g/dL HCT 29.4 (L) 36.6 - 50.3 % MCV 99.3 (H) 80.0 - 99.0 FL  
 MCH 35.5 (H) 26.0 - 34.0 PG  
 MCHC 35.7 30.0 - 36.5 g/dL  
 RDW 17.9 (H) 11.5 - 14.5 % PLATELET 079 907 - 474 K/uL MPV 10.9 8.9 - 12.9 FL  
 NRBC 5.1 (H) 0  WBC ABSOLUTE NRBC 0.98 (H) 0.00 - 0.01 K/uL NEUTROPHILS 91 (H) 32 - 75 % BAND NEUTROPHILS 2 % LYMPHOCYTES 3 (L) 12 - 49 % MONOCYTES 3 (L) 5 - 13 % EOSINOPHILS 0 0 - 7 % BASOPHILS 0 0 - 1 % METAMYELOCYTES 1 % IMMATURE GRANULOCYTES 0 0.0 - 0.5 % ABS. NEUTROPHILS 17.9 (H) 1.8 - 8.0 K/UL  
 ABS. LYMPHOCYTES 0.6 (L) 0.8 - 3.5 K/UL  
 ABS. MONOCYTES 0.6 0.0 - 1.0 K/UL  
 ABS. EOSINOPHILS 0.0 0.0 - 0.4 K/UL  
 ABS. BASOPHILS 0.0 0.0 - 0.1 K/UL  
 ABS. IMM. GRANS. 0.0 0.00 - 0.04 K/UL  
 DF MANUAL    
 RBC COMMENTS ANISOCYTOSIS 1+ 
    
 RBC COMMENTS TARGET CELLS 
PRESENT 
    
 RBC COMMENTS COLE CELLS 1+ TYPE & SCREEN Collection Time: 11/09/20  5:46 PM  
Result Value Ref Range Crossmatch Expiration 11/12/2020,2359 ABO/Rh(D) O POSITIVE Antibody screen NEG   
GLUCOSE, POC Collection Time: 11/09/20  7:01 PM  
Result Value Ref Range Glucose (POC) 283 (H) 65 - 100 mg/dL Performed by Angelito Dao GLUCOSE, POC Collection Time: 11/09/20  8:42 PM  
Result Value Ref Range Glucose (POC) 339 (H) 65 - 100 mg/dL Performed by Radha Garcia (PCT) METABOLIC PANEL, BASIC Collection Time: 11/10/20 12:13 AM  
Result Value Ref Range Sodium 125 (L) 136 - 145 mmol/L Potassium 4.8 3.5 - 5.1 mmol/L Chloride 89 (L) 97 - 108 mmol/L  
 CO2 26 21 - 32 mmol/L Anion gap 10 5 - 15 mmol/L Glucose 394 (H) 65 - 100 mg/dL BUN 65 (H) 6 - 20 MG/DL Creatinine 4.27 (H) 0.70 - 1.30 MG/DL  
 BUN/Creatinine ratio 15 12 - 20 GFR est AA 17 (L) >60 ml/min/1.73m2 GFR est non-AA 14 (L) >60 ml/min/1.73m2 Calcium 7.9 (L) 8.5 - 10.1 MG/DL  
CBC WITH AUTOMATED DIFF Collection Time: 11/10/20 12:13 AM  
Result Value Ref Range WBC 20.3 (H) 4.1 - 11.1 K/uL  
 RBC 2.78 (L) 4.10 - 5.70 M/uL HGB 9.8 (L) 12.1 - 17.0 g/dL HCT 28.0 (L) 36.6 - 50.3 % .7 (H) 80.0 - 99.0 FL  
 MCH 35.3 (H) 26.0 - 34.0 PG  
 MCHC 35.0 30.0 - 36.5 g/dL  
 RDW 18.5 (H) 11.5 - 14.5 % PLATELET 177 004 - 030 K/uL MPV 11.1 8.9 - 12.9 FL  
 NRBC 5.7 (H) 0  WBC ABSOLUTE NRBC 1.15 (H) 0.00 - 0.01 K/uL NEUTROPHILS 92 (H) 32 - 75 % BAND NEUTROPHILS 3 % LYMPHOCYTES 1 (L) 12 - 49 % MONOCYTES 3 (L) 5 - 13 % EOSINOPHILS 0 0 - 7 % BASOPHILS 0 0 - 1 % MYELOCYTES 1 % IMMATURE GRANULOCYTES 0 0.0 - 0.5 % ABS. NEUTROPHILS 19.3 (H) 1.8 - 8.0 K/UL  
 ABS. LYMPHOCYTES 0.2 (L) 0.8 - 3.5 K/UL  
 ABS. MONOCYTES 0.6 0.0 - 1.0 K/UL  
 ABS. EOSINOPHILS 0.0 0.0 - 0.4 K/UL  
 ABS. BASOPHILS 0.0 0.0 - 0.1 K/UL  
 ABS. IMM. GRANS. 0.0 0.00 - 0.04 K/UL  
 DF MANUAL    
 RBC COMMENTS ANISOCYTOSIS 1+ 
    
 RBC COMMENTS POLYCHROMASIA 1+ 
    
 RBC COMMENTS SCHISTOCYTES 1+ HGB & HCT Collection Time: 11/10/20  6:03 AM  
Result Value Ref Range HGB 9.8 (L) 12.1 - 17.0 g/dL HCT 28.5 (L) 36.6 - 50.3 % GLUCOSE, POC Collection Time: 11/10/20  7:48 AM  
Result Value Ref Range Glucose (POC) 383 (H) 65 - 100 mg/dL Performed by Susana Crowe (CON) HGB & HCT Collection Time: 11/10/20 12:09 PM  
Result Value Ref Range HGB 10.9 (L) 12.1 - 17.0 g/dL HCT 31.8 (L) 36.6 - 50.3 % GLUCOSE, POC Collection Time: 11/10/20  1:06 PM  
Result Value Ref Range Glucose (POC) 360 (H) 65 - 100 mg/dL Performed by Rachael Galicia Telemetry: AFIB Assessment:  
 
Hospital Problems  Date Reviewed: 1/25/2018 Codes Class Noted POA A-fib [facility]) ICD-10-CM: I48.91 
ICD-9-CM: 427.31  11/5/2020 Unknown Hypoglycemia ICD-10-CM: E16.2 ICD-9-CM: 251.2  11/5/2020 Unknown Acute dyspnea ICD-10-CM: R06.00 
ICD-9-CM: 786.09  11/5/2020 Plan:  
 
Continue coreg, cardizem. No anticoagulation per IM. Would not be a candidate for HIRA/DCC if we can not anticoagulate.  
 
Kuldeep Aguero MD

## 2020-11-10 NOTE — PROGRESS NOTES
Problem: Mobility Impaired (Adult and Pediatric) Goal: *Acute Goals and Plan of Care (Insert Text) Description: FUNCTIONAL STATUS PRIOR TO ADMISSION: pt does not drive/work, only household amb. Distances using rollator per pt report ; spouse assists with IADL's ; home O2 \"prn\" per pt report HOME SUPPORT PRIOR TO ADMISSION: The patient lived with spouse who assisted with IADL's. Physical Therapy Goals Initiated 11/8/2020 1. Patient will move from supine to sit and sit to supine , scoot up and down, and roll side to side in bed with independence within 7 day(s). 2.  Patient will transfer from bed to chair and chair to bed with modified independence using the least restrictive device within 7 day(s). 3.  Patient will perform sit to stand with modified independence within 7 day(s). 4.  Patient will ambulate with modified independence for 50 feet with the least restrictive device within 7 day(s). 5.  Patient will ascend/descend 4 stairs with supervision/set-up within 7 day(s). Outcome: Progressing Towards Goal 
 PHYSICAL THERAPY TREATMENT Patient: Bishop Gilford. (56 y.o. male) Date: 11/10/2020 Diagnosis: Hypoglycemia [E16.2] A-fib ([facility] [I48.91] Acute hypoxemic respiratory failure due to COVID-19 ([facility] [U07.1, J96.01] Precautions: Fall, Bed Alarm Chart, physical therapy assessment, plan of care and goals were reviewed. ASSESSMENT: Patient continues with skilled PT services and is progressing slowly towards goals, no LOB, mod SOB, fatigues quickly, does well with transfers and ther-ex, good motivation, vc's for safety and proper RW use. Current Level of Function Impacting Discharge (mobility/balance): Stand-by assistance x1 PLAN : Patient continues to benefit from skilled intervention to address the above impairments. Continue treatment per established plan of care 
to address goals. Recommendation for discharge: (in order for the patient to meet his/her long term goals) Therapy up to 5 days/week in SNF setting This discharge recommendation: Has not yet been discussed the attending provider and/or case management IF patient discharges home will need the following DME: rolling walker OBJECTIVE DATA SUMMARY:  
 
Critical Behavior: 
Neurologic State: Alert Orientation Level: Oriented X4 Cognition: Follows commands Safety/Judgement: Awareness of environment, Insight into deficits Functional Mobility Training: 
Bed Mobility: Pt sitting in chair on arrival. 
 
Transfers: 
Sit to Stand: Stand-by assistance Stand to Sit: Stand-by assistance Interventions: Verbal cues Level of Assistance: Stand-by assistance Balance: 
Sitting: Intact; Without support Standing: Intact; With support Standing - Static: Good;Constant support Standing - Dynamic : Good;Constant support Ambulation/Gait Training: 
Distance (ft): 100 Feet (ft) Assistive Device: Gait belt;Walker, rolling Ambulation - Level of Assistance: Stand-by assistance Gait Abnormalities: Decreased step clearance Right Side Weight Bearing: Full Left Side Weight Bearing: Full Base of Support: Widened Speed/Felicitas: Pace decreased (<100 feet/min) Step Length: Left shortened;Right shortened Interventions: Verbal cues Therapeutic Exercises:  
sitting EXERCISE Sets Reps Active Active Assist  
Passive Comments Ankle pumps 1 10 [x] [] [] bilat Heel raises 1 10 [x] [] [] \" Toe tap 1 10 [x] [] [] \" Knee ext 1 10 [x] [] [] \" Hip flex 1 10 [x] [] [] \"  
 
Pain Ratin Activity Tolerance: Poor After treatment patient left in no apparent distress: Sitting in chair and Call bell within reach COMMUNICATION/COLLABORATION:  
The patients plan of care was discussed with: Registered nurse. Sergio Bradshaw PTA Time Calculation: 25 mins

## 2020-11-10 NOTE — PROCEDURES
[facility]ialysis [facility]  [phone] Vitals   Pre   Post   Assessment   Pre   Post    
Temp  Temp: 97.8 °F (36.6 °C) (11/10/20 0840)  97.5, oral LOC  A&Ox4 A&Ox4 HR   Pulse (Heart Rate): (!) 103 (11/10/20 0840) 95 Lungs   Diminished B/P   BP: (!) 145/86 (11/10/20 0840) 142/79 Cardiac   Tele, A-Fib Tele, A-Fib Resp   Resp Rate: 22 (11/10/20 0840) 18 Skin   Scattered bruising, Abd wound with CDI dressing; scattered skin tears with CDI dressings; Stage 2 sacrum; R heel scab Scattered bruising, Abd wound with CDI dressing; scattered skin tears with CDI dressings; Stage 2 sacrum; R heel scab Pain level  Pain Intensity 1: 0 (11/09/20 1930) 0 Edema  Abd distention, 2+ LFA - leaking Abd distention, 2+ LFA - leaking Orders: Duration:   Start:    0840 End:    1110 Total:   2.5hr UF Dialyzer:   Dialyzer/Set Up Inspection: Nicky Doshi (11/10/20 0840) K Bath:   Dialysate K (mEq/L): (N/A UF only) (11/10/20 0840) Ca Bath:   Dialysate CA (mEq/L): (N/A UF only) (11/10/20 0840) Na/Bicarb:   Dialysate NA (mEq/L): (N/A UF only) (11/10/20 0840) Target Fluid Removal:   Goal/Amount of Fluid to Remove (mL): 4000 mL (11/10/20 0840) Access Type & Location:   MANUEL AVF: skin CDI. No s/s of infection. + B/T. No issues with cannulation or hemostasis. Running well at . Labs Obtained/Reviewed Critical Results Called   Date when labs were drawn- 
Hgb-   
HGB Date Value Ref Range Status 11/10/2020 9.8 (L) 12.1 - 17.0 g/dL Final  
 
K-   
Potassium Date Value Ref Range Status 11/10/2020 4.8 3.5 - 5.1 mmol/L Final  
 
Ca-  
Calcium Date Value Ref Range Status 11/10/2020 7.9 (L) 8.5 - 10.1 MG/DL Final  
 
Bun-  
BUN Date Value Ref Range Status 11/10/2020 65 (H) 6 - 20 MG/DL Final  
  Comment:  
  INVESTIGATED PER DELTA CHECK PROTOCOL Creat-  
Creatinine Date Value Ref Range Status 11/10/2020 4.27 (H) 0.70 - 1.30 MG/DL Final  
 
  
 Medications/ Blood Products Given Name   Dose   Route and Time None ordered Blood Volume Processed (BVP):    0, UF only Net Fluid Removed:  4000ml Comments Time Out Done: 6817 Primary Nurse Rpt Pre: Miguel Bhatt RN 
Primary Nurse Rpt Post: Miguel Bhatt, THAD 
Pt Education: 
Care Plan: UF today; HD tomorrow Tx Summary: 
 Pt arrived to HD suite A&Ox4. Consent signed & on file. SBAR received from Primary RN. 0840: Pt cannulated with 85W needles per policy & without issue. VSS. Dialysis Tx initiated. * No issues during treatment* 
1110: Tx ended. VSS. All possible blood returned to patient. Hemostasis achieved without issue. Bed locked and in the lowest position, call bell and belongings in reach. SBAR given to Primary, RN. Patient is stable at time of their departure. All Dialysis related medications have been reviewed. Admiting Diagnosis: A-Fib/ Acute Resp Failure Pt's previous clinic- [facility] Consent signed - Informed Consent Verified: Yes (11/10/20 0840) Kaity Consent -  
Hepatitis Status- Immune; 1/14/2020 Neg Ag; 74 Ab Machine #- Machine Number: [id number] (11/10/20 0840) Telemetry status- Tele, A-Fib Pre-dialysis wt. -

## 2020-11-10 NOTE — PROGRESS NOTES
Problem: Chronic Obstructive Pulmonary Disease (COPD) Goal: *Oxygen saturation during activity within specified parameters Outcome: Progressing Towards Goal 
Goal: *Able to remain out of bed as prescribed Outcome: Progressing Towards Goal 
Goal: *Absence of hypoxia Outcome: Progressing Towards Goal 
Goal: *Optimize nutritional status Outcome: Progressing Towards Goal 
  
Problem: Falls - Risk of 
Goal: *Absence of Falls Description: Document Truivan Dawn Fall Risk and appropriate interventions in the flowsheet. Outcome: Progressing Towards Goal 
Note: Fall Risk Interventions: 
Mobility Interventions: Assess mobility with egress test, Bed/chair exit alarm Medication Interventions: Bed/chair exit alarm, Patient to call before getting OOB, Teach patient to arise slowly Elimination Interventions: Call light in reach, Bed/chair exit alarm History of Falls Interventions: Bed/chair exit alarm Problem: Pressure Injury - Risk of 
Goal: *Prevention of pressure injury Description: Document Walter Scale and appropriate interventions in the flowsheet. Outcome: Progressing Towards Goal 
Note: Pressure Injury Interventions: 
Sensory Interventions: Assess changes in LOC Moisture Interventions: Absorbent underpads, Apply protective barrier, creams and emollients Activity Interventions: Increase time out of bed Mobility Interventions: Assess need for specialty bed Nutrition Interventions: Document food/fluid/supplement intake, Offer support with meals,snacks and hydration Friction and Shear Interventions: Feet elevated on foot rest

## 2020-11-10 NOTE — PROGRESS NOTES
HD TRANSFER - OUT REPORT: 
 
Verbal report given to Mary Ann Baker RN on Kody Gant. being transferred to [facility] for routine progression of care Report consisted of patient's Situation, Background, Assessment and  
Recommendations(SBAR). Information from the following report(s) SBAR, Procedure Summary, Intake/Output and Recent Results was reviewed with the receiving nurse. Method:  $$ Method: Hemodialysis (11/10/20 0840) Fluid Removed  NET Fluid Removed (mL): 4000 ml (11/10/20 1110) Patient response to treatment:  Stable End Time  Hemodialysis End Time: 1110 (11/10/20 1110) If not documented, dialysis nurse to update post-dialysis row in HD/Filtration flowsheet Medications /Volume expansion agents or Fluid boluses administered during treatment? no 
 
Post-dialysis medication administration due?  yes Remind nurse to administer post-HD medication upon return to unit. Fistula hemostasis? yes Line heparinization? no 
 
Lines: MANUEL AVF Opportunity for questions and clarification was provided. Patient transported with: O2 @ 2 liters Tech

## 2020-11-10 NOTE — PROGRESS NOTES
Physician Progress Note David Laurent 
[facility] #:                  [id number] :                       1965 ADMIT DATE:       2020 8:53 AM 
DISCH DATE: 
RESPONDING 
PROVIDER #:        Jennifer Chilel MD 
 
 
 
 
QUERY TEXT: 
 
Patient admitted with sepsis. If possible, please document in progress notes and discharge summary if you are evaluating and /or treating any of the following: The medical record reflects the following: 
Risk Factors: difficulty swallowing, esrd Clinical Indicators:  RD:  malnutrition Status: Moderate malnutrition, 
Context:  Chronic illness Findings of the 6 clinical characteristics of malnutrition: Energy Intake: Body Fat Loss:  1 - Mild body fat loss, Muscle Mass Loss:  7 - Severe muscle mass loss, Clavicles (pectoralis &deltoids), Scapula (trapezius), Thigh (quadraceps) Fluid Accumulation:  1 - Mild Treatment: RD cx, document % meals and supplements Maria E Langley RN/CDI  Options provided: -- Protein calorie malnutrition moderate 
-- Other - I will add my own diagnosis -- Disagree - Not applicable / Not valid -- Disagree - Clinically unable to determine / Unknown 
-- Refer to Clinical Documentation Reviewer PROVIDER RESPONSE TEXT: 
 
This patient has moderate protein calorie malnutrition.  
 
Query created by: Autumn Keene on 2020 11:13 AM 
 
 
Electronically signed by:  Jennifer Chilel MD 2020 9:22 PM

## 2020-11-10 NOTE — PROGRESS NOTES
Hospitalist Progress Note NAME: Elvis Cole. :  1965 MRN:  [id number] Assessment / Plan: 
DM 2 uncontrolled Severe symptomatic hypoglycemia resolved Off of D10 drip HgA1C 5.8 Diabetic diet Elevated BG from steroids JERRY adjusted 
  
Suspected sepsis ? CAP 
on broad-spectrum antibiotic, 
chronic wound on abdomen from hernia repair, wound culture with normal tana Sputum culture growing Pseudomonas aeruginosa,  
Continue cefepime, cover for CAP for total 7 days WBC elevated, secondary to steroids?,  Monitor   
 
  
Ventral hernia Mesh with bleeding Bleeding stopped No fluid collection  on CT Cont wound care per surgery F/u wound cx  Normal tana Iv Abx cefepime/flagyl 
  
A. fib with RVR, currently rate controlled  in ER on admit Cont cardizem PO per cards s/p cardizem drip Hold AC as hx of GI bleed in past 
Cards on case f/u recomnds Coreg bid if BP tolerates Echo -EF normal, dilated LA, PASP 56 Tsh/Ft4 ok Acute on chronic respiratory failure with hypoxia, currently requiring 2 L Recurrent Pleural effusion  
RLL collapse COPD managed by Dr. Candida Wilkins Appreciate guidance from Dr. Ambrosio Stands Nebs prn 
IV steroids  for wheezing  
  
L Renal mass Urology eval requested, reviewed recommendation leison chr no further w/u needed. 
  
ESRD/HD [facility] HD [facility] Hyponatremia- should correct with HD today 
  
ETOH abuse Tobacco abuse Thiamine/folate/mvt CIWA monitoring Nicotine patch 14mg daily PRN  
  
Debility/Deconditiones state with mod protien malnourished PT/OT eval 
Dietary eval 
  
Hepatitis C infection hx Monitored at [facility] hepatology clinic Hx of PVD s/p thrombectomy Hx of Splenectomy and partial pancreatectomy Chronic constipation - pt takes mag citrate at home PRN  
 
DVT PRx SCD AD FC Dispo: TBD Subjective: Chief Complaint / Reason for Physician Visit Had RRT yesterday for bleeding from chr abd wound. For HD today. Still with gogo PRITCHETT. No further bleeding reported at this time. Objective: VITALS:  
Last 24hrs VS reviewed since prior progress note. Most recent are: 
Patient Vitals for the past 24 hrs: 
 Temp Pulse Resp BP SpO2  
11/10/20 0639 97.8 °F (36.6 °C) 98 22 (!) 143/87 94 % 11/10/20 0340 97.7 °F (36.5 °C) 90 20 134/75 92 % 11/09/20 2258 97.6 °F (36.4 °C) 94 18 (!) 124/103 90 % 11/09/20 1933     98 % 11/09/20 1840 97.9 °F (36.6 °C) (!) 101 18 (!) 132/90 97 % 11/09/20 1805  (!) 107  132/88 97 % 11/09/20 1732  (!) 102 18 129/79 91 % 11/09/20 1453 97.9 °F (36.6 °C) (!) 142 18 108/70   
11/09/20 1445  (!) 153 18 99/83   
11/09/20 1430  71 18 124/79   
11/09/20 1415  98 18 108/68   
11/09/20 1400  90 18 110/85   
11/09/20 1345  (!) 145 18 126/72   
11/09/20 1330  81 18 118/74   
11/09/20 1315  90 18 96/73   
11/09/20 1300  97 18 127/78   
11/09/20 1245  (!) 129 18 119/74   
11/09/20 1230  (!) 123 18 119/76   
11/09/20 1223 97.3 °F (36.3 °C) (!) 39 19 114/69   
11/09/20 1035 97.7 °F (36.5 °C) 92 19 98/65 96 % 11/09/20 0944  98  118/74  Intake/Output Summary (Last 24 hours) at 11/10/2020 0825 Last data filed at 11/10/2020 9053 Gross per 24 hour Intake 1493.75 ml Output 1500 ml Net -6.25 ml PHYSICAL EXAM: 
General: Chr ill looking, no acute distress   
EENT:   MMM Resp:  Symmetric expansion, occ expiratory wheezing CV:  Irregular rhythm, no edema GI:  Soft, ChrAbd wound Neurologic:  Alert and  normal speech, Psych:    Not anxious nor agitated Reviewed most current lab test results and cultures  YES Reviewed most current radiology test results   YES Review and summation of old records today    NO Reviewed patient's current orders and MAR    YES 
PMH/SH reviewed - no change compared to H&P Procedures: see electronic medical records for all procedures/Xrays and details which were not copied into this note but were reviewed prior to creation of Plan. LABS: 
I reviewed today's most current labs and imaging studies. Pertinent labs include: 
Recent Labs 11/10/20 
0603 11/10/20 
0013 11/09/20 
1746 11/09/20 
1228 WBC  --  20.3* 19.3* 18.3* HGB 9.8* 9.8* 10.5* 10.2* HCT 28.5* 28.0* 29.4* 30.3* PLT  --  288 304 320 Recent Labs 11/10/20 
0013 11/09/20 
1746 11/09/20 
1228 *  --  120*  
K 4.8  --  4.5  
CL 89*  --  85* CO2 26  --  20* *  --  366* BUN 65*  --  103* CREA 4.27*  --  5.54* CA 7.9*  --  7.5* PHOS  --   --  7.7* ALB  --   --  2.8* INR  --  1.1  --   
 
 
Signed: Carla Schulte MD

## 2020-11-10 NOTE — PROGRESS NOTES
RAPID RESPONSE TEAM- Follow Up Rounded on patient due to recent rapid response for sudden bleed from abdominal wound. Was seen by Nemesio Lin MD for management. Dressing with quick clot c/d/i. Repeat CBC q6hr. Last H&H stable. Spoke with primary RN Daja. No acute concerns at this time. BP stable. Pt has chronic afib. On po cardizem; holding anticoagulation d/t bleed. Please call with any questions or concerns. Joe Mak RN 
RRT Ayla Alatorre Patient Vitals for the past 8 hrs: 
 Temp Pulse Resp BP SpO2  
11/09/20 1840 97.9 °F (36.6 °C) (!) 101 18 (!) 132/90 97 % 11/09/20 1805  (!) 107  132/88 97 % 11/09/20 1732  (!) 102 18 129/79 91 % 11/09/20 1453 97.9 °F (36.6 °C) (!) 142 18 108/70   
11/09/20 1445  (!) 153 18 99/83   
11/09/20 1430  71 18 124/79   
11/09/20 1415  98 18 108/68   
11/09/20 1400  90 18 110/85   
11/09/20 1345  (!) 145 18 126/72 

## 2020-11-10 NOTE — PROGRESS NOTES
Occupational Therapy Patient is currently unavailable for OT treatment due to dialysis. Will defer for now but continue to follow.

## 2020-11-10 NOTE — PROGRESS NOTES
Nephrology Progress Note Hugo Santamaria  
 
[facility]  Phone - [phone] Patient: Reyna Gaines. YOB: 1965     Admit Date: 11/5/2020 Date- 11/10/2020 CC: Follow up for ESRD IMPRESSION & PLAN:  
? ESRD  renal-[place]-Monday Wednesday Friday ? Anemia of CKD ? Noncompliance to fluid restriction ? Hyponatremia ? A. fib with RVR ? Hypoglycemia ? Secondary hyperparathyroidism ? Current smoker PLAN- Seen on dialysis today. He is getting ultrafiltration Goal to remove 3-4 kg Fluid restriction 1200 mL/day Follow BMP Continue Renvela Continue Epogen Monday Wednesday Friday Subjective: Interval History:  
Na low 125- he is not following fluid restrictions He is still requiring oxygen Seen on hd. 
 c/o sob, No c/o nausea or vomiting, No c/o  fever. 
 
 
-Patient has history of end-stage renal disease on dialysis Monday Wednesday Friday at [facility] unit ROS:- As documented above Objective:  
Vitals:  
 11/10/20 0900 11/10/20 0915 11/10/20 0930 11/10/20 0945 BP: (!) 142/78 (!) 143/78 (!) 159/86 131/77 Pulse: (!) 102 96 (!) 104 94 Resp: 20 20 20 20 Temp:      
TempSrc:      
SpO2:      
Weight:      
Height:      
  
11/09 0701 - 11/10 0700 In: 1493.8 [P.O.:810; I.V.:683.8] Out: 1500 Last 3 Recorded Weights in this Encounter 11/08/20 7702 11/09/20 6187 11/10/20 0601 Weight: 66.2 kg (145 lb 15.1 oz) 68.9 kg (152 lb) 73.3 kg (161 lb 11.2 oz) Physical exam:  
GEN: nad NECK- Supple, no mass RESP: mild wheezing coarse b/l CVS: S1,S2, RRR 
NEURO: Normal speech,non focal 
Abdo- soft, NT 
EXT: + Edema PSYCH: Normal Mood Right arm avf + Chart reviewed. Pertinent Notes reviewed. Data Review : 
Recent Labs 11/10/20 
0013 11/09/20 
1228 * 120*  
K 4.8 4.5  
CL 89* 85* CO2 26 20* BUN 65* 103* CREA 4.27* 5.54* * 366* CA 7.9* 7.5* PHOS  --  7.7* Recent Labs 11/10/20 
0603 11/10/20 
0013 11/09/20 
1746 11/09/20 
1228 WBC  --  20.3* 19.3* 18.3* HGB 9.8* 9.8* 10.5* 10.2* HCT 28.5* 28.0* 29.4* 30.3* PLT  --  288 304 320 No results for input(s): FE, TIBC, PSAT, FERR in the last 72 hours. Medication list  reviewed Current Facility-Administered Medications Medication Dose Route Frequency  methylPREDNISolone (PF) (SOLU-MEDROL) injection 40 mg  40 mg IntraVENous Q8H  
 insulin lispro (HUMALOG) injection   SubCUTAneous AC&HS  
 dextrose (D50W) injection syrg 12.5-25 g  12.5-25 g IntraVENous PRN  
 carvediloL (COREG) tablet 6.25 mg  6.25 mg Oral BID WITH MEALS  cefepime (MAXIPIME) 1 g in 0.9% sodium chloride (MBP/ADV) 50 mL MBP  1 g IntraVENous Q24H  
 HYDROcodone-acetaminophen (NORCO)  mg tablet 1 Tab  1 Tab Oral Q6H PRN  peppermint oil   Other PRN  
 0.9% sodium chloride infusion 250 mL  250 mL IntraVENous PRN  
 metroNIDAZOLE (FLAGYL) IVPB premix 500 mg  500 mg IntraVENous Q12H  
 dilTIAZem ER (CARDIZEM CD) capsule 180 mg  180 mg Oral DAILY  nystatin (MYCOSTATIN) 100,000 unit/mL oral suspension 500,000 Units  500,000 Units Oral QID  guaiFENesin-dextromethorphan (ROBITUSSIN DM) 100-10 mg/5 mL syrup 10 mL  10 mL Oral Q6H PRN  
 nicotine (NICODERM CQ) 14 mg/24 hr patch 1 Patch  1 Patch TransDERmal DAILY PRN  
 senna-docusate (PERICOLACE) 8.6-50 mg per tablet 1 Tab  1 Tab Oral DAILY  balsam peru-castor oiL (VENELEX) ointment   Topical BID  epoetin ginger-epbx (RETACRIT) injection 4,000 Units  4,000 Units SubCUTAneous Q MON, WED & FRI  guaiFENesin ER (MUCINEX) tablet 600 mg  600 mg Oral Q12H  hydrALAZINE (APRESOLINE) 20 mg/mL injection 10 mg  10 mg IntraVENous Q4H PRN  
 [Held by provider] dilTIAZem (CARDIZEM) 100 mg in dextrose 5% (MBP/ADV) 100 mL infusion  0-15 mg/hr IntraVENous TITRATE  sodium chloride (NS) flush 5-10 mL  5-10 mL IntraVENous PRN  
 acetaminophen (TYLENOL) tablet 650 mg  650 mg Oral Q6H PRN  
  thiamine mononitrate (B-1) tablet 100 mg  100 mg Oral DAILY  folic acid (FOLVITE) tablet 1 mg  1 mg Oral DAILY  therapeutic multivitamin (THERAGRAN) tablet 1 Tab  1 Tab Oral DAILY  glucose chewable tablet 16 g  4 Tab Oral PRN  
 dextrose (D50W) injection syrg 12.5-25 g  12.5-25 g IntraVENous PRN  
 glucagon (GLUCAGEN) injection 1 mg  1 mg IntraMUSCular PRN  pantoprazole (PROTONIX) tablet 40 mg  40 mg Oral ACB  sevelamer carbonate (RENVELA) tab 800 mg  800 mg Oral TID  gabapentin (NEURONTIN) capsule 300 mg  300 mg Oral TID PRN  
 [Held by provider] heparin (porcine) injection 5,000 Units  5,000 Units SubCUTAneous Q12H  
 albuterol-ipratropium (DUO-NEB) 2.5 MG-0.5 MG/3 ML  3 mL Nebulization Q4H PRN  
 budesonide (PULMICORT) 500 mcg/2 ml nebulizer suspension  500 mcg Nebulization BID RT And  
 arformoteroL (BROVANA) neb solution 15 mcg  15 mcg Nebulization BID RT And  
 ipratropium (ATROVENT) 0.02 % nebulizer solution 0.5 mg  0.5 mg Nebulization Q6H PRN Sarah Lanier, [address] [employer] [address] [place], [address] [facility] Phone - [phone]               Fax - [phone]

## 2020-11-10 NOTE — PROGRESS NOTES
End of Shift Note Bedside shift change report given to Deshaun Hartley RN (oncoming nurse) by Santa Palacios RN (offgoing nurse). Report included the following information SBAR and Kardex Shift worked:  1193-5312 Shift summary and any significant changes:  
  None. Concerns for physician to address:  None. Zone phone for oncoming shift:     
 
Activity: 
Activity Level: Up with Assistance Number times ambulated in hallways past shift: 0 Number of times OOB to chair past shift: 0 Cardiac:  
Cardiac Monitoring: Yes     
Cardiac Rhythm: Atrial fibrillation Access:  
Current line(s): PIV Genitourinary:  
Urinary status: oliguric Respiratory:  
O2 Device: Nasal cannula Chronic home O2 use?: NO Incentive spirometer at bedside: NO 
  
GI: 
Last Bowel Movement Date: 11/06/20 Current diet:  DIET NUTRITIONAL SUPPLEMENTS Dinner, Breakfast; [place] DIET DIABETIC CONSISTENT CARB Soft Solids; 2 GM NA (House Low NA) Passing flatus: YES Tolerating current diet: YES 
% Diet Eaten: 100 % Pain Management:  
Patient states pain is manageable on current regimen: YES Skin: 
Walter Score: 15 Interventions: speciality bed and foam dressing Patient Safety: 
Fall Score: Total Score: 4 Interventions: bed/chair alarm, gripper socks and pt to call before getting OOB High Fall Risk: Yes Length of Stay: 
Expected LOS: 4d 19h Actual LOS: 4 Santa Palacios RN

## 2020-11-10 NOTE — PROGRESS NOTES
SURGERY PROGRESS NOTE Admit Date: 2020 Subjective:  
 
Patient has no complaints Objective:  
 
Visit Vitals /77 Pulse 100 Temp 97.5 °F (36.4 °C) (Oral) Resp 18 Ht 5' 6\" (1.676 m) Wt 73.3 kg (161 lb 11.2 oz) SpO2 (!) 84% BMI 26.10 kg/m² Temp (24hrs), Av.7 °F (36.5 °C), Min:97.5 °F (36.4 °C), Max:97.9 °F (36.6 °C) 
 
 
11/10 0701 - 11/10 1900 In: -  
Out: 4000  
1901 - 11/10 0700 In: 1493.8 [P.O.:810; I.V.:683.8] Out: 1500 Physical Exam:   
General:  alert, cooperative, no distress, appears stated age Abdomen: soft, bowel sounds active, non-tender  
wound:   no bleeding. Clean. petroleum guaze to base and foam dressing above Lab Results Component Value Date/Time WBC 20.3 (H) 11/10/2020 12:13 AM  
 HGB 10.9 (L) 11/10/2020 12:09 PM  
 Hemoglobin (POC) 11.6 (L) 2018 07:31 AM  
 HCT 31.8 (L) 11/10/2020 12:09 PM  
 Hematocrit (POC) 34 (L) 2018 07:31 AM  
 PLATELET 158  12:13 AM  
 .7 (H) 11/10/2020 12:13 AM  
 
Lab Results Component Value Date/Time GFR est non-AA 14 (L) 11/10/2020 12:13 AM  
 GFRNA, POC 20 (L) 2018 07:31 AM  
 GFR est AA 17 (L) 11/10/2020 12:13 AM  
 GFRAA, POC 25 (L) 2018 07:31 AM  
 Creatinine 4.27 (H) 11/10/2020 12:13 AM  
 Creatinine (POC) 3.2 (H) 2018 07:31 AM  
 BUN 65 (H) 11/10/2020 12:13 AM  
 BUN (POC) 19 2018 07:31 AM  
 Sodium 125 (L) 11/10/2020 12:13 AM  
 Sodium (POC) 131 (L) 2018 07:31 AM  
 Potassium 4.8 11/10/2020 12:13 AM  
 Potassium (POC) 4.4 2018 07:31 AM  
 Chloride 89 (L) 11/10/2020 12:13 AM  
 Chloride (POC) 98 2018 07:31 AM  
 CO2 26 11/10/2020 12:13 AM  
 Magnesium 2.0 2020 03:39 AM  
 Phosphorus 7.7 (H) 2020 12:28 PM  
 
 
Assessment:  
 
Active Problems: 
  A-fib ([facility] [address] (2020) Hypoglycemia (2020) Acute dyspnea (2020) Bleeding stopped. Plan: Only Non-adherent, moist wound care products. Do not recommend Dakins use any longer to avoid drying wound out and causing recurrent bleeding

## 2020-11-11 NOTE — PROGRESS NOTES
Hospitalist Progress Note NAME: Omar Orellana. :  1965 MRN:  [id number] Assessment / Plan: 
DM 2 uncontrolled  From steroids Severe symptomatic hypoglycemia on admit resolved Off of D10 drip HgA1C 5.8 Diabetic diet Elevated BG from steroids JERRY adjusted, start humulin monitor Bg 
  
Suspected sepsis ? HAP 
chronic wound on abdomen from hernia repair, wound culture with normal tana Sputum culture growing Pseudomonas aeruginosa,  
Continue cefepime, cover for CAP for total 7 days WBC elevated, secondary to steroids?,  Monitor CBC 
 
  
Ventral hernia Mesh with bleeding Bleeding intermittently No fluid collection  on CT Cont wound care per surgery use mist dressing avoid dakins F/u wound cx  Normal tana Iv Abx cefepime/flagyl Chr anemia monitor Hgb >8 
  
A. fib with RVR intermittetly elevated HR 
 in ER on admit 
cardizem PO per cards s/p cardizem drip Hold AC as hx of GI bleed in past and abd wound bleeding Cards on case f/u recomnds Coreg 12.5mg  bid as BP elevated and HR >100 Echo -EF normal, dilated LA, PASP 56 Tsh/Ft4 ok Acute on chronic respiratory failure with hypoxia, currently requiring 2 L Recurrent Pleural effusion  
RLL collapse COPD managed by Dr. Roman Dad Appreciate guidance from Dr. Delilah Rodriguez Nebs prn 
IV steroids  for wheezing start tapering 30mg q8 
  
L Renal mass Urology eval requested, reviewed recommendation leison chr no further w/u needed. 
  
ESRD/HD [facility] HD center Hyponatremia  Na 125 - should correct with HD, defer to renal team 
  
ETOH abuse Tobacco abuse Thiamine/folate/mvt CIWA monitoring Nicotine patch 14mg daily PRN  
  
Debility/Deconditiones state with mod protien malnourished PT/OT, Dietary  On case 
  
Hepatitis C infection hx Monitored at [facility] hepatology clinic Hx of PVD s/p thrombectomy Hx of Splenectomy and partial pancreatectomy Chronic constipation - pt takes mag citrate at home PRN  
 
 DVT PRx SCD AD [facility] 
TREVOR wife Kaz Mcgrath [phone] Dispo: TBD may need SNF. PT/OT on case Subjective: Chief Complaint / Reason for Physician Visit RRT yesterday for bleeding from chr abd wound. Seen in HD today. HR >100, BP and BG elevated, WBC went up 26K. He feels weak has cough. Afebrile. Spoke with wife today and answered all questions. Objective: VITALS:  
Last 24hrs VS reviewed since prior progress note. Most recent are: 
Patient Vitals for the past 24 hrs: 
 Temp Pulse Resp BP SpO2  
11/11/20 0815  (!) 112 18 (!) 141/79   
11/11/20 0800  (!) 111 18 (!) 142/77   
11/11/20 0745  (!) 104 18 123/82   
11/11/20 0730  75 18 130/69   
11/11/20 0715  (!) 102 18 134/78   
11/11/20 0700  99 18 127/73   
11/11/20 0645  77 18 113/79   
11/11/20 0630  68 18 (!) 150/71   
11/11/20 0621 97.4 °F (36.3 °C) 69 18 124/75   
11/11/20 0221 97.4 °F (36.3 °C) 74 18 (!) 148/91 95 % 11/10/20 2031 97.2 °F (36.2 °C) 74 20 123/86 97 % 11/10/20 1913     97 % 11/10/20 1848 97.5 °F (36.4 °C) 95 16 124/76 96 % 11/10/20 1713  91  129/72   
11/10/20 1606 97.2 °F (36.2 °C) 88 16 117/69 91 % 11/10/20 1501     (!) 84 % 11/10/20 1318  100  135/77   
11/10/20 1110 97.5 °F (36.4 °C) 95 18 (!) 142/79 96 % 11/10/20 1100  93 20 131/78   
11/10/20 1045  94 20 (!) 179/86   
11/10/20 1030  (!) 107 20 (!) 154/80   
11/10/20 1015  98 20 (!) 142/72   
11/10/20 1000  (!) 108 20 114/79   
11/10/20 0945  94 20 131/77   
11/10/20 0930  (!) 104 20 (!) 159/86   
11/10/20 0915  96 20 (!) 143/78   
11/10/20 0900  (!) 102 20 (!) 142/78  Intake/Output Summary (Last 24 hours) at 11/11/2020 0845 Last data filed at 11/11/2020 9033 Gross per 24 hour Intake 1000 ml Output 4000 ml Net -3000 ml PHYSICAL EXAM: 
General: Chr ill looking, no acute distress   
EENT:   MMM Resp:  Symmetric expansion,  Expiratory wheezing  better CV:  Irregular rhythm, no edema GI:  Soft, ChrAbd wound Neurologic:  Alert and  normal speech, Psych:    Not anxious nor agitated Reviewed most current lab test results and cultures  YES Reviewed most current radiology test results   YES Review and summation of old records today    NO Reviewed patient's current orders and MAR    YES 
PMH/SH reviewed - no change compared to H&P Procedures: see electronic medical records for all procedures/Xrays and details which were not copied into this note but were reviewed prior to creation of Plan. LABS: 
I reviewed today's most current labs and imaging studies. Pertinent labs include: 
Recent Labs 11/11/20 
0222 11/10/20 
1209 11/10/20 
0603 11/10/20 
0013 11/09/20 
1746 WBC 26.6*  --   --  20.3* 19.3* HGB 10.3* 10.9* 9.8* 9.8* 10.5* HCT 29.6* 31.8* 28.5* 28.0* 29.4*  
  --   --  288 304 Recent Labs 11/10/20 
0013 11/09/20 
1746 11/09/20 
1228 *  --  120*  
K 4.8  --  4.5  
CL 89*  --  85* CO2 26  --  20* *  --  366* BUN 65*  --  103* CREA 4.27*  --  5.54* CA 7.9*  --  7.5* PHOS  --   --  7.7* ALB  --   --  2.8* INR  --  1.1  --   
 
 
Signed: Cleda Gowers, MD

## 2020-11-11 NOTE — PROGRESS NOTES
RAPID RESPONSE TEAM- Follow Up Rounded on patient due to recent rapid response for sudden bleed from abdominal wound. Spoke with Primary RN Key. Patient has had no further bleeding. VSS. No acute concerns at this time. No RRT interventions currently indicated. Please call with any questions or concerns. Odalys Gonzalez RN 
RRT Janay Guaman Patient Vitals for the past 4 hrs: 
 Temp Pulse Resp BP SpO2  
11/10/20 1913     97 % 11/10/20 1848 97.5 °F (36.4 °C) 95 16 124/76 96 %

## 2020-11-11 NOTE — PROGRESS NOTES
HD TRANSFER - OUT REPORT: 
 
Verbal report given to Edward Hirsch RN on Marlin Arabia. being transferred to [facility] for routine progression of care Report consisted of patient's Situation, Background, Assessment and  
Recommendations(SBAR). Information from the following report(s) SBAR, Procedure Summary, Intake/Output and Recent Results was reviewed with the receiving nurse. Method:  $$ Method: Hemodialysis (11/11/20 7269) Fluid Removed  NET Fluid Removed (mL): 2500 ml (11/11/20 0955) Patient response to treatment:  Stable End Time  Hemodialysis End Time: 6997 (11/11/20 0955) If not documented, dialysis nurse to update post-dialysis row in HD/Filtration flowsheet Medications /Volume expansion agents or Fluid boluses administered during treatment? no 
 
Post-dialysis medication administration due?  yes Remind nurse to administer post-HD medication upon return to unit. Fistula hemostasis? yes Line heparinization? no 
 
Lines: AMNUEL AVF Opportunity for questions and clarification was provided. Patient transported with: O2 @ 2 liters Tech

## 2020-11-11 NOTE — PROGRESS NOTES
RAPID RESPONSE TEAM - follow up Rounded on patient due to recent RRT. Discussed with primary RN. No acute concerns, VSS, MEWS 1. No RRT interventions indicated at this time. Please call with any questions or concerns. Baby Glenn Rapid Response RN Blaine Garrido

## 2020-11-11 NOTE — PROGRESS NOTES
Bedside and Verbal shift change report given to THAD Villa (oncoming nurse) by Elsie Lopez RN (offgoing nurse). Report included the following information SBAR, Kardex, Procedure Summary, Intake/Output, Recent Results and Cardiac Rhythm AFIB.

## 2020-11-11 NOTE — PROCEDURES
[facility] Dialysis Team [facility]  [phone] Vitals   Pre   Post   Assessment   Pre   Post    
Temp  Temp: 97.4 °F (36.3 °C) (11/11/20 0621)  98.0, oral LOC  A&Ox4 A&Ox4 HR   Pulse (Heart Rate): 69 (11/11/20 0621) 116 Lungs   Wheeze, dim bases Dim bases B/P   BP: 124/75 (11/11/20 8153) 155/70 Cardiac   Tele, A-Fib Tele, A-Fib Resp   Resp Rate: 18 (11/11/20 0621) 18 Skin   Scattered bruising, Abd wound with CDI dressing; scattered skin tears with CDI dressings; Stage 2 sacrum; R heel scab Scattered bruising, Abd wound with CDI dressing; scattered skin tears with CDI dressings; Stage 2 sacrum; R heel scab Pain level  Pain Intensity 1: 0 (11/10/20 2031) 0 Edema  Abd distention; 1+ BLE Abd distention; 1+ BLE Orders: Duration:   Start:    7215 End:    5910 Total:   3.5hr  
Dialyzer:   Dialyzer/Set Up Inspection: Brody Sales (11/11/20 2511) K Bath:   Dialysate K (mEq/L): 3 (11/11/20 6165) Ca Bath:   Dialysate CA (mEq/L): 3.0 (11/11/20 1102) Na/Bicarb:   Dialysate NA (mEq/L): 140 (11/11/20 0621) Target Fluid Removal:   Goal/Amount of Fluid to Remove (mL): 2500 mL (11/11/20 3951) Access Type & Location:   MANUEL AVF: skin CDI. No s/s of infection. + B/T. No issues with cannulation or hemostasis. Running well at . Labs Obtained/Reviewed Critical Results Called   Date when labs were drawn- 
Hgb-   
HGB Date Value Ref Range Status 11/11/2020 10.3 (L) 12.1 - 17.0 g/dL Final  
 
K-   
Potassium Date Value Ref Range Status 11/10/2020 4.8 3.5 - 5.1 mmol/L Final  
 
Ca-  
Calcium Date Value Ref Range Status 11/10/2020 7.9 (L) 8.5 - 10.1 MG/DL Final  
 
Bun-  
BUN Date Value Ref Range Status 11/10/2020 65 (H) 6 - 20 MG/DL Final  
  Comment:  
  INVESTIGATED PER DELTA CHECK PROTOCOL Creat-  
Creatinine Date Value Ref Range Status 11/10/2020 4.27 (H) 0.70 - 1.30 MG/DL Final  
 
  
Medications/ Blood Products Given Name   Dose   Route and Time None ordered Blood Volume Processed (BVP):    87.6L Net Fluid Removed:  2500ml (original order, not enough time to increase goal) Comments Time Out Done: 0248 Primary Nurse Rpt Pre: Sonido Flores RN 
Primary Nurse Rpt Post: Martin Garcia RN 
Pt Education: keep mask on while in suite Care Plan: ongoing Tx Summary: 
Pt arrived to HD suite A&Ox4. Consent signed & on file. SBAR received from Primary RN. 
0502: Pt cannulated with 08C needles per policy & without issue. VSS. Dialysis Tx initiated. * No issues with treatment* 
0955: Tx ended. VSS. All possible blood returned to patient. Hemostasis achieved without issue. Bed locked and in the lowest position, call bell and belongings in reach. SBAR given to Primary, RN. Patient is stable at time of their departure. All Dialysis related medications have been reviewed. Admiting Diagnosis: A-Fib/ Acute Resp Failure Pt's previous clinic- [facility] Consent signed - Informed Consent Verified: Yes (11/11/20 7776) Kaity Consent - on file Hepatitis Status- 1/14/2020 Neg Ag; 54MD Machine #- Machine Number: [id number] [id number] (11/11/20 3721) Telemetry status- chronic A-fib Pre-dialysis wt. -

## 2020-11-11 NOTE — PROGRESS NOTES
RAPID RESPONSE TEAM - follow up Rounded on patient due to recent RRT. Discussed with primary RN. No acute concerns, VSS, MEWS 1. No RRT interventions indicated at this time. Please call with any questions or concerns. Carol Tapia Rapid Response RN Joseph Sol

## 2020-11-11 NOTE — PROGRESS NOTES
Vitals w/ MEWS Score (last day) Date/Time MEWS Score Pulse Resp Temp BP Level of Consciousness SpO2  
 11/11/20 0955    (!) 116  18  98 °F (36.7 °C)  (!) 155/70      
 11/11/20 0945    (!) 109  18    (!) 143/85      
 11/11/20 0930    (!) 105  18    139/77      
 11/11/20 0915    (!) 106  18    (!) 153/78      
 11/11/20 0900    (!) 109  18    (!) 155/87      
 11/11/20 0845    (!) 105  18    (!) 152/72      
 11/11/20 0830    (!) 114  18    (!) 150/98      
 11/11/20 0815    (!) 112  18    (!) 141/79      
 11/11/20 0800    (!) 111  18    (!) 142/77      
 11/11/20 0745    (!) 104  18    123/82      
 11/11/20 0730    75  18    130/69      
 11/11/20 0715    (!) 102  18    134/78      
 11/11/20 0700    99  18    127/73      
 11/11/20 0645    77  18    113/79      
 11/11/20 0630    68  18    (!) 150/71      
 11/11/20 0621    69  18  97.4 °F (36.3 °C)  124/75      
 11/11/20 0221  1  74  18  97.4 °F (36.3 °C)  (!) 148/91  Alert  95 % 11/10/20 2031  1  74  20  97.2 °F (36.2 °C)  123/86  Alert  97 % 11/10/20 1913              97 % 11/10/20 1848  1  95  16  97.5 °F (36.4 °C)  124/76  Alert  96 % 11/10/20 1713    91      129/72      
 11/10/20 1606  1  88  16  97.2 °F (36.2 °C)  117/69  Alert  91 % 11/10/20 1501              (!) 84 % 11/10/20 1318    100      135/77      
 11/10/20 1110  1  95  18  97.5 °F (36.4 °C)  (!) 142/79  Alert  96 % 11/10/20 1100    93  20    131/78      
 11/10/20 1045    94  20    (!) 179/86      
 11/10/20 1030    (!) 107  20    (!) 154/80      
 11/10/20 1015    98  20    (!) 142/72      
 11/10/20 1000    (!) 108  20    114/79      
 11/10/20 0945    94  20    131/77      
 11/10/20 0930    (!) 104  20    (!) 159/86      
 11/10/20 0915    96  20    (!) 143/78      
 11/10/20 0900    (!) 102  20    (!) 142/78    Ciara Shepard 11/10/20 0845    85  20    129/76      
 11/10/20 0840    (!) 103  22  97.8 °F (36.6 °C)  (!) 145/86      
 11/10/20 0639  2  98  22  97.8 °F (36.6 °C)  (!) 143/87  Alert  94 % 11/10/20 0340  1  90  20  97.7 °F (36.5 °C)  134/75  Alert  92 % HR elevated due to afib unable to get morning meds due to dialysis and will be giving meds now.

## 2020-11-11 NOTE — PROGRESS NOTES
Cardiology Progress Note 11/11/2020 3:44 PM 
 
Admit Date: 11/5/2020 Admit Diagnosis: Hypoglycemia [E16.2]; [facility] [I48.91]; Acute hypoxemic respiratory failure due to COVID-19 ([facility] [U07.1, J96.01] Subjective:  
 
Joaquin Pittman has no specific complaints. Remains in afib. He states he feels \"bad\". VSS, AFib low 100's. S/p hemodialysis. Labs steady. Visit Vitals /66 Pulse (!) 121 Temp 97.7 °F (36.5 °C) Resp 20 Ht 5' 6\" (1.676 m) Wt 71.8 kg (158 lb 4.8 oz) SpO2 94% BMI 25.55 kg/m² Current Facility-Administered Medications Medication Dose Route Frequency  carvediloL (COREG) tablet 12.5 mg  12.5 mg Oral BID WITH MEALS  methylPREDNISolone (PF) (SOLU-MEDROL) injection 30 mg  30 mg IntraVENous Q8H  
 insulin NPH (NOVOLIN N, HUMULIN N) injection 15 Units  15 Units SubCUTAneous ACB&D  Epo- HOLD dose tonight  1 Each Other ONCE  
 insulin lispro (HUMALOG) injection   SubCUTAneous AC&HS  
 dextrose (D50W) injection syrg 12.5-25 g  12.5-25 g IntraVENous PRN  
 cefepime (MAXIPIME) 1 g in 0.9% sodium chloride (MBP/ADV) 50 mL MBP  1 g IntraVENous Q24H  
 HYDROcodone-acetaminophen (NORCO)  mg tablet 1 Tab  1 Tab Oral Q6H PRN  peppermint oil   Other PRN  
 0.9% sodium chloride infusion 250 mL  250 mL IntraVENous PRN  
 metroNIDAZOLE (FLAGYL) IVPB premix 500 mg  500 mg IntraVENous Q12H  
 dilTIAZem ER (CARDIZEM CD) capsule 180 mg  180 mg Oral DAILY  nystatin (MYCOSTATIN) 100,000 unit/mL oral suspension 500,000 Units  500,000 Units Oral QID  guaiFENesin-dextromethorphan (ROBITUSSIN DM) 100-10 mg/5 mL syrup 10 mL  10 mL Oral Q6H PRN  
 nicotine (NICODERM CQ) 14 mg/24 hr patch 1 Patch  1 Patch TransDERmal DAILY PRN  
 senna-docusate (PERICOLACE) 8.6-50 mg per tablet 1 Tab  1 Tab Oral DAILY  balsam peru-castor oiL (VENELEX) ointment   Topical BID  epoetin ginger-epbx (RETACRIT) injection 4,000 Units  4,000 Units SubCUTAneous Q MON, WED & FRI  guaiFENesin ER (MUCINEX) tablet 600 mg  600 mg Oral Q12H  hydrALAZINE (APRESOLINE) 20 mg/mL injection 10 mg  10 mg IntraVENous Q4H PRN  
 [Held by provider] dilTIAZem (CARDIZEM) 100 mg in dextrose 5% (MBP/ADV) 100 mL infusion  0-15 mg/hr IntraVENous TITRATE  sodium chloride (NS) flush 5-10 mL  5-10 mL IntraVENous PRN  
 acetaminophen (TYLENOL) tablet 650 mg  650 mg Oral Q6H PRN  thiamine mononitrate (B-1) tablet 100 mg  100 mg Oral DAILY  folic acid (FOLVITE) tablet 1 mg  1 mg Oral DAILY  therapeutic multivitamin (THERAGRAN) tablet 1 Tab  1 Tab Oral DAILY  glucose chewable tablet 16 g  4 Tab Oral PRN  
 glucagon (GLUCAGEN) injection 1 mg  1 mg IntraMUSCular PRN  pantoprazole (PROTONIX) tablet 40 mg  40 mg Oral ACB  sevelamer carbonate (RENVELA) tab 800 mg  800 mg Oral TID  gabapentin (NEURONTIN) capsule 300 mg  300 mg Oral TID PRN  
 [Held by provider] heparin (porcine) injection 5,000 Units  5,000 Units SubCUTAneous Q12H  
 albuterol-ipratropium (DUO-NEB) 2.5 MG-0.5 MG/3 ML  3 mL Nebulization Q4H PRN  
 budesonide (PULMICORT) 500 mcg/2 ml nebulizer suspension  500 mcg Nebulization BID RT And  
 arformoteroL (BROVANA) neb solution 15 mcg  15 mcg Nebulization BID RT And  
 ipratropium (ATROVENT) 0.02 % nebulizer solution 0.5 mg  0.5 mg Nebulization Q6H PRN Objective:  
  
Physical Exam: 
Visit Vitals /66 Pulse (!) 121 Temp 97.7 °F (36.5 °C) Resp 20 Ht 5' 6\" (1.676 m) Wt 71.8 kg (158 lb 4.8 oz) SpO2 94% BMI 25.55 kg/m² Physical:  
General: ill-appearing, older than stated age [de-identified] male in NAD Heart: irr,irr no m/S3/JVD, no carotid bruits Lungs: diminished Abdomen: Soft, +BS, NTND Extremities: LE corky +DP/PT, no edema, right upper arm fistula Neurologic: alert,oriented Data Review:  
Labs:   
Recent Results (from the past 24 hour(s)) HGB & HCT  
 Collection Time: 11/10/20 12:09 PM  
Result Value Ref Range HGB 10.9 (L) 12.1 - 17.0 g/dL HCT 31.8 (L) 36.6 - 50.3 % GLUCOSE, POC Collection Time: 11/10/20  1:06 PM  
Result Value Ref Range Glucose (POC) 360 (H) 65 - 100 mg/dL Performed by Spotsterde Stallion GLUCOSE, POC Collection Time: 11/10/20  4:46 PM  
Result Value Ref Range Glucose (POC) 325 (H) 65 - 100 mg/dL Performed by Spotsterde Stallion GLUCOSE, POC Collection Time: 11/10/20  8:20 PM  
Result Value Ref Range Glucose (POC) 184 (H) 65 - 100 mg/dL Performed by Fermin Crigler RN   
CBC W/O DIFF Collection Time: 11/11/20  2:22 AM  
Result Value Ref Range WBC 26.6 (H) 4.1 - 11.1 K/uL  
 RBC 2.90 (L) 4.10 - 5.70 M/uL  
 HGB 10.3 (L) 12.1 - 17.0 g/dL HCT 29.6 (L) 36.6 - 50.3 % .1 (H) 80.0 - 99.0 FL  
 MCH 35.5 (H) 26.0 - 34.0 PG  
 MCHC 34.8 30.0 - 36.5 g/dL  
 RDW 18.5 (H) 11.5 - 14.5 % PLATELET 921 509 - 541 K/uL MPV 10.7 8.9 - 12.9 FL  
 NRBC 8.7 (H) 0  WBC ABSOLUTE NRBC 2.32 (H) 0.00 - 0.01 K/uL GLUCOSE, POC Collection Time: 11/11/20 10:42 AM  
Result Value Ref Range Glucose (POC) 267 (H) 65 - 100 mg/dL Performed by Avril Qiu (JOSIANE) Telemetry: AFIB Assessment:  
 
Hospital Problems  Date Reviewed: 1/25/2018 Codes Class Noted POA A-fib [facility]) ICD-10-CM: I48.91 
ICD-9-CM: 427.31  11/5/2020 Unknown Hypoglycemia ICD-10-CM: E16.2 ICD-9-CM: 251.2  11/5/2020 Unknown Acute dyspnea ICD-10-CM: R06.00 
ICD-9-CM: 786.09  11/5/2020 Plan:  
Zaidabdi Thompson. is a 60 y.o. male with an extensive PMH significant for ESRD on hemodilaysis MWF, DM II, COPD, Tobacco use, HCV, ETOH abuse, HTN, Cellulitis, leukocytosis, chronic back pain, recurrent pleural effusions admitted for Hypoglycemia [E16.2] [facility] [I48.91] Acute hypoxemic respiratory failure due to COVID-19 ([facility] [U07.1, J96.01].   
  
 Afib with RVR: rate controlled currently; HASBLED= 4 CHADVASc= 2 
-Continue long acting dilt 180 mg PO daily, BB  
-Continue to monitor tele. -EKG and prior ECHO reviewed, new ECHO EF 55-60%, mild/mod. TR 
-TSH WNL 
-No anticoagulation per IM. 
-Would not be a candidate for HIRA/DCC if we can not anticoagulate. 
-Trop .05, no signs of ACS.   
DM II, symptomatic hypoglycemia: 
-manage per internal medicine 
  
Suspected sepsis: 
-Could be cause of new onset Afib, ABX and fluids per internal medicine.  
  
Tobacco abuse/COPD: 
-patient continues to be daily smoker, discussed importance of ceasing for improved CV health.  
  
Hypertension 
-Monitor BP, continue BB and Cardizem  
  
Neal Schroeder NP  
MSN,RN,AG[facility] 11/11/2020 Patient seen, examined by me personally. Plan discussed as detailed. Agree with note as outlined by  NP. I confirm findings in history and physical exam. No additional findings noted. Agree with plan as outlined above.   
 
Concha Guillen MD

## 2020-11-11 NOTE — PROGRESS NOTES
TRANSFER - IN REPORT: 
 
Verbal report received from THAD Salmon on [employer].  [facility] Report consisted of patients Situation, Background, Assessment and  
Recommendations(SBAR). Information from the following report(s) SBAR, Kardex and Cardiac Rhythm NSR was reviewed with the receiving nurse. Opportunity for questions and clarification was provided. Assessment completed upon patients arrival to unit and care assumed.

## 2020-11-11 NOTE — PROGRESS NOTES
0700: Bedside shift change report given to Allen RN (oncoming nurse) by Max Gannon RN (offgoing nurse). Report included the following information SBAR and Kardex.

## 2020-11-11 NOTE — PROGRESS NOTES
End of Shift Note Bedside shift change report given to Allen (oncoming nurse) by Darling Worthy RN (offgoing nurse). Report included the following information SBAR and Kardex Shift worked:  [id number] Shift summary and any significant changes:  
  None. Concerns for physician to address:  None. Zone phone for oncoming shift:     
 
Activity: 
Activity Level: Up with Assistance Number times ambulated in hallways past shift: 0 Number of times OOB to chair past shift: 1 Cardiac:  
Cardiac Monitoring: Yes     
Cardiac Rhythm: Atrial fibrillation Access:  
Current line(s): PIV and HD access Genitourinary:  
Urinary status: oliguric Respiratory:  
O2 Device: Nasal cannula Chronic home O2 use?: NO Incentive spirometer at bedside: NO 
  
GI: 
Last Bowel Movement Date: 11/06/20 Current diet:  DIET NUTRITIONAL SUPPLEMENTS Dinner, Breakfast; [place] DIET DIABETIC CONSISTENT CARB Soft Solids; 2 GM NA (House Low NA) Passing flatus: YES Tolerating current diet: YES 
% Diet Eaten: 100 % Pain Management:  
Patient states pain is manageable on current regimen: YES Skin: 
Walter Score: 15 Interventions: PT/OT consult Patient Safety: 
Fall Score: Total Score: 4 Interventions: bed/chair alarm, assistive device (walker, cane, etc) and pt to call before getting OOB High Fall Risk: Yes Length of Stay: 
Expected LOS: 4d 19h Actual LOS: 6 Darling Worthy RN

## 2020-11-11 NOTE — PROGRESS NOTES
HERMAN: Home with Follow-up Appointment 2:30pm- CM met with pt and pt's wife at bedside to discuss d/c plan. CM discussed with pt about home health. Pt and pt's wife agreed with home health. Pt's wife stated that pt had [facility] in the past and would like to use [facility] again. Referral sent to [facility] via [email or web address]. CM will continue to follow patient for discharge planning needs and arrange for services as deemed necessary. Jarod Brizuela [address] 
[phone]

## 2020-11-11 NOTE — PROGRESS NOTES
Problem: Chronic Obstructive Pulmonary Disease (COPD) Goal: *Oxygen saturation during activity within specified parameters Outcome: Progressing Towards Goal 
Goal: *Able to remain out of bed as prescribed Outcome: Progressing Towards Goal 
Goal: *Absence of hypoxia Outcome: Progressing Towards Goal 
Goal: *Optimize nutritional status Outcome: Progressing Towards Goal 
  
Problem: Falls - Risk of 
Goal: *Absence of Falls Description: Document Richard Merlin Fall Risk and appropriate interventions in the flowsheet. Outcome: Progressing Towards Goal 
Note: Fall Risk Interventions: 
Mobility Interventions: Bed/chair exit alarm Medication Interventions: Bed/chair exit alarm, Patient to call before getting OOB, Teach patient to arise slowly Elimination Interventions: Call light in reach, Bed/chair exit alarm History of Falls Interventions: Bed/chair exit alarm Problem: Pressure Injury - Risk of 
Goal: *Prevention of pressure injury Description: Document Walter Scale and appropriate interventions in the flowsheet. Outcome: Progressing Towards Goal 
Note: Pressure Injury Interventions: 
Sensory Interventions: Assess changes in LOC Moisture Interventions: Absorbent underpads, Apply protective barrier, creams and emollients Activity Interventions: Assess need for specialty bed Mobility Interventions: Assess need for specialty bed Nutrition Interventions: Document food/fluid/supplement intake, Offer support with meals,snacks and hydration Friction and Shear Interventions: Apply protective barrier, creams and emollients

## 2020-11-12 NOTE — PROGRESS NOTES
Problem: Chronic Obstructive Pulmonary Disease (COPD) Goal: *Oxygen saturation during activity within specified parameters Outcome: Progressing Towards Goal 
Goal: *Able to remain out of bed as prescribed Outcome: Progressing Towards Goal 
Goal: *Absence of hypoxia Outcome: Progressing Towards Goal 
Goal: *Optimize nutritional status Outcome: Progressing Towards Goal 
  
Problem: Falls - Risk of 
Goal: *Absence of Falls Description: Document Shanna Pati Fall Risk and appropriate interventions in the flowsheet. Outcome: Progressing Towards Goal 
Note: Fall Risk Interventions: 
Mobility Interventions: Bed/chair exit alarm, Patient to call before getting OOB Medication Interventions: Bed/chair exit alarm, Patient to call before getting OOB, Teach patient to arise slowly Elimination Interventions: Bed/chair exit alarm, Call light in reach History of Falls Interventions: Bed/chair exit alarm Problem: Pressure Injury - Risk of 
Goal: *Prevention of pressure injury Description: Document Walter Scale and appropriate interventions in the flowsheet. Outcome: Progressing Towards Goal 
Note: Pressure Injury Interventions: 
Sensory Interventions: Assess changes in LOC Moisture Interventions: Absorbent underpads, Apply protective barrier, creams and emollients Activity Interventions: Increase time out of bed Mobility Interventions: Assess need for specialty bed Nutrition Interventions: Document food/fluid/supplement intake, Offer support with meals,snacks and hydration Friction and Shear Interventions: Apply protective barrier, creams and emollients, Feet elevated on foot rest

## 2020-11-12 NOTE — PROGRESS NOTES
End of Shift Note Bedside shift change report given to Kassi Kim RN (oncoming nurse) by Elane Holter (offgoing nurse). Report included the following information SBAR and Kardex Shift worked:  Day Shift summary and any significant changes:  
  received ultrafiltration, doreen abgs and bmp per nephrologist due to increased confusion and lethargy Concerns for physician to address:  none Zone phone for oncoming shift:   [phone] Activity: 
Activity Level: Up with Assistance Number times ambulated in hallways past shift: 0 Number of times OOB to chair past shift: 0 Cardiac:  
Cardiac Monitoring: Yes     
Cardiac Rhythm: Atrial fibrillation Access:  
Current line(s): PIV Genitourinary:  
Urinary status: oliguric Respiratory:  
O2 Device: Nasal cannula Chronic home O2 use?: NO Incentive spirometer at bedside: NO 
  
GI: 
Last Bowel Movement Date: 11/06/20 Current diet:  DIET NUTRITIONAL SUPPLEMENTS Dinner, Breakfast; Nepro DIET DIABETIC CONSISTENT CARB Soft Solids; 2 GM NA (House Low NA) DIET ONE TIME MESSAGE 
DIET ONE TIME MESSAGE Passing flatus: YES Tolerating current diet: YES 
% Diet Eaten: 15 % Pain Management:  
Patient states pain is manageable on current regimen: YES Skin: 
Walter Score: 15 Interventions: speciality bed, float heels and foam dressing Patient Safety: 
Fall Score: Total Score: 5 Interventions: bed/chair alarm, gripper socks and stay with me (per policy) High Fall Risk: Yes Length of Stay: 
Expected LOS: 4d 19h Actual LOS: 7 Elane Holter

## 2020-11-12 NOTE — PROGRESS NOTES
HD TRANSFER - OUT REPORT: 
 
Verbal report given to Altagracia Velez RN on Krystal Shock. being transferred to [address] [place] [address] for routine progression of care Report consisted of patient's Situation, Background, Assessment and  
Recommendations(SBAR). Information from the following report(s) SBAR and Procedure Summary, better mentation was reviewed with the receiving nurse. Method:  $$ Method: Hemodialysis (11/12/20 0840) Fluid Removed  NET Fluid Removed (mL): 3500 ml (11/12/20 1040) Patient response to treatment:  Improved End Time  Hemodialysis End Time: 0055 (11/12/20 1040) If not documented, dialysis nurse to update post-dialysis row in HD/Filtration flowsheet Medications /Volume expansion agents or Fluid boluses administered during treatment? no 
 
Post-dialysis medication administration due?  yes Remind nurse to administer post-HD medication upon return to unit. Fistula hemostasis? yes Line heparinization? no 
 
Lines: MANUEL AVF Opportunity for questions and clarification was provided. Patient transported with: O2 @ 2 liters Tech

## 2020-11-12 NOTE — PROGRESS NOTES
TRANSFER - IN REPORT: 
 
Verbal [facility]  [facility] for ordered procedure Report consisted of patients Situation, Background, Assessment and  
Recommendations(SBAR). Information from the following report(s) SBAR and Kardex was reviewed with the receiving nurse. Opportunity for questions and clarification was provided. Assessment completed upon patients arrival to unit and care assumed.

## 2020-11-12 NOTE — PROGRESS NOTES
Cardiology Progress Note 11/12/2020 1:02 PM 
 
Admit Date: 11/5/2020 Admit Diagnosis: Hypoglycemia [E16.2]; A-fib ([facility] [I48.91]; Acute hypoxemic respiratory failure due to COVID-19 ([facility] [U07.1, J96.01] Subjective:  
 
Kody Gant. denies any chest pain, SOB. Remains in afib. Rate controlled. Visit Vitals BP (!) 114/93 Pulse 88 Temp 97.4 °F (36.3 °C) Resp 20 Ht 5' 6\" (1.676 m) Wt 147 lb 4.8 oz (66.8 kg) SpO2 93% BMI 23.77 kg/m² Current Facility-Administered Medications Medication Dose Route Frequency  methylPREDNISolone (PF) (SOLU-MEDROL) injection 20 mg  20 mg IntraVENous Q8H  
 sodium polystyrene (KAYEXALATE) 15 gram/60 mL oral suspension 45 g  45 g Oral NOW  insulin NPH (NOVOLIN N, HUMULIN N) injection 10 Units  10 Units SubCUTAneous ONCE  carvediloL (COREG) tablet 12.5 mg  12.5 mg Oral BID WITH MEALS  insulin NPH (NOVOLIN N, HUMULIN N) injection 15 Units  15 Units SubCUTAneous ACB&D  
 dilTIAZem ER (CARDIZEM CD) capsule 240 mg  240 mg Oral DAILY  insulin lispro (HUMALOG) injection   SubCUTAneous AC&HS  
 dextrose (D50W) injection syrg 12.5-25 g  12.5-25 g IntraVENous PRN  
 cefepime (MAXIPIME) 1 g in 0.9% sodium chloride (MBP/ADV) 50 mL MBP  1 g IntraVENous Q24H  
 HYDROcodone-acetaminophen (NORCO)  mg tablet 1 Tab  1 Tab Oral Q6H PRN  peppermint oil   Other PRN  
 0.9% sodium chloride infusion 250 mL  250 mL IntraVENous PRN  
 metroNIDAZOLE (FLAGYL) IVPB premix 500 mg  500 mg IntraVENous Q12H  nystatin (MYCOSTATIN) 100,000 unit/mL oral suspension 500,000 Units  500,000 Units Oral QID  guaiFENesin-dextromethorphan (ROBITUSSIN DM) 100-10 mg/5 mL syrup 10 mL  10 mL Oral Q6H PRN  
 nicotine (NICODERM CQ) 14 mg/24 hr patch 1 Patch  1 Patch TransDERmal DAILY PRN  
 senna-docusate (PERICOLACE) 8.6-50 mg per tablet 1 Tab  1 Tab Oral DAILY  balsam peru-castor oiL (VENELEX) ointment   Topical BID  
  epoetin ginger-epbx (RETACRIT) injection 4,000 Units  4,000 Units SubCUTAneous Q MON, WED & FRI  guaiFENesin ER (MUCINEX) tablet 600 mg  600 mg Oral Q12H  hydrALAZINE (APRESOLINE) 20 mg/mL injection 10 mg  10 mg IntraVENous Q4H PRN  
 [Held by provider] dilTIAZem (CARDIZEM) 100 mg in dextrose 5% (MBP/ADV) 100 mL infusion  0-15 mg/hr IntraVENous TITRATE  sodium chloride (NS) flush 5-10 mL  5-10 mL IntraVENous PRN  
 acetaminophen (TYLENOL) tablet 650 mg  650 mg Oral Q6H PRN  thiamine mononitrate (B-1) tablet 100 mg  100 mg Oral DAILY  folic acid (FOLVITE) tablet 1 mg  1 mg Oral DAILY  therapeutic multivitamin (THERAGRAN) tablet 1 Tab  1 Tab Oral DAILY  glucose chewable tablet 16 g  4 Tab Oral PRN  
 glucagon (GLUCAGEN) injection 1 mg  1 mg IntraMUSCular PRN  pantoprazole (PROTONIX) tablet 40 mg  40 mg Oral ACB  sevelamer carbonate (RENVELA) tab 800 mg  800 mg Oral TID  gabapentin (NEURONTIN) capsule 300 mg  300 mg Oral TID PRN  
 [Held by provider] heparin (porcine) injection 5,000 Units  5,000 Units SubCUTAneous Q12H  
 albuterol-ipratropium (DUO-NEB) 2.5 MG-0.5 MG/3 ML  3 mL Nebulization Q4H PRN  
 budesonide (PULMICORT) 500 mcg/2 ml nebulizer suspension  500 mcg Nebulization BID RT And  
 arformoteroL (BROVANA) neb solution 15 mcg  15 mcg Nebulization BID RT And  
 ipratropium (ATROVENT) 0.02 % nebulizer solution 0.5 mg  0.5 mg Nebulization Q6H PRN Objective:  
  
Physical Exam: 
Visit Vitals BP (!) 114/93 Pulse 88 Temp 97.4 °F (36.3 °C) Resp 20 Ht 5' 6\" (1.676 m) Wt 147 lb 4.8 oz (66.8 kg) SpO2 93% BMI 23.77 kg/m² General Appearance:  Well developed, well nourished,alert and oriented x 3, and individual in no acute distress. Ears/Nose/Mouth/Throat:   Hearing grossly normal. 
  
    Neck: Supple. Chest:   Lungs clear to auscultation bilaterally. Cardiovascular:  Regular rate and rhythm, S1, S2 normal, no murmur. Abdomen:   Soft, non-tender, bowel sounds are active. Extremities: No edema bilaterally. Skin: Warm and dry. Data Review:  
Labs:   
Recent Results (from the past 24 hour(s)) GLUCOSE, POC Collection Time: 11/11/20  5:00 PM  
Result Value Ref Range Glucose (POC) 205 (H) 65 - 100 mg/dL Performed by Tere Evangelista RN   
GLUCOSE, POC Collection Time: 11/11/20  9:40 PM  
Result Value Ref Range Glucose (POC) 208 (H) 65 - 100 mg/dL Performed by Martha Nesbitt RN   
CBC WITH AUTOMATED DIFF Collection Time: 11/12/20  3:13 AM  
Result Value Ref Range WBC 24.8 (H) 4.1 - 11.1 K/uL  
 RBC 2.65 (L) 4.10 - 5.70 M/uL HGB 9.2 (L) 12.1 - 17.0 g/dL HCT 27.5 (L) 36.6 - 50.3 % .8 (H) 80.0 - 99.0 FL  
 MCH 34.7 (H) 26.0 - 34.0 PG  
 MCHC 33.5 30.0 - 36.5 g/dL  
 RDW 19.4 (H) 11.5 - 14.5 % PLATELET 139 740 - 549 K/uL MPV 10.8 8.9 - 12.9 FL  
 NRBC 11.3 (H) 0  WBC ABSOLUTE NRBC 2.79 (H) 0.00 - 0.01 K/uL NEUTROPHILS 90 (H) 32 - 75 % BAND NEUTROPHILS 2 % LYMPHOCYTES 2 (L) 12 - 49 % MONOCYTES 5 5 - 13 % EOSINOPHILS 0 0 - 7 % BASOPHILS 0 0 - 1 % MYELOCYTES 1 % IMMATURE GRANULOCYTES 0 0.0 - 0.5 % ABS. NEUTROPHILS 22.8 (H) 1.8 - 8.0 K/UL  
 ABS. LYMPHOCYTES 0.5 (L) 0.8 - 3.5 K/UL  
 ABS. MONOCYTES 1.2 (H) 0.0 - 1.0 K/UL  
 ABS. EOSINOPHILS 0.0 0.0 - 0.4 K/UL  
 ABS. BASOPHILS 0.0 0.0 - 0.1 K/UL  
 ABS. IMM. GRANS. 0.0 0.00 - 0.04 K/UL  
 DF MANUAL    
 RBC COMMENTS ANISOCYTOSIS 1+ 
    
 RBC COMMENTS MACROCYTOSIS 1+ 
    
 RBC COMMENTS TARGET CELLS 1+ RBC COMMENTS SCHISTOCYTES 1+ RBC COMMENTS HYPOCHROMIA 1+ 
    
 RBC COMMENTS RBC FRAGMENTS    
 WBC COMMENTS SMUDGE CELLS SEEN    
GLUCOSE, POC Collection Time: 11/12/20  6:50 AM  
Result Value Ref Range Glucose (POC) 155 (H) 65 - 100 mg/dL Performed by [place] Coffee (PCT) POC EG7 Collection Time: 11/12/20  8:44 AM  
Result Value Ref Range Calcium, ionized (POC) 1.08 (L) 1.12 - 1.32 mmol/L  
 pH (POC) 7.34 (L) 7.35 - 7.45    
 pCO2 (POC) 59.5 (H) 35.0 - 45.0 MMHG  
 pO2 (POC) 116 (H) 80 - 100 MMHG  
 HCO3 (POC) 31.8 (H) 22 - 26 MMOL/L Base excess (POC) 6 mmol/L  
 sO2 (POC) 98 (H) 92 - 97 % Site OTHER Device: ROOM AIR Allens test (POC) N/A Specimen type (POC) VENOUS BLOOD Total resp. rate 16 METABOLIC PANEL, BASIC Collection Time: 11/12/20  8:45 AM  
Result Value Ref Range Sodium 128 (L) 136 - 145 mmol/L Potassium 5.8 (H) 3.5 - 5.1 mmol/L Chloride 91 (L) 97 - 108 mmol/L  
 CO2 29 21 - 32 mmol/L Anion gap 8 5 - 15 mmol/L Glucose 135 (H) 65 - 100 mg/dL BUN 71 (H) 6 - 20 MG/DL Creatinine 4.04 (H) 0.70 - 1.30 MG/DL  
 BUN/Creatinine ratio 18 12 - 20 GFR est AA 19 (L) >60 ml/min/1.73m2 GFR est non-AA 15 (L) >60 ml/min/1.73m2 Calcium 8.2 (L) 8.5 - 10.1 MG/DL  
GLUCOSE, POC Collection Time: 11/12/20 11:37 AM  
Result Value Ref Range Glucose (POC) 172 (H) 65 - 100 mg/dL Performed by Chante Chaves RN Telemetry: AFIB Assessment:  
 
Hospital Problems  Date Reviewed: 1/25/2018 Codes Class Noted POA A-fib [facility]) ICD-10-CM: I48.91 
ICD-9-CM: 427.31  11/5/2020 Unknown Hypoglycemia ICD-10-CM: E16.2 ICD-9-CM: 251.2  11/5/2020 Unknown Acute dyspnea ICD-10-CM: R06.00 
ICD-9-CM: 786.09  11/5/2020 Plan:  
 
Continue coreg/cardizem.  
 
Luci Peña MD

## 2020-11-12 NOTE — PROGRESS NOTES
End of Shift Note Bedside shift change report given to Allen (oncoming nurse) by Preet Cross RN (offgoing nurse). Report included the following information SBAR and Kardex Shift worked:  [id number] Shift summary and any significant changes:  
  None. Concerns for physician to address:  None. Zone phone for oncoming shift:     
 
Activity: 
Activity Level: Up with Assistance Number times ambulated in hallways past shift: 0 Number of times OOB to chair past shift: 0 Cardiac:  
Cardiac Monitoring: Yes     
Cardiac Rhythm: Atrial fibrillation Access:  
Current line(s): PIV and HD access Genitourinary:  
Urinary status: oliguric Respiratory:  
O2 Device: Nasal cannula Chronic home O2 use?: YES Incentive spirometer at bedside: NO 
  
GI: 
Last Bowel Movement Date: 11/06/20 Current diet:  DIET NUTRITIONAL SUPPLEMENTS Dinner, Breakfast; Nepro DIET DIABETIC CONSISTENT CARB Soft Solids; 2 GM NA (House Low NA) DIET ONE TIME MESSAGE Passing flatus: YES Tolerating current diet: YES 
% Diet Eaten: 15 % Pain Management:  
Patient states pain is manageable on current regimen: YES Skin: 
Walter Score: 15 Interventions: speciality bed Patient Safety: 
Fall Score: Total Score: 4 Interventions: bed/chair alarm High Fall Risk: Yes Length of Stay: 
Expected LOS: 4d 19h Actual LOS: 7 Preet Cross RN

## 2020-11-12 NOTE — PROGRESS NOTES
0700: Bedside shift change report given to Allen RN (oncoming nurse) by Evi Flowers RN (offgoing nurse). Report included the following information SBAR and Kardex. 0740: Pt brent for ultrafiltration, received call from dialysis nurse Dr. Omkar Bobo wants an order for ABGs and BMP. 1231:  Pt unable to get NPH insulin 15u this morning due to dialysis per Dr. Johnson Loser give 10u NPH.

## 2020-11-12 NOTE — PROGRESS NOTES
0745: Received patient in HD suite. Orientation assessment. Patient is altered from last 2 days. He was A&Ox4, now answers each question with either yes, no, or his name. 9442: Informed nephrology of A&Ox1; ABG ordered. Reviewed current VS all WNL. SpO2 100% on 2L O2. MAR reviewed for recent narcotics. Last [place] was 1105am 11/11/20. Will reassess after UF and ABG results.

## 2020-11-12 NOTE — PROGRESS NOTES
Occupational Therapy Chart reviewed; patient currently in dialysis; will retry later as able for OT.  Isaias Rashid OTR/L

## 2020-11-12 NOTE — PROGRESS NOTES
Nephrology Progress Note Hugo Santamaria  
 
[facility]  Phone - [phone] Patient: Summer Veras. YOB: 1965     Admit Date: 11/5/2020 Date- 11/12/2020 CC: Follow up for ESRD IMPRESSION & PLAN:  
? esrd  renal-[place]-Monday Wednesday Friday ? Hyperkalemia 
? SOB, Fluid overload ? Anemia of CKD ? Noncompliance to fluid restriction ? Hyponatremia ? A. fib with RVR ? Hypoglycemia ? Secondary hyperparathyroidism ? Current smoker PLAN- 
· He received ultrafiltration today · His k is high. He will need kayexalate · Next hd friday · Fluid restriction 1200 mL/day · Follow BMP · Continue Renvela · He may be having alcohol withdrawal - monitor symptoms Subjective: Interval History: He finished ultrafiltration this am 
k is high 5.8 - na slightly better 128 Sob improving He was confused this am.  
 
-Patient has history of end-stage renal disease on dialysis Monday Wednesday Friday at [facility] unit ROS:- As documented above Objective:  
Vitals:  
 11/12/20 0915 11/12/20 0930 11/12/20 0945 11/12/20 1000 BP: 122/67 119/80 118/63 100/82 Pulse: 70 76 71 62 Resp: 18 18 18 18 Temp:      
TempSrc:      
SpO2:      
Weight:      
Height:      
  
11/11 0701 - 11/12 0700 In: -  
Out: 2500 Last 3 Recorded Weights in this Encounter 11/10/20 0601 11/11/20 2248 11/12/20 6185 Weight: 73.3 kg (161 lb 11.2 oz) 71.8 kg (158 lb 4.8 oz) 66.8 kg (147 lb 4.8 oz) Physical exam:  
GEN: NAD NECK- Supple, no mass RESP: coarse b/l, decreased air movement CVS: S1,S2, RRR 
NEURO: Normal speech,non focal 
Abdo- soft, NT 
EXT: + Edema PSYCH: Normal Mood Right arm avf + Chart reviewed. Pertinent Notes reviewed. Data Review : 
Recent Labs 11/12/20 
0845 11/10/20 
0013 11/09/20 
1228 * 125* 120*  
K 5.8* 4.8 4.5  
CL 91* 89* 85* CO2 29 26 20* BUN 71* 65* 103* CREA 4.04* 4.27* 5.54* * 394* 366* CA 8.2* 7.9* 7.5* PHOS  --   --  7.7* Recent Labs 11/12/20 
2516 11/11/20 
0222 11/10/20 
1209  11/10/20 
0013 WBC 24.8* 26.6*  --   --  20.3* HGB 9.2* 10.3* 10.9*   < > 9.8* HCT 27.5* 29.6* 31.8*   < > 28.0*  
 307  --   --  288  
 < > = values in this interval not displayed. No results for input(s): FE, TIBC, PSAT, FERR in the last 72 hours. Medication list  reviewed Current Facility-Administered Medications Medication Dose Route Frequency  methylPREDNISolone (PF) (SOLU-MEDROL) injection 20 mg  20 mg IntraVENous Q8H  
 carvediloL (COREG) tablet 12.5 mg  12.5 mg Oral BID WITH MEALS  insulin NPH (NOVOLIN N, HUMULIN N) injection 15 Units  15 Units SubCUTAneous ACB&D  
 dilTIAZem ER (CARDIZEM CD) capsule 240 mg  240 mg Oral DAILY  insulin lispro (HUMALOG) injection   SubCUTAneous AC&HS  
 dextrose (D50W) injection syrg 12.5-25 g  12.5-25 g IntraVENous PRN  
 cefepime (MAXIPIME) 1 g in 0.9% sodium chloride (MBP/ADV) 50 mL MBP  1 g IntraVENous Q24H  
 HYDROcodone-acetaminophen (NORCO)  mg tablet 1 Tab  1 Tab Oral Q6H PRN  peppermint oil   Other PRN  
 0.9% sodium chloride infusion 250 mL  250 mL IntraVENous PRN  
 metroNIDAZOLE (FLAGYL) IVPB premix 500 mg  500 mg IntraVENous Q12H  nystatin (MYCOSTATIN) 100,000 unit/mL oral suspension 500,000 Units  500,000 Units Oral QID  guaiFENesin-dextromethorphan (ROBITUSSIN DM) 100-10 mg/5 mL syrup 10 mL  10 mL Oral Q6H PRN  
 nicotine (NICODERM CQ) 14 mg/24 hr patch 1 Patch  1 Patch TransDERmal DAILY PRN  
 senna-docusate (PERICOLACE) 8.6-50 mg per tablet 1 Tab  1 Tab Oral DAILY  balsam peru-castor oiL (VENELEX) ointment   Topical BID  epoetin ginger-epbx (RETACRIT) injection 4,000 Units  4,000 Units SubCUTAneous Q MON, WED & FRI  guaiFENesin ER (MUCINEX) tablet 600 mg  600 mg Oral Q12H  hydrALAZINE (APRESOLINE) 20 mg/mL injection 10 mg  10 mg IntraVENous Q4H PRN  
  [Held by provider] dilTIAZem (CARDIZEM) 100 mg in dextrose 5% (MBP/ADV) 100 mL infusion  0-15 mg/hr IntraVENous TITRATE  sodium chloride (NS) flush 5-10 mL  5-10 mL IntraVENous PRN  
 acetaminophen (TYLENOL) tablet 650 mg  650 mg Oral Q6H PRN  thiamine mononitrate (B-1) tablet 100 mg  100 mg Oral DAILY  folic acid (FOLVITE) tablet 1 mg  1 mg Oral DAILY  therapeutic multivitamin (THERAGRAN) tablet 1 Tab  1 Tab Oral DAILY  glucose chewable tablet 16 g  4 Tab Oral PRN  
 glucagon (GLUCAGEN) injection 1 mg  1 mg IntraMUSCular PRN  pantoprazole (PROTONIX) tablet 40 mg  40 mg Oral ACB  sevelamer carbonate (RENVELA) tab 800 mg  800 mg Oral TID  gabapentin (NEURONTIN) capsule 300 mg  300 mg Oral TID PRN  
 [Held by provider] heparin (porcine) injection 5,000 Units  5,000 Units SubCUTAneous Q12H  
 albuterol-ipratropium (DUO-NEB) 2.5 MG-0.5 MG/3 ML  3 mL Nebulization Q4H PRN  
 budesonide (PULMICORT) 500 mcg/2 ml nebulizer suspension  500 mcg Nebulization BID RT And  
 arformoteroL (BROVANA) neb solution 15 mcg  15 mcg Nebulization BID RT And  
 ipratropium (ATROVENT) 0.02 % nebulizer solution 0.5 mg  0.5 mg Nebulization Q6H PRN Jerry Lin, [address] [facility] [employer] [address] [place], [address] Phone - [phone]               Fax - [phone]

## 2020-11-12 NOTE — PROCEDURES
[facility] Dialysis Team [place]  [phone] Vitals   Pre   Post   Assessment   Pre   Post    
Temp  Temp: 97.7 °F (36.5 °C) (20)  97.9, oral LOC  A&Ox1! Only answers with first or last name or yes/ no (MD made aware) A&Ox3; 
Name/ / place/ president (not asked); does not know month or yr HR   Pulse (Heart Rate): 73 (20) 71 Lungs   Wheeze, Diminished 100% on 2L O2 Dim bases; 100% on 2L O2  
B/P   BP: 111/66 (20) 117/67 Cardiac   Tele, A-Fib Tele, A-Fib Resp   Resp Rate: 18 (20) 18 Skin   Scattered bruising, Abd wound with CDI dressing; scattered skin tears with CDI dressings; Stage 2 sacrum; R heel scab Scattered bruising, Abd wound with CDI dressing; scattered skin tears with CDI dressings; Stage 2 sacrum; R heel scab Pain level  Pain Intensity 1: 0 (20) 0 Edema  1+ BLE Trace BLE Orders: Duration:   Start:    840 End:     Total:   2Hr UF Dialyzer:   Dialyzer/Set Up Inspection: Conner Smallwood (20) K Bath:   Dialysate K (mEq/L): (N/A UF only) (20) Ca Bath:   Dialysate CA (mEq/L): (N/A UF only) (20) Na/Bicarb:   Dialysate NA (mEq/L): (N/A UF only) (20) Target Fluid Removal:   Goal/Amount of Fluid to Remove (mL): 3500 mL (20) Access Type & Location:   MANUEL AVF: skin CDI. No s/s of infection. + B/T. No issues with cannulation or hemostasis. Running well at  (UF only). Labs Obtained/Reviewed Critical Results Called   Date when labs were drawn- 
Hgb-   
HGB Date Value Ref Range Status 2020 9.2 (L) 12.1 - 17.0 g/dL Final  
 
K-   
Potassium Date Value Ref Range Status 2020 5.8 (H) 3.5 - 5.1 mmol/L Final  
 
Ca-  
Calcium Date Value Ref Range Status 2020 8.2 (L) 8.5 - 10.1 MG/DL Final  
 
Bun-  
BUN Date Value Ref Range Status 2020 71 (H) 6 - 20 MG/DL Final  
 
Creat-  
Creatinine Date Value Ref Range Status 11/12/2020 4.04 (H) 0.70 - 1.30 MG/DL Final  
 
  
Medications/ Blood Products Given Name   Dose   Route and Time None ordered Blood Volume Processed (BVP):    0L, UF only Net Fluid Removed:  3500ml Comments Time Out Done: 9280 Primary Nurse Rpt Pre: Cheryl Jacobson RN 
Primary Nurse Rpt Post: 
Pt Education: reorientation and procedural, needs reinforcement Care Plan: ongoing Tx Summary: 
Pt arrived to HD suite AMS; Only answers with first or last name or yes/ no. Dr Joelle Glaser made aware of orientation change. Ordered ABG and to continue with UF for today. Consent signed & on file. SBAR received from Primary RN. 0840: Pt cannulated with 79I needles per policy & without issue. Labs drawn per request/ order. VSS. RRT unable to get ABG. Order change to VBG and drawn from arterial needle prior to start. Dialysis Tx initiated. 1000: Dr Kimi Turner at bedside for assessment. VSS. Patient now recognizes MD and calls him by name. A&Ox3 now. 1041: Tx ended. VSS. A&Ox3. All possible blood returned to patient. Hemostasis achieved without issue. Bed locked and in the lowest position, call bell and belongings in reach. SBAR given to Primary, RN. Patient VSS at time of their departure. All Dialysis related medications have been reviewed. Admiting Diagnosis: A-Fib/ Acute Resp Failure Pt's previous clinic- [facility] Consent signed - Informed Consent Verified: Yes (11/12/20 0840) Kaity Consent - on file Hepatitis Status- Immune, 1/14/2020 Neg Ag; 51SK Machine #- Machine Number: Meena April (11/12/20 0840) Telemetry status- Tele, A-Fib Pre-dialysis wt. -

## 2020-11-12 NOTE — PROGRESS NOTES
Hospitalist Progress Note NAME: Sakshi Ngo. :  1965 MRN:  [id number] Assessment / Plan: 
DM 2 uncontrolled  From steroids Severe symptomatic hypoglycemia on admit resolved Off of D10 drip HgA1C 5.8 Diabetic diet Elevated BG from steroids JERRY adjusted, started humulin monitor Bg now better 
  
Suspected sepsis ? HAP 
chronic wound on abdomen from hernia repair, wound culture with normal tana Sputum culture growing Pseudomonas aeruginosa,  
Continue cefepime, cover for CAP for total 7 days WBC elevated 24k, secondary to steroids?,  Monitor CBC  
 
  
Ventral hernia Mesh with bleeding Bleeding intermittently now stopped per staff No fluid collection  on CT Cont wound care per surgery use moist dressing avoid dakins F/u wound cx  Normal tana Iv Abx cefepime/flagyl Chr anemia monitor Hgb >8 
  
A. fib with RVR intermittetly elevated HR 
 in ER on admit 
cardizem PO per cards s/p cardizem drip on admit Hold AC as hx of GI bleed in past and abd wound bleeding Cards on case f/u recomnds Coreg 12.5mg  Adjust as needed Echo -EF normal, dilated LA, PASP 56 Tsh/Ft4 ok Acute on chronic respiratory failure with hypoxia, currently requiring 2 L Recurrent Pleural effusion  
RLL collapse COPD managed by Dr. Newton Letters Appreciate guidance from Dr. Juan Ramon Pacheco Nebs prn 
IV steroids  for wheezing started tapering 20mg q8 
  
L Renal mass Urology eval requested, reviewed recommendation leison chr no further w/u needed. 
  
ESRD/HD [facility] for UF today per staff Hyponatremia    Monitor Na 
  
ETOH abuse Tobacco abuse Thiamine/folate/mvt CIWA monitoring Nicotine patch 14mg daily PRN  
  
Debility/Deconditiones state with mod protien malnourished PT/OT, Dietary  On case 
  
Hepatitis C infection hx Monitored at [facility] hepatology clinic Hx of PVD s/p thrombectomy Hx of Splenectomy and partial pancreatectomy Chronic constipation - pt takes mag citrate at home PRN  
 
DVT PRx SCD AD [facility] 
NOK wife Azam Miranda [phone] Dispo: not medically stable yet may need SNF. PT/OT on case Subjective: Chief Complaint / Reason for Physician Visit WBC  24K for UF today per renal. Per staff was confused last night. HR better <100 now. He is without N/V/D per staff. Case d/w wife on the phone and renal.  
 
Objective: VITALS:  
Last 24hrs VS reviewed since prior progress note. Most recent are: 
Patient Vitals for the past 24 hrs: 
 Temp Pulse Resp BP SpO2  
11/12/20 0732     96 % 11/12/20 0633 97.4 °F (36.3 °C) 64 16 (!) 115/58 95 % 11/12/20 0312 97.5 °F (36.4 °C) 71 18 96/63 95 % 11/11/20 2314 97.9 °F (36.6 °C) 75 22 118/68 97 % 11/11/20 1915     94 % 11/11/20 1830 98.1 °F (36.7 °C) 98 20 114/82 94 % 11/11/20 1525 97.8 °F (36.6 °C) 90 20 134/81 93 % 11/11/20 1206 97.7 °F (36.5 °C) (!) 113 20 (!) 146/82 94 % 11/11/20 1020 97.7 °F (36.5 °C) (!) 121 20 130/66 94 % 11/11/20 0955 98 °F (36.7 °C) (!) 116 18 (!) 155/70   
11/11/20 0945  (!) 109 18 (!) 143/85   
11/11/20 0930  (!) 105 18 139/77   
11/11/20 0915  (!) 106 18 (!) 153/78   
11/11/20 0900  (!) 109 18 (!) 155/87   
11/11/20 0845  (!) 105 18 (!) 152/72  Intake/Output Summary (Last 24 hours) at 11/12/2020 3783 Last data filed at 11/11/2020 1419 Gross per 24 hour Intake  Output 2500 ml Net -2500 ml PHYSICAL EXAM: 
General: Chr ill looking, no acute distress   
EENT:   MMM Resp:  Symmetric expansion,  no wheezing  today CV:  Irregular rhythm, no edema GI:  Soft, Chr Abd wound Neurologic:  Alert and  normal speech, Psych:      Anxious Reviewed most current lab test results and cultures  YES Reviewed most current radiology test results   YES Review and summation of old records today    NO Reviewed patient's current orders and MAR    YES 
PMH/SH reviewed - no change compared to H&P 
 
 Procedures: see electronic medical records for all procedures/Xrays and details which were not copied into this note but were reviewed prior to creation of Plan. LABS: 
I reviewed today's most current labs and imaging studies. Pertinent labs include: 
Recent Labs 11/12/20 
2621 11/11/20 
0222 11/10/20 
1209  11/10/20 
0013 WBC 24.8* 26.6*  --   --  20.3* HGB 9.2* 10.3* 10.9*   < > 9.8* HCT 27.5* 29.6* 31.8*   < > 28.0*  
 307  --   --  288  
 < > = values in this interval not displayed. Recent Labs 11/10/20 
0013 11/09/20 
1746 11/09/20 
1228 *  --  120*  
K 4.8  --  4.5  
CL 89*  --  85* CO2 26  --  20* *  --  366* BUN 65*  --  103* CREA 4.27*  --  5.54* CA 7.9*  --  7.5* PHOS  --   --  7.7* ALB  --   --  2.8* INR  --  1.1  --   
 
 
Signed: Jb Hammonds MD

## 2020-11-13 NOTE — PROGRESS NOTES
Problem: Falls - Risk of 
Goal: *Absence of Falls Description: Document Monica Steedman Fall Risk and appropriate interventions in the flowsheet. Outcome: Progressing Towards Goal 
Note: Fall Risk Interventions: 
Mobility Interventions: Bed/chair exit alarm, Communicate number of staff needed for ambulation/transfer, Patient to call before getting OOB, Utilize walker, cane, or other assistive device Mentation Interventions: Adequate sleep, hydration, pain control, Bed/chair exit alarm, Increase mobility, More frequent rounding, Reorient patient, Room close to nurse's station, Update white board Medication Interventions: Bed/chair exit alarm, Patient to call before getting OOB, Teach patient to arise slowly Elimination Interventions: Bed/chair exit alarm, Call light in reach, Patient to call for help with toileting needs, Toileting schedule/hourly rounds History of Falls Interventions: Bed/chair exit alarm, Room close to nurse's station Problem: Pressure Injury - Risk of 
Goal: *Prevention of pressure injury Description: Document Walter Scale and appropriate interventions in the flowsheet. Outcome: Progressing Towards Goal 
Note: Pressure Injury Interventions: 
Sensory Interventions: Assess changes in LOC, Assess need for specialty bed, Float heels, Keep linens dry and wrinkle-free, Maintain/enhance activity level, Minimize linen layers Moisture Interventions: Absorbent underpads, Minimize layers Activity Interventions: Assess need for specialty bed, Increase time out of bed, Pressure redistribution bed/mattress(bed type) Mobility Interventions: Assess need for specialty bed, Pressure redistribution bed/mattress (bed type), Float heels Nutrition Interventions: Document food/fluid/supplement intake, Offer support with meals,snacks and hydration Friction and Shear Interventions: Foam dressings/transparent film/skin sealants, Transferring/repositioning devices

## 2020-11-13 NOTE — PROGRESS NOTES
Hospitalist Progress Note NAME: Omar Orellana. :  1965 MRN:  [id number] Assessment / Plan: 
DM 2 uncontrolled  From steroids Severe symptomatic hypoglycemia on admit resolved Off of D10 drip HgA1C 5.8 Diabetic diet Elevated BG from steroids JERRY adjusted, started humulin monitor Bg now better 
  
Suspected sepsis ? HAP 
chronic wound on abdomen from hernia repair, wound culture with normal tana Sputum culture growing Pseudomonas aeruginosa,  
Continue cefepime, flagyl cover for CAP for total 7 days WBC elevated 29, secondary to steroids?,  Monitor CBC orderd chest xray spoke with his wife  Cultures ordered as well  
 
  
Ventral hernia Mesh with bleeding Bleeding intermittently now stopped per staff No fluid collection  on CT Cont wound care per surgery use moist dressing avoid dakins F/u wound cx  Normal tana Iv Abx cefepime/flagyl Chr anemia monitor Hgb >8 
  
A. fib with RVR intermittetly elevated HR 
 in ER on admit BB dose increased today by cardiology  
cardizem PO per cards s/p cardizem drip on admit Hold AC as hx of GI bleed in past and abd wound bleeding Cards on case f/u recomnds Coreg 12.5mg  Adjust as needed Echo -EF normal, dilated LA, PASP 56 Tsh/Ft4 ok Acute on chronic respiratory failure with hypoxia, currently requiring 2 L Recurrent Pleural effusion  check chest xray RLL collapse COPD managed by Dr. Roman Dad Appreciate guidance from Dr. Delilah Rodriguez Nebs prn 
IV steroids  for wheezing started tapering 20mg q8 
  
L Renal mass Urology eval requested, reviewed recommendation leison chr no further w/u needed. 
  
ESRD/HD [place] [facility] for UF today per staff Hyponatremia    Monitor Na 
  
ETOH abuse Tobacco abuse Thiamine/folate/mvt CIWA monitoring Nicotine patch 14mg daily PRN Alert oriented answered all questions.  Wife thinks he is much confused as compared to base line. CT head and Neurology consult, ABG ordered no focal neuro deficits seen on my exam he is slow to respond. 
  
Debility/Deconditiones state with mod protien malnourished PT/OT, Dietary  On case 
  
Hepatitis C infection hx Monitored at [facility] hepatology clinic Hx of PVD s/p thrombectomy Hx of Splenectomy and partial pancreatectomy Chronic constipation - pt takes mag citrate at home PRN  
 
DVT PRx SCD AD [facility] 
NOK wife Leo Serrato [phone] Dispo: not medically stable yet may need SNF. PT/OT on case Subjective: Chief Complaint / Reason for Physician Visit Confusion is resolved he answered all questions. I spoke with his wife as well today. Objective: VITALS:  
Last 24hrs VS reviewed since prior progress note. Most recent are: 
Patient Vitals for the past 24 hrs: 
 Temp Pulse Resp BP SpO2  
11/13/20 0831 97.6 °F (36.4 °C)      
11/13/20 0733     99 % 11/13/20 0640 97 °F (36.1 °C) (!) 114 20 (!) 144/96 99 % 11/13/20 0600  (!) 106  (!) 139/92 93 % 11/13/20 0200 97.5 °F (36.4 °C) (!) 109 20 135/82 90 % 11/13/20 0000  (!) 114  124/83 90 % 11/12/20 2204 98.1 °F (36.7 °C) 100 18 (!) 147/84 97 % 11/12/20 1945     95 % 11/12/20 1844 97.5 °F (36.4 °C) 96 18 122/84 96 % 11/12/20 1739  96  124/75   
11/12/20 1538 97.5 °F (36.4 °C) 81 19 127/78 99 % 11/12/20 1145 97.4 °F (36.3 °C) 88 20 (!) 114/93 93 % 11/12/20 1040 97.9 °F (36.6 °C) 71 18 117/67   
11/12/20 1030  75 18 105/70   
11/12/20 1015  67 18 111/64  Intake/Output Summary (Last 24 hours) at 11/13/2020 1003 Last data filed at 11/12/2020 1040 Gross per 24 hour Intake  Output 3500 ml Net -3500 ml PHYSICAL EXAM: 
General: Chr ill looking, no acute distress   
EENT:   MMM Resp:  Symmetric expansion,  Moderate  wheezing  today CV:  Irregular rhythm, no edema GI:  Soft, Chr Abd wound Neurologic:  Alert and  normal speech, Psych:      Anxious Reviewed most current lab test results and cultures  YES Reviewed most current radiology test results   YES Review and summation of old records today    NO Reviewed patient's current orders and MAR    YES 
PMH/SH reviewed - no change compared to H&P Procedures: see electronic medical records for all procedures/Xrays and details which were not copied into this note but were reviewed prior to creation of Plan. LABS: 
I reviewed today's most current labs and imaging studies. Pertinent labs include: 
Recent Labs 11/13/20 
0225 11/12/20 
1343 11/11/20 
0222 WBC 27.8* 24.8* 26.6* HGB 9.8* 9.2* 10.3* HCT 28.7* 27.5* 29.6*  263 307 Recent Labs 11/13/20 
0847 11/12/20 
0845 * 128* K 4.5 5.8*  
CL 87* 91* CO2 29 29 * 135* BUN 91* 71* CREA 5.23* 4.04* CA 8.1* 8.2* PHOS 7.0*  --   
ALB 2.7*  --   
 
 
Signed: Amanda Gilmore MD

## 2020-11-13 NOTE — PROGRESS NOTES
2025: Spoke with pts. Wife, Tray Fam. Wife requesting update from MDs in morning and concerned about his confusion 0015: Pt. Is having a frequent barking cough, o2 sats remain low 90s on 2 liters o2 (was mid to high 90s at 1900). Will message NP for cough meds and repeat chest xray since last one was on admission 11/5.  
 
0045: New orders placed by Ravin Fraga NP.  
 
4066: Paged Dr. Nickie Patricia regarding HR no longer being rate controlled (HR ranges from 95-140bpm). 0220: No new orders received from Dr. Nickie Patricia, just to continue to watch HR.  
 
0700: End of Shift Note Bedside shift change report given to [facility] (oncoming nurse) by Aneesh Alexander RN (offgoing nurse). Report included the following information SBAR, Kardex, Procedure Summary, Intake/Output, MAR, Recent Results and Cardiac Rhythm Paced Shift worked:  7p-7a Shift summary and any significant changes:  
   
 
  
Concerns for physician to address:    
Zone phone for oncoming shift:     
 
Activity: 
Activity Level: Up with Assistance Number times ambulated in hallways past shift: 0 Number of times OOB to chair past shift: 0 Cardiac:  
Cardiac Monitoring: Yes     
Cardiac Rhythm: Atrial fibrillation Access:  
Current line(s): PIV Genitourinary:  
Urinary status: oliguric Respiratory:  
O2 Device: Nasal cannula Chronic home O2 use?: NO Incentive spirometer at bedside: NO 
  
GI: 
Last Bowel Movement Date: 11/12/20 Current diet:  DIET NUTRITIONAL SUPPLEMENTS Dinner, Breakfast; Nepro DIET DIABETIC CONSISTENT CARB Soft Solids; 2 GM NA (House Low NA) DIET ONE TIME MESSAGE 
DIET ONE TIME MESSAGE Passing flatus: YES Tolerating current diet: YES 
% Diet Eaten: 15 % Pain Management:  
Patient states pain is manageable on current regimen: YES Skin: 
Walter Score: 14 Interventions: turn team, speciality bed, float heels, increase time out of bed and PT/OT consult Patient Safety: 
Fall Score: Total Score: 5 Interventions: bed/chair alarm, assistive device (walker, cane, etc) and gripper socks High Fall Risk: Yes Length of Stay: 
Expected LOS: 4d 19h Actual LOS: 8 Susana Mclaughlin RN

## 2020-11-13 NOTE — PROGRESS NOTES
11/13/20 1530 Vital Signs Temp 97.9 °F (36.6 °C) Temp Source Oral  
Pulse (Heart Rate) (!) 132 Heart Rate Source Monitor Cardiac Rhythm A Fib Resp Rate 18 Level of Consciousness Alert  
BP (!) 124/105 MAP (Calculated) 111 MEWS Score 4 Dr. Pablo Score notified. Awaiting orders.

## 2020-11-13 NOTE — PROGRESS NOTES
Received notification from bedside RN about patient with regards to: patient increasing frequency of barking cough Saturation dropping in low 90's VS:147/84, , RR 18, O2 sat 92% on NC 2 L, temp 98.1 Intervention given: Robitussin 200 ml q 4 hour prn, Tessalon TID prn, CXR in am

## 2020-11-13 NOTE — PROGRESS NOTES
Cardiology Progress Note 11/13/2020 9:06 AM 
 
Admit Date: 11/5/2020 Admit Diagnosis: Hypoglycemia [E16.2]; A-fib ([facility] [I48.91]; Acute hypoxemic respiratory failure due to COVID-19 ([facility] [U07.1, J96.01] Subjective:  
 
Zuleika Cervantes is somnolent. Denies any chest pain. HR high last night. BP high. -120, remains in afib. Visit Vitals BP (!) 144/96 Pulse (!) 114 Temp 97.6 °F (36.4 °C) Resp 20 Ht 5' 6\" (1.676 m) Wt 153 lb 9.6 oz (69.7 kg) SpO2 99% BMI 24.79 kg/m² Current Facility-Administered Medications Medication Dose Route Frequency  guaiFENesin (ROBITUSSIN) 100 mg/5 mL oral liquid 200 mg  200 mg Oral Q4H PRN  
 benzonatate (TESSALON) capsule 100 mg  100 mg Oral TID PRN  
 carvediloL (COREG) tablet 25 mg  25 mg Oral BID WITH MEALS  methylPREDNISolone (PF) (SOLU-MEDROL) injection 20 mg  20 mg IntraVENous Q8H  
 dilTIAZem ER (CARDIZEM CD) capsule 240 mg  240 mg Oral DAILY  insulin lispro (HUMALOG) injection   SubCUTAneous AC&HS  
 dextrose (D50W) injection syrg 12.5-25 g  12.5-25 g IntraVENous PRN  
 cefepime (MAXIPIME) 1 g in 0.9% sodium chloride (MBP/ADV) 50 mL MBP  1 g IntraVENous Q24H  
 HYDROcodone-acetaminophen (NORCO)  mg tablet 1 Tab  1 Tab Oral Q6H PRN  peppermint oil   Other PRN  
 0.9% sodium chloride infusion 250 mL  250 mL IntraVENous PRN  
 metroNIDAZOLE (FLAGYL) IVPB premix 500 mg  500 mg IntraVENous Q12H  nystatin (MYCOSTATIN) 100,000 unit/mL oral suspension 500,000 Units  500,000 Units Oral QID  guaiFENesin-dextromethorphan (ROBITUSSIN DM) 100-10 mg/5 mL syrup 10 mL  10 mL Oral Q6H PRN  
 nicotine (NICODERM CQ) 14 mg/24 hr patch 1 Patch  1 Patch TransDERmal DAILY PRN  
 senna-docusate (PERICOLACE) 8.6-50 mg per tablet 1 Tab  1 Tab Oral DAILY  balsam peru-castor oiL (VENELEX) ointment   Topical BID  epoetin ginger-epbx (RETACRIT) injection 4,000 Units  4,000 Units SubCUTAneous Q MON, WED & FRI  
  hydrALAZINE (APRESOLINE) 20 mg/mL injection 10 mg  10 mg IntraVENous Q4H PRN  
 [Held by provider] dilTIAZem (CARDIZEM) 100 mg in dextrose 5% (MBP/ADV) 100 mL infusion  0-15 mg/hr IntraVENous TITRATE  sodium chloride (NS) flush 5-10 mL  5-10 mL IntraVENous PRN  
 acetaminophen (TYLENOL) tablet 650 mg  650 mg Oral Q6H PRN  thiamine mononitrate (B-1) tablet 100 mg  100 mg Oral DAILY  folic acid (FOLVITE) tablet 1 mg  1 mg Oral DAILY  therapeutic multivitamin (THERAGRAN) tablet 1 Tab  1 Tab Oral DAILY  glucose chewable tablet 16 g  4 Tab Oral PRN  
 glucagon (GLUCAGEN) injection 1 mg  1 mg IntraMUSCular PRN  pantoprazole (PROTONIX) tablet 40 mg  40 mg Oral ACB  sevelamer carbonate (RENVELA) tab 800 mg  800 mg Oral TID  gabapentin (NEURONTIN) capsule 300 mg  300 mg Oral TID PRN  
 [Held by provider] heparin (porcine) injection 5,000 Units  5,000 Units SubCUTAneous Q12H  
 albuterol-ipratropium (DUO-NEB) 2.5 MG-0.5 MG/3 ML  3 mL Nebulization Q4H PRN  
 budesonide (PULMICORT) 500 mcg/2 ml nebulizer suspension  500 mcg Nebulization BID RT And  
 arformoteroL (BROVANA) neb solution 15 mcg  15 mcg Nebulization BID RT And  
 ipratropium (ATROVENT) 0.02 % nebulizer solution 0.5 mg  0.5 mg Nebulization Q6H PRN Objective:  
  
Physical Exam: 
Visit Vitals BP (!) 144/96 Pulse (!) 114 Temp 97.6 °F (36.4 °C) Resp 20 Ht 5' 6\" (1.676 m) Wt 153 lb 9.6 oz (69.7 kg) SpO2 99% BMI 24.79 kg/m² General Appearance:  Well developed, well nourished,alert and oriented x 3, and individual in no acute distress. Ears/Nose/Mouth/Throat:   Hearing grossly normal. 
  
    Neck: Supple. Chest:   Lungs clear to auscultation bilaterally. Cardiovascular:  Regular rate and rhythm, S1, S2 normal, no murmur. Abdomen:   Soft, non-tender, bowel sounds are active. Extremities: No edema bilaterally. Skin: Warm and dry. Data Review:  
Labs: Recent Results (from the past 24 hour(s)) GLUCOSE, POC Collection Time: 11/12/20 11:37 AM  
Result Value Ref Range Glucose (POC) 172 (H) 65 - 100 mg/dL Performed by Eileen Guillory RN   
GLUCOSE, POC Collection Time: 11/12/20  4:06 PM  
Result Value Ref Range Glucose (POC) 181 (H) 65 - 100 mg/dL Performed by Daina Vega GLUCOSE, POC Collection Time: 11/12/20  8:46 PM  
Result Value Ref Range Glucose (POC) 232 (H) 65 - 100 mg/dL Performed by Tamra Cartagena (CON) CBC W/O DIFF Collection Time: 11/13/20  2:25 AM  
Result Value Ref Range WBC 27.8 (H) 4.1 - 11.1 K/uL  
 RBC 2.81 (L) 4.10 - 5.70 M/uL HGB 9.8 (L) 12.1 - 17.0 g/dL HCT 28.7 (L) 36.6 - 50.3 % .1 (H) 80.0 - 99.0 FL  
 MCH 34.9 (H) 26.0 - 34.0 PG  
 MCHC 34.1 30.0 - 36.5 g/dL  
 RDW 19.1 (H) 11.5 - 14.5 % PLATELET 039 659 - 614 K/uL MPV 11.1 8.9 - 12.9 FL  
 NRBC 9.9 (H) 0  WBC ABSOLUTE NRBC 2.75 (H) 0.00 - 0.01 K/uL GLUCOSE, POC Collection Time: 11/13/20  7:27 AM  
Result Value Ref Range Glucose (POC) 111 (H) 65 - 100 mg/dL Performed by Bam Epstein [facility]   
GLUCOSE, POC Collection Time: 11/13/20  7:42 AM  
Result Value Ref Range Glucose (POC) 112 (H) 65 - 100 mg/dL Performed by Via Kim PCT Telemetry: AFIB Assessment:  
 
Hospital Problems  Date Reviewed: 1/25/2018 Codes Class Noted POA A-fib [facility]) ICD-10-CM: I48.91 
ICD-9-CM: 427.31  11/5/2020 Unknown Hypoglycemia ICD-10-CM: E16.2 ICD-9-CM: 251.2  11/5/2020 Unknown Acute dyspnea ICD-10-CM: R06.00 
ICD-9-CM: 786.09  11/5/2020 Plan:  
 
Increase coreg for better rate control. Continue cardizem. Can increase dose if needed.   
 
Gaylene Mortimer, MD

## 2020-11-13 NOTE — PROGRESS NOTES
Occupational Therapy Chart reviewed; Patient currently at dialysis; will retry later today as able.  Nikos Gill OTR/L

## 2020-11-13 NOTE — PROGRESS NOTES
0700: End of Shift Note Bedside shift change report given to Prosper Aragon RN (oncoming nurse) by Latrice Gagnon (offgoing nurse). Report included the following information SBAR Shift worked:  7a-7p Shift summary and any significant changes:  
  AMS, PICC team for blood culture draw, dilt gtt started, dialysis today 3 kilos removed. Concerns for physician to address:    
Zone phone for oncoming shift:   [phone] Activity: 
Activity Level: Up with Assistance Number times ambulated in hallways past shift: 0 Number of times OOB to chair past shift: 0 Cardiac:  
Cardiac Monitoring: Yes     
Cardiac Rhythm: Atrial fibrillation Access:  
Current line(s): PIV Genitourinary:  
Urinary status: oliguric Respiratory:  
O2 Device: Nasal cannula Chronic home O2 use?: NO Incentive spirometer at bedside: NO 
  
GI: 
Last Bowel Movement Date: 11/12/20 Current diet:  DIET NUTRITIONAL SUPPLEMENTS Dinner, Breakfast; Nepro DIET ONE TIME MESSAGE 
DIET ONE TIME MESSAGE 
DIET DIABETIC CONSISTENT CARB Soft Solids; 2 GM NA (House Low NA); Low Phosphorus Passing flatus: YES Tolerating current diet: YES 
% Diet Eaten: 15 % Pain Management:  
Patient states pain is manageable on current regimen: YES Skin: 
Walter Score: 15 Interventions: float heels, foam dressing, internal/external urinary devices and nutritional support Patient Safety: 
Fall Score: Total Score: 5 Interventions: bed/chair alarm, gripper socks and pt to call before getting OOB High Fall Risk: Yes Length of Stay: 
Expected LOS: 4d 19h Actual LOS: 8 Latrice Gagnon

## 2020-11-13 NOTE — PROCEDURES
[facility] Dialysis Team [place]  [phone] Vitals   Pre   Post   Assessment   Pre   Post    
Temp  Temp: 98.1 °F (36.7 °C) (11/13/20 1118)  98.2 LOC  A&Ox3 A&Ox3 HR   Pulse (Heart Rate): (!) 109 (11/13/20 1118) 111 Lungs   Coarse corky uppers with insp wheeze on left upper  coarse corky uppers with insp wheeze on left upper B/P   BP: (!) 156/101 (11/13/20 1118) 130/96 Cardiac   AFib AFib Resp   Resp Rate: 18 (11/13/20 1118) 18 Skin   Scattered bruising Scattered bruising Pain level  Pain Intensity 1: 0 (11/13/20 0200) 0 Edema  generalized 
 
 generalized Orders: Duration:   Start:    1118 End:   1454 Total:   3.5 hours Dialyzer:   Dialyzer/Set Up Inspection: Vicky Dillon (11/13/20 1118) K Bath:   Dialysate K (mEq/L): 2 (11/13/20 1118) Ca Bath:   Dialysate CA (mEq/L): 3.0 (11/13/20 1118) Na/Bicarb:   Dialysate NA (mEq/L): 140 (11/13/20 1118) Target Fluid Removal:   Goal/Amount of Fluid to Remove (mL): 3500 mL (11/13/20 1118) Access Type & Location:    MANUEL AVF: skin CDI. No s/s of infection. + B/T. No issues with cannulation or hemostasis. Running well at . Pt arrived to HD suite A&Ox4. Consent signed & on file. SBAR received from Primary RN. Pt cannulated with 83C needles per policy & without issue. Labs drawn per request/ order. VSS. Dialysis Tx initiated. Labs Obtained/Reviewed Critical Results Called   Date when labs were drawn- 
Hgb-   
HGB Date Value Ref Range Status 11/13/2020 9.8 (L) 12.1 - 17.0 g/dL Final  
 
K-   
Potassium Date Value Ref Range Status 11/13/2020 4.5 3.5 - 5.1 mmol/L Final  
  Comment:  
  INVESTIGATED PER DELTA CHECK PROTOCOL Ca-  
Calcium Date Value Ref Range Status 11/13/2020 8.1 (L) 8.5 - 10.1 MG/DL Final  
 
Bun-  
BUN Date Value Ref Range Status 11/13/2020 91 (H) 6 - 20 MG/DL Final  
 
Creat-  
Creatinine Date Value Ref Range Status 11/13/2020 5.23 (H) 0.70 - 1.30 MG/DL Final  
  Comment: INVESTIGATED PER DELTA CHECK PROTOCOL Medications/ Blood Products Given Name   Dose   Route and Time None ordered Blood Volume Processed (BVP):    73.4 Net Fluid Removed:  3500mL Comments All dialysis related medications have been reviewed. Assessment performed by RN. Procedure and documentation observed and reviewed by Arjun Gutierrez RN Time Out Done:  1113 Primary Nurse Rpt Pre:  Eleanor Calix RN 
Primary Nurse Rpt Post:  Rebecca Sullivan RN 
Pt Education:  Procedural / site rotation Care Plan:  On going Tx Summary: 
1118:  MANUEL AVF: skin CDI. No s/s of infection. + B/T. No issues with cannulation or hemostasis. Running well at . Pt arrived to HD suite A&Ox4. Consent signed & on file. SBAR received from Primary RN. Pt cannulated with 80Z needles per policy & without issue. Labs drawn per request/ order. VSS. Dialysis Tx initiated. 1130:  Pt resting 1145:  Pt resting 
1200:  Pt resting 1215:  Pt resting 1230:  Pt resting 1245:  Pt resting 1300:  Pt resting 1315:  Pt resting 1330:  Pt resting 1345:  Pt resting 1400:  Pt resting 1415:  Pt resting 1430:  Pt resting 1445:  Pt resting 1454: Tx ended. VSS. All possible blood returned to patient. Hemostasis achieved without issue. Bed locked and in the lowest position, call bell and belongings in reach. SBAR given to Primary, RN. Patient is stable at time of their/ my departure. Admiting Diagnosis:  AFib Pt's previous clinic-   [facility] [place] Consent signed - Informed Consent Verified: Yes (11/13/20 1118) Kaity Consent - signed and on file Hepatitis Status- neg/imm 1/14/20 Machine #- Machine Number: [id number] (11/13/20 1118) Telemetry status- 
Pre-dialysis wt. -

## 2020-11-13 NOTE — PROGRESS NOTES
Comprehensive Nutrition Assessment Type and Reason for Visit: Sally Spaulding Nutrition Recommendations/Plan:  
· Continue current diet · RD to add low phos d/t elevated phos · Continue nepro · Please document % meals and supplements consumed in flowsheet I/O's under intake Nutrition Assessment:     
11/13: Chart reviewed. Consistent carb, low Na, soft solids diet + Nepro BID. Recorded intakes %. Pt out for dialysis at time of visit. Phos high (7.0), will add phos restriction to diet. Previous confusion noted to be resolving. Possible etoh withdraw per nephrology. Malnutrition Assessment: 
Malnutrition Status: Moderate malnutrition Context:  Chronic illness Findings of the 6 clinical characteristics of malnutrition:  
Energy Intake:    
Weight Loss: Body Fat Loss:  1 - Mild body fat loss, Muscle Mass Loss:  7 - Severe muscle mass loss, Clavicles (pectoralis &deltoids), Scapula (trapezius), Thigh (quadraceps) Fluid Accumulation:  1 - Mild,   
 Strength:  Not performed Estimated Daily Nutrient Needs: 
Energy (kcal): 1853 (BMR 1425 x 1. 3AF); Weight Used for Energy Requirements: Current Protein (g): 79 (1.2 g/kg bw); Weight Used for Protein Requirements: Current Fluid (ml/day): 1800 ml/day; Method Used for Fluid Requirements: 1 ml/kcal 
 
 
Nutrition Related Findings:  Labs: -232mg/dL. Na 128. Phos 7.0. K WNL. Meds: cefepime, retacrit, folvite, humalog, solumedrol, flagyl, protonix, pericolace, renvela, MVI, thiamine. Edema: trace LUE weeping. BM 11/12. Wounds:   
Stage II, Deep tissue injury, Surgical incision Current Nutrition Therapies: DIET NUTRITIONAL SUPPLEMENTS Dinner, Breakfast; Nepro DIET DIABETIC CONSISTENT CARB Soft Solids; 2 GM NA (House Low NA) DIET ONE TIME MESSAGE 
DIET ONE TIME MESSAGE Anthropometric Measures: 
· Height:  5' 6\" (167.6 cm) · Current Body Wt:  69.7 kg (153 lb 10.6 oz) · Ideal Body Wt:  142 lbs:  102.8 % · BMI Category:  Normal weight (BMI 22.0-24.9) age over 72 Nutrition Diagnosis: · Altered nutrition-related lab values related to (current medical conditions) as evidenced by (elevated BG, renal labs, Na+ 128) Previous diagnosis continues; elevated BG, Phos 7, Na 128 Nutrition Interventions:  
Food and/or Nutrient Delivery: Continue current diet, Start oral nutrition supplement Nutrition Education and Counseling: No recommendations at this time Coordination of Nutrition Care: Continue to monitor while inpatient Goals: 
Maintaint PO >50% meals and ONS, trend BG <200mg/dl, renal labs WNL next 3-5 days Nutrition Monitoring and Evaluation:  
Behavioral-Environmental Outcomes: None identified Food/Nutrient Intake Outcomes: Food and nutrient intake, Supplement intake Physical Signs/Symptoms Outcomes: Biochemical data, Fluid status or edema, Hemodynamic status, Weight Discharge Planning:   
Continue current diet Electronically signed by Valentin Kirby RD on 11/13/2020 at 2:14 PM 
 
Contact: [phone] Pager [phone]

## 2020-11-13 NOTE — PROGRESS NOTES
Nephrology Progress Note Hugo Santamaria  
 
[email or web address]  Phone - [phone] Patient: Violeta Vaca. YOB: 1965     Admit Date: 11/5/2020 Date- 11/13/2020 CC: Follow up for ESRD IMPRESSION & PLAN:  
? esrd  renal-[facility]-Monday Wednesday Friday ? Hyperkalemia 
? SOB, Fluid overload ? Anemia of CKD ? Noncompliance to fluid restriction ? Hyponatremia ? A. fib with RVR ? Hypoglycemia ? Secondary hyperparathyroidism ? Current smoker PLAN- 
· Seen on HD and doing okay · His k is high. He will need kayexalate · Next hd friday · Fluid restriction 1200 mL/day · Follow BMP · Continue Renvela · He may be having alcohol withdrawal - monitor symptoms Subjective: Interval History:  
Seen on HD machine and so far tolerated 2 kg pull. Still SOB . No oother complains .  
 
-Patient has history of end-stage renal disease on dialysis Monday Wednesday Friday at [facility] unit ROS:- As documented above Objective:  
Vitals:  
 11/13/20 1215 11/13/20 1230 11/13/20 1245 11/13/20 1300 BP: (!) 142/109 (!) 159/67 120/75 (!) 147/118 Pulse: 74 (!) 103 (!) 105 (!) 114 Resp: 18 18 18 18 Temp:      
TempSrc:      
SpO2:      
Weight:      
Height:      
  
11/12 0701 - 11/13 0700 In: -  
Out: 3500 Last 3 Recorded Weights in this Encounter 11/11/20 5801 11/12/20 6242 11/13/20 3878 Weight: 71.8 kg (158 lb 4.8 oz) 66.8 kg (147 lb 4.8 oz) 69.7 kg (153 lb 9.6 oz) Physical exam:  
GEN: NAD NECK- Supple, no mass RESP: coarse b/l, decreased air movement CVS: S1,S2, RRR 
NEURO: Normal speech,non focal 
Abdo- soft, NT 
EXT: + Edema PSYCH: Normal Mood Right arm avf + Chart reviewed. Pertinent Notes reviewed. Data Review : 
Recent Labs 11/13/20 
0847 11/12/20 
0845 * 128* K 4.5 5.8*  
CL 87* 91* CO2 29 29 BUN 91* 71* CREA 5.23* 4.04* * 135* CA 8.1* 8.2* PHOS 7.0*  --   
 
Recent Labs 11/13/20 
0225 11/12/20 
6077 11/11/20 0222 WBC 27.8* 24.8* 26.6* HGB 9.8* 9.2* 10.3* HCT 28.7* 27.5* 29.6*  263 307 No results for input(s): FE, TIBC, PSAT, FERR in the last 72 hours. Medication list  reviewed Current Facility-Administered Medications Medication Dose Route Frequency  guaiFENesin (ROBITUSSIN) 100 mg/5 mL oral liquid 200 mg  200 mg Oral Q4H PRN  
 benzonatate (TESSALON) capsule 100 mg  100 mg Oral TID PRN  
 carvediloL (COREG) tablet 25 mg  25 mg Oral BID WITH MEALS  metoprolol (LOPRESSOR) injection 2.5 mg  2.5 mg IntraVENous Q6H PRN  
 methylPREDNISolone (PF) (SOLU-MEDROL) injection 20 mg  20 mg IntraVENous Q8H  
 dilTIAZem ER (CARDIZEM CD) capsule 240 mg  240 mg Oral DAILY  insulin lispro (HUMALOG) injection   SubCUTAneous AC&HS  
 dextrose (D50W) injection syrg 12.5-25 g  12.5-25 g IntraVENous PRN  
 cefepime (MAXIPIME) 1 g in 0.9% sodium chloride (MBP/ADV) 50 mL MBP  1 g IntraVENous Q24H  
 HYDROcodone-acetaminophen (NORCO)  mg tablet 1 Tab  1 Tab Oral Q6H PRN  peppermint oil   Other PRN  
 0.9% sodium chloride infusion 250 mL  250 mL IntraVENous PRN  
 metroNIDAZOLE (FLAGYL) IVPB premix 500 mg  500 mg IntraVENous Q12H  nystatin (MYCOSTATIN) 100,000 unit/mL oral suspension 500,000 Units  500,000 Units Oral QID  guaiFENesin-dextromethorphan (ROBITUSSIN DM) 100-10 mg/5 mL syrup 10 mL  10 mL Oral Q6H PRN  
 nicotine (NICODERM CQ) 14 mg/24 hr patch 1 Patch  1 Patch TransDERmal DAILY PRN  
 senna-docusate (PERICOLACE) 8.6-50 mg per tablet 1 Tab  1 Tab Oral DAILY  balsam peru-castor oiL (VENELEX) ointment   Topical BID  epoetin ginger-epbx (RETACRIT) injection 4,000 Units  4,000 Units SubCUTAneous Q MON, WED & FRI  hydrALAZINE (APRESOLINE) 20 mg/mL injection 10 mg  10 mg IntraVENous Q4H PRN  
 [Held by provider] dilTIAZem (CARDIZEM) 100 mg in dextrose 5% (MBP/ADV) 100 mL infusion  0-15 mg/hr IntraVENous TITRATE  sodium chloride (NS) flush 5-10 mL  5-10 mL IntraVENous PRN  
 acetaminophen (TYLENOL) tablet 650 mg  650 mg Oral Q6H PRN  thiamine mononitrate (B-1) tablet 100 mg  100 mg Oral DAILY  folic acid (FOLVITE) tablet 1 mg  1 mg Oral DAILY  therapeutic multivitamin (THERAGRAN) tablet 1 Tab  1 Tab Oral DAILY  glucose chewable tablet 16 g  4 Tab Oral PRN  
 glucagon (GLUCAGEN) injection 1 mg  1 mg IntraMUSCular PRN  pantoprazole (PROTONIX) tablet 40 mg  40 mg Oral ACB  sevelamer carbonate (RENVELA) tab 800 mg  800 mg Oral TID  gabapentin (NEURONTIN) capsule 300 mg  300 mg Oral TID PRN  
 [Held by provider] heparin (porcine) injection 5,000 Units  5,000 Units SubCUTAneous Q12H  
 albuterol-ipratropium (DUO-NEB) 2.5 MG-0.5 MG/3 ML  3 mL Nebulization Q4H PRN  
 budesonide (PULMICORT) 500 mcg/2 ml nebulizer suspension  500 mcg Nebulization BID RT And  
 arformoteroL (BROVANA) neb solution 15 mcg  15 mcg Nebulization BID RT And  
 ipratropium (ATROVENT) 0.02 % nebulizer solution 0.5 mg  0.5 mg Nebulization Q6H PRN Jodi Romero, [address] [facility] [employer] [address] [place], [address] Phone - [phone]               Fax - [phone]

## 2020-11-13 NOTE — PROGRESS NOTES
Physical Therapy Note Chart reviewed. Attempted to see patient for PT intervention, however patient currently ANTHONY for dialysis and unavailable for PT treatment. Will defer and continue to follow. Recommending SNF rehab at discharge.  
 
Thank you, 
Bambi Kelley, PT, DPT

## 2020-11-13 NOTE — PROGRESS NOTES
11/13/20 9890 Vital Signs Temp 97 °F (36.1 °C) Temp Source Axillary Pulse (Heart Rate) (!) 114 Heart Rate Source Monitor Cardiac Rhythm A Fib Resp Rate 20  
O2 Sat (%) 99 % Level of Consciousness Alert  
BP (!) 144/96 MAP (Monitor) 112 MEWS Score 3 Oxygen Therapy Pulse via Oximetry 108 beats per minute O2 Device Nasal cannula O2 Flow Rate (L/min) 2 l/min MEWS score 3 due to HR (afib no longer rate controlled, called cardiology who said to continue to monitor)

## 2020-11-13 NOTE — PROGRESS NOTES
ADULT PROTOCOL: JET AEROSOL ASSESSMENT Patient  Stacey Quinones.     60 y.o.   male     11/12/2020  8:27 PM 
 
Breath Sounds Pre Procedure: Right Breath Sounds: Diminished Left Breath Sounds: Diminished Breath Sounds Post Procedure: Right Breath Sounds: Diminished Left Breath Sounds: Diminished Breathing pattern: Pre procedure Breathing Pattern: Regular Post procedure Breathing Pattern: Regular Heart Rate: Pre procedure Pulse: 80 
         Post procedure Pulse: 83 Resp Rate: Pre procedure Respirations: 20 
         Post procedure Respirations: 17 Oxygen: O2 Device: Nasal cannula Changed: NO SpO2: Pre procedure SpO2: 95 %   with oxygen Post procedure SpO2: 96 %  with oxygen Nebulizer Therapy: Current medications Aerosolized Medications: Brovana, Pulmicort Changed: NO BID RESP Smoking History: packs, 1 per day Problem List:  
Patient Active Problem List  
Diagnosis Code  Cellulitis of thumb, left L03.012  Tenosynovitis of finger M65.9  
 DM (diabetes mellitus) ([facility] [address] E11.9  
 HCV (hepatitis C virus) B19.20  Tobacco abuse Z72.0  Alcohol abuse, daily use F10.10  COPD (chronic obstructive pulmonary disease) (HCC) J44.9  History of splenectomy Z90.81  
 Cellulitis and abscess of finger L03.019, L02.519  Abdominal wall cellulitis L03.311  Acute GI bleeding K92.2  
 HTN (hypertension) I10  
 ESRD on dialysis (HCC) N18.6, Z99.2  Nontraumatic ischemic infarction of muscle of hand M62.249  Acute cholecystitis K81.0  Type 2 diabetes mellitus with nephropathy (HCC) E11.21  
 COPD exacerbation (HCC) J44.1  A-fib (HCC) I48.91  
 Hypoglycemia E16.2  Acute dyspnea R06.00 Respiratory Therapist: Colt Aguirre, RT

## 2020-11-13 NOTE — PROGRESS NOTES
0700: Bedside shift change report given to Alex Arreguin RN (oncoming nurse) by Zackary Matson RN (offgoing nurse). Report included the following information SBAR.  
 
1200: Pt in dialysis, 's-160's. Per Dr Kt Sosa give IV metoprolol 2.5 mg. 
 
1510: Per Dr. Kt Sosa give PO diltiazem when pt returns from dialysis for HR still elevated 140's-160's.

## 2020-11-14 NOTE — PROGRESS NOTES
2015: Orders are in for CIWA monitoring. Upon assessment, CIWA is a 6 due to agitation, tremors, and disorientation. Will message NP to notify of CIWA score. 2030: Spoke with pts. Wife Troy Moreno who asked for updates. Also asked wife about pts. Drinking habits to get a better understanding of CIWA issues. Pts. Wife stated, Alva Lozada only drinks maybe one beer a day\" but was very defensive about question. Stephan Phelps stated, \"I will take him out that [facility] in a heart beat if they are trying to say it is alcohol. He wasn't confused when he came in to that hospital. Curly Rai can go ahead and take that one off your list right now. \" Writer tried to politely explain that every possibility is being questioned due to the new onset of confusion and was simply covering bases. Wife was polite towards end of conversation and.  
 
2125: Pt. Taken down to CT with transport and charge, RN 
 
2200: Pt back to room from CT. Upon assessment, pts ABD wound was heavily bleeding. Soaked through foam pad, gown, sheets and blanket. Dressing changed with help of RRT [employer], RN. A clot was noted about the size of writer fist. Per surgery notes, recommended moist dressings, no dakins, etc. A big bulky moist dressing was applied. Vital signs are stable, will notify surgery of bleeding and see recommendations 2242: Page sent to Dr. Dinorah Jacome. 2315: Spoke with Dr. Dinorah Jacome who gave no orders 0045: New dressing still clean, dry & intact. Will continue to monitor. 0400: Abdominal dressing still clean, dry and intact. Will continue to monitor. 0700: End of Shift Note Bedside shift change report given to Marily Levi (oncoming nurse) by Audrey Lopez RN (offgoing nurse). Report included the following information SBAR, Kardex, Procedure Summary, Intake/Output, MAR, Recent Results and Cardiac Rhythm Afib Shift worked:  [id number] Shift summary and any significant changes:  
  See above Concerns for physician to address:  See above Zone phone for oncoming shift:     
 
Activity: 
Activity Level: Up with Assistance Number times ambulated in hallways past shift: 0 Number of times OOB to chair past shift: 0 Cardiac:  
Cardiac Monitoring: Yes     
Cardiac Rhythm: Atrial fibrillation Access:  
Current line(s): PIV Genitourinary:  
Urinary status: oliguric Respiratory:  
O2 Device: Nasal cannula Chronic home O2 use?: NO Incentive spirometer at bedside: NO 
  
GI: 
Last Bowel Movement Date: 11/12/20 Current diet:  DIET NUTRITIONAL SUPPLEMENTS Dinner, Breakfast; Nepro DIET ONE TIME MESSAGE 
DIET ONE TIME MESSAGE 
DIET DIABETIC CONSISTENT CARB Soft Solids; 2 GM NA (House Low NA); Low Phosphorus Passing flatus: YES Tolerating current diet: YES 
% Diet Eaten: 15 % Pain Management:  
Patient states pain is manageable on current regimen: YES Skin: 
Walter Score: 13 Interventions: turn team, float heels, foam dressing and PT/OT consult Patient Safety: 
Fall Score: Total Score: 5 Interventions: bed/chair alarm and gripper socks High Fall Risk: Yes Length of Stay: 
Expected LOS: 4d 19h Actual LOS: 9 Carmen Ford RN
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
## 2020-11-14 NOTE — PROGRESS NOTES
0700 Bedside shift change report given to Ish Rajan (oncoming nurse) by Elvia Randolph RN (offgoing nurse). Report included the following information SBAR, Kardex, Intake/Output and MAR.  
 
0700 Pt abd dressing clean dry and intact will continue to monitor 1100 Pt abd dressing clean, dry and intact will continue to monitor 12 Paged Dr. Rosemary Flores to ask if it is appropriate to stop IV Cardizem drip since pt has received PO Cardizem waiting on call back. 502 [address] with Dr. Rosemary Flores. Dr. Rosemary Flores informed that it is appropriate to stop IV Cardizem if pt is able to take PO Cardizem 1500 Pt abd dressing clean, dry, and intact will continue to monitor 1900 End of Shift Note Bedside shift change report given to Luma Aponte RN (oncoming nurse) by Aguilar Cohen RN (offgoing nurse). Report included the following information SBAR, Kardex, Intake/Output and MAR Shift worked:  [id number] Shift summary and any significant changes:  
  None Concerns for physician to address:  None [facility] [place]   [phone] Activity: 
Activity Level: Up with Assistance Number times ambulated in hallways past shift: 0 Number of times OOB to chair past shift: 0 Cardiac:  
Cardiac Monitoring: Yes     
Cardiac Rhythm: Atrial fibrillation Access:  
Current line(s): HD access Genitourinary:  
Urinary status: oliguric Respiratory:  
O2 Device: Nasal cannula Chronic home O2 use?: YES Incentive spirometer at bedside: N/A 
  
GI: 
Last Bowel Movement Date: 11/12/20 Current diet:  DIET NUTRITIONAL SUPPLEMENTS Dinner, Breakfast; [place] DIET ONE TIME MESSAGE 
DIET ONE TIME MESSAGE 
DIET DIABETIC CONSISTENT CARB Soft Solids; 2 GM NA (House Low NA); Low Phosphorus Passing flatus: YES Tolerating current diet: YES 
% Diet Eaten: 75 % Pain Management:  
Patient states pain is manageable on current regimen: YES Skin: 
Walter Score: 13 Interventions: turn team 
 
Patient Safety: Fall Score: Total Score: 5 Interventions: bed/chair alarm High Fall Risk: Yes Length of Stay: 
Expected LOS: 4d 19h Actual LOS: 9 Marisol Garcia RN

## 2020-11-14 NOTE — PROGRESS NOTES
ADULT PROTOCOL: JET AEROSOL  REASSESSMENT Patient  Violeta Vaca.     60 y.o.   male     11/13/2020  7:39 PM 
 
Breath Sounds Pre Procedure: Right Breath Sounds: Diminished Left Breath Sounds: Diminished Breath Sounds Post Procedure: Right Breath Sounds: Diminished Left Breath Sounds: Diminished Breathing pattern: Pre procedure Breathing Pattern: Regular Post procedure Breathing Pattern: Regular Heart Rate: Pre procedure Pulse: 95 
         Post procedure Pulse: 96 
 
Resp Rate: Pre procedure Respirations: 20 
         Post procedure Respirations: 17 Oxygen: O2 Device: Nasal cannula Changed: NO SpO2: Pre procedure SpO2: 98 %   with oxygen Post procedure SpO2: 99 %  with oxygen Nebulizer Therapy: Current medications Aerosolized Medications: Brovana, Pulmicort Changed: NO BID RESP Smoking History: packs, 1 per day Problem List:  
Patient Active Problem List  
Diagnosis Code  Cellulitis of thumb, left L03.012  Tenosynovitis of finger M65.9  
 DM (diabetes mellitus) [address] E11.9  
 HCV (hepatitis C virus) B19.20  Tobacco abuse Z72.0  Alcohol abuse, daily use F10.10  COPD (chronic obstructive pulmonary disease) (HCC) J44.9  History of splenectomy Z90.81  
 Cellulitis and abscess of finger L03.019, L02.519  Abdominal wall cellulitis L03.311  Acute GI bleeding K92.2  
 HTN (hypertension) I10  
 ESRD on dialysis (HCC) N18.6, Z99.2  Nontraumatic ischemic infarction of muscle of hand M62.249  Acute cholecystitis K81.0  Type 2 diabetes mellitus with nephropathy (HCC) E11.21  
 COPD exacerbation (HCC) J44.1  A-fib (HCC) I48.91  
 Hypoglycemia E16.2  Acute dyspnea R06.00 Respiratory Therapist: Chago Cordova, RT

## 2020-11-14 NOTE — PROGRESS NOTES
Hospitalist Progress Note NAME: Summer Veras. :  1965 MRN:  [id number] Assessment / Plan: 
DM 2 uncontrolled  From steroids Severe symptomatic hypoglycemia on admit resolved Off of D10 drip HgA1C 5.8 Diabetic diet Elevated BG from steroids JERRY adjusted, started humulin monitor Bg now better 
  
Suspected sepsis ? HAP 
chronic wound on abdomen from hernia repair, wound culture with normal tana Sputum culture growing Pseudomonas aeruginosa,  
Continue cefepime, flagyl cover for CAP for total 7 days WBC count is improving Monitor CBC Currently on 2 L oxygen 
 
  
Ventral hernia Mesh with bleeding Bleeding intermittently now stopped per staff No fluid collection  on CT Cont wound care per surgery use moist dressing avoid dakins F/u wound cx  Normal tana Iv Abx cefepime/flagyl Chr anemia monitor Hgb >8 
  
A. fib with RVR intermittetly elevated HR 
 in ER on admit BB dose increased today by cardiology  
cardizem PO per cards s/p cardizem drip on admit Hold AC as hx of GI bleed in past and abd wound bleeding Cards on case f/u recomnds Coreg 12.5mg  Adjust as needed Echo -EF normal, dilated LA, PASP 56 Tsh/Ft4 ok Acute on chronic respiratory failure with hypoxia, currently requiring 2 L Recurrent Pleural effusion  check chest xray RLL collapse COPD managed by Dr. Jose Stein Appreciate guidance from Dr. Rip Pierre prn 
IV steroids  for wheezing started tapering 20mg q8 
  
L Renal mass Urology eval requested, reviewed recommendation leison chr no further w/u needed. 
  
ESRD/HD [facility] for [facility] today per staff Hyponatremia    Monitor Na 
  
ETOH abuse Tobacco abuse Thiamine/folate/mvt CIWA monitoring Nicotine patch 14mg daily PRN Alert oriented answered all questions. Wife thinks he is much confused as compared to base line.  CT head and Neurology consult, ABG ordered no focal neuro deficits seen on my exam he is slow to respond. 
  
Debility/Deconditiones state with mod protien malnourished PT/OT, Dietary  On case 
  
Hepatitis C infection hx Monitored at [facility] hepatology clinic Hx of PVD s/p thrombectomy Hx of Splenectomy and partial pancreatectomy Chronic constipation - pt takes mag citrate at home PRN  
 
DVT PRx SCD AD [facility] JUSTEN SAMPSON wife Batsheva Cruz [phone] Dispo: not medically stable yet may need SNF. PT/OT on case Subjective: Chief Complaint / Reason for Physician Visit Confusion is resolved he answered all questions. Reports that shortness of breath is improving. Denies any more bleeding from abdominal wound. Objective: VITALS:  
Last 24hrs VS reviewed since prior progress note. Most recent are: 
Patient Vitals for the past 24 hrs: 
 Temp Pulse Resp BP SpO2  
11/14/20 1123 98.3 °F (36.8 °C) 72 16 139/69 98 % 11/14/20 0931  85  115/78   
11/14/20 0730     97 % 11/14/20 0633 97.7 °F (36.5 °C) 82 20 (!) 143/72 97 % 11/14/20 0430  (!) 102  109/81 92 % 11/14/20 0300 97.7 °F (36.5 °C) 87 20 112/67 91 % 11/14/20 0100  85  129/68 90 % 11/13/20 2300  80  115/66 94 % 11/13/20 2249 97.2 °F (36.2 °C) 69 18 114/62 97 % 11/13/20 2230  76  118/67 95 % 11/13/20 2200  86  117/75 95 % 11/13/20 2150  83  109/72   
11/13/20 1915     98 % 11/13/20 1833 98.1 °F (36.7 °C) 97 18 131/83   
11/13/20 1810 98.5 °F (36.9 °C) (!) 110 18 129/81   
11/13/20 1608  (!) 114  (!) 149/81   
11/13/20 1530 97.9 °F (36.6 °C) (!) 132 18 (!) 124/105   
11/13/20 1454  (!) 111 18 (!) 130/96   
11/13/20 1445  83 18 (!) 140/87   
11/13/20 1430  80 18 (!) 151/96   
11/13/20 1415  68 18 (!) 151/78   
11/13/20 1400  (!) 109 18 (!) 132/102   
11/13/20 1345  (!) 105 18 (!) 159/139   
11/13/20 1330  (!) 106 18 (!) 157/113   
11/13/20 1315  (!) 117 18 (!) 143/108   
11/13/20 1300  (!) 114 18 (!) 147/118  11/13/20 1245  (!) 105 18 120/75  Intake/Output Summary (Last 24 hours) at 11/14/2020 1235 Last data filed at 11/13/2020 1454 Gross per 24 hour Intake  Output 3500 ml Net -3500 ml PHYSICAL EXAM: 
General: Chr ill looking, no acute distress   
EENT:   MMM Resp:  Symmetric expansion, wheezing seems to be clinically improving CV:  Irregular rhythm, no edema GI:  Soft, Chr Abd wound Neurologic:  Alert and  normal speech, Psych:      Anxious Reviewed most current lab test results and cultures  YES Reviewed most current radiology test results   YES Review and summation of old records today    NO Reviewed patient's current orders and MAR    YES 
PMH/SH reviewed - no change compared to H&P Procedures: see electronic medical records for all procedures/Xrays and details which were not copied into this note but were reviewed prior to creation of Plan. LABS: 
I reviewed today's most current labs and imaging studies. Pertinent labs include: 
Recent Labs 11/14/20 
0401 11/13/20 
0225 11/12/20 
5710 WBC 23.5* 27.8* 24.8* HGB 9.7* 9.8* 9.2* HCT 28.8* 28.7* 27.5*  
 246 263 Recent Labs 11/14/20 
0401 11/13/20 
0847 11/12/20 
0845 * 128* 128* K 4.0 4.5 5.8*  
CL 95* 87* 91* CO2 30 29 29 * 111* 135* BUN 63* 91* 71* CREA 3.92* 5.23* 4.04* CA 8.1* 8.1* 8.2* PHOS  --  7.0*  --   
ALB  --  2.7*  --   
 
 
Signed: Abbi Cuevas MD
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
## 2020-11-14 NOTE — PROGRESS NOTES
11/14/2020 9:21 AM 
 
Admit Date: 11/5/2020 Admit Diagnosis: Hypoglycemia [E16.2]; A-fib ([facility] [I48.91]; Acute hypoxemic respiratory failure due to COVID-19 ([facility] [U07.1, J96.01] Subjective: No cardiac events. Visit Vitals BP (!) 143/72 Pulse 82 Temp 97.7 °F (36.5 °C) Resp 20 Ht 5' 6\" (1.676 m) Wt 141 lb 9.6 oz (64.2 kg) SpO2 97% BMI 22.85 kg/m² Current Facility-Administered Medications Medication Dose Route Frequency  guaiFENesin (ROBITUSSIN) 100 mg/5 mL oral liquid 200 mg  200 mg Oral Q4H PRN  
 benzonatate (TESSALON) capsule 100 mg  100 mg Oral TID PRN  
 carvediloL (COREG) tablet 25 mg  25 mg Oral BID WITH MEALS  metoprolol (LOPRESSOR) injection 2.5 mg  2.5 mg IntraVENous Q6H PRN  
 dilTIAZem (CARDIZEM) 100 mg in dextrose 5% (MBP/ADV) 100 mL infusion  0-15 mg/hr IntraVENous TITRATE  methylPREDNISolone (PF) (SOLU-MEDROL) injection 20 mg  20 mg IntraVENous Q8H  
 dilTIAZem ER (CARDIZEM CD) capsule 240 mg  240 mg Oral DAILY  insulin lispro (HUMALOG) injection   SubCUTAneous AC&HS  
 dextrose (D50W) injection syrg 12.5-25 g  12.5-25 g IntraVENous PRN  
 cefepime (MAXIPIME) 1 g in 0.9% sodium chloride (MBP/ADV) 50 mL MBP  1 g IntraVENous Q24H  
 HYDROcodone-acetaminophen (NORCO)  mg tablet 1 Tab  1 Tab Oral Q6H PRN  peppermint oil   Other PRN  
 0.9% sodium chloride infusion 250 mL  250 mL IntraVENous PRN  
 metroNIDAZOLE (FLAGYL) IVPB premix 500 mg  500 mg IntraVENous Q12H  nystatin (MYCOSTATIN) 100,000 unit/mL oral suspension 500,000 Units  500,000 Units Oral QID  guaiFENesin-dextromethorphan (ROBITUSSIN DM) 100-10 mg/5 mL syrup 10 mL  10 mL Oral Q6H PRN  
 nicotine (NICODERM CQ) 14 mg/24 hr patch 1 Patch  1 Patch TransDERmal DAILY PRN  
 senna-docusate (PERICOLACE) 8.6-50 mg per tablet 1 Tab  1 Tab Oral DAILY  balsam peru-castor oiL (VENELEX) ointment   Topical BID  
  epoetin ginger-epbx (RETACRIT) injection 4,000 Units  4,000 Units SubCUTAneous Q MON, WED & FRI  hydrALAZINE (APRESOLINE) 20 mg/mL injection 10 mg  10 mg IntraVENous Q4H PRN  
 sodium chloride (NS) flush 5-10 mL  5-10 mL IntraVENous PRN  
 acetaminophen (TYLENOL) tablet 650 mg  650 mg Oral Q6H PRN  thiamine mononitrate (B-1) tablet 100 mg  100 mg Oral DAILY  folic acid (FOLVITE) tablet 1 mg  1 mg Oral DAILY  therapeutic multivitamin (THERAGRAN) tablet 1 Tab  1 Tab Oral DAILY  glucose chewable tablet 16 g  4 Tab Oral PRN  
 glucagon (GLUCAGEN) injection 1 mg  1 mg IntraMUSCular PRN  pantoprazole (PROTONIX) tablet 40 mg  40 mg Oral ACB  sevelamer carbonate (RENVELA) tab 800 mg  800 mg Oral TID  gabapentin (NEURONTIN) capsule 300 mg  300 mg Oral TID PRN  
 [Held by provider] heparin (porcine) injection 5,000 Units  5,000 Units SubCUTAneous Q12H  
 albuterol-ipratropium (DUO-NEB) 2.5 MG-0.5 MG/3 ML  3 mL Nebulization Q4H PRN  
 budesonide (PULMICORT) 500 mcg/2 ml nebulizer suspension  500 mcg Nebulization BID RT And  
 arformoteroL (BROVANA) neb solution 15 mcg  15 mcg Nebulization BID RT And  
 ipratropium (ATROVENT) 0.02 % nebulizer solution 0.5 mg  0.5 mg Nebulization Q6H PRN Objective:  
  
Visit Vitals BP (!) 143/72 Pulse 82 Temp 97.7 °F (36.5 °C) Resp 20 Ht 5' 6\" (1.676 m) Wt 141 lb 9.6 oz (64.2 kg) SpO2 97% BMI 22.85 kg/m² Physical Exam: 
Resting comfortably. No resp distress. Extremities: extremities normal, atraumatic, no cyanosis or edema Heart: Irregular rate and rhythm, S1, S2 normal, no murmur, click, rub or gallop Lungs: clear to auscultation bilaterally Data Review:  
Labs:   
Recent Results (from the past 24 hour(s)) GLUCOSE, POC Collection Time: 11/13/20  4:02 PM  
Result Value Ref Range Glucose (POC) 233 (H) 65 - 100 mg/dL Performed by Oscar Carrillo PCT   
POC EG7  Collection Time: 11/13/20  5:40 PM  
 Result Value Ref Range Calcium, ionized (POC) 1.07 (L) 1.12 - 1.32 mmol/L  
 FIO2 (POC) 28 % pH (POC) 7.43 7.35 - 7.45    
 pCO2 (POC) 51.0 (H) 35.0 - 45.0 MMHG  
 pO2 (POC) 44 (LL) 80 - 100 MMHG  
 HCO3 (POC) 33.4 (H) 22 - 26 MMOL/L Base excess (POC) 9 mmol/L  
 sO2 (POC) 80 (L) 92 - 97 % Site RIGHT RADIAL Device: NASAL CANNULA Flow rate (POC) 2 L/M Allens test (POC) YES Specimen type (POC) VENOUS BLOOD Total resp. rate 18 PROCALCITONIN Collection Time: 11/13/20  7:32 PM  
Result Value Ref Range Procalcitonin 1.07 ng/mL GLUCOSE, POC Collection Time: 11/13/20  9:01 PM  
Result Value Ref Range Glucose (POC) 321 (H) 65 - 100 mg/dL Performed by Zhane Popejoy METABOLIC PANEL, BASIC Collection Time: 11/14/20  4:01 AM  
Result Value Ref Range Sodium 133 (L) 136 - 145 mmol/L Potassium 4.0 3.5 - 5.1 mmol/L Chloride 95 (L) 97 - 108 mmol/L  
 CO2 30 21 - 32 mmol/L Anion gap 8 5 - 15 mmol/L Glucose 288 (H) 65 - 100 mg/dL BUN 63 (H) 6 - 20 MG/DL Creatinine 3.92 (H) 0.70 - 1.30 MG/DL  
 BUN/Creatinine ratio 16 12 - 20 GFR est AA 19 (L) >60 ml/min/1.73m2 GFR est non-AA 16 (L) >60 ml/min/1.73m2 Calcium 8.1 (L) 8.5 - 10.1 MG/DL  
CBC W/O DIFF Collection Time: 11/14/20  4:01 AM  
Result Value Ref Range WBC 23.5 (H) 4.1 - 11.1 K/uL  
 RBC 2.80 (L) 4.10 - 5.70 M/uL HGB 9.7 (L) 12.1 - 17.0 g/dL HCT 28.8 (L) 36.6 - 50.3 % .9 (H) 80.0 - 99.0 FL  
 MCH 34.6 (H) 26.0 - 34.0 PG  
 MCHC 33.7 30.0 - 36.5 g/dL  
 RDW 19.6 (H) 11.5 - 14.5 % PLATELET 274 300 - 931 K/uL MPV 11.2 8.9 - 12.9 FL  
 NRBC 9.6 (H) 0  WBC ABSOLUTE NRBC 2.25 (H) 0.00 - 0.01 K/uL GLUCOSE, POC Collection Time: 11/14/20  7:43 AM  
Result Value Ref Range Glucose (POC) 280 (H) 65 - 100 mg/dL Performed by Jv Diane Telemetry: a fib. Assessment:  
 
Active Problems: 
  A-fib ([facility] (11/5/2020) Hypoglycemia (11/5/2020) Acute dyspnea (11/5/2020) Plan:  
 
Continue BB and CCB. Rate control.

## 2020-11-15 NOTE — PROGRESS NOTES
0710: End of Shift Note Bedside shift change report given to Debo White (oncoming nurse) by Tg Little (offgoing nurse). Report included the following information SBAR, Kardex and Cardiac Rhythm Afib Shift worked:  7am-7pm  
Shift summary and any significant changes:  
  Wound care completed Concerns for physician to address:  [facility] Surgeon told wife to use Dakins solution; Dakins solution is not to be used here, she would like an explanation. Zone phone for oncoming shift:   [phone] Activity: 
Activity Level: Up with Assistance Number times ambulated in hallways past shift: 0 Number of times OOB to chair past shift: 3 Cardiac:  
Cardiac Monitoring: Yes     
Cardiac Rhythm: Atrial fibrillation Access:  
Current line(s): PIV Genitourinary:  
Urinary status: oliguric Respiratory:  
O2 Device: Nasal cannula Chronic home O2 use?: YES PRN Incentive spirometer at bedside: NO 
  
GI: 
Last Bowel Movement Date: 11/12/20 Current diet:  DIET NUTRITIONAL SUPPLEMENTS Dinner, Breakfast; [place] DIET ONE TIME MESSAGE 
DIET ONE TIME MESSAGE 
DIET DIABETIC CONSISTENT CARB Soft Solids; 2 GM NA (House Low NA); Low Phosphorus Passing flatus: YES Tolerating current diet: YES 
% Diet Eaten: 100 % Pain Management:  
Patient states pain is manageable on current regimen: YES Skin: 
Walter Score: 15 Interventions: increase time out of bed Patient Safety: 
Fall Score: Total Score: 5 Interventions: bed/chair alarm and pt to call before getting OOB High Fall Risk: Yes Length of Stay: 
Expected LOS: 4d 19h Actual LOS: 10 Tg Little

## 2020-11-15 NOTE — PROGRESS NOTES
0710: Bedside and Verbal shift change report given to [facility] (oncoming nurse) by Leni Dallas and Claudette Goldsmith (student nurse) (offgoing nurse). Report included the following information SBAR and Kardex. 0730: Pt assisted to chair

## 2020-11-15 NOTE — PROGRESS NOTES
Problem: Chronic Obstructive Pulmonary Disease (COPD) Goal: *Oxygen saturation during activity within specified parameters Outcome: Progressing Towards Goal 
Goal: *Absence of hypoxia Outcome: Progressing Towards Goal 
  
Problem: Falls - Risk of 
Goal: *Absence of Falls Description: Document Allen Click Fall Risk and appropriate interventions in the flowsheet. Outcome: Progressing Towards Goal 
Note: Fall Risk Interventions: 
Mobility Interventions: Bed/chair exit alarm Mentation Interventions: More frequent rounding, Increase mobility, Family/sitter at bedside Medication Interventions: Patient to call before getting OOB, Teach patient to arise slowly Elimination Interventions: Call light in reach History of Falls Interventions: Investigate reason for fall, Assess for delayed presentation/identification of injury for 48 hrs (comment for end date), Vital signs minimum Q4HRs X 24 hrs (comment for end date), Bed/chair exit alarm
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
## 2020-11-15 NOTE — PROGRESS NOTES
11/15/2020 7:50 AM 
 
Admit Date: 11/5/2020 Admit Diagnosis: Hypoglycemia [E16.2]; A-fib ([facility] [I48.91]; Acute hypoxemic respiratory failure due to COVID-19 ([facility] [U07.1, J96.01] Subjective:  
 
Stacey Joni.   denies chest pain, chest pressure/discomfort, palpitations, irregular heart beats, near-syncope, syncope. Visit Vitals /88 (BP 1 Location: Left arm) Pulse 85 Temp 97.7 °F (36.5 °C) Resp 18 Ht 5' 6\" (1.676 m) Wt 144 lb 12.8 oz (65.7 kg) SpO2 95% BMI 23.37 kg/m² Current Facility-Administered Medications Medication Dose Route Frequency  metroNIDAZOLE (FLAGYL) IVPB premix 500 mg  500 mg IntraVENous Q12H  
 gabapentin (NEURONTIN) capsule 300 mg  300 mg Oral QHS PRN  
 guaiFENesin (ROBITUSSIN) 100 mg/5 mL oral liquid 200 mg  200 mg Oral Q4H PRN  
 benzonatate (TESSALON) capsule 100 mg  100 mg Oral TID PRN  
 carvediloL (COREG) tablet 25 mg  25 mg Oral BID WITH MEALS  metoprolol (LOPRESSOR) injection 2.5 mg  2.5 mg IntraVENous Q6H PRN  
 methylPREDNISolone (PF) (SOLU-MEDROL) injection 20 mg  20 mg IntraVENous Q8H  
 dilTIAZem ER (CARDIZEM CD) capsule 240 mg  240 mg Oral DAILY  insulin lispro (HUMALOG) injection   SubCUTAneous AC&HS  
 dextrose (D50W) injection syrg 12.5-25 g  12.5-25 g IntraVENous PRN  
 cefepime (MAXIPIME) 1 g in 0.9% sodium chloride (MBP/ADV) 50 mL MBP  1 g IntraVENous Q24H  
 HYDROcodone-acetaminophen (NORCO)  mg tablet 1 Tab  1 Tab Oral Q6H PRN  peppermint oil   Other PRN  
 0.9% sodium chloride infusion 250 mL  250 mL IntraVENous PRN  
 nystatin (MYCOSTATIN) 100,000 unit/mL oral suspension 500,000 Units  500,000 Units Oral QID  guaiFENesin-dextromethorphan (ROBITUSSIN DM) 100-10 mg/5 mL syrup 10 mL  10 mL Oral Q6H PRN  
 nicotine (NICODERM CQ) 14 mg/24 hr patch 1 Patch  1 Patch TransDERmal DAILY PRN  
 senna-docusate (PERICOLACE) 8.6-50 mg per tablet 1 Tab  1 Tab Oral DAILY  balsam peru-castor oiL (VENELEX) ointment   Topical BID  epoetin ginger-epbx (RETACRIT) injection 4,000 Units  4,000 Units SubCUTAneous Q MON, WED & FRI  hydrALAZINE (APRESOLINE) 20 mg/mL injection 10 mg  10 mg IntraVENous Q4H PRN  
 sodium chloride (NS) flush 5-10 mL  5-10 mL IntraVENous PRN  
 acetaminophen (TYLENOL) tablet 650 mg  650 mg Oral Q6H PRN  thiamine mononitrate (B-1) tablet 100 mg  100 mg Oral DAILY  folic acid (FOLVITE) tablet 1 mg  1 mg Oral DAILY  therapeutic multivitamin (THERAGRAN) tablet 1 Tab  1 Tab Oral DAILY  glucose chewable tablet 16 g  4 Tab Oral PRN  
 glucagon (GLUCAGEN) injection 1 mg  1 mg IntraMUSCular PRN  pantoprazole (PROTONIX) tablet 40 mg  40 mg Oral ACB  sevelamer carbonate (RENVELA) tab 800 mg  800 mg Oral TID  [Held by provider] heparin (porcine) injection 5,000 Units  5,000 Units SubCUTAneous Q12H  
 albuterol-ipratropium (DUO-NEB) 2.5 MG-0.5 MG/3 ML  3 mL Nebulization Q4H PRN  
 budesonide (PULMICORT) 500 mcg/2 ml nebulizer suspension  500 mcg Nebulization BID RT And  
 arformoteroL (BROVANA) neb solution 15 mcg  15 mcg Nebulization BID RT And  
 ipratropium (ATROVENT) 0.02 % nebulizer solution 0.5 mg  0.5 mg Nebulization Q6H PRN Objective:  
  
Visit Vitals /88 (BP 1 Location: Left arm) Pulse 85 Temp 97.7 °F (36.5 °C) Resp 18 Ht 5' 6\" (1.676 m) Wt 144 lb 12.8 oz (65.7 kg) SpO2 95% BMI 23.37 kg/m² Physical Exam: 
Resting comfortably. No resp distress. Extremities: extremities normal, atraumatic, no cyanosis or edema Heart: Irregular rate and rhythm, S1, S2 normal, no murmur, click, rub or gallop Lungs: clear to auscultation bilaterally Neurologic: Grossly normal 
 
Data Review:  
Labs:   
Recent Results (from the past 24 hour(s)) GLUCOSE, POC Collection Time: 11/14/20 11:58 AM  
Result Value Ref Range Glucose (POC) 347 (H) 65 - 100 mg/dL  Performed by Brenda Matson PCT   
GLUCOSE, POC  
 Collection Time: 11/14/20  4:09 PM  
Result Value Ref Range Glucose (POC) 339 (H) 65 - 100 mg/dL Performed by Elliott Rosen PCT   
GLUCOSE, POC Collection Time: 11/14/20  8:55 PM  
Result Value Ref Range Glucose (POC) 354 (H) 65 - 100 mg/dL Performed by Rakesh Saeed GLUCOSE, POC Collection Time: 11/15/20  7:13 AM  
Result Value Ref Range Glucose (POC) 354 (H) 65 - 100 mg/dL Performed by Yuliana Ramirez (PCT) Telemetry: a. fib Assessment:  
 
Active Problems: 
  A-fib ([address] (11/5/2020) Hypoglycemia (11/5/2020) Acute dyspnea (11/5/2020) Plan:  
 
Continue PO BB and CCB. Rate control.

## 2020-11-15 NOTE — PROGRESS NOTES
Hospitalist Progress Note NAME: Violeta Vaca. :  1965 MRN:  [id number] Assessment / Plan: 
DM 2 uncontrolled  From steroids Severe symptomatic hypoglycemia on admit resolved Off of D10 drip HgA1C 5.8 Diabetic diet Elevated BG from steroids JERRY adjusted, 
Continue sliding scale, I will add NPH 15 units with steroids Steroids or transition to oral prednisone 
  
Suspected sepsis ? HAP 
chronic wound on abdomen from hernia repair, wound culture with normal tana Sputum culture growing Pseudomonas aeruginosa,  
Continue cefepime, flagyl cover for CAP for total 7 days WBC count is improving Monitor CBC Currently on 2 L oxygen 
 
  
Ventral hernia Mesh with bleeding Bleeding intermittently now stopped per staff No fluid collection  on CT Cont wound care per surgery use moist dressing avoid dakins F/u wound cx  Normal tana Iv Abx cefepime/flagyl Chr anemia monitor Hgb >8 
  
A. fib with RVR intermittetly elevated HR 
 in ER on admit BB dose increased today by cardiology  
cardizem PO per cards s/p cardizem drip on admit Hold AC as hx of GI bleed in past and abd wound bleeding Cards on case f/u recomnds Coreg 12.5mg  Adjust as needed Echo -EF normal, dilated LA, PASP 56 Tsh/Ft4 ok Acute on chronic respiratory failure with hypoxia, currently requiring 2 L Recurrent Pleural effusion  check chest xray RLL collapse COPD managed by Dr. Emma Silveira Appreciate guidance from Dr. Mague Quezada Nebs prn Transition steroids to oral prednisone, is at home oxygen requirement of 2 L Patient is wheezing bilaterally 
  
L Renal mass Urology eval requested, reviewed recommendation leison chr no further w/u needed. 
  
ESRD/HD [facility] for [facility] today per staff Hyponatremia    Monitor Na 
  
ETOH abuse Tobacco abuse Thiamine/folate/mvt CIWA monitoring Nicotine patch 14mg daily PRN Alert oriented answered all questions.  
  
 Debility/Deconditiones state with mod protien malnourished PT/OT, Dietary  On case 
  
Hepatitis C infection hx Monitored at [facility] hepatology clinic Hx of PVD s/p thrombectomy Hx of Splenectomy and partial pancreatectomy Chronic constipation - pt takes mag citrate at home PRN  
 
DVT PRx SCD AD [facility] JUSTEN SAMPSON wife aSulo De [phone] Dispo: not medically stable yet may need SNF. PT/OT on case Subjective: Chief Complaint / Reason for Physician Visit Confusion is resolved he answered all questions. Reports that shortness of breath is improving. Continues to have wheezing. Denies any more bleeding from abdominal wound. Objective: VITALS:  
Last 24hrs VS reviewed since prior progress note. Most recent are: 
Patient Vitals for the past 24 hrs: 
 Temp Pulse Resp BP SpO2  
11/15/20 0742     95 % 11/15/20 0635 97.7 °F (36.5 °C) 85 18 122/88 96 % 11/15/20 0334 97 °F (36.1 °C) 71 16 94/64 96 % 11/14/20 2340 97.9 °F (36.6 °C) 74 20 116/68 95 % 11/14/20 2032     94 % 11/14/20 1833 98.3 °F (36.8 °C) 64 18 101/66 95 % 11/14/20 1714  77  121/70   
11/14/20 1500 98.2 °F (36.8 °C) 84 20 (!) 135/55 98 % 11/14/20 1123 98.3 °F (36.8 °C) 72 16 139/69 98 % Intake/Output Summary (Last 24 hours) at 11/15/2020 1044 Last data filed at 11/15/2020 0405 Gross per 24 hour Intake 360 ml Output  Net 360 ml PHYSICAL EXAM: 
General: Chr ill looking, no acute distress   
EENT:   MMM Resp:  Symmetric expansion, wheezing seems to be clinically improving but is present in bilateral bases CV:  Irregular rhythm, no edema GI:  Soft, Chr Abd wound Neurologic:  Alert and  normal speech, Psych:      Anxious Reviewed most current lab test results and cultures  YES Reviewed most current radiology test results   YES Review and summation of old records today    NO Reviewed patient's current orders and MAR    YES 
PMH/SH reviewed - no change compared to H&P 
 
 Procedures: see electronic medical records for all procedures/Xrays and details which were not copied into this note but were reviewed prior to creation of Plan. LABS: 
I reviewed today's most current labs and imaging studies. Pertinent labs include: 
Recent Labs 11/14/20 0401 11/13/20 
0225 WBC 23.5* 27.8* HGB 9.7* 9.8* HCT 28.8* 28.7*  
 246 Recent Labs 11/14/20 0401 11/13/20 
6269 * 128* K 4.0 4.5 CL 95* 87* CO2 30 29 * 111* BUN 63* 91* CREA 3.92* 5.23* CA 8.1* 8.1*  
PHOS  --  7.0* ALB  --  2.7* Signed: Thuy Barnett MD

## 2020-11-15 NOTE — PROGRESS NOTES
Received notification from bedside RN about patient with regards to: , needing orders for Lispro coverage Intervention given: Lispro 5 units x 1

## 2020-11-16 NOTE — PROGRESS NOTES
0700: Bedside and Verbal shift change report given to [address] [place] [address] (oncoming nurse) by Latricia Cabrera (offgoing nurse). Report included the following information SBAR and Kardex. Dual Skin Assessment completed; wound dressings clean, dry, and intact. Report given to Dialysis RN by [address] [place] [address] RN, pts K 5.7, awaiting Monday dialysis. 0745: Pt off floor for dialysis. 26: Spoke with patients wife; updated her on plan of care.

## 2020-11-16 NOTE — PROGRESS NOTES
TRANSFER - IN REPORT: 
 
Verbal report received from Corinne RN (name) on Maryjo Thompson.  being received from Dialysis for routine progression of care Report consisted of patients Situation, Background, Assessment and  
Recommendations(SBAR). Information from the following report(s) SBAR, Kardex and Intake/Output was reviewed with the receiving nurse. Opportunity for questions and clarification was provided. Assessment completed upon patients arrival to unit and care assumed.

## 2020-11-16 NOTE — PROCEDURES
[facility] Dialysis Team [place]  [phone] Vitals   Pre   Post   Assessment   Pre   Post    
Temp  Temp: 98.5 °F (36.9 °C) (11/16/20 0802)  97.6 LOC  A&Ox3 A&Ox3 HR   Pulse (Heart Rate): 94 (11/16/20 0802) 96 Lungs   Coarse insp wheeze Coarse insp wheeze B/P   BP: (!) 83/53 (11/16/20 0802) 116/56 Cardiac   RRR RRR Resp   Resp Rate: 18 (11/16/20 0802) 18 Skin   Scattered bruising Scattered bruising Pain level  Pain Intensity 1: 3 (11/16/20 0705) 0 Edema  generalized generalized Orders: Duration:   Start:    0802 End:    1209 Total:   3.5 hours Dialyzer:   Dialyzer/Set Up Inspection: [place] (11/16/20 0802) K Bath:   Dialysate K (mEq/L): 2 (11/16/20 0802) Ca Bath:   Dialysate CA (mEq/L): 3.0 (11/16/20 0802) Na/Bicarb:   Dialysate NA (mEq/L): 140 (11/16/20 0802) Target Fluid Removal:   Goal/Amount of Fluid to Remove (mL): 3500 mL (11/16/20 0802) Access Type & Location:   MANUEL AVF: skin CDI. No s/s of infection. + B/T. No issues with cannulation or hemostasis. Running well at . Pt arrived to HD suite A&Ox4. Consent signed & on file. SBAR received from Primary RN. Pt cannulated with 76T needles per policy & without issue. VSS. Dialysis Tx initiated. Labs Obtained/Reviewed Critical Results Called   Date when labs were drawn- 
Hgb-   
HGB Date Value Ref Range Status 11/16/2020 9.2 (L) 12.1 - 17.0 g/dL Final  
 
K-   
Potassium Date Value Ref Range Status 11/16/2020 5.7 (H) 3.5 - 5.1 mmol/L Final  
  Comment:  
  INVESTIGATED PER DELTA CHECK PROTOCOL Ca-  
Calcium Date Value Ref Range Status 11/16/2020 7.8 (L) 8.5 - 10.1 MG/DL Final  
 
Bun-  
BUN Date Value Ref Range Status 11/16/2020 117 (H) 6 - 20 MG/DL Final  
  Comment:  
  INVESTIGATED PER DELTA CHECK PROTOCOL Creat-  
Creatinine Date Value Ref Range Status 11/16/2020 6.32 (H) 0.70 - 1.30 MG/DL Final  
  Comment:  
  INVESTIGATED PER DELTA CHECK PROTOCOL Medications/ Blood Products Given Name   Dose   Route and Time None ordered Blood Volume Processed (BVP):    89.0 Net Fluid Removed:  3000mL Comments All dialysis related medications have been reviewed. Assessment performed by RN. Procedure and documentation observed and reviewed by Pawel Horan RN Time Out Done: 8653 Primary Nurse Rpt Pre:  Mike Fagan RN 
Primary Nurse Rpt Post:  Maira Hernandez RN 
Pt Education:  procedural 
Care Plan:  On going Tx Summary: 
0802:  MANUEL AVF: skin CDI. No s/s of infection. + B/T. No issues with cannulation or hemostasis. Running well at . Pt arrived to HD suite A&Ox4. Consent signed & on file. SBAR received from Primary RN. Pt cannulated with 23W needles per policy & without issue. VSS. Dialysis Tx initiated. 0830:  Pt resting; goal reduced to 3000 d/t BP 
0845:  Pt resting 
0900:  Pt resting  
0915:  Pt resting 
0930:  Pt resting 
0945:  Pt resting; Bath changed to 1K/2.5Ca d/t pt K per verbal order by Dr Garcia Linker 
1000:  Pt resting 1015:  Pt resting 1030:  Pt resting 1045:  Pt resting 
1100:  Pt resting 1115:  Pt resting 1130:  Pt resting 1145:  Pt resting  
1200:  Pt resting 
1209: Tx ended. VSS. All possible blood returned to patient. Hemostasis achieved without issue. Bed locked and in the lowest position, call bell and belongings in reach. SBAR given to Primary, RN. Patient is stable at time of their/ my departure. Admiting Diagnosis:  AFib Pt's previous clinic-  Renal Ailey Consent signed - Informed Consent Verified: Yes (11/16/20 0802) Kaity Consent - signed and on file Hepatitis Status- neg/imm 1/14/20 Machine #- Machine Number: [id number] (11/16/20 0802) Telemetry status- 
Pre-dialysis wt. -

## 2020-11-16 NOTE — INTERDISCIPLINARY ROUNDS
[address] Is[address]ro INTERDISCIPLINARY ROUNDS Cardiopulmonary Care Interdisciplinary Rounds were held today to discuss patient's plan of care and outcomes. The following members were present: NP/Physician, Pharmacy, Nursing and Case Management. PLAN OF CARE:  
Continue current treatment plan Expected Length of Stay:  4d 19h

## 2020-11-16 NOTE — PROGRESS NOTES
Nephrology Progress Note Hugo Santamaria  
 
[facility]. [facility]  Phone - [phone] Patient: Christian Paz. YOB: 1965     Admit Date: 11/5/2020 Date- 11/16/2020 CC: Follow up for ESRD IMPRESSION & PLAN:  
? PIVL-mdtualk-yndbbcy-Monday Wednesday Friday ? Hyperkalemia 
? SOB, Fluid overload ? Anemia of CKD ? Noncompliance to fluid restriction ? Hyponatremia ? A. fib with RVR ? Hypoglycemia ? Secondary hyperparathyroidism ? Current smoker PLAN- 
· 1k x 2hours today - total 4 hour hd 
· Hd again tomorrow · Change diet to renal diet · Fluid restriction 1200 mL/day · Follow BMP · Continue Renvela · Follow bmp in am 
· Lower coreg dose Subjective: Interval History:  
Seen on hd today, k high 5.7 , na low 126, bp on low side ROS:- As documented above Objective:  
Vitals:  
 11/16/20 0830 11/16/20 0845 11/16/20 0900 11/16/20 0915 BP: (!) 84/41 (!) 94/37 (!) 113/30 (!) 100/36 Pulse: 83 65 76 74 Resp: 18 18 18 18 Temp:      
TempSrc:      
SpO2:      
Weight:      
Height:      
  
11/15 0701 - 11/16 0700 In: 360 [P.O.:360] Out: - Last 3 Recorded Weights in this Encounter 11/14/20 7131 11/15/20 7181 11/16/20 0327 Weight: 64.2 kg (141 lb 9.6 oz) 65.7 kg (144 lb 12.8 oz) 68.5 kg (151 lb) Physical exam:  
GEN: NAD NECK- Supple, no mass RESP: coarse b/l, decreased air movement CVS: S1,S2, RRR 
NEURO: Normal speech, non focal 
Abdo- soft, NT 
EXT: + Edema PSYCH:Can't access due to patient's current condition Right arm avf + Chart reviewed. Pertinent Notes reviewed. Data Review : 
Recent Labs 11/16/20 
0311 11/14/20 
0401 * 133*  
K 5.7* 4.0  
CL 91* 95* CO2 22 30 * 63* CREA 6.32* 3.92* * 288* CA 7.8* 8.1* Recent Labs 11/16/20 
0311 11/14/20 
0401 WBC 24.7* 23.5* HGB 9.2* 9.7* HCT 27.8* 28.8*  
 236 No results for input(s): FE, TIBC, PSAT, FERR in the last 72 hours. Medication list  reviewed Current Facility-Administered Medications Medication Dose Route Frequency  [START ON 11/17/2020] carvediloL (COREG) tablet 6.25 mg  6.25 mg Oral BID WITH MEALS  predniSONE (DELTASONE) tablet 40 mg  40 mg Oral DAILY WITH BREAKFAST  insulin NPH (NOVOLIN N, HUMULIN N) injection 15 Units  15 Units SubCUTAneous DAILY  metroNIDAZOLE (FLAGYL) IVPB premix 500 mg  500 mg IntraVENous Q12H  
 gabapentin (NEURONTIN) capsule 300 mg  300 mg Oral QHS PRN  
 guaiFENesin (ROBITUSSIN) 100 mg/5 mL oral liquid 200 mg  200 mg Oral Q4H PRN  
 benzonatate (TESSALON) capsule 100 mg  100 mg Oral TID PRN  
 metoprolol (LOPRESSOR) injection 2.5 mg  2.5 mg IntraVENous Q6H PRN  
 dilTIAZem ER (CARDIZEM CD) capsule 240 mg  240 mg Oral DAILY  insulin lispro (HUMALOG) injection   SubCUTAneous AC&HS  
 dextrose (D50W) injection syrg 12.5-25 g  12.5-25 g IntraVENous PRN  
 cefepime (MAXIPIME) 1 g in 0.9% sodium chloride (MBP/ADV) 50 mL MBP  1 g IntraVENous Q24H  
 HYDROcodone-acetaminophen (NORCO)  mg tablet 1 Tab  1 Tab Oral Q6H PRN  peppermint oil   Other PRN  
 0.9% sodium chloride infusion 250 mL  250 mL IntraVENous PRN  
 nystatin (MYCOSTATIN) 100,000 unit/mL oral suspension 500,000 Units  500,000 Units Oral QID  guaiFENesin-dextromethorphan (ROBITUSSIN DM) 100-10 mg/5 mL syrup 10 mL  10 mL Oral Q6H PRN  
 nicotine (NICODERM CQ) 14 mg/24 hr patch 1 Patch  1 Patch TransDERmal DAILY PRN  
 senna-docusate (PERICOLACE) 8.6-50 mg per tablet 1 Tab  1 Tab Oral DAILY  balsam peru-castor oiL (VENELEX) ointment   Topical BID  epoetin ginger-epbx (RETACRIT) injection 4,000 Units  4,000 Units SubCUTAneous Q MON, WED & FRI  hydrALAZINE (APRESOLINE) 20 mg/mL injection 10 mg  10 mg IntraVENous Q4H PRN  
 sodium chloride (NS) flush 5-10 mL  5-10 mL IntraVENous PRN  
  acetaminophen (TYLENOL) tablet 650 mg  650 mg Oral Q6H PRN  thiamine mononitrate (B-1) tablet 100 mg  100 mg Oral DAILY  folic acid (FOLVITE) tablet 1 mg  1 mg Oral DAILY  therapeutic multivitamin (THERAGRAN) tablet 1 Tab  1 Tab Oral DAILY  glucose chewable tablet 16 g  4 Tab Oral PRN  
 glucagon (GLUCAGEN) injection 1 mg  1 mg IntraMUSCular PRN  pantoprazole (PROTONIX) tablet 40 mg  40 mg Oral ACB  sevelamer carbonate (RENVELA) tab 800 mg  800 mg Oral TID  [Held by provider] heparin (porcine) injection 5,000 Units  5,000 Units SubCUTAneous Q12H  
 albuterol-ipratropium (DUO-NEB) 2.5 MG-0.5 MG/3 ML  3 mL Nebulization Q4H PRN  
 budesonide (PULMICORT) 500 mcg/2 ml nebulizer suspension  500 mcg Nebulization BID RT And  
 arformoteroL (BROVANA) neb solution 15 mcg  15 mcg Nebulization BID RT And  
 ipratropium (ATROVENT) 0.02 % nebulizer solution 0.5 mg  0.5 mg Nebulization Q6H PRN [place], [address] [facility] [employer] [address] [place], [address] Phone - [phone]               Fax - [phone]

## 2020-11-16 NOTE — PROGRESS NOTES
Spiritual Care Assessment/Progress Note [address] 
 
 
NAME: Christian Paz. MRN: [id number] AGE: 60 y.o. SEX: male Mormonism Affiliation: Latter day  
Language: English  
 
11/16/2020     Total Time (in minutes): 15 Spiritual Assessment begun in [facility] through conversation with: 
  
    [x]Patient        [x] Family    [] Friend(s) Reason for Consult: Initial/Spiritual assessment, patient floor Spiritual beliefs: (Please include comment if needed) [x] Identifies with a aki tradition:  Latter day   
   [] Supported by a aki community:        
   [] Claims no spiritual orientation:       
   [] Seeking spiritual identity:            
   [] Adheres to an individual form of spirituality:       
   [] Not able to assess:                   
 
    
Identified resources for coping:  
   [x] Prayer                           
   [] Music                  [] Guided Imagery [x] Family/friends                 [] Pet visits [] Devotional reading                         [] Unknown 
   [] Other:                                       
 
 
Interventions offered during this visit: (See comments for more details) Patient Interventions: Affirmation of emotions/emotional suffering, Affirmation of aki, Catharsis/review of pertinent events in supportive environment, Iconic (affirming the presence of God/Higher Power), Initial/Spiritual assessment, patient floor, Prayer (assurance of), Mormonism beliefs/image of God discussed Family/Friend(s): Catharsis/review of pertinent events in supportive environment, Prayer (assurance of) Plan of Care: 
 
 [x] Support spiritual and/or cultural needs  
 [] Support AMD and/or advance care planning process    
 [] Support grieving process 
 [] Coordinate Rites and/or Rituals  
 [] Coordination with community clergy [] No spiritual needs identified at this time [] Detailed Plan of Care below (See Comments)  [] Make referral to Music Therapy 
[] Make referral to Pet Therapy    
[] Make referral to Addiction services 
[] Make referral to [facility] 
[] Make referral to Spiritual Care Partner 
[] No future visits requested       
[x] Follow up visits as needed Comments:  Initial visit on [address] [facility] unit for spiritual assessment. Patient's wife and son were present. Patient had a difficult time speaking, but was able to share he feels he is doing better today. His wife echoed that she sees an improvement. Son shared patient had a test today; results pending at this time. They all seemed relaxed. Family identifies as [employer]aritan; patient pointed to his wife and stated she is the Jew one. He expressed no spiritual needs or concerns at this time. He was receptive to assurance of prayer. Advised patient of  availability. MIHAELA Arriaga, [facility], Staff  [facility]  Paging Service  [phone])

## 2020-11-16 NOTE — PROGRESS NOTES
Problem: Chronic Obstructive Pulmonary Disease (COPD) Goal: *Oxygen saturation during activity within specified parameters Outcome: Progressing Towards Goal 
Goal: *Absence of hypoxia Outcome: Progressing Towards Goal 
  
Problem: Patient Education: Go to Patient Education Activity Goal: Patient/Family Education Outcome: Progressing Towards Goal 
  
Problem: Falls - Risk of 
Goal: *Absence of Falls Description: Document Elio Elina Fall Risk and appropriate interventions in the flowsheet. Outcome: Progressing Towards Goal 
Note: Fall Risk Interventions: 
Mobility Interventions: Patient to call before getting OOB Mentation Interventions: Family/sitter at bedside Medication Interventions: Patient to call before getting OOB, Teach patient to arise slowly Elimination Interventions: Call light in reach History of Falls Interventions: Vital signs minimum Q4HRs X 24 hrs (comment for end date), Assess for delayed presentation/identification of injury for 48 hrs (comment for end date)

## 2020-11-16 NOTE — PROGRESS NOTES
Cardiology Progress Note 11/16/2020 1353 PM 
 
Admit Date: 11/5/2020 Admit Diagnosis: Hypoglycemia [E16.2]; A-fib ([facility] [address] [I48.91]; Acute hypoxemic respiratory failure [facility] ([facility] [U07.1, J96.01] Subjective:  
 
Nadeen Marques has no specific complaints. Remains in afib. He states he doesn't feel well. VSS, AFib low 100's-80. S/p hemodialysis. Labs steady. Visit Vitals BP (!) 116/56 Pulse 96 Temp 97.6 °F (36.4 °C) (Oral) Resp 18 Ht 5' 6\" (1.676 m) Wt 68.5 kg (151 lb) SpO2 93% BMI 24.37 kg/m² Current Facility-Administered Medications Medication Dose Route Frequency  [START ON 11/17/2020] carvediloL (COREG) tablet 6.25 mg  6.25 mg Oral BID WITH MEALS  
 [START ON 11/17/2020] insulin NPH (NOVOLIN N, HUMULIN N) injection 25 Units  25 Units SubCUTAneous DAILY  [START ON 11/17/2020] predniSONE (DELTASONE) tablet 30 mg  30 mg Oral DAILY WITH BREAKFAST  metroNIDAZOLE (FLAGYL) IVPB premix 500 mg  500 mg IntraVENous Q12H  
 gabapentin (NEURONTIN) capsule 300 mg  300 mg Oral QHS PRN  
 guaiFENesin (ROBITUSSIN) 100 mg/5 mL oral liquid 200 mg  200 mg Oral Q4H PRN  
 benzonatate (TESSALON) capsule 100 mg  100 mg Oral TID PRN  
 metoprolol (LOPRESSOR) injection 2.5 mg  2.5 mg IntraVENous Q6H PRN  
 dilTIAZem ER (CARDIZEM CD) capsule 240 mg  240 mg Oral DAILY  insulin lispro (HUMALOG) injection   SubCUTAneous AC&HS  
 dextrose (D50W) injection syrg 12.5-25 g  12.5-25 g IntraVENous PRN  
 cefepime (MAXIPIME) 1 g in 0.9% sodium chloride (MBP/ADV) 50 mL MBP  1 g IntraVENous Q24H  
 HYDROcodone-acetaminophen (NORCO)  mg tablet 1 Tab  1 Tab Oral Q6H PRN  peppermint oil   Other PRN  
 0.9% sodium chloride infusion 250 mL  250 mL IntraVENous PRN  
 nystatin (MYCOSTATIN) 100,000 unit/mL oral suspension 500,000 Units  500,000 Units Oral QID  guaiFENesin-dextromethorphan (ROBITUSSIN DM) 100-10 mg/5 mL syrup 10 mL  10 mL Oral Q6H PRN  
  nicotine (NICODERM CQ) 14 mg/24 hr patch 1 Patch  1 Patch TransDERmal DAILY PRN  
 senna-docusate (PERICOLACE) 8.6-50 mg per tablet 1 Tab  1 Tab Oral DAILY  balsam peru-castor oiL (VENELEX) ointment   Topical BID  epoetin ginger-epbx (RETACRIT) injection 4,000 Units  4,000 Units SubCUTAneous Q MON, WED & FRI  hydrALAZINE (APRESOLINE) 20 mg/mL injection 10 mg  10 mg IntraVENous Q4H PRN  
 sodium chloride (NS) flush 5-10 mL  5-10 mL IntraVENous PRN  
 acetaminophen (TYLENOL) tablet 650 mg  650 mg Oral Q6H PRN  thiamine mononitrate (B-1) tablet 100 mg  100 mg Oral DAILY  folic acid (FOLVITE) tablet 1 mg  1 mg Oral DAILY  therapeutic multivitamin (THERAGRAN) tablet 1 Tab  1 Tab Oral DAILY  glucose chewable tablet 16 g  4 Tab Oral PRN  
 glucagon (GLUCAGEN) injection 1 mg  1 mg IntraMUSCular PRN  pantoprazole (PROTONIX) tablet 40 mg  40 mg Oral ACB  sevelamer carbonate (RENVELA) tab 800 mg  800 mg Oral TID  [Held by provider] heparin (porcine) injection 5,000 Units  5,000 Units SubCUTAneous Q12H  
 albuterol-ipratropium (DUO-NEB) 2.5 MG-0.5 MG/3 ML  3 mL Nebulization Q4H PRN  
 budesonide (PULMICORT) 500 mcg/2 ml nebulizer suspension  500 mcg Nebulization BID RT And  
 arformoteroL (BROVANA) neb solution 15 mcg  15 mcg Nebulization BID RT And  
 ipratropium (ATROVENT) 0.02 % nebulizer solution 0.5 mg  0.5 mg Nebulization Q6H PRN Objective:  
  
Physical Exam: 
Visit Vitals BP (!) 116/56 Pulse 96 Temp 97.6 °F (36.4 °C) (Oral) Resp 18 Ht 5' 6\" (1.676 m) Wt 68.5 kg (151 lb) SpO2 93% BMI 24.37 kg/m² Physical:  
General: ill-appearing, older than stated age [de-identified] male in NAD Heart: irr,irr no m/S3/JVD, no carotid bruits Lungs: diminished Abdomen: Soft, +BS, NTND Extremities: LE corky +DP/PT, no edema, right upper arm fistula Neurologic: alert,oriented Data Review:  
Labs: Recent Results (from the past 24 hour(s)) GLUCOSE, POC Collection Time: 11/15/20  3:59 PM  
Result Value Ref Range Glucose (POC) 362 (H) 65 - 100 mg/dL Performed by Malinda Espinosa (PCT) GLUCOSE, POC Collection Time: 11/15/20  8:50 PM  
Result Value Ref Range Glucose (POC) 320 (H) 65 - 100 mg/dL Performed by Yomaira Alvarez (PCT) CBC W/O DIFF Collection Time: 11/16/20  3:11 AM  
Result Value Ref Range WBC 24.7 (H) 4.1 - 11.1 K/uL  
 RBC 2.66 (L) 4.10 - 5.70 M/uL HGB 9.2 (L) 12.1 - 17.0 g/dL HCT 27.8 (L) 36.6 - 50.3 % .5 (H) 80.0 - 99.0 FL  
 MCH 34.6 (H) 26.0 - 34.0 PG  
 MCHC 33.1 30.0 - 36.5 g/dL  
 RDW 19.4 (H) 11.5 - 14.5 % PLATELET 712 506 - 427 K/uL MPV 11.5 8.9 - 12.9 FL  
 NRBC 6.1 (H) 0  WBC ABSOLUTE NRBC 1.51 (H) 0.00 - 0.01 K/uL METABOLIC PANEL, BASIC Collection Time: 11/16/20  3:11 AM  
Result Value Ref Range Sodium 126 (L) 136 - 145 mmol/L Potassium 5.7 (H) 3.5 - 5.1 mmol/L Chloride 91 (L) 97 - 108 mmol/L  
 CO2 22 21 - 32 mmol/L Anion gap 13 5 - 15 mmol/L Glucose 205 (H) 65 - 100 mg/dL  (H) 6 - 20 MG/DL Creatinine 6.32 (H) 0.70 - 1.30 MG/DL  
 BUN/Creatinine ratio 19 12 - 20 GFR est AA 11 (L) >60 ml/min/1.73m2 GFR est non-AA 9 (L) >60 ml/min/1.73m2 Calcium 7.8 (L) 8.5 - 10.1 MG/DL  
GLUCOSE, POC Collection Time: 11/16/20  1:33 PM  
Result Value Ref Range Glucose (POC) 156 (H) 65 - 100 mg/dL Performed by Gordon Babin Telemetry: AFIB Assessment:  
 
Hospital Problems  Date Reviewed: 1/25/2018 Codes Class Noted POA A-fib [facility]) ICD-10-CM: I48.91 
ICD-9-CM: 427.31  11/5/2020 Unknown Hypoglycemia ICD-10-CM: E16.2 ICD-9-CM: 251.2  11/5/2020 Unknown Acute dyspnea ICD-10-CM: R06.00 
ICD-9-CM: 786.09  11/5/2020 Plan:  
Sakshi Ngo. is a 60 y.o. male with an extensive PMH significant for ESRD on hemodilaysis MWF, DM II, COPD, Tobacco use, HCV, ETOH abuse, HTN, Cellulitis, leukocytosis, chronic back pain, recurrent pleural effusions admitted for Hypoglycemia [E16.2] A-fib ([facility] [address] [I48.91]  
  Afib with RVR: rate controlled currently; HASBLED= 4 CHADVASc= 2 
-Continue long acting dilt  Increased to 240mg PO daily, BB increased to 12.5 mg PO BID 
-Continue to monitor tele. -EKG and prior ECHO reviewed, new ECHO EF 55-60%, mild/mod. TR 
-TSH WNL 
-No anticoagulation due to current ventral hernia mesh with bleeding.  
-Would not be a candidate for HIRA/DCC if we can not anticoagulate. 
-Trop .05, no signs of ACS.   
DM II, symptomatic hypoglycemia: 
-manage per internal medicine 
  
Suspected sepsis: 
-Could be cause of new onset Afib, ABX and fluids per internal medicine.  
  
Tobacco abuse/COPD: 
-patient continues to be daily smoker, discussed importance of ceasing for improved CV health.  
  
Hypertension 
-Monitor BP, continue BB at increased dose and Cardizem  
  
Christine Demarco, MONA  
MSN,RN,AG-ACNP-[facility] 11/16/2020 Patient seen, examined by me personally. Plan discussed as detailed. Agree with note as outlined by  NP. I confirm findings in history and physical exam. No additional findings noted. Agree with plan as outlined above.   
 
Beverly Stokes MD

## 2020-11-16 NOTE — PROGRESS NOTES
Hospitalist Progress Note NAME: Doug Valentine :  1965 MRN:  [id number] Assessment / Plan: 
DM 2 uncontrolled  From steroids Severe symptomatic hypoglycemia on admit resolved Off of D10 drip HgA1C 5.8 Diabetic diet Elevated BG from steroids JERRY adjusted, increased humulin 25U monitor Bg   
  
Suspected sepsis ? HAP 
chronic wound on abdomen from hernia repair, wound culture with normal tana Sputum culture growing Pseudomonas aeruginosa,  
Continue cefepime, flagyl cover for CAP last dose  for 7 day course WBC count is 24k from steroids Currently on 2 L oxygen Appreciate ID eval 
  
  
Ventral hernia Mesh with bleeding Bleeding intermittently now stopped per staff No fluid collection  on CT Cont wound care per surgery use moist dressing avoid dakins F/u wound cx  Normal tana Iv Abx cefepime/flagyl course 7 days Chr anemia monitor Hgb >8 
  
A. fib with RVR intermittetly elevated HR 
 in ER on admit BB dose adjusted by cardiology  
cardizem PO per cards s/p cardizem drip on admit Hold AC as hx of GI bleed in past and abd wound bleeding Cards on case f/u recomnds Coreg  6.25mg  Adjust as needed Echo -EF normal, dilated LA, PASP 56 Tsh/Ft4 ok 
  
  
  
Acute on chronic respiratory failure with hypoxia, currently requiring 2 L Recurrent Pleural effusion  check chest xray RLL collapse COPD managed by Dr. Clinton Batch Appreciate guidance from Dr. Maggy Sewell Nebs prn 
tapering steroids  
  
L Renal mass Urology eval requested, reviewed recommendation leison chr no further w/u needed. 
  
ESRD/HD [facility] MWF Getting UF/GD here as flid overload from noncompliance Fluid restricted Hyponatremia/hyperkalemia   Monitor Na/K with HD 
  
ETOH abuse Tobacco abuse Thiamine/folate/mvt CIWA monitoring no WD so far Nicotine patch 14mcg Alert oriented answered all questions. Was confused earlier in course. CT head ok Neurology feel delirium Debility/Deconditiones state with mod protien malnourished PT/OT, Dietary  On case 
  
Hepatitis C infection hx Monitored at [facility] hepatology clinic Hx of PVD s/p thrombectomy Hx of Splenectomy and partial pancreatectomy Chronic constipation - pt takes mag citrate at home PRN  
  
DVT PRx SCD AD [facility] JUSTEN SAMPSON wife Angelita Cons [phone] Dispo: not medically stable yet may need SNF. PT/OT on case Subjective: Chief Complaint / Reason for Physician Visit Seen in HD AAO X 3 but slow to respond. I spoke with his wife on phone today. BP low normal HR controlled WBC 24k. BG elevated > 350 Objective: VITALS:  
Last 24hrs VS reviewed since prior progress note. Most recent are: 
Patient Vitals for the past 24 hrs: 
 Temp Pulse Resp BP SpO2  
11/16/20 0930  74 18 (!) 101/33   
11/16/20 0915  74 18 (!) 100/36   
11/16/20 0900  76 18 (!) 113/30   
11/16/20 0845  65 18 (!) 94/37   
11/16/20 0830  83 18 (!) 84/41   
11/16/20 0802 98.5 °F (36.9 °C) 94 18 (!) 83/53   
11/16/20 0637 97.6 °F (36.4 °C) 68 18 (!) 116/56 93 % 11/16/20 0336 97 °F (36.1 °C) 69 18 113/62 95 % 11/15/20 2243 97.5 °F (36.4 °C) 71 18 97/77 95 % 11/15/20 2106     96 % 11/15/20 1840 97.5 °F (36.4 °C) 76 20 94/81 93 % 11/15/20 1453 97.3 °F (36.3 °C) 72 18 (!) 124/97 96 % 11/15/20 1239    98/67   
11/15/20 1130 97.5 °F (36.4 °C) 82 20 123/61 97 % Intake/Output Summary (Last 24 hours) at 11/16/2020 3951 Last data filed at 11/15/2020 1247 Gross per 24 hour Intake 360 ml Output  Net 360 ml PHYSICAL EXAM: 
General: Chr ill looking, no acute distress   
EENT:   MMM Resp:  Symmetric expansion, Occ wheezing CV:  Irregular rhythm, no edema GI:  Soft, Chr Abd wound Neurologic:  Alert and  normal speech, Psych:   Flat affect Reviewed most current lab test results and cultures  YES Reviewed most current radiology test results   YES Review and summation of old records today    NO 
 Reviewed patient's current orders and MAR    YES 
PMH/SH reviewed - no change compared to H&P Procedures: see electronic medical records for all procedures/Xrays and details which were not copied into this note but were reviewed prior to creation of Plan. LABS: 
I reviewed today's most current labs and imaging studies. Pertinent labs include: 
Recent Labs 11/16/20 0311 11/14/20 
0401 WBC 24.7* 23.5* HGB 9.2* 9.7* HCT 27.8* 28.8*  
 236 Recent Labs 11/16/20 0311 11/14/20 
0401 * 133*  
K 5.7* 4.0  
CL 91* 95* CO2 22 30 * 288* * 63* CREA 6.32* 3.92* CA 7.8* 8.1* Signed: Kaya Riley MD

## 2020-11-16 NOTE — PROGRESS NOTES
Physical Therapy Note: 
Chart reviewed and patient currently ANTHONY for dialysis. Will defer and follow up as able once he returns. Fany Reyes, PT, DPT Followed up after dialysis and patient is undergoing EEG. Will follow up tomorrow.

## 2020-11-17 NOTE — PROGRESS NOTES
Patient sitting in chair this AM. BP 95/79 when administering AM meds held Diltiazem and carvedilol. 1030 patients HR was jumping between 60 and high 30's asymptomatic. He was moved back in bed from the chair in attempts to raise HR. EKG was completed and cardiology was paged.

## 2020-11-17 NOTE — PROCEDURES
Patient Name: Nicci Cabrera : 1965 Age: 60 y.o. Ordering physician: No ref. provider found Date of EE2020 EEG procedure number: [id number] Diagnosis: alt mental status Interpreting physician: Chetan Thacker MD 
 
ELECTROENCEPHALOGRAM REPORT  
 
PROCEDURE: EEG. CLINICAL INDICATION: The patient is a 60 y.o. male with a history of possible seizures. EEG to rule out seizures, rule out stroke, rule out cortical abnormality. EEG CLASSIFICATION: Abnormal  
 
DESCRIPTION OF THE RECORD:  
The background of this recording contains a posteriorly-located occipital alpha rhythm of 6 Hz that attenuates with eye opening. Throughout the recording, there were no clear areas of focal slowing nor spike or spike-and-wave discharges seen. Hyperventilation was not performed. Photic stimulation produced no response. During the recording the patient did not achieve stage II sleep INTERPRETATION:  
Abnormal. Moderate diffuse cerebral dysfunction which is non specific for etiology but can be seen in toxic/metabolic states. No seizures. Clinical correlation recommended.  
 
 
Chetan Thacker MD 
Neurologist

## 2020-11-17 NOTE — PROGRESS NOTES
Infectious Disease Progress Note Interval: Afebrile, HDS, NAEO Subjective: He feels the same. Wants to go home. No SOB. Objective: 
 
Vitals:  
Patient Vitals for the past 24 hrs: 
 Temp Pulse Resp BP SpO2  
11/17/20 1207 97.8 °F (36.6 °C) (!) 49 18 (!) 93/59 97 % 11/17/20 0920  91  95/79   
11/17/20 0631 98.2 °F (36.8 °C) 82 18 (!) 97/56 94 % 11/17/20 0233 97.9 °F (36.6 °C) 84 18 97/60 94 % 11/16/20 2233 97.6 °F (36.4 °C) 85 18 (!) 114/54 92 % 11/16/20 1910 97.2 °F (36.2 °C) 90 20 91/65 94 % 11/16/20 1545 97.3 °F (36.3 °C) 99 20 104/75 91 % 11/16/20 1358 97.8 °F (36.6 °C) (!) 101 20 105/86 98 % Physical Exam: 
Gen: No apparent distress HEENT:  Normocephalic, atraumatic, no scleral icterus CV: S1,2 heard regularly, no lower extremity edema Lungs: on room air, some wheezing, rhonchi lower base Abdomen: soft, non tender, non distended, no organomegaly appreciated Genitourinary:  No genital discharge , no valdivia catheter Skin: no rash Psych: good affect, good eye contact, non tearful Neuro: alert, oriented to time,  place, and situation, moves all extremities to commands, verbal 
Musculoskeletal:  No joint edema, erythema or tenderness noted Lines: PIV Labs: 
Recent Results (from the past 24 hour(s)) GLUCOSE, POC Collection Time: 11/16/20  4:27 PM  
Result Value Ref Range Glucose (POC) 218 (H) 65 - 100 mg/dL Performed by Katarina Fatima GLUCOSE, POC Collection Time: 11/16/20  8:33 PM  
Result Value Ref Range Glucose (POC) 161 (H) 65 - 100 mg/dL Performed by Harmony Chowdhury PCT METABOLIC PANEL, BASIC Collection Time: 11/17/20  2:45 AM  
Result Value Ref Range Sodium 134 (L) 136 - 145 mmol/L Potassium 4.3 3.5 - 5.1 mmol/L Chloride 96 (L) 97 - 108 mmol/L  
 CO2 30 21 - 32 mmol/L Anion gap 8 5 - 15 mmol/L Glucose 139 (H) 65 - 100 mg/dL BUN 66 (H) 6 - 20 MG/DL  Creatinine 4.38 (H) 0.70 - 1.30 MG/DL  
 BUN/Creatinine ratio 15 12 - 20 GFR est AA 17 (L) >60 ml/min/1.73m2 GFR est non-AA 14 (L) >60 ml/min/1.73m2 Calcium 7.8 (L) 8.5 - 10.1 MG/DL  
GLUCOSE, POC Collection Time: 11/17/20  7:00 AM  
Result Value Ref Range Glucose (POC) 113 (H) 65 - 100 mg/dL Performed by Veto Kaufman PCT   
EKG, 12 LEAD, INITIAL Collection Time: 11/17/20 10:30 AM  
Result Value Ref Range Ventricular Rate 61 BPM  
 QRS Duration 88 ms Q-T Interval 406 ms QTC Calculation (Bezet) 408 ms Calculated R Axis 91 degrees Calculated T Axis 133 degrees Diagnosis Atrial fibrillation Rightward axis Septal infarct (cited on or before 17-NOV-2020) Abnormal ECG When compared with ECG of 05-NOV-2020 09:06, 
Vent. rate has decreased BY  96 BPM 
  
Confirmed by Maxim Dunlap M.D. ([id number]) on 11/17/2020 11:06:34 AM 
  
GLUCOSE, POC Collection Time: 11/17/20 11:52 AM  
Result Value Ref Range Glucose (POC) 236 (H) 65 - 100 mg/dL Performed by Buddy Wall (CON) Micro: 
Reviewed in EMR Assessment / Recommendations Carmita Grace is a 60y.o. year old male with history of T2DM, HCV, splenectomy, rheumatoid arthritis, CKD, COPD on 2L, who presented on 11/5/20 for hypoglycemia and AMS. ID consulted for leukocytosis. The leukocytosis is deemed likely due to the high-steroid (given for COPD) which are being tapered right now. Can continue to monitor that. Also being treated for HAP/aspiration due to PsA with 7 days of cefepime and flagyl, end 11/17/20.  
 
- Continue to periodically monitor the WBC as steroids being tapered off.  
- Rest of assessment per Dr. Tesha Fang note from ID from 11/14/20. Will follow Thank you for the opportunity to participate in the care of this patient. Please contact with questions or concerns. Beka Arenas MD 
Infectious Diseases

## 2020-11-17 NOTE — PROGRESS NOTES
Problem: Chronic Obstructive Pulmonary Disease (COPD) Goal: *Oxygen saturation during activity within specified parameters Outcome: Progressing Towards Goal 
Goal: *Absence of hypoxia Outcome: Progressing Towards Goal

## 2020-11-17 NOTE — PROGRESS NOTES
End of Shift Note Bedside shift change report given to Josiah Armendariz (oncoming nurse) by Viral Scott RN (offgoing nurse). Report included the following information SBAR, Kardex, Intake/Output, MAR and Recent Results Shift worked:  7p-7a Shift summary and any significant changes:  
   
 
  
Concerns for physician to address:   
Zone phone for oncoming shift:     
 
Activity: 
Activity Level: Up with Assistance Number times ambulated in hallways past shift: 0 Number of times OOB to chair past shift: 0 Cardiac:  
Cardiac Monitoring: Yes     
Cardiac Rhythm: Atrial fibrillation Access:  
Current line(s): PIV Genitourinary:  
Urinary status: anuric Respiratory:  
O2 Device: Nasal cannula Chronic home O2 use?: YES Incentive spirometer at bedside: YES 
  
GI: 
Last Bowel Movement Date: 11/12/20 Current diet:  DIET NUTRITIONAL SUPPLEMENTS Dinner, Breakfast; [place] DIET ONE TIME MESSAGE 
DIET ONE TIME MESSAGE 
DIET RENAL Regular; Consistent Carb 1800kcal; FR 1200ML; Low Potassium Passing flatus: YES Tolerating current diet: YES 
% Diet Eaten: 100 % Pain Management:  
Patient states pain is manageable on current regimen: YES Skin: 
Walter Score: 16 Interventions: turn team, speciality bed, float heels, increase time out of bed, PT/OT consult and limit briefs Patient Safety: 
Fall Score: Total Score: 5 Interventions: bed/chair alarm, gripper socks, pt to call before getting OOB and stay with me (per policy) High Fall Risk: Yes Length of Stay: 
Expected LOS: 4d 19h Actual LOS: 11 Viral Scott, RN

## 2020-11-17 NOTE — PROCEDURES
[facility] Dialysis Team [place]  [phone] Vitals   Pre   Post   Assessment   Pre   Post    
Temp  Temp: 98.2 °F (36.8 °C) (11/17/20 1700)  97.4 oral LOC  A &  o X 3, bouts of confusion No change HR   Pulse (Heart Rate): 94 (11/17/20 1715) 100 Lungs   Rales upper/lower  lobes  No change B/P   BP: 133/70 (11/17/20 1715) 105/56 Cardiac   S1S2  No change Resp   Resp Rate: 18 (11/17/20 1715) 18 Skin   Warm,dry & intact  No change Pain level  Pain Intensity 1: 3 (11/17/20 0234) 00 Edema  Facial edema, lower No change Orders: Duration:   Start:    1700 End:    1930 Total:   2.5 Dialyzer:   Dialyzer/Set Up Inspection: Taryn Alcazar (11/17/20 1700) K Bath:   Dialysate K (mEq/L): 2 (11/17/20 1700) Ca Bath:   Dialysate CA (mEq/L): 3.0 (11/17/20 1700) Na/Bicarb:   Dialysate NA (mEq/L): 140 (11/17/20 1700) Target Fluid Removal:   Goal/Amount of Fluid to Remove (mL): 3000 mL (11/17/20 1700) Access  AVF Type & Location:   Right upper AVF Labs Obtained/Reviewed Critical Results Called   Date when labs were drawn- 
Hgb-   
HGB Date Value Ref Range Status 11/16/2020 9.2 (L) 12.1 - 17.0 g/dL Final  
 
K-   
Potassium Date Value Ref Range Status 11/17/2020 4.3 3.5 - 5.1 mmol/L Final  
  Comment:  
  INVESTIGATED PER DELTA CHECK PROTOCOL Ca-  
Calcium Date Value Ref Range Status 11/17/2020 7.8 (L) 8.5 - 10.1 MG/DL Final  
 
Bun-  
BUN Date Value Ref Range Status 11/17/2020 66 (H) 6 - 20 MG/DL Final  
  Comment:  
  INVESTIGATED PER DELTA CHECK PROTOCOL Creat-  
Creatinine Date Value Ref Range Status 11/17/2020 4.38 (H) 0.70 - 1.30 MG/DL Final  
  Comment:  
  INVESTIGATED PER DELTA CHECK PROTOCOL Medications/ Blood Products Given Name   Dose   Route and Time Blood Volume Processed (BVP):    56.5 Net Fluid Removed:  1900ml Comments RN reviewed LPN assessment and completed RN assessment. RN completed patient assessment. RN reviewed technicians vital signs and procedure note. Tx completed. Reviewed by RN Cam Cummings Time Out Done: 3514 Primary Nurse Rpt Pre:THAD Wilkerson Primary Nurse Rpt Post:THAD Waldron Pt Education:access care Care Plan:contiue HD Tx Summary:Dr. Elisa Medina ordered to run 2.5hrs today. FILEMON AVF: skin CDI. No s/s of infection. + B/T. No issues with cannulation or hemostasis. Running well at . I arrived to pt's  A&Ox3 confused at moments Consent signed & on file. SBAR received from Primary RN. 1700: Pt cannulated with 39H needles per policy & without issue. Labs drawn per request/ order. VSS. Dialysis Tx initiated. 1715: Pt. Stable,jose ramon. Hd well. Lines secure visible & intact 1730: Pt resting quietly. Resting well. Lines secure 1745: Pt. Jose Ramon. Hd well. Lines secure 1800: Pt. Stable, lines secure and visible 1815: Pt. Stable, lines secure and visible 1830: pt. Watching TV. Lines secure and visible 1845: Pt. jose ramon hd well. No s/s of distress 
1900: HR high. Decrease UF goal. lines secure and  visible 1915: Pt. Stable,lines secure 1930: Tx ended. VSS. All possible blood returned to patient. Hemostasis achieved without issue. Bed locked and in the lowest position, call bell and belongings in reach. SBAR given to Primary, RN. Patient is stable at time of their/ my departure. All Dialysis related medications have been reviewed. Admiting Diagnosis:Afib Pt's previous clinic-  [facility] Consent signed - Informed Consent Verified: Yes (11/17/20 1700) Teddyita Consent - yes Hepatitis Status- neg/imm 1/14/20 Machine #- Machine Number: [id number] (11/17/20 1700) Telemetry status- yes Pre-dialysis wt. -

## 2020-11-17 NOTE — PROGRESS NOTES
Problem: Falls - Risk of 
Goal: *Absence of Falls Description: Document Nan Gamez Fall Risk and appropriate interventions in the flowsheet. Outcome: Progressing Towards Goal 
Note: Fall Risk Interventions: 
Mobility Interventions: Assess mobility with egress test, Bed/chair exit alarm, Patient to call before getting OOB Mentation Interventions: Bed/chair exit alarm, Adequate sleep, hydration, pain control Medication Interventions: Bed/chair exit alarm, Patient to call before getting OOB Elimination Interventions: Bed/chair exit alarm, Call light in reach, Toileting schedule/hourly rounds, Urinal in reach History of Falls Interventions: Bed/chair exit alarm, Investigate reason for fall

## 2020-11-17 NOTE — PROGRESS NOTES
Problem: Self Care Deficits Care Plan (Adult) Goal: *Acute Goals and Plan of Care (Insert Text) Description:  
FUNCTIONAL STATUS PRIOR TO ADMISSION:  Patient states he was Mod I with basic ADLs using a single point cane for functional mobility. Admits to difficulty using Rollator for longer household/community distances. On home O2 PRN, yet Pt admits he rarely uses it. Has a Pulse Ox w/ necklace yet also rarely uses. No longer drives. On social security. Tearful re: current living situation/medical condition. Takes a cab to dialysis MWF. Admits to x3-4 falls in past year. Most recent fall occurred while taking his plate to the sink w/o using his cane. HOME SUPPORT: The patient lived with his wife and step-children. Wife assists w/ IADLs yet has her own medical conditions. Occupational Therapy Goals Initiated 11/9/2020, Goals remain appropriate as per 11/17 weekly re-evaluation. 1.  Patient will perform grooming tasks standing at the sink for 3 minutes with Mod I (seated rest breaks PRN) within 7 day(s). 2.  Patient will perform UB/LB sponge bathing while seated with modified independence (AE and rest breaks PRN) within 7 day(s). 3.  Patient will perform lower body dressing with modified independence (AE and rest breaks PRN) within 7 day(s). 4.  Patient will perform toilet transfers with modified independence using least restrictive device within 7 day(s). 5.  Patient will perform all aspects of toileting with modified independence within 7 day(s). 6.  Patient will self-monitor need for rest breaks during functional tasks with min verbal cues within 7 day(s). Outcome: Progressing Towards Goal 
 
OCCUPATIONAL THERAPY TREATMENT/WEEKLY RE-ASSESSMENT Patient: Erik Mijares. (56 y.o. male) Date: 11/17/2020 Diagnosis: Hypoglycemia [E16.2] A-fib ([facility] [address] [I48.91] Acute hypoxemic respiratory failure due to COVID-19 ([facility]) [U07.1, J96.01]  
<principal problem not specified> Precautions: Fall, Skin, Bed Alarm, Seizure Chart, occupational therapy assessment, plan of care, and goals were reviewed. ASSESSMENT Patient continues to demonstrate GW, poor activity tolerance, decreased safety awareness and decreased balance which is impairing his  functional independence. Overall he was min A of 1 for bed mobility, he required as much as mod A of 2 for transfers and he was supervision to [facility] for ADLs performed. Patient becomes SOB with minimal activity, but VSS on 2 L NC t/o session. Cueing needed for use of pacing techniques during all ADL performance. At this point he continues to benefit from acute OT and he would benefit from SNF rehab with a pulmonary focus. PLAN : 
Goals have been updated based on progression since last assessment. Patient continues to benefit from skilled intervention to address the above impairments. Continue to follow patient 4 times a week to address goals. Recommendation for discharge: (in order for the patient to meet his/her long term goals) Therapy up to 5 days/week in SNF setting with pulmonary focus. Patient and his wife want to have him come home at discharge and he will need HHOT and PT services. This discharge recommendation: 
Has been made in collaboration with the attending provider and/or case management IF patient discharges home will need the following DME: hospital bed and tub transfer bench OBJECTIVE DATA SUMMARY:  
Cognitive/Behavioral Status: 
Neurologic State: Alert Orientation Level: Oriented X4 Cognition: Follows commands Safety/Judgement: Decreased awareness of need for safety;Decreased insight into deficits Functional Mobility and Transfers for ADLs: 
Bed Mobility: 
Supine to Sit: Minimum assistance; Additional time;Assist x1 Scooting: Minimum assistance; Additional time;Assist x1 Transfers: 
Sit to Stand: Moderate assistance;Assist x2(Improves to modA x 1) Balance: 
Sitting: Intact Standing: Impaired Standing - Static: Constant support; Fair 
Standing - Dynamic : Constant support;Poor ADL Intervention: 
Grooming Position Performed: Standing Washing Face: Contact guard assistance Washing Hands: Contact guard assistance Lower Body Dressing Assistance Socks: Supervision;Set-up; Compensatory technique training(additional time) Position Performed: Seated in chair;Bending forward method Cues: Verbal cues provided;Don;Doff(for pacing) Cognitive Retraining Attention to Task: Distractibility; Single task Safety/Judgement: Decreased awareness of need for safety;Decreased insight into deficits Activity Tolerance:  
Poor, but SpO2 was stable during activity on 2 L NC. 
BP stable with positional changes as well After treatment patient left in no apparent distress:  
Sitting in chair, Call bell within reach, and Caregiver / family present COMMUNICATION/COLLABORATION:  
The patients plan of care was discussed with: Physical therapist and Registered nurse. Nitesh Garg, OTR/L Time Calculation: 39 mins

## 2020-11-17 NOTE — PROGRESS NOTES
Cardiology Progress Note 11/17/2020 1353 PM 
 
Admit Date: 11/5/2020 Admit Diagnosis: Hypoglycemia [E16.2]; A-fib ([facility] [address] [I48.91]; Acute hypoxemic respiratory failure due to COVID-19 ([facility] [address] [U07.1, J96.01] Subjective:  
 
[place]. has no specific complaints. This AM remains in afib now with a slow ventricular response, dropping to 30 bpm, with 2sec pausing. Coreg had been increased to 25mg BID yesterday to manage the afib with RVR. Visit Vitals BP (!) 93/59 (BP 1 Location: Left arm, BP Patient Position: At rest) Pulse (!) 49 Temp 97.8 °F (36.6 °C) Resp 18 Ht 5' 6\" (1.676 m) Wt 148 lb 1 oz (67.2 kg) SpO2 97% BMI 23.90 kg/m² Current Facility-Administered Medications Medication Dose Route Frequency  carvediloL (COREG) tablet 6.25 mg  6.25 mg Oral BID WITH MEALS  insulin NPH (NOVOLIN N, HUMULIN N) injection 25 Units  25 Units SubCUTAneous DAILY  predniSONE (DELTASONE) tablet 30 mg  30 mg Oral DAILY WITH BREAKFAST  metroNIDAZOLE (FLAGYL) IVPB premix 500 mg  500 mg IntraVENous Q12H  
 gabapentin (NEURONTIN) capsule 300 mg  300 mg Oral QHS PRN  
 guaiFENesin (ROBITUSSIN) 100 mg/5 mL oral liquid 200 mg  200 mg Oral Q4H PRN  
 benzonatate (TESSALON) capsule 100 mg  100 mg Oral TID PRN  
 metoprolol (LOPRESSOR) injection 2.5 mg  2.5 mg IntraVENous Q6H PRN  
 dilTIAZem ER (CARDIZEM CD) capsule 240 mg  240 mg Oral DAILY  insulin lispro (HUMALOG) injection   SubCUTAneous AC&HS  
 dextrose (D50W) injection syrg 12.5-25 g  12.5-25 g IntraVENous PRN  
 HYDROcodone-acetaminophen (NORCO)  mg tablet 1 Tab  1 Tab Oral Q6H PRN  peppermint oil   Other PRN  
 0.9% sodium chloride infusion 250 mL  250 mL IntraVENous PRN  
 nystatin (MYCOSTATIN) 100,000 unit/mL oral suspension 500,000 Units  500,000 Units Oral QID  guaiFENesin-dextromethorphan (ROBITUSSIN DM) 100-10 mg/5 mL syrup 10 mL  10 mL Oral Q6H PRN  
  nicotine (NICODERM CQ) 14 mg/24 hr patch 1 Patch  1 Patch TransDERmal DAILY PRN  
 senna-docusate (PERICOLACE) 8.6-50 mg per tablet 1 Tab  1 Tab Oral DAILY  balsam peru-castor oiL (VENELEX) ointment   Topical BID  epoetin ginger-epbx (RETACRIT) injection 4,000 Units  4,000 Units SubCUTAneous Q MON, WED & FRI  hydrALAZINE (APRESOLINE) 20 mg/mL injection 10 mg  10 mg IntraVENous Q4H PRN  
 sodium chloride (NS) flush 5-10 mL  5-10 mL IntraVENous PRN  
 acetaminophen (TYLENOL) tablet 650 mg  650 mg Oral Q6H PRN  thiamine mononitrate (B-1) tablet 100 mg  100 mg Oral DAILY  folic acid (FOLVITE) tablet 1 mg  1 mg Oral DAILY  therapeutic multivitamin (THERAGRAN) tablet 1 Tab  1 Tab Oral DAILY  glucose chewable tablet 16 g  4 Tab Oral PRN  
 glucagon (GLUCAGEN) injection 1 mg  1 mg IntraMUSCular PRN  pantoprazole (PROTONIX) tablet 40 mg  40 mg Oral ACB  sevelamer carbonate (RENVELA) tab 800 mg  800 mg Oral TID  [Held by provider] heparin (porcine) injection 5,000 Units  5,000 Units SubCUTAneous Q12H  
 albuterol-ipratropium (DUO-NEB) 2.5 MG-0.5 MG/3 ML  3 mL Nebulization Q4H PRN  
 budesonide (PULMICORT) 500 mcg/2 ml nebulizer suspension  500 mcg Nebulization BID RT And  
 arformoteroL (BROVANA) neb solution 15 mcg  15 mcg Nebulization BID RT And  
 ipratropium (ATROVENT) 0.02 % nebulizer solution 0.5 mg  0.5 mg Nebulization Q6H PRN Objective:  
  
Physical Exam: 
General:  Older appearing than stated age,  male in no acute distress Chest:  Clear Heart:  Irr, irr, no m Abd:  +BS, NTND Ext:  No peripheral edema Visit Vitals BP (!) 93/59 (BP 1 Location: Left arm, BP Patient Position: At rest) Pulse (!) 49 Temp 97.8 °F (36.6 °C) Resp 18 Ht 5' 6\" (1.676 m) Wt 148 lb 1 oz (67.2 kg) SpO2 97% BMI 23.90 kg/m² Data Review:  
Labs:   
Recent Results (from the past 24 hour(s)) GLUCOSE, POC  Collection Time: 11/16/20  4:27 PM  
 Result Value Ref Range Glucose (POC) 218 (H) 65 - 100 mg/dL Performed by Haris Lucero GLUCOSE, POC Collection Time: 11/16/20  8:33 PM  
Result Value Ref Range Glucose (POC) 161 (H) 65 - 100 mg/dL [facility] METABOLIC PANEL, BASIC Collection Time: 11/17/20  2:45 AM  
Result Value Ref Range Sodium 134 (L) 136 - 145 mmol/L Potassium 4.3 3.5 - 5.1 mmol/L Chloride 96 (L) 97 - 108 mmol/L  
 CO2 30 21 - 32 mmol/L Anion gap 8 5 - 15 mmol/L Glucose 139 (H) 65 - 100 mg/dL BUN 66 (H) 6 - 20 MG/DL Creatinine 4.38 (H) 0.70 - 1.30 MG/DL  
 BUN/Creatinine ratio 15 12 - 20 GFR est AA 17 (L) >60 ml/min/1.73m2 GFR est non-AA 14 (L) >60 ml/min/1.73m2 Calcium 7.8 (L) 8.5 - 10.1 MG/DL  
GLUCOSE, POC Collection Time: 11/17/20  7:00 AM  
Result Value Ref Range Glucose (POC) 113 (H) 65 - 100 mg/dL Performed by [facility] PCT   
EKG, 12 LEAD, INITIAL Collection Time: 11/17/20 10:30 AM  
Result Value Ref Range Ventricular Rate 61 BPM  
 QRS Duration 88 ms Q-T Interval 406 ms QTC Calculation (Bezet) 408 ms Calculated R Axis 91 degrees Calculated T Axis 133 degrees Diagnosis Atrial fibrillation Rightward axis Septal infarct (cited on or before 17-NOV-2020) Abnormal ECG When compared with ECG of 05-NOV-2020 09:06, 
Vent. rate has decreased BY  96 BPM 
  
Confirmed by Fitz Reyes M.D. ([id number]) on 11/17/2020 11:06:34 AM 
  
GLUCOSE, POC Collection Time: 11/17/20 11:52 AM  
Result Value Ref Range Glucose (POC) 236 (H) 65 - 100 mg/dL Performed by Buddy Woodall (CON) Telemetry: AFIB Assessment:  
 
Hospital Problems  Date Reviewed: 1/25/2018 Codes Class Noted POA A-fib [facility]) ICD-10-CM: I48.91 
ICD-9-CM: 427.31  11/5/2020 Unknown Hypoglycemia ICD-10-CM: E16.2 ICD-9-CM: 251.2  11/5/2020 Unknown Acute dyspnea ICD-10-CM: R06.00 
ICD-9-CM: 786.09  11/5/2020 Plan: Franco San is a 60 y.o. male with an extensive PMH significant for ESRD on hemodilaysis MWF, DM II, COPD, Tobacco use, HCV, ETOH abuse, HTN, Cellulitis, leukocytosis, chronic back pain, recurrent pleural effusions admitted for Hypoglycemia on 11/5/2020, A-fib  
  
Afib with RVR:  HASBLED= 4 CHADVASc= 2 Yesterday increased Coreg to 25mg BID, continued on Dilt 240mg daily to manage the afib with RVR This AM, remains in afib with slow ventricular response with HR 30s-60s, and 2 sec pauses. Decreased Coreg, Diltizem Continue to monitor tele. -EKG and prior ECHO reviewed, new ECHO EF 55-60%, mild/mod. TR 
-TSH WNL 
-No anticoagulation due to current ventral hernia mesh with bleeding.  
-Would not be a candidate for HIRA/DCC if we can not anticoagulate. 
  
DM II, symptomatic hypoglycemia: 
-manage per internal medicine 
  
PNA 
-Could be cause of new onset Afib, ABX and fluids per internal medicine.  
  
Tobacco abuse/COPD: 
-patient continues to be daily smoker, discussed importance of ceasing for improved CV health.  
  
Hypertension 
-Monitor BP, continue BB at increased dose and Cardizem  
  
Stephanie Baxter NP [facility] 11/17/2020 Patient seen, examined by me personally. Plan discussed as detailed. Agree with note as outlined by  NP. I confirm findings in history and physical exam. No additional findings noted. Agree with plan as outlined above.   
 
Gloria Ingram MD
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
## 2020-11-17 NOTE — PROGRESS NOTES
TRANSFER - IN REPORT: 
 
Verbal report received from Raul. RN on Leal Douglas.  being received from PCU(unit) for ordered procedure Report consisted of patients Situation, Background, Assessment and  
Recommendations(SBAR). Information from the following report(s) SBAR and Kardex was reviewed with the receiving nurse. Opportunity for questions and clarification was provided. Assessment completed upon patients arrival to unit and care assumed.

## 2020-11-17 NOTE — PROGRESS NOTES
Hospitalist Progress Note NAME: Jenifer Fontaine. :  1965 MRN:  [id number] Assessment / Plan: 
DM 2 uncontrolled  From steroids Severe symptomatic hypoglycemia on admit resolved Off of D10 drip HgA1C 5.8 Diabetic diet Elevated BG from steroids JERRY adjusted 
 increased humulin 25U monitor Bg   Now better 
  
Suspected sepsis ? HAP 
chronic wound on abdomen from hernia repair, wound culture with normal tana Sputum culture growing Pseudomonas aeruginosa,  
S/p  cefepime, flagyl cover for CAP last dose  for 7 day course WBC count is 24k from steroids monitor periodically Currently on 2 L oxygen Appreciate ID eval 
  
  
Ventral hernia Mesh with bleeding Bleeding intermittently now stopped per staff No fluid collection  on CT Cont wound care per surgery use moist dressing avoid dakins F/u wound cx  Normal tana S/p Iv Abx cefepime/flagyl course 7 days Chr anemia monitor Hgb >8 
  
A. fib with RVR intermittetly elevated HR 
 in ER on admit BB dose adjusted by cardiology  
cardizem PO per cards s/p cardizem drip on admit Hold AC as hx of GI bleed in past and abd wound bleeding Cards on case f/u recomnds Coreg  6.25mg  Adjust as needed if bp allows Echo -EF normal, dilated LA, PASP 56 Tsh/Ft4 ok 
  
  
  
Acute on chronic respiratory failure with hypoxia, currently requiring 2 L Recurrent Pleural effusion  check CT chest   still wheezing and PRITCHETT  
RLL collapse check CT chest   Still wheezing and PRITCHETT 
COPD managed by Dr. Shell Han Appreciate guidance from Dr. Timmy Corbett Nebs prn 
tapering steroids  
  
L Renal mass Urology eval requested, reviewed recommendation leison chr no further w/u needed. 
  
ESRD/HD [place] [facility] MWF Getting UF/GD here as fluid overload from noncompliance Fluid restricted diet Monitor Na/K with HD 
  
ETOH abuse Tobacco abuse Thiamine/folate/mvt CIWA monitoring no WD so far Nicotine patch 14mcg Alert oriented answered all questions. Was confused earlier in course. CT head ok Neurology feel delirium making confuse Debility/Deconditiones state with mod protien malnourished PT/OT, Dietary  On case 
  
Hepatitis C infection hx Monitored at [facility] hepatology clinic Hx of PVD s/p thrombectomy Hx of Splenectomy and partial pancreatectomy Chronic constipation - pt takes mag citrate at home PRN  
  
DVT PRx SCD AD [facility] JUSTEN SAMPSON wife Paris Mitchell [phone] Dispo:  Not medically stable  may need SNF. PT/OT on case recommend SNF. CM on case Subjective: Chief Complaint / Reason for Physician Visit Doing well and he feels frustrated. I spoke with his wife on phone today and updated clinical status. BG better. He still has PRITCHETT and wheezing needing daily fluid removal per renal. Is poke with renal who recommend getiing CT chest and pulm eval   As he has been neg fluid balance so far. Objective: VITALS:  
Last 24hrs VS reviewed since prior progress note. Most recent are: 
Patient Vitals for the past 24 hrs: 
 Temp Pulse Resp BP SpO2  
11/17/20 0920  91  95/79   
11/17/20 0631 98.2 °F (36.8 °C) 82 18 (!) 97/56 94 % 11/17/20 0233 97.9 °F (36.6 °C) 84 18 97/60 94 % 11/16/20 2233 97.6 °F (36.4 °C) 85 18 (!) 114/54 92 % 11/16/20 1910 97.2 °F (36.2 °C) 90 20 91/65 94 % 11/16/20 1545 97.3 °F (36.3 °C) 99 20 104/75 91 % 11/16/20 1358 97.8 °F (36.6 °C) (!) 101 20 105/86 98 % 11/16/20 1209 97.6 °F (36.4 °C) 96 18 (!) 116/56   
11/16/20 1200  61 18 (!) 94/37   
11/16/20 1145  88 18 (!) 121/90   
11/16/20 1130  (!) 108 18 (!) 116/22   
11/16/20 1115  89 18 (!) 99/34   
11/16/20 1100  92 18 (!) 93/27  11/16/20 1045  100 18 (!) 90/40   
11/16/20 1030  73 18 (!) 103/28  11/16/20 1015  (!) 121 16 (!) 89/51  Intake/Output Summary (Last 24 hours) at 11/17/2020 1006 Last data filed at 11/17/2020 2621 Gross per 24 hour Intake 1000 ml Output 3000 ml Net -2000 ml PHYSICAL EXAM: 
General: Chr ill looking, no acute distress   
EENT:   MMM Resp:  Symmetric expansion,   wheezing +  
CV:  Irregular rhythm, no edema GI:  Soft, Chr Abd wound Neurologic:  Alert and  normal speech, Psych:   Flat affect Reviewed most current lab test results and cultures  YES Reviewed most current radiology test results   YES Review and summation of old records today    NO Reviewed patient's current orders and MAR    YES 
PMH/SH reviewed - no change compared to H&P Procedures: see electronic medical records for all procedures/Xrays and details which were not copied into this note but were reviewed prior to creation of Plan. LABS: 
I reviewed today's most current labs and imaging studies. Pertinent labs include: 
Recent Labs 11/16/20 
7823 WBC 24.7* HGB 9.2* HCT 27.8*  
 Recent Labs 11/17/20 
0245 11/16/20 
8247 * 126*  
K 4.3 5.7* CL 96* 91* CO2 30 22 * 205* BUN 66* 117* CREA 4.38* 6.32* CA 7.8* 7.8* Signed: Ngoc Aguilar MD

## 2020-11-17 NOTE — PROGRESS NOTES
Nephrology Progress Note Hugo Santamaria  
 
[email or web address]. [email or web address]  Phone - [phone] Patient: Nadeen Thakur. YOB: 1965     Admit Date: 11/5/2020 Date- 11/17/2020 CC: Follow up for ESRD IMPRESSION & PLAN:  
? ESRD -[id number] Wednesday Friday ? Hyperkalemia 
? SOB, Fluid overload 
? COPD,emphysema ? Bradycardia ? Anemia of CKD ? Noncompliance to fluid restriction ? Hyponatremia ? A. fib with RVR ? Hypoglycemia ? Secondary hyperparathyroidism ? Current smoker PLAN- 
· Fluid restriction 1200 mL/day · Hd today · Cardiology input noted. · Check pro bnp · Continue hd mwf · Continue epogen · Continue renvela Subjective: Interval History:  
k improved He has bradycardia and low bp Na improved Continue to have sob and cough ROS:- As documented above Objective:  
Vitals:  
 11/17/20 0517 11/17/20 0631 11/17/20 0920 11/17/20 1207 BP:  (!) 97/56 95/79 (!) 93/59 Pulse:  82 91 (!) 49 Resp:  18  18 Temp:  98.2 °F (36.8 °C)  97.8 °F (36.6 °C) TempSrc:      
SpO2:  94%  97% Weight: 67.2 kg (148 lb 1 oz) Height:      
  
11/16 0701 - 11/17 0700 In: 1000 [P.O.:600; I.V.:400] Out: 3000 Last 3 Recorded Weights in this Encounter 11/15/20 2920 11/16/20 0327 11/17/20 0359 Weight: 65.7 kg (144 lb 12.8 oz) 68.5 kg (151 lb) 67.2 kg (148 lb 1 oz) Physical exam:  
GEN: NAD NECK- Supple, no mass RESP: coarse b/l, decreased air movement CVS: S1,S2, RRR 
NEURO: Normal speech, non focal 
Abdo- soft, NT 
EXT: + Edema PSYCH:Can't access due to patient's current condition Right arm avf + Chart reviewed. Pertinent Notes reviewed. Data Review : 
Recent Labs 11/17/20 
0245 11/16/20 
7151 * 126*  
K 4.3 5.7* CL 96* 91* CO2 30 22 BUN 66* 117* CREA 4.38* 6.32* * 205* CA 7.8* 7.8* Recent Labs 11/16/20 
0115 WBC 24.7* HGB 9.2* HCT 27.8*  
 No results for input(s): FE, TIBC, PSAT, FERR in the last 72 hours. Medication list  reviewed Current Facility-Administered Medications Medication Dose Route Frequency  carvediloL (COREG) tablet 6.25 mg  6.25 mg Oral BID WITH MEALS  insulin NPH (NOVOLIN N, HUMULIN N) injection 25 Units  25 Units SubCUTAneous DAILY  predniSONE (DELTASONE) tablet 30 mg  30 mg Oral DAILY WITH BREAKFAST  metroNIDAZOLE (FLAGYL) IVPB premix 500 mg  500 mg IntraVENous Q12H  
 gabapentin (NEURONTIN) capsule 300 mg  300 mg Oral QHS PRN  
 guaiFENesin (ROBITUSSIN) 100 mg/5 mL oral liquid 200 mg  200 mg Oral Q4H PRN  
 benzonatate (TESSALON) capsule 100 mg  100 mg Oral TID PRN  
 metoprolol (LOPRESSOR) injection 2.5 mg  2.5 mg IntraVENous Q6H PRN  
 dilTIAZem ER (CARDIZEM CD) capsule 240 mg  240 mg Oral DAILY  insulin lispro (HUMALOG) injection   SubCUTAneous AC&HS  
 dextrose (D50W) injection syrg 12.5-25 g  12.5-25 g IntraVENous PRN  
 HYDROcodone-acetaminophen (NORCO)  mg tablet 1 Tab  1 Tab Oral Q6H PRN  peppermint oil   Other PRN  
 0.9% sodium chloride infusion 250 mL  250 mL IntraVENous PRN  
 nystatin (MYCOSTATIN) 100,000 unit/mL oral suspension 500,000 Units  500,000 Units Oral QID  guaiFENesin-dextromethorphan (ROBITUSSIN DM) 100-10 mg/5 mL syrup 10 mL  10 mL Oral Q6H PRN  
 nicotine (NICODERM CQ) 14 mg/24 hr patch 1 Patch  1 Patch TransDERmal DAILY PRN  
 senna-docusate (PERICOLACE) 8.6-50 mg per tablet 1 Tab  1 Tab Oral DAILY  balsam peru-castor oiL (VENELEX) ointment   Topical BID  epoetin ginger-epbx (RETACRIT) injection 4,000 Units  4,000 Units SubCUTAneous Q MON, WED & FRI  hydrALAZINE (APRESOLINE) 20 mg/mL injection 10 mg  10 mg IntraVENous Q4H PRN  
 sodium chloride (NS) flush 5-10 mL  5-10 mL IntraVENous PRN  
 acetaminophen (TYLENOL) tablet 650 mg  650 mg Oral Q6H PRN  thiamine mononitrate (B-1) tablet 100 mg  100 mg Oral DAILY  folic acid (FOLVITE) tablet 1 mg  1 mg Oral DAILY  therapeutic multivitamin (THERAGRAN) tablet 1 Tab  1 Tab Oral DAILY  glucose chewable tablet 16 g  4 Tab Oral PRN  
 glucagon (GLUCAGEN) injection 1 mg  1 mg IntraMUSCular PRN  pantoprazole (PROTONIX) tablet 40 mg  40 mg Oral ACB  sevelamer carbonate (RENVELA) tab 800 mg  800 mg Oral TID  [Held by provider] heparin (porcine) injection 5,000 Units  5,000 Units SubCUTAneous Q12H  
 albuterol-ipratropium (DUO-NEB) 2.5 MG-0.5 MG/3 ML  3 mL Nebulization Q4H PRN  
 budesonide (PULMICORT) 500 mcg/2 ml nebulizer suspension  500 mcg Nebulization BID RT And  
 arformoteroL (BROVANA) neb solution 15 mcg  15 mcg Nebulization BID RT And  
 ipratropium (ATROVENT) 0.02 % nebulizer solution 0.5 mg  0.5 mg Nebulization Q6H PRN Hedda Gardner, [address]  [facility] [employer] [address] [place], [address] [facility] Phone - [phone]               Fax - [phone]

## 2020-11-17 NOTE — PROGRESS NOTES
HERMAN: Home with Home Health [facility]) and follow-up appointments. Send dialysis notes and d/c summary 12:37am- CM sent referral to [facility] for rolling walker. 11:45am- CM called pt's wife via phone to discuss d/c plan. CM inquired with pt's wife about SNF/Rehab. Pt's wife stated she did not want pt going to rehab/SNF. Pt's wife stated that [facility] has been in touch with her and that pt will just come home with [facility]. Pt's wife stated that pt will need a rolling walker. 11:30am- CM called pt via phone to discuss d/c plan. CM discussed with pt about SNF/Rehab. Pt informed CM to call his wife to discuss d/c plan. CM will continue to follow patient for discharge planning needs and arrange for services as deemed necessary. Jarod Thomas [address] [place] [address] 
[phone]

## 2020-11-18 NOTE — PROGRESS NOTES
Comprehensive Nutrition Assessment Type and Reason for Visit: [facility] Nutrition Recommendations/Plan:  
Continue current diet Continue Nepro Check Phos & add diet restriction if needed Nutrition Assessment:     
11/18: Chart reviewed for reassessment. HD today stopped early d/t tachycardia. s/p EEG 11/17 with abnormal results. New PNA. PO intake has been good %. RD visited pt at bedside who was difficult to obtain information from. Pt is edentulous, soft foods recommended. K+ WNL, no recent Phos. Will check and implement Phos restriction if needed. Malnutrition Assessment: 
Malnutrition Status: Moderate malnutrition Context:  Chronic illness Findings of the 6 clinical characteristics of malnutrition:  
Energy Intake:    
Weight Loss: Body Fat Loss:  1 - Mild body fat loss, Muscle Mass Loss:  7 - Severe muscle mass loss, Clavicles (pectoralis &deltoids), Scapula (trapezius), Thigh (quadraceps) Fluid Accumulation:  1 - Mild,   
 Strength:  Not performed Estimated Daily Nutrient Needs: 
Energy (kcal): 1853 (BMR 1425 x 1. 3AF); Weight Used for Energy Requirements: Current Protein (g): 79 (1.2 g/kg bw); Weight Used for Protein Requirements: Current Fluid (ml/day): 1800 ml/day; Method Used for Fluid Requirements: 1 ml/kcal 
 
 
Nutrition Related Findings:  Labs: ,[phone]. H&H 9.5/28.9. Na 133, Ca 7.8. K WNL, no Phos. Meds: retacrit, folvite, humalog, NPH, protonix, prednisone, pericolace, renvela, MVI, thiamine. BM 11/18. Wounds:   
Stage II, Deep tissue injury, Surgical incision Current Nutrition Therapies: DIET RENAL Regular; Consistent Carb 1800kcal; FR 1200ML; Low Potassium DIET NUTRITIONAL SUPPLEMENTS Dinner; Nepro Anthropometric Measures: 
· Height:  5' 6\" (167.6 cm) · Current Body Wt:  66.8 kg (147 lb 4.3 oz) · Ideal Body Wt:  142 lbs:  102.8 % · BMI Category:  Normal weight (BMI 22.0-24.9) age over 72 Nutrition Diagnosis: · Altered nutrition-related lab values related to (current medical conditions) as evidenced by (elevated BG, renal labs, Na+ 128) Previous diagnosis continues Nutrition Interventions:  
Food and/or Nutrient Delivery: Continue current diet, Continue oral nutrition supplement Nutrition Education and Counseling: No recommendations at this time Coordination of Nutrition Care: Continue to monitor while inpatient Goals: 
Continue PO >70% meals and supplement, trend BG <200mg/dL and renal labs WNL next 4-6 days Nutrition Monitoring and Evaluation:  
Behavioral-Environmental Outcomes: Knowledge or skill Food/Nutrient Intake Outcomes: Food and nutrient intake, Supplement intake Physical Signs/Symptoms Outcomes: Biochemical data, Skin, Weight, Hemodynamic status Discharge Planning:   
Continue current diet Electronically signed by Demetrice Wise RD on 11/18/2020 at 11:48 AM 
 
Contact: [id number] Pager [phone]

## 2020-11-18 NOTE — PROCEDURES
Interventional Radiology Procedure Note 11/18/2020 5:12 PM 
 
Patient: Zuleika Gannon. Informed consent obtained Diagnosis: Left pleural effusion Procedure(s): ultrasound guided left thoracentesis Specimens removed:  720cc pleural fluid Complications: None Primary Physician: Anny Hall MD 
 
Recomendations: N/A Discharge Disposition: stable; return to floor Full dictated report to follow Anny Hall MD 
Interventional Radiology [facility] 
5:12 PM, 11/18/2020

## 2020-11-18 NOTE — PROGRESS NOTES
Pharmacy Automatic Renal Dosing Protocol - Antimicrobials Indication for Antimicrobials: Pseudomonas Lung Infection Current Regimen of Each Antimicrobial: 
Cefepime 1gm IV q12h  (Start Date ; Day # 1) Previous Antimicrobial Therapy: 
 
 
Significant Cultures:  
: Sputum = P.aeruginosa (FINAL) Radiology / Imaging results: (X-ray, CT scan or MRI):  
: CT Chest: - Right lower lobe and right middle lobe consolidation/mass is unchanged. Right 
basilar effusion with pleural thickening is unchanged. - Left pleural effusion left basilar atelectasis is slightly progressed. - Slightly enlarged mediastinal lymph node is stable. - There are new areas of groundglass opacities in the lungs left greater than 
right and more patchy densities at the lung bases and findings could represent 
pneumonia possibly atypical pneumonia or atypical edema pattern Paralysis, amputations, malnutrition: none mentioned Labs: 
Recent Labs 20 
0114 20 
0245 20 
2438 CREA 3.79* 4.38* 6.32* BUN 55* 66* 117* WBC 23.6*  --  24.7* Temp (24hrs), Av °F (36.7 °C), Min:97.4 °F (36.3 °C), Max:98.5 °F (36.9 °C) Is the Patient on Dialysis? Yes: HD days are MWF Creatinine Clearance (mL/min):  
CrCl (Actual Body Weight): 20.3 CrCl (Adjusted Body Weight): 19.8 CrCl (Ideal Body Weight): 19.4 Impression/Plan:  
Change Cefepime to 1gm IV q24h Antimicrobial stop date TBD Pharmacy will follow daily and adjust medications as appropriate for renal function and/or serum levels. Thank you, 
González Santizo, [facility] Recommended duration of therapy 
http://[facility]/[place]/[facility]/Pharmacy/Clinical%20Companion/Duration%20of%[email or web address]. docx Renal Dosing 
http://[facility]/[place]/[facility]/Pharmacy/Clinical%20Companion/Renal%[id number]. pdf

## 2020-11-18 NOTE — PROGRESS NOTES
OT Note: 
 
Chart reviewed and Pt is ANTHONY for HD. Will follow up later today for OT session.  
 
[facility], OTR/L

## 2020-11-18 NOTE — PROGRESS NOTES
0730 Bedside shift change report given to [address] F[address]ardo (oncoming nurse) by Tomer Kebede RN (offgoing nurse). Report included the following information SBAR, Kardex, ED Summary, Intake/Output, MAR, Recent Results and Cardiac Rhythm A. Fib .  
 
0745 Pt left floor for Dialysis [id number] Pt returned from dialysis.

## 2020-11-18 NOTE — PROGRESS NOTES
Hospitalist Progress Note NAME: Summer Veras. :  1965 MRN:  [id number] Assessment / Plan: 
DM 2 uncontrolled  From steroids Severe symptomatic hypoglycemia on admit resolved Off of D10 drip HgA1C 5.8 Diabetic diet Elevated BG from steroids JERRY adjusted 
 increased humulin 25U monitor Bg   Now better 
  
Suspected sepsis ? HAP 
chronic wound on abdomen from hernia repair, wound culture with normal tana Sputum culture growing Pseudomonas aeruginosa,  
S/p  cefepime, flagyl cover for CAP last dose  for 7 day course WBC count is 23k from steroids monitor periodically Currently on 2 L oxygen Appreciate ID eval 
  
  
Ventral hernia Mesh with bleeding Bleeding intermittently now stopped per staff No fluid collection  on CT Cont wound care per surgery use moist dressing avoid dakins F/u wound cx  Normal tana S/p Iv Abx cefepime/flagyl course 7 days Chr anemia monitor Hgb >8 
  
A. fib with RVR intermittetly elevated HR 
 in ER on admit BB dose adjusted by cardiology  
cardizem PO per cards s/p cardizem drip on admit Hold AC as hx of GI bleed in past and abd wound bleeding Cards on case f/u recomnds Coreg  6.25mg  Adjust as needed if bp allows Echo -EF normal, dilated LA, PASP 56 Tsh/Ft4 ok 
  
  
  
Acute on chronic respiratory failure with hypoxia, currently requiring 2 L Recurrent Pleural effusion  check CT chest   still wheezing and PRITCHETT  
RLL collapse check CT chest   Still wheezing and PRITCHETT 
COPD managed by Dr. Jose Stein Re-consulted pulm per nephrology request 
Nebs prn 
tapering steroids  
  
L Renal mass Urology eval requested, reviewed recommendation leison chr no further w/u needed. 
  
ESRD/HD [facility] MWF Getting UF/GD here as fluid overload from noncompliance Fluid restricted diet Monitor Na/K with HD 
  
ETOH abuse Tobacco abuse Thiamine/folate/mvt CIWA monitoring no WD so far Nicotine patch 14mcg Alert oriented answered all questions. Was confused earlier in course. CT head ok Neurology feel delirium making confuse Debility/Deconditiones state with mod protien malnourished PT/OT, Dietary  On case 
  
Hepatitis C infection hx Monitored at [facility] hepatology clinic Hx of PVD s/p thrombectomy Hx of Splenectomy and partial pancreatectomy Chronic constipation - pt takes mag citrate at home PRN  
  
DVT PRx SCD AD [facility] JUSTEN SAMPSON wife Kaz Mcgrath [phone] Dispo:  Not medically stable  may need SNF. PT/OT on case recommend SNF family has declined and he is at risk of re-hospitalizations. CM on case Subjective: Chief Complaint / Reason for Physician Visit Seen earlier today on HD was c/o PRITCHETT and CT chest noted  Also d/w nephrology to ask pulm nixonal today. Called wife updated clinical status and answered all questions and concerns. Objective: VITALS:  
Last 24hrs VS reviewed since prior progress note. Most recent are: 
Patient Vitals for the past 24 hrs: 
 Temp Pulse Resp BP SpO2  
11/18/20 0900  75 20 91/72   
11/18/20 0845  70 20 (!) 127/59   
11/18/20 0830  (!) 111 20 105/68   
11/18/20 0817 98.5 °F (36.9 °C) 85 20 114/80   
11/18/20 0735     96 % 11/18/20 0634 98.4 °F (36.9 °C) 93 18 136/82 97 % 11/18/20 0232 98.5 °F (36.9 °C) 96 18 132/71 97 % 11/17/20 2231 98.2 °F (36.8 °C) 95 18 128/77 95 % 11/17/20 1938     97 % 11/17/20 1930 97.4 °F (36.3 °C) 100 20 (!) 105/56 98 % 11/17/20 1915  (!) 112 21 112/69 98 % 11/17/20 1900  (!) 115 22 91/64 99 % 11/17/20 1845  (!) 108 20 98/60 98 % 11/17/20 1830  100 20 (!) 101/56 99 % 11/17/20 1815  100 20 (!) 105/58 96 % 11/17/20 1800  100 18 115/63 99 % 11/17/20 1745  97 18 116/60 92 % 11/17/20 1730  (!) 106 18 108/86 96 % 11/17/20 1715  94 18 133/70 100 % 11/17/20 1700 98.2 °F (36.8 °C) 95 18 106/83   
11/17/20 1616 97.4 °F (36.3 °C) 89 18 112/63 99 % 11/17/20 1207 97.8 °F (36.6 °C) (!) 49 18 (!) 93/59 97 % Intake/Output Summary (Last 24 hours) at 11/18/2020 1010 Last data filed at 11/18/2020 8335 Gross per 24 hour Intake 360 ml Output 1900 ml Net -1540 ml PHYSICAL EXAM: 
General: Chr ill looking, no acute distress   
EENT:   MMM Resp:  Symmetric expansion, wheezing +  
CV:  Irregular rhythm, no edema GI:  Soft, Chr Abd wound Neurologic:  Alert and  normal speech, Psych:   Flat affect Reviewed most current lab test results and cultures  YES Reviewed most current radiology test results   YES Review and summation of old records today    NO Reviewed patient's current orders and MAR    YES 
PMH/SH reviewed - no change compared to H&P Procedures: see electronic medical records for all procedures/Xrays and details which were not copied into this note but were reviewed prior to creation of Plan. LABS: 
I reviewed today's most current labs and imaging studies. Pertinent labs include: 
Recent Labs 11/18/20 
0114 11/16/20 
8606 WBC 23.6* 24.7* HGB 9.5* 9.2* HCT 28.9* 27.8*  
 198 Recent Labs 11/18/20 
0114 11/17/20 
0245 11/16/20 
9428 * 134* 126*  
K 4.7 4.3 5.7* CL 99 96* 91* CO2 28 30 22 * 139* 205* BUN 55* 66* 117* CREA 3.79* 4.38* 6.32* CA 7.8* 7.8* 7.8* Signed: Vazquez Barlow MD

## 2020-11-18 NOTE — PROGRESS NOTES
End of Shift Note Bedside shift change report given to Barb (oncoming nurse) by Vonda Stark RN (offgoing nurse). Report included the following information SBAR, Kardex, Intake/Output, MAR and Recent Results Shift worked:  7p-7a Shift summary and any significant changes:  
   
 
  
Concerns for physician to address:    
Zone phone for oncoming shift:     
 
Activity: 
Activity Level: Up with Assistance Number times ambulated in hallways past shift: 0 Number of times OOB to chair past shift: 0 Cardiac:  
Cardiac Monitoring: Yes     
Cardiac Rhythm: Atrial fibrillation Access:  
Current line(s): PIV Genitourinary:  
Urinary status: oliguric Respiratory:  
O2 Device: Nasal cannula Chronic home O2 use?: YES Incentive spirometer at bedside: NO 
  
GI: 
Last Bowel Movement Date: (Patient states he doesn't remember the last time he had a B) Current diet:  DIET NUTRITIONAL SUPPLEMENTS Dinner, Breakfast; [place] DIET ONE TIME MESSAGE 
DIET ONE TIME MESSAGE 
DIET RENAL Regular; Consistent Carb 1800kcal; FR 1200ML; Low Potassium Passing flatus: YES Tolerating current diet: YES 
% Diet Eaten: 100 % Pain Management:  
Patient states pain is manageable on current regimen: YES Skin: 
Walter Score: 16 Interventions: turn team, float heels, increase time out of bed, PT/OT consult and limit briefs Patient Safety: 
Fall Score: Total Score: 5 Interventions: bed/chair alarm, gripper socks, pt to call before getting OOB and stay with me (per policy) High Fall Risk: Yes Length of Stay: 
Expected LOS: 4d 19h Actual LOS: 12 Vonda Stark, RN

## 2020-11-18 NOTE — PROGRESS NOTES
ADULT PROTOCOL: JET AEROSOL  REASSESSMENT Patient  Zuleika Gannon.     60 y.o.   male     11/18/2020  1:16 AM 
 
Breath Sounds Pre Procedure: Right Breath Sounds: Clear Left Breath Sounds: Clear Breath Sounds Post Procedure: Right Breath Sounds: Clear Left Breath Sounds: Clear Breathing pattern: Pre procedure Breathing Pattern: Regular Post procedure Breathing Pattern: Regular Heart Rate: Pre procedure Pulse: 110 Post procedure Pulse: 108 Resp Rate: Pre procedure Respirations: 19 Post procedure Respirations: 18 
 
Oxygen: O2 Device: Nasal cannula Changed: NO SpO2: Pre procedure SpO2: 97 % Post procedure SpO2: 98 % Nebulizer Therapy: Current medications Aerosolized Medications: Brovana, Pulmicort Changed:NO Smoking History: Current smoker Problem List:  
Patient Active Problem List  
Diagnosis Code  Cellulitis of thumb, left L03.012  Tenosynovitis of finger M65.9  
 DM (diabetes mellitus) ([facility] [address] E11.9  
 HCV (hepatitis C virus) B19.20  Tobacco abuse Z72.0  Alcohol abuse, daily use F10.10  COPD (chronic obstructive pulmonary disease) (HCC) J44.9  History of splenectomy Z90.81  
 Cellulitis and abscess of finger L03.019, L02.519  Abdominal wall cellulitis L03.311  Acute GI bleeding K92.2  
 HTN (hypertension) I10  
 ESRD on dialysis (HCC) N18.6, Z99.2  Nontraumatic ischemic infarction of muscle of hand M62.249  Acute cholecystitis K81.0  Type 2 diabetes mellitus with nephropathy (HCC) E11.21  
 COPD exacerbation (HCC) J44.1  A-fib (HCC) I48.91  
 Hypoglycemia E16.2  Acute dyspnea R06.00 Respiratory Therapist: [facility]

## 2020-11-18 NOTE — PROGRESS NOTES
Nephrology Progress Note Hugo Santamaria  
 
[facility]. [facility]  Phone - [phone] Patient: Pernell French. YOB: 1965     Admit Date: 11/5/2020 Date- 11/18/2020 CC: Follow up for  ESRD IMPRESSION & PLAN:  
? esrd -[id number]-lpqqlqf-CsmidlMonday Wednesday Friday ? Hyperkalemia 
? SOB, Fluid overload 
? COPD,emphysema ? Bradycardia ? Anemia of CKD ? Noncompliance to fluid restriction ? Hyponatremia ? A. fib with RVR ? Hypoglycemia ? Secondary hyperparathyroidism ? Current smoker PLAN- 
· S/P HD TODAY. Hd stopped early due to tachycardia · His cardizem dose was decresed due to bradycardia yesterday · HIGH PRO BNP AND CT Chest results noted · Continue hd mwf · Continue epogen · Continue renvela D/w patient and Ilya Abarca Subjective: Interval History: · S/P HD TODAY. Hd stopped early due to tachycardia · C/o cough and sob · No fever CT chest results noted ROS:- As documented above Objective:  
Vitals:  
 11/18/20 0950 11/18/20 0955 11/18/20 1015 11/18/20 1047 BP: 135/66 135/66  113/73 Pulse: 92 (!) 130 (!) 130 (!) 114 Resp: 20 18 20 Temp: 98 °F (36.7 °C)   97.6 °F (36.4 °C) TempSrc: Oral     
SpO2:  100%  99% Weight:      
Height:      
  
11/17 0701 - 11/18 0700 In: 360 [P.O.:360] Out: 1900 Last 3 Recorded Weights in this Encounter 11/16/20 0327 11/17/20 0359 11/18/20 5551 Weight: 68.5 kg (151 lb) 67.2 kg (148 lb 1 oz) 66.8 kg (147 lb 3 oz) Physical exam:  
GEN: NAD NECK- Supple, no mass RESP: coarse b/l, decreased air movement CVS: S1,S2, RRR 
NEURO: Normal speech, non focal 
Abdo- soft, NT 
EXT: + Edema PSYCH:normal mood Right arm avf + Chart reviewed. Pertinent Notes reviewed. Data Review : 
Recent Labs 11/18/20 
0114 11/17/20 
0245 11/16/20 
3800 * 134* 126*  
K 4.7 4.3 5.7* CL 99 96* 91* CO2 28 30 22 BUN 55* 66* 117* CREA 3.79* 4.38* 6.32* * 139* 205* CA 7.8* 7.8* 7.8* Recent Labs 11/18/20 
0114 11/16/20 
0408 WBC 23.6* 24.7* HGB 9.5* 9.2* HCT 28.9* 27.8*  
 198 No results for input(s): FE, TIBC, PSAT, FERR in the last 72 hours. Medication list  reviewed Current Facility-Administered Medications Medication Dose Route Frequency  albumin human 25% (BUMINATE) solution 25 g  25 g IntraVENous DIALYSIS PRN  
 dilTIAZem ER (CARDIZEM CD) capsule 180 mg  180 mg Oral DAILY  [Held by provider] carvediloL (COREG) tablet 6.25 mg  6.25 mg Oral BID WITH MEALS  insulin NPH (NOVOLIN N, HUMULIN N) injection 25 Units  25 Units SubCUTAneous DAILY  predniSONE (DELTASONE) tablet 30 mg  30 mg Oral DAILY WITH BREAKFAST  gabapentin (NEURONTIN) capsule 300 mg  300 mg Oral QHS PRN  
 guaiFENesin (ROBITUSSIN) 100 mg/5 mL oral liquid 200 mg  200 mg Oral Q4H PRN  
 benzonatate (TESSALON) capsule 100 mg  100 mg Oral TID PRN  
 metoprolol (LOPRESSOR) injection 2.5 mg  2.5 mg IntraVENous Q6H PRN  
 insulin lispro (HUMALOG) injection   SubCUTAneous AC&HS  
 dextrose (D50W) injection syrg 12.5-25 g  12.5-25 g IntraVENous PRN  
 HYDROcodone-acetaminophen (NORCO)  mg tablet 1 Tab  1 Tab Oral Q6H PRN  peppermint oil   Other PRN  
 0.9% sodium chloride infusion 250 mL  250 mL IntraVENous PRN  
 nystatin (MYCOSTATIN) 100,000 unit/mL oral suspension 500,000 Units  500,000 Units Oral QID  guaiFENesin-dextromethorphan (ROBITUSSIN DM) 100-10 mg/5 mL syrup 10 mL  10 mL Oral Q6H PRN  
 nicotine (NICODERM CQ) 14 mg/24 hr patch 1 Patch  1 Patch TransDERmal DAILY PRN  
 senna-docusate (PERICOLACE) 8.6-50 mg per tablet 1 Tab  1 Tab Oral DAILY  balsam peru-castor oiL (VENELEX) ointment   Topical BID  epoetin ginger-epbx (RETACRIT) injection 4,000 Units  4,000 Units SubCUTAneous Q MON, WED & FRI  hydrALAZINE (APRESOLINE) 20 mg/mL injection 10 mg  10 mg IntraVENous Q4H PRN  
 sodium chloride (NS) flush 5-10 mL  5-10 mL IntraVENous PRN  
  acetaminophen (TYLENOL) tablet 650 mg  650 mg Oral Q6H PRN  thiamine mononitrate (B-1) tablet 100 mg  100 mg Oral DAILY  folic acid (FOLVITE) tablet 1 mg  1 mg Oral DAILY  therapeutic multivitamin (THERAGRAN) tablet 1 Tab  1 Tab Oral DAILY  glucose chewable tablet 16 g  4 Tab Oral PRN  
 glucagon (GLUCAGEN) injection 1 mg  1 mg IntraMUSCular PRN  pantoprazole (PROTONIX) tablet 40 mg  40 mg Oral ACB  sevelamer carbonate (RENVELA) tab 800 mg  800 mg Oral TID  [Held by provider] heparin (porcine) injection 5,000 Units  5,000 Units SubCUTAneous Q12H  
 albuterol-ipratropium (DUO-NEB) 2.5 MG-0.5 MG/3 ML  3 mL Nebulization Q4H PRN  
 budesonide (PULMICORT) 500 mcg/2 ml nebulizer suspension  500 mcg Nebulization BID RT And  
 arformoteroL (BROVANA) neb solution 15 mcg  15 mcg Nebulization BID RT And  
 ipratropium (ATROVENT) 0.02 % nebulizer solution 0.5 mg  0.5 mg Nebulization Q6H PRN Murali Old, [address]  [facility] [employer] [address] [place], [address] Phone - [phone]               Fax - [phone]

## 2020-11-18 NOTE — PROCEDURES
[facility] Dialysis Team [place]  [phone] Vitals   Pre   Post   Assessment   Pre   Post    
Temp  Temp: 98.5 °F (36.9 °C) (11/18/20 0817)  98.0, oral LOC  A&Ox4, periodic confusion A&Ox4, periodic confusion HR   Pulse (Heart Rate): 85 (11/18/20 0817) 92 (130s- 150s A-Fib) Lungs   Loud Wet/ coarse, diminished bases Coarse, Dim bases B/P   BP: 114/80 (11/18/20 0817) 135/66 Cardiac   Tele, chronic Afib Tele, intermittent rapid A-Fib (130-150) Resp   Resp Rate: 20 (11/18/20 0817) 20 Skin   Abd wound CDI dressing; DTI heels; stage 2 sacrum Abd wound CDI dressing; DTI heels; stage 2 sacrum Pain level  0 0 Edema  Abd distention Abd distention Orders: Duration:   Start:    2876 End:    1168 Total:   1.5hr  
Dialyzer:   Dialyzer/Set Up Inspection: Shikha Minaya (11/18/20 0817) K Bath:   Dialysate K (mEq/L): 2 (11/18/20 0817) Ca Bath:   Dialysate CA (mEq/L): 3.0 (11/18/20 0817) Na/Bicarb:   Dialysate NA (mEq/L): 140 (11/18/20 0817) Target Fluid Removal:   Goal/Amount of Fluid to Remove (mL): 3500 mL (11/18/20 0817) Access Type & Location:   MANUEL AVF: skin CDI. No s/s of infection. + B/T. No issues with cannulation or hemostasis. Running well at . Labs Obtained/Reviewed Critical Results Called   Date when labs were drawn- 
Hgb-   
HGB Date Value Ref Range Status 11/18/2020 9.5 (L) 12.1 - 17.0 g/dL Final  
 
K-   
Potassium Date Value Ref Range Status 11/18/2020 4.7 3.5 - 5.1 mmol/L Final  
 
Ca-  
Calcium Date Value Ref Range Status 11/18/2020 7.8 (L) 8.5 - 10.1 MG/DL Final  
 
Bun-  
BUN Date Value Ref Range Status 11/18/2020 55 (H) 6 - 20 MG/DL Final  
 
Creat-  
Creatinine Date Value Ref Range Status 11/18/2020 3.79 (H) 0.70 - 1.30 MG/DL Final  
 
  
Medications/ Blood Products Given Name   Dose   Route and Time None ordered Blood Volume Processed (BVP):    35.7L Net Fluid Removed:  750ml Comments Time Out Done: 7368 Primary Nurse Rpt Pre: Dane Naqvi RN 
Primary Nurse Rpt Post: Jayashree Holly RN 
Pt Education: keep mask on, breath through nose where O2 NC is Care Plan: ongoing Tx Summary: 
Pt arrived to HD suite A&Ox4, periodic confusion. Consent signed & on file. SBAR received from Primary RN. 
1195: Pt cannulated with 42L needles per policy & without issue. VSS. Dialysis Tx initiated. 0900: PB 91/72. Received order for PRN Albumin. 0915: Started Albumin IV. BP now 109/61. 
0930: Patient in unstustained Tachy 130-150. Tele called suite to report. Uf off. Called MD to report patient going in and out of rapid A-Fib. MD on the way to suite. 0945: BFR to 300. Monitor reading HR 130s-150s more often. Called MD. Treatment discontinued. 1050: Called Rapid response for Rapid A-Fib. Tx ended. All possible blood returned to patient. Hemostasis achieved without issue, then had break through bleed at arterial site. 5min hold again to achieve Hemostasis. SBAR given to Primary, RN who is at bedside with response team. EKG and IV metoprolol done in suite. Primary RN and transport transported patient back to room. All Dialysis related medications have been reviewed. Admiting Diagnosis: PNA/ A-Fib Pt's previous clinic- [facility] Consent signed - Informed Consent Verified: Yes (11/18/20 5693) Kaity Consent - on file Hepatitis Status- Immune, 74Ab Neg Ag 1/14/20 Machine #- Machine Number: [id number] (11/18/20 3194) Telemetry status- Tele, chronic A-Fib Pre-dialysis wt. -

## 2020-11-18 NOTE — ROUTINE PROCESS
1630  Patient arrived via stretcher to Radiology Recovery, alert and oriented x 4. 
 
1700  Dr. Francisca Way at bedside evaluating patient and explaining procedure to patient with risks and benefits. Patient verbalized understanding and signed consent for procedure. 1715  Thoracentesis complete, 720 ml of clear yellow fluid drained. 1724  Portable CXR being done at bedside. 1726  Dr. Francisca Way evaluated CXR, advised no pneumothorax, and patient can be returned to in-patient room. 1745  Patient returned to in-patient room 2209.

## 2020-11-18 NOTE — PROGRESS NOTES
HD TRANSFER - OUT REPORT: 
 
Verbal report given to JP VALENZUELA, RN on Marisela Johnson. being transferred to [address] [place] [address] for urgent transfer/ rapid response Report consisted of patient's Situation, Background, Assessment and  
Recommendations(SBAR). Information from the following report(s) SBAR, Procedure Summary, Intake/Output, Recent Results and Cardiac Rhythm intermittent rapid A-Fib was reviewed with the receiving nurse. Method:  $$ Method: Hemodialysis (11/18/20 0817) Fluid Removed  NET Fluid Removed (mL): 750 ml (11/18/20 0950) Patient response to treatment: poor, recurrent -150 unsustained End Time  Hemodialysis End Time: 5773 (11/18/20 0950) If not documented, dialysis nurse to update post-dialysis row in HD/Filtration flowsheet Medications /Volume expansion agents or Fluid boluses administered during treatment? no 
 
Post-dialysis medication administration due?  yes Remind nurse to administer post-HD medication upon return to unit. Fistula hemostasis? yes Line heparinization? no 
 
Lines: MANUEL AVF Opportunity for questions and clarification was provided. Patient transported with: O2 @ 3 liters Registered Nurse Tech

## 2020-11-18 NOTE — PROGRESS NOTES
HD TRANSFER - OUT REPORT: 
 
Verbal report given to [facility] on Stacey Quinones. being transferred to PCU  (Unit) for ordered procedure Report consisted of patient's Situation, Background, Assessment and  
Recommendations(SBAR). Information from the following report(s) Kardex was reviewed with the receiving nurse. Method:  $$ Method: Hemodialysis (11/17/20 1930) Fluid Removed  NET Fluid Removed (mL): 1900 ml (11/17/20 1930) Patient response to treatment:  Stable End Time  Hemodialysis End Time: 1930 (11/17/20 1930) If not documented, dialysis nurse to update post-dialysis row in HD/Filtration flowsheet Medications /Volume expansion agents or Fluid boluses administered during treatment? no 
 
Post-dialysis medication administration due?  no 
Remind nurse to administer post-HD medication upon return to unit. Fistula hemostasis? yes Line heparinization? no 
 
Lines: AVF Opportunity for questions and clarification was provided. Patient transported with: HD at bedside

## 2020-11-18 NOTE — PROGRESS NOTES
TRANSFER - IN REPORT: 
 
Verbal report received from Jed Daly RN on Crowheart Pippins.  being received from [facility] Amy Isidro for ordered procedure Report consisted of patients Situation, Background, Assessment and  
Recommendations(SBAR). Information from the following report(s) SBAR, Kardex and Cardiac Rhythm chronic A-Fib was reviewed with the receiving nurse. Opportunity for questions and clarification was provided. Assessment completed upon patients arrival to unit and care assumed.

## 2020-11-18 NOTE — PROGRESS NOTES
Physician Progress Note Catalino Gutierrez 
CSN #:                  [id number] :                       1965 ADMIT DATE:       2020 8:53 AM 
DISCH DATE: 
RESPONDING 
PROVIDER #:        Sammi Grijalva MD 
 
 
 
 
QUERY TEXT: 
 
Patient admitted with pna. Documentation reflects \"suspected sepsis. \"  If possible, please document in the progress notes and discharge summary if sepsis was: The medical record reflects the following: 
Risk Factors: ? HAP, chronic wound on abdomen from hernia repair Clinical Indicators:  pn: Suspected sepsis, ? HAP, WBC count is 24k from steroids monitor periodically 
hr 100-112, procal 1.07, rr 23 Treatment: ID cx, cefepime, flagyl Thanks, [place] RN/CDI  Options provided: -- sepsis  ruled out after study -- sepsis treated and resolved -- sepsis confirmed after study -- Other - I will add my own diagnosis -- Disagree - Not applicable / Not valid -- Disagree - Clinically unable to determine / Unknown 
-- Refer to Clinical Documentation Reviewer PROVIDER RESPONSE TEXT: 
 
sepsis treated and resolved.  
 
Query created by: Chandan Crowder on 2020 10:10 AM 
 
 
Electronically signed by:  Sammi Grijalva MD 2020 11:20 AM

## 2020-11-18 NOTE — PROGRESS NOTES
Cardiology Progress Note 11/18/2020 1353 PM 
 
Admit Date: 11/5/2020 Admit Diagnosis: Hypoglycemia [E16.2]; A-fib ([facility] [address] [facility] [I48.91]; Acute hypoxemic respiratory failure due to COVID-19 ([facility] [address] [U07.1, J96.01] Subjective:  
 
Na Kahn has no specific complaints. Wanting to go home. This morning in dialysis, became tachycardic, afib with RVR, HD stopped. Yesterday BB held and Dilt dose held and decrease. He has not had Dilt since 11/16/2020. Currently afib with RVR at 110bpm.  He did receive IV BB at 10AM during the faster afib with RVR. Visit Vitals /73 Pulse (!) 114 Temp 97.6 °F (36.4 °C) Resp 20 Ht 5' 6\" (1.676 m) Wt 147 lb 3 oz (66.8 kg) SpO2 99% BMI 23.76 kg/m² Current Facility-Administered Medications Medication Dose Route Frequency  albumin human 25% (BUMINATE) solution 25 g  25 g IntraVENous DIALYSIS PRN  
 dilTIAZem ER (CARDIZEM CD) capsule 180 mg  180 mg Oral DAILY  [Held by provider] carvediloL (COREG) tablet 6.25 mg  6.25 mg Oral BID WITH MEALS  insulin NPH (NOVOLIN N, HUMULIN N) injection 25 Units  25 Units SubCUTAneous DAILY  predniSONE (DELTASONE) tablet 30 mg  30 mg Oral DAILY WITH BREAKFAST  gabapentin (NEURONTIN) capsule 300 mg  300 mg Oral QHS PRN  
 guaiFENesin (ROBITUSSIN) 100 mg/5 mL oral liquid 200 mg  200 mg Oral Q4H PRN  
 benzonatate (TESSALON) capsule 100 mg  100 mg Oral TID PRN  
 metoprolol (LOPRESSOR) injection 2.5 mg  2.5 mg IntraVENous Q6H PRN  
 insulin lispro (HUMALOG) injection   SubCUTAneous AC&HS  
 dextrose (D50W) injection syrg 12.5-25 g  12.5-25 g IntraVENous PRN  
 HYDROcodone-acetaminophen (NORCO)  mg tablet 1 Tab  1 Tab Oral Q6H PRN  peppermint oil   Other PRN  
 0.9% sodium chloride infusion 250 mL  250 mL IntraVENous PRN  
 nystatin (MYCOSTATIN) 100,000 unit/mL oral suspension 500,000 Units  500,000 Units Oral QID  guaiFENesin-dextromethorphan (ROBITUSSIN DM) 100-10 mg/5 mL syrup 10 mL  10 mL Oral Q6H PRN  
 nicotine (NICODERM CQ) 14 mg/24 hr patch 1 Patch  1 Patch TransDERmal DAILY PRN  
 senna-docusate (PERICOLACE) 8.6-50 mg per tablet 1 Tab  1 Tab Oral DAILY  balsam peru-castor oiL (VENELEX) ointment   Topical BID  epoetin ginger-epbx (RETACRIT) injection 4,000 Units  4,000 Units SubCUTAneous Q MON, WED & FRI  hydrALAZINE (APRESOLINE) 20 mg/mL injection 10 mg  10 mg IntraVENous Q4H PRN  
 sodium chloride (NS) flush 5-10 mL  5-10 mL IntraVENous PRN  
 acetaminophen (TYLENOL) tablet 650 mg  650 mg Oral Q6H PRN  thiamine mononitrate (B-1) tablet 100 mg  100 mg Oral DAILY  folic acid (FOLVITE) tablet 1 mg  1 mg Oral DAILY  therapeutic multivitamin (THERAGRAN) tablet 1 Tab  1 Tab Oral DAILY  glucose chewable tablet 16 g  4 Tab Oral PRN  
 glucagon (GLUCAGEN) injection 1 mg  1 mg IntraMUSCular PRN  pantoprazole (PROTONIX) tablet 40 mg  40 mg Oral ACB  sevelamer carbonate (RENVELA) tab 800 mg  800 mg Oral TID  [Held by provider] heparin (porcine) injection 5,000 Units  5,000 Units SubCUTAneous Q12H  
 albuterol-ipratropium (DUO-NEB) 2.5 MG-0.5 MG/3 ML  3 mL Nebulization Q4H PRN  
 budesonide (PULMICORT) 500 mcg/2 ml nebulizer suspension  500 mcg Nebulization BID RT And  
 arformoteroL (BROVANA) neb solution 15 mcg  15 mcg Nebulization BID RT And  
 ipratropium (ATROVENT) 0.02 % nebulizer solution 0.5 mg  0.5 mg Nebulization Q6H PRN Objective:  
  
Physical Exam: 
General:  Older appearing than stated age,  male in no acute distress Chest:  rhonchi Heart:  Irr, irr, no m Abd:  +BS, NTND Ext:  No peripheral edema Visit Vitals /73 Pulse (!) 114 Temp 97.6 °F (36.4 °C) Resp 20 Ht 5' 6\" (1.676 m) Wt 147 lb 3 oz (66.8 kg) SpO2 99% BMI 23.76 kg/m² Data Review:  
Labs:   
Recent Results (from the past 24 hour(s)) NT-PRO BNP  
 Collection Time: 11/17/20  5:09 PM  
Result Value Ref Range NT pro-BNP >35,000 (H) <125 PG/ML  
GLUCOSE, POC Collection Time: 11/17/20  5:46 PM  
Result Value Ref Range Glucose (POC) 175 (H) 65 - 100 mg/dL Performed by Anahi Banks PCT   
GLUCOSE, POC Collection Time: 11/17/20  8:56 PM  
Result Value Ref Range Glucose (POC) 236 (H) 65 - 100 mg/dL Performed by Farrah Ferrer (PCT) CBC W/O DIFF Collection Time: 11/18/20  1:14 AM  
Result Value Ref Range WBC 23.6 (H) 4.1 - 11.1 K/uL  
 RBC 2.72 (L) 4.10 - 5.70 M/uL HGB 9.5 (L) 12.1 - 17.0 g/dL HCT 28.9 (L) 36.6 - 50.3 % .3 (H) 80.0 - 99.0 FL  
 MCH 34.9 (H) 26.0 - 34.0 PG  
 MCHC 32.9 30.0 - 36.5 g/dL RDW 20.3 (H) 11.5 - 14.5 % PLATELET 717 515 - 823 K/uL MPV 11.7 8.9 - 12.9 FL  
 NRBC 3.7 (H) 0  WBC ABSOLUTE NRBC 0.88 (H) 0.00 - 0.01 K/uL METABOLIC PANEL, BASIC Collection Time: 11/18/20  1:14 AM  
Result Value Ref Range Sodium 133 (L) 136 - 145 mmol/L Potassium 4.7 3.5 - 5.1 mmol/L Chloride 99 97 - 108 mmol/L  
 CO2 28 21 - 32 mmol/L Anion gap 6 5 - 15 mmol/L Glucose 272 (H) 65 - 100 mg/dL BUN 55 (H) 6 - 20 MG/DL Creatinine 3.79 (H) 0.70 - 1.30 MG/DL  
 BUN/Creatinine ratio 15 12 - 20 GFR est AA 20 (L) >60 ml/min/1.73m2 GFR est non-AA 17 (L) >60 ml/min/1.73m2 Calcium 7.8 (L) 8.5 - 10.1 MG/DL  
GLUCOSE, POC Collection Time: 11/18/20  7:46 AM  
Result Value Ref Range Glucose (POC) 145 (H) 65 - 100 mg/dL Performed by Poncho Rosen (JOSIANE) EKG, 12 LEAD, SUBSEQUENT Collection Time: 11/18/20  9:49 AM  
Result Value Ref Range Ventricular Rate 120 BPM  
 Atrial Rate 120 BPM  
 P-R Interval 194 ms QRS Duration 94 ms Q-T Interval 266 ms  
 QTC Calculation (Bezet) 375 ms Calculated R Axis 84 degrees Calculated T Axis -137 degrees Diagnosis Atrial fibrillation with rapid ventricular response ST & T wave abnormality, consider inferolateral ischemia Abnormal ECG Confirmed by Yonathan Macario M.D. ([id number]) on 11/18/2020 10:28:48 AM 
  
GLUCOSE, POC Collection Time: 11/18/20 10:57 AM  
Result Value Ref Range Glucose (POC) 122 (H) 65 - 100 mg/dL Performed by Vi Rodriguez PCT Telemetry: AFIB Assessment:  
 
Hospital Problems  Date Reviewed: 1/25/2018 Codes Class Noted POA A-fib [facility]) ICD-10-CM: I48.91 
ICD-9-CM: 427.31  11/5/2020 Unknown Hypoglycemia ICD-10-CM: E16.2 ICD-9-CM: 251.2  11/5/2020 Unknown Acute dyspnea ICD-10-CM: R06.00 
ICD-9-CM: 786.09  11/5/2020 Plan:  
Kody Alfreda. is a 60 y.o. male with an extensive PMH significant for ESRD on hemodilaysis MWF, DM II, COPD, Tobacco use, HCV, ETOH abuse, HTN, Cellulitis, leukocytosis, chronic back pain, recurrent pleural effusions admitted for Hypoglycemia on 11/5/2020, A-fib  
  
Afib with RVR:  HASBLED= 4 CHADVASc= 2 With a decrease in BB and Dilt and holding the medicationson 11/17 and this AM, patient developed afib with RVR during dialysis requiring IV BB Continue on Coreg 6.25mg BID and Dilt 180mg daily (neither was given this AM due to hold parameters which have not been adjusted) 
11/17/2020 afib with slow ventricular response with HR 30s-60s, and 2 sec pauses - resolved EKG and prior ECHO reviewed, new ECHO EF 55-60%, mild/mod. TR 
TSH WNL No anticoagulation due to current ventral hernia mesh with bleeding. Would not be a candidate for HIRA/DCC if we can not anticoagulate. 
  
DM II, symptomatic hypoglycemia: 
manage per internal medicine 
  
PNA Continues with pulm difficulties - Reconsulted Pulm   
  
Tobacco abuse/COPD: 
Was a daily smoker,  
  
Hypertension Monitor BP, continue BB, Dilt  
 
  
Arbie Lesches, NP [facility] 11/18/2020 Patient seen, examined by me personally. Plan discussed as detailed.  Agree with note as outlined by  NP. I confirm findings in history and physical exam. No additional findings noted. Agree with plan as outlined above.   
 
Jacques Wade MD

## 2020-11-18 NOTE — PROGRESS NOTES
Chart reviewed and patient is ANTHONY for HD. Will follow up as able later today for PT session. aJvier Jacob, PT, DPT Attempted to follow up but dialysis stopped early d/t PRITCHETT and elevated HR. Will defer and follow up per POC.

## 2020-11-18 NOTE — ROUTINE PROCESS
End of Shift Note Bedside shift change report given to Ira Larose (oncoming nurse) by Oren Chambers (offgoing nurse). Report included the following information SBAR and Kardex Shift worked:  Day Shift summary and any significant changes:  
  NA Concerns for physician to address:  NA  
Zone phone for oncoming shift:     
 
Activity: 
Activity Level: Up with Assistance Number times ambulated in hallways past shift: 0 Number of times OOB to chair past shift: 2 Cardiac:  
Cardiac Monitoring: Yes     
Cardiac Rhythm: Atrial fibrillation Access:  
Current line(s): HD access Genitourinary:  
Urinary status: anuric Respiratory:  
O2 Device: Nasal cannula Chronic home O2 use?: NO Incentive spirometer at bedside: NO 
  
GI: 
Last Bowel Movement Date: (Patient states he doesn't remember the last time he had a B) Current diet:  DIET NUTRITIONAL SUPPLEMENTS Dinner, Breakfast; [place] DIET ONE TIME MESSAGE 
DIET ONE TIME MESSAGE 
DIET RENAL Regular; Consistent Carb 1800kcal; FR 1200ML; Low Potassium Passing flatus: YES Tolerating current diet: YES 
% Diet Eaten: 100 % Pain Management:  
Patient states pain is manageable on current regimen: YES Skin: 
Walter Score: 16 Interventions: float heels and increase time out of bed Patient Safety: 
Fall Score: Total Score: 5 Interventions: bed/chair alarm and gripper socks High Fall Risk: Yes Length of Stay: 
Expected LOS: 4d 19h Actual LOS: 401 [facility]

## 2020-11-18 NOTE — ROUTINE PROCESS
TRANSFER - OUT REPORT: 
 
Verbal report given to Darwin Arreguin RN(name) on [facility].  being transferred to 220(unit) for routine post - op Report consisted of patients Situation, Background, Assessment and  
Recommendations(SBAR). Information from the following report(s) SBAR was reviewed with the receiving nurse. Lines:  
Peripheral IV 11/05/20 Left Forearm (Active) Site Assessment Clean, dry, & intact 11/18/20 0745 Phlebitis Assessment 0 11/18/20 0745 Infiltration Assessment 0 11/18/20 0745 Dressing Status Clean, dry, & intact 11/18/20 0745 Dressing Type Transparent 11/18/20 0745 Hub Color/Line Status Green 11/18/20 0745 Action Taken Open ports on tubing capped 11/16/20 1545 Alcohol Cap Used Yes 11/16/20 1545 Peripheral IV 11/13/20 Left Forearm (Active) Site Assessment Clean, dry, & intact 11/18/20 1209 Phlebitis Assessment 0 11/18/20 1209 Infiltration Assessment 0 11/18/20 1209 Dressing Status Clean, dry, & intact 11/18/20 1209 Dressing Type Tape;Transparent 11/18/20 1209 Hub Color/Line Status Blue;Capped 11/18/20 1209 Action Taken Open ports on tubing capped 11/16/20 1545 Alcohol Cap Used Yes 11/18/20 1209 Opportunity for questions and clarification was provided. Patient transported with: 
 O2 @ 3 liters

## 2020-11-18 NOTE — PROGRESS NOTES
[facility] Consult Service Note Pulmonary, Critical Care, and Sleep Medicine Name: Wendi Carranza. MRN: [id number] : 1965 Hospital: [address] Date: 2020  Admission date: 2020 Hospital Day: 15 Subjective/Interval History: We were asked by Hospitalist to re evaluate the pt for worsening respiratory failure. Pt reports he has increased coughing, sputum production, dyspnea, wheezing. Pt and his wife are upset that he has not been getting better. His wife states that he has been doing ok. Pt report he is doing worse. Has increased left pleural effusion. Pt is ESRD. Has a left AV fistula. Seen earlier today on rounds. Pt is unstable and acutely ill. Medical records and data reviewed. The nurse was able to go in with me and discuss above. The pt and his wife are frustrated about his condition. 1118; Pt went for thoracentesis. Had about 720 cc of fluid removed. NO acute issues. Noted. IMPRESSION:  
1. Acute chronic respiratory failure with Hypoxia 2. Recurrent pleural effusions; chronic right loculated with compression atelectasis, RLL collapse, consolidation. Now has increased left pleural effusion. 3. Chronic Obstructive Pulmonary Disease with emphysema, chronic bronchitis; poor airway clearance 4. ESRD on HD- [place] [facility]  
5. IDDM type II with renal manifestation 6. Hypertension, benign essential 
7. Tobacco Abuse 8. ETOH abuse 9. Constipation 10. Hep C, followed by hepatology at [facility] 11. Diabetes. 12. History of splenectomy and partial pancreatectomy. 15. H/o PVD; h/o LUE thrombectomy 14. Prognosis guarded RECOMMENDATIONS/PLAN:  
1. Will restart the Cefepime, Will check procalcitonin levels. 2. Will ask US to eval for a left sided thoracentesis. Will order the labs on the fluid. 3. Taper steroids 4. Supplemental O2 to keep sats > 93% 5. Aspiration precautions 6. Labs to follow electrolytes, renal function and and blood counts 7. Glucose monitoring and SSI 8. Bronchial hygiene with respiratory therapy techniques, bronchodilators 9. Eventually maintenane meds need to be switched to nebulized equivalents for better delivery. 10. DVT prophylaxis 11. Smoking cessation counseling done 12. Need to follow with us as out pt  
 
 
  
[x] High complexity decision making was performed [x] See my orders for details Current Facility-Administered Medications Medication  albumin human 25% (BUMINATE) solution 25 g  
 dilTIAZem ER (CARDIZEM CD) capsule 180 mg  carvediloL (COREG) tablet 6.25 mg  
 insulin NPH (NOVOLIN N, HUMULIN N) injection 25 Units  predniSONE (DELTASONE) tablet 30 mg  
 gabapentin (NEURONTIN) capsule 300 mg  
 guaiFENesin (ROBITUSSIN) 100 mg/5 mL oral liquid 200 mg  
 benzonatate (TESSALON) capsule 100 mg  
 metoprolol (LOPRESSOR) injection 2.5 mg  
 insulin lispro (HUMALOG) injection  dextrose (D50W) injection syrg 12.5-25 g  
 HYDROcodone-acetaminophen (NORCO)  mg tablet 1 Tab  peppermint oil  
 0.9% sodium chloride infusion 250 mL  nystatin (MYCOSTATIN) 100,000 unit/mL oral suspension 500,000 Units  guaiFENesin-dextromethorphan (ROBITUSSIN DM) 100-10 mg/5 mL syrup 10 mL  nicotine (NICODERM CQ) 14 mg/24 hr patch 1 Patch  
 senna-docusate (PERICOLACE) 8.6-50 mg per tablet 1 Tab  balsam peru-castor oiL (VENELEX) ointment  epoetin ginger-epbx (RETACRIT) injection 4,000 Units  hydrALAZINE (APRESOLINE) 20 mg/mL injection 10 mg  
 sodium chloride (NS) flush 5-10 mL  acetaminophen (TYLENOL) tablet 650 mg  
 thiamine mononitrate (B-1) tablet 145 mg  
 folic acid (FOLVITE) tablet 1 mg  therapeutic multivitamin (THERAGRAN) tablet 1 Tab  glucose chewable tablet 16 g  
 glucagon (GLUCAGEN) injection 1 mg  pantoprazole (PROTONIX) tablet 40 mg  
 sevelamer carbonate (RENVELA) tab 800 mg  
  [Held by provider] heparin (porcine) injection 5,000 Units  albuterol-ipratropium (DUO-NEB) 2.5 MG-0.5 MG/3 ML  
 budesonide (PULMICORT) 500 mcg/2 ml nebulizer suspension And  
 arformoteroL (BROVANA) neb solution 15 mcg And  ipratropium (ATROVENT) 0.02 % nebulizer solution 0.5 mg  
  
 
  
ROS:A comprehensive review of systems was negative except for that written in the HPI. Objective:  
 
Vital Signs: Telemetry:     Intake/Output:  
Visit Vitals /80 (BP 1 Location: Left arm, BP Patient Position: At rest) Pulse 98 Temp 98.2 °F (36.8 °C) Resp 18 Ht 5' 6\" (1.676 m) Wt 66.8 kg (147 lb 3 oz) SpO2 97% BMI 23.76 kg/m² Temp (24hrs), Av °F (36.7 °C), Min:97.4 °F (36.3 °C), Max:98.5 °F (36.9 °C) O2 Device: Nasal cannula O2 Flow Rate (L/min): 3 l/min Wt Readings from Last 4 Encounters:  
20 66.8 kg (147 lb 3 oz) 20 62.6 kg (138 lb)  
20 63.5 kg (139 lb 15.9 oz) 19 63.5 kg (140 lb) Intake/Output Summary (Last 24 hours) at 2020 1513 Last data filed at 2020 6915 Gross per 24 hour Intake 360 ml Output 2650 ml Net -2290 ml Last shift:       07 - 1900 In: -  
Out: 750 Last 3 shifts: 1901 -  07 In: 1120 [P.O.:720; I.V.:400] Out: 0 Physical Exam:  
General:  male; in no respiratory distress and acyanotic, alert, oriented times 3, cooperative and moderately ill;  NC He was able to give hx. His wife was in room with him. HEENT: NCAT, no dentition, lips and mucosa dry Eyes: anicteric; conjunctiva clear Neck: no nodes, no cuff leak, no accessory MM use. Chest: no deformity,  
Cardiac: regular rate, rhythm; no murmur Lungs:bilateral rhonchi. Has some decreased BS in bases. Abd: soft, NT, hypoactive BS, Dressing intact. Ext: no edema; no joint swelling; No clubbing : NO valdivia, Neuro: fluent, , sedated, follows commands Psych- no agitation, oriented to person;  
Skin: warm, dry, no cyanosis; scattered ecchymoses Pulses: 1-2+ Bilateral pedal, radial 
Capillary: brisk; pale Labs: 
 
Recent Labs 11/18/20 
0114 11/16/20 
6685 WBC 23.6* 24.7* HGB 9.5* 9.2*  
 198 Recent Labs 11/18/20 
0114 11/17/20 
0245 11/16/20 
8215 * 134* 126*  
K 4.7 4.3 5.7* CL 99 96* 91* CO2 28 30 22 * 139* 205* BUN 55* 66* 117* CREA 3.79* 4.38* 6.32* CA 7.8* 7.8* 7.8* No results for input(s): PH, PCO2, PO2, HCO3, FIO2 in the last 72 hours. No results for input(s): CPK, CKNDX, TROIQ in the last 72 hours. No lab exists for component: CPKMB No results found for: BNPP, BNP Lab Results Component Value Date/Time Culture result: NO GROWTH 5 DAYS 11/13/2020 04:54 PM  
 Culture result: LIGHT PSEUDOMONAS AERUGINOSA (A) 11/07/2020 03:55 AM  
 Culture result: LIGHT NORMAL RESPIRATORY ALESSIA 11/07/2020 03:55 AM  
 
Lab Results Component Value Date/Time TSH 1.67 11/06/2020 03:39 AM  
 
 
Imaging: CXR Results  (Last 48 hours) None Ct of chest: 11-17-20: IMPRESSION: 
  
1. Right lower lobe and right middle lobe consolidation/mass is unchanged. Right 
basilar effusion with pleural thickening is unchanged. 
  
Left pleural effusion left basilar atelectasis is slightly progressed. 
  
Slightly enlarged mediastinal lymph node is stable. 
  
There are new areas of groundglass opacities in the lungs left greater than 
right and more patchy densities at the lung bases and findings could represent 
pneumonia possibly atypical pneumonia or atypical edema pattern. Results from Hospital Encounter encounter on 11/05/20 CT CHEST WO CONT Narrative INDICATION: Dyspnea COMPARISON: 11/5/2020 CONTRAST: None. TECHNIQUE:  5 mm axial images were obtained through the chest. Coronal and 
sagittal reformats were generated. CT dose reduction was achieved through use of a standardized protocol tailored for this examination and automatic exposure 
control for dose modulation. The absence of intravenous contrast reduces the sensitivity for evaluation of 
the mediastinum, aroldo, vasculature, and upper abdominal organs. FINDINGS: 
 
CHEST WALL: No mass or axillary lymphadenopathy. THYROID: 5 cm right thyroid nodule is unchanged MEDIASTINUM: Slightly enlarged mediastinal lymph node is stable. AROLDO: No mass or lymphadenopathy. THORACIC AORTA: No aneurysm. MAIN PULMONARY ARTERY: Normal in caliber. TRACHEA/BRONCHI: Patent. There is mucus in the trachea ESOPHAGUS: No wall thickening or dilatation. HEART: Cardiomegaly is stable. Coronary artery calcification is stable. PLEURA: Left pleural effusion and left basilar atelectasis is minimally larger. LUNGS: Right pleural effusion with slightly thickened pleura is stable. Opacification of the right middle lobe and right lower lobe are unchanged. There 
is mucus most likely filling right lower lobe bronchi. . A mass is not excluded. [place] There are scattered areas of groundglass opacity in the lungs left greater than 
right which is new. There is increasing areas of airspace disease in the lung 
bases. INCIDENTALLY IMAGED UPPER ABDOMEN: Images status post splenectomy. Small nodule 
in the splenic bed is most likely small amount of residual spleen BONES: No destructive bone lesion. Impression IMPRESSION: 
 
1. Right lower lobe and right middle lobe consolidation/mass is unchanged. Right 
basilar effusion with pleural thickening is unchanged. Left pleural effusion left basilar atelectasis is slightly progressed. Slightly enlarged mediastinal lymph node is stable. There are new areas of groundglass opacities in the lungs left greater than 
right and more patchy densities at the lung bases and findings could represent 
pneumonia possibly atypical pneumonia or atypical edema pattern. This care involved high complexity medical decision making: I personally: · Reviewed the flowsheet and previous days notes · Reviewed and summarized records or history from previous days note or discussions with staff, family · High Risk Drug therapy requiring intensive monitoring for toxicity: eg steroids, pressors, antibiotics · Reviewed and/or ordered Clinical lab tests · Reviewed images and/or ordered Radiology tests · Reviewed the patients ECG / Telemetry · discussed my assessment/management with : Nursing, for coordination of care Linnea Ritchie MD

## 2020-11-19 NOTE — PROGRESS NOTES
Cardiology Progress Note 11/19/2020 1353 PM 
 
Admit Date: 11/5/2020 Admit Diagnosis: Hypoglycemia [E16.2]; A-fib ([facility] [address] [I48.91]; Acute hypoxemic respiratory failure due to COVID-19 ([facility] [address] [U07.1, J96.01] Subjective:  
 
Joaquin Costa. has no specific complaints. S/p thoracentesis yesterday with 720cc out. Remains in afib, overall rate controlled today. Telemetry down, lost all data prior to 8:15AM  
 
 
Visit Vitals /83 Pulse 80 Temp 97.5 °F (36.4 °C) Resp 18 Ht 5' 6\" (1.676 m) Wt 148 lb 9.6 oz (67.4 kg) SpO2 98% BMI 23.98 kg/m² Current Facility-Administered Medications Medication Dose Route Frequency  sevelamer carbonate (RENVELA) tab 1,600 mg  1,600 mg Oral TID  [START ON 11/20/2020] predniSONE (DELTASONE) tablet 20 mg  20 mg Oral DAILY WITH BREAKFAST  albumin human 25% (BUMINATE) solution 25 g  25 g IntraVENous DIALYSIS PRN  
 cefepime (MAXIPIME) 1 g in 0.9% sodium chloride (MBP/ADV) 50 mL MBP  1 g IntraVENous Q24H  
 dilTIAZem ER (CARDIZEM CD) capsule 180 mg  180 mg Oral DAILY  carvediloL (COREG) tablet 6.25 mg  6.25 mg Oral BID WITH MEALS  insulin NPH (NOVOLIN N, HUMULIN N) injection 25 Units  25 Units SubCUTAneous DAILY  gabapentin (NEURONTIN) capsule 300 mg  300 mg Oral QHS PRN  
 guaiFENesin (ROBITUSSIN) 100 mg/5 mL oral liquid 200 mg  200 mg Oral Q4H PRN  
 benzonatate (TESSALON) capsule 100 mg  100 mg Oral TID PRN  
 metoprolol (LOPRESSOR) injection 2.5 mg  2.5 mg IntraVENous Q6H PRN  
 insulin lispro (HUMALOG) injection   SubCUTAneous AC&HS  
 dextrose (D50W) injection syrg 12.5-25 g  12.5-25 g IntraVENous PRN  
 HYDROcodone-acetaminophen (NORCO)  mg tablet 1 Tab  1 Tab Oral Q6H PRN  peppermint oil   Other PRN  
 0.9% sodium chloride infusion 250 mL  250 mL IntraVENous PRN  
 nystatin (MYCOSTATIN) 100,000 unit/mL oral suspension 500,000 Units  500,000 Units Oral QID  guaiFENesin-dextromethorphan (ROBITUSSIN DM) 100-10 mg/5 mL syrup 10 mL  10 mL Oral Q6H PRN  
 nicotine (NICODERM CQ) 14 mg/24 hr patch 1 Patch  1 Patch TransDERmal DAILY PRN  
 senna-docusate (PERICOLACE) 8.6-50 mg per tablet 1 Tab  1 Tab Oral DAILY  balsam peru-castor oiL (VENELEX) ointment   Topical BID  epoetin ginger-epbx (RETACRIT) injection 4,000 Units  4,000 Units SubCUTAneous Q MON, WED & FRI  hydrALAZINE (APRESOLINE) 20 mg/mL injection 10 mg  10 mg IntraVENous Q4H PRN  
 sodium chloride (NS) flush 5-10 mL  5-10 mL IntraVENous PRN  
 acetaminophen (TYLENOL) tablet 650 mg  650 mg Oral Q6H PRN  thiamine mononitrate (B-1) tablet 100 mg  100 mg Oral DAILY  folic acid (FOLVITE) tablet 1 mg  1 mg Oral DAILY  therapeutic multivitamin (THERAGRAN) tablet 1 Tab  1 Tab Oral DAILY  glucose chewable tablet 16 g  4 Tab Oral PRN  
 glucagon (GLUCAGEN) injection 1 mg  1 mg IntraMUSCular PRN  pantoprazole (PROTONIX) tablet 40 mg  40 mg Oral ACB  heparin (porcine) injection 5,000 Units  5,000 Units SubCUTAneous Q12H  
 albuterol-ipratropium (DUO-NEB) 2.5 MG-0.5 MG/3 ML  3 mL Nebulization Q4H PRN  
 budesonide (PULMICORT) 500 mcg/2 ml nebulizer suspension  500 mcg Nebulization BID RT And  
 arformoteroL (BROVANA) neb solution 15 mcg  15 mcg Nebulization BID RT And  
 ipratropium (ATROVENT) 0.02 % nebulizer solution 0.5 mg  0.5 mg Nebulization Q6H PRN Objective:  
  
Physical Exam: 
General:  Older appearing than stated age,  male in no acute distress Chest:  rhonchi Heart:  Irr, irr, no m Abd:  +BS, NTND Ext:  No peripheral edema Visit Vitals /83 Pulse 80 Temp 97.5 °F (36.4 °C) Resp 18 Ht 5' 6\" (1.676 m) Wt 148 lb 9.6 oz (67.4 kg) SpO2 98% BMI 23.98 kg/m² Data Review:  
Labs:   
Recent Results (from the past 24 hour(s)) GLUCOSE, POC Collection Time: 11/18/20  3:54 PM  
Result Value Ref Range Glucose (POC) 146 (H) 65 - 100 mg/dL Performed by Chritsine Espitia PCT   
CULTURE, BODY FLUID W Rosezella Bellis Collection Time: 11/18/20  5:07 PM  
 Specimen: Pleural Fluid Result Value Ref Range Special Requests: NO SPECIAL REQUESTS    
 GRAM STAIN RARE WBCS SEEN    
 GRAM STAIN NO ORGANISMS SEEN Culture result: PENDING   
CHOLESTEROL, FLUID Collection Time: 11/18/20  5:07 PM  
Result Value Ref Range Fluid Type: PLEURAL FLUID Cholesterol, fluid <50 MG/DL  
CELL COUNT, BODY FLUID Collection Time: 11/18/20  5:07 PM  
Result Value Ref Range BODY FLUID TYPE PLEURAL FLUID FLUID COLOR STRAW    
 FLUID APPEARANCE HAZY FLUID RBC CT. >100 (H) 0 /cu mm FLUID NUCLEATED CELLS 348 /cu mm  
LDH, BODY FLUID Collection Time: 11/18/20  5:07 PM  
Result Value Ref Range Fluid Type: PLEURAL FLUID    
 LD, body fld. 117 U/L  
PROTEIN TOTAL, FLUID Collection Time: 11/18/20  5:07 PM  
Result Value Ref Range Fluid Type: PLEURAL FLUID Protein total, body fld. 1.2 g/dL SAMPLES BEING HELD Collection Time: 11/18/20  5:07 PM  
Result Value Ref Range SAMPLES BEING HELD  1 [facility]   
 COMMENT Add-on orders for these samples will be processed based on acceptable specimen integrity and analyte stability, which may vary by analyte. PATHOLOGIST REVIEW FLUIDS Collection Time: 11/18/20  5:07 PM  
Result Value Ref Range PATHOLOGIST REVIEW (NOTE) GLUCOSE, POC Collection Time: 11/18/20  9:00 PM  
Result Value Ref Range Glucose (POC) 167 (H) 65 - 100 mg/dL Performed by Ashish May RN   
PHOSPHORUS Collection Time: 11/19/20  3:31 AM  
Result Value Ref Range Phosphorus 5.6 (H) 2.6 - 4.7 MG/DL PROCALCITONIN Collection Time: 11/19/20  3:31 AM  
Result Value Ref Range Procalcitonin 0.36 ng/mL GLUCOSE, POC Collection Time: 11/19/20  7:39 AM  
Result Value Ref Range Glucose (POC) 194 (H) 65 - 100 mg/dL Performed by Elena Villalta. (CON) Telemetry: AFIB Assessment:  
 
Hospital Problems  Date Reviewed: 1/25/2018 Codes Class Noted POA A-fib [facility]) ICD-10-CM: I48.91 
ICD-9-CM: 427.31  11/5/2020 Unknown Hypoglycemia ICD-10-CM: E16.2 ICD-9-CM: 251.2  11/5/2020 Unknown Acute dyspnea ICD-10-CM: R06.00 
ICD-9-CM: 786.09  11/5/2020 Plan:  
Jenifer Fraga is a 60 y.o. male with an extensive PMH significant for ESRD on hemodilaysis MWF, DM II, COPD, Tobacco use, HCV, ETOH abuse, HTN, Cellulitis, leukocytosis, chronic back pain, recurrent pleural effusions admitted for Hypoglycemia on 11/5/2020, A-fib  
  
Afib with RVR:  HASBLED= 4 CHADVASc= 2 Continue on Coreg 6.25mg BID and Dilt 180mg daily, rate remains 100 bpm 
11/17/2020 afib with slow ventricular response with HR 30s-60s, and 2 sec pauses - resolved with decreased dose of Dilt and BB 
EKG and prior ECHO reviewed, new ECHO EF 55-60%, mild/mod. TR 
TSH WNL No anticoagulation due to current ventral hernia mesh with bleeding. Would not be a candidate for HIRA/DCC if we can not anticoagulate. 
  
DM II, symptomatic hypoglycemia: 
manage per internal medicine 
  
PNA Continues with pulm difficulties - pl effusion tapped yesterday with 720cc out 
  
Tobacco abuse/COPD: 
Was a daily smoker,  
  
Hypertension Monitor BP, continue BB, Dilt  
 
 Consider consult to Palliative to assist with ADR and goals of care Micki Delarosa NP [facility] 11/19/2020 Patient seen, examined by me personally. Plan discussed as detailed. Agree with note as outlined by  NP. I confirm findings in history and physical exam. No additional findings noted. Agree with plan as outlined above.   
 
Leyda Morfin MD

## 2020-11-19 NOTE — PROGRESS NOTES
Problem: Mobility Impaired (Adult and Pediatric) Goal: *Acute Goals and Plan of Care (Insert Text) Description: FUNCTIONAL STATUS PRIOR TO ADMISSION: pt does not drive/work, only household amb. Distances using rollator per pt report ; spouse assists with IADL's ; home O2 \"prn\" per pt report HOME SUPPORT PRIOR TO ADMISSION: The patient lived with spouse who assisted with IADL's. Physical Therapy Goals Revised 11/17/2020 1. Patient will move from supine to sit and sit to supine  in bed with minimal assistance within 7 day(s). 2.  Patient will transfer from bed to chair and chair to bed with minimal assistance using the least restrictive device within 7 day(s). 3.  Patient will perform sit to stand with minimal assistance within 7 day(s). 4.  Patient will ambulate with minimal assistance for 20 feet with the least restrictive device within 7 day(s). Physical Therapy Goals Initiated 11/8/2020 1. Patient will move from supine to sit and sit to supine , scoot up and down, and roll side to side in bed with independence within 7 day(s). 2.  Patient will transfer from bed to chair and chair to bed with modified independence using the least restrictive device within 7 day(s). 3.  Patient will perform sit to stand with modified independence within 7 day(s). 4.  Patient will ambulate with modified independence for 50 feet with the least restrictive device within 7 day(s). 5.  Patient will ascend/descend 4 stairs with supervision/set-up within 7 day(s). Outcome: Not Progressing Towards Goal 
 
PHYSICAL THERAPY TREATMENT Patient: Julian Lynn (56 y.o. male) Date: 11/19/2020 Diagnosis: Hypoglycemia [E16.2] A-fib ([facility] [address] [I48.91] Acute hypoxemic respiratory failure due to COVID-19 ([facility]) [U07.1, J96.01]  
<principal problem not specified> Precautions: Fall, Skin, Bed Alarm, Seizure Chart, physical therapy assessment, plan of care and goals were reviewed. ASSESSMENT Patient continues with skilled PT services and is not progressing towards goals. Pt was received in supine on 3L and cleared by nursing to mobilize. Very flat and minimal verbalizations, but family stated he was talkative prior to staff entering the room. Noted delayed responses and increased  essential tremors. He was able to come to the EOB. Systolic BP dropped from 838 to 86. Pt performed ankle pumps at the EOB and systolic increased to 95. Pt reported dizziness that did not subside and was returned to supine for safety. Current Level of Function Impacting Discharge (mobility/balance): mod A Other factors to consider for discharge: hypotensive PLAN : 
Patient continues to benefit from skilled intervention to address the above impairments. Continue treatment per established plan of care. to address goals. Recommendation for discharge: (in order for the patient to meet his/her long term goals) Therapy up to 5 days/week in SNF setting This discharge recommendation: 
Has not yet been discussed the attending provider and/or case management IF patient discharges home will need the following DME: to be determined (TBD) SUBJECTIVE:  
Patient stated Arliss Haji feel dizzy.  OBJECTIVE DATA SUMMARY:  
Critical Behavior: 
Neurologic State: Alert Orientation Level: Oriented X4 Cognition: Appropriate decision making Safety/Judgement: Decreased awareness of need for safety, Decreased insight into deficits Functional Mobility Training: 
Bed Mobility: 
  
Supine to Sit: Moderate assistance Scooting: Maximum assistance Balance: 
Sitting: Impaired Sitting - Static: Fair (occasional) Sitting - Dynamic: Fair (occasional) Activity Tolerance:  
Fair and signs and symptoms of orthostatic hypotension After treatment patient left in no apparent distress:  
Supine in bed and Call bell within reach COMMUNICATION/COLLABORATION:  
 The patients plan of care was discussed with: Occupational therapist and Registered nurse. Luis Fernando Welsh, PT, DPT Time Calculation: 24 mins

## 2020-11-19 NOTE — PROGRESS NOTES
Hospitalist Progress Note NAME: Jenifer Fontaine. :  1965 MRN:  [id number] Assessment / Plan: Suspected sepsis ? HAP/chronic wound on abdomen from hernia repair, wound culture with normal tana Sputum culture growing Pseudomonas aeruginosa,  
S/p  cefepime, flagyl cover for CAP last dose  for 7 day course WBC count is 23k from steroids monitor periodically Currently on 2 L oxygen Appreciate ID eval 
CT chest R lung consolidation IV abx restarted by pulm  on cefepime monitor low procal 
 
DM 2 uncontrolled  From steroids Severe symptomatic hypoglycemia on admit resolved Off of D10 drip HgA1C 5.8 Diabetic diet Elevated BG from steroids JERRY adjusted 
 increased humulin 25U monitor Bg   Now better 
  
  
Ventral hernia Mesh with bleeding Bleeding intermittently now stopped per staff No fluid collection  on CT Cont wound care per surgery use moist dressing avoid dakins F/u wound cx  Normal tana S/p Iv Abx cefepime/flagyl course 7 days Chr anemia monitor Hgb >8 
  
A. fib with RVR intermittetly elevated HR 
 in ER on admit BB dose adjusted by cardiology s/p cardizem drip on admit Cardizem/coreg  PO now  Adjust as needed if bp allows Hold AC as hx of GI bleed in past and abd wound bleeding Cards on case f/u recomnds Echo -EF normal, dilated LA, PASP 56 Tsh/Ft4 ok 
  
   
Acute on chronic respiratory failure with hypoxia, currently requiring 2 L Recurrent Pleural effusion  check CT chest   RLL collapse check CT chest  COPD managed by Dr. Shell Han Re-consulted pulm   reomend IV abx cefepime restarted monitor S/p Thoracentesis  720 cc fluid removed R lung monitor as per pulm Nebs prn 
tapering steroids  
  
L Renal mass Urology eval requested, reviewed recommendation leison chr no further w/u needed. 
  
ESRD/HD [facility] MWF 
S/p UF  here as fluid overload from noncompliance Fluid restricted diet Monitor Na/K with HD 
  
ETOH abuse Tobacco abuse Thiamine/folate/mvt CIWA monitoring no WD so far Nicotine patch 14mcg Alert oriented answered all questions. Was confused earlier in course. CT head ok Neurology feel delirium making confuse now resolved Debility/Deconditiones state with mod protien malnourished PT/OT, Dietary  On case 
  
Hepatitis C infection hx Monitored at [facility] hepatology clinic Hx of PVD s/p thrombectomy Hx of Splenectomy and partial pancreatectomy Chronic constipation - pt takes mag citrate at home PRN  
  
DVT PRx SCD [facility] 
[place] wife Nimihsa Lay [phone] Dispo:   PT/OT on case recommend SNF family has declined and he is at risk of re-hospitalizations. CM on case Subjective: Chief Complaint / Reason for Physician Visit Seen with nursing staff. Feels better after thoracentesis. Called wife updated clinical status and answered all questions and concerns. Still too weak and needs re eval with PT/OT. On IV abx per pulm with cefepime started yesterday Objective: VITALS:  
Last 24hrs VS reviewed since prior progress note. Most recent are: 
Patient Vitals for the past 24 hrs: 
 Temp Pulse Resp BP SpO2  
11/19/20 1005  80  120/83   
11/19/20 0742     98 % 11/19/20 0647 97.5 °F (36.4 °C) 91 18 108/76 99 % 11/19/20 0349 97.6 °F (36.4 °C) 87 16 116/68 98 % 11/18/20 2353 97.6 °F (36.4 °C) 91 18 112/71 98 % 11/18/20 1935     96 % 11/18/20 1838 97.9 °F (36.6 °C) 77 20 99/78 91 % 11/18/20 1809  80 20 (!) 104/59   
11/18/20 1720  99 20 119/71 97 % 11/18/20 1714  81 20 (!) 111/52 98 % 11/18/20 1710  90 20 (!) 104/51 98 % 11/18/20 1700  78 19 (!) 100/54 96 % 11/18/20 1429 98.2 °F (36.8 °C) 98 18 120/80 97 % 11/18/20 1047 97.6 °F (36.4 °C) (!) 114 20 113/73 99 % Intake/Output Summary (Last 24 hours) at 11/19/2020 1024 Last data filed at 11/19/2020 5138 Gross per 24 hour Intake 120 ml Output 720 ml Net -600 ml PHYSICAL EXAM: 
General: Chr ill looking, no acute distress   
EENT:   MMM Resp:  Symmetric expansion,  No wheezing CV:  Irregular rhythm, no edema GI:  Soft, Chr Abd wound Neurologic:  Alert and  normal speech, Psych:   Flat affect Reviewed most current lab test results and cultures  YES Reviewed most current radiology test results   YES Review and summation of old records today    NO Reviewed patient's current orders and MAR    YES 
PMH/SH reviewed - no change compared to H&P Procedures: see electronic medical records for all procedures/Xrays and details which were not copied into this note but were reviewed prior to creation of Plan. LABS: 
I reviewed today's most current labs and imaging studies. Pertinent labs include: 
Recent Labs 11/18/20 
0114 WBC 23.6* HGB 9.5* HCT 28.9*  
 Recent Labs 11/19/20 
0331 11/18/20 
0114 11/17/20 
0245 NA  --  133* 134* K  --  4.7 4.3 CL  --  99 96* CO2  --  28 30 GLU  --  272* 139* BUN  --  55* 66* CREA  --  3.79* 4.38* CA  --  7.8* 7.8* PHOS 5.6*  --   --   
 
 
Signed: Chencho Fried MD

## 2020-11-19 NOTE — PROGRESS NOTES
Nephrology Progress Note Hugo Santamaria  
 
[email or web address]. [email or web address]  Phone - [phone] Patient: Faby Young. YOB: 1965     Admit Date: 11/5/2020 Date- 11/19/2020 CC: Follow up for  ESRD IMPRESSION & PLAN:  
? ESRD -ETsxdpo-ywachvv-PzfdibMonday Wednesday Friday ? Hyperkalemia 
? SOB, Fluid overload 
? COPD,emphysema ? ple effusion. S/p thoracocentesis ? Bradycardia ? Anemia of CKD ? Noncompliance to fluid restriction ? Hyponatremia ? A. fib with RVR ? Hypoglycemia ? Secondary hyperparathyroidism ? Current smoker PLAN- 
· No hd today · Continue hd mwf · Continue epogen · Increase  renvela 2 tab tid · Continue cardizem and coreg for tachycardia/ afib D/w patient and wife Subjective: Interval History:  
Heart rate improved. He had thoracocentesis done yesterday 
bp on low side Sob improved. No fever, no nausea or vomiting ROS:- As documented above Objective:  
Vitals:  
 11/19/20 3312 11/19/20 0601 11/19/20 3604 11/19/20 1604 BP: 116/68  108/76 Pulse: 87  91 Resp: 16  18 Temp: 97.6 °F (36.4 °C)  97.5 °F (36.4 °C) TempSrc:      
SpO2: 98%  99% 98% Weight:  67.4 kg (148 lb 9.6 oz) Height:      
  
11/18 0701 - 11/19 0700 In: 120 [P.O.:120] Out: 1470 Last 3 Recorded Weights in this Encounter 11/17/20 7065 11/18/20 0233 11/19/20 0601 Weight: 67.2 kg (148 lb 1 oz) 66.8 kg (147 lb 3 oz) 67.4 kg (148 lb 9.6 oz) Physical exam:  
GEN: NAD NECK- Supple, no mass RESP: coarse b/l, decreased air movement CVS: s1,s2,rrr NEURO: Normal speech, non focal 
Abdo- soft, NT 
EXT: + Edema PSYCH:normal mood Right arm avf + Chart reviewed. Pertinent Notes reviewed. Data Review : 
Recent Labs 11/19/20 
0331 11/18/20 
0114 11/17/20 
0245 NA  --  133* 134* K  --  4.7 4.3 CL  --  99 96* CO2  --  28 30 BUN  --  55* 66* CREA  --  3.79* 4.38* GLU  --  272* 139* CA  --  7.8* 7.8*  
 PHOS 5.6*  --   --   
 
Recent Labs 11/18/20 
0114 WBC 23.6* HGB 9.5* HCT 28.9*  
 No results for input(s): FE, TIBC, PSAT, FERR in the last 72 hours. Medication list  reviewed Current Facility-Administered Medications Medication Dose Route Frequency  albumin human 25% (BUMINATE) solution 25 g  25 g IntraVENous DIALYSIS PRN  
 cefepime (MAXIPIME) 1 g in 0.9% sodium chloride (MBP/ADV) 50 mL MBP  1 g IntraVENous Q24H  
 dilTIAZem ER (CARDIZEM CD) capsule 180 mg  180 mg Oral DAILY  carvediloL (COREG) tablet 6.25 mg  6.25 mg Oral BID WITH MEALS  insulin NPH (NOVOLIN N, HUMULIN N) injection 25 Units  25 Units SubCUTAneous DAILY  predniSONE (DELTASONE) tablet 30 mg  30 mg Oral DAILY WITH BREAKFAST  gabapentin (NEURONTIN) capsule 300 mg  300 mg Oral QHS PRN  
 guaiFENesin (ROBITUSSIN) 100 mg/5 mL oral liquid 200 mg  200 mg Oral Q4H PRN  
 benzonatate (TESSALON) capsule 100 mg  100 mg Oral TID PRN  
 metoprolol (LOPRESSOR) injection 2.5 mg  2.5 mg IntraVENous Q6H PRN  
 insulin lispro (HUMALOG) injection   SubCUTAneous AC&HS  
 dextrose (D50W) injection syrg 12.5-25 g  12.5-25 g IntraVENous PRN  
 HYDROcodone-acetaminophen (NORCO)  mg tablet 1 Tab  1 Tab Oral Q6H PRN  peppermint oil   Other PRN  
 0.9% sodium chloride infusion 250 mL  250 mL IntraVENous PRN  
 nystatin (MYCOSTATIN) 100,000 unit/mL oral suspension 500,000 Units  500,000 Units Oral QID  guaiFENesin-dextromethorphan (ROBITUSSIN DM) 100-10 mg/5 mL syrup 10 mL  10 mL Oral Q6H PRN  
 nicotine (NICODERM CQ) 14 mg/24 hr patch 1 Patch  1 Patch TransDERmal DAILY PRN  
 senna-docusate (PERICOLACE) 8.6-50 mg per tablet 1 Tab  1 Tab Oral DAILY  balsam peru-castor oiL (VENELEX) ointment   Topical BID  epoetin ginger-epbx (RETACRIT) injection 4,000 Units  4,000 Units SubCUTAneous Q MON, WED & FRI  hydrALAZINE (APRESOLINE) 20 mg/mL injection 10 mg  10 mg IntraVENous Q4H PRN  
  sodium chloride (NS) flush 5-10 mL  5-10 mL IntraVENous PRN  
 acetaminophen (TYLENOL) tablet 650 mg  650 mg Oral Q6H PRN  thiamine mononitrate (B-1) tablet 100 mg  100 mg Oral DAILY  folic acid (FOLVITE) tablet 1 mg  1 mg Oral DAILY  therapeutic multivitamin (THERAGRAN) tablet 1 Tab  1 Tab Oral DAILY  glucose chewable tablet 16 g  4 Tab Oral PRN  
 glucagon (GLUCAGEN) injection 1 mg  1 mg IntraMUSCular PRN  pantoprazole (PROTONIX) tablet 40 mg  40 mg Oral ACB  sevelamer carbonate (RENVELA) tab 800 mg  800 mg Oral TID  [Held by provider] heparin (porcine) injection 5,000 Units  5,000 Units SubCUTAneous Q12H  
 albuterol-ipratropium (DUO-NEB) 2.5 MG-0.5 MG/3 ML  3 mL Nebulization Q4H PRN  
 budesonide (PULMICORT) 500 mcg/2 ml nebulizer suspension  500 mcg Nebulization BID RT And  
 arformoteroL (BROVANA) neb solution 15 mcg  15 mcg Nebulization BID RT And  
 ipratropium (ATROVENT) 0.02 % nebulizer solution 0.5 mg  0.5 mg Nebulization Q6H PRN Gonzalo Terrazas, [address] [facility] [employer] [address] [place], [address] Phone - [phone]               Fax - [phone]

## 2020-11-19 NOTE — PROGRESS NOTES
End of Shift Note Bedside shift change report given to  (oncoming nurse) by Gilmer Jewell (offgoing nurse). Report included the following information [facility], [facility]ard[facility] and [facility] Shift worked:  7A-7P Shift summary and any significant changes:  
   
 
  
Concerns for physician to address:    
Zone phone for oncoming shift:   [phone] Activity: 
Activity Level: Up with Assistance Number times ambulated in hallways past shift: 0 Number of times OOB to chair past shift: 0 Cardiac:  
Cardiac Monitoring: Yes     
Cardiac Rhythm: Atrial fibrillation Access:  
Current line(s): PIV Genitourinary:  
Urinary status: voiding Respiratory:  
O2 Device: Nasal cannula Chronic home O2 use?: YES Incentive spirometer at bedside: NO 
  
GI: 
Last Bowel Movement Date: 11/18/20 Current diet:  DIET RENAL Regular; Consistent Carb 1800kcal; FR 1200ML; Low Potassium DIET NUTRITIONAL SUPPLEMENTS Dinner; [place] DIET ONE TIME MESSAGE 
DIET ONE TIME MESSAGE Passing flatus: YES Tolerating current diet: YES 
% Diet Eaten: 100 % Pain Management:  
Patient states pain is manageable on current regimen: YES Skin: 
Walter Score: 15 Interventions: turn team and foam dressing Patient Safety: 
Fall Score: Total Score: 5 Interventions: bed/chair alarm, assistive device (walker, cane, etc) and gripper socks High Fall Risk: Yes Length of Stay: 
Expected LOS: 4d 19h Actual LOS: [address] [facility]

## 2020-11-19 NOTE — PROGRESS NOTES
[facility] Consult Service Note Pulmonary, Critical Care, and Sleep Medicine Name: Katherine Loving. MRN: [id number] : 1965 Hospital: [facility] Date: 2020  Admission date: 2020 Hospital Day: 15 Subjective/Interval History: We were asked by Hospitalist to re evaluate the pt for worsening respiratory failure. Pt reports he has increased coughing, sputum production, dyspnea, wheezing. Pt and his wife are upset that he has not been getting better. His wife states that he has been doing ok. Pt report he is doing worse. Has increased left pleural effusion. Pt is ESRD. Has a left AV fistula. Seen earlier today on rounds. Pt is unstable and acutely ill. Medical records and data reviewed. The nurse was able to go in with me and discuss above. The pt and his wife are frustrated about his condition. ; Pt went for thoracentesis. Had about 720 cc of fluid removed. NO acute issues noted. 20: NO chest pain, feels that his breathing is easier. Has decreased congestions. NO new issues. IMPRESSION:  
1. Acute chronic respiratory failure with Hypoxia 2. Recurrent pleural effusions; chronic right loculated with compression atelectasis, RLL collapse, consolidation. Now has increased left pleural effusion. S/p thoracentesis and had 700cc removed. 3. Chronic Obstructive Pulmonary Disease with emphysema, chronic bronchitis; poor airway clearance 4. ESRD on HD- [facility]  
5. IDDM type II with renal manifestation 6. Hypertension, benign essential 
7. Tobacco Abuse 8. ETOH abuse 9. Constipation 10. Hep C, followed by hepatology at [facility] 11. Diabetes. 12. History of splenectomy and partial pancreatectomy. 15. H/o PVD; h/o LUE thrombectomy 14. Prognosis guarded RECOMMENDATIONS/PLAN:  
1. Will restart the Cefepime, Will check procalcitonin levels.  Can change to levoflox for home when ready to for discharge. 2. Taper steroids 3. Supplemental O2 to keep sats > 93% 4. Aspiration precautions 5. Labs to follow electrolytes, renal function and and blood counts 6. Glucose monitoring and SSI 7. Bronchial hygiene with respiratory therapy techniques, bronchodilators 8. Eventually maintenane meds need to be switched to nebulized equivalents for better delivery. 9. DVT prophylaxis 10. Smoking cessation counseling done 11. Need to follow with us as out pt with Dr. Mary Beth Alford. 12. Will see again as needed. Please call with any additional questions or concerns. [x] High complexity decision making was performed [x] See my orders for details Current Facility-Administered Medications Medication  sevelamer carbonate (RENVELA) tab 1,600 mg  [START ON 11/20/2020] predniSONE (DELTASONE) tablet 20 mg  
 albumin human 25% (BUMINATE) solution 25 g  cefepime (MAXIPIME) 1 g in 0.9% sodium chloride (MBP/ADV) 50 mL MBP  dilTIAZem ER (CARDIZEM CD) capsule 180 mg  carvediloL (COREG) tablet 6.25 mg  
 insulin NPH (NOVOLIN N, HUMULIN N) injection 25 Units  gabapentin (NEURONTIN) capsule 300 mg  
 guaiFENesin (ROBITUSSIN) 100 mg/5 mL oral liquid 200 mg  
 benzonatate (TESSALON) capsule 100 mg  
 metoprolol (LOPRESSOR) injection 2.5 mg  
 insulin lispro (HUMALOG) injection  dextrose (D50W) injection syrg 12.5-25 g  
 HYDROcodone-acetaminophen (NORCO)  mg tablet 1 Tab  peppermint oil  
 0.9% sodium chloride infusion 250 mL  nystatin (MYCOSTATIN) 100,000 unit/mL oral suspension 500,000 Units  guaiFENesin-dextromethorphan (ROBITUSSIN DM) 100-10 mg/5 mL syrup 10 mL  nicotine (NICODERM CQ) 14 mg/24 hr patch 1 Patch  
 senna-docusate (PERICOLACE) 8.6-50 mg per tablet 1 Tab  balsam peru-castor oiL (VENELEX) ointment  epoetin ginger-epbx (RETACRIT) injection 4,000 Units  hydrALAZINE (APRESOLINE) 20 mg/mL injection 10 mg  
  sodium chloride (NS) flush 5-10 mL  acetaminophen (TYLENOL) tablet 650 mg  
 thiamine mononitrate (B-1) tablet 484 mg  
 folic acid (FOLVITE) tablet 1 mg  therapeutic multivitamin (THERAGRAN) tablet 1 Tab  glucose chewable tablet 16 g  
 glucagon (GLUCAGEN) injection 1 mg  pantoprazole (PROTONIX) tablet 40 mg  
 heparin (porcine) injection 5,000 Units  albuterol-ipratropium (DUO-NEB) 2.5 MG-0.5 MG/3 ML  
 budesonide (PULMICORT) 500 mcg/2 ml nebulizer suspension And  
 arformoteroL (BROVANA) neb solution 15 mcg And  ipratropium (ATROVENT) 0.02 % nebulizer solution 0.5 mg  
  
 
  
ROS:A comprehensive review of systems was negative except for that written in the HPI. Objective:  
 
Vital Signs: Telemetry:     Intake/Output:  
Visit Vitals /83 Pulse 100 Temp 97.7 °F (36.5 °C) Resp 18 Ht 5' 6\" (1.676 m) Wt 67.4 kg (148 lb 9.6 oz) SpO2 97% BMI 23.98 kg/m² Temp (24hrs), Av.8 °F (36.6 °C), Min:97.5 °F (36.4 °C), Max:98.2 °F (36.8 °C) O2 Device: Nasal cannula O2 Flow Rate (L/min): 3 l/min Wt Readings from Last 4 Encounters:  
20 67.4 kg (148 lb 9.6 oz) 20 62.6 kg (138 lb)  
20 63.5 kg (139 lb 15.9 oz) 19 63.5 kg (140 lb) Intake/Output Summary (Last 24 hours) at 2020 1121 Last data filed at 2020 3853 Gross per 24 hour Intake 120 ml Output 720 ml Net -600 ml Last shift:      No intake/output data recorded. Last 3 shifts:  1901 -  0700 In: 480 [P.O.:480] Out: 9355 Physical Exam:  
General:  male; in no respiratory distress and acyanotic, alert, oriented times 3, cooperative and moderately ill;  NC He was able to give hx. His wife was in room with him. HEENT: NCAT, no dentition, lips and mucosa dry Eyes: anicteric; conjunctiva clear Neck: no nodes, no cuff leak, no accessory MM use. Chest: no deformity,  
Cardiac: regular rate, rhythm; no murmur Lungs:bilateral rhonchi. Has some decreased BS in bases. Abd: soft, NT, hypoactive BS, Dressing intact. Ext: no edema; no joint swelling; No clubbing : NO valdivia, Neuro: fluent, , sedated, follows commands Psych- no agitation, oriented to person;  
Skin: warm, dry, no cyanosis; scattered ecchymoses Pulses: 1-2+ Bilateral pedal, radial 
Capillary: brisk; pale Labs: 
 
Recent Labs 11/18/20 
0114 WBC 23.6* HGB 9.5*  Recent Labs 11/19/20 
0331 11/18/20 
0114 11/17/20 
0245 NA  --  133* 134* K  --  4.7 4.3 CL  --  99 96* CO2  --  28 30 GLU  --  272* 139* BUN  --  55* 66* CREA  --  3.79* 4.38* CA  --  7.8* 7.8* PHOS 5.6*  --   -- No results for input(s): PH, PCO2, PO2, HCO3, FIO2 in the last 72 hours. No results for input(s): CPK, CKNDX, TROIQ in the last 72 hours. No lab exists for component: CPKMB No results found for: BNPP, BNP Lab Results Component Value Date/Time Culture result: PENDING 11/18/2020 05:07 PM  
 Culture result: NO GROWTH 5 DAYS 11/13/2020 04:54 PM  
 Culture result: LIGHT PSEUDOMONAS AERUGINOSA (A) 11/07/2020 03:55 AM  
 Culture result: LIGHT NORMAL RESPIRATORY ALESSIA 11/07/2020 03:55 AM  
 
Lab Results Component Value Date/Time TSH 1.67 11/06/2020 03:39 AM  
 
 
Imaging: CXR Results  (Last 48 hours)  
          
 11/18/20 1732  XR CHEST PORT Final result Impression:  IMPRESSION: No pneumothorax status post thoracentesis. Narrative:  EXAM: XR CHEST PORT INDICATION: post thoracentesis COMPARISON: 11/15/2020 FINDINGS: A portable AP radiograph of the chest was obtained at 1723 hours. No  
pneumothorax is demonstrated. There are small right and trace left pleural  
effusions which appear diminished in the interval. Ill-defined patchy bilateral  
pulmonary opacification is shown in the mid and lower lungs, greater on the  
right. Cardiac and mediastinal contours are stable. [place] of chest: 11-17-20: IMPRESSION: 
  
1. Right lower lobe and right middle lobe consolidation/mass is unchanged. Right 
basilar effusion with pleural thickening is unchanged. 
  
Left pleural effusion left basilar atelectasis is slightly progressed. 
  
Slightly enlarged mediastinal lymph node is stable. 
  
There are new areas of groundglass opacities in the lungs left greater than 
right and more patchy densities at the lung bases and findings could represent 
pneumonia possibly atypical pneumonia or atypical edema pattern. Results from Hospital Encounter encounter on 11/05/20 CT CHEST WO CONT Narrative INDICATION: Dyspnea COMPARISON: 11/5/2020 CONTRAST: None. TECHNIQUE:  5 mm axial images were obtained through the chest. Coronal and 
sagittal reformats were generated. CT dose reduction was achieved through use 
of a standardized protocol tailored for this examination and automatic exposure 
control for dose modulation. The absence of intravenous contrast reduces the sensitivity for evaluation of 
the mediastinum, aroldo, vasculature, and upper abdominal organs. FINDINGS: 
 
CHEST WALL: No mass or axillary lymphadenopathy. THYROID: 5 cm right thyroid nodule is unchanged MEDIASTINUM: Slightly enlarged mediastinal lymph node is stable. AROLDO: No mass or lymphadenopathy. THORACIC AORTA: No aneurysm. MAIN PULMONARY ARTERY: Normal in caliber. TRACHEA/BRONCHI: Patent. There is mucus in the trachea ESOPHAGUS: No wall thickening or dilatation. HEART: Cardiomegaly is stable. Coronary artery calcification is stable. PLEURA: Left pleural effusion and left basilar atelectasis is minimally larger. LUNGS: Right pleural effusion with slightly thickened pleura is stable. Opacification of the right middle lobe and right lower lobe are unchanged. There 
is mucus most likely filling right lower lobe bronchi. . A mass is not excluded. Derril Hemal There are scattered areas of groundglass opacity in the lungs left greater than 
right which is new. There is increasing areas of airspace disease in the lung 
bases. INCIDENTALLY IMAGED UPPER ABDOMEN: Images status post splenectomy. Small nodule 
in the splenic bed is most likely small amount of residual spleen BONES: No destructive bone lesion. Impression IMPRESSION: 
 
1. Right lower lobe and right middle lobe consolidation/mass is unchanged. Right 
basilar effusion with pleural thickening is unchanged. Left pleural effusion left basilar atelectasis is slightly progressed. Slightly enlarged mediastinal lymph node is stable. There are new areas of groundglass opacities in the lungs left greater than 
right and more patchy densities at the lung bases and findings could represent 
pneumonia possibly atypical pneumonia or atypical edema pattern. This care involved high complexity medical decision making: I personally: · Reviewed the flowsheet and previous days notes · Reviewed and summarized records or history from previous days note or discussions with staff, family · High Risk Drug therapy requiring intensive monitoring for toxicity: eg steroids, pressors, antibiotics · Reviewed and/or ordered Clinical lab tests · Reviewed images and/or ordered Radiology tests · Reviewed the patients ECG / Telemetry · discussed my assessment/management with : Nursing, for coordination of care Fish Beltrán MD

## 2020-11-19 NOTE — PROGRESS NOTES
End of Shift Note Bedside shift change report given to Allen ONEIL (oncoming nurse) by Veronika Salguero (offgoing nurse). Report included the following information SBAR, Kardex, ED Summary, Procedure Summary, Intake/Output, MAR, Recent Results and Cardiac Rhythm A. Fib Shift worked: Day Shift summary and any significant changes:  
 Pt had dialysis for an hour and half. RRT for HR 150s. Thoracentesis completed with 720cc removed. Pt is very upset about his lunch alexandra coming late and his dinner alexandra not being delivered. Concerns for physician to address: The Pt's wife would like physicians to call her with updates. Zone phone for oncoming shift:     
 
Activity: 
Activity Level: Up with Assistance Number times ambulated in hallways past shift: 0 Number of times OOB to chair past shift: 0 Cardiac:  
Cardiac Monitoring: Yes     
Cardiac Rhythm: Atrial fibrillation Access:  
Current line(s): PIV and HD access Genitourinary:  
Urinary status: oliguric Respiratory:  
O2 Device: Nasal cannula Chronic home O2 use?: NO Incentive spirometer at bedside: NO 
  
GI: 
Last Bowel Movement Date: 11/18/20 Current diet:  DIET RENAL Regular; Consistent Carb 1800kcal; FR 1200ML; Low Potassium DIET NUTRITIONAL SUPPLEMENTS Dinner; [place] DIET ONE TIME MESSAGE 
DIET ONE TIME MESSAGE Passing flatus: YES Tolerating current diet: YES 
% Diet Eaten: 100 % Pain Management:  
Patient states pain is manageable on current regimen: YES Skin: 
Walter Score: 15 Interventions: turn team, speciality bed, float heels, increase time out of bed, foam dressing and PT/OT consult Patient Safety: 
Fall Score: Total Score: 5 Interventions: bed/chair alarm, gripper socks and pt to call before getting OOB High Fall Risk: Yes Length of Stay: 
Expected LOS: 4d 19h Actual LOS: [facility]

## 2020-11-19 NOTE — PROGRESS NOTES
Problem: Chronic Obstructive Pulmonary Disease (COPD) Goal: *Oxygen saturation during activity within specified parameters Outcome: Progressing Towards Goal 
Goal: *Absence of hypoxia Outcome: Progressing Towards Goal 
  
Problem: Patient Education: Go to Patient Education Activity Goal: Patient/Family Education Outcome: Progressing Towards Goal 
  
Problem: Falls - Risk of 
Goal: *Absence of Falls Description: Document Siddharthadaron Blanchard Fall Risk and appropriate interventions in the flowsheet. Outcome: Progressing Towards Goal 
Note: Fall Risk Interventions: 
Mobility Interventions: Bed/chair exit alarm, Patient to call before getting OOB Mentation Interventions: Adequate sleep, hydration, pain control, Bed/chair exit alarm, Door open when patient unattended Medication Interventions: Bed/chair exit alarm, Patient to call before getting OOB Elimination Interventions: Bed/chair exit alarm, Call light in reach History of Falls Interventions: Bed/chair exit alarm, Door open when patient unattended

## 2020-11-19 NOTE — PROGRESS NOTES
Problem: Self Care Deficits Care Plan (Adult) Goal: *Acute Goals and Plan of Care (Insert Text) Description:  
FUNCTIONAL STATUS PRIOR TO ADMISSION:  Patient states he was Mod I with basic ADLs using a single point cane for functional mobility. Admits to difficulty using Rollator for longer household/community distances. On home O2 PRN, yet Pt admits he rarely uses it. Has a Pulse Ox w/ necklace yet also rarely uses. No longer drives. On social security. Tearful re: current living situation/medical condition. Takes a cab to dialysis MWF. Admits to x3-4 falls in past year. Most recent fall occurred while taking his plate to the sink w/o using his cane. HOME SUPPORT: The patient lived with his wife and step-children. Wife assists w/ IADLs yet has her own medical conditions. Occupational Therapy Goals Initiated 11/9/2020, Goals remain appropriate as per 11/17 weekly re-evaluation. 1.  Patient will perform grooming tasks standing at the sink for 3 minutes with Mod I (seated rest breaks PRN) within 7 day(s). 2.  Patient will perform UB/LB sponge bathing while seated with modified independence (AE and rest breaks PRN) within 7 day(s). 3.  Patient will perform lower body dressing with modified independence (AE and rest breaks PRN) within 7 day(s). 4.  Patient will perform toilet transfers with modified independence using least restrictive device within 7 day(s). 5.  Patient will perform all aspects of toileting with modified independence within 7 day(s). 6.  Patient will self-monitor need for rest breaks during functional tasks with min verbal cues within 7 day(s). Outcome: Not Met OCCUPATIONAL THERAPY TREATMENT Patient: Franco San (56 y.o. male) Date: 11/19/2020 Diagnosis: Hypoglycemia [E16.2] A-fib ([facility] [address] [I48.91] Acute hypoxemic respiratory failure due to COVID-19 ([facility]) [U07.1, J96.01]  
<principal problem not specified> Precautions: Fall, Skin, Bed Alarm, Seizure Chart, occupational therapy assessment, plan of care, and goals were reviewed. ASSESSMENT Patient continues with skilled OT services and is progressing slowly towards goals. Pt received supine in bed, agreeable to participate, on 3 L O2. Noted pt with flat affect and little conversation, mildly delayed responses during session. Required mod A for supine>sit, fair sitting balance. Once sitting pt began c/o dizziness, noted drop in systolic  to 86, recovered to 95 with ankle pumps in sitting. Pt reported not improvement in symptoms, so returned to bed with max A. Pt interested in eating lunch, required max A for container management, performed self-feeding with setup. Pt is well below his PLOF at this time, limited by generalized weakness, decreased activity tolerance, impaired functional mobility and hypotension. Continue to recommend SNF rehab at discharge. Current Level of Function Impacting Discharge (ADLs): Mod-max A bed mobility, setup-total A ADLs Other factors to consider for discharge: fall risk, debility PLAN : 
Patient continues to benefit from skilled intervention to address the above impairments. Continue treatment per established plan of care. to address goals. Recommendation for discharge: (in order for the patient to meet his/her long term goals) Therapy up to 5 days/week in SNF setting This discharge recommendation: 
Has been made in collaboration with the attending provider and/or case management IF patient discharges home will need the following DME: TBD SUBJECTIVE:  
Patient stated I feel dizzy.  OBJECTIVE DATA SUMMARY:  
Cognitive/Behavioral Status: 
Neurologic State: Alert Orientation Level: Oriented X4 Cognition: Follows commands Perception: Appears intact Perseveration: No perseveration noted Safety/Judgement: Decreased insight into deficits Functional Mobility and Transfers for ADLs: 
Bed Mobility: 
Supine to Sit: Moderate assistance Scooting: Maximum assistance Balance: 
Sitting: Impaired Sitting - Static: Fair (occasional) Sitting - Dynamic: Fair (occasional) ADL Intervention: 
Feeding Container Management: Maximum assistance Food to Mouth: Set-up Drink to Mouth: Set-up Cognitive Retraining Safety/Judgement: Decreased insight into deficits Pain: 
Pt did not report pain during session Activity Tolerance:  
Poor and signs and symptoms of orthostatic hypotension After treatment patient left in no apparent distress:  
Call bell within reach, Caregiver / family present, Side rails x 3, and in bed with HOB elevated COMMUNICATION/COLLABORATION:  
The patients plan of care was discussed with: Physical therapist and Registered nurse. Maribeth Perez OT Time Calculation: 23 mins

## 2020-11-19 NOTE — PROGRESS NOTES
RAPID RESPONSE TEAM- Follow Up Rounded on patient due to recent rapid response for Afib RVR in dialysis. Cardiology following patient. On PO BB and PO Cardizem. HR currently A fib 100. Spoke with primary RN Allen. No acute concerns at this time. No RRT interventions indicated at this time. Please call back if needed. Paco Farrell RN 
RRT Wilmer Comer

## 2020-11-19 NOTE — ROUTINE PROCESS
Report received from Daly Henry , [address] [place] [address]. SBAR were discussed.  
 
Diego Lee RN

## 2020-11-19 NOTE — INTERDISCIPLINARY ROUNDS
[facility] INTERDISCIPLINARY ROUNDS Cardiopulmonary Care Interdisciplinary Rounds were held today to discuss patient's plan of care and outcomes. The following members were present: NP/Physician, Pharmacy, Nursing and Case Management. PLAN OF CARE:  
Continue current treatment plan: 
 
Expected Length of Stay:  4d 19h

## 2020-11-20 NOTE — PROGRESS NOTES
Bedside shift change report given to  (oncoming nurse) by Sarah Bone (offgoing nurse). Report included the following information SBAR, Kardex and MAR.

## 2020-11-20 NOTE — WOUND CARE
Wound care nurse asked to clarify orders for chronic abdominal wound. Orders clarified and WC changed dressing while he was in dialysis earlier today.  
 
Mateus Pedersen RN

## 2020-11-20 NOTE — PROGRESS NOTES
Hospitalist Progress Note NAME: Jenifer Fontaine. :  1965 MRN:  [id number] Assessment / Plan: 
ADDENDUM 3pm: CXR noted R chest opacification, pulm re-consulted, needs bronch but he ate already, cont mucomyst, nebs, acapella per pulm and monitor for now, Repeat CXR in AM if not improved may need bronch. Suspected sepsis ? HAP/chronic wound on abdomen from hernia repair, wound culture with normal tana Sputum culture grew Pseudomonas aeruginosa,  
S/p  cefepime, flagyl cover for CAP last dose  for 7 day course WBC count is 24k from steroids monitor  daily Currently on 2 L oxygen Appreciate ID eval 
CT chest on  R lung consolidation IV abx restarted by pulm  on cefepime and can dc on po levaquin per pulm note Acute on chronic respiratory failure with hypoxia, currently requiring 2 L Recurrent Pleural effusion  check CT chest   RLL collapse check CT chest  COPD managed by Dr. Shell Han Re-consulted pulm   reomend IV abx cefepime restarted monitor S/p Thoracentesis  720 cc fluid removed R lung monitor as per pulm Nebs prn 
tapering steroids  
repeat CXR  for f/u per renal after HD 
 
  
ESRD/HD now in fluid overload [facility] 
UF  here as fluid overload from noncompliance Fluid restricted diet Monitor Na/K with HD Per renal repeat CXR today after HD to access fluid overload and pleural effusions DM 2 uncontrolled  From steroids Severe symptomatic hypoglycemia on admit resolved Off of D10 drip HgA1C 5.8 Diabetic diet Elevated BG from steroids JERRY adjusted 
increased humulin 25U monitor Bg   Now better 
   
Ventral hernia Mesh with bleeding Bleeding intermittently now stopped per staff No fluid collection  on CT Cont wound care per surgery use moist dressing avoid dakins F/u wound cx  Normal tana S/p Iv Abx cefepime/flagyl course 7 days Chr anemia monitor Hgb >8 
  
 A. fib with RVR intermittetly elevated HR 
 in ER on admit BB dose adjusted by cardiology s/p cardizem drip on admit Cardizem/coreg  PO now  Adjust as needed if bp allows Hold AC as hx of GI bleed in past and abd wound bleeding Cards on case f/u recomnds Echo 11/07-EF normal, dilated LA, PASP 56 Tsh/Ft4 ok 
  
  
L Renal mass Urology eval requested, reviewed recommendation leison chr no further w/u needed. 
 
[place] abuse Tobacco abuse Thiamine/folate/mvt CIWA monitoring no WD so far Nicotine patch 14mcg Alert oriented answered all questions. Was confused earlier in course. CT head ok Neurology feel delirium making confuse now resolved Debility/Deconditiones state with mod protien malnourished PT/OT, Dietary  On case 
  
Hepatitis C infection hx Monitored at [address] [place] [address] hepatology clinic Hx of PVD s/p thrombectomy Hx of Splenectomy and partial pancreatectomy Chronic constipation - pt takes mag citrate at home PRN  
  
DVT PRx SCD AD [facility] 
TREVOR wife Seth Garrison [phone] Dispo:   PT/OT on case recommend SNF family has declined and he is at risk of re-hospitalizations. CM on case  Dc once medically stable next 1-2 days Subjective: Chief Complaint / Reason for Physician Visit Case d/w RN had BM, currently in HD HR stable in HD. Patient feels weak overall. Not worked with therapy so far. Renal note says to repeat CXR may need UF tomorrow to pull fluid. Called wife not answering phone tody. Objective: VITALS:  
Last 24hrs VS reviewed since prior progress note. Most recent are: 
Patient Vitals for the past 24 hrs: 
 Temp Pulse Resp BP SpO2  
11/20/20 1115  (!) 59 18 139/72   
11/20/20 1100  63 18 113/75   
11/20/20 1045  66 18 (!) 102/58   
11/20/20 1030  70 18 125/71   
11/20/20 1015  61 18 120/69   
11/20/20 1000  75 18 126/82   
11/20/20 0945  66 20 104/63   
11/20/20 0930  78 20 109/73   
11/20/20 0915  68 20 113/74   
11/20/20 0900  62 20 96/67  11/20/20 0845  (!) 38 22 (!) 111/93   
11/20/20 0830  64 22 122/75   
11/20/20 0815  96 22 118/77   
11/20/20 0800 97.8 °F (36.6 °C) 66 24 116/71   
11/20/20 0657 97.8 °F (36.6 °C) 89 16 136/85 97 % 11/20/20 0413 97.7 °F (36.5 °C) 80 18 122/71 97 % 11/19/20 2356 97.5 °F (36.4 °C) 92 18 126/63 97 % 11/19/20 1939 98.1 °F (36.7 °C) 74 20 (!) 107/56 96 % 11/19/20 1936     95 % 11/19/20 1655  91  105/64   
11/19/20 1513 97.9 °F (36.6 °C) 95 18 110/75 96 % 11/19/20 1227  89  105/68  Intake/Output Summary (Last 24 hours) at 11/20/2020 1122 Last data filed at 11/20/2020 0945 Gross per 24 hour Intake 660 ml Output 100 ml Net 560 ml PHYSICAL EXAM: 
General: Chr ill looking, no acute distress   
EENT:   MMM Resp:  Symmetric expansion,  No wheezing CV:  Irregular rhythm, no edema GI:  Soft, Chr Abd wound Neurologic:  Alert and  normal speech, Psych:   Flat affect Reviewed most current lab test results and cultures  YES Reviewed most current radiology test results   YES Review and summation of old records today    NO Reviewed patient's current orders and MAR    YES 
PMH/SH reviewed - no change compared to H&P Procedures: see electronic medical records for all procedures/Xrays and details which were not copied into this note but were reviewed prior to creation of Plan. LABS: 
I reviewed today's most current labs and imaging studies. Pertinent labs include: 
Recent Labs  
  11/20/20 0339 11/18/20 
0114 WBC 24.0* 23.6* HGB 9.1* 9.5* HCT 28.0* 28.9*  
* 151 Recent Labs  
  11/20/20 0339 11/19/20 
0331 11/18/20 
0114 *  --  133*  
K 5.7*  --  4.7 CL 94*  --  99  
CO2 26  --  28  
*  --  272* BUN 90*  --  55* CREA 5.43*  --  3.79* CA 8.1*  --  7.8* PHOS  --  5.6*  --   
 
 
Signed: Mic Restrepo MD

## 2020-11-20 NOTE — PROGRESS NOTES
HERMAN: Home with Home Health [facility]) and follow-up appointments. Send dialysis notes and d/c summary to [facility] 4:20pm-CM met with pt to discuss d/c plan. No new needs at this time. CM will continue to follow pt for d/c planning needs. Jarod Thomas [address] [place] [address] 
[phone]

## 2020-11-20 NOTE — ROUTINE PROCESS
End of Shift Note Bedside shift change report given to NA  (oncoming nurse) by Michelle Meza RN (offgoing nurse). Report included the following information SBAR Shift worked:  7-7:30 pm   
Shift summary and any significant changes:  
  patient tolerated dialysis but HR was elevated. Dialysis wanted the coreg held but cardizem given. Yelitza Braden wants both blood pressure meds to be given before dialysis as scheduled. Patient medicated for pain. Wound care done by wound care nurse Concerns for physician to address:  increase mobility assess progress of wounds. Off load heals. Pain control, monitor blood pressure for orthostatic hypotension Zone phone for oncoming shift:   na Activity: 
Activity Level: Up with Assistance Number times ambulated in hallways past shift: 0 Number of times OOB to chair past shift: 0 Cardiac:  
Cardiac Monitoring: Yes     
Cardiac Rhythm: Atrial fibrillation Access:  
Current line(s): PIV Genitourinary:  
Urinary status: oliguric Respiratory:  
O2 Device: Nasal cannula Chronic home O2 use?: YES Incentive spirometer at bedside: YES 
  
GI: 
Last Bowel Movement Date: 11/19/20 Current diet:  DIET NUTRITIONAL SUPPLEMENTS Dinner; Nepro DIET ONE TIME MESSAGE 
DIET ONE TIME MESSAGE 
DIET RENAL Regular; Consistent Carb 1800kcal; FR 1000ML; Low Potassium DIET ONE TIME MESSAGE Passing flatus: NO Tolerating current diet: YES 
% Diet Eaten: 100 %(including [employer]) Pain Management:  
Patient states pain is manageable on current regimen: YES Skin: 
Walter Score: 15 Interventions: increase time out of bed Patient Safety: 
Fall Score: Total Score: 3 Interventions: bed/chair alarm and pt to call before getting OOB High Fall Risk: Yes Length of Stay: 
Expected LOS: 4d 19h Actual LOS: 1221 James So RN

## 2020-11-20 NOTE — PROGRESS NOTES
Cardiology Progress Note 
 
 
11/20/2020 1353 PM 
 
Admit Date: 11/5/2020 Admit Diagnosis: Hypoglycemia [E16.2]; A-fib ([facility] [I48.91]; Acute hypoxemic respiratory failure due to COVID-19 ([facility] [U07.1, J96.01] Subjective:  
 
Qing Platts. has no specific complaints. Dialysis today neg 3L. CXray post HD with right side complete opacification - Dr. Donato Butler aware. Chest PT and mucmyst. S/p thoracentesis 11/18/2020 with 720cc out. Remains in afib, overall rate controlled today. Slight increase in rate during HD, but tolerated. Visit Vitals /68 (BP 1 Location: Left arm, BP Patient Position: At rest) Pulse (!) 102 Temp 97.5 °F (36.4 °C) Resp 16 Ht 5' 6\" (1.676 m) Wt 150 lb 4.8 oz (68.2 kg) SpO2 98% BMI 24.26 kg/m² Current Facility-Administered Medications Medication Dose Route Frequency  [START ON 11/21/2020] predniSONE (DELTASONE) tablet 10 mg  10 mg Oral DAILY WITH BREAKFAST  albuterol (PROVENTIL VENTOLIN) nebulizer solution 2.5 mg  2.5 mg Nebulization Q4H RT  
 acetylcysteine (MUCOMYST) 200 mg/mL (20 %) solution 200 mg  1 mL Nebulization Q8H RT  
 sevelamer carbonate (RENVELA) tab 1,600 mg  1,600 mg Oral TID  albumin human 25% (BUMINATE) solution 25 g  25 g IntraVENous DIALYSIS PRN  
 cefepime (MAXIPIME) 1 g in 0.9% sodium chloride (MBP/ADV) 50 mL MBP  1 g IntraVENous Q24H  
 dilTIAZem ER (CARDIZEM CD) capsule 180 mg  180 mg Oral DAILY  carvediloL (COREG) tablet 6.25 mg  6.25 mg Oral BID WITH MEALS  insulin NPH (NOVOLIN N, HUMULIN N) injection 25 Units  25 Units SubCUTAneous DAILY  gabapentin (NEURONTIN) capsule 300 mg  300 mg Oral QHS PRN  
 guaiFENesin (ROBITUSSIN) 100 mg/5 mL oral liquid 200 mg  200 mg Oral Q4H PRN  
 benzonatate (TESSALON) capsule 100 mg  100 mg Oral TID PRN  
 metoprolol (LOPRESSOR) injection 2.5 mg  2.5 mg IntraVENous Q6H PRN  
 insulin lispro (HUMALOG) injection   SubCUTAneous AC&HS  
  dextrose (D50W) injection syrg 12.5-25 g  12.5-25 g IntraVENous PRN  
 HYDROcodone-acetaminophen (NORCO)  mg tablet 1 Tab  1 Tab Oral Q6H PRN  peppermint oil   Other PRN  
 0.9% sodium chloride infusion 250 mL  250 mL IntraVENous PRN  
 nystatin (MYCOSTATIN) 100,000 unit/mL oral suspension 500,000 Units  500,000 Units Oral QID  guaiFENesin-dextromethorphan (ROBITUSSIN DM) 100-10 mg/5 mL syrup 10 mL  10 mL Oral Q6H PRN  
 nicotine (NICODERM CQ) 14 mg/24 hr patch 1 Patch  1 Patch TransDERmal DAILY PRN  
 senna-docusate (PERICOLACE) 8.6-50 mg per tablet 1 Tab  1 Tab Oral DAILY  balsam peru-castor oiL (VENELEX) ointment   Topical BID  epoetin ginger-epbx (RETACRIT) injection 4,000 Units  4,000 Units SubCUTAneous Q MON, WED & FRI  hydrALAZINE (APRESOLINE) 20 mg/mL injection 10 mg  10 mg IntraVENous Q4H PRN  
 sodium chloride (NS) flush 5-10 mL  5-10 mL IntraVENous PRN  
 acetaminophen (TYLENOL) tablet 650 mg  650 mg Oral Q6H PRN  thiamine mononitrate (B-1) tablet 100 mg  100 mg Oral DAILY  folic acid (FOLVITE) tablet 1 mg  1 mg Oral DAILY  therapeutic multivitamin (THERAGRAN) tablet 1 Tab  1 Tab Oral DAILY  glucose chewable tablet 16 g  4 Tab Oral PRN  
 glucagon (GLUCAGEN) injection 1 mg  1 mg IntraMUSCular PRN  pantoprazole (PROTONIX) tablet 40 mg  40 mg Oral ACB  heparin (porcine) injection 5,000 Units  5,000 Units SubCUTAneous Q12H  
 budesonide (PULMICORT) 500 mcg/2 ml nebulizer suspension  500 mcg Nebulization BID RT And  
 arformoteroL (BROVANA) neb solution 15 mcg  15 mcg Nebulization BID RT  
   
 
Objective:  
  
Physical Exam: 
General:  Older appearing than stated age,  male in no acute distress Chest:  Rhonchi, diminished on right Heart:  Irr, irr, no m Abd:  +BS, NTND Ext:  No peripheral edema Visit Vitals /68 (BP 1 Location: Left arm, BP Patient Position: At rest) Pulse (!) 102 Temp 97.5 °F (36.4 °C) Resp 16  
 Ht 5' 6\" (1.676 m) Wt 150 lb 4.8 oz (68.2 kg) SpO2 98% BMI 24.26 kg/m² Data Review:  
Labs:   
Recent Results (from the past 24 hour(s)) GLUCOSE, POC Collection Time: 11/19/20  5:49 PM  
Result Value Ref Range Glucose (POC) 119 (H) 65 - 100 mg/dL Performed by Hammad Doshi PCT   
GLUCOSE, POC Collection Time: 11/19/20  8:42 PM  
Result Value Ref Range Glucose (POC) 175 (H) 65 - 100 mg/dL Performed by Bozena Galarza (PCT) CBC W/O DIFF Collection Time: 11/20/20  3:39 AM  
Result Value Ref Range WBC 24.0 (H) 4.1 - 11.1 K/uL  
 RBC 2.64 (L) 4.10 - 5.70 M/uL HGB 9.1 (L) 12.1 - 17.0 g/dL HCT 28.0 (L) 36.6 - 50.3 % .1 (H) 80.0 - 99.0 FL  
 MCH 34.5 (H) 26.0 - 34.0 PG  
 MCHC 32.5 30.0 - 36.5 g/dL  
 RDW 19.9 (H) 11.5 - 14.5 % PLATELET 437 (L) 403 - 400 K/uL MPV 12.1 8.9 - 12.9 FL  
 NRBC 0.8 (H) 0  WBC ABSOLUTE NRBC 0.19 (H) 0.00 - 0.01 K/uL METABOLIC PANEL, BASIC Collection Time: 11/20/20  3:39 AM  
Result Value Ref Range Sodium 129 (L) 136 - 145 mmol/L Potassium 5.7 (H) 3.5 - 5.1 mmol/L Chloride 94 (L) 97 - 108 mmol/L  
 CO2 26 21 - 32 mmol/L Anion gap 9 5 - 15 mmol/L Glucose 189 (H) 65 - 100 mg/dL BUN 90 (H) 6 - 20 MG/DL Creatinine 5.43 (H) 0.70 - 1.30 MG/DL  
 BUN/Creatinine ratio 17 12 - 20 GFR est AA 13 (L) >60 ml/min/1.73m2 GFR est non-AA 11 (L) >60 ml/min/1.73m2 Calcium 8.1 (L) 8.5 - 10.1 MG/DL  
GLUCOSE, POC Collection Time: 11/20/20  7:18 AM  
Result Value Ref Range Glucose (POC) 186 (H) 65 - 100 mg/dL Performed by Bozena Galarza (PCT) GLUCOSE, POC Collection Time: 11/20/20 12:46 PM  
Result Value Ref Range Glucose (POC) 121 (H) 65 - 100 mg/dL Performed by Thea Fraga Telemetry: AFIB CXray 11/20/2020 post HD: There is now complete opacification right hemithorax with volume 
loss suspicious for atelectasis. Assessment: Hospital Problems  Date Reviewed: 1/25/2018 Codes Class Noted POA A-fib [facility]) ICD-10-CM: I48.91 
ICD-9-CM: 427.31  11/5/2020 Unknown Hypoglycemia ICD-10-CM: E16.2 ICD-9-CM: 251.2  11/5/2020 Unknown Acute dyspnea ICD-10-CM: R06.00 
ICD-9-CM: 786.09  11/5/2020 Plan:  
Zuleika Cervantes is a 60 y.o. male with an extensive PMH significant for ESRD on hemodilaysis MWF, DM II, COPD, Tobacco use, HCV, ETOH abuse, HTN, Cellulitis, leukocytosis, chronic back pain, recurrent pleural effusions admitted for Hypoglycemia on 11/5/2020, A-fib  
  
Afib with RVR:  HASBLED= 4 CHADVASc= 2 Continue on Coreg 6.25mg BID and Dilt 180mg daily, rate remains 100 bpm 
11/17/2020 afib with slow ventricular response with HR 30s-60s, and 2 sec pauses - resolved with decreased dose of Dilt  
and BB 
EKG and prior ECHO reviewed, new ECHO EF 55-60%, mild/mod. TR 
TSH WNL No anticoagulation due to current ventral hernia mesh with bleeding. Would not be a candidate for HIRA/DCC if we can not anticoagulate. 
  
DM II, symptomatic hypoglycemia: 
manage per internal medicine 
  
PNA Continues with pulm difficulties - opacification on right. OOB, chest Pt  
pl effusion tapped yesterday with 720cc out 
  
Tobacco abuse/COPD: 
Was a daily smoker,  
  
Hypertension Monitor BP, continue BB, Dilt  
 
 Consider consult to Palliative to assist with ADR and goals of care Radha Sarah NP [facility] 11/20/2020 Patient seen, examined by me personally. Plan discussed as detailed. Agree with note as outlined by  NP. I confirm findings in history and physical exam. No additional findings noted. Agree with plan as outlined above.   
 
Jorge Thomas MD

## 2020-11-20 NOTE — ROUTINE PROCESS
End of Shift Note Bedside shift change report given to Allen  (oncoming nurse) by Erin Hernandez RN (offgoing nurse). Report included the following information SBAR Shift worked:  7-7:30 pm   
Shift summary and any significant changes:  
  patient a little irritable, ciwa 2 from mild tremor. Patient only voided 100 mls today. Orthostatic. Did not get up with PT because of this. Specialty bed due to skin breakdown. Abdomen distended and Dr. Elizabeth Rodriguez aware. bm documented yesterday. Concerns for physician to address:  urine output, he is on dialysis. Zone phone for oncoming shift:   na Activity: 
Activity Level: Up with Assistance Number times ambulated in hallways past shift: 0 Number of times OOB to chair past shift: 0 Cardiac:  
Cardiac Monitoring: Yes     
Cardiac Rhythm: Atrial fibrillation Access:  
Current line(s): PIV Genitourinary:  
Urinary status: oliguric Respiratory:  
O2 Device: Nasal cannula Chronic home O2 use?: YES Incentive spirometer at bedside: NO 
  
GI: 
Last Bowel Movement Date: 11/18/20 Current diet:  DIET RENAL Regular; Consistent Carb 1800kcal; FR 1200ML; Low Potassium DIET NUTRITIONAL SUPPLEMENTS Dinner; Nepro DIET ONE TIME MESSAGE 
DIET ONE TIME MESSAGE Passing flatus: NO Tolerating current diet: YES 
% Diet Eaten: 100 % Pain Management:  
Patient states pain is manageable on current regimen: NO 
 
Skin: 
Walter Score: 14 Interventions: increase time out of bed and PT/OT consult Patient Safety: 
Fall Score: Total Score: 4 Interventions: bed/chair alarm and pt to call before getting OOB High Fall Risk: Yes Length of Stay: 
Expected LOS: 4d 19h Actual LOS: 25 [facility], RN

## 2020-11-20 NOTE — PROGRESS NOTES
Nephrology Progress Note Hugo Santamaria  
 
[email or web address]  Phone - [phone] Patient: Erik Mijares. YOB: 1965     Admit Date: 11/5/2020 Date- 11/20/2020 CC: Follow up for  ESRD IMPRESSION & PLAN:  
? ESRD -[id number]-UEC 
? Hyperkalemia ? Hyponatremia ? Anemia of CKD ? Noncompliance to fluid restriction ? Secondary hyperparathyroidism ? Hyponatremia ? Acute chronic respiratory failure from COPD exacerbation ? Recurrent pleural effusions; chronic right loculated with compression atelectasis, RLL collapse, consolidation. Now w/ increased left pleural effusion. S/p thoracentesis w/  700cc removed on 11/18 ? Current/ longterm smoker PLAN- 
· HD today per [facility] schedule · Check CXR later today to eval need for UF  or repeat thoracentesis · Continue epogen · Cont  renvela 2 tab tid · Renal diet · Fluid restriction 1L/day D/w patient , wife, HD RN Subjective: Interval History:  
C/o SOB, speaking in short sentences during beginning of HD No fever, no nausea or vomiting ROS:- As documented above Objective:  
Vitals:  
 11/20/20 0800 11/20/20 0815 11/20/20 0830 11/20/20 0845 BP: 116/71 118/77 122/75 (!) 111/93 Pulse: 66 96 64 (!) 38 Resp: 24 22 22 22 Temp: 97.8 °F (36.6 °C) TempSrc: Oral     
SpO2:      
Weight:      
Height:      
  
11/19 0701 - 11/20 0700 In: 960 [P.O.:960] Out: 100 [Urine:100] Last 3 Recorded Weights in this Encounter 11/18/20 2136 11/19/20 0601 11/20/20 0205 Weight: 66.8 kg (147 lb 3 oz) 67.4 kg (148 lb 9.6 oz) 68.2 kg (150 lb 4.8 oz) Physical exam:  
GEN: AAOx3 NECK- Supple RESP: mild resp distress, speaking in short sentences, satting well on NC 
CVS:RRR 
NEURO: Normal speech, non focal 
Right arm avf + Chart reviewed. Pertinent Notes reviewed. Data Review : 
Recent Labs  
  11/20/20 
0339 11/19/20 
0331 11/18/20 
0114 *  --  133* K 5.7*  --  4.7 CL 94*  --  99  
CO2 26  --  28  
BUN 90*  --  55* CREA 5.43*  --  3.79* *  --  272* CA 8.1*  --  7.8* PHOS  --  5.6*  --   
 
Recent Labs  
  11/20/20 
0339 11/18/20 
0114 WBC 24.0* 23.6* HGB 9.1* 9.5* HCT 28.0* 28.9*  
* 151 No results for input(s): FE, TIBC, PSAT, FERR in the last 72 hours. Medication list  reviewed Current Facility-Administered Medications Medication Dose Route Frequency  sevelamer carbonate (RENVELA) tab 1,600 mg  1,600 mg Oral TID  predniSONE (DELTASONE) tablet 20 mg  20 mg Oral DAILY WITH BREAKFAST  albumin human 25% (BUMINATE) solution 25 g  25 g IntraVENous DIALYSIS PRN  
 cefepime (MAXIPIME) 1 g in 0.9% sodium chloride (MBP/ADV) 50 mL MBP  1 g IntraVENous Q24H  
 dilTIAZem ER (CARDIZEM CD) capsule 180 mg  180 mg Oral DAILY  carvediloL (COREG) tablet 6.25 mg  6.25 mg Oral BID WITH MEALS  insulin NPH (NOVOLIN N, HUMULIN N) injection 25 Units  25 Units SubCUTAneous DAILY  gabapentin (NEURONTIN) capsule 300 mg  300 mg Oral QHS PRN  
 guaiFENesin (ROBITUSSIN) 100 mg/5 mL oral liquid 200 mg  200 mg Oral Q4H PRN  
 benzonatate (TESSALON) capsule 100 mg  100 mg Oral TID PRN  
 metoprolol (LOPRESSOR) injection 2.5 mg  2.5 mg IntraVENous Q6H PRN  
 insulin lispro (HUMALOG) injection   SubCUTAneous AC&HS  
 dextrose (D50W) injection syrg 12.5-25 g  12.5-25 g IntraVENous PRN  
 HYDROcodone-acetaminophen (NORCO)  mg tablet 1 Tab  1 Tab Oral Q6H PRN  peppermint oil   Other PRN  
 0.9% sodium chloride infusion 250 mL  250 mL IntraVENous PRN  
 nystatin (MYCOSTATIN) 100,000 unit/mL oral suspension 500,000 Units  500,000 Units Oral QID  guaiFENesin-dextromethorphan (ROBITUSSIN DM) 100-10 mg/5 mL syrup 10 mL  10 mL Oral Q6H PRN  
 nicotine (NICODERM CQ) 14 mg/24 hr patch 1 Patch  1 Patch TransDERmal DAILY PRN  
 senna-docusate (PERICOLACE) 8.6-50 mg per tablet 1 Tab  1 Tab Oral DAILY  balsam peru-castor oiL (VENELEX) ointment   Topical BID  epoetin ginger-epbx (RETACRIT) injection 4,000 Units  4,000 Units SubCUTAneous Q MON, WED & FRI  hydrALAZINE (APRESOLINE) 20 mg/mL injection 10 mg  10 mg IntraVENous Q4H PRN  
 sodium chloride (NS) flush 5-10 mL  5-10 mL IntraVENous PRN  
 acetaminophen (TYLENOL) tablet 650 mg  650 mg Oral Q6H PRN  thiamine mononitrate (B-1) tablet 100 mg  100 mg Oral DAILY  folic acid (FOLVITE) tablet 1 mg  1 mg Oral DAILY  therapeutic multivitamin (THERAGRAN) tablet 1 Tab  1 Tab Oral DAILY  glucose chewable tablet 16 g  4 Tab Oral PRN  
 glucagon (GLUCAGEN) injection 1 mg  1 mg IntraMUSCular PRN  pantoprazole (PROTONIX) tablet 40 mg  40 mg Oral ACB  heparin (porcine) injection 5,000 Units  5,000 Units SubCUTAneous Q12H  
 albuterol-ipratropium (DUO-NEB) 2.5 MG-0.5 MG/3 ML  3 mL Nebulization Q4H PRN  
 budesonide (PULMICORT) 500 mcg/2 ml nebulizer suspension  500 mcg Nebulization BID RT And  
 arformoteroL (BROVANA) neb solution 15 mcg  15 mcg Nebulization BID RT And  
 ipratropium (ATROVENT) 0.02 % nebulizer solution 0.5 mg  0.5 mg Nebulization Q6H PRN Gabokarthik Anderson, [address] [facility] [employer] [address] [place], [address] Phone - [phone]               Fax - [phone]

## 2020-11-20 NOTE — PROGRESS NOTES
Physical Therapy Chart reviewed. Noted pt is currently ANTHONY receiving HD. Will defer at this time, but will continue to follow.

## 2020-11-20 NOTE — PROGRESS NOTES
[facility] Consult Service Note Pulmonary, Critical Care, and Sleep Medicine Name: Franco Abebe. MRN: [id number] : 1965 Hospital: [facility] Date: 2020  Admission date: 2020 Hospital Day: 12 Subjective/Interval History: We were asked by Hospitalist to re evaluate the pt for worsening respiratory failure. Pt reports he has increased coughing, sputum production, dyspnea, wheezing. Pt and his wife are upset that he has not been getting better. His wife states that he has been doing ok. Pt report he is doing worse. Has increased left pleural effusion. Pt is ESRD. Has a left AV fistula. Seen earlier today on rounds. Pt is unstable and acutely ill. Medical records and data reviewed. The nurse was able to go in with me and discuss above. The pt and his wife are frustrated about his condition. 1118; Pt went for thoracentesis. Had about 720 cc of fluid removed. NO acute issues noted. 20: NO chest pain, feels that his breathing is easier. Has decreased congestions. NO new issues. 20: I was called by nurse to review the CXR that was done today by hospitalist.  
Has right hemithorax opacification. Has a dry cough. Has not been active. Mostly lying in bed today. He denies any breathing difficulty. His wife and son were present. He had already eaten so a bronch is not possible today. IMPRESSION:  
1. Right hemithorax opacification. Suspected mucous plug especially with Pseudomonas. I have discussed with pt, his wife and nurse. Will try conservative management with mucomyst nebs, albuterol and acapella for now. If not getting better may need a bronch. 2. Acute chronic respiratory failure with Hypoxia 3. Recurrent pleural effusions; chronic right loculated with compression atelectasis, RLL collapse, consolidation.  Now has increased left pleural effusion. S/p thoracentesis and had 700cc removed. 4. Chronic Obstructive Pulmonary Disease with emphysema, chronic bronchitis; poor airway clearance 5. ESRD on HD- [facility]  
6. IDDM type II with renal manifestation 7. Hypertension, benign essential 
8. Tobacco Abuse 9. ETOH abuse 10. Constipation 11. Hep C, followed by hepatology at [facility] 12. Diabetes. 13. History of splenectomy and partial pancreatectomy. 15. H/o PVD; h/o LUE thrombectomy 15. Prognosis guarded RECOMMENDATIONS/PLAN:  
1. Will place on mucomyst nebs, acapella. Will get a repeat CXR in am.  
2. He clinically looks stable. 3. Will restart the Cefepime, Will check procalcitonin levels. Can change to levoflox for home when ready to for discharge. 4. Taper steroids 5. Supplemental O2 to keep sats > 93% 6. Aspiration precautions 7. Labs to follow electrolytes, renal function and and blood counts 8. Glucose monitoring and SSI 9. Bronchial hygiene with respiratory therapy techniques, bronchodilators 10. Eventually maintenane meds need to be switched to nebulized equivalents for better delivery. 11. DVT prophylaxis 12. Smoking cessation counseling done 13. Need to follow with us as out pt with Dr. Yanet Layne. 14. Will see again as needed. Please call with any additional questions or concerns. [x] High complexity decision making was performed [x] See my orders for details Current Facility-Administered Medications Medication  [START ON 11/21/2020] predniSONE (DELTASONE) tablet 10 mg  
 albuterol (PROVENTIL VENTOLIN) nebulizer solution 2.5 mg  
 acetylcysteine (MUCOMYST) 200 mg/mL (20 %) solution 200 mg  
 sevelamer carbonate (RENVELA) tab 1,600 mg  
 albumin human 25% (BUMINATE) solution 25 g  cefepime (MAXIPIME) 1 g in 0.9% sodium chloride (MBP/ADV) 50 mL MBP  dilTIAZem ER (CARDIZEM CD) capsule 180 mg  carvediloL (COREG) tablet 6.25 mg  
  insulin NPH (NOVOLIN N, HUMULIN N) injection 25 Units  gabapentin (NEURONTIN) capsule 300 mg  
 guaiFENesin (ROBITUSSIN) 100 mg/5 mL oral liquid 200 mg  
 benzonatate (TESSALON) capsule 100 mg  
 metoprolol (LOPRESSOR) injection 2.5 mg  
 insulin lispro (HUMALOG) injection  dextrose (D50W) injection syrg 12.5-25 g  
 HYDROcodone-acetaminophen (NORCO)  mg tablet 1 Tab  peppermint oil  
 0.9% sodium chloride infusion 250 mL  nystatin (MYCOSTATIN) 100,000 unit/mL oral suspension 500,000 Units  guaiFENesin-dextromethorphan (ROBITUSSIN DM) 100-10 mg/5 mL syrup 10 mL  nicotine (NICODERM CQ) 14 mg/24 hr patch 1 Patch  
 senna-docusate (PERICOLACE) 8.6-50 mg per tablet 1 Tab  balsam peru-castor oiL (VENELEX) ointment  epoetin ginger-epbx (RETACRIT) injection 4,000 Units  hydrALAZINE (APRESOLINE) 20 mg/mL injection 10 mg  
 sodium chloride (NS) flush 5-10 mL  acetaminophen (TYLENOL) tablet 650 mg  
 thiamine mononitrate (B-1) tablet 059 mg  
 folic acid (FOLVITE) tablet 1 mg  therapeutic multivitamin (THERAGRAN) tablet 1 Tab  glucose chewable tablet 16 g  
 glucagon (GLUCAGEN) injection 1 mg  pantoprazole (PROTONIX) tablet 40 mg  
 heparin (porcine) injection 5,000 Units  budesonide (PULMICORT) 500 mcg/2 ml nebulizer suspension And  
 arformoteroL (BROVANA) neb solution 15 mcg ROS:A comprehensive review of systems was negative except for that written in the HPI. Objective:  
 
Vital Signs: Telemetry:     Intake/Output:  
Visit Vitals /68 (BP 1 Location: Left arm, BP Patient Position: At rest) Pulse (!) 102 Temp 97.5 °F (36.4 °C) Resp 16 Ht 5' 6\" (1.676 m) Wt 68.2 kg (150 lb 4.8 oz) SpO2 98% BMI 24.26 kg/m² Temp (24hrs), Av.7 °F (36.5 °C), Min:97.5 °F (36.4 °C), Max:98.1 °F (36.7 °C) O2 Device: Nasal cannula O2 Flow Rate (L/min): 3 l/min Wt Readings from Last 4 Encounters: 11/20/20 68.2 kg (150 lb 4.8 oz) 06/18/20 62.6 kg (138 lb)  
01/14/20 63.5 kg (139 lb 15.9 oz) 05/21/19 63.5 kg (140 lb) Intake/Output Summary (Last 24 hours) at 11/20/2020 1515 Last data filed at 11/20/2020 1221 Gross per 24 hour Intake 620 ml Output 3600 ml Net -2980 ml Last shift:      11/20 0701 - 11/20 1900 In: 200 [I.V.:200] Out: 3500 Last 3 shifts: 11/18 1901 - 11/20 0700 In: 1080 [P.O.:1080] Out: 100 [Urine:100] Physical Exam:  
General:  male; in no respiratory distress and acyanotic, alert, oriented times 3, cooperative and moderately ill;  NC He was able to give hx. His wife was in room with him. HEENT: NCAT, no dentition, lips and mucosa dry Eyes: anicteric; conjunctiva clear Neck: no nodes, no cuff leak, no accessory MM use. Chest: no deformity,  
Cardiac: regular rate, rhythm; no murmur Lungs:bilateral rhonchi. Some decreased BS on the right side. He is not taking a deep breath. Abd: soft, NT, hypoactive BS, Dressing intact. Ext: no edema; no joint swelling; No clubbing : NO valdivia, Neuro: fluent, , sedated, follows commands Psych- no agitation, oriented to person;  
Skin: warm, dry, no cyanosis; scattered ecchymoses Pulses: 1-2+ Bilateral pedal, radial 
Capillary: brisk; pale Labs: 
 
Recent Labs  
  11/20/20 
0339 11/18/20 
0114 WBC 24.0* 23.6* HGB 9.1* 9.5* * 151 Recent Labs  
  11/20/20 
0339 11/19/20 
0331 11/18/20 
0114 *  --  133*  
K 5.7*  --  4.7 CL 94*  --  99  
CO2 26  --  28  
*  --  272* BUN 90*  --  55* CREA 5.43*  --  3.79* CA 8.1*  --  7.8* PHOS  --  5.6*  -- No results for input(s): PH, PCO2, PO2, HCO3, FIO2 in the last 72 hours. No results for input(s): CPK, CKNDX, TROIQ in the last 72 hours. No lab exists for component: CPKMB No results found for: BNPP, BNP Lab Results Component Value Date/Time Culture result: Culture performed on Fluid swab specimen 11/18/2020 05:07 PM  
 Culture result: NO GROWTH 2 DAYS 11/18/2020 05:07 PM  
 Culture result: NO GROWTH 5 DAYS 11/13/2020 04:54 PM  
 
Lab Results Component Value Date/Time TSH 1.67 11/06/2020 03:39 AM  
 
 
Imaging: CXR Results  (Last 48 hours)  
          
 11/20/20 1307  XR CHEST PORT Final result Impression:  IMPRESSION: There is now complete opacification right hemithorax with volume  
loss suspicious for atelectasis. Results called to the nurse by myself. Narrative:  EXAM:  XR CHEST PORT INDICATION:  blt pleural effusions after throracentesis check for reaccumulation COMPARISON:  11/18/2020 FINDINGS: A portable AP radiograph of the chest was obtained at 1226 hours. The  
patient is on a cardiac monitor. There is complete opacification right  
hemithorax with volume loss. Left lung is clear with minimal blunting left CP  
angle. Tonye Cogan Heart size is enlarged and stable. Bony structures are unchanged. 11/18/20 1732  XR CHEST PORT Final result Impression:  IMPRESSION: No pneumothorax status post thoracentesis. Narrative:  EXAM: XR CHEST PORT INDICATION: post thoracentesis COMPARISON: 11/15/2020 FINDINGS: A portable AP radiograph of the chest was obtained at 1723 hours. No  
pneumothorax is demonstrated. There are small right and trace left pleural  
effusions which appear diminished in the interval. Ill-defined patchy bilateral  
pulmonary opacification is shown in the mid and lower lungs, greater on the  
right. Cardiac and mediastinal contours are stable. .   
   
  
  
 
 
Ct of chest: 11-17-20: IMPRESSION: 
  
1. Right lower lobe and right middle lobe consolidation/mass is unchanged. Right 
basilar effusion with pleural thickening is unchanged. 
  
Left pleural effusion left basilar atelectasis is slightly progressed. 
  
Slightly enlarged mediastinal lymph node is stable.   
There are new areas of groundglass opacities in the lungs left greater than 
right and more patchy densities at the lung bases and findings could represent 
pneumonia possibly atypical pneumonia or atypical edema pattern. Results from Hospital Encounter encounter on 11/05/20 CT CHEST WO CONT Narrative INDICATION: Dyspnea COMPARISON: 11/5/2020 CONTRAST: None. TECHNIQUE:  5 mm axial images were obtained through the chest. Coronal and 
sagittal reformats were generated. CT dose reduction was achieved through use 
of a standardized protocol tailored for this examination and automatic exposure 
control for dose modulation. The absence of intravenous contrast reduces the sensitivity for evaluation of 
the mediastinum, aroldo, vasculature, and upper abdominal organs. FINDINGS: 
 
CHEST WALL: No mass or axillary lymphadenopathy. THYROID: 5 cm right thyroid nodule is unchanged MEDIASTINUM: Slightly enlarged mediastinal lymph node is stable. AROLDO: No mass or lymphadenopathy. THORACIC AORTA: No aneurysm. MAIN PULMONARY ARTERY: Normal in caliber. TRACHEA/BRONCHI: Patent. There is mucus in the trachea ESOPHAGUS: No wall thickening or dilatation. HEART: Cardiomegaly is stable. Coronary artery calcification is stable. PLEURA: Left pleural effusion and left basilar atelectasis is minimally larger. LUNGS: Right pleural effusion with slightly thickened pleura is stable. Opacification of the right middle lobe and right lower lobe are unchanged. There 
is mucus most likely filling right lower lobe bronchi. . A mass is not excluded. Geralene Elda There are scattered areas of groundglass opacity in the lungs left greater than 
right which is new. There is increasing areas of airspace disease in the lung 
bases. INCIDENTALLY IMAGED UPPER ABDOMEN: Images status post splenectomy. Small nodule 
in the splenic bed is most likely small amount of residual spleen BONES: No destructive bone lesion. Impression IMPRESSION: 
 
1. Right lower lobe and right middle lobe consolidation/mass is unchanged. Right 
basilar effusion with pleural thickening is unchanged. Left pleural effusion left basilar atelectasis is slightly progressed. Slightly enlarged mediastinal lymph node is stable. There are new areas of groundglass opacities in the lungs left greater than 
right and more patchy densities at the lung bases and findings could represent 
pneumonia possibly atypical pneumonia or atypical edema pattern. 11-20-20: CXR: IMPRESSION: There is now complete opacification right hemithorax with volume 
loss suspicious for atelectasis. This care involved high complexity medical decision making: I personally: · Reviewed the flowsheet and previous days notes · Reviewed and summarized records or history from previous days note or discussions with staff, family · High Risk Drug therapy requiring intensive monitoring for toxicity: eg steroids, pressors, antibiotics · Reviewed and/or ordered Clinical lab tests · Reviewed images and/or ordered Radiology tests · Reviewed the patients ECG / Telemetry · discussed my assessment/management with : Nursing, for coordination of care Phi Anderson MD

## 2020-11-20 NOTE — PROGRESS NOTES
Bedside and Verbal shift change report given to Georgia RN (oncoming nurse) by SAI Beard RN (offgoing nurse). Report given with SBAR, Kardex, Intake/Output, MAR and Recent Results.

## 2020-11-20 NOTE — PROGRESS NOTES
Pt is currently in dialysis. Will defer, but continue to follow. Continue to recommend rehab at discharge.

## 2020-11-20 NOTE — PROCEDURES
[facility] Dialysis Team [place]  [phone] Vitals   Pre   Post   Assessment   Pre   Post    
Temp  Temp: 97.8 °F (36.6 °C) (11/20/20 0800)  97.6 LOC  AxOx3 AxOx3 HR   Pulse (Heart Rate): 66 (11/20/20 0800) 65 Lungs   Coarse / 3L/NC 3L/NC  
B/P   BP: 116/71 (11/20/20 0800) 94/69 Cardiac   irregular irregular Resp   Resp Rate: 24 (11/20/20 0800) 20 Skin   intact intact Pain level  Pain Intensity 1: 3 (11/20/20 0413) 0 Edema  generalized 
 generalized Orders: Duration:   Start:   0800 End:   1130 Total:   3.5 hrs Dialyzer:   Dialyzer/Set Up Inspection: Tracee Karimi (11/20/20 0800) K Bath:   Dialysate K (mEq/L): 2 (11/20/20 0800) Ca Bath:   Dialysate CA (mEq/L): 3.0 (11/20/20 0800) Na/Bicarb:   Dialysate NA (mEq/L): 138 (11/20/20 0800) Target Fluid Removal:   Goal/Amount of Fluid to Remove (mL): 3500 mL (11/20/20 0800) Access Type & Location:   Right upper arm AV Fistula without evidence of warmth, redness, or drainage. +thrill/+bruit. Each access site disinfected for 60 seconds per site with alcohol swabs per P&P. Cannulated with 15G needles x2 and secured with paper tape. +aspiration/+flushed. Labs Obtained/Reviewed Critical Results Called   Date when labs were drawn- 
Hgb-   
HGB Date Value Ref Range Status 11/20/2020 9.1 (L) 12.1 - 17.0 g/dL Final  
 
K-   
Potassium Date Value Ref Range Status 11/20/2020 5.7 (H) 3.5 - 5.1 mmol/L Final  
 
Ca-  
Calcium Date Value Ref Range Status 11/20/2020 8.1 (L) 8.5 - 10.1 MG/DL Final  
 
Bun-  
BUN Date Value Ref Range Status 11/20/2020 90 (H) 6 - 20 MG/DL Final  
  Comment:  
  INVESTIGATED PER DELTA CHECK PROTOCOL Creat-  
Creatinine Date Value Ref Range Status 11/20/2020 5.43 (H) 0.70 - 1.30 MG/DL Final  
  Comment:  
  INVESTIGATED PER DELTA CHECK PROTOCOL Medications/ Blood Products Given Name   Dose   Route and Time    
none Blood Volume Processed (BVP):    85.5 L Net Fluid Removed:  3500 mL Comments Time Out Done: 1661 Primary Nurse Rpt Pre: Anjel Garzon RN 
Primary Nurse Rpt Post: Alcide Mohs, RN 
Pt Education: procedural / infection control Care Plan: continue current HD plan of care Tx Summary:  
0800 HD treatment initiated per physician order. 0930 Telemetry called to notify of patient HR fluctuating up to 120s non-sustained. Notified we have orders from nephrologist to continue dialysis unless HR > 150 sustained and/or dysrhythmia. Will continue to monitor. 2580 Primary RN called to request current BP. Reported 104/63 at [address] 
1130 HD treatment completed per physician order. All possible blood returned to patient. Hemostasis achieved in <10 minutes. Access site dressings clean, dry, and intact. +thrill. Admiting Diagnosis: Afib / Hypoglycemia / Acute Hypoxemic Resp Failure Pt's previous clinic- 
Consent signed - Informed Consent Verified: Yes (11/20/20 0800) Kaity Consent - verified Hepatitis Status-  01/14/20 neg/imm (cc) Machine #- Machine Number: [id number] (11/20/20 0800) Telemetry status- remote Pre-dialysis wt. -

## 2020-11-20 NOTE — PROGRESS NOTES
ADULT PROTOCOL: JET AEROSOL  REASSESSMENT Patient  Faby Young.     60 y.o.   male     11/20/2020  5:40 PM 
 
Breath Sounds Pre Procedure: Right Breath Sounds: Coarse Left Breath Sounds: Coarse Breath Sounds Post Procedure: Right Breath Sounds: Coarse Left Breath Sounds: Coarse Breathing pattern: Pre procedure Breathing Pattern: Regular Post procedure Breathing Pattern: Regular Heart Rate: Pre procedure Pulse: 98 
         Post procedure Pulse: 98 Resp Rate: Pre procedure Respirations: 18 Post procedure Respirations: 18 
  
Oxygen: O2 Device: Nasal cannula 3L SpO2: Pre procedure SpO2: 99 % Post procedure SpO2: 99 % Nebulizer Therapy: Current medications Aerosolized Medications: Albuterol, Mucomyst 
  
 
Problem List:  
Patient Active Problem List  
Diagnosis Code  Cellulitis of thumb, left L03.012  Tenosynovitis of finger M65.9  
 [facility] E11.9  
 HCV (hepatitis C virus) B19.20  Tobacco abuse Z72.0  Alcohol abuse, daily use F10.10  COPD (chronic obstructive pulmonary disease) (HCC) J44.9  History of splenectomy Z90.81  
 Cellulitis and abscess of finger L03.019, L02.519  Abdominal wall cellulitis L03.311  Acute GI bleeding K92.2  
 HTN (hypertension) I10  
 ESRD on dialysis (HCC) N18.6, Z99.2  Nontraumatic ischemic infarction of muscle of hand M62.249  Acute cholecystitis K81.0  Type 2 diabetes mellitus with nephropathy (HCC) E11.21  
 COPD exacerbation (HCC) J44.1  A-fib (HCC) I48.91  
 Hypoglycemia E16.2  Acute dyspnea R06.00 Respiratory Therapist: Benedicto Velasquez

## 2020-11-21 NOTE — PROGRESS NOTES
ADULT PROTOCOL: JET AEROSOL  REASSESSMENT Patient  Joaquin Costa.     60 y.o.   male     11/21/2020  10:06 AM 
 
Breath Sounds Pre Procedure: Right Breath Sounds: Coarse Left Breath Sounds: Coarse Breath Sounds Post Procedure: Right Breath Sounds: Coarse Left Breath Sounds: Coarse Breathing pattern: Pre procedure Breathing Pattern: Regular Post procedure Breathing Pattern: Regular Heart Rate: Pre procedure Pulse: 101 Post procedure Pulse: 105 Resp Rate: Pre procedure Respirations: 18 Post procedure Respirations: 18 
  
Oxygen: O2 Device: Nasal cannula  3L SpO2: Pre procedure SpO2: 96 % Post procedure SpO2: 96 % Nebulizer Therapy: Current medications Aerosolized Medications: Albuterol, Mucomyst, Brovana, Pulmicort Problem List:  
Patient Active Problem List  
Diagnosis Code  Cellulitis of thumb, left L03.012  Tenosynovitis of finger M65.9  
 DM (diabetes mellitus) ([facility] E11.9  
 HCV (hepatitis C virus) B19.20  Tobacco abuse Z72.0  Alcohol abuse, daily use F10.10  COPD (chronic obstructive pulmonary disease) (HCC) J44.9  History of splenectomy Z90.81  
 Cellulitis and abscess of finger L03.019, L02.519  Abdominal wall cellulitis L03.311  Acute GI bleeding K92.2  
 HTN (hypertension) I10  
 ESRD on dialysis (HCC) N18.6, Z99.2  Nontraumatic ischemic infarction of muscle of hand M62.249  Acute cholecystitis K81.0  Type 2 diabetes mellitus with nephropathy (HCC) E11.21  
 COPD exacerbation (HCC) J44.1  A-fib (HCC) I48.91  
 Hypoglycemia E16.2  Acute dyspnea R06.00 Respiratory Therapist: Le Graves

## 2020-11-21 NOTE — PROGRESS NOTES
End of Shift Note Bedside shift change report given to THAD Rios (oncoming nurse) by Andrei Browne RN (offgoing nurse). Report included the following information SBAR and Kardex Shift worked:  [id number] Shift summary and any significant changes:  
 Patient NPO since midnight for possible Bronchoscopy this AM. Concerns for physician to address: None. Zone phone for oncoming shift:     
 
Activity: 
Activity Level: Up with Assistance Number times ambulated in hallways past shift: 0 Number of times OOB to chair past shift: 0 Cardiac:  
Cardiac Monitoring: Yes     
Cardiac Rhythm: Atrial fibrillation Access:  
Current line(s): PIV Genitourinary:  
Urinary status: oliguric Respiratory:  
O2 Device: Nasal cannula Chronic home O2 use?: YES Incentive spirometer at bedside: YES 
  
GI: 
Last Bowel Movement Date: 11/19/20 Current diet:  DIET NUTRITIONAL SUPPLEMENTS Dinner; [place] DIET ONE TIME MESSAGE 
DIET ONE TIME MESSAGE 
DIET RENAL Regular; Consistent Carb 1800kcal; FR 1000ML; Low Potassium DIET ONE TIME MESSAGE Passing flatus: YES Tolerating current diet: YES 
% Diet Eaten: 100 % Pain Management:  
Patient states pain is manageable on current regimen: yes Skin: 
Walter Score: 15 Interventions: speciality bed Patient Safety: 
Fall Score: Total Score: 5 Interventions: bed/chair alarm High Fall Risk: Yes Length of Stay: 
Expected LOS: 4d 19h Actual LOS: Alma Trivedi RN

## 2020-11-21 NOTE — ROUTINE PROCESS
Report received from [facility]. SBAR were discussed.  
 
Marisa Sutton RN

## 2020-11-21 NOTE — PROGRESS NOTES
Pulmonary Refer to Dr Debby Diaz note from 11/20 for details O2 requirement is down CXR from this am shows improved aeration in the R lung from complete R lung atx yesterday 
 
--continue pulmonary toilet 
--does not need bronchoscopy today 
--d/w RN and diet resumed Will see on 11/22 if needed Bernice Mercedes MD

## 2020-11-21 NOTE — PROGRESS NOTES
Problem: Chronic Obstructive Pulmonary Disease (COPD) Goal: *Oxygen saturation during activity within specified parameters Outcome: Progressing Towards Goal 
Goal: *Absence of hypoxia Outcome: Progressing Towards Goal 
  
Problem: Falls - Risk of 
Goal: *Absence of Falls Description: Document Terry Galeas Fall Risk and appropriate interventions in the flowsheet. Outcome: Progressing Towards Goal 
Note: Fall Risk Interventions: 
Mobility Interventions: Bed/chair exit alarm Mentation Interventions: Adequate sleep, hydration, pain control, Bed/chair exit alarm Medication Interventions: Bed/chair exit alarm, Patient to call before getting OOB, Teach patient to arise slowly Elimination Interventions: Call light in reach, Bed/chair exit alarm History of Falls Interventions: Bed/chair exit alarm Problem: Pressure Injury - Risk of 
Goal: *Prevention of pressure injury Description: Document Walter Scale and appropriate interventions in the flowsheet. Outcome: Progressing Towards Goal 
Note: Pressure Injury Interventions: 
Sensory Interventions: Assess changes in LOC Moisture Interventions: Absorbent underpads Activity Interventions: Assess need for specialty bed, Chair cushion Mobility Interventions: Assess need for specialty bed Nutrition Interventions: Document food/fluid/supplement intake, Offer support with meals,snacks and hydration Friction and Shear Interventions: Feet elevated on foot rest 
 
  
 
 
 
  
Problem: Breathing Pattern - Ineffective Goal: *Absence of hypoxia Outcome: Progressing Towards Goal 
Goal: *Use of effective breathing techniques Outcome: Progressing Towards Goal

## 2020-11-21 NOTE — PROGRESS NOTES
Cardiology Progress Note 
 
 
11/21/2020 1353 PM 
 
Admit Date: 11/5/2020 Admit Diagnosis: Hypoglycemia [E16.2]; A-fib ([facility] [I48.91]; Acute hypoxemic respiratory failure due to COVID-19 ([facility] [U07.1, J96.01] Subjective:  
 
Marisela Smith has no specific complaints. S/p thoracentesis 11/18/2020 with 720cc out. Remains in afib, overall rate controlled today. Visit Vitals BP 90/66 (BP 1 Location: Left arm, BP Patient Position: Sitting) Pulse (!) 115 Temp 97.9 °F (36.6 °C) Resp 20 Ht 5' 6\" (1.676 m) Wt 144 lb 1.6 oz (65.4 kg) SpO2 96% BMI 23.26 kg/m² Current Facility-Administered Medications Medication Dose Route Frequency  predniSONE (DELTASONE) tablet 10 mg  10 mg Oral DAILY WITH BREAKFAST  albuterol (PROVENTIL VENTOLIN) nebulizer solution 2.5 mg  2.5 mg Nebulization Q4H RT  
 acetylcysteine (MUCOMYST) 200 mg/mL (20 %) solution 200 mg  1 mL Nebulization Q8H RT  
 sevelamer carbonate (RENVELA) tab 1,600 mg  1,600 mg Oral TID  albumin human 25% (BUMINATE) solution 25 g  25 g IntraVENous DIALYSIS PRN  
 cefepime (MAXIPIME) 1 g in 0.9% sodium chloride (MBP/ADV) 50 mL MBP  1 g IntraVENous Q24H  
 dilTIAZem ER (CARDIZEM CD) capsule 180 mg  180 mg Oral DAILY  carvediloL (COREG) tablet 6.25 mg  6.25 mg Oral BID WITH MEALS  insulin NPH (NOVOLIN N, HUMULIN N) injection 25 Units  25 Units SubCUTAneous DAILY  gabapentin (NEURONTIN) capsule 300 mg  300 mg Oral QHS PRN  
 guaiFENesin (ROBITUSSIN) 100 mg/5 mL oral liquid 200 mg  200 mg Oral Q4H PRN  
 benzonatate (TESSALON) capsule 100 mg  100 mg Oral TID PRN  
 metoprolol (LOPRESSOR) injection 2.5 mg  2.5 mg IntraVENous Q6H PRN  
 insulin lispro (HUMALOG) injection   SubCUTAneous AC&HS  
 dextrose (D50W) injection syrg 12.5-25 g  12.5-25 g IntraVENous PRN  
 HYDROcodone-acetaminophen (NORCO)  mg tablet 1 Tab  1 Tab Oral Q6H PRN  peppermint oil   Other PRN  
  0.9% sodium chloride infusion 250 mL  250 mL IntraVENous PRN  
 nystatin (MYCOSTATIN) 100,000 unit/mL oral suspension 500,000 Units  500,000 Units Oral QID  guaiFENesin-dextromethorphan (ROBITUSSIN DM) 100-10 mg/5 mL syrup 10 mL  10 mL Oral Q6H PRN  
 nicotine (NICODERM CQ) 14 mg/24 hr patch 1 Patch  1 Patch TransDERmal DAILY PRN  
 senna-docusate (PERICOLACE) 8.6-50 mg per tablet 1 Tab  1 Tab Oral DAILY  balsam peru-castor oiL (VENELEX) ointment   Topical BID  epoetin ginger-epbx (RETACRIT) injection 4,000 Units  4,000 Units SubCUTAneous Q MON, WED & FRI  hydrALAZINE (APRESOLINE) 20 mg/mL injection 10 mg  10 mg IntraVENous Q4H PRN  
 sodium chloride (NS) flush 5-10 mL  5-10 mL IntraVENous PRN  
 acetaminophen (TYLENOL) tablet 650 mg  650 mg Oral Q6H PRN  thiamine mononitrate (B-1) tablet 100 mg  100 mg Oral DAILY  folic acid (FOLVITE) tablet 1 mg  1 mg Oral DAILY  therapeutic multivitamin (THERAGRAN) tablet 1 Tab  1 Tab Oral DAILY  glucose chewable tablet 16 g  4 Tab Oral PRN  
 glucagon (GLUCAGEN) injection 1 mg  1 mg IntraMUSCular PRN  pantoprazole (PROTONIX) tablet 40 mg  40 mg Oral ACB  heparin (porcine) injection 5,000 Units  5,000 Units SubCUTAneous Q12H  
 budesonide (PULMICORT) 500 mcg/2 ml nebulizer suspension  500 mcg Nebulization BID RT And  
 arformoteroL (BROVANA) neb solution 15 mcg  15 mcg Nebulization BID RT  
   
 
Objective:  
  
Physical Exam: 
General:  Older appearing than stated age,  male in no acute distress Chest:  Rhonchi, diminished bilaterally and decreased, no crackles Heart:  Irr, irr, no m Abd:  +BS, NTND Ext:  trace peripheral edema Visit Vitals BP 90/66 (BP 1 Location: Left arm, BP Patient Position: Sitting) Pulse (!) 115 Temp 97.9 °F (36.6 °C) Resp 20 Ht 5' 6\" (1.676 m) Wt 144 lb 1.6 oz (65.4 kg) SpO2 96% BMI 23.26 kg/m² Data Review:  
Labs:   
Recent Results (from the past 24 hour(s)) GLUCOSE, POC  
 Collection Time: 11/20/20  8:16 PM  
Result Value Ref Range Glucose (POC) 109 (H) 65 - 100 mg/dL Performed by Rickey Machado METABOLIC PANEL, BASIC Collection Time: 11/21/20  3:44 AM  
Result Value Ref Range Sodium 134 (L) 136 - 145 mmol/L Potassium 4.9 3.5 - 5.1 mmol/L Chloride 97 97 - 108 mmol/L  
 CO2 32 21 - 32 mmol/L Anion gap 5 5 - 15 mmol/L Glucose 130 (H) 65 - 100 mg/dL BUN 58 (H) 6 - 20 MG/DL Creatinine 4.00 (H) 0.70 - 1.30 MG/DL  
 BUN/Creatinine ratio 15 12 - 20 GFR est AA 19 (L) >60 ml/min/1.73m2 GFR est non-AA 16 (L) >60 ml/min/1.73m2 Calcium 7.8 (L) 8.5 - 10.1 MG/DL  
CBC WITH AUTOMATED DIFF Collection Time: 11/21/20  3:44 AM  
Result Value Ref Range WBC 22.1 (H) 4.1 - 11.1 K/uL  
 RBC 2.56 (L) 4.10 - 5.70 M/uL HGB 8.8 (L) 12.1 - 17.0 g/dL HCT 27.5 (L) 36.6 - 50.3 % .4 (H) 80.0 - 99.0 FL  
 MCH 34.4 (H) 26.0 - 34.0 PG  
 MCHC 32.0 30.0 - 36.5 g/dL RDW 20.3 (H) 11.5 - 14.5 % PLATELET 326 (L) 678 - 400 K/uL MPV 11.9 8.9 - 12.9 FL  
 NRBC 0.6 (H) 0  WBC ABSOLUTE NRBC 0.14 (H) 0.00 - 0.01 K/uL NEUTROPHILS 91 (H) 32 - 75 % BAND NEUTROPHILS 2 % LYMPHOCYTES 1 (L) 12 - 49 % MONOCYTES 4 (L) 5 - 13 % EOSINOPHILS 1 0 - 7 % BASOPHILS 0 0 - 1 % MYELOCYTES 1 % IMMATURE GRANULOCYTES 0 0.0 - 0.5 % ABS. NEUTROPHILS 20.6 (H) 1.8 - 8.0 K/UL  
 ABS. LYMPHOCYTES 0.2 (L) 0.8 - 3.5 K/UL  
 ABS. MONOCYTES 0.9 0.0 - 1.0 K/UL  
 ABS. EOSINOPHILS 0.2 0.0 - 0.4 K/UL  
 ABS. BASOPHILS 0.0 0.0 - 0.1 K/UL  
 ABS. IMM. GRANS. 0.0 0.00 - 0.04 K/UL  
 DF MANUAL    
 RBC COMMENTS SCHISTOCYTES 1+ RBC COMMENTS TARGET CELLS 1+ RBC COMMENTS ANISOCYTOSIS 2+ 
    
 RBC COMMENTS HYPOCHROMIA 1+ 
    
 RBC COMMENTS MACROCYTOSIS 1+ 
    
 RBC COMMENTS POLYCHROMASIA 1+ 
    
 RBC COMMENTS RBC FRAGMENTS    
GLUCOSE, POC Collection Time: 11/21/20  7:27 AM  
Result Value Ref Range Glucose (POC) 130 (H) 65 - 100 mg/dL Performed by Miah Hill  (PCT) GLUCOSE, POC Collection Time: 11/21/20 12:16 PM  
Result Value Ref Range Glucose (POC) 114 (H) 65 - 100 mg/dL Performed by Miah Hill  (PCT) Telemetry: AFIB CXray 11/20/2020 post HD: There is now complete opacification right hemithorax with volume 
loss suspicious for atelectasis. Assessment:  
 
Hospital Problems  Date Reviewed: 1/25/2018 Codes Class Noted POA A-fib [facility]) ICD-10-CM: I48.91 
ICD-9-CM: 427.31  11/5/2020 Unknown Hypoglycemia ICD-10-CM: E16.2 ICD-9-CM: 251.2  11/5/2020 Unknown Acute dyspnea ICD-10-CM: R06.00 
ICD-9-CM: 786.09  11/5/2020 Plan:  
Joaquin Costa. is a 60 y.o. male with an extensive PMH significant for ESRD on hemodilaysis MWF, DM II, COPD, Tobacco use, HCV, ETOH abuse, HTN, Cellulitis, leukocytosis, chronic back pain, recurrent pleural effusions admitted for Hypoglycemia on 11/5/2020, A-fib  
  
Afib with RVR:  HASBLED= 4 CHADVASc= 2 Continue on Coreg 6.25mg BID and Dilt 180mg daily, rate remains relatively well controlled 11/17/2020 afib with slow ventricular response with HR 30s-60s, and 2 sec pauses - resolved with decreased dose of Dilt  
and BB 
EKG and prior ECHO reviewed, new ECHO EF 55-60%, mild/mod. TR 
TSH WNL No anticoagulation due to current ventral hernia mesh with bleeding. Would not be a candidate for HIRA/DCC if we can not anticoagulate. 
  
DM II, symptomatic hypoglycemia: 
manage per internal medicine 
  
PNA Continues with pulm difficulties - opacification on right. OOB, chest Pt  
pl effusion tapped with 720cc out 
  
Tobacco abuse/COPD: 
Was a daily smoker,  
  
Hypertension Monitor BP, continue BB, Dilt ESRD on HD Consider consult to Palliative to assist with ADR and goals of care Dr. Lyndsay Yates will check back on Monday if no acute issues before then. Please call if we can assist in the meantime.  
 
Astrid Soto MD

## 2020-11-21 NOTE — PROGRESS NOTES
Hospitalist Progress Note NAME: Maryjo Thompson. :  1965 MRN:  [id number] Assessment / Plan: 
ADDENDUM 3pm 2020: CXR noted R chest opacification, pulm re-consulted, needs bronch but he ate already, cont mucomyst, nebs, acapella per pulm and monitor for now, Repeat CXR in AM if not improved may need bronch. 2020: improved CXR of right hemithorax opacification, no indication for  Bronchoscopy,pulmonology following Suspected sepsis ? HAP/chronic wound on abdomen from hernia repair, wound culture with normal tana Sputum culture grew Pseudomonas aeruginosa,  
S/p  cefepime, flagyl cover for CAP last dose  for 7 day course WBC count is 24k from steroids monitor  daily Currently on 2 L oxygen Appreciate ID eval 
CT chest on  R lung consolidation IV abx restarted by pulm  on cefepime and can dc on po levaquin per pulm note Acute on chronic respiratory failure with hypoxia, currently requiring 2 L Recurrent Pleural effusion  check CT chest   RLL collapse check CT chest  COPD managed by Dr. Liza Hankins Re-consulted pulm   reomend IV abx cefepime restarted monitor S/p Thoracentesis  720 cc fluid removed R lung monitor as per pulm Nebs prn 
tapering steroids  
repeat CXR  for f/u per renal after HD 
 
  
ESRD/HD now in fluid overload [facility] 
UF  here as fluid overload from noncompliance Fluid restricted diet Monitor Na/K with HD Per renal repeat CXR today after HD to access fluid overload and pleural effusions DM 2 uncontrolled  From steroids Severe symptomatic hypoglycemia on admit resolved Off of D10 drip HgA1C 5.8 Diabetic diet Elevated BG from steroids JERRY adjusted 
increased humulin 25U monitor Bg   Now better 
   
Ventral hernia Mesh with bleeding Bleeding intermittently now stopped per staff No fluid collection  on CT Cont wound care per surgery use moist dressing avoid dakins F/u wound cx  Normal tana S/p Iv Abx cefepime/flagyl course 7 days Chr anemia monitor Hgb >8 
  
A. fib with RVR intermittetly elevated HR 
 in ER on admit BB dose adjusted by cardiology s/p cardizem drip on admit Cardizem/coreg  PO now  Adjust as needed if bp allows Hold AC as hx of GI bleed in past and abd wound bleeding Cards on case f/u recomnds Echo 11/07-EF normal, dilated LA, PASP 56 Tsh/Ft4 ok 
  
  
L Renal mass Urology eval requested, reviewed recommendation leison chr no further w/u needed. 
 
Lauren Adas abuse Tobacco abuse Thiamine/folate/mvt CIWA monitoring no WD so far Nicotine patch 14mcg Alert oriented answered all questions. Was confused earlier in course. CT head ok Neurology feel delirium making confuse now resolved Debility/Deconditiones state with mod protien malnourished PT/OT, Dietary  On case 
  
Hepatitis C infection hx Monitored at [facility] hepatology clinic Hx of PVD s/p thrombectomy Hx of Splenectomy and partial pancreatectomy Chronic constipation - pt takes mag citrate at home PRN  
  
DVT PRx SCD AD [facility] JUSTEN SAMPSON wife Anastasiia Adan [phone] Dispo:   PT/OT on case recommend SNF family has declined and he is at risk of re-hospitalizations. CM on case  Dc once medically stable next 1-2 days Subjective: Chief Complaint / Reason for Physician Visit Patient report feeling better than yesterday Objective: VITALS:  
Last 24hrs VS reviewed since prior progress note. Most recent are: 
Patient Vitals for the past 24 hrs: 
 Temp Pulse Resp BP SpO2  
11/21/20 1505     96 % 11/21/20 1312 97.9 °F (36.6 °C) (!) 115 20 90/66 95 % 11/21/20 0855     96 % 11/21/20 0641 98.2 °F (36.8 °C) 92 20 97/66 95 % 11/21/20 0405     97 % 11/21/20 0355 98.2 °F (36.8 °C) 93 18 103/69 97 % 11/20/20 2300 97.9 °F (36.6 °C) 98 18 109/60 98 % 11/20/20 1952     99 % 11/20/20 1946     100 % 11/20/20 1809 98.3 °F (36.8 °C) 98 17 114/77  11/20/20 1551     99 % 11/20/20 1508 97.5 °F (36.4 °C) (!) 102 16 110/68 98 % Intake/Output Summary (Last 24 hours) at 11/21/2020 1507 Last data filed at 11/20/2020 2130 Gross per 24 hour Intake 750 ml Output  Net 750 ml PHYSICAL EXAM: 
General: Chr ill looking, no acute distress   
EENT:   MMM Resp:  Symmetric expansion,  No wheezing CV:  Irregular rhythm, no edema GI:  Soft, Chr Abd wound Neurologic:  Alert and  normal speech, Psych:   Flat affect Reviewed most current lab test results and cultures  YES Reviewed most current radiology test results   YES Review and summation of old records today    NO Reviewed patient's current orders and MAR    YES 
PMH/SH reviewed - no change compared to H&P Procedures: see electronic medical records for all procedures/Xrays and details which were not copied into this note but were reviewed prior to creation of Plan. LABS: 
I reviewed today's most current labs and imaging studies. Pertinent labs include: 
Recent Labs  
  11/21/20 0344 11/20/20 0339 WBC 22.1* 24.0* HGB 8.8* 9.1*  
HCT 27.5* 28.0*  
* 117* Recent Labs  
  11/21/20 0344 11/20/20 0339 11/19/20 
0331 * 129*  --   
K 4.9 5.7*  --   
CL 97 94*  --   
CO2 32 26  --   
* 189*  --   
BUN 58* 90*  --   
CREA 4.00* 5.43*  --   
CA 7.8* 8.1*  --   
PHOS  --   --  5.6* Signed: Radha Cruz MD

## 2020-11-21 NOTE — ROUTINE PROCESS
End of Shift Note Bedside shift change report given to Daja  (oncoming nurse) by Florence Avalos RN (offgoing nurse). Report included the following information SBAR Shift worked:  7-7:30 pm   
Shift summary and any significant changes:  
  patient tolerated dialysis except he did tachy up with afib. beta blocker held as per dialysis. Cardiac wants bp meds to be given with dialysis. Wound care done. Concerns for physician to address:  patient to mobilize tomorrow. His chest xray results received and Pulmonary examined. He is for further work up tomorrow. Heels elevated. Fluid restriction Zone phone for oncoming shift:   7-7pm  
 
Activity: 
Activity Level: Up with Assistance Number times ambulated in hallways past shift: 0 Number of times OOB to chair past shift: 0 Cardiac:  
Cardiac Monitoring: Yes     
Cardiac Rhythm: Atrial fibrillation Access:  
Current line(s): PIV Genitourinary:  
Urinary status: voiding Respiratory:  
O2 Device: Nasal cannula Chronic home O2 use?: YES Incentive spirometer at bedside: YES 
  
GI: 
Last Bowel Movement Date: 11/19/20 Current diet:  DIET NUTRITIONAL SUPPLEMENTS Dinner; Nep[place] DIET ONE TIME MESSAGE 
DIET ONE TIME MESSAGE 
DIET RENAL Regular; Consistent Carb 1800kcal; FR 1000ML; Low Potassium DIET ONE TIME MESSAGE Passing flatus: NO Tolerating current diet: YES 
% Diet Eaten: 100 % Pain Management:  
Patient states pain is manageable on current regimen: YES Skin: 
Walter Score: 15 Interventions: speciality bed, float heels and PT/OT consult Patient Safety: 
Fall Score: Total Score: 3 Interventions: bed/chair alarm and pt to call before getting OOB High Fall Risk: Yes Length of Stay: 
Expected LOS: 4d 19h Actual LOS: 1221 James So RN

## 2020-11-21 NOTE — PROGRESS NOTES
Pharmacy Automatic Renal Dosing Protocol - Antimicrobials Indication for Antimicrobials: Pseudomonas Lung Infection Current Regimen of Each Antimicrobial: 
Cefepime 1 g IV every 12 hours (Start Date ; Day # 4) Previous Antimicrobial Therapy: 
None Significant Cultures:  
 Sputum: P.aeruginosa (FINAL)  Body fluid: NGTD x 2 days- pending Radiology / Imaging results: (X-ray, CT scan or MRI):  
: CT Chest: Right lower lobe and right middle lobe consolidation/mass is unchanged. Right basilar effusion with pleural thickening is unchanged. Left pleural effusion left basilar atelectasis is slightly progressed. Slightly enlarged mediastinal lymph node is stable. There are new areas of groundglass opacities in the lungs left greater than 
right and more patchy densities at the lung bases and findings could represent pneumonia possibly atypical pneumonia or atypical edema pattern Paralysis, amputations, malnutrition: none mentioned Labs: 
Recent Labs  
  20 
0344 20 
7872 CREA 4.00* 5.43* BUN 58* 90* WBC 22.1* 24.0* Temp (24hrs), Av.9 °F (36.6 °C), Min:97.5 °F (36.4 °C), Max:98.3 °F (36.8 °C) Is the Patient on Dialysis? Yes: HD days are MWF Creatinine Clearance (mL/min): HD Impression/Plan:  
Will continue current regimen as appropriate for indication and renal function. Antimicrobial stop date TBD Pharmacy will follow daily and adjust medications as appropriate for renal function and/or serum levels. Thank you, ERINN HernandezD 
 
Recommended duration of therapy 
http://[facility]/Pharmacy/Clinical%20Companion/Duration%20of%20ABX%20therapy. docx Renal Dosing 
http://[facility]/[place]/[facility]/Pharmacy/Clinical%20Companion/Renal%[id number]. pdf

## 2020-11-22 NOTE — PROGRESS NOTES
7:29 AM Received bedside verbal report from [facility]. 
 
3:36 PM patient left AMA even after Dr. Justine Curran had a conversation with family at the bedside. Patient signed AMA. Tele box and PIV removed. All patient belongings and home meds were send home with patient.

## 2020-11-22 NOTE — PROGRESS NOTES
Pharmacy Automatic Renal Dosing Protocol - Antimicrobials Indication for Antimicrobials: Pseudomonas Lung Infection Current Regimen of Each Antimicrobial: 
Cefepime 1 g IV every 24 hours (Start Date ; Day # 5) Previous Antimicrobial Therapy: 
None Significant Cultures:  
 Sputum: P.aeruginosa (FINAL)  Body fluid: NGTD x 3 days- pending Radiology / Imaging results: (X-ray, CT scan or MRI):  
: CT Chest: Right lower lobe and right middle lobe consolidation/mass is unchanged. Right basilar effusion with pleural thickening is unchanged. Left pleural effusion left basilar atelectasis is slightly progressed. Slightly enlarged mediastinal lymph node is stable. There are new areas of groundglass opacities in the lungs left greater than 
right and more patchy densities at the lung bases and findings could represent pneumonia possibly atypical pneumonia or atypical edema pattern Paralysis, amputations, malnutrition: none mentioned Labs: 
Recent Labs  
  20 
0334 20 
0344 20 
6179 CREA 5.06* 4.00* 5.43* BUN 85* 58* 90* WBC 21.7* 22.1* 24.0* Temp (24hrs), Av.7 °F (36.5 °C), Min:96.9 °F (36.1 °C), Max:98.2 °F (36.8 °C) Is the Patient on Dialysis? Yes: HD days are MWF Creatinine Clearance (mL/min): HD Impression/Plan:  
Will continue current regimen as appropriate for indication and renal function. Antimicrobial stop date TBD Pharmacy will follow daily and adjust medications as appropriate for renal function and/or serum levels. Thank you, ERINN HernandezD 
 
Recommended duration of therapy 
http://[facility]/Pharmacy/Clinical%20Companion/Duration%20of%20ABX%20therapy. docx Renal Dosing 
http://[facility]/[place]/[facility]/Pharmacy/Clinical%20Companion/Renal%20Dosing%[email or web address]. pdf

## 2020-11-22 NOTE — DISCHARGE SUMMARY
Hospitalist Discharge Summary Patient ID: 
Francisco Mueller 
[id number] 
38 y.o. 
1965 11/5/2020 PCP on record: Miracle Lau NP Admit date: 11/5/2020 Discharge date and time: 11/22/2020 DISCHARGE DIAGNOSIS: 
 
Sepsis COPD exacerbation Pleural effusion Acute on chronic hypoxic respiratory failure CONSULTATIONS: 
IP CONSULT TO NEPHROLOGY 
IP CONSULT TO UROLOGY 
IP CONSULT TO NEUROLOGY 
IP CONSULT TO INFECTIOUS DISEASES 
IP CONSULT TO CARDIOLOGY 
IP CONSULT TO PULMONOLOGY 
IP CONSULT TO PULMONOLOGY 
IP CONSULT TO PULMONOLOGY Excerpted HPI from H&P of Ez Schulz MD: 
HISTORY OF PRESENT ILLNESS:    
Mr. Samira Root is a 60 y.o.    male   Hx of ESRD on hemodialysis, last hemodialysis yesterday, recurrent pleural effusion, diabetes type 2 came here today with with hypoglycemia and dec mental status. Patient BG was elevated last night >400 he took 35 units of insulin(details unknown). He noted low BG EMS was called and brought here. BG were 35 got several D50 BG remained yudith and he was symptomatic. He was started on D10 and BG improved to 70-90. He also noted to have SVT/Afiv with RVR rate 170 got better with Cardizem drip cardiology was consulted in ER. Patient had CT abd showed resolution of ventral hernia fluid collection, L kidney mass and R loculated Pl effusion. Patient overall poor historian, family unable to reach at this time. He denies any CP/N/V/D/cough to me. He has some PRITCHETT/SOB from COPD and uses prn O2. He had thoracentesis for pl effusions in past. He admits to drinking and smoking and last drink was beer yesterday. He altaf any rectal bleed, fevers, hematuria, headache, dizziness, palpitations to me. He is being admited for further care.  
 
______________________________________________________________________ DISCHARGE SUMMARY/HOSPITAL COURSE:  for full details see H&P, daily progress notes, labs, consult notes. ADDENDUM 3pm 11/20/2020: CXR noted R chest opacification, pulm re-consulted, needs bronch but he ate already, cont mucomyst, nebs, acapella per pulm and monitor for now, Repeat CXR in AM if not improved may need bronch. 
  
  
11/21/2020: improved CXR of right hemithorax opacification, no indication for  Bronchoscopy,pulmonology following  
  
Suspected sepsis ? HAP/chronic wound on abdomen from hernia repair, wound culture with normal tana Sputum culture grew Pseudomonas aeruginosa,  
S/p  cefepime, flagyl cover for CAP last dose 11/16 for 7 day course Dustin Londono is 24k from steroids monitor  daily Currently on 2 L oxygen Appreciate ID eval 
CT chest on 11/17 R lung consolidation IV abx restarted by pulm 11/18 on cefepime and can dc on po levaquin per pulm note  
  
Acute on chronic respiratory failure with hypoxia, currently requiring 2 L Recurrent Pleural effusion  check CT chest  11/17 RLL collapse check CT chest 11/17 COPD managed by Dr. Naya De La Cruz Re-consulted pulm 11/18  reomend IV abx cefepime restarted monitor S/p Thoracentesis 11/18 720 cc fluid removed R lung monitor as per pulm Nebs prn 
tapering steroids  
repeat CXR 11/20 for f/u per renal after HD 
  
  
ESRD/HD now in fluid overload [facility] 
UF  here as fluid overload from noncompliance Fluid restricted diet Monitor Na/K with HD Per renal repeat CXR today after HD to access fluid overload and pleural effusions 
  
DM 2 uncontrolled  From steroids Severe symptomatic hypoglycemia on admit resolved Off of D10 drip HgA1C 5.8 Diabetic diet Elevated BG from steroids JERRY adjusted 
increased humulin 25U monitor Bg   Now better 
   
Ventral hernia Mesh with bleeding Bleeding intermittently now stopped per staff No fluid collection  on CT Cont wound care per surgery use moist dressing avoid dakins F/u wound cx  Normal tana S/p Iv Abx cefepime/flagyl course 7 days 
  
  
Chr anemia monitor Hgb >8 
  
 A. fib with RVR intermittetly elevated HR 
 in ER on admit BB dose adjusted by cardiology s/p cardizem drip on admit Cardizem/coreg  PO now  Adjust as needed if bp allows Hold AC as hx of GI bleed in past and abd wound bleeding Cards on case f/u recomnds Echo 11/07-EF normal, dilated LA, PASP 56 Tsh/Ft4 ok 
  
  
L Renal mass Urology eval requested, reviewed recommendation leison chr no further w/u needed. 
  
 ETOH abuse Tobacco abuse Thiamine/folate/mvt CIWA monitoring no WD so far Nicotine patch 14mcg Alert oriented answered all questions. Was confused earlier in course. CT head ok Neurology feel delirium making confuse now resolved 
  
Debility/Deconditiones state with mod protien malnourished PT/OT, Dietary  On case 
  
Hepatitis C infection hx Monitored at [facility] hepatology clinic Hx of PVD s/p thrombectomy Hx of Splenectomy and partial pancreatectomy Chronic constipation - pt takes mag citrate at home PRN  
  
DVT PRx SCD AD [facility] wife Esperanza Tyler [phone]   
Dispo:   PT/OT on case recommend SNF family has declined and he is at risk of re-hospitalizations. CM on case  Dc once medically stable next 1-2 days 
  
  
 
 
 
_______________________________________________________________________ Patient seen and examined by me on discharge day. Pertinent Findings: 
Gen:    Not in distress Chest: Clear lungs CVS:   Regular rhythm. No edema Abd:  Soft, not distended, not tender Neuro:  Alert, oriented x3 
_______________________________________________________________________ DISCHARGE MEDICATIONS:  
Current Discharge Medication List  
  
START taking these medications Details  
acetylcysteine (MUCOMYST) 200 mg/mL (20 %) solution 1 mL by Nebulization route three (3) times daily. Qty: 30 mL, Refills: 0  
  
carvediloL (COREG) 6.25 mg tablet Take 1 Tab by mouth two (2) times daily (with meals). Qty: 60 Tab, Refills: 1 thiamine mononitrate (B-1) 100 mg tablet Take 1 Tab by mouth daily. Qty: 30 Tab, Refills: 0  
  
therapeutic multivitamin (THERAGRAN) tablet Take 1 Tab by mouth daily. Qty: 30 Tab, Refills: 0  
  
senna-docusate (PERICOLACE) 8.6-50 mg per tablet Take 1 Tab by mouth daily as needed for Constipation. Qty: 30 Tab, Refills: 1  
  
nystatin (MYCOSTATIN) 100,000 unit/mL suspension Take 5 mL by mouth four (4) times daily. swish and spit  Indications: thrush Qty: 60 mL, Refills: 0  
  
nicotine (NICODERM CQ) 14 mg/24 hr patch 1 Patch by TransDERmal route every twenty-four (24) hours for 30 days. Qty: 30 Patch, Refills: 0  
  
guaiFENesin-dextromethorphan (ROBITUSSIN DM) 100-10 mg/5 mL syrup Take 10 mL by mouth every six (6) hours as needed for Cough or Congestion for up to 10 days. Qty: 1 Bottle, Refills: 0  
  
folic acid (FOLVITE) 1 mg tablet Take 1 Tab by mouth daily. Qty: 30 Tab, Refills: 0  
  
dilTIAZem ER (CARDIZEM CD) 180 mg capsule Take 1 Cap by mouth daily. Qty: 90 Cap, Refills: 0  
  
benzonatate (TESSALON) 100 mg capsule Take 1 Cap by mouth three (3) times daily as needed for Cough for up to 7 days. Qty: 21 Cap, Refills: 0  
  
levoFLOXacin (Levaquin) 500 mg tablet 500 mg every 48 hours. PHARMACY DONT FILL CEFIXIM Qty: 3 Tab, Refills: 0 CONTINUE these medications which have CHANGED Details  
sevelamer carbonate (Renvela) 800 mg tab tab Take 2 Tabs by mouth three (3) times daily. Qty: 90 Tab, Refills: 1 CONTINUE these medications which have NOT CHANGED Details  
fluticasone-umeclidinium-vilanterol (TRELEGY ELLIPTA) 100-62.5-25 mcg inhaler Take 1 Puff by inhalation daily. oxyCODONE-acetaminophen (PERCOCET)  mg per tablet Take 1 Tab by mouth three (3) times daily. ergocalciferol (VITAMIN D2) 50,000 unit capsule Take 50,000 Units by mouth every seven (7) days. MONDAY  
  
omeprazole (PRILOSEC) 20 mg capsule Take 20 mg by mouth as needed. gabapentin (NEURONTIN) 300 mg capsule Take 300 mg by mouth nightly as needed. b complex-vitamin c-folic acid (NEPHROCAPS) 1 mg capsule Take 1 Cap by mouth daily. lidocaine-prilocaine (EMLA) topical cream Apply  to affected area as needed for Pain. Patient applies to arm before dialysis on Monday, Wednesday, and Friday. albuterol-ipratropium (DUO-NEB) 2.5 mg-0.5 mg/3 ml nebu 3 mL by Nebulization route three (3) times daily. AT LUNCHTIME DAILY. albuterol (PROVENTIL) 90 mcg/Actuation inhaler Take 2 Puffs by inhalation four (4) times daily. QID PRN  
  
  
STOP taking these medications  
  
 furosemide (LASIX) 80 mg tablet Comments:  
Reason for Stopping:   
   
  
Patient left AMA Patient Follow Up Instructions: Activity: Activity as tolerated Diet: Resume previous diet Wound Care: None needed Follow-up with primary care and pulmonology after discharge Follow-up tests/labs no labs pending Follow-up Information Follow up With Specialties Details Why Contact Info [address] [facility]  This is your home health agency. If you have not heard from them in 2-3 days, please give them a call. [address] [facility], [address] [place] [address],[address] 
[phone]  
  
 
________________________________________________________________ Risk of deterioration: High 
 
Condition at Discharge: Left AMA 
__________________________________________________________________ Disposition Patient left AMA 
 
____________________________________________________________________ Code Status: Full Code ______________________________________________________ Total time in minutes spent coordinating this discharge (includes going over instructions, follow-up, prescriptions, and preparing report for sign off to her PCP) :  >30 minutes Signed: 
Miles Ashton MD

## 2020-11-22 NOTE — PROGRESS NOTES
Pt discharged AMA this afternoon. CM faxed updated clinicals to dialysis unit ([address] [facility], [phone]) and notified [facility] via [employer]. Myrna Silverman, [facility] [phone]

## 2020-11-22 NOTE — PROGRESS NOTES
Problem: Falls - Risk of 
Goal: *Absence of Falls Description: Document Bianca Tolentino Fall Risk and appropriate interventions in the flowsheet. Outcome: Progressing Towards Goal 
Note: Fall Risk Interventions: 
Mobility Interventions: Bed/chair exit alarm, Patient to call before getting OOB, OT consult for ADLs, PT Consult for mobility concerns Mentation Interventions: Adequate sleep, hydration, pain control, Bed/chair exit alarm, Door open when patient unattended, Family/sitter at bedside Medication Interventions: Bed/chair exit alarm, Patient to call before getting OOB Elimination Interventions: Bed/chair exit alarm, Call light in reach, Patient to call for help with toileting needs History of Falls Interventions: Bed/chair exit alarm

## 2020-11-22 NOTE — PROGRESS NOTES
Problem: Chronic Obstructive Pulmonary Disease (COPD) Goal: *Oxygen saturation during activity within specified parameters Outcome: Progressing Towards Goal 
  
Problem: Falls - Risk of 
Goal: *Absence of Falls Description: Document Isabela Francis Fall Risk and appropriate interventions in the flowsheet. Outcome: Progressing Towards Goal 
Note: Fall Risk Interventions: 
Mobility Interventions: Bed/chair exit alarm, Patient to call before getting OOB, PT Consult for mobility concerns Mentation Interventions: Adequate sleep, hydration, pain control, Bed/chair exit alarm, Familiar objects from home, Increase mobility Medication Interventions: Bed/chair exit alarm, Patient to call before getting OOB, Teach patient to arise slowly Elimination Interventions: Bed/chair exit alarm, Elevated toilet seat, Toileting schedule/hourly rounds, Patient to call for help with toileting needs History of Falls Interventions: Bed/chair exit alarm Problem: Patient Education: Go to Patient Education Activity Goal: Patient/Family Education Outcome: Progressing Towards Goal 
  
Problem: Breathing Pattern - Ineffective Goal: *Absence of hypoxia Outcome: Progressing Towards Goal

## 2020-11-22 NOTE — ROUTINE PROCESS
End of Shift Note Bedside shift change report given to [employer] (oncoming nurse) by Reena Cowden, RN (offgoing nurse). Report included the following information SBAR Shift worked:  7-7:30 pm   
Shift summary and any significant changes:  
  Patient had 1 episode agitation because he wanted to go home with family. Family was not anxious to take him home today. Doctor notified and Dr. Clare Storey came up and talked with patient and patient has been fine. He has been up in chair most of the day and doing much better standing with assistance. He stood with walker. Exercised his legs. Great appetite. He knows he needs to stay under 1000 mls fluid restriction and did his best to do so. Did not void. Still on 2 lpm 02 but this is chronic. Given mucinex x2 because he has congestion and thought it would help. Medicated for back pain which is chronic. Concerns for physician to address:  DC planning. Home health nursing to follow up on skin issues, wound abdomen and skin breakdown on sacrum and right heel DTII. Zone phone for oncoming shift:   na Activity: 
Activity Level: Up with Assistance Number times ambulated in hallways past shift: 0 Number of times OOB to chair past shift: 1 Cardiac:  
Cardiac Monitoring: Yes     
Cardiac Rhythm: Atrial fibrillation Access:  
Current line(s): PIV Genitourinary:  
Urinary status: oliguric Respiratory:  
O2 Device: Nasal cannula Chronic home O2 use?: YES Incentive spirometer at bedside: YES 
  
GI: 
Last Bowel Movement Date: 11/21/20 Current diet:  DIET NUTRITIONAL SUPPLEMENTS Dinner; Nep[place] DIET ONE TIME MESSAGE 
DIET ONE TIME MESSAGE 
DIET RENAL Regular; Consistent Carb 1800kcal; FR 1000ML; Low Potassium DIET ONE TIME MESSAGE Passing flatus: YES Tolerating current diet: YES 
% Diet Eaten: 100 % Pain Management:  
Patient states pain is manageable on current regimen: YES Skin: 
Walter Score: 13 Interventions: increase time out of bed Patient Safety: 
Fall Score: Total Score: 4 Interventions: bed/chair alarm and pt to call before getting OOB High Fall Risk: Yes Length of Stay: 
Expected LOS: 4d 19h Actual LOS: THAD Quispe

## 2020-11-23 PROBLEM — R41.82 AMS (ALTERED MENTAL STATUS): Status: ACTIVE | Noted: 2020-01-01

## 2020-11-23 NOTE — ED PROVIDER NOTES
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 
 
 
 
I was the first provider for this patient on this visit. To the best of my ability I reviewed relevant prior medical records, electrocardiograms, laboratories, and radiologic studies. The patient's presenting problems were discussed, and the patient was in agreement with the care plan formulated and outlined with them. Alicia Medina MD 
 
Please note that this dictation was completed with Dragon voice recognition software. Quite often unanticipated grammatical, syntax, homophones, and other interpretive errors are inadvertently transcribed by the computer software. Please disregard these errors and excuse any errors that have escaped final proofreading.

## 2020-11-23 NOTE — ED NOTES
Per Dr. Brooklynn Isabel. Pt's respiratory tx held until rapid covid results. If negative, remove precautions.

## 2020-11-23 NOTE — PROGRESS NOTES
Pharmacy Automatic Renal Dosing Protocol - Antimicrobials Indication for Antimicrobials: collapse lung Current Regimen of Each Antimicrobial: 
Cefepime 2g q 24h (Start Date ; Day # 1) Previous Antimicrobial Therapy: 
Cefepime  - -left ama before  dose Significant Cultures:  
: blood : urine on hold Radiology / Imaging results: (X-ray, CT scan or MRI):  
 
Paralysis, amputations, malnutrition: none Labs: 
Recent Labs  
  20 
1145 20 
0334 20 
0344 CREA 6.15* 5.06* 4.00* * 85* 58* WBC 18.3* 21.7* 22.1* Temp (24hrs), Av.7 °F (34.3 °C), Min:93 °F (33.9 °C), Max:95.1 °F (35.1 °C) Is the Patient on Dialysis? Yes: HD days are MWF Creatinine Clearance (mL/min):  
CrCl (Actual Body Weight): 12.3 CrCl (Adjusted Body Weight): 12.1 CrCl (Ideal Body Weight): 12.0 Impression/Plan:  
Cefepime 1g q 24 for HD Antimicrobial stop date to be determined Pharmacy will follow daily and adjust medications as appropriate for renal function and/or serum levels. Thank you, ERINN HernandezD 
 
Recommended duration of therapy 
http://[facility]/Pharmacy/Clinical%20Companion/Duration%20of%20ABX%20therapy. docx Renal Dosing 
http://[facility]/[place]/[facility]/Pharmacy/Clinical%20Companion/Renal%[id number]. pdf

## 2020-11-23 NOTE — ED NOTES
Pt refused 2nd bolus of NS and requested to speak with the MD. Fahad Steen MD he advised would talk to patient.
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
## 2020-11-23 NOTE — REMOTE MONITORING
Spoke with available RN for primary RN regarding sepsis bundle. Call back number [phone], thank you! [address] [facility] Signed By: Rosaura Gowers, RN November 23, 2020
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
## 2020-11-24 NOTE — PROGRESS NOTES
Called by RN because of A fib with RVR while on HD, Came to evaluated the patient. See my assessment and plan below for details Visit Vitals /75 Pulse (!) 120 Temp 97.7 °F (36.5 °C) (Oral) Resp 24 Ht 5' 6\" (1.676 m) Wt 65.4 kg (144 lb 1.6 oz) SpO2 98% BMI 23.26 kg/m² PHYSICAL EXAM: 
General: WD, WN. Alert,  Not in acute distress EENT:  Anicteric sclerae, Resp:  Clear lungs, no wheezing or rales. No accessory muscle use CV:  Regular  rhythm,  No edema GI:  Soft, Non distended, Non tender.  +Bowel sounds, Neurologic:  Alert and oriented X 3. Psych:   Good insight. Not anxious nor agitated. Skin:  no rashes or ulcers. No jaundice. Assessment/Plan: 
Patient in A fib with RVR while on HD. Ordered 10mg IV push of dilt. HR continued 120s, patient with soft bp and history of requiring pressors when on IV dilt. Ordered amiodarone bolus and drip. Consult to cardiology as patient converting into a fib while on HD has been recurrent. I have spent critical care time involved in lab review, consultations with specialist, family decision-making, and documentation. During this entire length of time I was immediately available to the patient. The reason for providing this level of medical care for this critically ill patient was due to a critical illness that impaired one or more vital organ systems such that there was a high probability of imminent or life threatening deterioration in the patients condition. This care involved high complexity decision making to assess, manipulate, and support vital system functions, to treat this degreee vital organ system failure and to prevent further life threatening deterioration of the patients condition. Total critical care time spent: 25 mins 
 
________________________________________________________________________ Montez Farnsworth NP

## 2020-11-24 NOTE — WOUND CARE
Wound Care consult for several wounds - all present on admission plus a skin tear on the left arm. Chart reviewed and patient assessed. Pt. Is well known to the wound care department from past care provided to him. All wounds were cleansed, photographed and dressed. Nursing to do the heel wound and the sacrum wound care per orders. The wound flowsheet was \"fixed\" and old wound greater then 61days old were removed. Chronic Abdominal wound 11-: very old Wound. Deep dermal level of skin is exposed and 
Dry with surface small vessels exposed. 11- - Right Heel pressure injury - POA: 
Nursing to paint this with Betadine and leave open To air but floated on pillow at all times. 11- - Sacrum wound - open but blanchable Healing pressure injury stage 2. POA:  
 
 
Plan: Wound care orders written and discussed with the RN, Alonzo Scheuermann and her student today. Pressure injury prevention is essential for this patient as well as NOT using tape on his skin. No Band-Aids and No tape except for the IV site with Tegaderm only. Use the Mepitel One silicone dressing as a contact layer dressing and then a dry gauze. Wrap with italo. Tape only on the italo.   
Casey Junior, RN, BSN, [employer]

## 2020-11-24 NOTE — ROUTINE PROCESS
Patient has an IV running amiodarone. Patient has cefepime antibiotic that is due now. This RN checked to see if there was any place to get another IV started for the antibiotic. No place found as we can only use the left arm for sticks. 1755- PICC team called. 1825- Noted that NP put in order for labs. 1830- PICC team in room and asked if they are able to get lab as well. PICC team stated that they are unable to find any veins for IV or for lab. RN paged NP for further assistance. NP put in orders for an arterial stick for the lab. NP also stated that stop the amiodarone for 30 minutes until cefepime is infused. 80- RN called respiratory for art stick. They stated they will be up shortly. When cefepime infused, amiodarone will be restarted.

## 2020-11-24 NOTE — PROGRESS NOTES
HD TRANSFER - OUT REPORT: 
 
Verbal report given to [facility], RN on [employer]. being transferred to [facility] for urgent transfer Report consisted of patient's Situation, Background, Assessment and  
Recommendations(SBAR). Information from the following report(s) SBAR, Procedure Summary, Intake/Output, MAR, Recent Results and Cardiac Rhythm A-Fib RVR was reviewed with the receiving nurse. Method:  $$ Method: Hemodialysis (11/24/20 1215) Fluid Removed  NET Fluid Removed (mL): 775 ml (11/24/20 1430) Patient response to treatment: unstable End Time  Hemodialysis End Time: 4781 (11/24/20 1430) If not documented, dialysis nurse to update post-dialysis row in HD/Filtration flowsheet Medications /Volume expansion agents or Fluid boluses administered during treatment? yes Post-dialysis medication administration due?  yes Remind nurse to administer post-HD medication upon return to unit. Fistula hemostasis? yes Line heparinization? no 
 
Lines: MANUEL AVF Opportunity for questions and clarification was provided. Patient transported with: Monitor O2 @ 3 liters Registered Nurse Tech

## 2020-11-24 NOTE — PROGRESS NOTES
TRANSFER - IN REPORT: 
 
Verbal report received from [facility], RN on [employer].  being received from [facility] for ordered procedure Report consisted of patients Situation, Background, Assessment and  
Recommendations(SBAR). Information from the following report(s) SBAR, Kardex and Cardiac Rhythm chronic A-Fib was reviewed with the receiving nurse. Opportunity for questions and clarification was provided. Assessment completed upon patients arrival to unit and care assumed.

## 2020-11-24 NOTE — PROGRESS NOTES
Hospitalist Progress Note NAME: Christian Paz. :  1965 MRN:  [id number] I reviewed pertinent labs and imaging, and discussed /agreed on the plan of care with Dr. Merlene De Los Santos. Assessment / Plan: Metabolic Encephalopathy with AMS Hypoglycemia, Hypothermic POA - resolved Acute on Chronic Respiratory Failure with Hypoxia PNA - recently d/c from hospital last week with Pseudomonas PNA ESRD on Hemodialysis · Patient remains confused · Remains afebrile, WBC trending down · CXR  - Bilateral nonspecific atelectasis vs edema vs pneumonia. Right pleural effusion. No significant change. · Recent admission for PNA - did not fill prescriptions (for antibiotics) at discharge · Requiring 4 LPM NC - baseline 3 LPM  
· Appreciate Pulmonology input · Continue Cefepime · Appreciate Nephrology input Hyperkalemia in setting of ESRD Missed HD yesterday · Treated with calcium gluconate and insulin · For HD this AM - Recheck BMP after HD Hyponatremia · Na 126, follow BMP · Continue 1L fluid restriction Type II Diabetes with Hypoglycemia on admission · Follow glucose · Hold SSI with hypoglycemia Atrial Fibrillation with RVR · History of A fib with RVR while on HD · Responded to RRT call for afib while patient ending HD treatment · Started amiodarone drip - BP drops with IV dilt · Appreciate Cardiology input · Continue PO dilt, carvedilol · Recent ECHO  - EF 55-60%. Normal cavity size, wall thickness and systolic function. Ventral Hernia Mesh with Bleeding - resolved Anemia of Chronic Disease Moderate Protein Calorie Malnutrition Chronic Debility · Continue multivitamin, thiamine, folic acid, nephrocap · Consult PT/OT Hepatitis C infection hx Monitored at [facility] hepatology clinic Hx of PVD s/p thrombectomy Hx of Splenectomy and partial pancreatectomy Chronic constipation - pt takes mag citrate at home PRN History of ETOH abuse History of COPD/smoker 18.5 - 24.9 Normal weight / Body mass index is 23.26 kg/m². Code status: Full Prophylaxis: Hep SQ Recommended Disposition: Home w/Family Subjective: Chief Complaint / Reason for Physician Visit \"The doctor\". Patient seen at bedside, answered every question with \"the doctor\". No information gathered from patient. Discussed with RN events overnight. Review of Systems: 
Symptom Y/N Comments  Symptom Y/N Comments Fever/Chills    Chest Pain Poor Appetite    Edema Cough    Abdominal Pain Sputum    Joint Pain SOB/PRITCHETT    Pruritis/Rash Nausea/vomit    Tolerating PT/OT Diarrhea    Tolerating Diet Constipation    Other Could NOT obtain due to: AMS Objective: VITALS:  
Last 24hrs VS reviewed since prior progress note. Most recent are: 
Patient Vitals for the past 24 hrs: 
 Temp Pulse Resp BP SpO2  
11/24/20 1605    114/75   
11/24/20 1459  (!) 120  108/89   
11/24/20 1437  (!) 131 24 117/74   
11/24/20 1430 97.7 °F (36.5 °C) (!) 130 18    
11/24/20 1416  (!) 140  126/71   
11/24/20 1415  94 18 126/71   
11/24/20 1400  79 18 (!) 109/53   
11/24/20 1345  88 18 (!) 116/54   
11/24/20 1330  86 18 (!) 116/56   
11/24/20 1315  97 18 90/66   
11/24/20 1300  100 18 (!) 113/56   
11/24/20 1245  (!) 108 18 (!) 100/49   
11/24/20 1230  98 18 (!) 113/50   
11/24/20 1215 97.9 °F (36.6 °C) 82 18 127/69   
11/24/20 0820     98 % 11/24/20 0810     95 % 11/24/20 0803  100 20 (!) 144/92 98 % 11/24/20 0756   24 (!) 153/88 100 % 11/24/20 0751  99 22 (!) 149/90 98 % 11/24/20 0405 97.8 °F (36.6 °C) 89 22 (!) 170/86 97 % 11/24/20 0120     97 % 11/23/20 2329 97.4 °F (36.3 °C) 78 16 139/69 96 % 11/23/20 2130 97.9 °F (36.6 °C) 79 18 123/80 94 % 11/23/20 2030 98.5 °F (36.9 °C) 71 17 112/80 94 % 11/23/20 2015  72 15 (!) 110/56 95 % 11/23/20 2000  74 13 105/76 97 % 11/23/20 1945  77 14 (!) 106/53 95 % 11/23/20 1930  77 12 101/64 96 % 11/23/20 1915  78 20 123/70 96 % 11/23/20 1900  83 20 (!) 117/54 (!) 89 % 11/23/20 1845  77 13 135/71 94 % 11/23/20 1830  82 19 (!) 122/58 95 % 11/23/20 1819 97.2 °F (36.2 °C) 80 18 108/64 93 % Intake/Output Summary (Last 24 hours) at 11/24/2020 1818 Last data filed at 11/24/2020 1430 Gross per 24 hour Intake 460 ml Output 775 ml Net -315 ml I had a face to face encounter and independently examined this patient on 11/24/2020, as outlined below: PHYSICAL EXAM: 
General: WD, WN. Alert and confused  male EENT:  EOMI. Anicteric sclerae. MMM Resp:  LS coarse, no wheezing or rales. No accessory muscle use CV:  Regular  rhythm,  No edema GI:  Soft, Non distended, Non tender. +Bowel sounds Neurologic:  Confused, normal speech, Psych:   Not anxious nor agitated Skin:  No rashes. No jaundice Reviewed most current lab test results and cultures  YES Reviewed most current radiology test results   YES Review and summation of old records today    NO Reviewed patient's current orders and MAR    YES 
PMH/SH reviewed - no change compared to H&P 
________________________________________________________________________ Care Plan discussed with: 
  Comments Patient x Family RN x Care Manager x Consultant  x Multidiciplinary team rounds were held today with , nursing, pharmacist and clinical coordinator. Patient's plan of care was discussed; medications were reviewed and discharge planning was addressed. ________________________________________________________________________ Total NON critical care TIME:  25   Minutes Total CRITICAL CARE TIME Spent:   Minutes non procedure based Comments >50% of visit spent in counseling and coordination of care x   
________________________________________________________________________ Song Ramos NP Procedures: see electronic medical records for all procedures/Xrays and details which were not copied into this note but were reviewed prior to creation of Plan. LABS: 
I reviewed today's most current labs and imaging studies. Pertinent labs include: 
Recent Labs  
  11/24/20 
0237 11/23/20 
1145 11/22/20 
0334 WBC 12.1* 18.3* 21.7* HGB 10.7* 9.0* 9.4* HCT 32.2* 27.6* 29.1*  
* 127* 128* Recent Labs  
  11/24/20 
0600 11/23/20 
1145 11/22/20 
0334 * 128* 131*  
K 7.4* 5.5* 5.6*  
CL 89* 91* 95* CO2 23 28 29 * 97 76 * 105* 85* CREA 6.62* 6.15* 5.06* CA 8.5 8.4* 8.4* ALB 2.8* 2.6*  --   
TBILI 0.6 0.6  --   
ALT 43 36  --   
 
 
Signed: Song Ramos NP
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
## 2020-11-24 NOTE — PROGRESS NOTES
Asked to assess patient for venous access. Pt has patent AVF in right arm and old AVF non-functional in upper left arm. Lower left arm has bleeding, weeping skin tears. Assessed left arm with ultrasound and did not find any vein that would support antibiotic or Amiodarone therapy.  
 
Yuriy GUZMANN, RN, [facility] Vascular Access Team

## 2020-11-24 NOTE — PROGRESS NOTES
RAPID RESPONSE TEAM 
 
Responded to over head rapid response to dialysis suite. Patient c/o palpitations. 's-150's, Afib w/rvr. Patient denies chest pain or increasing SOB (states he's SOB at baseline). Dialysis RN administered albumin and labetalol 20 mg @ 1415. HR still 130's. Padmini Dominguez NP at bedside. EKG:afib w/rvr. Cardizem 10 mg IV given. -130's. /70 after cardizem bolus. Orders placed for amio bolus, gtt and cardiology consulted. Potassium 7.4 this morning. Repeat BMP? Lab Results Component Value Date/Time Sodium 126 (L) 11/24/2020 06:00 AM  
 Potassium 7.4 (HH) 11/24/2020 06:00 AM  
 Chloride 89 (L) 11/24/2020 06:00 AM  
 CO2 23 11/24/2020 06:00 AM  
 Anion gap 14 11/24/2020 06:00 AM  
 Glucose 213 (H) 11/24/2020 06:00 AM  
  (H) 11/24/2020 06:00 AM  
 Creatinine 6.62 (H) 11/24/2020 06:00 AM  
 BUN/Creatinine ratio 16 11/24/2020 06:00 AM  
 GFR est AA 11 (L) 11/24/2020 06:00 AM  
 GFR est non-AA 9 (L) 11/24/2020 06:00 AM  
 Calcium 8.5 11/24/2020 06:00 AM  
 Bilirubin, total 0.6 11/24/2020 06:00 AM  
 Alk. phosphatase 84 11/24/2020 06:00 AM  
 Protein, total 6.6 11/24/2020 06:00 AM  
 Albumin 2.8 (L) 11/24/2020 06:00 AM  
 Globulin 3.8 11/24/2020 06:00 AM  
 A-G Ratio 0.7 (L) 11/24/2020 06:00 AM  
 ALT (SGPT) 43 11/24/2020 06:00 AM  
 AST (SGOT) 23 11/24/2020 06:00 AM  
 
Pt to remain in 3116. Adriel Romero RN 
RRT, [id number]

## 2020-11-24 NOTE — ED NOTES
TRANSFER - OUT REPORT: 
 
Verbal report given to [facility] RN(name) on Jenifer Fontaine.  being transferred to NSTU(unit) for routine progression of care Report consisted of patients Situation, Background, Assessment and  
Recommendations(SBAR). Information from the following report(s) SBAR, Kardex, Intake/Output and MAR was reviewed with the receiving nurse. Lines:  
Peripheral IV 11/23/20 Left Forearm (Active) Site Assessment Clean, dry, & intact 11/23/20 1916 Phlebitis Assessment 0 11/23/20 1916 Infiltration Assessment 0 11/23/20 1916 Dressing Status Clean, dry, & intact 11/23/20 1916 Dressing Type Tape;Transparent 11/23/20 1916 Hub Color/Line Status Pink;Flushed;Patent 11/23/20 1916 Alcohol Cap Used No 11/23/20 1916 Opportunity for questions and clarification was provided. Patient transported with: 
 O2 @ 3 liters

## 2020-11-24 NOTE — PROGRESS NOTES
[facility] [address] [place] [address]  [phone] Cardiology Progress Note 
 
 
11/24/2020 4:28 PM 
 
Admit Date: 11/23/2020 Admit Diagnosis:  
AMS (altered mental status) [R41.82] Subjective:  
 
Krystal Shock. No complaints Visit Vitals /75 Pulse (!) 120 Temp 97.7 °F (36.5 °C) (Oral) Resp 24 Ht 5' 6\" (1.676 m) Wt 144 lb 1.6 oz (65.4 kg) SpO2 98% BMI 23.26 kg/m² Current Facility-Administered Medications Medication Dose Route Frequency  ipratropium (ATROVENT) 0.02 % nebulizer solution 0.5 mg  0.5 mg Nebulization Q6H PRN And  
 budesonide (PULMICORT) 250 mcg/2ml nebulizer susp  250 mcg Nebulization BID RT And  
 arformoteroL (BROVANA) neb solution 15 mcg  15 mcg Nebulization BID RT  
 calcium gluconate 2 g in 0.9% sodium chloride 100 mL IVPB  2 g IntraVENous ONCE  
 albuterol CONCENTRATE 2.5mg/0.5 mL neb soln  20 mg Nebulization NOW  insulin regular (NOVOLIN R, HUMULIN R) injection 10 Units  10 Units IntraVENous ONCE  
 dextrose (D50W) injection syrg 25 g  25 g IntraVENous ONCE  zinc oxide-cod liver oil (DESITIN) 40 % paste   Topical BID  albumin human 25% (BUMINATE) solution 25 g  25 g IntraVENous DIALYSIS PRN  
 amiodarone (CORDARONE) 375 mg/250 mL D5W infusion  1 mg/min IntraVENous CONTINUOUS Followed by  
Mark Filter amiodarone (CORDARONE) 375 mg/250 mL D5W infusion  0.5 mg/min IntraVENous CONTINUOUS  
 albuterol (PROVENTIL VENTOLIN) nebulizer solution 2.5 mg  2.5 mg Nebulization Q4H PRN  
 [START ON 11/25/2020] dilTIAZem ER (CARDIZEM CD) capsule 240 mg  240 mg Oral DAILY  sodium chloride (NS) flush 5-10 mL  5-10 mL IntraVENous PRN  
 dextrose (D50W) injection syrg 25 g  50 mL IntraVENous PRN  
 B complex-vitaminC-folic acid (NEPHROCAP) cap  1 Cap Oral DAILY  [START ON 11/30/2020] ergocalciferol capsule 50,000 Units  50,000 Units Oral every Monday  oxyCODONE-acetaminophen (PERCOCET 10)  mg per tablet 1 Tab  1 Tab Oral TID  sevelamer carbonate (RENVELA) tab 1,600 mg  1,600 mg Oral TID  benzonatate (TESSALON) capsule 100 mg  100 mg Oral TID PRN  thiamine mononitrate (B-1) tablet 100 mg  100 mg Oral DAILY  multivitamin, tx-iron-ca-min (THERA-M w/ IRON) tablet 1 Tab  1 Tab Oral DAILY  senna-docusate (PERICOLACE) 8.6-50 mg per tablet 1 Tab  1 Tab Oral DAILY PRN  
 nystatin (MYCOSTATIN) 100,000 unit/mL oral suspension 500,000 Units  500,000 Units Oral QID  nicotine (NICODERM CQ) 14 mg/24 hr patch 1 Patch  1 Patch TransDERmal Q24H  
 guaiFENesin-dextromethorphan (ROBITUSSIN DM) 100-10 mg/5 mL syrup 10 mL  10 mL Oral M6P PRN  
 folic acid (FOLVITE) tablet 1 mg  1 mg Oral DAILY  carvediloL (COREG) tablet 6.25 mg  6.25 mg Oral BID WITH MEALS  glucose chewable tablet 16 g  4 Tab Oral PRN  
 dextrose (D50W) injection syrg 12.5-25 g  25-50 mL IntraVENous PRN  
 glucagon (GLUCAGEN) injection 1 mg  1 mg IntraMUSCular PRN  
 sodium chloride (NS) flush 5-40 mL  5-40 mL IntraVENous Q8H  
 sodium chloride (NS) flush 5-40 mL  5-40 mL IntraVENous PRN  
 acetaminophen (TYLENOL) tablet 650 mg  650 mg Oral Q6H PRN Or  
 acetaminophen (TYLENOL) suppository 650 mg  650 mg Rectal Q6H PRN  polyethylene glycol (MIRALAX) packet 17 g  17 g Oral DAILY PRN  promethazine (PHENERGAN) tablet 12.5 mg  12.5 mg Oral Q6H PRN Or  
 ondansetron (ZOFRAN) injection 4 mg  4 mg IntraVENous Q6H PRN  
 heparin (porcine) injection 5,000 Units  5,000 Units SubCUTAneous Q8H  
 cefepime (MAXIPIME) 1 g in 0.9% sodium chloride (MBP/ADV) 50 mL MBP  1 g IntraVENous Q24H  
 acetylcysteine (MUCOMYST) 200 mg/mL (20 %) solution 200 mg  200 mg Nebulization TID RT  
 albuterol-ipratropium (DUO-NEB) 2.5 MG-0.5 MG/3 ML  3 mL Nebulization TID RT  
   
Objective:  
  
Physical Exam: 
General Appearance:   
Chest:   Clear Cardiovascular: irreg Extremities: no edema Skin:  Warm and dry.  
 
Data Review:  
Recent Labs  
  11/24/20 
0237 11/23/20 
1145 11/22/20 
0334 WBC 12.1* 18.3* 21.7* HGB 10.7* 9.0* 9.4* HCT 32.2* 27.6* 29.1*  
* 127* 128* Recent Labs  
  11/24/20 
0600 11/23/20 
1145 11/22/20 
0334 * 128* 131*  
K 7.4* 5.5* 5.6*  
CL 89* 91* 95* CO2 23 28 29 * 97 76 * 105* 85* CREA 6.62* 6.15* 5.06* CA 8.5 8.4* 8.4* ALB 2.8* 2.6*  --   
TBILI 0.6 0.6  --   
ALT 43 36  -- No results for input(s): TROIQ, CPK, CKMB in the last 72 hours. Intake/Output Summary (Last 24 hours) at 11/24/2020 1628 Last data filed at 11/24/2020 1430 Gross per 24 hour Intake 460 ml Output 775 ml Net -315 ml Telemetry:  
EKG: 
Cxray: 
 
Assessment:  
 
Active Problems: 
  AMS (altered mental status) (11/23/2020) Plan:  
 
Rate up during HD, now back down. Can try higher dilt, not sure BP will allow. If that fails only option is AV juwan ablation and pacer Deepthi Pastrana M.D., [facility]

## 2020-11-24 NOTE — PROCEDURES
[facility] Dialysis Team [place]  [phone] Vitals   Pre   Post   Assessment   Pre   Post    
Temp  Temp: 97.9 °F (36.6 °C) (11/24/20 1215)  97.7, oral LOC  A&Ox4, periodic confusion (patient has frequent tremors) A&Ox4, periodic confusion (patient has frequent tremors) HR   Pulse (Heart Rate): 82 (11/24/20 1215) 88 (tachy to 130s) Lungs   Coarse upper, wet; Dim bases Coarse upper; Dim bases B/P   BP: 127/69 (11/24/20 1215) 117/64 Cardiac   Tele, chronic A-Fib Tele, A-Fib tachy to 130-150s Resp   Resp Rate: 18 (11/24/20 1215) 18 Skin   Sacral stage 2; multiple skin tears; Abd wound, dressing CDI; R heel unstageable Sacral stage 2; multiple skin tears; Abd wound, dressing CDI; R heel unstageable Pain level  0, no complaint upon arrival 0 Edema  abd distention, trace BLE abd distention, trace BLE Orders: Duration:   Start:    1215 End:    1435 Total:   3.5hr  
Dialyzer:   Dialyzer/Set Up Inspection: Dorita Duque (11/24/20 1215) K Bath:   Dialysate K (mEq/L): 2 (11/24/20 1215) Ca Bath:   Dialysate CA (mEq/L): 2.5 (11/24/20 1215) Na/Bicarb:   Dialysate NA (mEq/L): (145-138) (11/24/20 1215) Target Fluid Removal:   Goal/Amount of Fluid to Remove (mL): 3000 mL (11/24/20 1215) Access Type & Location:   MANUEL AVF: skin CDI. No s/s of infection. + B/T. No issues with cannulation or hemostasis. Running well at - 450. Labs Obtained/Reviewed Critical Results Called   Date when labs were drawn- 
Hgb-   
HGB Date Value Ref Range Status 11/24/2020 10.7 (L) 12.1 - 17.0 g/dL Final  
 
K-   
Potassium Date Value Ref Range Status 11/24/2020 7.4 (HH) 3.5 - 5.1 mmol/L Final  
  Comment:  
  INVESTIGATED PER DELTA CHECK PROTOCOL 
CALLED TO AND READ BACK BY 
LISA ONIEL AT 1010 MKP Ca-  
Calcium Date Value Ref Range Status 11/24/2020 8.5 8.5 - 10.1 MG/DL Final  
 
Bun-  
BUN Date Value Ref Range Status 11/24/2020 109 (H) 6 - 20 MG/DL Final  
 
Creat-  
 Creatinine Date Value Ref Range Status 11/24/2020 6.62 (H) 0.70 - 1.30 MG/DL Final  
 
  
Medications/ Blood Products Given Name   Dose   Route and Time Albumin 25% 25G IV at 1335 Labetalol  20mg/4ml IV at 1416 Blood Volume Processed (BVP):    60.7L Net Fluid Removed:  775ml Comments Time Out Done: 1212 Primary Nurse Rpt Pre: Tony Jameson RN 
Primary Nurse Rpt Post: Tony Jameson RN and Genevieve Vásquez RN 
Pt Education: need for regular HD/ should follow medical advise (no AMA) Care Plan: ongoing Tx Summary: SBAR received from Primary RN. Pt arrived to HD suite, for STAT HD (K 7.4). Pt A&Ox4, periodic confusion and having frequent tremors. Consent signed & filed. 1215: Pt cannulated with 06P needles per policy & without issue. VSS; with A-Fib. Dialysis Tx initiated. 1315: BG 165mg/dL. BP now 90/66. HR increasing UF off. 
1330: Telemetry called to say patient was tachy 130-150s, not sustaining. Patient states he can fell heart racing some. Arcenio Dai MD. Give Albumin for hypotension; then give Labetalol if SBP >100. If this does not help, call rapid response. 1335: Albumin started IV for hypotension. HR still A-Fib intermittently tachy 130-150s. 1340: Tele called again to inform that patient has hit 150 again. Informed of interventions and contniue to monitor. 1345: /54. First bottle Albumin finished. Second started. No change in HR. BP has rebounded. 1400: /53. No change in HR. Ordered Labetalol per order from pharmacy. 1415: Labetalol 20mg IV given. Tele called again to say patient has hit 150s again. 1430: Called Rapid response for A-Fib RVR after no response to interventions. 32 61 16: Tx ended. VSS. All possible blood returned to patient. Hemostasis achieved without issue. Bed locked and in the lowest position, call bell and belongings in reach.  SBAR given to Primary, RN and rapid response team. Patient is stable at time of transfer with monitor, rapid RN, and Primary RN back to room. All Dialysis related medications have been reviewed. Admiting Diagnosis: Hypoglycemia/ Hypothermia Pt's previous clinic- [facility] Consent signed - Informed Consent Verified: Yes (11/24/20 1215) Kaity Consent - obtained, filed Hepatitis Status- Immune, 1/14/20 Neg Ag, 74 Ab Machine #- Machine Number: [id number] (11/24/20 1215) Telemetry status- tle,e A-Fib Pre-dialysis wt. -

## 2020-11-25 NOTE — PROGRESS NOTES
HD TRANSFER - OUT REPORT: 
 
Verbal report given to [facility] on Wendi Old. being transferred to [facility]  (Unit) for ordered procedure Report consisted of patient's Situation, Background, Assessment and  
Recommendations(SBAR). Information from the following report(s) Kardex was reviewed with the receiving nurse. Method:  $$ Method: Hemodialysis (11/25/20 1745) Fluid Removed  NET Fluid Removed (mL): 1100 ml (11/25/20 1745) Patient response to treatment:  Stable End Time  Hemodialysis End Time: 1710 (11/25/20 1745) If not documented, dialysis nurse to update post-dialysis row in HD/Filtration flowsheet Medications /Volume expansion agents or Fluid boluses administered during treatment? yes Post-dialysis medication administration due?  no 
Remind nurse to administer post-HD medication upon return to unit. Fistula hemostasis? yes Line heparinization? no 
 
Lines: secure/AVF Opportunity for questions and clarification was provided. Patient transported with: HD at bedside
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
## 2020-11-25 NOTE — PROCEDURES
[facility]  [phone] Vitals   Pre   Post   Assessment   Pre   Post    
Temp  Temp: 98.2 °F (36.8 °C) (11/25/20 1545)  98.5 oral LOC  A & O x 3 No change HR   Pulse (Heart Rate): 77 (11/25/20 1135) 88 Lungs   Rales upper/lower lobes  no change B/P   BP: (!) 86/62 (11/25/20 1605) 98/70 Cardiac   NSR  No change Resp   Resp Rate: 16 (11/25/20 1545) 14 Skin   Warm, dry intact  No change Pain level  Pain Intensity 1: 0 (11/25/20 0400)  Edema  Facial +2 Facial [id number] Orders: Duration:   Start:    1545 End:    1745 Total:   2.0 Dialyzer:   Dialyzer/Set Up Inspection: Janeth Macadamia (11/25/20 1545) K Bath:   Dialysate K (mEq/L): 2 (11/24/20 1215) Ca Bath:   Dialysate CA (mEq/L): 2.5 (11/24/20 1215) Na/Bicarb:   Dialysate NA (mEq/L): (145-138) (11/24/20 1215) Target Fluid Removal:   Goal/Amount of Fluid to Remove (mL): 2000 mL (11/25/20 1545) Access  AVF Type & Location:   Right upper arm AVF Labs Obtained/Reviewed Critical Results Called   Date when labs were drawn- 
Hgb-   
HGB Date Value Ref Range Status 11/25/2020 8.1 (L) 12.1 - 17.0 g/dL Final  
  Comment:  
  INVESTIGATED PER DELTA CHECK PROTOCOL  
 
K-   
Potassium Date Value Ref Range Status 11/25/2020 6.2 (H) 3.5 - 5.1 mmol/L Final  
 
Ca-  
Calcium Date Value Ref Range Status 11/25/2020 8.7 8.5 - 10.1 MG/DL Final  
 
Bun-  
BUN Date Value Ref Range Status 11/25/2020 77 (H) 6 - 20 MG/DL Final  
 
Creat-  
Creatinine Date Value Ref Range Status 11/25/2020 5.03 (H) 0.70 - 1.30 MG/DL Final  
 
  
Medications/ Blood Products Given Name   Dose   Route and Time Albumin  25.gm Iv Blood Volume Processed (BVP):    42.0 Net Fluid Removed:  1100 Comments RN reviewed LPN assessment and completed RN assessment. RN completed patient assessment. RN reviewed technicians vital signs and procedure note. Tx completed. Reviewed by THAD Retana Time Out Done: 1530 Primary Nurse Rpt Pre:THAD Berumen Primary Nurse Rpt Post:THAD Berumen Pt Education:fluid management Care Plan:continue HD Tx Summary:FILEMON AVF: skin CDI. No s/s of infection. + B/T. No issues with cannulation or hemostasis. Running well at  I arrived to pt's room A&Ox3. Consent signed & on file. SBAR received from Primary RN. At bedside over a liter of fluids noticed pt's tray side. 1545: Pt cannulated with 43B needles per policy & without issue. Labs drawn per request/ order. VSS. Dialysis Tx initiated. 1600: Pt resting quietly. Lines secure and visible. 1605: Pt. Hypotensive. Admin. Albumin 25G admin. Over 30min 1615: jose ramon hd well. Lines secure. No s/s of distress. 1630: Pt. Stable, lines secure and visible 1645: Pt. Stable, lines visible. 1700: Pt. Stable, lines secure 1715: Pt. Stable, lines secure 1730: Pt. Jose Ramon. Hd well. Pt. Stable. Ronold Kanner 1745: Tx ended. VSS. All possible blood returned to patient. Hemostasis achieved without issue. Bed locked and in the lowest position, call bell and belongings in reach. SBAR given to Primary, RN. Patient is stable at time of their/ my departure. All Dialysis related medications have been reviewed. Admiting Diagnosis:Hypoglycemia/ Hypothermia Pt's previous clinic-[facility] Renal  
Consent signed - Informed Consent Verified: Yes (11/25/20 1545) Teddyita Consent - yes Hepatitis Status- 1/14/20 Neg Ag, 74 Ab Machine #- Machine Number: [id number] (11/25/20 1545) Telemetry status- 
Pre-dialysis wt. -

## 2020-11-25 NOTE — PROGRESS NOTES
Hospitalist Progress Note NAME: Reyna Gaines. :  1965 MRN:  [id number] I reviewed pertinent labs and imaging, and discussed /agreed on the plan of care with Dr. Zuly Hernandez.  
 
Amos Puente / Plan: Metabolic Encephalopathy with AMS Hypoglycemia, Hypothermic POA - resolved Acute on Chronic Respiratory Failure with Hypoxia PNA - recently d/c from hospital last week with Pseudomonas PNA ESRD on Hemodialysis · Patient's confusion has resolved · Remains afebrile, WBC bump 17.2 · CXR  - Bilateral nonspecific atelectasis vs edema vs pneumonia. Right pleural effusion. No significant change. · Recent admission for PNA - did not fill prescriptions (for antibiotics) at discharge, wife stated pharmacy was not open to fill prescriptions at discharge . Monday morning patient became acutely confused and she called EMS. · Baseline 3 LPM - now tolerating · Appreciate Pulmonology input · Sputum sent for culture · Continue Cefepime - will need at least 14 days of antibiotics · Appreciate Nephrology input - patient for HD again today  
  
Hyperkalemia in setting of ESRD Missed HD yesterday · Received dose of kayexalate - K remains high · Did not tolerate HD well yesterday - went into Afib · For HD again today - Recheck BMP after HD 
  
Hyponatremia - improving · Na 132, follow BMP · Continue 1L fluid restriction  
  
Type II Diabetes with Hypoglycemia on admission · Follow glucose · Hold SSI with hypoglycemia  
  
Atrial Fibrillation with RVR · History of A fib with RVR while on HD · Responded to RRT call for afib while patient ending HD treatment - this has been an issue previously · Appreciate Cardiology input - increasing dose of diltiazem · Continue PO dilt, carvedilol · Recent ECHO  - EF 55-60%.  Normal cavity size, wall thickness and systolic function.  
  
Ventral Hernia Mesh with Bleeding - resolved 
  
Anemia of Chronic Disease 
  
 Moderate Protein Calorie Malnutrition Chronic Debility · Continue multivitamin, thiamine, folic acid, nephrocap · Consult PT/OT 
  
Hepatitis C infection hx Monitored at [facility] hepatology clinic Hx of PVD s/p thrombectomy Hx of Splenectomy and partial pancreatectomy Chronic constipation - pt takes mag citrate at home PRN History of ETOH abuse History of COPD/smoker 
  
18.5 - 24.9 Normal weight / Body mass index is 23.26 kg/m². 
  
Code status: Full Prophylaxis: Hep SQ Recommended Disposition: Home w/Family Subjective: Chief Complaint / Reason for Physician Visit \"I feel better\". Patient seen at bedside. Patient much more alert and able to speak today. Had no complaints. 1430 - Updated wife and son at bedside on plan of care. Discussed with RN events overnight. Review of Systems: 
Symptom Y/N Comments  Symptom Y/N Comments Fever/Chills n   Chest Pain n   
Poor Appetite n   Edema n   
Cough n   Abdominal Pain n   
Sputum n   Joint Pain n   
SOB/PRITCHETT n   Pruritis/Rash n   
Nausea/vomit n   Tolerating PT/OT Diarrhea n   Tolerating Diet y Constipation n   Other Could NOT obtain due to:   
 
Objective: VITALS:  
Last 24hrs VS reviewed since prior progress note. Most recent are: 
Patient Vitals for the past 24 hrs: 
 Temp Pulse Resp BP SpO2  
11/25/20 1545 98.2 °F (36.8 °C)  16 (!) 94/56   
11/25/20 1329     95 % 11/25/20 1135 97.9 °F (36.6 °C) 77 18 95/62 96 % 11/25/20 0835     95 % 11/25/20 0745 97.6 °F (36.4 °C) 70 18 107/64 97 % 11/25/20 0400 98.5 °F (36.9 °C) 68 16 (!) 92/51 97 % 11/24/20 2328 98.1 °F (36.7 °C) 77 18 (!) 112/57 93 % 11/24/20 1952 97.3 °F (36.3 °C) 78 20 115/67 99 % 11/24/20 1926     97 % No intake or output data in the 24 hours ending 11/25/20 1607 I had a face to face encounter and independently examined this patient on 11/25/2020, as outlined below: PHYSICAL EXAM: 
 General: WD, WN. Alert, cooperative, frail appearing  male EENT:  EOMI. Anicteric sclerae. MMM Resp:  LS coarse, no wheezing or rales. No accessory muscle use CV:  Regular  rhythm,  No edema GI:  Soft, obese, Non tender. +Bowel sounds Neurologic:  Alert and oriented X 3, normal speech, Psych:   Good insight. Not anxious nor agitated Skin:  No rashes. No jaundice Reviewed most current lab test results and cultures  YES Reviewed most current radiology test results   YES Review and summation of old records today    NO Reviewed patient's current orders and MAR    YES 
PMH/SH reviewed - no change compared to H&P 
________________________________________________________________________ Care Plan discussed with: 
  Comments Patient x Family  x   
RN x Care Manager x Consultant Multidiciplinary team rounds were held today with , nursing, pharmacist and clinical coordinator. Patient's plan of care was discussed; medications were reviewed and discharge planning was addressed. ________________________________________________________________________ Total NON critical care TIME:  25   Minutes Total CRITICAL CARE TIME Spent:   Minutes non procedure based Comments >50% of visit spent in counseling and coordination of care x   
________________________________________________________________________ Reilly Whitfield NP Procedures: see electronic medical records for all procedures/Xrays and details which were not copied into this note but were reviewed prior to creation of Plan. LABS: 
I reviewed today's most current labs and imaging studies. Pertinent labs include: 
Recent Labs  
  11/25/20 
0841 11/24/20 
0237 11/23/20 
1145 WBC 17.2* 12.1* 18.3* HGB 8.1* 10.7* 9.0*  
HCT 24.6* 32.2* 27.6*  
* 143* 127* Recent Labs  
  11/25/20 
1346 11/25/20 
0841 11/24/20 
1926 11/24/20 
0600 11/23/20 
1145 * 131* 132* 126* 128* K 6.2* 6.2* 5.9* 7.4* 5.5* CL 98 97 97 89* 91* CO2 26 26 26 23 28 GLU 84 170* 232* 213* 97 BUN 77* 76* 62* 109* 105* CREA 5.03* 4.90* 4.33* 6.62* 6.15* CA 8.7 8.3* 8.1* 8.5 8.4* ALB  --   --   --  2.8* 2.6* TBILI  --   --   --  0.6 0.6 ALT  --   --   --  43 36 Signed: Kaila Jeong, NP

## 2020-11-25 NOTE — PROGRESS NOTES
RAPID RESPONSE TEAM- Follow Up Rounded on patient due to recent rapid response for afib RVR during iHD. Patient currently on amio gtt. HR rate controlled a fib 70s-80s. Followed by cardiology. Spoke with primary RN. No acute concerns at this time. VSS. No RRT interventions indicated at this time. Please call back if needed. Shahram Cam RN 
RRT Manjinder Rosado Patient Vitals for the past 8 hrs: 
 Temp Pulse Resp BP SpO2  
11/24/20 2328 98.1 °F (36.7 °C) 77 18 (!) 112/57 93 % 11/24/20 1952 97.3 °F (36.3 °C) 78 20 115/67 99 % 11/24/20 1926     97 %
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
## 2020-11-25 NOTE — PROGRESS NOTES
[facility] [address], [address] [place] [address] Ne, [address] [facility]  [phone] Cardiology Progress Note 
 
 
11/25/2020 11:19 AM 
 
Admit Date: 11/23/2020 Admit Diagnosis:  
AMS (altered mental status) [R41.82] Subjective:  
 
Zuleika Gannon. No complaints Visit Vitals /64 Pulse 70 Temp 97.6 °F (36.4 °C) Resp 18 Ht 5' 6\" (1.676 m) Wt 144 lb 1.6 oz (65.4 kg) SpO2 97% BMI 23.26 kg/m² Current Facility-Administered Medications Medication Dose Route Frequency  ipratropium (ATROVENT) 0.02 % nebulizer solution 0.5 mg  0.5 mg Nebulization Q6H PRN And  
 budesonide (PULMICORT) 250 mcg/2ml nebulizer susp  250 mcg Nebulization BID RT And  
 arformoteroL (BROVANA) neb solution 15 mcg  15 mcg Nebulization BID RT  
 zinc oxide-cod liver oil (DESITIN) 40 % paste   Topical BID  albumin human 25% (BUMINATE) solution 25 g  25 g IntraVENous DIALYSIS PRN  
 amiodarone (CORDARONE) 375 mg/250 mL D5W infusion  0.5 mg/min IntraVENous CONTINUOUS  
 albuterol (PROVENTIL VENTOLIN) nebulizer solution 2.5 mg  2.5 mg Nebulization Q4H PRN  
 dilTIAZem ER (CARDIZEM CD) capsule 240 mg  240 mg Oral DAILY  sodium chloride (NS) flush 5-10 mL  5-10 mL IntraVENous PRN  
 dextrose (D50W) injection syrg 25 g  50 mL IntraVENous PRN  
 B complex-vitaminC-folic acid (NEPHROCAP) cap  1 Cap Oral DAILY  [START ON 11/30/2020] ergocalciferol capsule 50,000 Units  50,000 Units Oral every Monday  oxyCODONE-acetaminophen (PERCOCET 10)  mg per tablet 1 Tab  1 Tab Oral TID  sevelamer carbonate (RENVELA) tab 1,600 mg  1,600 mg Oral TID  benzonatate (TESSALON) capsule 100 mg  100 mg Oral TID PRN  thiamine mononitrate (B-1) tablet 100 mg  100 mg Oral DAILY  multivitamin, tx-iron-ca-min (THERA-M w/ IRON) tablet 1 Tab  1 Tab Oral DAILY  senna-docusate (PERICOLACE) 8.6-50 mg per tablet 1 Tab  1 Tab Oral DAILY PRN  
  nystatin (MYCOSTATIN) 100,000 unit/mL oral suspension 500,000 Units  500,000 Units Oral QID  nicotine (NICODERM CQ) 14 mg/24 hr patch 1 Patch  1 Patch TransDERmal Q24H  
 guaiFENesin-dextromethorphan (ROBITUSSIN DM) 100-10 mg/5 mL syrup 10 mL  10 mL Oral Z1P PRN  
 folic acid (FOLVITE) tablet 1 mg  1 mg Oral DAILY  carvediloL (COREG) tablet 6.25 mg  6.25 mg Oral BID WITH MEALS  glucose chewable tablet 16 g  4 Tab Oral PRN  
 dextrose (D50W) injection syrg 12.5-25 g  25-50 mL IntraVENous PRN  
 glucagon (GLUCAGEN) injection 1 mg  1 mg IntraMUSCular PRN  
 sodium chloride (NS) flush 5-40 mL  5-40 mL IntraVENous Q8H  
 sodium chloride (NS) flush 5-40 mL  5-40 mL IntraVENous PRN  
 acetaminophen (TYLENOL) tablet 650 mg  650 mg Oral Q6H PRN Or  
 acetaminophen (TYLENOL) suppository 650 mg  650 mg Rectal Q6H PRN  polyethylene glycol (MIRALAX) packet 17 g  17 g Oral DAILY PRN  promethazine (PHENERGAN) tablet 12.5 mg  12.5 mg Oral Q6H PRN Or  
 ondansetron (ZOFRAN) injection 4 mg  4 mg IntraVENous Q6H PRN  
 heparin (porcine) injection 5,000 Units  5,000 Units SubCUTAneous Q8H  
 cefepime (MAXIPIME) 1 g in 0.9% sodium chloride (MBP/ADV) 50 mL MBP  1 g IntraVENous Q24H  
 acetylcysteine (MUCOMYST) 200 mg/mL (20 %) solution 200 mg  200 mg Nebulization TID RT  
 albuterol-ipratropium (DUO-NEB) 2.5 MG-0.5 MG/3 ML  3 mL Nebulization TID RT  
   
Objective:  
  
Physical Exam: 
General Appearance:   
Chest:   Clear Cardiovascular: irreg Extremities: no edema Skin:  Warm and dry.  
 
Data Review:  
Recent Labs  
  11/25/20 
0841 11/24/20 
0237 11/23/20 
1145 WBC 17.2* 12.1* 18.3* HGB 8.1* 10.7* 9.0*  
HCT 24.6* 32.2* 27.6*  
* 143* 127* Recent Labs  
  11/25/20 
0841 11/24/20 
1926 11/24/20 
0600 11/23/20 
1145 * 132* 126* 128* K 6.2* 5.9* 7.4* 5.5* CL 97 97 89* 91* CO2 26 26 23 28 * 232* 213* 97 BUN 76* 62* 109* 105* CREA 4.90* 4.33* 6.62* 6.15* CA 8.3* 8.1* 8.5 8.4* ALB  --   --  2.8* 2.6* TBILI  --   --  0.6 0.6 ALT  --   --  43 36 No results for input(s): TROIQ, CPK, CKMB in the last 72 hours. Intake/Output Summary (Last 24 hours) at 11/25/2020 1119 Last data filed at 11/24/2020 1430 Gross per 24 hour Intake  Output 775 ml Net -775 ml Telemetry:  
EKG: 
Cxray: 
 
Assessment:  
 
Active Problems: 
  AMS (altered mental status) (11/23/2020) Plan:  
 
Trying higher dose of dilt. If he becomes hypotensive only other option would be AV juwan ablation and pacer.  
 
Soniya Paige M.D., [facility] [place]

## 2020-11-25 NOTE — PROGRESS NOTES
RAPID RESPONSE TEAM - follow up Rounded on patient due to recent RRT. Discussed with primary RN. No acute concerns, VSS, MEWS 2. Pt HR rate controlled ~80s; no afib RVR noted today. Scheduled for dialysis later today. No RRT interventions indicated at this time. Please call with any questions or concerns. Connor Lopez Rapid Response RN Angelina Riojas

## 2020-11-25 NOTE — PROGRESS NOTES
TRANSFER - IN REPORT: 
 
Verbal report received from [facility] on [place].  being received from [address] (unit) for ordered procedure Report consisted of patients Situation, Background, Assessment and  
Recommendations(SBAR). Information from the following report(s) Kardex was reviewed with the receiving nurse. Opportunity for questions and clarification was provided. Assessment completed upon patients arrival to unit and care assumed.

## 2020-11-25 NOTE — PROGRESS NOTES
Comprehensive Nutrition Assessment Type and Reason for Visit: Initial, Positive nutrition screen Nutrition Recommendations/Plan:  
· Continue Renal Diet which is low in Na+, K+, and Phos. RD added a CCD to diet to assist with BG management. · RD ordered Nepro shakes po BID with breakfast and dinner. · Please document % meals and supplements consumed in flowsheet I/O's under intake. Nutrition Assessment:    
11/25: Chart reviewed; med noted for AMS, acute resp failure sepsis, renal disease. Hx of alcohol abuse, HCV, COPD, DM. Pt recently left hospital AMA and readmitted within 24 hrs. Pt's K+ levels were significantly elevated on admission(7.4), currently 6.2. RD spoke with pt at bedside and encouraged pt to limit foods high in K+. Pt is on a renal diet which limits K+-rich foods while in the hospital. Phos lab value unavailable for review; RD ordered a check for tomorrow. Pt also has DM with elevated BG levels so Consistent Carb Diet added to assist with BG management. Pt receptive to try Nepro shakes which would assist with meeting nutritional needs and is also renal-friendly. Pt is on a standard daily MVI but I verified the K+ content and it is <1% &DV. No data found. Last Weight Metric Weight Loss Metrics 11/23/2020 11/21/2020 6/18/2020 1/14/2020 5/21/2019 5/17/2018 2/27/2018 Today's Wt 144 lb 1.6 oz 144 lb 1.6 oz 138 lb 139 lb 15.9 oz 140 lb 152 lb 149 lb BMI 23.26 kg/m2 23.26 kg/m2 22.27 kg/m2 23.3 kg/m2 22.6 kg/m2 24.53 kg/m2 23.34 kg/m2 Estimated Daily Nutrient Needs: 
Energy (kcal): 7875 (BMR 1424 x 1. 3AF); Weight Used for Energy Requirements: Current Protein (g): 78 (1.2 g/kg bw); Weight Used for Protein Requirements: Current Fluid (ml/day): per MD; Method Used for Fluid Requirements: 1 ml/kcal 
 
Nutrition Related Findings:  Labs: K+ 5.9 (>7 on admission), , Na+ 132; BM: 11/23; Meds: thiamine, folic acid, MVI, nephrocap Wounds:   
Stage II(right heel PI) Current Nutrition Therapies: DIET RENAL Regular; Consistent Carb 2000kcal 
DIET NUTRITIONAL SUPPLEMENTS Breakfast, Dinner; Nepro Anthropometric Measures: 
· Height:  5' 6\" (167.6 cm) · Current Body Wt:  65.4 kg (144 lb 2.9 oz) · Ideal Body Wt:  142 lbs:  101.5 % · BMI Category:  Normal weight (BMI 18.5-24. 9) Nutrition Diagnosis: · Altered nutrition-related lab values related to (current medical conditions (DM, renal disease)) as evidenced by (elevated BG, Creat, K+ levels) Nutrition Interventions:  
Food and/or Nutrient Delivery: Modify current diet, Start oral nutrition supplement Nutrition Education and Counseling: No recommendations at this time Coordination of Nutrition Care: No recommendation at this time Goals: 
Trend PO intake >50% of meals + consume 240 ml ONS while trend K+ and Phos WNL next 5-7 days Nutrition Monitoring and Evaluation:  
Food/Nutrient Intake Outcomes: Food and nutrient intake Physical Signs/Symptoms Outcomes: Biochemical data, Weight, Skin Discharge Planning:   
Continue oral nutrition supplement, Continue current diet Electronically signed by Edna Mckeon RD on 11/25/2020 at 9:41 AM

## 2020-11-25 NOTE — PROGRESS NOTES
Problem: Falls - Risk of 
Goal: *Absence of Falls Description: Document Shanna Krueger Fall Risk and appropriate interventions in the flowsheet. Outcome: Progressing Towards Goal 
Note: Fall Risk Interventions: 
Mobility Interventions: Bed/chair exit alarm, Patient to call before getting OOB Medication Interventions: Bed/chair exit alarm, Patient to call before getting OOB Elimination Interventions: Bed/chair exit alarm, Call light in reach Problem: Patient Education: Go to Patient Education Activity Goal: Patient/Family Education Outcome: Progressing Towards Goal 
  
Problem: Pressure Injury - Risk of 
Goal: *Prevention of pressure injury Description: Document Walter Scale and appropriate interventions in the flowsheet. Outcome: Progressing Towards Goal 
Note: Pressure Injury Interventions: 
Sensory Interventions: Avoid rigorous massage over bony prominences, Keep linens dry and wrinkle-free Moisture Interventions: Absorbent underpads, Check for incontinence Q2 hours and as needed, Maintain skin hydration (lotion/cream) Activity Interventions: Assess need for specialty bed Mobility Interventions: Assess need for specialty bed, Turn and reposition approx. every two hours(pillow and wedges) Nutrition Interventions: Offer support with meals,snacks and hydration Friction and Shear Interventions: Apply protective barrier, creams and emollients, Lift sheet, Minimize layers, Transferring/repositioning devices Problem: Chronic Obstructive Pulmonary Disease (COPD) Goal: *Absence of hypoxia Outcome: Progressing Towards Goal 
  
Problem: Breathing Pattern - Ineffective Goal: *Absence of hypoxia Outcome: Progressing Towards Goal

## 2020-11-25 NOTE — PROGRESS NOTES
Reason for Admission:   Hypoglycemia RUR Score:  26 Moderate risk for readmission PCP:    First and Last name:  Lindon Hammans, NP Name of Practice:  
            Are you a current patient: Yes/No: Yes Approximate date of last visit: October 2020 Can you participate in a virtual visit if needed: No 
 Is a Care Conference indicated:  IDR will discuss Did you attend your follow up appointment (s): If not, why not: No, pt left AMA and readmitted back in next day Resources/supports as identified by patient/family:  
 
Spouse Diann Mercado, who is his care giver Top Challenges facing patient (as identified by patient/family and CM): Finances/Medication cost?    Not an issue Transportation      Pt doesn't drive, spouse transport pt for his appointments. Support system or lack thereof? No  
Living arrangements? Pt resides with his wife in an one level home with 3 TINY. Self-care/ADLs/Cognition? Dependent with ADLs and IADLs. Current Advanced Directive/Advance Care Plan: Pt is FULL code status. Pt does not have an ACP on file. Plan for utilizing home health:   Yes. [facility] was following pt, may need to send  a resumption order  befor d/c. Transition of Care Plan:    Based on readmission, the patient's previous Plan of Care Home with [facility] 
 has been evaluated and/or modified. The current Transition of Care Plan is:  Home with HH, OP HD Care Management Interventions PCP Verified by CM: Yes Mode of Transport at Discharge: Self(Spouse will transport pt back to home) Transition of Care Consult (CM Consult): Home Health(Pt had HH in that past.) [address]: No 
Reason Outside [place]: Patient already serviced by other home care/hospice agency Discharge Durable Medical Equipment: No(Pt owns home oxygen, RW, cane and rollator.) Current Support Network: Lives with Spouse(Pt resides with his wife in an one level home with 3 TINY.)) Confirm Follow Up Transport: Family Discharge Location Discharge Placement: Home with home health Readmission Assessment Number of days since last admission?: 1-7 days Previous disposition: Home with Home Health Who is being interviewed?: Patient What was the patient's/caregiver's perception as to why they think they needed to return back to the hospital?: Did not realize care needs would be so extensive, AMA discharge on prior admission, Did not agree to original recommended D/C plan Did you visit your Primary Care Physician after you left the hospital, before you returned this time?: No 
Why weren't you able to visit your PCP?: Did not have an appointment, Other (Comment)(Left AMA) Who advised the patient to return to the hospital?: Self-referral 
Does the patient report anything that got in the way of taking their medications?: No 
In our efforts to provide the best possible care to you and others like you, can you think of anything that we could have done to help you after you left the hospital the first time, so that you might not have needed to return so soon?: Arrange for more help when leaving the hospital, Additional Community resources available for illness support CM spoke to pt and completed readmission assessment. Pt uses [facility] pharmacy in [place] for Rx. Pt resides with his wife in an one level home with 3 TINY. Pt needs assistance with ADL's and IADL's. Pt does not drive. Pt's wife transports when necessary. Pt has home oxygen, cane and rollator. Pt is on 2lnc of home oxygen with [facility]. Pt has accepted for [facility] in his  last admission, need a HH resumption order. No SNF or IPR in the past. Pt is FULL code status. Pt does not have an ACP on file. Pt's wife Raj Pedersen will transport at d/c. Patient has ESRD, goes to the [address] [place] [address] in [place] on M-W-F at 10 AM. Gretel Ann MSW 
ED Case Manager Ext [phone]

## 2020-11-25 NOTE — PROGRESS NOTES
Spiritual Care Assessment/Progress Note [address] 
 
 
NAME: Bishop Gilford. MRN: [id number] AGE: 60 y.o. SEX: male Caodaism Affiliation: Druze  
Language: English  
 
11/25/2020     Total Time (in minutes): 10 Spiritual Assessment begun in [facility]OSCIENCE TELEMETRY through conversation with: 
  
    []Patient        [] Family    [] Friend(s) Reason for Consult: Palliative Care, Initial/Spiritual Assessment Spiritual beliefs: (Please include comment if needed) 
   [] Identifies with a aki tradition:     
   [] Supported by a aki community:        
   [] Claims no spiritual orientation:       
   [] Seeking spiritual identity:            
   [] Adheres to an individual form of spirituality:       
   [x] Not able to assess:                   
 
    
Identified resources for coping:  
   [] Prayer                           
   [] Music                  [] Guided Imagery 
   [] Family/friends                 [] Pet visits [] Devotional reading                         [x] Unknown 
   [] Other:                                         
 
 
Interventions offered during this visit: (See comments for more details) Patient Interventions: Initial visit Plan of Care: 
 
 [] Support spiritual and/or cultural needs  
 [] Support AMD and/or advance care planning process    
 [] Support grieving process 
 [] Coordinate Rites and/or Rituals  
 [] Coordination with community clergy [] No spiritual needs identified at this time 
 [] Detailed Plan of Care below (See Comments)  [] Make referral to Music Therapy 
[] Make referral to Pet Therapy    
[] Make referral to Addiction services 
[] Make referral to [facility] 
[] Make referral to Spiritual Care Partner 
[] No future visits requested       
[x] Follow up visits as needed Attempted to visit pt without success. Pt sleeping and did not alert.  No family present. Advised nurse to contact [facility] for any further referrals. Chaplain Fritz MDiv, MS, [facility] 
[phone]

## 2020-11-25 NOTE — PROGRESS NOTES
0134-Spoke with Dr. Kevin Ochoa, made him aware of BG of 316. MD would like to hold off on insulin orders for now and reassess at 0600. Will continue to monitor patient. 8870- spoke with Dr. Maria Fernanda Armendariz, he was made aware of patient's BG of 245. He would like to continue to monitor at this time and hold off on insulin orders.

## 2020-11-26 NOTE — PROGRESS NOTES
RAPID RESPONSE TEAM - follow up Rounded on patient due to recent RRT. Discussed with primary RN. No acute concerns, VSS, MEWS 1. No RRT interventions indicated at this time. Please call with any questions or concerns. Jorje Xie Rapid Response RN Shanelle Amen
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
## 2020-11-26 NOTE — PROGRESS NOTES
Hospitalist Progress Note NAME: Cindy Ellis. :  1965 MRN:  [id number] I reviewed pertinent labs and imaging, and discussed /agreed on the plan of care with Dr. Deysi Montgomery. 
 
 
Ha Calabrese / Plan: Metabolic Encephalopathy with AMS Hypoglycemia, Hypothermic POA - resolved Acute on Chronic Respiratory Failure with Hypoxia PNA - recently d/c from hospital last week with Pseudomonas PNA ESRD on Hemodialysis Leukocytosis · Patient's confusion has resolved · Remains afebrile, WBC bump 17.2 · CXR  - Bilateral nonspecific atelectasis vs edema vs pneumonia. Right pleural effusion. No significant change. · Recent admission for PNA - did not fill prescriptions (for antibiotics) at discharge, wife stated pharmacy was not open to fill prescriptions at discharge . Monday morning patient became acutely confused and she called EMS. · Baseline 3 LPM - now tolerating · Appreciate Pulmonology input · Follow Sputum Culture - HEAVY GRAM NEGATIVE RODS, FEW GRAM POSITIVE COCCI IN PAIR - preliminary    
· Continue Cefepime - will need at least 14 days of antibiotics pending cultures and sensitivities · May need to consult ID if felt patient needs dual antibiotic management, may need to continue IV with HD 
· WBC improving, WBC 15.1, remains afebrile · Appreciate Nephrology input  
  
Hyperkalemia in setting of ESRD - resolved Missed HD yesterday · Received dose of kayexalate  · Did not tolerate HD well  - went into Afib, now rate controlled · HD per nephrology  
  
Hyponatremia - improving · Na 132, follow BMP · Continue 1L fluid restriction  
  
Type II Diabetes with Hypoglycemia on admission · Follow glucose · Hold SSI with hypoglycemia  
  
Atrial Fibrillation with RVR -  Now rate controlled · History of A fib with RVR while on HD · Responded to RRT  for afib while patient ending HD treatment · Appreciate Cardiology input - increasing dose of diltiazem · Continue PO dilt, carvedilol · Recent ECHO 11/7 - EF 55-60%. Normal cavity size, wall thickness and systolic function.  
  
Ventral Hernia Mesh with Bleeding - resolved 
  
Anemia of Chronic Disease · Stable · Follow CBC  
  
Moderate Protein Calorie Malnutrition Chronic Debility · Continue multivitamin, thiamine, folic acid, nephrocap · Consult PT/OT 
  
Hepatitis C infection hx Monitored at [facility] hepatology clinic Hx of PVD s/p thrombectomy Hx of Splenectomy and partial pancreatectomy Chronic constipation - pt takes mag citrate at home PRN History of ETOH abuse   
History of COPD/smoker 
  
18.5 - 24.9 Normal weight / Body mass index is 23.26 kg/m². 
  
Code status: Full Prophylaxis: Hep SQ Recommended Disposition: Home w/Family Subjective: Chief Complaint / Reason for Physician Visit \"I feel better\". Patient denies any concerns at this time. Discussed plan of care, did request NP to call wife. 1140 : Updated wife Rachel Severino on plan of care via phone, verbalized understanding and had now questions at this time. Discussed with RN events overnight. Review of Systems: 
Symptom Y/N Comments  Symptom Y/N Comments Fever/Chills n   Chest Pain n   
Poor Appetite n   Edema n   
Cough y   Abdominal Pain n   
Sputum y   Joint Pain n   
SOB/PRITCHETT y   Pruritis/Rash n   
Nausea/vomit n   Tolerating PT/OT n Diarrhea n   Tolerating Diet n   
Constipation n   Other n   
 
Could NOT obtain due to:   
 
Objective: VITALS:  
Last 24hrs VS reviewed since prior progress note. Most recent are: 
Patient Vitals for the past 24 hrs: 
 Temp Pulse Resp BP SpO2  
11/26/20 0902 98 °F (36.7 °C) 87 16 107/63 96 % 11/26/20 0400 98.4 °F (36.9 °C) 92 16 105/60 93 % 11/25/20 2327 97.9 °F (36.6 °C) 88 18 99/64 93 % 11/25/20 2037     97 % 11/25/20 2032     96 % 11/25/20 2002 98.4 °F (36.9 °C) 74 16 (!) 97/51 95 % 11/25/20 1745  88 16 98/70   
11/25/20 1730  84 18 103/65   
11/25/20 1715  72 16 92/62   
11/25/20 1700  71 16 101/66   
11/25/20 1645  70 14 97/60   
11/25/20 1630  81 16 95/64   
11/25/20 1615  76 16 95/60   
11/25/20 1607  77     
11/25/20 1605    (!) 86/62   
11/25/20 1545 98.2 °F (36.8 °C)  16 (!) 94/56   
11/25/20 1329     95 % 11/25/20 1135 97.9 °F (36.6 °C) 77 18 95/62 96 % Intake/Output Summary (Last 24 hours) at 11/26/2020 1127 Last data filed at 11/25/2020 1745 Gross per 24 hour Intake  Output 1100 ml Net -1100 ml I had a face to face encounter and independently examined this patient on 11/26/2020, as outlined below: PHYSICAL EXAM: 
General: WD, WN. Alert, cooperative, no acute distress EENT:  EOMI. Anicteric sclerae. MMM Resp:  LS ronchi  No accessory muscle use CV:  Regular  rhythm,  No edema GI:  Soft, Non distended, Non tender. +Bowel sounds Neurologic:  Alert and oriented X 3, normal speech, Psych:   Good insight. Not anxious nor agitated Skin:  No rashes. No jaundice Reviewed most current lab test results and cultures  YES Reviewed most current radiology test results   YES Review and summation of old records today    NO Reviewed patient's current orders and MAR    YES 
PMH/ reviewed - no change compared to H&P 
________________________________________________________________________ Care Plan discussed with: 
  Comments Patient x Family  x   
RN x Care Manager Consultant Multidiciplinary team rounds were held today with , nursing, pharmacist and clinical coordinator. Patient's plan of care was discussed; medications were reviewed and discharge planning was addressed. ________________________________________________________________________ Total NON critical care TIME:  25   Minutes Total CRITICAL CARE TIME Spent:   Minutes non procedure based Comments >50% of visit spent in counseling and coordination of care x   
________________________________________________________________________ Padmini Paula NP Procedures: see electronic medical records for all procedures/Xrays and details which were not copied into this note but were reviewed prior to creation of Plan. LABS: 
I reviewed today's most current labs and imaging studies. Pertinent labs include: 
Recent Labs  
  11/26/20 
0250 11/25/20 
0841 11/24/20 
0237 WBC 15.1* 17.2* 12.1* HGB 8.4* 8.1* 10.7* HCT 25.8* 24.6* 32.2*  
PLT 98* 102* 143* Recent Labs  
  11/26/20 
0250 11/25/20 
1857 11/25/20 
1346 11/25/20 
0841  11/24/20 
0600 11/23/20 
1145 NA  --  133* 132* 131*   < > 126* 128* K  --  4.8 6.2* 6.2*   < > 7.4* 5.5*  
CL  --  100 98 97   < > 89* 91* CO2  --  27 26 26   < > 23 28 GLU  --  156* 84 170*   < > 213* 97 BUN  --  45* 77* 76*   < > 109* 105* CREA  --  3.40* 5.03* 4.90*   < > 6.62* 6.15* CA  --  8.1* 8.7 8.3*   < > 8.5 8.4* PHOS 4.6  --   --   --   --   --   --   
ALB  --   --   --   --   --  2.8* 2.6* TBILI  --   --   --   --   --  0.6 0.6 ALT  --   --   --   --   --  43 36  
 < > = values in this interval not displayed.   
 
 
Signed: Padmini Paula NP
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
## 2020-11-26 NOTE — PROGRESS NOTES
11/26/2020 11:36 AM 
 
Admit Date: 11/23/2020 Admit Diagnosis: AMS (altered mental status) [R41.82] Subjective: No cardiac events. Visit Vitals /63 (BP 1 Location: Left arm, BP Patient Position: At rest) Pulse 87 Temp 98 °F (36.7 °C) Resp 16 Ht 5' 6\" (1.676 m) Wt 150 lb 12.8 oz (68.4 kg) SpO2 96% BMI 24.34 kg/m² Current Facility-Administered Medications Medication Dose Route Frequency  guaiFENesin ER (MUCINEX) tablet 600 mg  600 mg Oral Q12H  ipratropium (ATROVENT) 0.02 % nebulizer solution 0.5 mg  0.5 mg Nebulization Q6H PRN And  
 budesonide (PULMICORT) 250 mcg/2ml nebulizer susp  250 mcg Nebulization BID RT And  
 arformoteroL (BROVANA) neb solution 15 mcg  15 mcg Nebulization BID RT  
 zinc oxide-cod liver oil (DESITIN) 40 % paste   Topical BID  albumin human 25% (BUMINATE) solution 25 g  25 g IntraVENous DIALYSIS PRN  
 albuterol (PROVENTIL VENTOLIN) nebulizer solution 2.5 mg  2.5 mg Nebulization Q4H PRN  
 dilTIAZem ER (CARDIZEM CD) capsule 240 mg  240 mg Oral DAILY  sodium chloride (NS) flush 5-10 mL  5-10 mL IntraVENous PRN  
 dextrose (D50W) injection syrg 25 g  50 mL IntraVENous PRN  
 B complex-vitaminC-folic acid (NEPHROCAP) cap  1 Cap Oral DAILY  [START ON 11/30/2020] ergocalciferol capsule 50,000 Units  50,000 Units Oral every Monday  oxyCODONE-acetaminophen (PERCOCET 10)  mg per tablet 1 Tab  1 Tab Oral TID  sevelamer carbonate (RENVELA) tab 1,600 mg  1,600 mg Oral TID  benzonatate (TESSALON) capsule 100 mg  100 mg Oral TID PRN  thiamine mononitrate (B-1) tablet 100 mg  100 mg Oral DAILY  multivitamin, tx-iron-ca-min (THERA-M w/ IRON) tablet 1 Tab  1 Tab Oral DAILY  senna-docusate (PERICOLACE) 8.6-50 mg per tablet 1 Tab  1 Tab Oral DAILY PRN  
 nystatin (MYCOSTATIN) 100,000 unit/mL oral suspension 500,000 Units  500,000 Units Oral QID  nicotine (NICODERM CQ) 14 mg/24 hr patch 1 Patch  1 Patch TransDERmal Q24H  
 guaiFENesin-dextromethorphan (ROBITUSSIN DM) 100-10 mg/5 mL syrup 10 mL  10 mL Oral F4R PRN  
 folic acid (FOLVITE) tablet 1 mg  1 mg Oral DAILY  carvediloL (COREG) tablet 6.25 mg  6.25 mg Oral BID WITH MEALS  glucose chewable tablet 16 g  4 Tab Oral PRN  
 dextrose (D50W) injection syrg 12.5-25 g  25-50 mL IntraVENous PRN  
 glucagon (GLUCAGEN) injection 1 mg  1 mg IntraMUSCular PRN  
 sodium chloride (NS) flush 5-40 mL  5-40 mL IntraVENous Q8H  
 sodium chloride (NS) flush 5-40 mL  5-40 mL IntraVENous PRN  
 acetaminophen (TYLENOL) tablet 650 mg  650 mg Oral Q6H PRN Or  
 acetaminophen (TYLENOL) suppository 650 mg  650 mg Rectal Q6H PRN  polyethylene glycol (MIRALAX) packet 17 g  17 g Oral DAILY PRN  promethazine (PHENERGAN) tablet 12.5 mg  12.5 mg Oral Q6H PRN Or  
 ondansetron (ZOFRAN) injection 4 mg  4 mg IntraVENous Q6H PRN  
 heparin (porcine) injection 5,000 Units  5,000 Units SubCUTAneous Q8H  
 cefepime (MAXIPIME) 1 g in 0.9% sodium chloride (MBP/ADV) 50 mL MBP  1 g IntraVENous Q24H  
 acetylcysteine (MUCOMYST) 200 mg/mL (20 %) solution 200 mg  200 mg Nebulization TID RT  
 albuterol-ipratropium (DUO-NEB) 2.5 MG-0.5 MG/3 ML  3 mL Nebulization TID RT  
   
 
Objective:  
  
Visit Vitals /63 (BP 1 Location: Left arm, BP Patient Position: At rest) Pulse 87 Temp 98 °F (36.7 °C) Resp 16 Ht 5' 6\" (1.676 m) Wt 150 lb 12.8 oz (68.4 kg) SpO2 96% BMI 24.34 kg/m² Physical Exam: 
Resting comfortably. No resp distress. Extremities: extremities normal, atraumatic, no cyanosis or edema Heart: Irregular rate and rhythm, S1, S2 normal, no murmur, click, rub or gallop Neurologic: Grossly normal 
 
Data Review:  
Labs:   
Recent Results (from the past 24 hour(s)) GLUCOSE, POC Collection Time: 11/25/20 11:49 AM  
Result Value Ref Range Glucose (POC) 115 (H) 65 - 100 mg/dL Performed by Aggie Torres, BASIC Collection Time: 11/25/20  1:46 PM  
Result Value Ref Range Sodium 132 (L) 136 - 145 mmol/L Potassium 6.2 (H) 3.5 - 5.1 mmol/L Chloride 98 97 - 108 mmol/L  
 CO2 26 21 - 32 mmol/L Anion gap 8 5 - 15 mmol/L Glucose 84 65 - 100 mg/dL BUN 77 (H) 6 - 20 MG/DL Creatinine 5.03 (H) 0.70 - 1.30 MG/DL  
 BUN/Creatinine ratio 15 12 - 20 GFR est AA 14 (L) >60 ml/min/1.73m2 GFR est non-AA 12 (L) >60 ml/min/1.73m2 Calcium 8.7 8.5 - 90.2 MG/DL  
METABOLIC PANEL, BASIC Collection Time: 11/25/20  6:57 PM  
Result Value Ref Range Sodium 133 (L) 136 - 145 mmol/L Potassium 4.8 3.5 - 5.1 mmol/L Chloride 100 97 - 108 mmol/L  
 CO2 27 21 - 32 mmol/L Anion gap 6 5 - 15 mmol/L Glucose 156 (H) 65 - 100 mg/dL BUN 45 (H) 6 - 20 MG/DL Creatinine 3.40 (H) 0.70 - 1.30 MG/DL  
 BUN/Creatinine ratio 13 12 - 20 GFR est AA 23 (L) >60 ml/min/1.73m2 GFR est non-AA 19 (L) >60 ml/min/1.73m2 Calcium 8.1 (L) 8.5 - 10.1 MG/DL  
GLUCOSE, POC Collection Time: 11/26/20 12:04 AM  
Result Value Ref Range Glucose (POC) 139 (H) 65 - 100 mg/dL Performed by Wayne Garzon PCT   
CBC WITH AUTOMATED DIFF Collection Time: 11/26/20  2:50 AM  
Result Value Ref Range WBC 15.1 (H) 4.1 - 11.1 K/uL  
 RBC 2.41 (L) 4.10 - 5.70 M/uL HGB 8.4 (L) 12.1 - 17.0 g/dL HCT 25.8 (L) 36.6 - 50.3 % .1 (H) 80.0 - 99.0 FL  
 MCH 34.9 (H) 26.0 - 34.0 PG  
 MCHC 32.6 30.0 - 36.5 g/dL  
 RDW 19.8 (H) 11.5 - 14.5 % PLATELET 98 (L) 935 - 400 K/uL MPV 12.1 8.9 - 12.9 FL  
 NRBC 0.6 (H) 0  WBC ABSOLUTE NRBC 0.09 (H) 0.00 - 0.01 K/uL NEUTROPHILS 89 (H) 32 - 75 % LYMPHOCYTES 4 (L) 12 - 49 % MONOCYTES 5 5 - 13 % EOSINOPHILS 1 0 - 7 % BASOPHILS 0 0 - 1 % IMMATURE GRANULOCYTES 2 (H) 0.0 - 0.5 % ABS. NEUTROPHILS 13.5 (H) 1.8 - 8.0 K/UL  
 ABS. LYMPHOCYTES 0.6 (L) 0.8 - 3.5 K/UL ABS. MONOCYTES 0.7 0.0 - 1.0 K/UL  
 ABS. EOSINOPHILS 0.1 0.0 - 0.4 K/UL  
 ABS. BASOPHILS 0.0 0.0 - 0.1 K/UL  
 ABS. IMM. GRANS. 0.2 (H) 0.00 - 0.04 K/UL  
 DF AUTOMATED PHOSPHORUS Collection Time: 11/26/20  2:50 AM  
Result Value Ref Range Phosphorus 4.6 2.6 - 4.7 MG/DL  
GLUCOSE, POC Collection Time: 11/26/20  6:10 AM  
Result Value Ref Range Glucose (POC) 117 (H) 65 - 100 mg/dL Performed by Caryl Regan PCT Telemetry: a fib. Assessment:  
 
Active Problems: 
  AMS (altered mental status) (11/23/2020) Plan:  
 
Rate controlled. Continue current meds.

## 2020-11-26 NOTE — PROGRESS NOTES
.Bedside and Verbal shift change report given to Susan Moore RN (oncoming nurse) by Jose Enrique Dumas (offgoing nurse). Report given with SBAR, Kardex, Intake/Output, MAR and Recent Results.

## 2020-11-26 NOTE — PROGRESS NOTES
RAPID RESPONSE TEAM- Follow Up Rounded on patient due to recent rapid response for Afib RVR. Discussed with primary RNLuiz. No acute concerns, VSS, MEWS 2. Patient alert, in bed, receiving a breathing treatment, NAD. Remains in A fib, rate controlled 80-90s. Patient Vitals for the past 12 hrs: 
 Temp Pulse Resp BP SpO2  
11/25/20 2037     97 % 11/25/20 2032     96 % 11/25/20 2002 98.4 °F (36.9 °C) 74 16 (!) 97/51 95 % 11/25/20 1745  88 16 98/70   
11/25/20 1730  84 18 103/65   
11/25/20 1715  72 16 92/62   
11/25/20 1700  71 16 101/66   
11/25/20 1645  70 14 97/60   
11/25/20 1630  81 16 95/64   
11/25/20 1615  76 16 95/60   
11/25/20 1607  77     
11/25/20 1605    (!) 86/62   
11/25/20 1545 98.2 °F (36.8 °C)  16 (!) 94/56   
11/25/20 1329     95 % 11/25/20 1135 97.9 °F (36.6 °C) 77 18 95/62 96 % No RRT interventions indicated at this time. Please call with any questions or concerns. [facility]
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
## 2020-11-27 NOTE — PROGRESS NOTES
11/27/2020 11:36 AM 
 
Admit Date: 11/23/2020 Admit Diagnosis: AMS (altered mental status) [R41.82] Subjective:  
 
Mr. Wayne Mak reports feeling better. He denies any cardiac symptoms, no chest pain, palpitations, or increased SOB. VSS stable, HR controlled. Labs steady. Visit Vitals /73 (BP 1 Location: Left arm, BP Patient Position: At rest) Pulse (!) 117 Temp 98.2 °F (36.8 °C) Resp 20 Ht 5' 6\" (1.676 m) Wt 70.1 kg (154 lb 8.7 oz) SpO2 99% BMI 24.94 kg/m² Current Facility-Administered Medications Medication Dose Route Frequency  predniSONE (DELTASONE) tablet 40 mg  40 mg Oral DAILY WITH BREAKFAST  guaiFENesin ER (MUCINEX) tablet 600 mg  600 mg Oral Q12H  
 epoetin ginger-epbx (RETACRIT) 12,000 Units combo injection  12,000 Units SubCUTAneous Q MON, WED & FRI  
 ipratropium (ATROVENT) 0.02 % nebulizer solution 0.5 mg  0.5 mg Nebulization Q6H PRN And  
 budesonide (PULMICORT) 250 mcg/2ml nebulizer susp  250 mcg Nebulization BID RT And  
 arformoteroL (BROVANA) neb solution 15 mcg  15 mcg Nebulization BID RT  
 zinc oxide-cod liver oil (DESITIN) 40 % paste   Topical BID  albumin human 25% (BUMINATE) solution 25 g  25 g IntraVENous DIALYSIS PRN  
 albuterol (PROVENTIL VENTOLIN) nebulizer solution 2.5 mg  2.5 mg Nebulization Q4H PRN  
 dilTIAZem ER (CARDIZEM CD) capsule 240 mg  240 mg Oral DAILY  sodium chloride (NS) flush 5-10 mL  5-10 mL IntraVENous PRN  
 dextrose (D50W) injection syrg 25 g  50 mL IntraVENous PRN  
 B complex-vitaminC-folic acid (NEPHROCAP) cap  1 Cap Oral DAILY  [START ON 11/30/2020] ergocalciferol capsule 50,000 Units  50,000 Units Oral every Monday  oxyCODONE-acetaminophen (PERCOCET 10)  mg per tablet 1 Tab  1 Tab Oral TID  sevelamer carbonate (RENVELA) tab 1,600 mg  1,600 mg Oral TID  benzonatate (TESSALON) capsule 100 mg  100 mg Oral TID PRN  
  thiamine mononitrate (B-1) tablet 100 mg  100 mg Oral DAILY  multivitamin, tx-iron-ca-min (THERA-M w/ IRON) tablet 1 Tab  1 Tab Oral DAILY  senna-docusate (PERICOLACE) 8.6-50 mg per tablet 1 Tab  1 Tab Oral DAILY PRN  
 nystatin (MYCOSTATIN) 100,000 unit/mL oral suspension 500,000 Units  500,000 Units Oral QID  nicotine (NICODERM CQ) 14 mg/24 hr patch 1 Patch  1 Patch TransDERmal Q24H  
 guaiFENesin-dextromethorphan (ROBITUSSIN DM) 100-10 mg/5 mL syrup 10 mL  10 mL Oral A1V PRN  
 folic acid (FOLVITE) tablet 1 mg  1 mg Oral DAILY  carvediloL (COREG) tablet 6.25 mg  6.25 mg Oral BID WITH MEALS  glucose chewable tablet 16 g  4 Tab Oral PRN  
 dextrose (D50W) injection syrg 12.5-25 g  25-50 mL IntraVENous PRN  
 glucagon (GLUCAGEN) injection 1 mg  1 mg IntraMUSCular PRN  
 sodium chloride (NS) flush 5-40 mL  5-40 mL IntraVENous Q8H  
 sodium chloride (NS) flush 5-40 mL  5-40 mL IntraVENous PRN  
 acetaminophen (TYLENOL) tablet 650 mg  650 mg Oral Q6H PRN Or  
 acetaminophen (TYLENOL) suppository 650 mg  650 mg Rectal Q6H PRN  polyethylene glycol (MIRALAX) packet 17 g  17 g Oral DAILY PRN  promethazine (PHENERGAN) tablet 12.5 mg  12.5 mg Oral Q6H PRN Or  
 ondansetron (ZOFRAN) injection 4 mg  4 mg IntraVENous Q6H PRN  
 heparin (porcine) injection 5,000 Units  5,000 Units SubCUTAneous Q8H  
 cefepime (MAXIPIME) 1 g in 0.9% sodium chloride (MBP/ADV) 50 mL MBP  1 g IntraVENous Q24H  
 acetylcysteine (MUCOMYST) 200 mg/mL (20 %) solution 200 mg  200 mg Nebulization TID RT  
 albuterol-ipratropium (DUO-NEB) 2.5 MG-0.5 MG/3 ML  3 mL Nebulization TID RT  
   
 
Objective:  
  
Visit Vitals /73 (BP 1 Location: Left arm, BP Patient Position: At rest) Pulse (!) 117 Temp 98.2 °F (36.8 °C) Resp 20 Ht 5' 6\" (1.676 m) Wt 70.1 kg (154 lb 8.7 oz) SpO2 99% BMI 24.94 kg/m² Physical Exam: 
Resting comfortably. No resp distress. Extremities: extremities normal, atraumatic, no cyanosis or edema Heart: Irregular rate and rhythm, S1, S2 normal, no murmur, click, rub or gallop Neurologic: Grossly normal 
 
Data Review:  
Labs:   
Recent Results (from the past 24 hour(s)) GLUCOSE, POC Collection Time: 11/26/20 12:55 PM  
Result Value Ref Range Glucose (POC) 142 (H) 65 - 100 mg/dL Performed by [facility] RENAL FUNCTION PANEL Collection Time: 11/26/20  1:09 PM  
Result Value Ref Range Sodium 132 (L) 136 - 145 mmol/L Potassium 5.1 3.5 - 5.1 mmol/L Chloride 97 97 - 108 mmol/L  
 CO2 30 21 - 32 mmol/L Anion gap 5 5 - 15 mmol/L Glucose 133 (H) 65 - 100 mg/dL BUN 56 (H) 6 - 20 MG/DL Creatinine 4.33 (H) 0.70 - 1.30 MG/DL  
 BUN/Creatinine ratio 13 12 - 20 GFR est AA 17 (L) >60 ml/min/1.73m2 GFR est non-AA 14 (L) >60 ml/min/1.73m2 Calcium 8.1 (L) 8.5 - 10.1 MG/DL Phosphorus 4.9 (H) 2.6 - 4.7 MG/DL Albumin 2.7 (L) 3.5 - 5.0 g/dL GLUCOSE, POC Collection Time: 11/26/20  9:21 PM  
Result Value Ref Range Glucose (POC) 208 (H) 65 - 100 mg/dL Performed by [employer] GLUCOSE, POC Collection Time: 11/27/20  2:06 AM  
Result Value Ref Range Glucose (POC) 227 (H) 65 - 100 mg/dL Performed by [employer] CBC WITH AUTOMATED DIFF Collection Time: 11/27/20  3:28 AM  
Result Value Ref Range WBC 12.3 (H) 4.1 - 11.1 K/uL  
 RBC 2.36 (L) 4.10 - 5.70 M/uL HGB 8.2 (L) 12.1 - 17.0 g/dL HCT 25.6 (L) 36.6 - 50.3 % .5 (H) 80.0 - 99.0 FL  
 MCH 34.7 (H) 26.0 - 34.0 PG  
 MCHC 32.0 30.0 - 36.5 g/dL  
 RDW 19.6 (H) 11.5 - 14.5 % PLATELET 93 (L) 485 - 400 K/uL MPV 12.0 8.9 - 12.9 FL  
 NRBC 0.6 (H) 0  WBC ABSOLUTE NRBC 0.08 (H) 0.00 - 0.01 K/uL NEUTROPHILS 91 (H) 32 - 75 % LYMPHOCYTES 3 (L) 12 - 49 % MONOCYTES 3 (L) 5 - 13 % EOSINOPHILS 2 0 - 7 % BASOPHILS 0 0 - 1 % IMMATURE GRANULOCYTES 2 (H) 0.0 - 0.5 % ABS. NEUTROPHILS 11.1 (H) 1.8 - 8.0 K/UL  
 ABS. LYMPHOCYTES 0.4 (L) 0.8 - 3.5 K/UL  
 ABS. MONOCYTES 0.4 0.0 - 1.0 K/UL  
 ABS. EOSINOPHILS 0.2 0.0 - 0.4 K/UL  
 ABS. BASOPHILS 0.0 0.0 - 0.1 K/UL  
 ABS. IMM. GRANS. 0.2 (H) 0.00 - 0.04 K/UL  
 DF AUTOMATED    
GLUCOSE, POC Collection Time: 11/27/20  6:04 AM  
Result Value Ref Range Glucose (POC) 132 (H) 65 - 100 mg/dL Performed by Venus Carr Telemetry: a fib. Assessment:  
 
Active Problems: 
  AMS (altered mental status) (11/23/2020) Plan: Afib with RVR: rate controlled currently; HASBLED= 4 CHADVASc= 2 
-Continue PO dilt. 240 mg and coreg 6.25 BID 
-Continue to monitor tele. -EKG and ECHO reviewed -TSH WNL 
-Not candidate for [address] [place] [address] d/t history of GIB.  
  
DM II, symptomatic hypoglycemia: 
-manage per internal medicine 
  
Tobacco abuse/COPD: 
-patient continues to be daily smoker, discussed importance of ceasing for improved CV health.  
  
Hypotension: stabilized -Monitor BP, stable Will follow as needed, please call with questions over weekend, Dr. Mario Hussein to resume rounding on Monday. Hadley Levi [id number] TRAORE DNP,RN,AG-ACNP-BC Patient seen and examined by me with nurse practitioner. I personally performed all components of the history, physical, and medical decision making and agree with the assessment and plan as noted.  
 
Carolyn Wright MD

## 2020-11-27 NOTE — PROGRESS NOTES
ADULT PROTOCOL: JET AEROSOL  REASSESSMENT Patient  Pernell French.     60 y.o.   male     11/27/2020  10:50 AM 
 
Breath Sounds Pre Procedure: Right Breath Sounds: Coarse Left Breath Sounds: Coarse Breath Sounds Post Procedure: Right Breath Sounds: Coarse Left Breath Sounds: Coarse Breathing pattern: Pre procedure Breathing Pattern: Regular Post procedure Breathing Pattern: Regular Heart Rate: Pre procedure Pulse: 85 
         Post procedure Pulse: 85 Resp Rate: Pre procedure Respirations: 17 Post procedure Respirations: 17 
 
Cough: Pre procedure Cough: Non-productive Post procedure Cough: Non-productive Oxygen: O2 Device: Nasal cannula   3L SpO2: Pre procedure SpO2: 97 % Post procedure SpO2: 97 % Nebulizer Therapy: Current medications Aerosolized Medications: Brovana, DuoNeb, Pulmicort Smoking History: current smoker Problem List:  
Patient Active Problem List  
Diagnosis Code  Cellulitis of thumb, left L03.012  Tenosynovitis of finger M65.9  
 DM (diabetes mellitus) ([facility] [address] E11.9  
 HCV (hepatitis C virus) B19.20  Tobacco abuse Z72.0  Alcohol abuse, daily use F10.10  COPD (chronic obstructive pulmonary disease) (HCC) J44.9  History of splenectomy Z90.81  
 Cellulitis and abscess of finger L03.019, L02.519  Abdominal wall cellulitis L03.311  Acute GI bleeding K92.2  
 HTN (hypertension) I10  
 ESRD on dialysis (HCC) N18.6, Z99.2  Nontraumatic ischemic infarction of muscle of hand M62.249  Acute cholecystitis K81.0  Type 2 diabetes mellitus with nephropathy (HCC) E11.21  
 COPD exacerbation (HCC) J44.1  A-fib (HCC) I48.91  
 Hypoglycemia E16.2  Acute dyspnea R06.00  
 AMS (altered mental status) R41.82 Respiratory Therapist: Wisam Lyn

## 2020-11-27 NOTE — PROGRESS NOTES
Hospitalist Progress Note NAME: Faby Young. :  1965 MRN:  [id number] I reviewed pertinent labs and imaging, and discussed /agreed on the plan of care with Dr. Kris Almodovar. 
 
 
Major Gokul / Plan: Metabolic Encephalopathy with AMS Hypoglycemia, Hypothermic POA - resolved Acute on Chronic Respiratory Failure with Hypoxia PNA - recently d/c from hospital last week with Pseudomonas PNA ESRD on Hemodialysis Leukocytosis · Patient's confusion has resolved  
· Remains afebrile, WBC bump 17.2 · CXR  - Bilateral nonspecific atelectasis vs edema vs pneumonia. Right pleural effusion. No significant change. · Recent admission for PNA - did not fill prescriptions (for antibiotics) at discharge, wife stated pharmacy was not open to fill prescriptions at discharge . Monday morning patient became acutely confused and she called EMS.    
· Baseline 3 LPM - now tolerating  
· Appreciate Pulmonology input · Follow Sputum Culture - HEAVY GRAM NEGATIVE RODS, FEW GRAM POSITIVE COCCI IN PAIR - preliminary    
· Continue Cefepime - will need at least 14 days of antibiotics pending cultures and sensitivities · Consult ID to assist - may need antibiotics with HD 
· WBC improving, WBC 12.3, remains afebrile · Appreciate Nephrology input  
  
Hyperkalemia in setting of ESRD - resolved End Stage Renal Disease, MWD HD   
· Received dose of kayexalate  · Did not tolerate HD well  - went into Afib, now rate controlled · HD per nephrology  
  
Hyponatremia - improving  
· LQ 175, follow BMP · Continue 1L fluid restriction  
  
Type II Diabetes with Hypoglycemia on admission · Follow glucose · Hold SSI with hypoglycemia  
  
Atrial Fibrillation with RVR -  Now rate controlled · History of A fib with RVR while on HD · Responded to RRT  for afib while patient ending HD treatment · Appreciate Cardiology input - increasing dose of diltiazem  
 · Continue PO dilt, carvedilol · Recent ECHO 11/7 - EF 55-60%. Normal cavity size, wall thickness and systolic function.  
  
Ventral Hernia Mesh with Bleeding - resolved 
  
Anemia of Chronic Disease · Stable · Follow CBC  
  
Moderate Protein Calorie Malnutrition Chronic Debility · Continue multivitamin, thiamine, folic acid, nephrocap · Consult PT/OT 
· Consult to Palliative Care  
  
Hepatitis C infection hx Monitored at [facility] hepatology clinic Hx of PVD s/p thrombectomy Hx of Splenectomy and partial pancreatectomy Chronic constipation - pt takes mag citrate at home PRN History of ETOH abuse   
History of COPD/smoker 
  
18.5 - 24.9 Normal weight / Body mass index is 23.26 kg/m². 
  
Code status: Full Prophylaxis: Hep SQ Recommended Disposition: Home w/Family Subjective: Chief Complaint / Reason for Physician Visit \"I feel better\". Patient seen at bedside, reports he is feeling better today. Has no complaints. Discussed with RN events overnight. Review of Systems: 
Symptom Y/N Comments  Symptom Y/N Comments Fever/Chills n   Chest Pain n   
Poor Appetite n   Edema n   
Cough n   Abdominal Pain n   
Sputum n   Joint Pain n   
SOB/PRITCHETT y   Pruritis/Rash n   
Nausea/vomit n   Tolerating PT/OT Diarrhea n   Tolerating Diet y Constipation n   Other Could NOT obtain due to:   
 
Objective: VITALS:  
Last 24hrs VS reviewed since prior progress note. Most recent are: 
Patient Vitals for the past 24 hrs: 
 Temp Pulse Resp BP SpO2  
11/27/20 1039 98.2 °F (36.8 °C) (!) 117 20 108/73 99 % 11/27/20 0839     97 % 11/27/20 0735 98.5 °F (36.9 °C) 80 20 (!) 98/51 100 % 11/27/20 0302 97.7 °F (36.5 °C) 70 20 (!) 105/59 94 % 11/26/20 2305 97.8 °F (36.6 °C) 78 22 112/78 96 % 11/26/20 2037     98 % 11/26/20 2000 97.5 °F (36.4 °C) 73 20 115/61 98 % 11/26/20 1520 97.9 °F (36.6 °C) 82 16 (!) 90/47 97 % Intake/Output Summary (Last 24 hours) at 11/27/2020 1416 Last data filed at 11/26/2020 5960 Gross per 24 hour Intake 250 ml Output  Net 250 ml I had a face to face encounter and independently examined this patient on 11/27/2020, as outlined below: PHYSICAL EXAM: 
General: WD, WN. Alert, cooperative, no acute distress, frail appearing  male EENT:  EOMI. Anicteric sclerae. MMM Resp:  Lung sounds coarse. No accessory muscle use CV:  Regular  rhythm,  No edema GI:  Soft, Non distended, Non tender. +Bowel sounds Neurologic:  Alert and oriented X 3, normal speech, Psych:   Poor insight. Not anxious nor agitated Skin:  No rashes. No jaundice Reviewed most current lab test results and cultures  YES Reviewed most current radiology test results   YES Review and summation of old records today    NO Reviewed patient's current orders and MAR    YES 
PMH/ reviewed - no change compared to H&P 
________________________________________________________________________ Care Plan discussed with: 
  Comments Patient x Family RN x Care Manager x Consultant Multidiciplinary team rounds were held today with , nursing, pharmacist and clinical coordinator. Patient's plan of care was discussed; medications were reviewed and discharge planning was addressed. ________________________________________________________________________ Total NON critical care TIME:  25   Minutes Total CRITICAL CARE TIME Spent:   Minutes non procedure based Comments >50% of visit spent in counseling and coordination of care x   
________________________________________________________________________ Bright Bolanos NP Procedures: see electronic medical records for all procedures/Xrays and details which were not copied into this note but were reviewed prior to creation of Plan. LABS: 
I reviewed today's most current labs and imaging studies. Pertinent labs include: 
Recent Labs  
  11/27/20 
0328 11/26/20 
0250 11/25/20 
3409 WBC 12.3* 15.1* 17.2* HGB 8.2* 8.4* 8.1* HCT 25.6* 25.8* 24.6* PLT 93* 98* 102* Recent Labs  
  11/26/20 
1309 11/26/20 
0250 11/25/20 
1857 11/25/20 
1346 *  --  133* 132*  
K 5.1  --  4.8 6.2*  
CL 97  --  100 98 CO2 30  --  27 26 *  --  156* 84 BUN 56*  --  45* 77* CREA 4.33*  --  3.40* 5.03* CA 8.1*  --  8.1* 8.7 PHOS 4.9* 4.6  --   --   
ALB 2.7*  --   --   --   
 
 
Signed: Dylan Zeng NP

## 2020-11-27 NOTE — PROGRESS NOTES
Problem: Falls - Risk of 
Goal: *Absence of Falls Description: Document Veatrice Marker Fall Risk and appropriate interventions in the flowsheet. Outcome: Progressing Towards Goal 
Note: Fall Risk Interventions: 
Mobility Interventions: Communicate number of staff needed for ambulation/transfer Mentation Interventions: Evaluate medications/consider consulting pharmacy Medication Interventions: Evaluate medications/consider consulting pharmacy Elimination Interventions: Patient to call for help with toileting needs Problem: Patient Education: Go to Patient Education Activity Goal: Patient/Family Education Outcome: Progressing Towards Goal 
  
Problem: Pressure Injury - Risk of 
Goal: *Prevention of pressure injury Description: Document Walter Scale and appropriate interventions in the flowsheet. Outcome: Progressing Towards Goal 
Note: Pressure Injury Interventions: 
Sensory Interventions: Assess changes in LOC, Avoid rigorous massage over bony prominences Moisture Interventions: Check for incontinence Q2 hours and as needed Activity Interventions: Pressure redistribution bed/mattress(bed type) Mobility Interventions: HOB 30 degrees or less Nutrition Interventions: Offer support with meals,snacks and hydration Friction and Shear Interventions: Apply protective barrier, creams and emollients Problem: Patient Education: Go to Patient Education Activity Goal: Patient/Family Education Outcome: Progressing Towards Goal 
  
Problem: Chronic Obstructive Pulmonary Disease (COPD) Goal: *Oxygen saturation during activity within specified parameters Outcome: Progressing Towards Goal 
Goal: *Able to remain out of bed as prescribed Outcome: Progressing Towards Goal 
Goal: *Absence of hypoxia Outcome: Progressing Towards Goal 
Goal: *Optimize nutritional status Outcome: Progressing Towards Goal 
  
Problem: Patient Education: Go to Patient Education Activity Goal: Patient/Family Education Outcome: Progressing Towards Goal 
  
Problem: Breathing Pattern - Ineffective Goal: *Absence of hypoxia Outcome: Progressing Towards Goal 
Goal: *Use of effective breathing techniques Outcome: Progressing Towards Goal 
  
Problem: Patient Education: Go to Patient Education Activity Goal: Patient/Family Education Outcome: Progressing Towards Goal
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
## 2020-11-28 PROBLEM — A41.9 SEPSIS (HCC): Status: ACTIVE | Noted: 2020-01-01

## 2020-11-28 NOTE — PROGRESS NOTES
ADULT PROTOCOL: JET AEROSOL  REASSESSMENT Patient  Omar Orellana.     60 y.o.   male     11/28/2020  3:22 PM 
 
Breath Sounds Pre Procedure: Right Breath Sounds: Coarse, Diminished Left Breath Sounds: Coarse, Diminished Breath Sounds Post Procedure: Right Breath Sounds: Coarse Left Breath Sounds: Coarse Breathing pattern: Pre procedure Breathing Pattern: Regular Post procedure Breathing Pattern: Regular Heart Rate: Pre procedure Pulse: 64 
         Post procedure Pulse: 64 Resp Rate: Pre procedure Respirations: 17 Post procedure Respirations: 17 
 
Cough: Pre procedure Cough: Congested Post procedure Cough: Congested Oxygen: O2 Device: Nasal cannula   3L SpO2: Pre procedure SpO2: 96 % Post procedure SpO2: 96 % Nebulizer Therapy: Current medications Aerosolized Medications: Brovana, DuoNeb, Pulmicort, Mucomyst 
     
 
Smoking History: Current Smoker Problem List:  
Patient Active Problem List  
Diagnosis Code  Cellulitis of thumb, left L03.012  Tenosynovitis of finger M65.9  
 DM [facility]diabetes mellitus) ([facility] E11.9  
 HCV (hepatitis C virus) B19.20  Tobacco abuse Z72.0  Alcohol abuse, daily use F10.10  COPD (chronic obstructive pulmonary disease) ([facility]) J44.9  History of splenectomy Z90.81  
 Cellulitis and abscess of finger L03.019, L02.519  Abdominal wall cellulitis L03.311  Acute GI bleeding K92.2  
 HTN (hypertension) I10  
 ESRD on dialysis ([facility]) N18.6, Z99.2  Nontraumatic ischemic infarction of muscle of hand M62.249  Acute cholecystitis K81.0  Type 2 diabetes mellitus with nephropathy (HCC) E11.21  
 COPD exacerbation ([facility]) J44.1  A-fib ([facility]) I48.91  
 Hypoglycemia E16.2  Acute dyspnea R06.00  
 AMS (altered mental status) R41.82 Respiratory Therapist: Riaz Ferguson

## 2020-11-28 NOTE — PROGRESS NOTES
Bedside and Verbal shift change report given to Jovan Rojas (oncoming nurse) by Amelie Renteria (offgoing nurse). Report included the following information SBAR, Kardex, MAR and Recent Results.

## 2020-11-28 NOTE — PERIOP NOTES
TRANSFER - IN REPORT: 
 
Verbal report received from [facility] (name) on Joaquin Costa.  being received from [facility](unit) for ordered procedure Report consisted of patients Situation, Background, Assessment and  
Recommendations(SBAR). Information from the following report(s) SBAR, Kardex, Recent Results and Cardiac Rhythm Afib was reviewed with the receiving nurse. Opportunity for questions and clarification was provided. Assessment completed upon patients arrival to unit and care assumed. Patient bathed, gown changed , and linens changed following procedure. Bed and bed rails disinfected. Patient transferred back to [facility] when procedure completed. Bedside handoff performed with [place]. Patient yellow/gray metal ring brought down on patient and patient returned to room while wearing. No DME or any other belongings to PACU for central line insertion.

## 2020-11-28 NOTE — PROCEDURES
Central Line Procedure Note Indication: Inadequate venous access Risks, benefits, alternatives explained and patient agrees to proceed. Patient positioned in Trendelenburg. 7-Step Sterility Protocol followed. (cap, mask sterile gown, sterile gloves, large sterile sheet, hand hygiene, 2% chlorhexidine for cutaneous antisepsis) 5 mL 1% Lidocaine placed at insertion site. An ultrasound survey of the right neck did not reveal a patent IJ. An ultrasound survey of the left neck revealed what appeared to be a patent IJ. LIJ was cannulated, but the guidewire was not able to be advanced into the LIJ. Ultrasound guided placement of the right femoral was successful. Right femoral cannulated x 1 attempt(s) utilizing the Seldinger technique. Catheter secured & Biopatch applied. Sterile Tegaderm placed. CXR pending.    
Care turned over to covering Attending MD.

## 2020-11-28 NOTE — ACP (ADVANCE CARE PLANNING)
Advance Care Planning Note NAME: Na Reich. :  1965 MRN:  [id number] Date/Time:  2020 3:24 PM 
 
Active Diagnoses: 
Hospital Problems  Date Reviewed: 2018 Codes Class Noted POA Sepsis ([facility] ICD-10-CM: A41.9 ICD-9-CM: 038.9, 995.91  2020 Unknown AMS (altered mental status) ICD-10-CM: A90.45 
ICD-9-CM: 780.97  2020 Unknown A-fib [facility]) ICD-10-CM: I48.91 
ICD-9-CM: 427.31  2020 Yes Type 2 diabetes mellitus with nephropathy ([facility] ICD-10-CM: E11.21 
ICD-9-CM: 250.40, 583.81  2018 Yes ESRD on dialysis [facility]) ICD-10-CM: N18.6, Z99.2 ICD-9-CM: 585.6, V45.11  10/19/2017 Yes HCV (hepatitis C virus) (Chronic) ICD-10-CM: H98.48 ICD-9-CM: 070.70  2012 Yes COPD (chronic obstructive pulmonary disease) (HCC) (Chronic) ICD-10-CM: J44.9 ICD-9-CM: 969  2012 Yes These active diagnoses are of sufficient risk that focused discussion on advance care planning is indicated in order to allow the patient to thoughtfully consider personal goals of care, and if situations arise that prevent the ability to personally give input, to ensure appropriate representation of their personal desires for different levels and aggressiveness of care. Discussion:  
Code status addressed and wants to be a Full Code. Patient wants central line and vasopressors if needed. Patient would also want a feeding tube, if needed, for nutritional support. Patient  would like to assign Hu Brandon as the surrogate decision maker. Persons present and participating in discussion: Na Reich., Haja Osorio NP, Hu Brandon and her son Time Spent:  
Total time spent face-to-face in education and discussion:   20  minutes. Haja Osorio NP Hospitalist
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
## 2020-11-28 NOTE — PROGRESS NOTES
Problem: Falls - Risk of 
Goal: *Absence of Falls Description: Document Nan Abelccasin Fall Risk and appropriate interventions in the flowsheet. Outcome: Progressing Towards Goal 
Note: Fall Risk Interventions: 
Mobility Interventions: Bed/chair exit alarm, Patient to call before getting OOB, Utilize gait belt for transfers/ambulation Mentation Interventions: Adequate sleep, hydration, pain control, Bed/chair exit alarm, Door open when patient unattended, Gait belt with transfers/ambulation, Increase mobility, More frequent rounding, Toileting rounds, Update white board Medication Interventions: Bed/chair exit alarm, Patient to call before getting OOB, Teach patient to arise slowly, Utilize gait belt for transfers/ambulation Elimination Interventions: Bed/chair exit alarm, Call light in reach, Patient to call for help with toileting needs, Toileting schedule/hourly rounds Problem: Patient Education: Go to Patient Education Activity Goal: Patient/Family Education Outcome: Progressing Towards Goal 
  
Problem: Pressure Injury - Risk of 
Goal: *Prevention of pressure injury Description: Document Walter Scale and appropriate interventions in the flowsheet. Outcome: Progressing Towards Goal 
Note: Pressure Injury Interventions: 
Sensory Interventions: Assess changes in LOC, Avoid rigorous massage over bony prominences, Keep linens dry and wrinkle-free Moisture Interventions: Absorbent underpads, Minimize layers Activity Interventions: Pressure redistribution bed/mattress(bed type) Mobility Interventions: HOB 30 degrees or less, Pressure redistribution bed/mattress (bed type) Nutrition Interventions: Offer support with meals,snacks and hydration Friction and Shear Interventions: Apply protective barrier, creams and emollients, Minimize layers Problem: Patient Education: Go to Patient Education Activity Goal: Patient/Family Education Outcome: Progressing Towards Goal 
 Problem: Chronic Obstructive Pulmonary Disease (COPD) Goal: *Oxygen saturation during activity within specified parameters Outcome: Progressing Towards Goal 
Goal: *Able to remain out of bed as prescribed Outcome: Progressing Towards Goal 
Goal: *Absence of hypoxia Outcome: Progressing Towards Goal 
Goal: *Optimize nutritional status Outcome: Progressing Towards Goal 
  
Problem: Patient Education: Go to Patient Education Activity Goal: Patient/Family Education Outcome: Progressing Towards Goal 
  
Problem: Breathing Pattern - Ineffective Goal: *Absence of hypoxia Outcome: Progressing Towards Goal 
Goal: *Use of effective breathing techniques Outcome: Progressing Towards Goal 
  
Problem: Patient Education: Go to Patient Education Activity Goal: Patient/Family Education Outcome: Progressing Towards Goal
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
## 2020-11-28 NOTE — PROCEDURES
[facility] Dialysis Team [place]  [phone] Vitals   Pre   Post   Assessment   Pre   Post    
Temp  Temp: 97.8 °F (36.6 °C) (11/28/20 0708)  97.8 LOC  A&O to person. Delayed responses. Same HR   Pulse (Heart Rate): 84 (11/28/20 0708) 84 Lungs   Coarse on 2 L NC Same B/P   BP: 119/62 (11/28/20 0708) 93/62 Cardiac   Regular Same Resp   Resp Rate: 18 (11/28/20 0708) 18 Skin   Fragile Same Pain level  0 0 Edema  2+ Upper 1+ lower Same Orders: Duration:   Start:    0708 End:    1125 Total:   3.5 hours Dialyzer:   Dialyzer/Set Up Inspection: Tiffany Mccollum (11/28/20 0708) K Bath:   Dialysate K (mEq/L): 2 (11/28/20 0708) Ca Bath:   Dialysate CA (mEq/L): 2.5 (11/28/20 0708) Na/Bicarb:   Dialysate NA (mEq/L): 138 (11/28/20 0708) Target Fluid Removal:   Goal/Amount of Fluid to Remove (mL): 3000 mL (11/28/20 0708) Access Type & Location:   MANUEL AVF: skin CDI. No s/s of infection. + B/T. No issues with cannulation or hemostasis. Running well at . Labs Obtained/Reviewed Critical Results Called   Date when labs were drawn- 
Hgb-   
HGB Date Value Ref Range Status 11/27/2020 8.2 (L) 12.1 - 17.0 g/dL Final  
 
K-   
Potassium Date Value Ref Range Status 11/26/2020 5.1 3.5 - 5.1 mmol/L Final  
 
Ca-  
Calcium Date Value Ref Range Status 11/26/2020 8.1 (L) 8.5 - 10.1 MG/DL Final  
 
Bun-  
BUN Date Value Ref Range Status 11/26/2020 56 (H) 6 - 20 MG/DL Final  
 
Creat-  
Creatinine Date Value Ref Range Status 11/26/2020 4.33 (H) 0.70 - 1.30 MG/DL Final  
  Comment:  
  INVESTIGATED PER DELTA CHECK PROTOCOL Medications/ Blood Products Given Name   Dose   Route and Time Albumin 25 g IV @ 0719 Blood Volume Processed (BVP):    75.0 L Net Fluid Removed:  3000 ml Comments Time Out Done: 1534 Primary Nurse Rpt Pre: Stephenie Sarabia RN 
Primary Nurse Rpt PostDemili Herrera RN 
Pt Education: Procedural 
Care Plan:Continue with HD per MD. 
 Tx Summary: 
 
Pt A&O to person. Consent verified & on file. SBAR received from Primary RN. 9859: Pt cannulated with 38M needles per policy & without issue. Labs drawn per request/ order. VSS. Dialysis Tx initiated. 0820: Circuit clotting. All possible blood returned before unable. 0900: Restrung machine. All tests passed. HD restarted. 1125: Tx ended. VSS. All possible blood returned to patient. Hemostasis achieved without issue. Bed locked and in the lowest position, call bell and belongings in reach. SBAR given to Primary, RN. Patient is stable at time of my departure. All Dialysis related medications have been reviewed. Admiting Diagnosis: AMS Pt's previous clinic- [facility] Consent signed - Informed Consent Verified: Yes (11/28/20 9578) Hepatitis Status- neg/immune 1/14/20 Machine #- Machine Number: [id number] (11/28/20 2281) Telemetry status- remote

## 2020-11-28 NOTE — PROGRESS NOTES
Hospitalist Progress Note NAME: Elvis Cole. :  1965 MRN:  [id number] I reviewed pertinent labs and imaging, and discussed /agreed on the plan of care with Dr. Hannah Medina. 
 
 
Kale Yates / Plan: Metabolic Encephalopathy with AMS Hypoglycemia, Hypothermic POA - resolved Acute on Chronic Respiratory Failure with Hypoxia PNA - recently d/c from hospital last week with Pseudomonas PNA ESRD on Hemodialysis Leukocytosis  
· Patient's confusion has resolved  
· Remains afebrile, WBC trending down · CXR  - Bilateral nonspecific atelectasis vs edema vs pneumonia. Right pleural effusion. No significant change. · Recent admission for PNA - did not fill prescriptions (for antibiotics) at discharge, wife stated pharmacy was not open to fill prescriptions at discharge . Monday morning patient became acutely confused and she called EMS.    
· Baseline 3 LPM - now tolerating  
· Appreciate Pulmonology input · Follow Sputum Culture - HEAVY STENOTHROPHOMONAS Final   
· Continue Cefepime  
· Consult ID to assist - may need long term antibiotics with HD  · Appreciate Nephrology input  
  
Hyperkalemia in setting of ESRD - resolved  
End Stage Renal Disease, MWD HD   
· Received dose of kayexalate  · Did not tolerate HD well  - went into Afib, now rate controlled · HD per nephrology  
  
Hyponatremia - improving  
· NZ 749, follow BMP · Continue 1L fluid restriction  
  
Type II Diabetes with Hypoglycemia on admission · Follow glucose · Hold SSI with hypoglycemia  
  
Atrial Fibrillation with RVR -  Now rate controlled · History of A fib with RVR while on HD · Responded to RRT  for afib while patient ending HD treatment · Appreciate Cardiology input - increased dose of diltiazem  
· Continue PO dilt, carvedilol · Recent ECHO  - EF 55-60%.  Normal cavity size, wall thickness and systolic function.  
  
Ventral Hernia Mesh with Bleeding - resolved 
  
 Anemia of Chronic Disease · Stable · Follow CBC  
  
Moderate Protein Calorie Malnutrition Chronic Debility · Continue multivitamin, thiamine, folic acid, nephrocap · Consult PT/OT 
  
Hepatitis C infection hx Monitored at [address] [place] [address] hepatology clinic Hx of PVD s/p thrombectomy Hx of Splenectomy and partial pancreatectomy Chronic constipation - pt takes mag citrate at home PRN History of ETOH abuse   
History of COPD/smoker 
  
18.5 - 24.9 Normal weight / Body mass index is 23.26 kg/m². 
  
Code status: Full Prophylaxis: Hep SQ Recommended Disposition: Home w/Family Subjective: Chief Complaint / Reason for Physician Visit Patient at seen at bedside on HD. Complains of lying in bed too much. Rounded a second time to speak with wife at bedside. Explained PT had tried to work with patient today but BP would not tolerate. Wife asking questions about what happens when patient can no longer tolerate HD. Discussed with RN events overnight. Review of Systems: 
Symptom Y/N Comments  Symptom Y/N Comments Fever/Chills n   Chest Pain n   
Poor Appetite n   Edema n   
Cough n   Abdominal Pain n   
Sputum n   Joint Pain n   
SOB/PRITCHETT y   Pruritis/Rash n   
Nausea/vomit n   Tolerating PT/OT n Diarrhea n   Tolerating Diet y Constipation n   Other Could NOT obtain due to:   
 
Objective: VITALS:  
Last 24hrs VS reviewed since prior progress note. Most recent are: 
Patient Vitals for the past 24 hrs: 
 Temp Pulse Resp BP SpO2  
11/28/20 1325  81  100/63   
11/28/20 1324  99  (!) 82/48   
11/28/20 1317  93  100/62   
11/28/20 1137 97.8 °F (36.6 °C) 91 18 (!) 112/49 98 % 11/28/20 1125 97.8 °F (36.6 °C) 85 18 93/62   
11/28/20 1115  90 18 (!) 106/50   
11/28/20 1100  72 18 (!) 86/51   
11/28/20 1045  98 18 (!) 113/47   
11/28/20 1030  100 18 100/60   
11/28/20 1015  87 18 (!) 110/55   
11/28/20 1000  75 18 97/61   
11/28/20 0945  93 18 (!) 131/52  11/28/20 0930  (!) 104 18 (!) 99/49   
11/28/20 0915  94 18 (!) 115/53   
11/28/20 0900  91 18 114/63   
11/28/20 0831     96 % 11/28/20 0820 98 °F (36.7 °C) 89 18 (!) 116/55 96 % 11/28/20 0815  80 18 (!) 96/47   
11/28/20 0800  85 18 109/60   
11/28/20 0745  80 18 110/62   
11/28/20 0730  82 18 (!) 115/55   
11/28/20 0715  73 18 (!) 110/57   
11/28/20 0708 97.8 °F (36.6 °C) 84 18 119/62   
11/28/20 0354 97.5 °F (36.4 °C) 72 18 118/64 91 % 11/27/20 2330 98 °F (36.7 °C) 95 20 119/64 92 % 11/27/20 2210     97 % 11/27/20 1945 98.4 °F (36.9 °C) 68 20 105/69 96 % 11/27/20 1638 98.2 °F (36.8 °C) 85 16 96/62 94 % Intake/Output Summary (Last 24 hours) at 11/28/2020 1528 Last data filed at 11/28/2020 1125 Gross per 24 hour Intake  Output 3000 ml Net -3000 ml I had a face to face encounter and independently examined this patient on 11/28/2020, as outlined below: PHYSICAL EXAM: 
General: WD, WN. Alert, cooperative, no acute distress but frail  male EENT:  EOMI. Anicteric sclerae. MMM Resp:  LS coarse. No accessory muscle use CV:  Regular  rhythm,  No edema GI:  Soft, Non distended, Non tender. +Bowel sounds Neurologic:  Alert and oriented X 3, normal speech, Psych:   Good insight. Not anxious nor agitated Skin:  No rashes. No jaundice Reviewed most current lab test results and cultures  YES Reviewed most current radiology test results   YES Review and summation of old records today    NO Reviewed patient's current orders and MAR    YES 
PMH/SH reviewed - no change compared to H&P 
________________________________________________________________________ Care Plan discussed with: 
  Comments Patient x Family  x   
RN x Care Manager Consultant Multidiciplinary team rounds were held today with , nursing, pharmacist and clinical coordinator.   Patient's plan of care was discussed; medications were reviewed and discharge planning was addressed. ________________________________________________________________________ Total NON critical care TIME:  25   Minutes Total CRITICAL CARE TIME Spent:   Minutes non procedure based Comments >50% of visit spent in counseling and coordination of care x   
________________________________________________________________________ Yuko Rausch NP Procedures: see electronic medical records for all procedures/Xrays and details which were not copied into this note but were reviewed prior to creation of Plan. LABS: 
I reviewed today's most current labs and imaging studies. Pertinent labs include: 
Recent Labs  
  11/28/20 
0655 11/27/20 
0328 11/26/20 
0250 WBC 9.1 12.3* 15.1* HGB 8.8* 8.2* 8.4* HCT 26.8* 25.6* 25.8*  
* 93* 98* Recent Labs  
  11/28/20 
0655 11/26/20 
1309 11/26/20 
0250 11/25/20 
1857 * 132*  --  133* K 6.2* 5.1  --  4.8  
CL 96* 97  --  100 CO2 27 30  --  27 * 133*  --  156* BUN 83* 56*  --  45* CREA 6.49* 4.33*  --  3.40* CA 8.7 8.1*  --  8.1*  
PHOS  --  4.9* 4.6  --   
ALB  --  2.7*  --   --   
 
 
Signed: Yuko Rausch NP
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
## 2020-11-29 NOTE — PROGRESS NOTES
Hospitalist Progress Note NAME: Elvis Cole. :  1965 MRN:  [id number] I reviewed pertinent labs and imaging, and discussed /agreed on the plan of care with Dr. Hannah Medina.  
 
 
Zuri Gardner / Plan: Metabolic Encephalopathy with AMS Hypoglycemia, Hypothermic POA - resolved Acute on Chronic Respiratory Failure with Hypoxia PNA - recently d/c from hospital last week with Pseudomonas PNA ESRD on Hemodialysis Leukocytosis  
· Patient's confusion has resolved  
· Remains afebrile, WBC slightly elevated with oral prednisone - follow CBC · CXR  - Bilateral nonspecific atelectasis vs edema vs pneumonia. Right pleural effusion. No significant change. · Recent admission for PNA - did not fill prescriptions (for antibiotics) at discharge, wife stated pharmacy was not open to fill prescriptions at discharge . Monday morning patient became acutely confused and she called EMS.    
· Baseline 3 LPM - now tolerating  
· Appreciate Pulmonology input · Follow Sputum Culture - HEAVY STENOTHROPHOMONAS Final   
· Continue Cefepime - Day 7  
· Consult ID to assist - may need long term antibiotics with HD  · Appreciate Nephrology input  
  
Hyperkalemia in setting of ESRD - resolved  
End Stage Renal Disease, MWD HD   
· Received dose of kayexalate  · Did not tolerate HD well  - went into Afib, now rate controlled · HD per nephrology  
  
Hyponatremia - improving  
· NH 077, follow BMP · Continue 1L fluid restriction  
  
Type II Diabetes with Hypoglycemia on admission · Follow glucose · Hyperglycemia with prednisone - start SSI  
  
Atrial Fibrillation with RVR -  Now rate controlled · History of A fib with RVR while on HD · Responded to RRT  for afib while patient ending HD treatment · Appreciate Cardiology input - increased dose of diltiazem  
· Continue PO dilt, carvedilol · Recent ECHO  - EF 55-60%.  Normal cavity size, wall thickness and systolic function.  
  
Ventral Hernia Mesh with Bleeding - resolved 
  
Anemia of Chronic Disease · Stable · Follow CBC  
  
Moderate Protein Calorie Malnutrition Chronic Debility · Continue multivitamin, thiamine, folic acid, nephrocap · Consult PT/OT 
  
Hepatitis C infection hx Monitored at [facility] hepatology clinic Hx of PVD s/p thrombectomy Hx of Splenectomy and partial pancreatectomy Chronic constipation - pt takes mag citrate at home PRN History of ETOH abuse   
History of COPD/smoker 
  
18.5 - 24.9 Normal weight / Body mass index is 23.26 kg/m². 
  
Code status: Full Prophylaxis: Hep SQ Recommended Disposition: Home w/Family Subjective: Chief Complaint / Reason for Physician Visit \"I feel OK\". Patient seen at bedside, stated he is waiting for his wife to visit. He has concerns at this time. Stated breathing has improved. 1600 : Updated wife via phone. Discussed with RN events overnight. Review of Systems: 
Symptom Y/N Comments  Symptom Y/N Comments Fever/Chills n   Chest Pain n   
Poor Appetite n   Edema n   
Cough n   Abdominal Pain n   
Sputum n   Joint Pain n   
SOB/PRITCHETT y   Pruritis/Rash n   
Nausea/vomit n   Tolerating PT/OT n Diarrhea n   Tolerating Diet y Constipation n   Other Could NOT obtain due to:   
 
Objective: VITALS:  
Last 24hrs VS reviewed since prior progress note. Most recent are: 
Patient Vitals for the past 24 hrs: 
 Temp Pulse Resp BP SpO2  
11/29/20 1225 98.6 °F (37 °C)      
11/29/20 1136 97.8 °F (36.6 °C) 82 17 125/72 97 % 11/29/20 0912     97 % 11/29/20 0835 98.4 °F (36.9 °C) 70 17 (!) 103/52 99 % 11/29/20 0800     96 % 11/29/20 0420     93 % 11/29/20 0407 98.2 °F (36.8 °C) 71 17 103/61 93 % 11/29/20 0020 98.4 °F (36.9 °C) 89 16 (!) 115/59 92 % 11/28/20 2050     92 % 11/28/20 2017 97.7 °F (36.5 °C) 95 18 (!) 154/72 93 % 11/28/20 1720 98.3 °F (36.8 °C) 96 17 (!) 118/54 96 % 11/28/20 1715  83 12 116/62 97 % 11/28/20 1710  88 18 123/63 97 % 11/28/20 1705  96 23 120/63 97 % 11/28/20 1700  85 17 116/70 98 % 11/28/20 1655  86 14 112/70 98 % 11/28/20 1650  83 17 (!) 117/55 98 % 11/28/20 1645  86 18 118/65 98 % 11/28/20 1640  83 16 119/61 98 % 11/28/20 1635  85 16 112/77 98 % 11/28/20 1630 98.6 °F (37 °C) 80 19 111/73 100 % 11/28/20 1549 97.9 °F (36.6 °C) 85 18 101/62 98 % 11/28/20 1542     98 % 11/28/20 1325  81  100/63   
11/28/20 1324  99  (!) 82/48   
11/28/20 1317  93  100/62  No intake or output data in the 24 hours ending 11/29/20 1242 I had a face to face encounter and independently examined this patient on 11/29/2020, as outlined below: PHYSICAL EXAM: 
General: WD, WN. Alert, cooperative, no acute distress although frail appearing  male EENT:  EOMI. Anicteric sclerae. MMM Resp:  LS diminished no wheezing or rales. No accessory muscle use CV:  Regular  rhythm,  No edema GI:  Soft, Non distended, Non tender. +Bowel sounds Neurologic:  Alert and oriented X 3, normal speech, Psych:   Good insight. Not anxious nor agitated Skin:  No rashes. No jaundice Reviewed most current lab test results and cultures  YES Reviewed most current radiology test results   YES Review and summation of old records today    NO Reviewed patient's current orders and MAR    YES 
PMH/SH reviewed - no change compared to H&P 
________________________________________________________________________ Care Plan discussed with: 
  Comments Patient x Family RN x Care Manager Consultant Multidiciplinary team rounds were held today with , nursing, pharmacist and clinical coordinator. Patient's plan of care was discussed; medications were reviewed and discharge planning was addressed. ________________________________________________________________________ Total NON critical care TIME:  25   Minutes Total CRITICAL CARE TIME Spent:   Minutes non procedure based Comments >50% of visit spent in counseling and coordination of care x   
________________________________________________________________________ Lisa Mantilla NP Procedures: see electronic medical records for all procedures/Xrays and details which were not copied into this note but were reviewed prior to creation of Plan. LABS: 
I reviewed today's most current labs and imaging studies. Pertinent labs include: 
Recent Labs  
  11/29/20 
0902 11/28/20 
6165 11/27/20 
0328 WBC 13.7* 9.1 12.3* HGB 7.7* 8.8* 8.2* HCT 23.8* 26.8* 25.6*  
* 105* 93* Recent Labs  
  11/29/20 
0902 11/28/20 
0655 11/26/20 
1309 * 131* 132* K 4.8 6.2* 5.1 CL 99 96* 97  
CO2 28 27 30 * 126* 133* BUN 50* 83* 56* CREA 4.29* 6.49* 4.33* CA 8.8 8.7 8.1*  
PHOS  --   --  4.9* ALB  --   --  2.7* Signed: Lisa Mantilla NP

## 2020-11-29 NOTE — PROGRESS NOTES
Bedside, Verbal and Written shift change report given to Estelle Chanel (oncoming nurse) by Juan Espinosa (offgoing nurse). Report included the following information SBAR, Kardex, MAR and Recent Results.

## 2020-11-29 NOTE — PROGRESS NOTES
ADULT PROTOCOL: JET AEROSOL  REASSESSMENT Patient  Franco San     60 y.o.   male     11/29/2020  9:32 AM 
 
Breath Sounds Pre Procedure: Right Breath Sounds: Coarse, Diminished Left Breath Sounds: Coarse, Diminished Breath Sounds Post Procedure: Right Breath Sounds: Coarse Left Breath Sounds: Coarse Breathing pattern: Pre procedure Breathing Pattern: Regular Post procedure Breathing Pattern: Regular Heart Rate: Pre procedure Pulse: 71 
         Post procedure Pulse: 71 Resp Rate: Pre procedure Respirations: 17 Post procedure Respirations: 17 
 
 
Cough: Pre procedure Cough: Congested Post procedure Cough: Congested Oxygen: O2 Device: Nasal cannula   2L SpO2: Pre procedure SpO2: 97 % Post procedure SpO2: 97 % Nebulizer Therapy: Current medications Aerosolized Medications: Xopenex, Pulmicort, Brovana, Mucomyst 
    
 
Smoking History: current smoker Problem List:  
Patient Active Problem List  
Diagnosis Code  Cellulitis of thumb, left L03.012  Tenosynovitis of finger M65.9  
 DM (diabetes mellitus) ([address] E11.9  
 HCV (hepatitis C virus) B19.20  Tobacco abuse Z72.0  Alcohol abuse, daily use F10.10  COPD (chronic obstructive pulmonary disease) (HCC) J44.9  History of splenectomy Z90.81  
 Cellulitis and abscess of finger L03.019, L02.519  Abdominal wall cellulitis L03.311  Acute GI bleeding K92.2  
 HTN (hypertension) I10  
 ESRD on dialysis (HCC) N18.6, Z99.2  Nontraumatic ischemic infarction of muscle of hand M62.249  Acute cholecystitis K81.0  Type 2 diabetes mellitus with nephropathy (HCC) E11.21  
 COPD exacerbation (HCC) J44.1  A-fib (HCC) I48.91  
 Hypoglycemia E16.2  Acute dyspnea R06.00  
 AMS (altered mental status) R41.82  Sepsis ([address] A41.9 Respiratory Therapist: Ashley Reyes

## 2020-11-29 NOTE — PROGRESS NOTES
No labs today Please check K and let us if high We will see him again tomorrow Jigar Mijares MD 
[address] [facility] Office :[phone] Fax: [phone]

## 2020-11-30 NOTE — PROGRESS NOTES
Patient currently off the floor for HD. Will follow up later today if possible.  
 
Desire Samayoa, PT

## 2020-11-30 NOTE — PROGRESS NOTES
Hospitalist Progress Note NAME: Doug Estrada. :  1965 MRN:  [id number] Assessment / Plan: Metabolic Encephalopathy with AMS Hypoglycemia, Hypothermic POA - resolved Acute on Chronic Respiratory Failure with Hypoxia PNA - recently d/c from hospital last week with Pseudomonas PNA ESRD on Hemodialysis Leukocytosis  
· Patient's confusion has resolved  
· Remains afebrile, WBC trending down with oral prednisone - follow CBC · CXR  - Bilateral nonspecific atelectasis vs edema vs pneumonia. Right pleural effusion. No significant change. · Recent admission for PNA - did not fill prescriptions (for antibiotics) at discharge, wife stated pharmacy was not open to fill prescriptions at discharge . Monday morning patient became acutely confused and she called EMS.    
· Baseline 3 LPM - now tolerating  
· Appreciate Pulmonology input - will need 2-3 weeks of TMP SMX · Follow Sputum Culture - HEAVY STENOTHROPHOMONAS Final   
· Continue Cefepime - Day 7 - discontinue after today · Consult ID to assist - weigh in on antibiotic length at discharge · Appreciate Nephrology input  
· Patient reports increased SOB today - obtain CXR 
  
Hyperkalemia in setting of ESRD - resolved  
End Stage Renal Disease, MWD HD   
· Received dose of kayexalate  · Did not tolerate HD well  - went into Afib, now rate controlled · HD per nephrology  
  
Hyponatremia - improving  
· RZ 927, follow BMP · Continue 1L fluid restriction  
  
Type II Diabetes with Hypoglycemia on admission · Follow glucose · Hyperglycemia with prednisone - start SSI  
  
Atrial Fibrillation with RVR -  Now rate controlled · History of A fib with RVR while on HD · Responded to RRT  for afib while patient ending HD treatment · Appreciate Cardiology input - increased dose of diltiazem  
· Continue PO dilt, carvedilol · Recent ECHO  - EF 55-60%.  Normal cavity size, wall thickness and systolic function.  
  
Ventral Hernia Mesh with Bleeding - resolved 
  
Anemia of Chronic Disease · Stable · Follow CBC  
  
Moderate Protein Calorie Malnutrition Chronic Debility · Continue multivitamin, thiamine, folic acid, nephrocap · Consult PT/OT 
  
Hepatitis C infection hx Monitored at [address] [place] [address] hepatology clinic Hx of PVD s/p thrombectomy Hx of Splenectomy and partial pancreatectomy Chronic constipation - pt takes mag citrate at home PRN History of ETOH abuse   
History of COPD/smoker 
  
18.5 - 24.9 Normal weight / Body mass index is 23.26 kg/m². 
  
Code status: Full Prophylaxis: Hep SQ Recommended Disposition: Home w/Family Subjective: Chief Complaint / Reason for Physician Visit Patient seen at bedside, he is more SOB today. Reports he does not feel as well as he did yesterday. Complains of chronic back pain, getting scheduled percocet. 1700 : Updated wife and son at bedside, discusses goals of care and may need hospice. Discussed with RN events overnight. Review of Systems: 
Symptom Y/N Comments  Symptom Y/N Comments Fever/Chills n   Chest Pain n   
Poor Appetite n   Edema n   
Cough y   Abdominal Pain n   
Sputum y   Joint Pain n   
SOB/PRITCHETT y   Pruritis/Rash n   
Nausea/vomit n   Tolerating PT/OT n Diarrhea n   Tolerating Diet y Constipation n   Other y Back pain Could NOT obtain due to:   
 
Objective: VITALS:  
Last 24hrs VS reviewed since prior progress note. Most recent are: 
Patient Vitals for the past 24 hrs: 
 Temp Pulse Resp BP SpO2  
11/30/20 1531 97.4 °F (36.3 °C) 70 18 (!) 85/50 100 % 11/30/20 1522     95 % 11/30/20 1318 98.2 °F (36.8 °C) 92 17 (!) 83/53 98 % 11/30/20 1228 97.8 °F (36.6 °C) 82 20 (!) 119/55   
11/30/20 1215  76 20 101/60   
11/30/20 1200  72 20 (!) 87/50   
11/30/20 1145  86 20 101/64   
11/30/20 1130  93 20 (!) 86/49   
11/30/20 1115  78 20 (!) 83/37   
11/30/20 1100  71 20 (!) 95/36  11/30/20 1050  87  (!) 84/45   
11/30/20 1045  78 20 (!) 58/33   
11/30/20 1030  87 20 (!) 87/46   
11/30/20 1015  92 20 (!) 102/54   
11/30/20 1000  85 20 (!) 88/40   
11/30/20 0945  (!) 103 20 100/62   
11/30/20 0930  94 20 (!) 92/54   
11/30/20 0915  97 20 104/60   
11/30/20 0900 98 °F (36.7 °C) 96 20 (!) 94/57   
11/30/20 0819 98 °F (36.7 °C) 80 20 (!) 115/58 96 % 11/30/20 0331 97.7 °F (36.5 °C) 95 18 94/62 95 % 11/30/20 0015 97.6 °F (36.4 °C) 65 18 (!) 92/53 96 % 11/29/20 2026     96 % 11/29/20 1913 98 °F (36.7 °C) 98 16 (!) 106/58 96 % 11/29/20 1630 98.1 °F (36.7 °C) 81 15 (!) 95/57 96 % Intake/Output Summary (Last 24 hours) at 11/30/2020 1609 Last data filed at 11/30/2020 1228 Gross per 24 hour Intake 850 ml Output 2300 ml Net -1450 ml I had a face to face encounter and independently examined this patient on 11/30/2020, as outlined below: PHYSICAL EXAM: 
General: WD, WN. Alert, ill appearing and frail  male EENT:  EOMI. Anicteric sclerae. MMM Resp:  LS coarse with scattered wheezing. No accessory muscle use CV:  Regular  rhythm,  No edema GI:  Soft, Non distended, Non tender. +Bowel sounds Neurologic:  Alert and oriented X 3, normal speech, Psych:   Good insight. Not anxious nor agitated Skin:  No rashes. No jaundice Reviewed most current lab test results and cultures  YES Reviewed most current radiology test results   YES Review and summation of old records today    NO Reviewed patient's current orders and MAR    YES 
PMH/SH reviewed - no change compared to H&P 
________________________________________________________________________ Care Plan discussed with: 
  Comments Patient x Family  x   
RN x Care Manager x Consultant  x ID Multidiciplinary team rounds were held today with , nursing, pharmacist and clinical coordinator.   Patient's plan of care was discussed; medications were reviewed and discharge planning was addressed. ________________________________________________________________________ Total NON critical care TIME:  25   Minutes Total CRITICAL CARE TIME Spent:   Minutes non procedure based Comments >50% of visit spent in counseling and coordination of care x   
________________________________________________________________________ Lisa Mantilla NP Procedures: see electronic medical records for all procedures/Xrays and details which were not copied into this note but were reviewed prior to creation of Plan. LABS: 
I reviewed today's most current labs and imaging studies. Pertinent labs include: 
Recent Labs 11/30/20 
0023 11/29/20 
0902 11/28/20 
0285 WBC 11.7* 13.7* 9.1 HGB 7.5* 7.7* 8.8* HCT 23.8* 23.8* 26.8*  
* 106* 105* Recent Labs 11/30/20 0023 11/29/20 
0902 11/28/20 
5759 * 134* 131* K 4.8 4.8 6.2*  
CL 97 99 96* CO2 28 28 27 * 206* 126* BUN 60* 50* 83* CREA 5.01* 4.29* 6.49* CA 8.6 8.8 8.7 Signed: Lisa Mantilla NP
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
## 2020-11-30 NOTE — PROGRESS NOTES
HERMAN: 
 
[facility] Possible Palliative CM: Scottie Muller is currently working with pt in the Neuro Unit. CM discussed case during IDRs, regarding pt's care. Pt is known to be dialysis pt. Therapy currently pending eval to work with pt by bedside to determine pt's baseline. CM will verify with MD to verify if palliative team should be consulted due to pt's presenting diagnosis, and to address code status (FULL to DNR). CM will leave FYI for MD to acknowledge. CM will send referrals and discuss recommendations with pt's family as deemed necessary. CM will enter delay. CM will continue to follow. Scottie Muller, RHINA, [address] [facility]

## 2020-11-30 NOTE — PROGRESS NOTES
Problem: Falls - Risk of 
Goal: *Absence of Falls Description: Document Susan Pete Fall Risk and appropriate interventions in the flowsheet. Outcome: Progressing Towards Goal 
Note: Fall Risk Interventions: 
Mobility Interventions: Bed/chair exit alarm, Patient to call before getting OOB, Utilize gait belt for transfers/ambulation Mentation Interventions: Adequate sleep, hydration, pain control, Bed/chair exit alarm, Door open when patient unattended, Gait belt with transfers/ambulation, Increase mobility, More frequent rounding, Toileting rounds, Update white board Medication Interventions: Bed/chair exit alarm, Patient to call before getting OOB, Teach patient to arise slowly, Utilize gait belt for transfers/ambulation Elimination Interventions: Bed/chair exit alarm, Call light in reach, Patient to call for help with toileting needs, Toileting schedule/hourly rounds Problem: Patient Education: Go to Patient Education Activity Goal: Patient/Family Education Outcome: Progressing Towards Goal 
  
Problem: Pressure Injury - Risk of 
Goal: *Prevention of pressure injury Description: Document Walter Scale and appropriate interventions in the flowsheet. Outcome: Progressing Towards Goal 
Note: Pressure Injury Interventions: 
Sensory Interventions: Assess changes in LOC, Float heels, Keep linens dry and wrinkle-free Moisture Interventions: Absorbent underpads, Apply protective barrier, creams and emollients, Minimize layers Activity Interventions: Assess need for specialty bed, Pressure redistribution bed/mattress(bed type) Mobility Interventions: Assess need for specialty bed, Pressure redistribution bed/mattress (bed type) Nutrition Interventions: Document food/fluid/supplement intake Friction and Shear Interventions: Apply protective barrier, creams and emollients, HOB 30 degrees or less, Minimize layers Problem: Patient Education: Go to Patient Education Activity Goal: Patient/Family Education Outcome: Progressing Towards Goal 
  
Problem: Chronic Obstructive Pulmonary Disease (COPD) Goal: *Oxygen saturation during activity within specified parameters Outcome: Progressing Towards Goal 
Goal: *Able to remain out of bed as prescribed Outcome: Progressing Towards Goal 
Goal: *Absence of hypoxia Outcome: Progressing Towards Goal 
Goal: *Optimize nutritional status Outcome: Progressing Towards Goal 
  
Problem: Patient Education: Go to Patient Education Activity Goal: Patient/Family Education Outcome: Progressing Towards Goal 
  
Problem: Breathing Pattern - Ineffective Goal: *Absence of hypoxia Outcome: Progressing Towards Goal 
Goal: *Use of effective breathing techniques Outcome: Progressing Towards Goal 
  
Problem: Patient Education: Go to Patient Education Activity Goal: Patient/Family Education Outcome: Progressing Towards Goal
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
## 2020-11-30 NOTE — PROGRESS NOTES
Nephrology Progress Note Hugo Santamaria  
 
[facility]. [facility]  Phone - [phone] Patient: Rod Ferguson. YOB: 1965     Admit Date: 11/23/2020 Date- 11/30/2020 CC: Follow up for ESRD IMPRESSION & PLAN:  
· ESRD -[id number]-[id number]-FXP · Hyperkalemia · Hyponatremia- fluid overload · Anemia of CKD · Secondary hyperparathyroidism · COPD · Current/ longterm smoker · DM 
· Afib- PLAN- 
? Hd today ? Remove 3 kg ? Fluid restriction 1500 ml per day ? Continue coreg and cardizem ? epogen mwf Subjective: Interval History:  
-  bp stable Sob improved. k normal 
 
 No c/o chest pain, No c/o nausea or vomiting, No c/o  fever. ROS:- As documented above Objective:  
Vitals:  
 11/29/20 2026 11/30/20 0015 11/30/20 0331 11/30/20 3210 BP:  (!) 92/53 94/62 (!) 115/58 Pulse:  65 95 80 Resp:  18 18 20 Temp:  97.6 °F (36.4 °C) 97.7 °F (36.5 °C) 98 °F (36.7 °C) TempSrc:      
SpO2: 96% 96% 95% 96% Weight:      
Height:      
  
11/29 0701 - 11/30 0700 In: 850 [P.O.:850] Out: - Last 3 Recorded Weights in this Encounter 11/26/20 3987 11/27/20 0735 11/28/20 1214 Weight: 68.4 kg (150 lb 12.8 oz) 70.1 kg (154 lb 8.7 oz) 67.1 kg (147 lb 14.4 oz) Physical exam:  
GEN: NAD NECK- Supple, no mass RESP: No wheezing,coarse b/l CVS: S1,S2  RRR 
NEURO: Normal speech, Non focal 
Abdo- soft, NT 
EXT: right arm avf + PSYCH: Normal Mood Chart reviewed. Pertinent Notes reviewed. Data Review : 
Recent Labs 11/30/20 
0023 11/29/20 
0902 11/28/20 
5298 * 134* 131* K 4.8 4.8 6.2*  
CL 97 99 96* CO2 28 28 27 BUN 60* 50* 83* CREA 5.01* 4.29* 6.49* * 206* 126* CA 8.6 8.8 8.7 Recent Labs 11/30/20 
0023 11/29/20 
0902 11/28/20 
8243 WBC 11.7* 13.7* 9.1 HGB 7.5* 7.7* 8.8* HCT 23.8* 23.8* 26.8*  
* 106* 105* No results for input(s): FE, TIBC, PSAT, FERR in the last 72 hours. Medication list  reviewed Current Facility-Administered Medications Medication Dose Route Frequency  iron sucrose (VENOFER) injection 100 mg  100 mg IntraVENous ONCE  
 insulin lispro (HUMALOG) injection   SubCUTAneous AC&HS  
 glucose chewable tablet 16 g  4 Tab Oral PRN  
 dextrose (D50W) injection syrg 12.5-25 g  12.5-25 g IntraVENous PRN  
 levalbuterol (XOPENEX) nebulizer soln 0.63 mg/3 mL  0.63 mg Nebulization TID RT  
 predniSONE (DELTASONE) tablet 40 mg  40 mg Oral DAILY WITH BREAKFAST  guaiFENesin ER (MUCINEX) tablet 600 mg  600 mg Oral Q12H  
 epoetin ginger-epbx (RETACRIT) 12,000 Units combo injection  12,000 Units SubCUTAneous Q MON, WED & FRI  
 ipratropium (ATROVENT) 0.02 % nebulizer solution 0.5 mg  0.5 mg Nebulization Q6H PRN And  
 budesonide (PULMICORT) 250 mcg/2ml nebulizer susp  250 mcg Nebulization BID RT And  
 arformoteroL (BROVANA) neb solution 15 mcg  15 mcg Nebulization BID RT  
 zinc oxide-cod liver oil (DESITIN) 40 % paste   Topical BID  albumin human 25% (BUMINATE) solution 25 g  25 g IntraVENous DIALYSIS PRN  
 albuterol (PROVENTIL VENTOLIN) nebulizer solution 2.5 mg  2.5 mg Nebulization Q4H PRN  
 dilTIAZem ER (CARDIZEM CD) capsule 240 mg  240 mg Oral DAILY  sodium chloride (NS) flush 5-10 mL  5-10 mL IntraVENous PRN  
 dextrose (D50W) injection syrg 25 g  50 mL IntraVENous PRN  
 B complex-vitaminC-folic acid (NEPHROCAP) cap  1 Cap Oral DAILY  ergocalciferol capsule 50,000 Units  50,000 Units Oral every Monday  oxyCODONE-acetaminophen (PERCOCET 10)  mg per tablet 1 Tab  1 Tab Oral TID  sevelamer carbonate (RENVELA) tab 1,600 mg  1,600 mg Oral TID  benzonatate (TESSALON) capsule 100 mg  100 mg Oral TID PRN  thiamine mononitrate (B-1) tablet 100 mg  100 mg Oral DAILY  multivitamin, tx-iron-ca-min (THERA-M w/ IRON) tablet 1 Tab  1 Tab Oral DAILY  senna-docusate (PERICOLACE) 8.6-50 mg per tablet 1 Tab  1 Tab Oral DAILY PRN  
  nystatin (MYCOSTATIN) 100,000 unit/mL oral suspension 500,000 Units  500,000 Units Oral QID  nicotine (NICODERM CQ) 14 mg/24 hr patch 1 Patch  1 Patch TransDERmal Q24H  
 guaiFENesin-dextromethorphan (ROBITUSSIN DM) 100-10 mg/5 mL syrup 10 mL  10 mL Oral Q5V PRN  
 folic acid (FOLVITE) tablet 1 mg  1 mg Oral DAILY  carvediloL (COREG) tablet 6.25 mg  6.25 mg Oral BID WITH MEALS  glucose chewable tablet 16 g  4 Tab Oral PRN  
 glucagon (GLUCAGEN) injection 1 mg  1 mg IntraMUSCular PRN  
 sodium chloride (NS) flush 5-40 mL  5-40 mL IntraVENous Q8H  
 sodium chloride (NS) flush 5-40 mL  5-40 mL IntraVENous PRN  
 acetaminophen (TYLENOL) tablet 650 mg  650 mg Oral Q6H PRN Or  
 acetaminophen (TYLENOL) suppository 650 mg  650 mg Rectal Q6H PRN  polyethylene glycol (MIRALAX) packet 17 g  17 g Oral DAILY PRN  promethazine (PHENERGAN) tablet 12.5 mg  12.5 mg Oral Q6H PRN Or  
 ondansetron (ZOFRAN) injection 4 mg  4 mg IntraVENous Q6H PRN  
 heparin (porcine) injection 5,000 Units  5,000 Units SubCUTAneous Q8H  
 cefepime (MAXIPIME) 1 g in 0.9% sodium chloride (MBP/ADV) 50 mL MBP  1 g IntraVENous Q24H  
 acetylcysteine (MUCOMYST) 200 mg/mL (20 %) solution 200 mg  200 mg Nebulization TID RT Rhys Poor, [address] [facility], [address] [place], [address] Phone - [phone]               Fax - [phone]

## 2020-11-30 NOTE — PROGRESS NOTES
Physician Progress Note Inez Yip 
CSN #:                  [id number] :                       1965 ADMIT DATE:       2020 11:01 AM 
DISCH DATE: 
RESPONDING 
PROVIDER #:        CORY BYNUM NP 
 
 
 
 
QUERY TEXT: 
 
Pt admitted with hypoglycemia. Noted documentation of sepsis (ED). If possible, please document in progress notes and discharge summary: The medical record reflects the following: 
Risk Factors: Recent Pseudomonas pneumonia, did not take abx on dc,  esrd, chr resp failure, DM, etoh Clinical Indicators: t 93, wbc 18--12, bands 1, lac 0.40, hr , ED \"sepsis\" Treatment: Complete 14 days total (including last admission) of antibiotics for his Pseudomonas pneumonia, Mucomyst, Thanks, Anthony Swan RN/CDI  Options provided: -- sepsis confirmed present on admission -- sepsis ruled out 
-- Other - I will add my own diagnosis -- Disagree - Not applicable / Not valid -- Disagree - Clinically unable to determine / Unknown 
-- Refer to Clinical Documentation Reviewer PROVIDER RESPONSE TEXT: 
 
The diagnosis of sepsis was confirmed as present on admission.  
 
Query created by: Anastacio Romo on 2020 11:33 AM 
 
 
Electronically signed by:  Rajni Perea NP 2020 12:03 PM

## 2020-11-30 NOTE — PROGRESS NOTES
Patient has returned from dialysis and is demanding his pain pill which is not due until 5pm. NP has been made aware and RN requested pain medication PRN; However, NP stated that patient cannot have anymore pain medication until she sees him in person and due to his low BP reading of 83/53. Patient was made aware and is refusing all medication until he receives pain medication.

## 2020-11-30 NOTE — PROCEDURES
[facility] Dialysis Team [place]  [phone] Vitals   Pre   Post   Assessment   Pre   Post    
Temp  Temp: 98 °F (36.7 °C) (11/30/20 0900)  97.8 LOC  AxOx4 AxOx4 HR   Pulse (Heart Rate): 96 (11/30/20 0900) 82 Lungs   Coarse / intermittent cough Even and unlabored B/P   BP: (!) 94/57 (11/30/20 0900) 119/55 Cardiac   Controlled Afib Controlled Afib Resp   Resp Rate: 20 (11/30/20 0900) 20 Skin   intact intact Pain level  Pain Intensity 1: 0 (11/30/20 0331)  Edema  generalized 
 generalized Orders: Duration:   Start:    9450 End:    1230 Total:   3.5 hrs Dialyzer:   Dialyzer/Set Up Inspection: [place] (11/30/20 0900) K Bath:   Dialysate K (mEq/L): 2 (11/30/20 0900) Ca Bath:   Dialysate CA (mEq/L): 2.5 (11/30/20 0900) Na/Bicarb:   Dialysate NA (mEq/L): 138 (11/30/20 0900) Target Fluid Removal:   Goal/Amount of Fluid to Remove (mL): 3000 mL (11/30/20 0900) Access Type & Location:   Right upper arm AV Fistula without evidence of warmth, redness, or drainage. +thrill/+bruit. Each access site disinfected for 60 seconds per site with alcohol swabs per P&P. Cannulated with 15G needles x2 and secured with paper tape. +aspiration/+flushed. Labs Obtained/Reviewed Critical Results Called   Date when labs were drawn- 
Hgb-   
HGB Date Value Ref Range Status 11/30/2020 7.5 (L) 12.1 - 17.0 g/dL Final  
 
K-   
Potassium Date Value Ref Range Status 11/30/2020 4.8 3.5 - 5.1 mmol/L Final  
 
Ca-  
Calcium Date Value Ref Range Status 11/30/2020 8.6 8.5 - 10.1 MG/DL Final  
 
Bun-  
BUN Date Value Ref Range Status 11/30/2020 60 (H) 6 - 20 MG/DL Final  
 
Creat-  
Creatinine Date Value Ref Range Status 11/30/2020 5.01 (H) 0.70 - 1.30 MG/DL Final  
 
  
Medications/ Blood Products Given Name   Dose   Route and Time Venofer 100mg/5mL  @ 1030 Albumin 25% 25 g @1000, 1115 Blood Volume Processed (BVP):   87.4 L Net Fluid Removed:  2300 mL Comments Time Out Done: 0880 Primary Nurse Rpt Pre: Gage Redman RN 
Primary Nurse Rpt Post: 
Pt Education: infection control / procedural  
Care Plan: continue current HD plan of care Tx Summary:  
3426 HD treatment initiated per physician order. 1000 BP 88/40. Albumin initiated 1030 BP 87/46. Albumin infusing / rate increased 1045 BP 58/33. Patient asymptomatic. UF off.  mL bolus given. Albumin infusing. Goal reduced. 1050 BP 84/45. UF remains off. 
1100 BP 95/36. UF back on. 
1115 BP 83/37. Albumin #2 initiated. Still asymptomatic. 1215 Albumin completed. 1230 HD treatment completed per physician order. All possible blood returned to patient. Hemostasis achieved in <10 minutes. Access site dressings clean, dry, and intact. +thrill. Admiting Diagnosis: Altered Mental Status Pt's previous clinic-  
Consent signed - Informed Consent Verified: Yes (11/30/20 0900) [place]ita Consent - verified Hepatitis Status- 1/14/20 neg/imm (cc) Machine #- Machine Number: [id number] (11/30/20 0900) Telemetry status- remote Pre-dialysis wt. -

## 2020-12-01 NOTE — PROGRESS NOTES
Hospitalist Progress Note NAME: Layla Schultz. :  1965 MRN:  [id number] Assessment / Plan: Metabolic Encephalopathy with AMS Hypoglycemia, Hypothermic POA - resolved Acute on Chronic Respiratory Failure with Hypoxia PNA - recently d/c from hospital last week with Pseudomonas PNA ESRD on Hemodialysis Leukocytosis  
Back to baseline mental status. Remains afebrile, WBC trending down with oral prednisone - follow CBC CXR  - Bilateral nonspecific atelectasis vs edema vs pneumonia. Right pleural effusion. No significant change. Recent admission for PNA - did not fill prescriptions (for antibiotics) at discharge, wife stated pharmacy was not open to fill prescriptions at discharge . Monday morning patient became acutely confused and she called EMS.    
On Baseline 3 LPM - now tolerating  
Appreciate Pulmonology input - will need 2-3 weeks of TMP SMX Sputum Culture - HEAVY STENOTHROPHOMONAS Final   
Completed course of Cefepime ID following 
 
  
Hyperkalemia in setting of ESRD - resolved  
End Stage Renal Disease, MWD HD   
Hyperkalemia resolved. HD per nephrology  
  
Hyponatremia - improving  
AP 827, follow BMP  
  
Type II Diabetes with Hypoglycemia on admission · Follow glucose  
· Hyperglycemia with prednisone - c/w sliding scale 
  
Atrial Fibrillation with RVR -  Now rate controlled · History of A fib with RVR while on HD · Responded to RRT  for afib while patient ending HD treatment · Appreciate Cardiology input - increased dose of diltiazem  
· Continue PO dilt, carvedilol · Recent ECHO  - EF 55-60%. Normal cavity size, wall thickness and systolic function.  
  
Ventral Hernia Mesh with Bleeding - resolved 
  
Anemia of Chronic Disease · Stable · Follow CBC  
  
Moderate Protein Calorie Malnutrition Chronic Debility · Continue multivitamin, thiamine, folic acid, nephrocap · Consult PT/OT 
  
 Hepatitis C infection hx Monitored at [facility] hepatology clinic Hx of PVD s/p thrombectomy Hx of Splenectomy and partial pancreatectomy Chronic constipation - pt takes mag citrate at home PRN History of ETOH abuse   
History of COPD/smoker 
  
18.5 - 24.9 Normal weight / Body mass index is 23.26 kg/m². 
  
Code status: Full Prophylaxis: Hep SQ Recommended Disposition: Home w/Family Subjective: Chief Complaint / Reason for Physician Visit Follow up for pneumonia Review of Systems: 
Symptom Y/N Comments  Symptom Y/N Comments Fever/Chills n   Chest Pain n   
Poor Appetite n   Edema n   
Cough n   Abdominal Pain n   
Sputum    Joint Pain SOB/PRITCHETT    Pruritis/Rash Nausea/vomit    Tolerating PT/OT Diarrhea    Tolerating Diet Constipation    Other Could NOT obtain due to:   
 
Objective: VITALS:  
Last 24hrs VS reviewed since prior progress note. Most recent are: 
Patient Vitals for the past 24 hrs: 
 Temp Pulse Resp BP SpO2  
12/01/20 1544 98.2 °F (36.8 °C) 88 18 104/89 93 % 12/01/20 1319     93 % 12/01/20 1205 97.9 °F (36.6 °C) 64 20 116/62 91 % 12/01/20 0918     94 % 12/01/20 0839 97.3 °F (36.3 °C) (!) 58 18 101/63 96 % 12/01/20 0409 97.9 °F (36.6 °C) 63 16 (!) 104/53 94 % 11/30/20 2351 98 °F (36.7 °C) (!) 55 18 97/64 99 % 11/30/20 2037 97.9 °F (36.6 °C) 82 18 (!) 106/52 100 % 11/30/20 2025     98 % Intake/Output Summary (Last 24 hours) at 12/1/2020 1721 Last data filed at 12/1/2020 1503 Gross per 24 hour Intake 996 ml Output  Net 996 ml I had a face to face encounter and independently examined this patient on 12/1/2020, as outlined below: PHYSICAL EXAM: 
General: WD, WN. Alert, cooperative, no acute distress EENT:  EOMI. Anicteric sclerae. MMM Resp:  CTA bilaterally, no wheezing or rales. No accessory muscle use CV:  Regular  rhythm,  No edema GI:  Soft, Non distended, Non tender. +Bowel sounds Neurologic:  Alert and oriented X 3, normal speech, Psych:   Good insight. Not anxious nor agitated Skin:  No rashes. No jaundice Reviewed most current lab test results and cultures  YES Reviewed most current radiology test results   YES Review and summation of old records today    NO Reviewed patient's current orders and MAR    YES 
PMH/SH reviewed - no change compared to H&P 
________________________________________________________________________ Care Plan discussed with: 
  Comments Patient y Family RN y   
Care Manager Consultant Multidiciplinary team rounds were held today with , nursing, pharmacist and clinical coordinator. Patient's plan of care was discussed; medications were reviewed and discharge planning was addressed. ________________________________________________________________________ Total NON critical care TIME: 35   Minutes Total CRITICAL CARE TIME Spent:   Minutes non procedure based Comments >50% of visit spent in counseling and coordination of care    
________________________________________________________________________ Donovan Ramirez MD  
 
Procedures: see electronic medical records for all procedures/Xrays and details which were not copied into this note but were reviewed prior to creation of Plan. LABS: 
I reviewed today's most current labs and imaging studies. Pertinent labs include: 
Recent Labs 12/01/20 
5712 11/30/20 
0023 11/29/20 
0332 WBC 9.4 11.7* 13.7* HGB 7.1* 7.5* 7.7* HCT 22.6* 23.8* 23.8*  
* 123* 106* Recent Labs 12/01/20 
0021 11/30/20 
0023 11/29/20 
7139 * 133* 134* K 4.3 4.8 4.8  
CL 97 97 99 CO2 31 28 28 * 110* 206* BUN 32* 60* 50* CREA 3.26* 5.01* 4.29* CA 7.8* 8.6 8.8 Signed: Donovan Ramirez MD

## 2020-12-01 NOTE — PROGRESS NOTES
Nephrology Progress Note Hugo Santamaria  
 
[facility]. [facility]  Phone - [phone] Patient: Adalgisa Hill. YOB: 1965     Admit Date: 11/23/2020 Date- 12/1/2020 CC: Follow up for ESRD IMPRESSION & PLAN:  
· ESRD -UHFY-Ctgvnly-OEL · Hyponatremia- fluid overload · Anemia of CKD/ chronic inflammation/sepsis · Secondary hyperparathyroidism · Severe COPD · Stenotrophomonas bronchopneumonia/recent Pseudomonas PNA · Current/ longterm smoker · DM 
· Afib PLAN- 
? No Hd today ? HD tomorrow per MWF schedule ? Renal diet, Fluid restriction 1200 ml per day ? Cont epogen MWF ? Please remove triple lumen catheter in R femoral if no longer needed( not in use on the time of exam) ? Transfuse PRN PRBC to keep Hb>7 Subjective: Interval History:  
-  BP/HR stable Sob improving No c/o chest pain, No c/o nausea or vomiting, No c/o  fever. ROS:- As documented above Objective:  
Vitals:  
 12/01/20 0839 12/01/20 0918 12/01/20 1205 12/01/20 1319 BP: 101/63  116/62 Pulse: (!) 58  64 Resp: 18  20 Temp: 97.3 °F (36.3 °C)  97.9 °F (36.6 °C) TempSrc:      
SpO2: 96% 94% 91% 93% Weight:      
Height:      
  
11/30 0701 - 12/01 0700 In: 318 [P.O.:318] Out: 2300 Last 3 Recorded Weights in this Encounter 11/27/20 0735 11/28/20 1214 11/30/20 1736 Weight: 70.1 kg (154 lb 8.7 oz) 67.1 kg (147 lb 14.4 oz) 67.6 kg (149 lb) Physical exam:  
GEN: NAD NECK- Supple, no mass RESP: No wheezing,coarse b/l CVS: S1,S2  RRR 
NEURO: Normal speech, Non focal 
Abdo- soft, NT 
EXT: right arm avf +, no edema Triple lumen catheter in R femoral  
 
Chart reviewed. Pertinent Notes reviewed. Data Review : 
Recent Labs 12/01/20 
5489 11/30/20 
0023 11/29/20 
9077 * 133* 134* K 4.3 4.8 4.8  
CL 97 97 99 CO2 31 28 28 BUN 32* 60* 50* CREA 3.26* 5.01* 4.29* * 110* 206* CA 7.8* 8.6 8.8 Recent Labs 12/01/20 
3967 11/30/20 
0023 11/29/20 
6790 WBC 9.4 11.7* 13.7* HGB 7.1* 7.5* 7.7* HCT 22.6* 23.8* 23.8*  
* 123* 106* No results for input(s): FE, TIBC, PSAT, FERR in the last 72 hours. Medication list  reviewed Current Facility-Administered Medications Medication Dose Route Frequency  [START ON 12/2/2020] predniSONE (DELTASONE) tablet 20 mg  20 mg Oral DAILY WITH BREAKFAST  ipratropium (ATROVENT) 0.02 % nebulizer solution 0.5 mg  0.5 mg Nebulization Q6H RT And  
 budesonide (PULMICORT) 250 mcg/2ml nebulizer susp  250 mcg Nebulization BID RT And  
 arformoteroL (BROVANA) neb solution 15 mcg  15 mcg Nebulization BID RT  
 trimethoprim-sulfamethoxazole (BACTRIM) 335.52 mg in dextrose 5% 500 mL  5 mg/kg/day IntraVENous Q24H  
 levalbuterol (XOPENEX) nebulizer soln 0.63 mg/3 mL  0.63 mg Nebulization Q4H PRN  polyethylene glycol (MIRALAX) packet 17 g  17 g Oral DAILY PRN  
 insulin lispro (HUMALOG) injection   SubCUTAneous AC&HS  
 glucose chewable tablet 16 g  4 Tab Oral PRN  
 dextrose (D50W) injection syrg 12.5-25 g  12.5-25 g IntraVENous PRN  
 levalbuterol (XOPENEX) nebulizer soln 0.63 mg/3 mL  0.63 mg Nebulization TID RT  
 guaiFENesin ER (MUCINEX) tablet 600 mg  600 mg Oral Q12H  
 epoetin ginger-epbx (RETACRIT) 12,000 Units combo injection  12,000 Units SubCUTAneous Q MON, WED & FRI  zinc oxide-cod liver oil (DESITIN) 40 % paste   Topical BID  albumin human 25% (BUMINATE) solution 25 g  25 g IntraVENous DIALYSIS PRN  
 dilTIAZem ER (CARDIZEM CD) capsule 240 mg  240 mg Oral DAILY  sodium chloride (NS) flush 5-10 mL  5-10 mL IntraVENous PRN  
 dextrose (D50W) injection syrg 25 g  50 mL IntraVENous PRN  
 B complex-vitaminC-folic acid (NEPHROCAP) cap  1 Cap Oral DAILY  ergocalciferol capsule 50,000 Units  50,000 Units Oral every Monday  oxyCODONE-acetaminophen (PERCOCET 10)  mg per tablet 1 Tab  1 Tab Oral TID  sevelamer carbonate (RENVELA) tab 1,600 mg  1,600 mg Oral TID  benzonatate (TESSALON) capsule 100 mg  100 mg Oral TID PRN  thiamine mononitrate (B-1) tablet 100 mg  100 mg Oral DAILY  multivitamin, tx-iron-ca-min (THERA-M w/ IRON) tablet 1 Tab  1 Tab Oral DAILY  senna-docusate (PERICOLACE) 8.6-50 mg per tablet 1 Tab  1 Tab Oral DAILY PRN  
 nystatin (MYCOSTATIN) 100,000 unit/mL oral suspension 500,000 Units  500,000 Units Oral QID  nicotine (NICODERM CQ) 14 mg/24 hr patch 1 Patch  1 Patch TransDERmal Q24H  
 guaiFENesin-dextromethorphan (ROBITUSSIN DM) 100-10 mg/5 mL syrup 10 mL  10 mL Oral W0I PRN  
 folic acid (FOLVITE) tablet 1 mg  1 mg Oral DAILY  carvediloL (COREG) tablet 6.25 mg  6.25 mg Oral BID WITH MEALS  glucose chewable tablet 16 g  4 Tab Oral PRN  
 glucagon (GLUCAGEN) injection 1 mg  1 mg IntraMUSCular PRN  
 sodium chloride (NS) flush 5-40 mL  5-40 mL IntraVENous Q8H  
 sodium chloride (NS) flush 5-40 mL  5-40 mL IntraVENous PRN  
 acetaminophen (TYLENOL) tablet 650 mg  650 mg Oral Q6H PRN Or  
 acetaminophen (TYLENOL) suppository 650 mg  650 mg Rectal Q6H PRN  polyethylene glycol (MIRALAX) packet 17 g  17 g Oral DAILY PRN  promethazine (PHENERGAN) tablet 12.5 mg  12.5 mg Oral Q6H PRN Or  
 ondansetron (ZOFRAN) injection 4 mg  4 mg IntraVENous Q6H PRN  
 heparin (porcine) injection 5,000 Units  5,000 Units SubCUTAneous Q8H  
 acetylcysteine (MUCOMYST) 200 mg/mL (20 %) solution 200 mg  200 mg Nebulization TID RT Kehinde Powell, [address]  [facility] [place] [facility] [employer] [address] [place], [address] Phone - [phone]               Fax - [phone]

## 2020-12-01 NOTE — PROGRESS NOTES
Palliative Medicine [place]: [phone] ([id number]) [facility]: [phone] ([phone]) Palliative Reconsult for Code status discussion. Discussed with Efren Martinez CM that Palliative team, MONA Black saw patient on 11/23/20. Code status was addressed at that time. Patient's/ family's goals were clear, for full restorative treatment and Full Code status. It would be redundant for Palliative team to see patient to address Code status again today. Have asked for consult to be canceled, which CM did agree with.

## 2020-12-01 NOTE — PROGRESS NOTES
Patient is ringing out frustrated, demanding his pain medication. It has been explained multiple times that his pain medication is not due until 4pm. Patient is refusing to be turned and repositioned and continues to remove pillows from under his heels. Patient has a history of pilonidal cysts according to wife. Wound care has been reconsulted
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
## 2020-12-01 NOTE — PROGRESS NOTES
Problem: Falls - Risk of 
Goal: *Absence of Falls Description: Document Allen Click Fall Risk and appropriate interventions in the flowsheet. Outcome: Progressing Towards Goal 
Note: Fall Risk Interventions: 
Mobility Interventions: Bed/chair exit alarm, Patient to call before getting OOB, Utilize gait belt for transfers/ambulation Mentation Interventions: Adequate sleep, hydration, pain control, Bed/chair exit alarm, Door open when patient unattended, Gait belt with transfers/ambulation, Increase mobility, More frequent rounding, Toileting rounds, Update white board Medication Interventions: Bed/chair exit alarm, Patient to call before getting OOB, Teach patient to arise slowly, Utilize gait belt for transfers/ambulation Elimination Interventions: Bed/chair exit alarm, Call light in reach, Patient to call for help with toileting needs, Toileting schedule/hourly rounds Problem: Patient Education: Go to Patient Education Activity Goal: Patient/Family Education Outcome: Progressing Towards Goal 
  
Problem: Pressure Injury - Risk of 
Goal: *Prevention of pressure injury Description: Document Waletr Scale and appropriate interventions in the flowsheet. Outcome: Progressing Towards Goal 
Note: Pressure Injury Interventions: 
Sensory Interventions: Assess changes in LOC, Float heels, Keep linens dry and wrinkle-free Moisture Interventions: Absorbent underpads, Minimize layers Activity Interventions: Assess need for specialty bed, Pressure redistribution bed/mattress(bed type) Mobility Interventions: Assess need for specialty bed, Pressure redistribution bed/mattress (bed type) Nutrition Interventions: Document food/fluid/supplement intake Friction and Shear Interventions: Feet elevated on foot rest, HOB 30 degrees or less, Minimize layers Problem: Patient Education: Go to Patient Education Activity Goal: Patient/Family Education Outcome: Progressing Towards Goal 
 Problem: Chronic Obstructive Pulmonary Disease (COPD) Goal: *Oxygen saturation during activity within specified parameters Outcome: Progressing Towards Goal 
Goal: *Able to remain out of bed as prescribed Outcome: Progressing Towards Goal 
Goal: *Absence of hypoxia Outcome: Progressing Towards Goal 
Goal: *Optimize nutritional status Outcome: Progressing Towards Goal 
  
Problem: Patient Education: Go to Patient Education Activity Goal: Patient/Family Education Outcome: Progressing Towards Goal 
  
Problem: Breathing Pattern - Ineffective Goal: *Absence of hypoxia Outcome: Progressing Towards Goal 
Goal: *Use of effective breathing techniques Outcome: Progressing Towards Goal 
  
Problem: Patient Education: Go to Patient Education Activity Goal: Patient/Family Education Outcome: Progressing Towards Goal

## 2020-12-01 NOTE — PROGRESS NOTES
Spiritual Care Assessment/Progress Note [address] 
 
 
NAME: [employer]. MRN: [id number] AGE: 60 y.o. SEX: male Zoroastrian Affiliation: Orthodox  
Language: English  
 
12/1/2020     Total Time (in minutes): 11 Spiritual Assessment begun in [facility] NEUROSCIENCE TELEMETRY through conversation with: 
  
    [x]Patient        [] Family    [] Friend(s) Reason for Consult: Palliative Care, Initial/Spiritual Assessment Spiritual beliefs: (Please include comment if needed) [x] Identifies with a aki tradition:     
   [] Supported by a aki community:        
   [] Claims no spiritual orientation:       
   [] Seeking spiritual identity:            
   [] Adheres to an individual form of spirituality:       
   [] Not able to assess:                   
 
    
Identified resources for coping:  
   [] Prayer                           
   [] Music                  [] Guided Imagery [x] Family/friends                 [] Pet visits [] Devotional reading                         [] Unknown 
   [] Other:                                          
 
 
Interventions offered during this visit: (See comments for more details) Patient Interventions: Affirmation of emotions/emotional suffering, Catharsis/review of pertinent events in supportive environment, Initial/Spiritual assessment, patient floor, Life review/legacy, Prayer (assurance of) Plan of Care: 
 
 [] Support spiritual and/or cultural needs  
 [] Support AMD and/or advance care planning process    
 [] Support grieving process 
 [] Coordinate Rites and/or Rituals  
 [] Coordination with community clergy 
 [x] No spiritual needs identified at this time 
 [] Detailed Plan of Care below (See Comments)  [] Make referral to Music Therapy 
[] Make referral to Pet Therapy    
[] Make referral to Addiction services 
[] Make referral to [place] 
[] Make referral to Spiritual Care Partner [] No future visits requested       
[x] Follow up upon further referrals Comments: Provided support for this pt in [facility]. Facilitated life review to assess potential support needs or coping strategies. Pt offered review of current situation. Pt is receiving emotional support from his wife and his son. Pt expressed no particular support needs at this time. Assured pt of prayers and affirmed ongoing availability of support. Aline Lea MDiv. Staff  Request  Support/Spiritual Care Services via [facility] [employer]
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
## 2020-12-01 NOTE — PROGRESS NOTES
HERMAN: 
 
Resumption [facility] 
 
 
UPDATE: 4:12PM 
 
CM attempted to order hospital bed for pt, however, CM informed that pt will require medical necessity for DME. CM spoke with pt's spouse, via telephone to inform her of the following. Pt's spouse reported that she understands pt's condition and pt is in need of several different DME. CM recommended for pt's spouse to speak with hospice, due to pt having various diagnosis, requiring long term care, and DME. Pt's spouse would like hospice info session. CM will inform MD and pt's nurse of the following. RHINA Pacheco, [address] [facility] UPDATE: 3:54PM  
 
CM sent referral for hospital bed to [place], however, DME provider reported that they are currently out of hospital beds. CM sent referral to Leticia Chun. CM will continue to follow. RHINA Pacheco, [address] [facility] ROBERTO CARLOS contact pt's spouse: Gokulfatemeh Clemonsloida, via telephone regarding pt's d/c needs and plans. Pt is known to currently open to [facility], provided by [facility]. CM will complete a resumption referral.  Pt is known to be a current dialysis pt (MWF), with [facility]. Pt's spouse requested hospital bed, before pt returns home. CM will send referral for hospital bed, with attempts with getting it approved. Pt could possible benefit from palliative care consult, to discuss goals of care and discuss code status. CM will inform MD of the following. RHINA Pacheco, [address] [facility]

## 2020-12-02 NOTE — PROGRESS NOTES
Attempted to see pt for PT tx. Currently getting HD. Per CM note family is wanting hospice info session.

## 2020-12-02 NOTE — PROGRESS NOTES
Received message from patient's nurse Thierry Herron stating : 
 
Pt would like pain med for break through pain Discussion / orders: 
 
Ordered oxycodone 5 mg q4h prn moderate pain and 10 mg q4h prn severe pain. Please note that this note was dictated using Dragon computer voice recognition software. Quite often unanticipated grammatical, syntax, homophones, and other interpretive errors are inadvertently transcribed by the computer software. Please disregard these errors. Please excuse any errors that have escaped final proofreading.

## 2020-12-02 NOTE — ROUTINE PROCESS
Bedside and Verbal shift change report given to Nikolai England (oncoming nurse) by Valerio Damon (offgoing nurse). Report included the following information SBAR, Kardex, MAR and Recent Results.

## 2020-12-02 NOTE — PROGRESS NOTES
TRANSFER - IN REPORT: 
 
Verbal report received from Aishwarya Pace RN on [facility].  being received from Neuro for ordered procedure Report consisted of patients Situation, Background, Assessment and  
Recommendations(SBAR). Information from the following report(s) SBAR and Kardex was reviewed with the receiving nurse. Opportunity for questions and clarification was provided. Assessment completed upon patients arrival to unit and care assumed.

## 2020-12-02 NOTE — PROCEDURES
[facility]  [phone]    Vitals   Pre   Post   Assessment   Pre   Post     Temp  Temp: 97.7 °F (36.5 °C) (12/02/20 1429)  97.8, oral LOC  A&Ox4 A&Ox4   HR   Pulse (Heart Rate): 88 (12/02/20 1429) 93 Lungs   Coarse; Dim bases Coarse; Dim bases   B/P   BP: (!) 106/57 (12/02/20 1429) 121/38 Cardiac   Tele, chronic A-Fib Tele, chronic A-Fib   Resp   Resp Rate: 18 (12/02/20 1429) 18 Skin   Abd wound; R heel wound Abd wound; R heel wound   Pain level  0 0 Edema  Abd distention     Abd Distention   Orders:    Duration:   Start:    5352 End:    1800 Total:   3.5hr   Dialyzer:   Dialyzer/Set Up Inspection: Marco Quezada (12/02/20 1429)   K Bath:   Dialysate K (mEq/L): 2 (12/02/20 1429)   Ca Bath:   Dialysate CA (mEq/L): 2.5 (12/02/20 1429)   Na/Bicarb:   Dialysate NA (mEq/L): 138 (12/02/20 1429)   Target Fluid Removal:   Goal/Amount of Fluid to Remove (mL): 3000 mL (12/02/20 1429)   Access     Type & Location:   MANUEL AVF: skin CDI. No s/s of infection. + B/T. No issues with cannulation or hemostasis. Running well at .    Labs     Obtained/Reviewed   Critical Results Called   Date when labs were drawn-  Hgb-    HGB   Date Value Ref Range Status   12/02/2020 7.4 (L) 12.1 - 17.0 g/dL Final     K-    Potassium   Date Value Ref Range Status   12/02/2020 4.8 3.5 - 5.1 mmol/L Final     Ca-   Calcium   Date Value Ref Range Status   12/02/2020 8.0 (L) 8.5 - 10.1 MG/DL Final     Bun-   BUN   Date Value Ref Range Status   12/02/2020 40 (H) 6 - 20 MG/DL Final     Creat-   Creatinine   Date Value Ref Range Status   12/02/2020 4.14 (H) 0.70 - 1.30 MG/DL Final     Comment:     INVESTIGATED PER DELTA CHECK PROTOCOL        Medications/ Blood Products Given     Name   Dose   Route and Time     None ordered                Blood Volume Processed (BVP):    91.0L Net Fluid   Removed:  2700ml   Comments   Time Out Done: 1427  Primary Nurse Rpt Pre: Aissatou Velez, [address] [place] [address], RN  Primary Nurse Rpt Post: Palmer Trujillo, RN  Pt Education: need for HD; fluid restriction  Care Plan: ongoing  Tx Summary:  Pt arrived to HD suite A&Ox4. Consent signed & on file. SBAR received from Primary RN.  3789: Pt cannulated with 56B needles per policy & without issue. VSS. Dialysis Tx initiated. 1700: BP 91/40, . UF off. 100ml NS bolus given for hypotension. 1705: BP 92/57 HR 93.  1715: /63; HR 77. UF back on at decreased rate. 1800: Tx ended. VSS. All possible blood returned to patient. Hemostasis achieved without issue. Bed locked and in the lowest position, call bell and belongings in reach. SBAR given to Primary, RN. Patient is stable at time of their departure. All Dialysis related medications have been reviewed. Admiting Diagnosis: COPD/ ESRD  Pt's previous clinic- [facility]  Consent signed - Informed Consent Verified: Yes (12/02/20 1429)  [facility] Consent - on file  Hepatitis Status- Immune, 22Ab, Neg Ag 12/2/2020  Machine #- Machine Number: [id number] (12/02/20 1429)  Telemetry status- Tele, A-Fib  Pre-dialysis wt. -

## 2020-12-02 NOTE — PROGRESS NOTES
ADULT PROTOCOL: JET AEROSOL ASSESSMENT Patient  Bishop Gilford.     60 y.o.   male     12/2/2020  6:37 AM 
 
Breath Sounds Pre Procedure: Right Breath Sounds: Coarse, Crackles Left Breath Sounds: Coarse, Crackles Breath Sounds Post Procedure: Right Breath Sounds: Coarse, Crackles Left Breath Sounds: Coarse, Crackles Breathing pattern: Pre procedure Breathing Pattern: Regular Post procedure Breathing Pattern: Regular Heart Rate: Pre procedure Pulse: 98 
         Post procedure Pulse: 98 Resp Rate: Pre procedure Respirations: 20 
         Post procedure Respirations: 20 
 
Oxygen: O2 Device: Nasal cannula   Flow rate (L/min) 3 Changed: NO SpO2: Pre procedure SpO2: 95 %   with oxygen Post procedure SpO2: 95 %  with oxygen Nebulizer Therapy: Current medications Aerosolized Medications: Ipratropium bromide Q6, Xoponex and Mucomyst TID, Pulmicort/Brovana BID Changed: NO Smoking History: Current Smoker Problem List:  
Patient Active Problem List  
Diagnosis Code  Cellulitis of thumb, left L03.012  Tenosynovitis of finger M65.9  
 DM (diabetes mellitus) ([facility] E11.9  
 HCV (hepatitis C virus) B19.20  Tobacco abuse Z72.0  Alcohol abuse, daily use F10.10  COPD (chronic obstructive pulmonary disease) ([facility]) J44.9  History of splenectomy Z90.81  
 Cellulitis and abscess of finger L03.019, L02.519  Abdominal wall cellulitis L03.311  Acute GI bleeding K92.2  
 HTN (hypertension) I10  
 ESRD on dialysis ([facility]) N18.6, Z99.2  Nontraumatic ischemic infarction of muscle of hand M62.249  Acute cholecystitis K81.0  Type 2 diabetes mellitus with nephropathy (HCC) E11.21  
 COPD exacerbation (HCC) J44.1  A-fib (HCC) I48.91  
 Hypoglycemia E16.2  Acute dyspnea R06.00  
 AMS (altered mental status) R41.82  Sepsis ([facility] A41.9 Respiratory Therapist: Cori Dan
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
## 2020-12-02 NOTE — PROGRESS NOTES
Comprehensive Nutrition Assessment    Type and Reason for Visit: Reassess    Nutrition Recommendations/Plan:   Continue current diet and supplements     Nutrition Assessment:     Chart reviewed; noted plans for hospice information session. Visited patient at bedside. He reports a pretty good appetite. Likes the nepro shakes okay; prefers vanilla. Patient mentioned going home with hospice tomorrow. Encouraged intake of meals. K+ WNL but still high side of normal; no new phos. Estimated Daily Nutrient Needs:  Energy (kcal): 1908 kcal (BMR 1468 x 1. 3AF); Weight Used for Energy Requirements: Current  Protein (g): 91-105g (1.3-1.5 g/kg bw); Weight Used for Protein Requirements: Current  Fluid (ml/day): 1200 mL per MD note; Method Used for Fluid Requirements: 1 ml/kcal    Nutrition Related Findings:    Na 130, K+ 4.8, -[phone]  BM 12/2  Medications: Nephrocap, Folic Acid, humalog, MVI, Renvela, Prednisone, Thiamine      Wounds:    Stage II, Unstageable       Current Nutrition Therapies:  DIET NUTRITIONAL SUPPLEMENTS Breakfast, Dinner; Nepro  DIET RENAL Regular; Consistent Carb 2000kcal; FR 1000ML    Anthropometric Measures:  · Height:  5' 6\" (167.6 cm)  · Current Body Wt:  69.9 kg (154 lb 1.6 oz)    · BMI Category:  Normal weight (BMI 18.5-24. 9)       Nutrition Diagnosis:   · Increased nutrient needs related to (wound healing) as evidenced by (documented PI's)    Nutrition Interventions:   Food and/or Nutrient Delivery: Continue current diet, Continue oral nutrition supplement  Nutrition Education and Counseling: No recommendations at this time  Coordination of Nutrition Care: No recommendation at this time    Goals:  PO intake >70% of meals/supplements next 5-7 days       Nutrition Monitoring and Evaluation:   Behavioral-Environmental Outcomes: None identified  Food/Nutrient Intake Outcomes: Food and nutrient intake, Supplement intake  Physical Signs/Symptoms Outcomes: Biochemical data, Weight    Discharge Planning:    Continue oral nutrition supplement, Continue current diet     Electronically signed by Francesco Levnie RD on 12/2/2020 at 3:06 PM    Contact: ext [phone]
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
## 2020-12-02 NOTE — PROGRESS NOTES
HERMAN: 
 
Hospice Info Session UPDATE: 12:47PM 
 
CM spoke with Heidy from [facility], and it was reported that pt can be admitted by their agency. CM informed that DME will be delivered to pt's home on today. CM was informed to arrange transport on 12/3/20 in case DME is delayed. CM will continue to follow. RHINA Velazquez, [facility] ROBERTO CARLOS spoke with pt's spouses on 12/1/20, via telephone regarding pt's d/c needs and plans. Pt's spouse has requested to order several DME, and pt is known to not qualify for the equipment that pt's spouse is needing. Pt's spouse reported that she is interested in hospice info session. CM will contact MD to verify if an consult can be ordered. CM informed that hospice info session consult has been entered. CM will send referral to [facility], in regards to pt currently being open to [address] [place] [address] Road, and been in contact with [facility] liaison: [address] [place] [address] ([phone]). CM informed by [address] [place] [address] that pt will be reviewed, and she and hospice liaison can staff case with pt's spouse/family. CM informed that pt could return home with [facility] and then transition to hospice care, and then DME can be ordered for pt. CM will continue to follow. RHINA Velazquez, [facility]
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
## 2020-12-03 NOTE — PROGRESS NOTES
HD TRANSFER - OUT REPORT:    Verbal report given to THAD Ferreira on [employer]. being transferred to [facility] for routine progression of care       Report consisted of patient's Situation, Background, Assessment and   Recommendations(SBAR). Information from the following report(s) SBAR, Procedure Summary, Intake/Output, MAR and Recent Results was reviewed with the receiving nurse. Method:  $$ Method: Hemodialysis (12/02/20 1429)    Fluid Removed  NET Fluid Removed (mL): 2700 ml (12/02/20 1800)     Patient response to treatment:  Stable    End Time  Hemodialysis End Time: 1800 (12/02/20 1800)  If not documented, dialysis nurse to update post-dialysis row in HD/Filtration flowsheet     Medications /Volume expansion agents or Fluid boluses administered during treatment? no    Post-dialysis medication administration due?  yes  Remind nurse to administer post-HD medication upon return to unit. Fistula hemostasis? yes    Line heparinization? no    Lines: MANUEL AVF    Opportunity for questions and clarification was provided.       Patient transported with: O2 @ 3 liters  Tech

## 2020-12-03 NOTE — DISCHARGE SUMMARY
Hospitalist Discharge Summary     Patient ID:  Tj Stacy  [id number]  40 y.o.  1965 11/23/2020    PCP on record: Sal Cruz NP    Admit date: 11/23/2020  Discharge date and time: 12/3/2020    DISCHARGE DIAGNOSIS:  Strentophomonas pneumonia  ESRD on HD  Acute on chronic respiratory failure with hypoxia      CONSULTATIONS:  IP CONSULT TO HOSPITALIST  IP CONSULT TO PULMONOLOGY  IP CONSULT TO NEPHROLOGY  IP CONSULT TO INFECTIOUS DISEASES  IP CONSULT TO CARDIOLOGY    Excerpted HPI from H&P of Alma Falcon MD:  Barbie Adams is a 60 y.o.  male who presents with above symptoms, patient was discharged yesterday. He was admitted 11/5/2020, was hypoglycemic which occurred after patient took insulin the night prior to admission. It resolved with D10 drip, he also was diagnosed with sepsis, started on broad-spectrum antibiotic blood culture came back negative. He also presented with SVT A. fib RVR. Started on Cardizem drip, no indication for anticoagulation due to GI bleed. He had right lung opacification, which was slightly improved prior to discharge ,  Was on  IV antibiotic as he had positive sputum for Pseudomonas. He was discharged with oral antibiotic, Levaquin, he did not take any of his medication on discharge, I did not discharge him on insulin, gave him medrol dose pack but he was not able to fill his prescription as was arrived home late . Wife reports had dinner, he was doing okay night, she tried to wake him up AM  for hemodialysis and breakfast.  Reports he was was not responding, called EMS, was found to have severe hypoglycemia. In ER found to have hypothermia and hypoglycemia. Received dextrose, hypothermia improved with bear hugger. Hypoglycemia resolved. Patient more alert. Hospitalist consulted for admission.     ______________________________________________________________________  DISCHARGE SUMMARY/HOSPITAL COURSE:  for full details see H&P, daily progress notes, labs, consult notes. Metabolic Encephalopathy with AMS   Hypoglycemia, Hypothermic POA - resolved   Acute on Chronic Respiratory Failure with Hypoxia  PNA - Strentophomonas   ESRD on Hemodialysis  Bactrim DS PO daily (dose as per pharmacist recommendation) for 2 weeks and levaquin every other day for 2 weeks as per ID  F/u with pulmonary as outpatient.        Hyperkalemia in setting of ESRD - resolved   End Stage Renal Disease, MWD HD    HD per nephrology        Type II Diabetes with Hypoglycemia on admission   Controlled  C/w sliding scale     Atrial Fibrillation with RVR -  Now rate controlled   History of A fib with RVR while on HD  RRT 11/24 for afib while patient ending HD treatment  Continue PO dilt, carvedilol   Recent ECHO 11/7 - EF 55-60%. Normal cavity size, wall thickness and systolic function.      Ventral Hernia Mesh with Bleeding - resolved     Moderate Protein Calorie Malnutrition   Chronic Debility   Continue multivitamin, thiamine, folic acid, nephrocap     Hepatitis C infection hx Monitored at [facility] hepatology clinic  Hx of PVD s/p thrombectomy   Hx of Splenectomy and partial pancreatectomy   Chronic constipation - pt takes mag citrate at home PRN  History of ETOH abuse    History of COPD/smoker     18.5 - 24.9 Normal weight / Body mass index is 23.26 kg/m².     Code status: Full  Prophylaxis: Hep SQ  Recommended Disposition: Home w/Famiily_________________________________________________________  Patient seen and examined by me on discharge day. Pertinent Findings:  Gen:    Not in distress  Chest: Clear lungs  CVS:   Regular rhythm. No edema  Abd:  Soft, not distended, not tender  Neuro:  Alert,   _______________________________________________________________________  DISCHARGE MEDICATIONS:   Discharge Medication List as of 12/3/2020  9:46 AM      START taking these medications    Details   predniSONE (DELTASONE) 10 mg tablet Take 10 mg by mouth daily (with breakfast).  Take 3 tabs and stop, Normal, Disp-3 Tab,R-0      trimethoprim-sulfamethoxazole (Bactrim DS) 160-800 mg per tablet Take 1 Tab by mouth daily. , Normal, Disp-11 Tab,R-0         CONTINUE these medications which have CHANGED    Details   levoFLOXacin (Levaquin) 500 mg tablet Take 1 Tab by mouth every fourty-eight (48) hours. Starting on 12/14 for 2 weeks. , Normal, Disp-7 Tab,R-0      dilTIAZem ER (CARDIZEM CD) 240 mg capsule Take 1 Cap by mouth daily. , Normal, Disp-30 Cap,R-1         CONTINUE these medications which have NOT CHANGED    Details   sevelamer carbonate (Renvela) 800 mg tab tab Take 2 Tabs by mouth three (3) times daily. , Normal, Disp-90 Tab,R-1      thiamine mononitrate (B-1) 100 mg tablet Take 1 Tab by mouth daily. , Normal, Disp-30 Tab,R-0      therapeutic multivitamin (THERAGRAN) tablet Take 1 Tab by mouth daily. , Normal, Disp-30 Tab,R-0      senna-docusate (PERICOLACE) 8.6-50 mg per tablet Take 1 Tab by mouth daily as needed for Constipation. , Normal, Disp-30 Tab,R-1      nystatin (MYCOSTATIN) 100,000 unit/mL suspension Take 5 mL by mouth four (4) times daily. swish and spit  Indications: thrush, Normal, Disp-60 mL,R-0      nicotine (NICODERM CQ) 14 mg/24 hr patch 1 Patch by TransDERmal route every twenty-four (24) hours for 30 days. , Normal, Disp-30 Patch,R-0      folic acid (FOLVITE) 1 mg tablet Take 1 Tab by mouth daily. , Normal, Disp-30 Tab,R-0      carvediloL (COREG) 6.25 mg tablet Take 1 Tab by mouth two (2) times daily (with meals). , Normal, Disp-60 Tab,R-1      acetylcysteine (MUCOMYST) 200 mg/mL (20 %) solution 1 mL by Nebulization route three (3) times daily. , Normal, Disp-30 mL,R-0      fluticasone-umeclidinium-vilanterol (TRELEGY ELLIPTA) 100-62.5-25 mcg inhaler Take 1 Puff by inhalation daily. , Historical Med      oxyCODONE-acetaminophen (PERCOCET)  mg per tablet Take 1 Tab by mouth three (3) times daily. , Historical Med      ergocalciferol (VITAMIN D2) 50,000 unit capsule Take 50,000 Units by mouth every seven (7) days. MONDAY, Historical Med      omeprazole (PRILOSEC) 20 mg capsule Take 20 mg by mouth as needed., Historical Med      gabapentin (NEURONTIN) 300 mg capsule Take 300 mg by mouth nightly as needed., Historical Med      b complex-vitamin c-folic acid (NEPHROCAPS) 1 mg capsule Take 1 Cap by mouth daily. , Historical Med      lidocaine-prilocaine (EMLA) topical cream Apply  to affected area as needed for Pain. Patient applies to arm before dialysis on Monday, Wednesday, and Friday., Historical Med      albuterol-ipratropium (DUO-NEB) 2.5 mg-0.5 mg/3 ml nebu 3 mL by Nebulization route three (3) times daily. AT LUNCHTIME DAILY. , Historical Med      albuterol (PROVENTIL) 90 mcg/Actuation inhaler Take 2 Puffs by inhalation four (4) times daily. QID PRN, Historical Med         STOP taking these medications       trimethoprim-sulfamethoxazole (BACTRIM, SEPTRA)  mg per tablet Comments:   Reason for Stopping:         benzonatate (TESSALON) 100 mg capsule Comments:   Reason for Stopping:         guaiFENesin-dextromethorphan (ROBITUSSIN DM) 100-10 mg/5 mL syrup Comments:   Reason for Stopping:                 Patient Follow Up Instructions:    Activity: Activity as tolerated  Diet: Renal Diet  Wound Care: None needed    Follow-up with your pulmonary doctor    Follow-up Information     Follow up With Specialties Details Why Contact Info    Montana Valenzuela NP Nurse Practitioner   [address] [place] [address]  [phone]          ________________________________________________________________    Risk of deterioration: Moderate    Condition at Discharge:  Stable  __________________________________________________________________    Disposition  Home with family and home health services    ____________________________________________________________________    Code Status: Full Code  ___________________________________________________________________      Total time in minutes spent coordinating this discharge (includes going over instructions, follow-up, prescriptions, and preparing report for sign off to her PCP) :  >30 minutes    Signed:  Caleb Bustos MD

## 2020-12-03 NOTE — PROGRESS NOTES
Pharmacy Automatic Renal Dosing Protocol - Antimicrobials    Indication for Antimicrobials: PNA    Current Regimen of Each Antimicrobial:    Bactrim 5 mg/kg daily (Start Date ; Day 4)  Previous Antimicrobial Therapy:  Cefepime  - -left ama before  dose  Cefepime 1g q 24h (Start Date ; Day # 9)  Significant Cultures:   : blood - NG  : urine -was on hold   RCx sputum - HEAVY STENOTROPHOMONAS MALTOPHILIA, few GPC in pairs, 4+ GNRs - final    Radiology / Imaging results: (X-ray, CT scan or MRI):     Paralysis, amputations, malnutrition: none    Labs:  Recent Labs     20  0102 20  0209 20  0023   CREA 4.14* 3.26* 5.01*   BUN 40* 32* 60*   WBC 9.1 9.4 11.7*     Temp (24hrs), Av.8 °F (36.6 °C), Min:97.5 °F (36.4 °C), Max:98.2 °F (36.8 °C)    Creatinine Clearance (mL/min):   HD TuThSa    Impression/Plan:   ID consulted / following  Continue Bactrim  Recommend d/c on Bactrim DS - 2 tablets nightly (320 mg trimethoprim)  Levofloxacin 500 mg IV Q48H     Pharmacy will follow daily and adjust medications as appropriate for renal function and/or serum levels.     Thank you,  Vanita Moise, PHARMD

## 2020-12-03 NOTE — PROGRESS NOTES
HERMAN:    Home with Outpatient Hospice        CM informed that pt will be d/c on today and will transition home with outpatient hospice, provided by [facility]. CM spoke with hospice liaison and was informed that pt's DME was delivered to pt's home on 12/2/20. CM contact pt's son, via telephone to inform him of the following d/c.  Pt's son is agreeable and accepting of pt's d/c on today. CM informed pt's son  of 2nd  Medicare Letter (verbal consent) placed in chart. Pt arranged transport today at 9:25am.  CM provided pt's nurse with transport packet     CM will continue to follow.     Myrna Kim MSW, [address] [place] [address]
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
## 2020-12-03 NOTE — PROGRESS NOTES
Bedside and Verbal shift change report given to Miguel Jerez (oncoming nurse) by Tonya Woo (offgoing nurse). Report included the following information SBAR, Kardex, MAR and Recent Results.

## 2020-12-03 NOTE — PROGRESS NOTES
Patient is being discharged home on hospice with home health. Patient is being transported via [facility] at 9:30am. All discharge paperwork has been completed and taught via teach back method. Patient verbally stated understanding of all information provided.

## 2020-12-03 NOTE — PROGRESS NOTES
Nephrology Progress Note  Hugo Santamaria     [facility]. [facility]  Phone - [phone]   Patient: Jenifer Fontaine. YOB: 1965     Admit Date: 11/23/2020   Date- 12/2/2020     CC: Follow up for ESRD          IMPRESSION & PLAN:   · ESRD -[id number]  · Hyponatremia- fluid overload  · Anemia of CKD/ chronic inflammation/sepsis  · Secondary hyperparathyroidism  · Severe COPD  · Stenotrophomonas bronchopneumonia/recent Pseudomonas PNA  · Current/ longterm smoker  · DM  · Afib    PLAN-   HD today per MWF schedule   Renal diet, Fluid restriction 1200 ml per day   Cont epogen MWF. Check iron profile   Please remove triple lumen catheter in R femoral once no longer needed     Subjective: Interval History:   -  BP/HR stable  Sob improving     No c/o chest pain,   No c/o nausea or vomiting, No c/o  fever. ROS:- As documented above  Objective:   Vitals:    12/02/20 1849 12/02/20 1852 12/02/20 1910 12/02/20 2014   BP:  112/69 (!) 94/58    Pulse:  98 83    Resp: 18 18 16    Temp:  97.9 °F (36.6 °C) 97.9 °F (36.6 °C)    TempSrc:       SpO2:  98% 99% 95%   Weight:       Height:          12/01 0701 - 12/02 0700  In: 928 [P.O.:928]  Out: -   Last 3 Recorded Weights in this Encounter    11/28/20 1214 11/30/20 1736 12/01/20 1737   Weight: 67.1 kg (147 lb 14.4 oz) 67.6 kg (149 lb) 69.9 kg (154 lb)      Physical exam:   GEN: NAD  NECK- Supple, no mass  RESP: No wheezing,coarse b/l  CVS: S1,S2  RRR  NEURO: Normal speech, Non focal  Abdo- soft, NT  EXT: right arm avf +, no edema  Triple lumen catheter in R femoral     Chart reviewed. Pertinent Notes reviewed.      Data Review :  Recent Labs     12/02/20  0102 12/01/20  0209 11/30/20  0023   * 133* 133*   K 4.8 4.3 4.8   CL 95* 97 97   CO2 29 31 28   BUN 40* 32* 60*   CREA 4.14* 3.26* 5.01*   * 139* 110*   CA 8.0* 7.8* 8.6     Recent Labs     12/02/20  0102 12/01/20  0209 11/30/20  0023   WBC 9.1 9.4 11.7*   HGB 7. 4* 7.1* 7.5*   HCT 23.2* 22.6* 23.8*   * 104* 123*     No results for input(s): FE, TIBC, PSAT, FERR in the last 72 hours.    Medication list  reviewed  Current Facility-Administered Medications   Medication Dose Route Frequency    oxyCODONE IR (ROXICODONE) tablet 5 mg  5 mg Oral Q4H PRN    oxyCODONE IR (ROXICODONE) tablet 10 mg  10 mg Oral Q4H PRN    predniSONE (DELTASONE) tablet 20 mg  20 mg Oral DAILY WITH BREAKFAST    ipratropium (ATROVENT) 0.02 % nebulizer solution 0.5 mg  0.5 mg Nebulization Q6H RT    And    budesonide (PULMICORT) 250 mcg/2ml nebulizer susp  250 mcg Nebulization BID RT    And    arformoteroL (BROVANA) neb solution 15 mcg  15 mcg Nebulization BID RT    pneumococcal 13 mara conj dip (PREVNAR-13) injection 0.5 mL  0.5 mL IntraMUSCular PRIOR TO DISCHARGE    trimethoprim-sulfamethoxazole (BACTRIM) 335.52 mg in dextrose 5% 500 mL  5 mg/kg/day IntraVENous Q24H    levalbuterol (XOPENEX) nebulizer soln 0.63 mg/3 mL  0.63 mg Nebulization Q4H PRN    polyethylene glycol (MIRALAX) packet 17 g  17 g Oral DAILY PRN    insulin lispro (HUMALOG) injection   SubCUTAneous AC&HS    glucose chewable tablet 16 g  4 Tab Oral PRN    dextrose (D50W) injection syrg 12.5-25 g  12.5-25 g IntraVENous PRN    levalbuterol (XOPENEX) nebulizer soln 0.63 mg/3 mL  0.63 mg Nebulization TID RT    guaiFENesin ER (MUCINEX) tablet 600 mg  600 mg Oral Q12H    epoetin ginger-epbx (RETACRIT) 12,000 Units combo injection  12,000 Units SubCUTAneous Q MON, WED & FRI    zinc oxide-cod liver oil (DESITIN) 40 % paste   Topical BID    albumin human 25% (BUMINATE) solution 25 g  25 g IntraVENous DIALYSIS PRN    dilTIAZem ER (CARDIZEM CD) capsule 240 mg  240 mg Oral DAILY    sodium chloride (NS) flush 5-10 mL  5-10 mL IntraVENous PRN    dextrose (D50W) injection syrg 25 g  50 mL IntraVENous PRN    B complex-vitaminC-folic acid (NEPHROCAP) cap  1 Cap Oral DAILY    ergocalciferol capsule 50,000 Units  50,000 Units Oral every Monday    oxyCODONE-acetaminophen (PERCOCET 10)  mg per tablet 1 Tab  1 Tab Oral TID    sevelamer carbonate (RENVELA) tab 1,600 mg  1,600 mg Oral TID    benzonatate (TESSALON) capsule 100 mg  100 mg Oral TID PRN    thiamine mononitrate (B-1) tablet 100 mg  100 mg Oral DAILY    multivitamin, tx-iron-ca-min (THERA-M w/ IRON) tablet 1 Tab  1 Tab Oral DAILY    senna-docusate (PERICOLACE) 8.6-50 mg per tablet 1 Tab  1 Tab Oral DAILY PRN    nystatin (MYCOSTATIN) 100,000 unit/mL oral suspension 500,000 Units  500,000 Units Oral QID    nicotine (NICODERM CQ) 14 mg/24 hr patch 1 Patch  1 Patch TransDERmal Q24H    guaiFENesin-dextromethorphan (ROBITUSSIN DM) 100-10 mg/5 mL syrup 10 mL  10 mL Oral L1J PRN    folic acid (FOLVITE) tablet 1 mg  1 mg Oral DAILY    carvediloL (COREG) tablet 6.25 mg  6.25 mg Oral BID WITH MEALS    glucose chewable tablet 16 g  4 Tab Oral PRN    glucagon (GLUCAGEN) injection 1 mg  1 mg IntraMUSCular PRN    sodium chloride (NS) flush 5-40 mL  5-40 mL IntraVENous Q8H    sodium chloride (NS) flush 5-40 mL  5-40 mL IntraVENous PRN    acetaminophen (TYLENOL) tablet 650 mg  650 mg Oral Q6H PRN    Or    acetaminophen (TYLENOL) suppository 650 mg  650 mg Rectal Q6H PRN    polyethylene glycol (MIRALAX) packet 17 g  17 g Oral DAILY PRN    promethazine (PHENERGAN) tablet 12.5 mg  12.5 mg Oral Q6H PRN    Or    ondansetron (ZOFRAN) injection 4 mg  4 mg IntraVENous Q6H PRN    heparin (porcine) injection 5,000 Units  5,000 Units SubCUTAneous Q8H    acetylcysteine (MUCOMYST) 200 mg/mL (20 %) solution 200 mg  200 mg Nebulization TID RT          MD [facility]  [facility] [employer]  [address]  [place], [address]  Phone - [phone]               Fax - [phone]

## 2020-12-03 NOTE — PROGRESS NOTES
Nephrology Progress Note  Hugo Santamaria     [facility]. [email or web address]  Phone - [phone]   Patient: Joaquin Costa. YOB: 1965     Admit Date: 11/23/2020   Date- 12/3/2020     CC: Follow up for ESRD          IMPRESSION & PLAN:   · ESRD -[id number]  · Mild Hyponatremia  · Anemia of CKD/ chronic inflammation/sepsis  · Secondary hyperparathyroidism  · Severe COPD  · Stenotrophomonas bronchopneumonia/recent Pseudomonas PNA  · Current/ longterm smoker  · DM  · Afib    PLAN-   S/p HD yesterday, 2.7L removed   No HD  today    continue MWF schedule while inpt   Renal diet, Fluid restriction 1200 ml per day   Cont epogen MWF, increase dose, iron stores appropriate   Please remove triple lumen catheter in R femoral once no longer needed     Subjective: Interval History:   -  BP/HR stable  Sob improving     No c/o chest pain,   No c/o nausea or vomiting, No c/o  fever. ROS:- As documented above  Objective:   Vitals:    12/03/20 0240 12/03/20 0431 12/03/20 0723 12/03/20 0751   BP:  106/65  98/67   Pulse:  81  (!) 108   Resp:  17  18   Temp:  98.3 °F (36.8 °C)  98.3 °F (36.8 °C)   TempSrc:       SpO2: 96% 96% 96% 97%   Weight:       Height:          12/02 0701 - 12/03 0700  In: 480 [P.O.:480]  Out: 2700   Last 3 Recorded Weights in this Encounter    11/28/20 1214 11/30/20 1736 12/01/20 1737   Weight: 67.1 kg (147 lb 14.4 oz) 67.6 kg (149 lb) 69.9 kg (154 lb)      Physical exam:   GEN: AAOx3, in NAD  NECK- Supple, no mass  RESP: on O2 through NC, no distress, satting well  CVS: S1,S2  RRR  NEURO: Normal speech, Non focal  Abdo- soft, NT  EXT: right arm avf +, no edema  Triple lumen catheter in R femoral     Chart reviewed. Pertinent Notes reviewed.      Data Review :  Recent Labs     12/03/20  0402 12/02/20  0102 12/01/20  0209   * 130* 133*   K 4.2 4.8 4.3   CL 96* 95* 97   CO2 30 29 31   BUN 24* 40* 32*   CREA 3.01* 4.14* 3.26*   GLU 96 108* 139*   CA 8.4* 8. 0* 7.8*   PHOS 3.1  --   --      Recent Labs     12/03/20  0402 12/02/20  0102 12/01/20  0209   WBC 8.7 9.1 9.4   HGB 7.7* 7.4* 7.1*   HCT 24.0* 23.2* 22.6*    128* 104*     Recent Labs     12/03/20  0402   TIBC 138*   PSAT 23   FERR 1,168*      Medication list  reviewed  Current Facility-Administered Medications   Medication Dose Route Frequency    predniSONE (DELTASONE) tablet 10 mg  10 mg Oral DAILY WITH BREAKFAST    trimethoprim-sulfamethoxazole (BACTRIM, SEPTRA)  mg per tablet 1 Tab  1 Tab Oral DAILY    oxyCODONE IR (ROXICODONE) tablet 5 mg  5 mg Oral Q4H PRN    oxyCODONE IR (ROXICODONE) tablet 10 mg  10 mg Oral Q4H PRN    ipratropium (ATROVENT) 0.02 % nebulizer solution 0.5 mg  0.5 mg Nebulization Q6H RT    And    budesonide (PULMICORT) 250 mcg/2ml nebulizer susp  250 mcg Nebulization BID RT    And    arformoteroL (BROVANA) neb solution 15 mcg  15 mcg Nebulization BID RT    pneumococcal 13 mara conj dip (PREVNAR-13) injection 0.5 mL  0.5 mL IntraMUSCular PRIOR TO DISCHARGE    levalbuterol (XOPENEX) nebulizer soln 0.63 mg/3 mL  0.63 mg Nebulization Q4H PRN    polyethylene glycol (MIRALAX) packet 17 g  17 g Oral DAILY PRN    insulin lispro (HUMALOG) injection   SubCUTAneous AC&HS    glucose chewable tablet 16 g  4 Tab Oral PRN    dextrose (D50W) injection syrg 12.5-25 g  12.5-25 g IntraVENous PRN    levalbuterol (XOPENEX) nebulizer soln 0.63 mg/3 mL  0.63 mg Nebulization TID RT    guaiFENesin ER (MUCINEX) tablet 600 mg  600 mg Oral Q12H    epoetin ginger-epbx (RETACRIT) 12,000 Units combo injection  12,000 Units SubCUTAneous Q MON, WED & FRI    zinc oxide-cod liver oil (DESITIN) 40 % paste   Topical BID    albumin human 25% (BUMINATE) solution 25 g  25 g IntraVENous DIALYSIS PRN    dilTIAZem ER (CARDIZEM CD) capsule 240 mg  240 mg Oral DAILY    sodium chloride (NS) flush 5-10 mL  5-10 mL IntraVENous PRN    dextrose (D50W) injection syrg 25 g  50 mL IntraVENous PRN    B complex-vitaminC-folic acid (NEPHROCAP) cap  1 Cap Oral DAILY    ergocalciferol capsule 50,000 Units  50,000 Units Oral every Monday    oxyCODONE-acetaminophen (PERCOCET 10)  mg per tablet 1 Tab  1 Tab Oral TID    sevelamer carbonate (RENVELA) tab 1,600 mg  1,600 mg Oral TID    benzonatate (TESSALON) capsule 100 mg  100 mg Oral TID PRN    thiamine mononitrate (B-1) tablet 100 mg  100 mg Oral DAILY    multivitamin, tx-iron-ca-min (THERA-M w/ IRON) tablet 1 Tab  1 Tab Oral DAILY    senna-docusate (PERICOLACE) 8.6-50 mg per tablet 1 Tab  1 Tab Oral DAILY PRN    nystatin (MYCOSTATIN) 100,000 unit/mL oral suspension 500,000 Units  500,000 Units Oral QID    nicotine (NICODERM CQ) 14 mg/24 hr patch 1 Patch  1 Patch TransDERmal Q24H    guaiFENesin-dextromethorphan (ROBITUSSIN DM) 100-10 mg/5 mL syrup 10 mL  10 mL Oral A3M PRN    folic acid (FOLVITE) tablet 1 mg  1 mg Oral DAILY    carvediloL (COREG) tablet 6.25 mg  6.25 mg Oral BID WITH MEALS    glucose chewable tablet 16 g  4 Tab Oral PRN    glucagon (GLUCAGEN) injection 1 mg  1 mg IntraMUSCular PRN    sodium chloride (NS) flush 5-40 mL  5-40 mL IntraVENous Q8H    sodium chloride (NS) flush 5-40 mL  5-40 mL IntraVENous PRN    acetaminophen (TYLENOL) tablet 650 mg  650 mg Oral Q6H PRN    Or    acetaminophen (TYLENOL) suppository 650 mg  650 mg Rectal Q6H PRN    polyethylene glycol (MIRALAX) packet 17 g  17 g Oral DAILY PRN    promethazine (PHENERGAN) tablet 12.5 mg  12.5 mg Oral Q6H PRN    Or    ondansetron (ZOFRAN) injection 4 mg  4 mg IntraVENous Q6H PRN    heparin (porcine) injection 5,000 Units  5,000 Units SubCUTAneous Q8H    acetylcysteine (MUCOMYST) 200 mg/mL (20 %) solution 200 mg  200 mg Nebulization TID RT          Tod MD Wilfredo              [facility]  formerly [facility]  [address]  [place], [address]  Phone - [phone]               Fax - [phone]

## 2020-12-03 NOTE — DISCHARGE INSTRUCTIONS
HOSPITALIST DISCHARGE INSTRUCTIONS    NAME: Pernell French. :  1965   MRN:  [id number]     Date/Time:  12/3/2020 9:33 AM    ADMIT DATE: 2020     DISCHARGE DATE: 12/3/2020     DISCHARGE DIAGNOSIS:  Strentophomonas pneumonia  ESRD    Please take Bactrim until  and then take [id number] for 2 weeks every other day. Follow up with your PCP and lung doctor. MEDICATIONS:  As per medication reconciliation  list  · It is important that you take the medication exactly as they are prescribed. · Keep your medication in the bottles provided by the pharmacist and keep a list of the medication names, dosages, and times to be taken in your wallet. · Do not take other medications without consulting your doctor. Pain Management: per above medications    What to do at Home    Recommended diet:  Renal Diet    Recommended activity: Activity as tolerated    If you have questions regarding the hospital related prescriptions or hospital related issues please call [facility] renee Gonzalez at [phone]. If you experience any of the following symptoms then please call your primary care physician or return to the emergency room if you cannot get hold of your doctor:  Fever, chills, nausea, vomiting, diarrhea, change in mentation, falling, bleeding, shortness of breath,     Follow Up:  Dr. Shruthi Frank, NP  you are to call and set up an appointment to see them in 7-10 days. Information obtained by :  I understand that if any problems occur once I am at home I am to contact my physician. I understand and acknowledge receipt of the instructions indicated above.                                                                                                                                            Physician's or R.N.'s Signature                                                                  Date/Time Patient or Representative Signature                                                          Date/Time

## 2020-12-07 PROBLEM — I95.9 HYPOTENSION: Status: ACTIVE | Noted: 2020-01-01

## 2020-12-07 NOTE — PROGRESS NOTES
[facility] Attending    Reviewed chart. Patient recently agreed to Hospice with [facility]. I talked with [facility] and patient called this morning because of his distress. Nurse went to the home to evaluate. Patient no longer wanted to have Hospice care and signed revocation paperwork and then came to the ED for evaluation. At this time, patient does not appear to be in support of hospice philosophy but if that were to change, certainly could consult [facility] but patient was being serviced by [facility].
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
## 2020-12-07 NOTE — ED TRIAGE NOTES
Pt arrived to ED w/ EMS c/o weakness and SOB. Pt reports that yesterday morning he lost feeling and control of his left arm. Pt reports that his wife put a glove on his hand and warm towels but he didn't think he needed to be seen in the ED because he thought it was the result of removing a bandage from his arm. Pt is AOX4 and monitored X3. Pt reports that he was a hospice pt but no longer wants to be on hospice stating \"I've changed my mind and I want to live\". Pt reports a hx of ESRD, HTN, COPD, and diabetes. Pt is on 3 L O2 at home. Pt has a right limb alert due to a fistula. [id number]:CHANTALE w/ MD Farley from hospice and was advised that, after speaking w/ [facility], the pt signed out of Hospice this morning when in distress. 1511: In Room w/ MD Jayashree Ulrich to assist w/ central line placement      1700: Vascular MD Cindi Chaidez in room advised okay to get a BP reading once using the right arm but wasn't confident that the reading would be accurate. 1843: Spoke w/MD Kassandra Barrett to discuss option of femoral A-line to monitor BP. MD advised he would speak w/ CCU charge to try to expedite getting the pt a room to allow for A-line placement. MD Jennifer Smart will call back with an update.      MD aware that pt is on levophed no new orders to stop medication    2230 Pt in ED awaiting CCU bed
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
## 2020-12-08 NOTE — PROGRESS NOTES
Nephrology Progress Note  Hugo Santamaria     [email or web address]  Phone - [phone]   Patient: Sheldon Machado. YOB: 1965     Admit Date: 12/7/2020   Date- 12/8/2020     CC: Follow up for esrd         IMPRESSION & PLAN:     esrd - hd mwf at [facility]. - hd was stopped last week as patient went on hospice   Sob,    Recently placed on hospice. Now patient wants to be full code   Ischemic left arm- ischemia   Copd   DM 2   Anemia of ckd   Sec. hyperpara   afib   hyponatremia    PLAN-   Hd again today   He had 2 hour uf last night   Continue vasopressor support   Poor prognosis     Subjective: Interval History:   -  bp low requiring vasopressors  He had  uf yesterday  C/o sob and cough  He wants everything done  He was placed on hospice last week  ROS:- As documented above  Objective:   Vitals:    12/08/20 0613 12/08/20 0700 12/08/20 0715 12/08/20 0730   BP: (!) 107/42 (!) 108/41  (!) 90/15   Pulse: 78 79 72 70   Resp: 10 18 13 10   Temp: 97.2 °F (36.2 °C)      SpO2: 96%  (!) 84% 95%   Weight:       Height:          12/07 0701 - 12/08 0700  In: -   Out: 2500   Last 3 Recorded Weights in this Encounter    12/07/20 1238   Weight: 69.6 kg (153 lb 7 oz)      Physical exam:   GEN: mild distress  NECK- Supple, no mass  RESP: + wheezing, coarse b/l  CVS: S1,S2  RRR  NEURO: Normal speech, Non focal  Abdo- soft, NT  EXT: No Edema   PSYCH: Normal Mood  Upper arm avf +    Chart reviewed. Pertinent Notes reviewed. Data Review :  Recent Labs     12/07/20  2124   *   K 4.7   CL 96*   CO2 29   BUN 41*   CREA 5.10*   GLU 95   CA 8.0*     Recent Labs     12/07/20  1632   WBC 7.8   HGB 7.2*   HCT 23.6*        No results for input(s): FE, TIBC, PSAT, FERR in the last 72 hours.    Medication list  reviewed  Current Facility-Administered Medications   Medication Dose Route Frequency    guaiFENesin ER (MUCINEX) tablet 600 mg  600 mg Oral Q12H    lidocaine 4 % patch 2 Patch  2 Patch TransDERmal Q24H    fentaNYL citrate (PF) injection 25 mcg  25 mcg IntraVENous Q3H PRN    NOREPINephrine (LEVOPHED) 8 mg in 5% dextrose 250mL (32 mcg/mL) infusion  2-100 mcg/min IntraVENous TITRATE    heparin 25,000 units in D5W 250 ml infusion  18-36 Units/kg/hr IntraVENous TITRATE    heparin (porcine) injection 2,800 Units  40 Units/kg IntraVENous PRN    Or    heparin (porcine) injection 5,550 Units  80 Units/kg IntraVENous PRN    albuterol (PROVENTIL VENTOLIN) nebulizer solution 2.5 mg  2.5 mg Nebulization Q6H PRN     Current Outpatient Medications   Medication Sig    levoFLOXacin (Levaquin) 500 mg tablet Take 1 Tab by mouth every fourty-eight (48) hours. Starting on 12/14 for 2 weeks.  dilTIAZem ER (CARDIZEM CD) 240 mg capsule Take 1 Cap by mouth daily.  predniSONE (DELTASONE) 10 mg tablet Take 10 mg by mouth daily (with breakfast). Take 3 tabs and stop    trimethoprim-sulfamethoxazole (Bactrim DS) 160-800 mg per tablet Take 1 Tab by mouth daily.  sevelamer carbonate (Renvela) 800 mg tab tab Take 2 Tabs by mouth three (3) times daily.  thiamine mononitrate (B-1) 100 mg tablet Take 1 Tab by mouth daily.  therapeutic multivitamin (THERAGRAN) tablet Take 1 Tab by mouth daily.  senna-docusate (PERICOLACE) 8.6-50 mg per tablet Take 1 Tab by mouth daily as needed for Constipation.  nystatin (MYCOSTATIN) 100,000 unit/mL suspension Take 5 mL by mouth four (4) times daily. swish and spit  Indications: thrush    nicotine (NICODERM CQ) 14 mg/24 hr patch 1 Patch by TransDERmal route every twenty-four (24) hours for 30 days.  folic acid (FOLVITE) 1 mg tablet Take 1 Tab by mouth daily.  carvediloL (COREG) 6.25 mg tablet Take 1 Tab by mouth two (2) times daily (with meals).  acetylcysteine (MUCOMYST) 200 mg/mL (20 %) solution 1 mL by Nebulization route three (3) times daily.     fluticasone-umeclidinium-vilanterol (TRELEGY ELLIPTA) 100-62.5-25 mcg inhaler Take 1 Puff by inhalation daily.  oxyCODONE-acetaminophen (PERCOCET)  mg per tablet Take 1 Tab by mouth three (3) times daily.  ergocalciferol (VITAMIN D2) 50,000 unit capsule Take 50,000 Units by mouth every seven (7) days. MONDAY    omeprazole (PRILOSEC) 20 mg capsule Take 20 mg by mouth as needed.  gabapentin (NEURONTIN) 300 mg capsule Take 300 mg by mouth nightly as needed.  b complex-vitamin c-folic acid (NEPHROCAPS) 1 mg capsule Take 1 Cap by mouth daily.  lidocaine-prilocaine (EMLA) topical cream Apply  to affected area as needed for Pain. Patient applies to arm before dialysis on Monday, Wednesday, and Friday.  albuterol-ipratropium (DUO-NEB) 2.5 mg-0.5 mg/3 ml nebu 3 mL by Nebulization route three (3) times daily. AT LUNCHTIME DAILY.  albuterol (PROVENTIL) 90 mcg/Actuation inhaler Take 2 Puffs by inhalation four (4) times daily.  QID PRN          Pratik Orellana MD              [facility]  [facility] / [facility] [address]  [place], [address]  Phone - [phone]               Fax - [phone]

## 2020-12-08 NOTE — CONSULTS
Palliative Medicine  [phone]    Spoke with attending  Goals are clear for comfort  Will cancel consult    Sherin Coreas NP
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
## 2020-12-08 NOTE — PROGRESS NOTES
Spiritual Care Assessment/Progress Note  [facility]      NAME: Joaquin Costa. MRN: [id number]  AGE: 60 y.o. SEX: male  Restoration Affiliation: Adventism   Language: English     12/8/2020           Spiritual Assessment begun in [facility] EMERGENCY DEPT through conversation with:         []Patient        [] Family    [] Friend(s)        Reason for Consult: Palliative Care, Initial/Spiritual Assessment     Spiritual beliefs: (Please include comment if needed)     [] Identifies with a aki tradition:         [] Supported by a aki community:            [] Claims no spiritual orientation:           [] Seeking spiritual identity:                [] Adheres to an individual form of spirituality:           [] Not able to assess:                           Identified resources for coping:      [] Prayer                               [] Music                  [] Guided Imagery     [] Family/friends                 [] Pet visits     [] Devotional reading                         [] Unknown     [] Other:                                               Interventions offered during this visit: (See comments for more details)    Patient Interventions: Affirmation of emotions/emotional suffering, Catharsis/review of pertinent events in supportive environment, Initial/Spiritual assessment, patient floor, Iconic (affirming the presence of God/Higher Power), Prayer (assurance of), Prayer (actual)     Family/Friend(s):  Affirmation of emotions/emotional suffering, Catharsis/review of pertinent events in supportive environment, Initial Assessment, Iconic (affirming the presence of God/Higher Power), Prayer (assurance of), Prayer (actual)     Plan of Care:     [] Support spiritual and/or cultural needs    [] Support AMD and/or advance care planning process      [] Support grieving process   [] Coordinate Rites and/or Rituals    [] Coordination with community clergy   [] No spiritual needs identified at this time   [] Detailed Plan of Care below (See Comments)  [] Make referral to Music Therapy  [] Make referral to Pet Therapy     [] Make referral to Addiction services  [] Make referral to [facility]  [] Make referral to Spiritual Care Partner  [] No future visits requested        [] Follow up upon further referrals     Comments:  was updated on status and POC for Mr Susan Garsia in ED-36 by Dr. Xin Linder. Patient's son, Malvin Saini, was at the patient's beside holding Mr Alba's hand. Provided pastoral presence and active listening as Mr Susan Garsia shared that his greatest concern at that time was for his family's welfare. Acknowledged patient and family's feelings and concerns; offered words of support. With their permission, had prayer on their behalf. Provided support to patient's wife, Azam Miranda, and one of his sisters when they arrived. Family was appropriately tearful as they engaged in anticipatory grieving. Assured them of prayers on patient's and family's behalf and of ongoing  availability for support. : . Maritza Yu.  Alexandrea Santa; [facility], to contact [phone] [address] call: [phone]

## 2020-12-08 NOTE — ED NOTES
Bedside shift change report given to Rylan Pendleton (oncoming nurse) by Citlaly Lopes (offgoing nurse). Report included the following information SBAR, Kardex, ED Summary, [facility] and Recent Results.

## 2020-12-08 NOTE — PROGRESS NOTES
ESRD PT   MISSED DIALYSIS RX   IMAGING SUGGESTS VOLUNE OVERLOAD ANF INCREASING 02 requirements since arrival     Ordered stat UF and NABIL notified   Low threshold to intubate patients if he decompensates further        Sean BREWSTER  RNA
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
## 2020-12-08 NOTE — PROGRESS NOTES
[address] [facility] Adult  Hospitalist Group                                                                                          Critical Care Progress Note  Rylie Teresa MD  Answering service: [phone] from in house phone        Date of Service:  2020  NAME:  Julian Shell. :  1965  MRN:  [id number]      Interval history / Subjective:     Awake, alert, coherent, on levo, no pulses LUE. CTA LUE reviewed. Vascular recommends  Hospice. Assessment & Plan:     Acute Limb Left upper extremity ischemia     - Heparin drip  - Vascular surgery has talked to the patient recommended hospice re-eval. Will get   palliative care. - Monitor doppler signals of the LUE.     Hypotension:     - Cont Levophed drip. BP cuff measurements for now. Clinically ok. Goal SBP greater than 90     COPD  -Nebulized bronchodilators prn  -Supplemental oxygen support, wean as tolerated  -Cessation of tobacco use was advised  - has O2 at home, will use O2 at discharge if he survives     ESRD on HD  -Nephro eval for standard HD who has seen the patient and has ordered stat UF.      History of Pleural effusion: Monitor CXR     Diabetes mellitus type II  -Patient reports no longer using insulin at home per MD instruction  -Patient is NPO. No insulin for now.      Code status: Full  Prophylaxis: Hep drip    Poor prognosis. Will get palliative care to see. Spoke to patient and his wife. She says whatever he says for now is to be done. She is  On her way down here with her 23 yo son as she needs help with walking and driving. She is  2.5 hours away from here. I told her his status and she didn't give me a clear direction on   The code status.   Full code as it stands per patient very clearly repeated today he is ok  With intubation, CPR/Shocks and he agreed yesterday to the central line.      I personally spent   40 minutes of critical care time.  This is time spent at this critically ill patient's bedside actively involved in patient care as well as the coordination of care and discussions with the patient's family.  This does not include any procedural time which has been billed separately. Hospital Problems  Date Reviewed: 1/25/2018          Codes Class Noted POA    Hypotension ICD-10-CM: I95.9  ICD-9-CM: 458.9  12/7/2020 Unknown                Review of Systems:   A comprehensive review of systems was negative except for that written in the HPI. Vital Signs:    Last 24hrs VS reviewed since prior progress note. Most recent are:  Visit Vitals  BP (!) 90/15   Pulse 70   Temp 97.2 °F (36.2 °C)   Resp 10   Ht 5' 6\" (1.676 m)   Wt 69.6 kg (153 lb 7 oz)   SpO2 95%   BMI 24.77 kg/m²         Intake/Output Summary (Last 24 hours) at 12/8/2020 0748  Last data filed at 12/8/2020 0100  Gross per 24 hour   Intake    Output 2500 ml   Net -2500 ml        Physical Examination:             Constitutional:  No acute distress, cooperative, pleasant    ENT:  Oral mucous moist, oropharynx benign. Resp:  CTA bilaterally. No wheezing/rhonchi/rales. No accessory muscle use   CV:  Regular rhythm, normal rate, no murmurs, gallops, rubs    GI:  Soft, non distended, non tender. normoactive bowel sounds, no hepatosplenomegaly     Musculoskeletal:  No edema, warm, 2+ pulses throughout    Neurologic:  Moves all extremities. AAOx3, CN II-XII reviewed     Psych:  Good insight, Not anxious nor agitated. Skin:  Good turgor, no rashes or ulcers  Hematologic/Lymphatic/Immunlogic:  No jaundice nor lymph node swelling  :  deferred  Eyes:  EOMI. Anicteric sclerae, PERRL.        Data Review:    Review and/or order of clinical lab test      Labs:     Recent Labs     12/07/20  1632   WBC 7.8   HGB 7.2*   HCT 23.6*        Recent Labs     12/07/20 2124   *   K 4.7   CL 96*   CO2 29   BUN 41*   CREA 5.10*   GLU 95   CA 8.0*     Recent Labs     12/07/20 2124   ALT 29   AP 62   TBILI 0.6   TP 5.4*   ALB 2.7*   GLOB 2.7 Recent Labs     12/08/20  0553 12/08/20  0335 12/08/20  0045   APTT 26.8 >130.0* >130.0*      No results for input(s): FE, TIBC, PSAT, FERR in the last 72 hours. No results found for: FOL, RBCF   No results for input(s): PH, PCO2, PO2 in the last 72 hours. No results for input(s): CPK, CKNDX, TROIQ in the last 72 hours.     No lab exists for component: CPKMB  No results found for: CHOL, CHOLX, CHLST, CHOLV, HDL, HDLP, LDL, LDLC, DLDLP, TGLX, Adolph Spire, CHHD, CHHDX  Lab Results   Component Value Date/Time    Glucose (POC) 92 12/07/2020 07:37 PM    Glucose (POC) 118 (H) 12/03/2020 06:46 AM    Glucose (POC) 272 (H) 12/02/2020 09:31 PM    Glucose (POC) 150 (H) 12/02/2020 06:50 PM    Glucose (POC) 157 (H) 12/02/2020 11:07 AM     Lab Results   Component Value Date/Time    Color YELLOW/STRAW 06/12/2016 05:24 PM    Appearance CLEAR 06/12/2016 05:24 PM    Specific gravity 1.020 06/12/2016 05:24 PM    Specific gravity 1.013 12/14/2012 11:20 AM    pH (UA) 6.0 06/12/2016 05:24 PM    Protein 100 (A) 06/12/2016 05:24 PM    Glucose NEGATIVE  06/12/2016 05:24 PM    Ketone NEGATIVE  06/12/2016 05:24 PM    Bilirubin NEGATIVE  06/12/2016 05:24 PM    Urobilinogen 0.2 06/12/2016 05:24 PM    Nitrites NEGATIVE  06/12/2016 05:24 PM    Leukocyte Esterase NEGATIVE  06/12/2016 05:24 PM    Epithelial cells FEW 06/12/2016 05:24 PM    Bacteria NEGATIVE  06/12/2016 05:24 PM    WBC 0-4 06/12/2016 05:24 PM    RBC 0-5 06/12/2016 05:24 PM         Medications Reviewed:     Current Facility-Administered Medications   Medication Dose Route Frequency    guaiFENesin ER (MUCINEX) tablet 600 mg  600 mg Oral Q12H    lidocaine 4 % patch 2 Patch  2 Patch TransDERmal Q24H    fentaNYL citrate (PF) injection 25 mcg  25 mcg IntraVENous Q3H PRN    NOREPINephrine (LEVOPHED) 8 mg in 5% dextrose 250mL (32 mcg/mL) infusion  2-100 mcg/min IntraVENous TITRATE    heparin 25,000 units in D5W 250 ml infusion  18-36 Units/kg/hr IntraVENous TITRATE    heparin (porcine) injection 2,800 Units  40 Units/kg IntraVENous PRN    Or    heparin (porcine) injection 5,550 Units  80 Units/kg IntraVENous PRN    albuterol (PROVENTIL VENTOLIN) nebulizer solution 2.5 mg  2.5 mg Nebulization Q6H PRN     Current Outpatient Medications   Medication Sig    levoFLOXacin (Levaquin) 500 mg tablet Take 1 Tab by mouth every fourty-eight (48) hours. Starting on 12/14 for 2 weeks.  dilTIAZem ER (CARDIZEM CD) 240 mg capsule Take 1 Cap by mouth daily.  predniSONE (DELTASONE) 10 mg tablet Take 10 mg by mouth daily (with breakfast). Take 3 tabs and stop    trimethoprim-sulfamethoxazole (Bactrim DS) 160-800 mg per tablet Take 1 Tab by mouth daily.  sevelamer carbonate (Renvela) 800 mg tab tab Take 2 Tabs by mouth three (3) times daily.  thiamine mononitrate (B-1) 100 mg tablet Take 1 Tab by mouth daily.  therapeutic multivitamin (THERAGRAN) tablet Take 1 Tab by mouth daily.  senna-docusate (PERICOLACE) 8.6-50 mg per tablet Take 1 Tab by mouth daily as needed for Constipation.  nystatin (MYCOSTATIN) 100,000 unit/mL suspension Take 5 mL by mouth four (4) times daily. swish and spit  Indications: thrush    nicotine (NICODERM CQ) 14 mg/24 hr patch 1 Patch by TransDERmal route every twenty-four (24) hours for 30 days.  folic acid (FOLVITE) 1 mg tablet Take 1 Tab by mouth daily.  carvediloL (COREG) 6.25 mg tablet Take 1 Tab by mouth two (2) times daily (with meals).  acetylcysteine (MUCOMYST) 200 mg/mL (20 %) solution 1 mL by Nebulization route three (3) times daily.  fluticasone-umeclidinium-vilanterol (TRELEGY ELLIPTA) 100-62.5-25 mcg inhaler Take 1 Puff by inhalation daily.  oxyCODONE-acetaminophen (PERCOCET)  mg per tablet Take 1 Tab by mouth three (3) times daily.  ergocalciferol (VITAMIN D2) 50,000 unit capsule Take 50,000 Units by mouth every seven (7) days. MONDAY    omeprazole (PRILOSEC) 20 mg capsule Take 20 mg by mouth as needed.     gabapentin (NEURONTIN) 300 mg capsule Take 300 mg by mouth nightly as needed.  b complex-vitamin c-folic acid (NEPHROCAPS) 1 mg capsule Take 1 Cap by mouth daily.  lidocaine-prilocaine (EMLA) topical cream Apply  to affected area as needed for Pain. Patient applies to arm before dialysis on Monday, Wednesday, and Friday.  albuterol-ipratropium (DUO-NEB) 2.5 mg-0.5 mg/3 ml nebu 3 mL by Nebulization route three (3) times daily. AT LUNCHTIME DAILY.  albuterol (PROVENTIL) 90 mcg/Actuation inhaler Take 2 Puffs by inhalation four (4) times daily.  QID PRN     ______________________________________________________________________  EXPECTED LENGTH OF STAY: - - -  ACTUAL LENGTH OF STAY:          1                 Todd Hinton MD

## 2020-12-08 NOTE — DIALYSIS
2hr   [facility]  [phone]    Vitals   Pre   Post   Assessment   Pre   Post     Temp  Temp: 97.8 °F (36.6 °C) (12/07/20 2300)  97.5 LOC  A+Ox3 Same   HR   Pulse (Heart Rate): 62 (12/07/20 2300) 72 Lungs   Diminshed, wheeze in bases  same   B/P   BP: (!) 89/64 (12/07/20 2300) 78/5 Cardiac   Irreguler  same   Resp   Resp Rate: 8 (12/07/20 2300) 8 Skin   Warm/dry, w/ echymiosis on both upper extremities  same   Pain level  Pain Intensity 1: 8 (12/07/20 2137) 8 Edema    generalized   same   Orders:    Duration:   Start:    2300 End:    0100 Total:   2hr   Dialyzer:   Dialyzer/Set Up Inspection: Revaclear (12/07/20 2300)   K Bath:       Ca Bath:       Na/Bicarb: Target Fluid Removal:   Goal/Amount of Fluid to Remove (mL): 3000 mL (12/07/20 2300)   Access     Type & Location:   MANUEL-AVF-W/ bruit/thrill, site intact, w/ some redness and bruising around access area   Labs     Obtained/Reviewed   Critical Results Called   Date when labs were drawn-  Hgb-    HGB   Date Value Ref Range Status   12/07/2020 7.2 (L) 12.1 - 17.0 g/dL Final     K-    Potassium   Date Value Ref Range Status   12/07/2020 4.7 3.5 - 5.1 mmol/L Final     Ca-   Calcium   Date Value Ref Range Status   12/07/2020 8.0 (L) 8.5 - 10.1 MG/DL Final     Bun-   BUN   Date Value Ref Range Status   12/07/2020 41 (H) 6 - 20 MG/DL Final     Creat-   Creatinine   Date Value Ref Range Status   12/07/2020 5.10 (H) 0.70 - 1.30 MG/DL Final     Comment:     INVESTIGATED PER DELTA CHECK PROTOCOL        Medications/ Blood Products Given     Name   Dose   Route and Time                     Blood Volume Processed (BVP):    0 Net Fluid   Removed:  2500ml   Comments   Time Out Done:   Primary Nurse Rpt Pre:Primary  Primary Nurse Rpt Post:Primary  Pt Education:Fluid restrictions  Care Plan:Continue Tx as ordered  Tx Summary: Pt tolerated Tx well. All possible blood returned via NS rinseback.   Needles pulle, sites secured, no excessive bleeding. Admiting Diagnosis: L-arm numbness and tingling  Pt's previous clinic-  Consent signed - Informed Consent Verified: Yes (12/07/20 2300)  Kaity Consent - Signed  Hepatitis Status- Neg/immune  Machine #- Machine Number: [id number] (12/07/20 2300)  Telemetry status-Bedside  Pre-dialysis wt. - Pre-Dialysis Weight: 69.6 kg (153 lb 7 oz) (12/07/20 2300)

## 2020-12-08 NOTE — PROGRESS NOTES
Called for pre-HD report on Pt. Informed by ED MD that patient will be made comfort care.  Will inform Guerda bowman

## 2020-12-08 NOTE — PROGRESS NOTES
Met with family bedside. They want to make him a DNR. They are also waiting for one last family member  And then they will very likely proceed with comfort care measures.

## 2020-12-08 NOTE — ED NOTES
Bedside and Verbal shift change report given to THAD Qureshi (oncoming nurse) by Cande Aguirre (offgoing nurse). Report included the following information SBAR, ED Summary, Intake/Output, MAR and Recent Results. HD in process right now. 0100: NP intenivist informed of updated patient status w/ vitals. Will come to re-evaluate when dialysis is completed. 0113: HD completed 2.5L removed per HD nurse. 0123: PTT >130 holding Heparin gtt. Will redraw in 2 hrs per protocol. 0210: NP at bedside to assess patient. NNO at this time. Will continue to monitor. 0340: Repeat PTT sent to lab. Continuing to monitor bp.     0415: PTT >130, still holding heparin gtt. Will redraw in 2 hrs per protocol. NAD. Will continue to monitor. 56: NP made aware of PTT lab, and pt requesting pain medications as well as mucinex. Orders to follow. 4731: Bedside and Verbal shift change report given to THAD Gordon (oncoming nurse) by Jovany Rodriguez (offgoing nurse). Report included the following information SBAR, ED Summary, Intake/Output, MAR and Recent Results. Resting at this time.

## 2020-12-08 NOTE — ED NOTES
Verbal shift change report given to Kiera RN (oncoming nurse) by Zhane Lazo RN (offgoing nurse). Report included the following information SBAR, Kardex and MAR.

## 2020-12-08 NOTE — PROGRESS NOTES
Physician Progress Note      [facility]  CSN #:                  [id number]  :                       1965  ADMIT DATE:       2020 12:24 PM  [address] [place] DATE:  RESPONDING  PROVIDER #:        Sierra Ramos MD          QUERY Joao Kelsey Hospitalist Team:    This patient admitted for acute left limb upper extremity ischemia  The patient had just been discharged 4 days prior with Septic shock. Pt now with Systolic Bp ranging from 28-49N- requiring IV Levophed drip    If possible, please document in the progress notes and discharge summary if you are evaluating and/or treating any of the following: The medical record reflects the following:  Risk Factors: ESRD on HD ., COPD, Recent admission for Sepsis, just discharged 4 days PTA. Clinical Indicators: Systolic Bp  64-56I- Given IVF bolus, requiring IV levophed  drip  Treatment: IV levophed, close monitoring BP, Daily labs, Nephrology  consulted,    Thank you,  Emmie Ca, [address]  Options provided:  -- Cardiogenic Shock  -- Hemorrhagic Shock  -- Shock Unspecified  -- Septic Shock  -- Hypovolemic Shock  -- Hypovolemia without Shock  -- Hypotension without Shock  -- Other - I will add my own diagnosis  -- Disagree - Not applicable / Not valid  -- Disagree - Clinically unable to determine / Unknown  -- Refer to Clinical Documentation Reviewer    PROVIDER RESPONSE TEXT:    This patient has Hypovolemic Shock. Query created by: Lexi Luis on 2020 11:06 AM      QUERY TEXT:    Good Morning Hospitalist Team:    This patient admitted with Acute left upper limb ischemia. Pt noted to have o2 sats 84% while on 12 liter Ventimask. Nephrology notes pt is in \"volume overload\". The patient known COPD and is on 3 liters o2 @ home. If possible, please document in the progress notes and discharge summary if you are evaluating and/or treating any of the following:     The medical record reflects the following:  Risk Factors: COPD on 3 liters o2 @ home. Per ER triage RN  Clinical Indicators: o2 sats dropping to 87% while on 12 liters Ventimask. CXR + corky. pleural effusions with bibasilar atelectasis and mild  edema is present. Treatment: CCU level care, monitoring o2 , currently on 12 liters Ventimask. Vianey Cordero, Nephrology    Thank you,  Junior Serna, Emmie Kaufman, [facility], [facility]  [phone]  Options provided:  -- Acute respiratory failure with hypoxia  -- Acute on chronic respiratory failure with hypoxia  -- Other - I will add my own diagnosis  -- Disagree - Not applicable / Not valid  -- Disagree - Clinically unable to determine / Unknown  -- Refer to Clinical Documentation Reviewer    PROVIDER RESPONSE TEXT:    This patient is in acute respiratory failure with hypoxia.     Query created by: Nitza Pacheco on 12/8/2020 11:13 AM      Electronically signed by:  Ankit Jones MD 12/8/2020 11:42 AM

## 2020-12-08 NOTE — PROGRESS NOTES
Documentation from prior team noted. Vascular surgery signing off. We appreciate the opportunity to participate in the care of Ms. Hank Garvin.

## 2020-12-08 NOTE — PROGRESS NOTES
TRANSFER - IN REPORT:    Verbal report received from Tina(name) on [facility].  being received from ED(unit) for routine progression of care      Report consisted of patients Situation, Background, Assessment and   Recommendations(SBAR). Information from the following report(s) SBAR, Kardex, ED Summary and MAR was reviewed with the receiving nurse. Opportunity for questions and clarification was provided. Assessment completed upon patients arrival to unit and care assumed.

## 2020-12-08 NOTE — PROGRESS NOTES
6420 - Assumed care of patient. Pt resting in bed. BP cuff on right low extremity and difficulty getting accurate bp. ICU MD at bedside and spoke with patient regarding wishes. MD to call wife. Pt would like to have family input. 46 - Family at bedside. SBP > 90. Per Dr. Jaimie Romano that is okay due to his condition. Nephrology at bedside. HD today. 1000 - Family at bedside. Pt still requesting to be full code. Family in agreement. 1300 - Dr. Tj Klein paged regarding increased weakness and unable to lift head off pillow. Pt still stating he does not want to die. 18 - Dr. Tj Klein at bedside. Waiting for wife to return to speak with MD.     [phone] - Family at bedside. Family decided on comfort care and  at bedside. 1508 - Fentanyl started. Wife son and sister at bedside. 1615 - Report called. Pt to transport.

## 2020-12-08 NOTE — ED NOTES
Called RNA spoke w/ MD Gertrudis Gonzalez and was advised that he would try have dialysis ordered for tonight if possible

## 2020-12-08 NOTE — PROGRESS NOTES
CM acknowledged CM consult. CM reviewed patient chart. CM went to patient room to speak with patient at bedside. Patient  sleeping. Per patient nurse patient had just received pain medication. CM placed call to patient's spouse Mikhail Back [phone] to discuss transition of care and discharge planning. CM introduced self and explained purpose of call. Patient spouse became agitatated stating that she was\"getting too many calls \"on phone. CM appologized for calls and offered to call back at a more convenient time. Patient spouse disconnected call.         Shantell Calabrese, MEGANN, RN, [facility]

## 2020-12-09 NOTE — PROGRESS NOTES
SPEECH THERAPY SCREENING: 
SERVICES ARE NOT INDICATED AT THIS TIME [facility] for speech therapy based on results obtained during the nursing admission assessment. The patients chart was reviewed and the patient is not appropriate for a skilled therapy evaluation at this time. Please consult speech therapy if any therapy needs arise. Thank you. Patient has orders for comfort measures only.  
 
Veronica Manning, SLP

## 2020-12-09 NOTE — PROGRESS NOTES
Bedside and Verbal shift change report given to ivonne (oncoming nurse) by calritos (offgoing nurse). Report included the following information SBAR, Kardex, ED Summary and MAR. Patient alert and talking. PCA fentanyl continued. Family at bedside.

## 2020-12-09 NOTE — PROGRESS NOTES
Hugo Santamaria         NAME:Amadou Call. QAU:[id number]   :1965     Last 24 hour notes reviewed  He is now COMFORT CARE    NO MORE Dialysis  We will sign off. Sonia Pastor, [facility]  [facility]  [address], [address] [place]  Phone - [phone]         Fax - [phone] [facility] Office  [address] [place] [address]  Phone - [phone]        Fax - [phone]     [email or web address]

## 2020-12-09 NOTE — PROGRESS NOTES
Palliative Medicine [place]: [phone] ([phone]) [facility]: [phone] ([phone]) Chloe Golden. is a 60 y.o.  male with history of COPD, ESRD on HD, active tobacco use, who presents with  
A painful left arm which on the ER evaluation lacks a pulse and is cold. Patient recently agreed to Hospice with [facility]. I talked with [facility] and patient called this morning because of his distress. Nurse went to the home to evaluate. Patient no longer wanted to have Hospice care and signed revocation paperwork and then came to the ED for evaluation. (from ED notes). Palliative LCSW asked to see family for emotional support. The family, including son Aleah Keita, wife Seth Garrison and sister Maddi Sparks are at bedside, keeping boyer. Family (initially son and wife) upset and angry about \"being lied to\" about prognosis. Family appears to have some illiteracy in terms of healthcare issues, they shared that they were told that \"he would die in 10 minutes to an hour and he is still here 24 hrs later\". Explained to family that patient may have appeared quite uncomfortable and in distress, appeared close to dying but now is more comfortable and able to relax which is why he doesn't appear in distress. Family seemed to understand this. 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 From that time patient has gone \"downhill\" in terms of his health. He had to have multiple surgeries and had diabetes, healing was difficult etc. Son noted that patient and he loved to fish, work on projects. He is patient's only son. Seth Garrison has been  twice previously and has another son. Asked about hospice services and explained how they would be able to help. Family (wife) is not interested in hospice at this time, she does not want anything to change in terms of teams, staff etc. Family feel that patient is finally comfortable and they don't want to change anything at this time. Their goal is to keep him comfortable and for patient to not suffer. LCSW did explain to wife that she needs to be prepared that a discharge plan will likely be brought up again, if patient is comfortable and his care can be managed elsewhere, such as home. This made her very upset and she stormed out of the room. Family wants patient to stay here till he dies. Explained to them that the hospital is not a LTC facility and that people don't usually stay here till end of life, especially if they look comfortable. Encouraged family to go home and rest, they have stayed in the hospital.  
 
LCSW is available to family for support as needed, they do not want patient to have any procedures or labs etc, apparently there was an x-ray ordered today and they were not happy about this. Case discussed with bedside RN, Sarahi Robins and with CMDianne.

## 2020-12-09 NOTE — PROGRESS NOTES
Received notification from bedside RN about patient with regards to: patient requesting Benadryl for itching  VS: BP 79/38, HR 77, RR 15, O2 sat 94%  Intervention given: Benadryl 25 mg IV x1    0312: patient requesting more itch medication, ordered Hydroxyzine.  Patient is comfort measures only

## 2020-12-09 NOTE — PROGRESS NOTES
Chart reviewed. Consult received for hospice. Spoke with palliative care SW this PM who informed CM that pt's family is not interested in hospice at this time. Family feels that pt is comfortable right now and they are not wanting to discuss hospice. CM will attempt to follow up again tomorrow to identify disposition needs pending medical progress. Unable to complete full assessment at this time. RHINA Brennan Care Manager, [facility] 
[phone]

## 2020-12-09 NOTE — PROGRESS NOTES
Hospitalist Progress Note NAME: Sakshi Ngo. :  1965 MRN:  [id number] Assessment / Plan: 
Acute L upper ext ischemia Simon ESRD on HD 
COPD Hx of pleural effusions T2DM CTA BRIANA arm showed Occlusive thrombus in the left distal axillary/proximal brachial artery at 
the level of the origins of both abandoned left upper extremity 
brachial-brachial arteriovenous grafts. There is reconstitution of the brachial 
artery distal to the takeoff of the grafts, with another occlusive thrombus in 
the distal brachial artery extending into the severely diseased left radial 
artery, which is occluded through its entire length. The ulnar artery and 
superficial palmar arch are patent. There is partial reconstitution of the deep 
palmar arch. .  Large L sided pleural effusion with collapse of the LLL Pt was on heparin gtt, vascular was following and recommended hospice. Pt and family agreed upon comfort measures. Pt was initially in the ICU, transfer to floor for comfort measures. Long discussion with pt's wife and son today. They are very angry because they were told he would passed in <1 hr.  Pt's wife repeatedly asked \"when pt is going to pass? \" Pt currently comfortable on fentanyl gtt and wife would like to continue this. They refused inpt hospice eval due to bad experience outpt. Despite my explanation of inpt hospice philosophy, family refusing to have them involved. Will add additional comfort measure orders Will consult palliative team to help with symptom management. Subjective: Chief Complaint / Reason for Physician Visit Pt seen, appears comfortable in bed. Family at bedside. Discussed with RN events overnight. Review of Systems: 
Symptom Y/N Comments  Symptom Y/N Comments Fever/Chills    Chest Pain Poor Appetite    Edema Cough    Abdominal Pain Sputum    Joint Pain SOB/PRITCHETT    Pruritis/Rash Nausea/vomit    Tolerating PT/OT Diarrhea    Tolerating Diet Constipation    Other Could NOT obtain due to:   
 
Objective: VITALS:  
Last 24hrs VS reviewed since prior progress note. Most recent are: 
Patient Vitals for the past 24 hrs: 
 Temp Pulse Resp BP SpO2  
12/09/20 0745 97.2 °F (36.2 °C) 79 12 (!) 63/15   
12/09/20 0122 97.2 °F (36.2 °C) 63 15 (!) 63/38 94 % 12/08/20 1657 97.2 °F (36.2 °C) 77 14 (!) 79/38 96 % Intake/Output Summary (Last 24 hours) at 12/9/2020 1404 Last data filed at 12/9/2020 7991 Gross per 24 hour Intake 19.16 ml Output  Net 19.16 ml I had a face to face encounter and independently examined this patient on 12/9/2020, as outlined below: PHYSICAL EXAM: 
General: Male in bed, on fentanyl gtt. EENT:  EOMI. Anicteric sclerae. MMM Resp:  CTA bilaterally, no wheezing or rales. No accessory muscle use CV:  Regular  rhythm,  No edema GI:  Soft, Non distended, Non tender. +Bowel sounds Neurologic:  Resting in bed, not following commands Psych:   Good insight. Not anxious nor agitated Skin:  No rashes. No jaundice Reviewed most current lab test results and cultures  YES Reviewed most current radiology test results   YES Review and summation of old records today    NO Reviewed patient's current orders and MAR    YES 
PMH/ reviewed - no change compared to H&P 
________________________________________________________________________ Care Plan discussed with: 
  Comments Patient x Family  x   
RN x Care Manager Consultant  x Multidiciplinary team rounds were held today with , nursing, pharmacist and clinical coordinator. Patient's plan of care was discussed; medications were reviewed and discharge planning was addressed. ________________________________________________________________________ Total NON critical care TIME: 35  Minutes Total CRITICAL CARE TIME Spent:   Minutes non procedure based Comments >50% of visit spent in counseling and coordination of care    
________________________________________________________________________ Yovanny Fuller MD  
 
Procedures: see electronic medical records for all procedures/Xrays and details which were not copied into this note but were reviewed prior to creation of Plan. LABS: 
I reviewed today's most current labs and imaging studies. Pertinent labs include: 
Recent Labs 12/07/20 
1632 WBC 7.8 HGB 7.2* HCT 23.6*  Recent Labs 12/07/20 
2124 *  
K 4.7 CL 96* CO2 29  
GLU 95 BUN 41* CREA 5.10* CA 8.0* ALB 2.7* TBILI 0.6 ALT 29 Signed: Yovanny Fuller MD

## 2020-12-09 NOTE — PROGRESS NOTES
End of Shift Note    Bedside shift change report given to Luisa Patel (oncoming nurse) by Oswaldo Kaufman (offgoing nurse). Report included the following information SBAR, Kardex, Intake/Output and MAR    Shift worked:  7p-7a     Shift summary and any significant changes:    PCA increased from 100mcg/hr to 150mcg/hr. PRN Fentanyl x2. Benadryl and Hydroxizine x1. Concerns for physician to address:  IP Hospice consult needed. More appropriate comfort orders. Needs diet order. Would like to speak to Dr. Bonna Ormond phone for onckang shift:   [phone]       Activity:  Activity Level: Bed Rest  Number times ambulated in hallways past shift: 0  Number of times OOB to chair past shift: 0    Cardiac:   Cardiac Monitoring: No      Cardiac Rhythm: Atrial fibrillation    Access:   Current line(s): central line     Genitourinary:   Urinary status: anuric    Respiratory:   O2 Device: Nasal cannula  Chronic home O2 use?: YES  Incentive spirometer at bedside: NO     GI:  Last Bowel Movement Date: 12/08/20  Current diet:  No diet orders on file  Passing flatus: YES  Tolerating current diet: YES       Pain Management:   Patient states pain is manageable on current regimen: YES    Skin:  Walter Score: 11  Interventions: turn team, float heels and foam dressing    Patient Safety:  Fall Score:  Total Score: 3  Interventions: stay with me (per policy)  High Fall Risk: Yes    Length of Stay:  Expected LOS: 3d 19h  Actual LOS: 2      Oswaldo Kaufman

## 2020-12-10 PROCEDURE — 94760 N-INVAS EAR/PLS OXIMETRY 1: CPT

## 2020-12-10 PROCEDURE — 77010033678 HC OXYGEN DAILY

## 2020-12-10 NOTE — PROGRESS NOTES
Bedside and Verbal shift change report given to ivonne (oncoming nurse) by carlitos (offgoing nurse). Report included the following information SBAR, Kardex, ED Summary and MAR.

## 2020-12-10 NOTE — PROGRESS NOTES
:Called to pt room by family. Stated they think pt may have passed. Nurse into verify. Pt still with shallow breathing. Informed family that pt was actively transitioning. Educated on what this looks like. Informed mayeily nurse would be back shortly to to check on family. Pt resting comfortably. PCA infusing. No PRN medication needed at this time. : Called back by family. Confirmed pt has passed. Family grieving appropriately. PCA stopped. : Family left bedside. Provided number to nursing supervisor to call when they have selected a  home. Rivas Malik, Nursing supervisor informed of pt passing. Offered to call  before family left, but family did not want to speak with . Will call per protocol for all pt death now that family has left. Perfect serve message sent Paco Swanson NP to inform of pt passing and asking to come to bedside to pronounce. 2230: NP at bedside to pronounce. 2316: [facility] called, approved of release to  home.  at bedside. 0300: Post mortem care completed by writing nurse and pt transported to [address] [place] [address] without incident. On valuables were socks pt was wearing. Sent to [facility] with pt.

## 2020-12-10 NOTE — DISCHARGE SUMMARY
Discharge Summary Name: Faby Young. 
[id number] YOB: 1965 (Age: 60 y.o.) Date of Admission: 12/7/2020 Date of Discharge: 12/10/2020 Attending Physician: No att. providers found Discharge Diagnosis:  
Acute L upper ext ischemia Simon ESRD on HD 
COPD Hx of pleural effusions T2DM Consultations: 
IP CONSULT TO VASCULAR SURGERY 
IP CONSULT TO PALLIATIVE CARE - PROVIDER Brief Admission History/Reason for Admission Per Lennox Camper, MD: 60 y.o.   male with history of COPD, ESRD on HD, active tobacco use, who presents with A painful left arm which on the ER evaluation lacks a pulse and is cold. BP 50/30 checked multipleTimes, patient still awake, alert. Heparin drip started. Labs currently pending. Patient recently agreed to Hospice with [facility]. I talked with [facility] and patient called this morning because of his distress. Nurse went to the home to evaluate. Patient no longer wanted to have Hospice care and signed revocation paperwork and then came to the ED for evaluation. At this time, patient does not appear to be in support of hospice philosophy but if that were to change, certainly could consult BS hospice but patient was being serviced by [facility] the above and current acute limb ischemia, sending the patient to the ICU now. No labs available yet.  
  
Brief Hospital Course by Main Problems:  
Acute L upper ext ischemia Simon ESRD on HD 
COPD Hx of pleural effusions T2DM CTA BRIANA arm showed occlusive thrombus in the left distal axillary/proximal brachial artery at the level of the origins of both abandoned left upper extremity 
brachial-brachial arteriovenous grafts.  There is reconstitution of the brachial 
artery distal to the takeoff of the grafts, with another occlusive thrombus in 
the distal brachial artery extending into the severely diseased left radial 
 artery, which is occluded through its entire length. The ulnar artery and 
superficial palmar arch are patent. There is partial reconstitution of the deep 
palmar arch. .  Large L sided pleural effusion with collapse of the LLL Pt was on heparin gtt, vascular was following and recommended hospice. Pt and family agreed upon comfort measures. Pt was initially in the ICU on pressors support. Vascular evaluated, recommended hospice. Family agreed and pt was transfer to floor for comfort measures.  
Pt  at 09:31PM

## 2020-12-10 NOTE — PROGRESS NOTES
Responded to patient death on Oncology. Consulted with RN, no family present or coming. Silent prayer at bedside. Rev. Marleni Arroyo Paging Service: [phone]

## 2020-12-10 NOTE — PROGRESS NOTES
I was called to see Mr. Carmen Escalante due to patient's death. On my arrival, He was lying in bed; there was no response to verbal or painful stimuli; pupils were fixed and dilated; there was no pulse, no respirations and no heart sounds. Elvis Mecca was pronounced dead at 09:31 PM. Baldemar Zavala NP 
10:34 PM, 12/9/2020

## 2020-12-12 LAB
BACTERIA SPEC CULT: NORMAL
SERVICE CMNT-IMP: NORMAL

## (undated) DEVICE — DEVON™ KNEE AND BODY STRAP 60" X 3" (1.5 M X 7.6 CM): Brand: DEVON

## (undated) DEVICE — Device

## (undated) DEVICE — REM POLYHESIVE ADULT PATIENT RETURN ELECTRODE: Brand: VALLEYLAB

## (undated) DEVICE — (D)PREP SKN CHLRAPRP APPL 26ML -- CONVERT TO ITEM 371833

## (undated) DEVICE — SUTURE VCRL SZ 3-0 L27IN ABSRB UD FS-2 L19MM 1/2 CIR J423H

## (undated) DEVICE — SOLUTION IRRIG 1000ML H2O STRL BLT

## (undated) DEVICE — FOGARTY ARTERIAL EMBOLECTOMY CATHETER 4F 80CM: Brand: FOGARTY

## (undated) DEVICE — DRAPE,REIN 53X77,STERILE: Brand: MEDLINE

## (undated) DEVICE — SOLUTION IV 500ML 0.9% SOD CHL FLX CONT

## (undated) DEVICE — SLIM BODY SKIN STAPLER: Brand: APPOSE ULC

## (undated) DEVICE — SUTURE VCRL SZ 3-0 L27IN ABSRB UD L26MM SH 1/2 CIR J416H

## (undated) DEVICE — KENDALL SCD EXPRESS SLEEVES, KNEE LENGTH, MEDIUM: Brand: KENDALL SCD

## (undated) DEVICE — STERILE POLYISOPRENE POWDER-FREE SURGICAL GLOVES WITH EMOLLIENT COATING: Brand: PROTEXIS

## (undated) DEVICE — CONQUEST® PTA BALLOON DILATATION CATHETER 8 MM X 40 MM, 75 CM CATHETER: Brand: CONQUEST®

## (undated) DEVICE — FOGARTY ARTERIAL EMBOLECTOMY CATHETER 3F 80CM: Brand: FOGARTY

## (undated) DEVICE — Z INACTIVE NO USAGE TURNOVER KIT RM CLEANOP

## (undated) DEVICE — DEVICE INFL 20ML L13IN 30ATM CLR POLYCARB TB PRTBL DGT

## (undated) DEVICE — SUTURE PROL 5-0 L18IN NONABSORBABLE BLU RB-2 L13MM 1/2 CIR 8713H

## (undated) DEVICE — SYR 3ML LL TIP 1/10ML GRAD --

## (undated) DEVICE — INFECTION CONTROL KIT SYS

## (undated) DEVICE — HANDLE LT SNAP ON ULT DURABLE LENS FOR TRUMPF ALC DISPOSABLE

## (undated) DEVICE — SUT PROL 6-0 18IN BV1 DA BLU --

## (undated) DEVICE — GAUZE SPONGES,12 PLY: Brand: CURITY

## (undated) DEVICE — AMC/4 ARTERIAL NEEDLE – 18GA X 2.75” (7CM): Brand: ARTERIAL NEEDLE

## (undated) DEVICE — SOLUTION IV 1000ML 0.9% SOD CHL

## (undated) DEVICE — SUT ETHLN 3-0 18IN PS2 BLK --

## (undated) DEVICE — FEMALE LUER ADAPTER: Brand: ARGYLE

## (undated) DEVICE — SUTURE NONABSORBABLE MONOFILAMENT 4-0 CV-5 PT-13 24 IN GORTX 5K08B

## (undated) DEVICE — DRAPE SURG W41XL74IN CLR FULL SZ C ARM 3 ADH POLY STRP E

## (undated) DEVICE — RADIFOCUS GLIDEWIRE: Brand: GLIDEWIRE

## (undated) DEVICE — PINNACLE INTRODUCER SHEATH: Brand: PINNACLE